# Patient Record
Sex: FEMALE | Race: WHITE | NOT HISPANIC OR LATINO | Employment: OTHER | URBAN - METROPOLITAN AREA
[De-identification: names, ages, dates, MRNs, and addresses within clinical notes are randomized per-mention and may not be internally consistent; named-entity substitution may affect disease eponyms.]

---

## 2018-07-09 ENCOUNTER — TELEPHONE (OUTPATIENT)
Dept: NEUROSURGERY | Facility: CLINIC | Age: 73
End: 2018-07-09

## 2018-07-09 NOTE — TELEPHONE ENCOUNTER
Message left earlier in the day reporting she had previous surgery with Dr Chin Carl and would like a call back after 1 pm   Call returned with no answer and request to return call at her convenience

## 2018-11-27 ENCOUNTER — HOSPITAL ENCOUNTER (OUTPATIENT)
Dept: RADIOLOGY | Facility: HOSPITAL | Age: 73
Discharge: HOME | End: 2018-11-27
Payer: MEDICARE

## 2018-11-27 ENCOUNTER — OFFICE VISIT (OUTPATIENT)
Dept: OBGYN CLINIC | Facility: HOSPITAL | Age: 73
End: 2018-11-27
Payer: MEDICARE

## 2018-11-27 VITALS
HEART RATE: 73 BPM | DIASTOLIC BLOOD PRESSURE: 80 MMHG | WEIGHT: 193 LBS | HEIGHT: 63 IN | BODY MASS INDEX: 34.2 KG/M2 | SYSTOLIC BLOOD PRESSURE: 138 MMHG

## 2018-11-27 DIAGNOSIS — M79.642 LEFT HAND PAIN: ICD-10-CM

## 2018-11-27 DIAGNOSIS — G56.21 GUYON SYNDROME, RIGHT: ICD-10-CM

## 2018-11-27 DIAGNOSIS — M79.642 LEFT HAND PAIN: Primary | ICD-10-CM

## 2018-11-27 DIAGNOSIS — M79.641 RIGHT HAND PAIN: ICD-10-CM

## 2018-11-27 DIAGNOSIS — G56.02 CARPAL TUNNEL SYNDROME ON LEFT: ICD-10-CM

## 2018-11-27 PROCEDURE — 99213 OFFICE O/P EST LOW 20 MIN: CPT | Performed by: PHYSICIAN ASSISTANT

## 2018-11-27 PROCEDURE — 73130 X-RAY EXAM OF HAND: CPT

## 2018-11-27 RX ORDER — LISINOPRIL 40 MG/1
40 TABLET ORAL DAILY
COMMUNITY
End: 2018-11-27 | Stop reason: CLARIF

## 2018-11-27 RX ORDER — LEVOTHYROXINE SODIUM 0.15 MG/1
150 TABLET ORAL
COMMUNITY
Start: 2018-10-12 | End: 2019-03-18 | Stop reason: SDUPTHER

## 2018-11-27 RX ORDER — LEVOTHYROXINE SODIUM 0.1 MG/1
100 TABLET ORAL DAILY
COMMUNITY
End: 2018-11-27 | Stop reason: CLARIF

## 2018-11-27 RX ORDER — BUPROPION HYDROCHLORIDE 150 MG/1
TABLET, EXTENDED RELEASE ORAL
COMMUNITY
Start: 2018-09-03 | End: 2019-03-18

## 2018-11-27 RX ORDER — LISINOPRIL 40 MG/1
1 TABLET ORAL DAILY
COMMUNITY
Start: 2013-11-05 | End: 2019-03-18

## 2018-11-27 RX ORDER — SERTRALINE HYDROCHLORIDE 100 MG/1
150 TABLET, FILM COATED ORAL DAILY
Refills: 1 | COMMUNITY
Start: 2018-10-14 | End: 2019-03-18

## 2018-11-27 RX ORDER — ALBUTEROL SULFATE 90 UG/1
AEROSOL, METERED RESPIRATORY (INHALATION) 3 TIMES DAILY PRN
COMMUNITY
Start: 2014-04-28 | End: 2019-03-18

## 2018-11-27 RX ORDER — CYCLOSPORINE 0.5 MG/ML
1 EMULSION OPHTHALMIC EVERY 12 HOURS
COMMUNITY
Start: 2018-11-07 | End: 2022-02-02 | Stop reason: ALTCHOICE

## 2018-11-27 RX ORDER — TRAZODONE HYDROCHLORIDE 100 MG/1
100 TABLET ORAL
COMMUNITY
Start: 2018-08-14 | End: 2019-03-18

## 2018-11-27 RX ORDER — PANTOPRAZOLE SODIUM 40 MG/1
TABLET, DELAYED RELEASE ORAL
COMMUNITY
Start: 2018-11-20 | End: 2019-03-18

## 2018-11-27 RX ORDER — ATORVASTATIN CALCIUM 20 MG/1
20 TABLET, FILM COATED ORAL
COMMUNITY
Start: 2017-08-28 | End: 2019-03-18

## 2018-11-27 NOTE — PROGRESS NOTES
Assessment/Plan   Diagnoses and all orders for this visit:      Carpal tunnel syndrome on left  -     EMG 2 Limb Upper Extremity; Future    Guyon syndrome, right  -     EMG 2 Limb Upper Extremity; Future    Other orders  -     Carpal tunnel night splints  Follow up with Dr Esa Bosch after the EMG          Subjective   Patient ID: Olman Blackman is a 74 y o  female  Vitals:    11/27/18 1044   BP: 138/80   Pulse: 68     72yo female comes in for an evaluation of her b/l hands  She has been doing a lot quilting lately and has been getting pain and numbness in her hands  Right hand: She has a history of a carpal tunnel release and cubital tunnel release in 2016 by Dr Esa Bosch  At that time, she was having numbness and pain in the long, ring, and small fingers  Currently, she has had a return of pain and numbness in the same three fingers  She has not noticed weakness  The pain does not radiate  Left hand:  No surgical history  She c/o pain and numbness in the index, long, and ring fingers  This increases at night and it increases with quilting  The left hand is worse than the right  The pain radiates to the elbow  No weakness  + history of cspine fusion, but she denies any neck pain now  The following portions of the patient's history were reviewed and updated as appropriate: allergies, current medications, past family history, past medical history, past social history, past surgical history and problem list     Review of Systems  Ortho Exam  Past Medical History:   Diagnosis Date    Acid reflux     Depressed     Hyperlipemia     Hypertension     Hypothyroid     Tendinitis      Past Surgical History:   Procedure Laterality Date    APPENDECTOMY      GALLBLADDER SURGERY      KNEE SURGERY Right     NECK SURGERY      WRIST SURGERY Right      History reviewed  No pertinent family history  Social History     Occupational History    Not on file       Social History Main Topics    Smoking status: Former Smoker    Smokeless tobacco: Never Used    Alcohol use No    Drug use: No    Sexual activity: Not on file       Review of Systems   Constitutional: Negative  HENT: Negative  Eyes: Negative  Respiratory: Negative  Cardiovascular: Negative  Gastrointestinal: Negative  Endocrine: Negative  Genitourinary: Negative  Musculoskeletal: As below      Allergic/Immunologic: Negative  Neurological: Negative  Hematological: Negative  Psychiatric/Behavioral: Negative  Objective   Physical Exam    · Constitutional: Awake, Alert, Oriented  · Eyes: EOMI  · Psych: Mood and affect appropriate  · Heart: regular rate and rhythm  · Lungs: No audible wheezing  · Abdomen: soft  · Lymph: no lymphedema   bilateral hand:  - Appearance   No swelling, discoloration, deformity, or ecchymosis and no thenar wasting  - Palpation  o No tenderness to palpation of distal radius, distal ulna, scaphoid, lunate, hamate, ulnar wrist, dorsal wrist, palmar wrist, thenar eminence, hypothenar eminence, or hand   - ROM  o full active flexion and extension  - Motor  o  5/5, wrist extension 5/5, and wrist flexion 5/5, interossi 5/5  - Special Tests  o + Phalen's maneuver and + carpal tunnel compression test bilaterally  These both cause symptoms in the index/long/ring on the right and the long/ring/small on the left  Negative tinels b/l at the carpal tunnel and cubital tunnel   + tinels at guy[place] canal on the right only  - NVI distally except as above  I have personally reviewed pertinent films in PACS and my interpretation is Osteoarthritis noted in the DIP and PIP joints  No acute fracture  Stella Jones

## 2019-01-07 ENCOUNTER — EVALUATION (OUTPATIENT)
Dept: PHYSICAL THERAPY | Facility: MEDICAL CENTER | Age: 74
End: 2019-01-07
Payer: MEDICARE

## 2019-01-07 DIAGNOSIS — M76.61 RIGHT ACHILLES TENDINITIS: Primary | ICD-10-CM

## 2019-01-07 PROCEDURE — G8979 MOBILITY GOAL STATUS: HCPCS | Performed by: PHYSICAL THERAPIST

## 2019-01-07 PROCEDURE — 97161 PT EVAL LOW COMPLEX 20 MIN: CPT | Performed by: PHYSICAL THERAPIST

## 2019-01-07 PROCEDURE — G8978 MOBILITY CURRENT STATUS: HCPCS | Performed by: PHYSICAL THERAPIST

## 2019-01-07 NOTE — LETTER
2019    Esther Shafer DPM  [address] [place] [address] [address]    Patient: Maria Fernanda Hyde   YOB: 1945   Date of Visit: 2019     Encounter Diagnosis     ICD-10-CM    1  Right Achilles tendinitis M76 61        Dear Dr Johan Hall:    Please review the attached Plan of Care from [facility] recent visit  Please verify that you agree therapy should continue by signing the attached document and sending it back to our office  If you have any questions or concerns, please don't hesitate to call  Sincerely,    Mary eWbb, PT      Referring Provider:      I certify that I have read the below Plan of Care and certify the need for these services furnished under this plan of treatment while under my care  Esther Shafer DPM  [address] [place] [address] [address]  VIA [place]          PT Evaluation     Today's date: 2019  Patient name: Maria Fernanda Hyde  : 1945  MRN: [id number]  Referring provider: LUISANA Lewis  Dx:   Encounter Diagnosis     ICD-10-CM    1  Right Achilles tendinitis M76 61                   Assessment  Assessment details: Maria Fernanda Hyde is a pleasant 74 y o  female presents with right achilles pain  No further referral appears necessary at this time  Primary movement diagnosis of gastroc tightness and TCJ hypomobility resulting in symptoms of pain and limiting her participation/performance in walk long distances, wear shoes with backs, go down stairs   Primary impairments include:  1)  Gastroc tightness and muscle spasm - addressing with STR and stretching   2) Poor TCJ mobility -addressing with mobilizations   3) Achilles insertion irritation - addressing with modalities     Skilled PT services appropriate to facilitate return to prior level of function with transition to home exercise program for independent management when appropriate      Impairments: abnormal muscle firing, abnormal muscle tone, abnormal or restricted ROM, impaired physical strength, lacks appropriate home exercise program and pain with function  Understanding of Dx/Px/POC: good   Prognosis: good    Goals  Patient will successfully transition to home exercise program   Patient will be able to manage symptoms independently  Patient will be able to walk one mile without pain in 8 weeks  Patient will be able to go down stairs without limitation in 8 weeks  Patient will be able to wear backed shoes without discomfort in 8 weeks     Plan  Patient would benefit from: skilled PT  Referral necessary: No  Planned modality interventions: thermotherapy: hydrocollator packs  Planned therapy interventions: home exercise program, manual therapy, neuromuscular re-education, patient education, functional ROM exercises, strengthening, stretching, joint mobilization, graded activity, graded exercise, therapeutic exercise, body mechanics training, motor coordination training and activity modification  Frequency: 2x week  Duration in weeks: 8  Treatment plan discussed with: patient        Subjective Evaluation    History of Present Illness  Mechanism of injury: Destinee Shukla is a 74 y o  female presenting to therapy with complaints of right achilles pain  She reports that pain began a year ago and has gotten worse  She has had injection which helped her pain for a few weeks but pain returned  She is unable to wear shoes with backs as the pressure makes pain intolerable  She limits her walking or standing due to pain and hopes to get back to walking for exercise      Pain  Current pain ratin  At best pain ratin  At worst pain ratin  Quality: dull ache and sharp    Patient Goals  Patient goals for therapy: decreased pain, independence with ADLs/IADLs and increased motion          Objective     Observations     Additional Observation Details  Visible swelling/tendon hypertrophy to posterior heel with TTP    Palpation     Right Tenderness of the lateral gastrocnemius and medial gastrocnemius  Trigger point to lateral gastrocnemius and medial gastrocnemius  Tenderness     Right Ankle/Foot   Tenderness in the Achilles insertion  Neurological Testing     Sensation     Ankle/Foot   Left Ankle/Foot   Intact: light touch    Right Ankle/Foot   Intact: light touch     Active Range of Motion   Left Ankle/Foot   Dorsiflexion (ke): 5 degrees   Dorsiflexion (kf): 20 degrees   Plantar flexion: WFL  Inversion: WFL  Eversion: WFL    Right Ankle/Foot   Dorsiflexion (ke): 5 degrees   Dorsiflexion (kf): 15 degrees with pain  Plantar flexion: WFL  Inversion: WFL  Eversion: WFL    Passive Range of Motion   Left Ankle/Foot    Dorsiflexion (ke): 5 degrees   Dorsiflexion (kf): 25 degrees     Right Ankle/Foot    Dorsiflexion (ke): 5 degrees   Dorsiflexion (kf): 15 degrees      Joint Play     Right Ankle/Foot  Hypomobile in the talocrural joint, subtalar joint and midfoot         Flowsheet Rows      Most Recent Value   PT/OT G-Codes   Current Score  49   Projected Score  56          Precautions: None    Daily Treatment Diary     Manual  1/7            Posterior TCJ mobilizations  AF            Gastroc STR AF                                                       Exercise Diary              Gastroc stretching              Self gastroc soft tissue release                                                                                                                                                                                                                                                            Modalities

## 2019-01-08 ENCOUNTER — TRANSCRIBE ORDERS (OUTPATIENT)
Dept: PHYSICAL THERAPY | Facility: MEDICAL CENTER | Age: 74
End: 2019-01-08

## 2019-01-08 DIAGNOSIS — M76.61 RIGHT ACHILLES TENDINITIS: Primary | ICD-10-CM

## 2019-01-11 ENCOUNTER — OFFICE VISIT (OUTPATIENT)
Dept: PHYSICAL THERAPY | Facility: MEDICAL CENTER | Age: 74
End: 2019-01-11
Payer: MEDICARE

## 2019-01-11 DIAGNOSIS — M76.61 RIGHT ACHILLES TENDINITIS: Primary | ICD-10-CM

## 2019-01-11 PROCEDURE — 97140 MANUAL THERAPY 1/> REGIONS: CPT

## 2019-01-11 PROCEDURE — 97110 THERAPEUTIC EXERCISES: CPT

## 2019-01-11 NOTE — PROGRESS NOTES
Daily Note     Today's date: 2019  Patient name: Omlan Blackman  : 1945  MRN: [id number]  Referring provider: LUISANA Barlow  Dx:   Encounter Diagnosis     ICD-10-CM    1  Right Achilles tendinitis M76 61        Start Time: 1000  Stop Time: 1035  Total time in clinic (min): 35 minutes    Subjective: Patient reports "3/10" pain prior to PT today  Patient notes decrease stiffness post therapy  Objective: See treatment diary below    Assessment: Tolerated treatment fair  Patient demonstrated fatigue post treatment, exhibited good technique with therapeutic exercises and would benefit from continued PT  Mod myofacial gastroc restriction noted during manual therapy  Patient felt improved post therapy today noting "decreased stiffness"  However, patient noted achilles soreness/irritation  Plan: Continue per plan of care       Precautions: None    Daily Treatment Diary     Manual             Posterior TCJ mobilizations  AF            Gastroc STR AF JM           Gastroc/achilles STM  JM           Foam Roller  JM                          Exercise Diary              Gastroc stretching   20"x5           Self gastroc soft tissue release              PF TB  Pink  2x10           Heel raises  2x10           Bike   5'                                                                                                                                                                                                                Modalities

## 2019-01-14 ENCOUNTER — APPOINTMENT (OUTPATIENT)
Dept: PHYSICAL THERAPY | Facility: MEDICAL CENTER | Age: 74
End: 2019-01-14
Payer: MEDICARE

## 2019-01-18 ENCOUNTER — APPOINTMENT (OUTPATIENT)
Dept: PHYSICAL THERAPY | Facility: MEDICAL CENTER | Age: 74
End: 2019-01-18
Payer: MEDICARE

## 2019-01-22 ENCOUNTER — OFFICE VISIT (OUTPATIENT)
Dept: PHYSICAL THERAPY | Facility: MEDICAL CENTER | Age: 74
End: 2019-01-22
Payer: MEDICARE

## 2019-01-22 DIAGNOSIS — M76.61 RIGHT ACHILLES TENDINITIS: Primary | ICD-10-CM

## 2019-01-22 PROCEDURE — 97110 THERAPEUTIC EXERCISES: CPT | Performed by: PHYSICAL THERAPIST

## 2019-01-22 PROCEDURE — 97140 MANUAL THERAPY 1/> REGIONS: CPT | Performed by: PHYSICAL THERAPIST

## 2019-01-23 NOTE — PROGRESS NOTES
Daily Note     Today's date: 2019  Patient name: Kimberly Holloway  : 1945  MRN: [id number]  Referring provider: LUISANA Katz  Dx:   Encounter Diagnosis     ICD-10-CM    1  Right Achilles tendinitis M76 61                   Subjective: Alvaro Gomez reports that she is noticing some improvements but still has pain and cannot wear backed shoes      Objective: See treatment diary below  Precautions: None    Daily Treatment Diary     Manual            Posterior TCJ mobilizations  AF  AF          Gastroc STR AF JM           Gastroc/achilles STM  JM AF          Foam Roller  JM                          Exercise Diary              Gastroc stretching   20"x5 With strap  30 sec  X3          Self gastroc soft tissue release              PF TB  Pink  2x10           Heel raises  2x10           Bike   5' 10'          BAPS forward and back   5 sec  x20          Heel slides in sitting   5 sec  x20                                                                                                                                                                                     Modalities                                                         Assessment: Tolerated treatment well  Patient with best stretch using strap and avoiding weight bearing due to heel pain  Also suggested she contact referring provider and inquire about use of iontophoresis to control some pain       Plan: Continue per plan of care

## 2019-01-25 ENCOUNTER — OFFICE VISIT (OUTPATIENT)
Dept: PHYSICAL THERAPY | Facility: MEDICAL CENTER | Age: 74
End: 2019-01-25
Payer: MEDICARE

## 2019-01-25 DIAGNOSIS — M76.61 RIGHT ACHILLES TENDINITIS: Primary | ICD-10-CM

## 2019-01-25 PROCEDURE — 97140 MANUAL THERAPY 1/> REGIONS: CPT

## 2019-01-25 PROCEDURE — 97110 THERAPEUTIC EXERCISES: CPT

## 2019-01-25 NOTE — PROGRESS NOTES
Daily Note     Today's date: 2019  Patient name: Iman Venegas  : 1945  MRN: [id number]  Referring provider: LUISANA Monsalve  Dx:   Encounter Diagnosis     ICD-10-CM    1  Right Achilles tendinitis M76 61        Start Time: 1500  Stop Time: 1540  Total time in clinic (min): 40 minutes    Subjective: Pt reports R achilles is feeling better than usual today  "It just pulls a little bit "      Objective: See treatment diary below    Precautions: None    Daily Treatment Diary     Manual           Posterior TCJ mobilizations  AF  AF          Gastroc STR AF JM           Gastroc/achilles STM  JM AF AFB         Foam Roller  JM  AFB                        Exercise Diary              Gastroc stretching   20"x5 With strap  30 sec  X3 With strap  30 sec x3         Self gastroc soft tissue release              PF TB  Pink  2x10  Pink  2x10         Heel raises  2x10  2x10         Bike   5' 10' 10'         BAPS forward and back   5 sec  x20 5 sec  x20         Heel slides in sitting   5 sec  x20 5 sec  x20                                                                                                                                                                                    Modalities                                                         Assessment: Tolerated treatment well  Heel raises and PF TB added back to program this session  Was able to perform all exercises today with no significant increase of pain  Pt stated she felt the heel slides have been helping her the most   Moderate tension in achilles and distal gastroc noted during STM  Patient would benefit from continued PT to further decrease pain and return to PLOF  Plan: Continue per plan of care  Progress treatment as tolerated

## 2019-01-28 ENCOUNTER — OFFICE VISIT (OUTPATIENT)
Dept: PHYSICAL THERAPY | Facility: MEDICAL CENTER | Age: 74
End: 2019-01-28
Payer: MEDICARE

## 2019-01-28 DIAGNOSIS — M76.61 RIGHT ACHILLES TENDINITIS: Primary | ICD-10-CM

## 2019-01-28 PROCEDURE — 97110 THERAPEUTIC EXERCISES: CPT

## 2019-01-28 PROCEDURE — 97140 MANUAL THERAPY 1/> REGIONS: CPT

## 2019-01-28 NOTE — PROGRESS NOTES
Daily Note     Today's date: 2019  Patient name: Shital Garza  : 1945  MRN: [id number]  Referring provider: LUISANA Schilling  Dx:   Encounter Diagnosis     ICD-10-CM    1  Right Achilles tendinitis M76 61        Start Time: 1430  Stop Time: 1510  Total time in clinic (min): 40 minutes    Subjective: Pt reports her R ankle "doesn't feel as good as it did on Friday, but still better "      Objective: See treatment diary below  Precautions: None    Daily Treatment Diary     Manual          Posterior TCJ mobilizations  AF  AF          Gastroc STR AF JM           Gastroc/achilles STM  JM AF [employer] AF[employer]        Foam Roller  JM  AFB AFB                       Exercise Diary              Gastroc stretching   20"x5 With strap  30 sec  X3 With strap  30 sec x3 With strap  30 sec x3        Self gastroc soft tissue release              PF TB  Pink  2x10  Pink  2x10 Pink  3x10        Heel raises  2x10  2x10 3x10        Bike   5' 10' 10' 10'        BAPS forward and back   5 sec  x20 5 sec  x20 5 sec  x30        Heel slides in sitting   5 sec  x20 5 sec  x20 5 sec  x30                                                                                                                                                                                   Modalities                                                         Assessment: Tolerated treatment well  Reps increased as tolerated by patient  Minimal verbal cueing required for proper form with exercises  Patient reported feeling slightly better at the end of session  "It feels more stretched "  Patient would benefit from continued PT  Plan: Continue per plan of care  Progress treatment as tolerated

## 2019-02-01 ENCOUNTER — OFFICE VISIT (OUTPATIENT)
Dept: PHYSICAL THERAPY | Facility: MEDICAL CENTER | Age: 74
End: 2019-02-01
Payer: MEDICARE

## 2019-02-01 DIAGNOSIS — M76.61 RIGHT ACHILLES TENDINITIS: Primary | ICD-10-CM

## 2019-02-01 PROCEDURE — 97140 MANUAL THERAPY 1/> REGIONS: CPT

## 2019-02-01 PROCEDURE — 97110 THERAPEUTIC EXERCISES: CPT

## 2019-02-01 NOTE — PROGRESS NOTES
Daily Note     Today's date: 2019  Patient name: No Yeh  : 1945  MRN: [id number]  Referring provider: LUISANA Gutierrez  Dx:   Encounter Diagnosis     ICD-10-CM    1  Right Achilles tendinitis M76 61        Start Time:   Stop Time:   Total time in clinic (min): 45 minutes    Subjective: Pt states pain today is about the same as after her last visit  "It's much better than it ever was, but I can tell I still do to much "      Objective: See treatment diary below  Precautions: None    Daily Treatment Diary     Manual         Posterior TCJ mobilizations  AF  AF          Gastroc STR AF JM           Gastroc/achilles STM  JM AF [employer] AF[employer] AFB       Foam Roller  JM  AFB AFB AFB                      Exercise Diary              Gastroc stretching   20"x5 With strap  30 sec  X3 With strap  30 sec x3 With strap  30 sec x3 With strap  30 sec x4       Self gastroc soft tissue release       AFB       PF TB  Pink  2x10  Pink  2x10 Pink  3x10 Pink  3x10       Heel raises  2x10  2x10 3x10 3x10       Bike   5' 10' 10' 10' 10'       BAPS forward and back   5 sec  x20 5 sec  x20 5 sec  x30 5 sec  x30       Heel slides in sitting   5 sec  x20 5 sec  x20 5 sec  x30 5 sec  x30                                                                                                                                                                                  Modalities                                                         Assessment: Tolerated treatment well  Moderate tension and trigger points noted in muscle belly of R gastroc, which improved with STM and foam roaller  Would likely tolerate increased resistance w/ PF TB next session  Patient was able to perform all exercise with no significant increase of pain  Patient would benefit from continued PT  Plan: Continue per plan of care  Progress treatment as tolerated

## 2019-02-04 ENCOUNTER — OFFICE VISIT (OUTPATIENT)
Dept: PHYSICAL THERAPY | Facility: MEDICAL CENTER | Age: 74
End: 2019-02-04
Payer: MEDICARE

## 2019-02-04 DIAGNOSIS — M76.61 RIGHT ACHILLES TENDINITIS: Primary | ICD-10-CM

## 2019-02-04 PROCEDURE — 97110 THERAPEUTIC EXERCISES: CPT

## 2019-02-04 PROCEDURE — 97140 MANUAL THERAPY 1/> REGIONS: CPT

## 2019-02-04 NOTE — PROGRESS NOTES
Daily Note     Today's date: 2019  Patient name: Rina Baumgarten  : 1945  MRN: [id number]  Referring provider: LUISANA Scott  Dx:   Encounter Diagnosis     ICD-10-CM    1  Right Achilles tendinitis M76 61        Start Time: 1430  Stop Time: 1515  Total time in clinic (min): 45 minutes    Subjective: Pt stated she had no pain today prior to start of session  Objective: See treatment diary below  Precautions: None    Daily Treatment Diary     Manual        Posterior TCJ mobilizations  AF  AF          Gastroc STR AF JM           Gastroc/achilles STM  JM AF AFB AFB AFB AFB      Foam Roller  JM  AFB AFB AFB AFB                     Exercise Diary              Gastroc stretching   20"x5 With strap  30 sec  X3 With strap  30 sec x3 With strap  30 sec x3 With strap  30 sec x4 With strap  30 sec x4      Self gastroc soft tissue release       AFB       PF TB  Pink  2x10  Pink  2x10 Pink  3x10 Pink  3x10 orange  3x10      Heel raises  2x10  2x10 3x10 3x10 x25 (P!)      Bike   5' 10' 10' 10' 10' 10'      BAPS forward and back   5 sec  x20 5 sec  x20 5 sec  x30 5 sec  x30 5 sec  x30      Heel slides in sitting   5 sec  x20 5 sec  x20 5 sec  x30 5 sec  x30 5 sec  x30                                                                                                                                                                                 Modalities                                                         Assessment: Tolerated treatment well  Increased pain experienced while performing heel raises  Remaining reps held 2* to this report of increased pain  Tension and trigger points continue to present in R gastroc/achilles, but has improved since last session  Patient would benefit from continued PT  Plan: Continue per plan of care  Progress treatment as tolerated

## 2019-02-08 ENCOUNTER — OFFICE VISIT (OUTPATIENT)
Dept: PHYSICAL THERAPY | Facility: MEDICAL CENTER | Age: 74
End: 2019-02-08
Payer: MEDICARE

## 2019-02-08 ENCOUNTER — APPOINTMENT (OUTPATIENT)
Dept: RADIOLOGY | Facility: MEDICAL CENTER | Age: 74
End: 2019-02-08
Payer: MEDICARE

## 2019-02-08 ENCOUNTER — TRANSCRIBE ORDERS (OUTPATIENT)
Dept: ADMINISTRATIVE | Facility: HOSPITAL | Age: 74
End: 2019-02-08

## 2019-02-08 DIAGNOSIS — M76.61 TENDONITIS, ACHILLES, RIGHT: ICD-10-CM

## 2019-02-08 DIAGNOSIS — M76.61 TENDONITIS, ACHILLES, RIGHT: Primary | ICD-10-CM

## 2019-02-08 DIAGNOSIS — M76.61 RIGHT ACHILLES TENDINITIS: Primary | ICD-10-CM

## 2019-02-08 PROCEDURE — 97110 THERAPEUTIC EXERCISES: CPT | Performed by: PHYSICAL THERAPIST

## 2019-02-08 PROCEDURE — 97140 MANUAL THERAPY 1/> REGIONS: CPT | Performed by: PHYSICAL THERAPIST

## 2019-02-08 PROCEDURE — 73650 X-RAY EXAM OF HEEL: CPT

## 2019-02-08 NOTE — PROGRESS NOTES
Daily Note     Today's date: 2019  Patient name: Hugo Oglesby  : 1945  MRN: [id number]  Referring provider: LUISANA Orourke  Dx:   Encounter Diagnosis     ICD-10-CM    1  Right Achilles tendinitis M76 61                   Subjective: Reports no pain currently  Will be seeing podiatrist next Monday  Objective: See treatment diary below  Precautions: None    Daily Treatment Diary     Manual       Posterior TCJ mobilizations  AF  AF          Gastroc STR AF JM           Gastroc/achilles STM  JM AF AFB AFB AFB AFB DD used graston     Foam Roller  JM  AFB AFB AFB AFB DD                    Exercise Diary              Gastroc stretching   20"x5 With strap  30 sec  X3 With strap  30 sec x3 With strap  30 sec x3 With strap  30 sec x4 With strap  30 sec x4 With strap  30 sec x4     Self gastroc soft tissue release       AFB       PF TB  Pink  2x10  Pink  2x10 Pink  3x10 Pink  3x10 orange  3x10 Blue TB, 3x10     Heel raises  2x10  2x10 3x10 3x10 x25 (P!) R foot on floor, L foot on stool  Focus on eccentric  10x2     Bike   5' 10' 10' 10' 10' 10' 10' L2     BAPS forward and back   5 sec  x20 5 sec  x20 5 sec  x30 5 sec  x30 5 sec  x30 30x      Heel slides in sitting   5 sec  x20 5 sec  x20 5 sec  x30 5 sec  x30 5 sec  x30 5 sec  x30                                                                                                                                                                                Modalities                                                         Assessment: Tolerated treatment well  Responsded well to [facility] with instrument reporting reduced pain and tightness in tendon  Able to tlerated progression on thera band difficulty today  Modified heel raise exercise in response to pain last visit, pt assited with concentric and focused on eccentric which produced less pain compared to last session  Would benefit from continue PT       Plan: Continue per plan of care  Progress treatment as tolerated

## 2019-02-11 ENCOUNTER — APPOINTMENT (OUTPATIENT)
Dept: PHYSICAL THERAPY | Facility: MEDICAL CENTER | Age: 74
End: 2019-02-11
Payer: MEDICARE

## 2019-02-22 ENCOUNTER — OFFICE VISIT (OUTPATIENT)
Dept: OBGYN CLINIC | Facility: HOSPITAL | Age: 74
End: 2019-02-22
Payer: MEDICARE

## 2019-02-22 VITALS
BODY MASS INDEX: 34.73 KG/M2 | SYSTOLIC BLOOD PRESSURE: 131 MMHG | WEIGHT: 196 LBS | HEART RATE: 77 BPM | HEIGHT: 63 IN | DIASTOLIC BLOOD PRESSURE: 75 MMHG

## 2019-02-22 DIAGNOSIS — G56.01 CARPAL TUNNEL SYNDROME OF RIGHT WRIST: Primary | ICD-10-CM

## 2019-02-22 DIAGNOSIS — G56.02 CARPAL TUNNEL SYNDROME OF LEFT WRIST: ICD-10-CM

## 2019-02-22 PROCEDURE — 99214 OFFICE O/P EST MOD 30 MIN: CPT | Performed by: ORTHOPAEDIC SURGERY

## 2019-02-22 NOTE — PROGRESS NOTES
ASSESSMENT/PLAN:    Assessment:   Bilateral CTS    Plan:   Patient will start wearing the braces again as her symptoms resolved while wearing them  Follow Up:  8  week(s)    To Do Next Visit:   recheck CTS    General Discussions:     Carpal Tunnel Syndrome: The anatomy and physiology of carpal tunnel syndrome was discussed with the patient today  Increase pressure localized under the transverse carpal ligament can cause pain, numbness, tingling, or dysesthesias within the median nerve distribution as well as feelings of fatigue, clumsiness, or awkwardness  These symptoms typically occur at night and worse in the morning upon waking  Eventually, untreated carpal tunnel syndrome can result in weakness and permanent loss of muscle within the thenar compartment of the hand  Treatment options were discussed with the patient  Conservative treatment includes nocturnal resting splints to keep the nerve in a neutral position, ergonomic changes within the work or home environment, activity modification, and tendon gliding exercises  Steroid injections within the carpal canal can help a majority of patients, however this is often self-limited in a majority of patients  Surgical intervention to divide the transverse carpal ligament typically results in a long-lasting relief of the patient's complaints, with the recurrence rate of less than 1%        _____________________________________________________  CHIEF COMPLAINT:  Chief Complaint   Patient presents with    Right Wrist - Pain, Numbness    Left Wrist - Pain, Numbness         SUBJECTIVE:  Olman Blackman is a 74y o  year old female who presents with Numbness to the bilateral index finger, long finger and thumb  This increases at night and it increases with quilting  Patient was seen by [facility] who provided the patient with braces, ordered an EMG and referred here today  Patient states while she was using the night time splints her symptoms resolved  Since she stopped wearing them the N/T has returned  Patient had a previous right carpal tunnel release and cubital tunnel release in 2016  PAST MEDICAL HISTORY:  Past Medical History:   Diagnosis Date    Acid reflux     Depressed     Hyperlipemia     Hypertension     Hypothyroid     Tendinitis        PAST SURGICAL HISTORY:  Past Surgical History:   Procedure Laterality Date    APPENDECTOMY      GALLBLADDER SURGERY      KNEE SURGERY Right     NECK SURGERY      WRIST SURGERY Right        FAMILY HISTORY:  Family History   Problem Relation Age of Onset    No Known Problems Mother     No Known Problems Father        SOCIAL HISTORY:  Social History     Tobacco Use    Smoking status: Former Smoker    Smokeless tobacco: Never Used   Substance Use Topics    Alcohol use: No    Drug use: No       MEDICATIONS:    Current Outpatient Medications:     albuterol (PROAIR HFA) 90 mcg/act inhaler, Inhale 3 (three) times a day as needed, Disp: , Rfl:     atorvastatin (LIPITOR) 20 mg tablet, Take 20 mg by mouth, Disp: , Rfl:     buPROPion (WELLBUTRIN SR) 150 mg 12 hr tablet, TAKE 1 TABLET BY MOUTH TWICE A DAY, Disp: , Rfl:     levothyroxine 150 mcg tablet, Take 150 mcg by mouth, Disp: , Rfl:     lisinopril (ZESTRIL) 40 mg tablet, Take 1 tablet by mouth daily, Disp: , Rfl:     pantoprazole (PROTONIX) 40 mg tablet, , Disp: , Rfl:     RESTASIS 0 05 % ophthalmic emulsion, , Disp: , Rfl:     sertraline (ZOLOFT) 100 mg tablet, Take 150 mg by mouth daily, Disp: , Rfl: 1    traZODone (DESYREL) 100 mg tablet, Take 100 mg by mouth, Disp: , Rfl:     ALLERGIES:  Allergies   Allergen Reactions    Ceftin [Cefuroxime] Hives       REVIEW OF SYSTEMS:  Pertinent items are noted in HPI  A comprehensive review of systems was negative      LABS:  HgA1c: No results found for: HGBA1C  BMP:   Lab Results   Component Value Date    GLUCOSE 121 02/27/2015    CALCIUM 8 3 02/27/2015     02/27/2015    K 4 3 02/27/2015    CO2 29 02/27/2015     02/27/2015    BUN 18 08/29/2016    CREATININE 0 96 08/29/2016         _____________________________________________________  PHYSICAL EXAMINATION:  General: well developed and well nourished, alert, oriented times 3 and appears comfortable  Psychiatric: Normal  HEENT: Trachea Midline, No torticollis  Cardiovascular: No discernable arrhythmia  Pulmonary: No wheezing or stridor  Skin: No masses, erthema, lacerations, fluctation, ulcerations  Neurovascular: Pulses Intact    MUSCULOSKELETAL EXAMINATION:  LEFT SIDE:  Carpal tunnel:  Postive Tinel's, derkin's compression + for pain, 5/5 AIN, 5/5 intrinsic, 4-/5 opposition   RIGHT SIDE:  Carpal tunnel:  Negative tinel's test and derkin's compression causes pain, AIN 5/5, intrinsic 5/5, opposition 5/5    _____________________________________________________  STUDIES REVIEWED:  EMG: Mild to Moderate CTS of the right wrist, borderline to mild CTS of the left wrist       PROCEDURES PERFORMED:  Procedures  No Procedures performed today     Scribe Attestation    I,:   Lucas Mercedes am acting as a scribe while in the presence of the attending physician :        I,:   Teresa Wilkins MD personally performed the services described in this documentation    as scribed in my presence :

## 2019-03-01 ENCOUNTER — APPOINTMENT (OUTPATIENT)
Dept: PHYSICAL THERAPY | Facility: MEDICAL CENTER | Age: 74
End: 2019-03-01
Payer: MEDICARE

## 2019-03-04 ENCOUNTER — APPOINTMENT (OUTPATIENT)
Dept: PHYSICAL THERAPY | Facility: MEDICAL CENTER | Age: 74
End: 2019-03-04
Payer: MEDICARE

## 2019-03-05 ENCOUNTER — OFFICE VISIT (OUTPATIENT)
Dept: PHYSICAL THERAPY | Facility: MEDICAL CENTER | Age: 74
End: 2019-03-05
Payer: MEDICARE

## 2019-03-05 DIAGNOSIS — M76.61 RIGHT ACHILLES TENDINITIS: Primary | ICD-10-CM

## 2019-03-05 PROCEDURE — 97140 MANUAL THERAPY 1/> REGIONS: CPT

## 2019-03-05 PROCEDURE — 97110 THERAPEUTIC EXERCISES: CPT
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
## 2019-03-08 ENCOUNTER — APPOINTMENT (OUTPATIENT)
Dept: PHYSICAL THERAPY | Facility: MEDICAL CENTER | Age: 74
End: 2019-03-08
Payer: MEDICARE

## 2019-03-08 ENCOUNTER — OFFICE VISIT (OUTPATIENT)
Dept: PHYSICAL THERAPY | Facility: MEDICAL CENTER | Age: 74
End: 2019-03-08
Payer: MEDICARE

## 2019-03-08 DIAGNOSIS — M76.61 RIGHT ACHILLES TENDINITIS: Primary | ICD-10-CM

## 2019-03-08 PROCEDURE — 97140 MANUAL THERAPY 1/> REGIONS: CPT | Performed by: PHYSICAL THERAPIST

## 2019-03-08 PROCEDURE — 97110 THERAPEUTIC EXERCISES: CPT | Performed by: PHYSICAL THERAPIST

## 2019-03-08 NOTE — PROGRESS NOTES
Daily Note     Today's date: 3/8/2019  Patient name: Jovan Denis  : 1945  MRN: [id number]  Referring provider: LUISANA Lai  Dx:   Encounter Diagnosis     ICD-10-CM    1  Right Achilles tendinitis M[age]        Start Time: 1055  Stop Time: 1140  Total time in clinic (min): 45 minutes    Subjective: Patient rates pain pre-treatment as 2/10  She mentions that she noticed relief of pain for about 2 days with the ionto patch  Objective: See treatment diary below    Precautions: None    Daily Treatment Diary     Manual  1/7 1/11 1/22 1/25 1/28 2/1 2/4 2/8 3/5 3/8   Posterior TCJ mobilizations  AF  AF          Gastroc STR AF JM       KO SK   Gastroc/achilles STM  JM AF AFB AFB AFB AFB DD used graston STM  -KO SK   Foam Roller  JM  AFB AFB AFB AFB DD KO SK   Ionto patch to R achilles         KO SK     Exercise Diary         2/8 3/5 3/8   Gastroc stretching   20"x5 With strap  30 sec  X3 With strap  30 sec x3 With strap  30 sec x3 With strap  30 sec x4 With strap  30 sec x4 With strap  30 sec x4 w/strap  30 sec  x3   w/strap  30 sec  x3     Self gastroc soft tissue release       AFB       PF TB  Pink  2x10  Pink  2x10 Pink  3x10 Pink  3x10 orange  3x10 Blue TB, 3x10 Blue  TB  30x Purple  TB  30x   Heel raises  2x10  2x10 3x10 3x10 x25 (P!) R foot on floor, L foot on stool   Focus on eccentric  10x2 W/  eccentric lowering  2x10 eccentric lowering  2x10   Bike   5' 10' 10' 10' 10' 10' 10' L2 10' 10'   BAPS forward and back   5 sec  x20 5 sec  x20 5 sec  x30 5 sec  x30 5 sec  x30 30x  Rocker  board  30x Rocker  board  30x   Heel slides in sitting   5 sec  x20 5 sec  x20 5 sec  x30 5 sec  x30 5 sec  x30 5 sec  x30 hep    HS stretch-long  sit         30"x3 30"x3   HR's         3x10 3x10                                                                                                                                                    Modalities Assessment: Tolerated treatment well  Patient reports pain feels better after manual therapy  Tolerated increased resistance for TB PF  Patient demonstrated fatigue post treatment, exhibited good technique with therapeutic exercises and would benefit from continued PT      Plan: Continue per plan of care  Progress as per patient tolerance

## 2019-03-13 ENCOUNTER — OFFICE VISIT (OUTPATIENT)
Dept: PHYSICAL THERAPY | Facility: MEDICAL CENTER | Age: 74
End: 2019-03-13
Payer: MEDICARE

## 2019-03-13 DIAGNOSIS — M76.61 RIGHT ACHILLES TENDINITIS: Primary | ICD-10-CM

## 2019-03-13 PROCEDURE — 97140 MANUAL THERAPY 1/> REGIONS: CPT

## 2019-03-13 PROCEDURE — 97110 THERAPEUTIC EXERCISES: CPT

## 2019-03-13 NOTE — PROGRESS NOTES
Daily Note     Today's date: 3/13/2019  Patient name: Iman Venegas  : 1945  MRN: [id number]  Referring provider: LUISANA Monsalve  Dx:   Encounter Diagnosis     ICD-10-CM    1  Right Achilles tendinitis M[age]        Start Time: 1430  Stop Time: 1520  Total time in clinic (min): 50 minutes    Subjective: Patient reports "2/10" pain today  Decreased pain post manual therapy  Ionto patch has helped, however, it fell off over the weekend secondary to "the sock" she wore  Objective: See treatment diary below    Precautions: None    Daily Treatment Diary     Manual  3/13   1/25 1/28 2/1 2/4 2/8 3/5 3/8   Posterior TCJ mobilizations              Gastroc STR JM        KO SK   Gastroc/achilles STM JM   AFB AFB AFB AFB DD used graston STM  -KO SK   Foam Roller    AFB AFB AFB AFB DD KO SK   Ionto patch to R achilles JM        KO SK     Exercise Diary  3/13       2/8 3/5 3/8   Gastroc stretching  30"x3   With strap  30 sec x3 With strap  30 sec x3 With strap  30 sec x4 With strap  30 sec x4 With strap  30 sec x4 w/strap  30 sec  x3   w/strap  30 sec  x3     Self gastroc soft tissue release       AFB       PF TB Purple TB  30x   Pink  2x10 Pink  3x10 Pink  3x10 orange  3x10 Blue TB, 3x10 Blue  TB  30x Purple  TB  30x   Heel raises eccentric lowering 2x10   2x10 3x10 3x10 x25 (P!) R foot on floor, L foot on stool   Focus on eccentric  10x2 W/  eccentric lowering  2x10 eccentric lowering  2x10   Bike  10'   10' 10' 10' 10' 10' L2 10' 10'   BAPS forward and back rockerboard 30x   5 sec  x20 5 sec  x30 5 sec  x30 5 sec  x30 30x  Rocker  board  30x Rocker  board  30x   Heel slides in sitting    5 sec  x20 5 sec  x30 5 sec  x30 5 sec  x30 5 sec  x30 hep    HS stretch-long  sit 30"x3        30"x3 30"x3   HR's 3x10        3x10 3x10                                                                                                                                                    Modalities Assessment: Tolerated treatment well  Patient demonstrated fatigue post treatment, exhibited good technique with therapeutic exercises and would benefit from continued PT  Plan: Continue per plan of care

## 2019-03-15 ENCOUNTER — OFFICE VISIT (OUTPATIENT)
Dept: PHYSICAL THERAPY | Facility: MEDICAL CENTER | Age: 74
End: 2019-03-15
Payer: MEDICARE

## 2019-03-15 DIAGNOSIS — M76.61 RIGHT ACHILLES TENDINITIS: Primary | ICD-10-CM

## 2019-03-15 PROCEDURE — 97140 MANUAL THERAPY 1/> REGIONS: CPT

## 2019-03-15 PROCEDURE — 97110 THERAPEUTIC EXERCISES: CPT

## 2019-03-15 NOTE — PROGRESS NOTES
Daily Note     Today's date: 3/15/2019  Patient name: Cleveland De La Paz  : 1945  MRN: [id number]  Referring provider: LUISANA Malin  Dx:   Encounter Diagnosis     ICD-10-CM    1  Right Achilles tendinitis M[age]                   Subjective: Patient reports that the pain on the medial side of her achilles is better, but she has had some increased pain on the lateral border, down towards her heel  She states that the pain is intermittent  Objective: See treatment diary below    Precautions: None    Daily Treatment Diary     Manual  3/13   1/25 1/28 2/1 2/4 2/8 3/5 3/8 3/15   Posterior TCJ mobilizations               Gastroc STR JM        KO SK TH   Gastroc/achilles STM JM   AFB AFB AFB AFB DD used graston STM  -KO SK TH   Foam Roller    AFB AFB AFB AFB DD KO SK NP   Ionto patch to R achilles JM        KO SK [facility] AT [place]     Exercise Diary  3/13       2/8 3/5 3/8 3/15   Gastroc stretching  30"x3   With strap  30 sec x3 With strap  30 sec x3 With strap  30 sec x4 With strap  30 sec x4 With strap  30 sec x4 w/strap  30 sec  x3   w/strap  30 sec  x3   w/strap  30 sec x3   Self gastroc soft tissue release       AFB        PF TB Purple TB  30x   Pink  2x10 Pink  3x10 Pink  3x10 orange  3x10 Blue TB, 3x10 Blue  TB  30x Purple  TB  30x Purple  30x   Heel raises eccentric lowering 2x10   2x10 3x10 3x10 x25 (P!) R foot on floor, L foot on stool  Focus on eccentric  10x2 W/  eccentric lowering  2x10 eccentric lowering  2x10 Ecc   Lower  2x10   Bike  10'   10' 10' 10' 10' 10' L2 10' 10' 10'   BAPS forward and back rockerboard 30x   5 sec  x20 5 sec  x30 5 sec  x30 5 sec  x30 30x  Rocker  board  30x Rocker  board  30x Rockerbrd  30x   Heel slides in sitting    5 sec  x20 5 sec  x30 5 sec  x30 5 sec  x30 5 sec  x30 hep     HS stretch-long  sit 30"x3        30"x3 30"x3 30"x3   HR's 3x10        3x10 3x10 3x10 Modalities                                                        Assessment: Tolerated treatment well  Patient demonstrated fatigue post treatment, exhibited good technique with therapeutic exercises and would benefit from continued PT  Tenderness noted along lateral portion of achilles, with decreased tenderness noted after STM  Pt reports decreased pain post treatment  Plan: Continue per plan of care

## 2019-03-18 ENCOUNTER — OFFICE VISIT (OUTPATIENT)
Dept: PHYSICAL THERAPY | Facility: MEDICAL CENTER | Age: 74
End: 2019-03-18
Payer: MEDICARE

## 2019-03-18 ENCOUNTER — OFFICE VISIT (OUTPATIENT)
Dept: FAMILY MEDICINE CLINIC | Facility: CLINIC | Age: 74
End: 2019-03-18
Payer: MEDICARE

## 2019-03-18 VITALS
WEIGHT: 192 LBS | SYSTOLIC BLOOD PRESSURE: 132 MMHG | DIASTOLIC BLOOD PRESSURE: 78 MMHG | RESPIRATION RATE: 12 BRPM | OXYGEN SATURATION: 96 % | HEIGHT: 60 IN | BODY MASS INDEX: 37.69 KG/M2 | HEART RATE: 76 BPM

## 2019-03-18 DIAGNOSIS — E78.5 HYPERLIPIDEMIA, UNSPECIFIED HYPERLIPIDEMIA TYPE: ICD-10-CM

## 2019-03-18 DIAGNOSIS — E03.9 HYPOTHYROIDISM, UNSPECIFIED TYPE: ICD-10-CM

## 2019-03-18 DIAGNOSIS — Z12.11 SCREEN FOR COLON CANCER: ICD-10-CM

## 2019-03-18 DIAGNOSIS — F32.A DEPRESSION, UNSPECIFIED DEPRESSION TYPE: ICD-10-CM

## 2019-03-18 DIAGNOSIS — R06.02 SHORTNESS OF BREATH: ICD-10-CM

## 2019-03-18 DIAGNOSIS — M76.61 RIGHT ACHILLES TENDINITIS: Primary | ICD-10-CM

## 2019-03-18 DIAGNOSIS — J45.20 MILD INTERMITTENT ASTHMA WITHOUT COMPLICATION: ICD-10-CM

## 2019-03-18 DIAGNOSIS — Z12.39 SCREENING FOR MALIGNANT NEOPLASM OF BREAST: ICD-10-CM

## 2019-03-18 DIAGNOSIS — Z00.00 MEDICARE ANNUAL WELLNESS VISIT, SUBSEQUENT: ICD-10-CM

## 2019-03-18 DIAGNOSIS — K21.9 GASTROESOPHAGEAL REFLUX DISEASE WITHOUT ESOPHAGITIS: ICD-10-CM

## 2019-03-18 DIAGNOSIS — I10 ESSENTIAL HYPERTENSION: ICD-10-CM

## 2019-03-18 PROBLEM — E66.812 CLASS 2 SEVERE OBESITY DUE TO EXCESS CALORIES WITH SERIOUS COMORBIDITY AND BODY MASS INDEX (BMI) OF 36.0 TO 36.9 IN ADULT (HCC): Status: ACTIVE | Noted: 2017-02-01

## 2019-03-18 PROBLEM — R42 VERTIGO: Status: ACTIVE | Noted: 2018-06-12

## 2019-03-18 PROBLEM — F41.8 MIXED ANXIETY AND DEPRESSIVE DISORDER: Status: ACTIVE | Noted: 2017-01-05

## 2019-03-18 PROBLEM — S46.009A INJURY OF TENDON OF ROTATOR CUFF: Status: ACTIVE | Noted: 2017-11-14

## 2019-03-18 PROBLEM — M17.9 OSTEOARTHRITIS OF KNEE: Status: ACTIVE | Noted: 2019-03-18

## 2019-03-18 PROBLEM — M17.10 OSTEOARTHRITIS OF KNEE: Status: ACTIVE | Noted: 2019-03-18

## 2019-03-18 PROBLEM — M54.12 CERVICAL RADICULITIS: Status: ACTIVE | Noted: 2019-03-18

## 2019-03-18 PROBLEM — S14.121A: Status: ACTIVE | Noted: 2019-03-18

## 2019-03-18 PROBLEM — E66.01 CLASS 2 SEVERE OBESITY DUE TO EXCESS CALORIES WITH SERIOUS COMORBIDITY AND BODY MASS INDEX (BMI) OF 36.0 TO 36.9 IN ADULT (HCC): Status: ACTIVE | Noted: 2017-02-01

## 2019-03-18 PROCEDURE — 97140 MANUAL THERAPY 1/> REGIONS: CPT | Performed by: PHYSICAL THERAPIST

## 2019-03-18 PROCEDURE — 93000 ELECTROCARDIOGRAM COMPLETE: CPT | Performed by: FAMILY MEDICINE

## 2019-03-18 PROCEDURE — 97110 THERAPEUTIC EXERCISES: CPT | Performed by: PHYSICAL THERAPIST

## 2019-03-18 PROCEDURE — 99214 OFFICE O/P EST MOD 30 MIN: CPT | Performed by: FAMILY MEDICINE

## 2019-03-18 PROCEDURE — G0439 PPPS, SUBSEQ VISIT: HCPCS | Performed by: FAMILY MEDICINE

## 2019-03-18 RX ORDER — LEVOTHYROXINE SODIUM 0.15 MG/1
150 TABLET ORAL DAILY
Qty: 90 TABLET | Refills: 3 | Status: SHIPPED | OUTPATIENT
Start: 2019-03-18 | End: 2019-07-17

## 2019-03-18 RX ORDER — ALBUTEROL SULFATE 90 UG/1
2 AEROSOL, METERED RESPIRATORY (INHALATION) EVERY 6 HOURS PRN
Qty: 1 INHALER | Refills: 3 | Status: SHIPPED | OUTPATIENT
Start: 2019-03-18 | End: 2020-03-31 | Stop reason: SDUPTHER

## 2019-03-18 RX ORDER — AMLODIPINE BESYLATE 10 MG/1
TABLET ORAL
COMMUNITY
Start: 2013-11-05 | End: 2019-03-18 | Stop reason: SDUPTHER

## 2019-03-18 RX ORDER — BUPROPION HYDROCHLORIDE 150 MG/1
150 TABLET, EXTENDED RELEASE ORAL 2 TIMES DAILY
Qty: 180 TABLET | Refills: 3 | Status: SHIPPED | OUTPATIENT
Start: 2019-03-18 | End: 2020-05-22

## 2019-03-18 RX ORDER — ALBUTEROL SULFATE 90 UG/1
1 AEROSOL, METERED RESPIRATORY (INHALATION) EVERY 6 HOURS
COMMUNITY
Start: 2015-10-14 | End: 2019-03-18

## 2019-03-18 RX ORDER — SERTRALINE HYDROCHLORIDE 100 MG/1
50 TABLET, FILM COATED ORAL DAILY
Qty: 90 TABLET | Refills: 3 | Status: SHIPPED | OUTPATIENT
Start: 2019-03-18 | End: 2020-01-23 | Stop reason: SDUPTHER

## 2019-03-18 RX ORDER — LISINOPRIL 10 MG/1
TABLET ORAL
COMMUNITY
End: 2019-03-18

## 2019-03-18 RX ORDER — FLUTICASONE PROPIONATE 110 UG/1
2 AEROSOL, METERED RESPIRATORY (INHALATION) 2 TIMES DAILY
Qty: 1 INHALER | Refills: 3 | Status: SHIPPED | OUTPATIENT
Start: 2019-03-18 | End: 2020-03-31 | Stop reason: SDUPTHER

## 2019-03-18 RX ORDER — PANTOPRAZOLE SODIUM 40 MG/1
40 TABLET, DELAYED RELEASE ORAL DAILY
Qty: 90 TABLET | Refills: 3 | Status: SHIPPED | OUTPATIENT
Start: 2019-03-18 | End: 2019-04-08 | Stop reason: SDUPTHER

## 2019-03-18 RX ORDER — BUPROPION HYDROCHLORIDE 100 MG/1
TABLET, EXTENDED RELEASE ORAL
Refills: 0 | COMMUNITY
Start: 2019-01-02 | End: 2019-03-18

## 2019-03-18 RX ORDER — LISINOPRIL 40 MG/1
40 TABLET ORAL DAILY
Qty: 90 TABLET | Refills: 3 | Status: SHIPPED | OUTPATIENT
Start: 2019-03-18 | End: 2020-03-31 | Stop reason: SDUPTHER

## 2019-03-18 RX ORDER — TRAZODONE HYDROCHLORIDE 100 MG/1
100 TABLET ORAL
Qty: 90 TABLET | Refills: 3 | Status: SHIPPED | OUTPATIENT
Start: 2019-03-18 | End: 2020-03-31 | Stop reason: SDUPTHER

## 2019-03-18 RX ORDER — BUPROPION HYDROCHLORIDE 100 MG/1
TABLET ORAL
COMMUNITY
Start: 2019-01-02 | End: 2019-03-18

## 2019-03-18 RX ORDER — AMLODIPINE BESYLATE 10 MG/1
10 TABLET ORAL DAILY
Qty: 90 TABLET | Refills: 3 | Status: SHIPPED | OUTPATIENT
Start: 2019-03-18 | End: 2020-03-31 | Stop reason: SDUPTHER

## 2019-03-18 RX ORDER — ATORVASTATIN CALCIUM 20 MG/1
20 TABLET, FILM COATED ORAL DAILY
Qty: 90 TABLET | Refills: 3 | Status: SHIPPED | OUTPATIENT
Start: 2019-03-18 | End: 2020-07-11 | Stop reason: SDUPTHER

## 2019-03-18 RX ORDER — LEVOTHYROXINE SODIUM 0.15 MG/1
TABLET ORAL
COMMUNITY
Start: 2019-02-05 | End: 2019-03-18

## 2019-03-18 RX ORDER — PANTOPRAZOLE SODIUM 40 MG/1
TABLET, DELAYED RELEASE ORAL
COMMUNITY
Start: 2014-07-09 | End: 2019-03-18

## 2019-03-18 NOTE — PROGRESS NOTES
Daily Note     Today's date: 3/18/2019  Patient name: Hugo Oglesby  : 1945  MRN: [id number]  Referring provider: LUISANA Orourke  Dx:   Encounter Diagnosis     ICD-10-CM    1  Right Achilles tendinitis M[age]                   Subjective: Clovis Diaz reports she is sore today but overall feeling much better       Objective: See treatment diary below  Precautions: None    Daily Treatment Diary     Manual  3/18            Gastroc MFR  AF                                                                    Exercise Diary              Bike 10 min            Gastroc stretch 30 sec  X3            Soleus stretch 30 sec  X3            Standing PF 3X10            TB ankle 4 way Blue  3x10                                                                                                                                                                                                                   Modalities                                                               Assessment: Tolerated treatment well  Patient with improved toleration to walking and standing  Progress as able      Plan: Continue per plan of care

## 2019-03-18 NOTE — PATIENT INSTRUCTIONS
Start flovent 2 puffs twice daily  Obtain stress test  F/u 1 month  Obesity   AMBULATORY CARE:   Obesity  is when your body mass index (BMI) is greater than 30  Your healthcare provider will use your height and weight to measure your BMI  The risks of obesity include  many health problems, such as injuries or physical disability  You may need tests to check for the following:  · Diabetes     · High blood pressure or high cholesterol     · Heart disease     · Gallbladder or liver disease     · Cancer of the colon, breast, prostate, liver, or kidney     · Sleep apnea     · Arthritis or gout  Seek care immediately if:   · You have a severe headache, confusion, or difficulty speaking  · You have weakness on one side of your body  · You have chest pain, sweating, or shortness of breath  Contact your healthcare provider if:   · You have symptoms of gallbladder or liver disease, such as pain in your upper abdomen  · You have knee or hip pain and discomfort while walking  · You have symptoms of diabetes, such as intense hunger and thirst, and frequent urination  · You have symptoms of sleep apnea, such as snoring or daytime sleepiness  · You have questions or concerns about your condition or care  Treatment for obesity  focuses on helping you lose weight to improve your health  Even a small decrease in BMI can reduce the risk for many health problems  Your healthcare provider will help you set a weight-loss goal   · Lifestyle changes  are the first step in treating obesity  These include making healthy food choices and getting regular physical activity  Your healthcare provider may suggest a weight-loss program that involves coaching, education, and therapy  · Medicine  may help you lose weight when it is used with a healthy diet and physical activity  · Surgery  can help you lose weight if you are very obese and have other health problems  There are several types of weight-loss surgery  Ask your healthcare provider for more information  Be successful losing weight:   · Set small, realistic goals  An example of a small goal is to walk for 20 minutes 5 days a week  Anther goal is to lose 5% of your body weight  · Tell friends, family members, and coworkers about your goals  and ask for their support  Ask a friend to lose weight with you, or join a weight-loss support group  · Identify foods or triggers that may cause you to overeat , and find ways to avoid them  Remove tempting high-calorie foods from your home and workplace  Place a bowl of fresh fruit on your kitchen counter  If stress causes you to eat, then find other ways to cope with stress  · Keep a diary to track what you eat and drink  Also write down how many minutes of physical activity you do each day  Weigh yourself once a week and record it in your diary  Eating changes: You will need to eat 500 to 1,000 fewer calories each day than you currently eat to lose 1 to 2 pounds a week  The following changes will help you cut calories:  · Eat smaller portions  Use small plates, no larger than 9 inches in diameter  Fill your plate half full of fruits and vegetables  Measure your food using measuring cups until you know what a serving size looks like  · Eat 3 meals and 1 or 2 snacks each day  Plan your meals in advance  [place] and eat at home most of the time  Eat slowly  · Eat fruits and vegetables at every meal   They are low in calories and high in fiber, which makes you feel full  Do not add butter, margarine, or cream sauce to vegetables  Use herbs to season steamed vegetables  · Eat less fat and fewer fried foods  Eat more baked or grilled chicken and fish  These protein sources are lower in calories and fat than red meat  Limit fast food  Dress your salads with olive oil and vinegar instead of bottled dressing  · Limit the amount of sugar you eat  Do not drink sugary beverages  Limit alcohol    Activity changes:  Physical activity is good for your body in many ways  It helps you burn calories and build strong muscles  It decreases stress and depression, and improves your mood  It can also help you sleep better  Talk to your healthcare provider before you begin an exercise program   · Exercise for at least 30 minutes 5 days a week  Start slowly  Set aside time each day for physical activity that you enjoy and that is convenient for you  It is best to do both weight training and an activity that increases your heart rate, such as walking, bicycling, or swimming  · Find ways to be more active  Do yard work and housecleaning  Walk up the stairs instead of using elevators  Spend your leisure time going to events that require walking, such as outdoor festivals or fairs  This extra physical activity can help you lose weight and keep it off  Follow up with your healthcare provider as directed: You may need to meet with a dietitian  Write down your questions so you remember to ask them during your visits  © 2017 [address] Information is for End User's use only and may not be sold, redistributed or otherwise used for commercial purposes  All illustrations and images included in CareNotes® are the copyrighted property of A D A M , Inc  or Rowdy Tanner  The above information is an  only  It is not intended as medical advice for individual conditions or treatments  Talk to your doctor, nurse or pharmacist before following any medical regimen to see if it is safe and effective for you  Urinary Incontinence   WHAT YOU NEED TO KNOW:   What is urinary incontinence? Urinary incontinence (UI) is when you lose control of your bladder  What causes UI? UI occurs because your bladder cannot store or empty urine properly  The following are the most common types of UI:  · Stress incontinence  is when you leak urine due to increased bladder pressure   This may happen when you cough, sneeze, or exercise  · Urge incontinence  is when you feel the need to urinate right away and leak urine accidentally  · Mixed incontinence  is when you have both stress and urge UI  What are the signs and symptoms of UI?   · You feel like your bladder does not empty completely when you urinate  · You urinate often and need to urinate immediately  · You leak urine when you sleep, or you wake up with the urge to urinate  · You leak urine when you cough, sneeze, exercise, or laugh  How is UI diagnosed? Your healthcare provider will ask how often you leak urine and whether you have stress or urge symptoms  Tell him which medicines you take, how often you urinate, and how much liquid you drink each day  You may need any of the following tests:  · Urine tests  may show infection or kidney function  · A pelvic exam  may be done to check for blockages  A pelvic exam will also show if your bladder, uterus, or other organs have moved out of place  · An x-ray, ultrasound, or CT  may show problems with parts of your urinary system  You may be given contrast liquid to help your organs show up better in the pictures  Tell the healthcare provider if you have ever had an allergic reaction to contrast liquid  Do not enter the MRI room with anything metal  Metal can cause serious injury  Tell the healthcare provider if you have any metal in or on your body  · A bladder scan  will show how much urine is left in your bladder after you urinate  You will be asked to urinate and then healthcare providers will use a small ultrasound machine to check the urine left in your bladder  · Cystometry  is used to check the function of your urinary system  Your healthcare provider checks the pressure in your bladder while filling it with fluid  Your bladder pressure may also be tested when your bladder is full and while you urinate  How is UI treated?    · Medicines  can help strengthen your bladder control  · Electrical stimulation  is used to send a small amount of electrical energy to your pelvic floor muscles  This helps control your bladder function  Electrodes may be placed outside your body or in your rectum  For women, the electrodes may be placed in the vagina  · A bulking agent  may be injected into the wall of your urethra to make it thicker  This helps keep your urethra closed and decreases urine leakage  · Devices  such as a clamp, pessary, or tampon may help stop urine leaks  Ask your healthcare provider for more information about these and other devices  · Surgery  may be needed if other treatments do not work  Several types of surgery can help improve your bladder control  Ask your healthcare provider for more information about the surgery you may need  How can I manage my symptoms? · Do pelvic muscle exercises often  Your pelvic muscles help you stop urinating  Squeeze these muscles tight for 5 seconds, then relax for 5 seconds  Gradually work up to squeezing for 10 seconds  Do 3 sets of 15 repetitions a day, or as directed  This will help strengthen your pelvic muscles and improve bladder control  · A catheter  may be used to help empty your bladder  A catheter is a tiny, plastic tube that is put into your bladder to drain your urine  Your healthcare provider may tell you to use a catheter to prevent your bladder from getting too full and leaking urine  · Keep a UI record  Write down how often you leak urine and how much you leak  Make a note of what you were doing when you leaked urine  · Train your bladder  Go to the bathroom at set times, such as every 2 hours, even if you do not feel the urge to go  You can also try to hold your urine when you feel the urge to go  For example, hold your urine for 5 minutes when you feel the urge to go  As that becomes easier, hold your urine for 10 minutes  · Drink liquids as directed    Ask your healthcare provider how much liquid to drink each day and which liquids are best for you  You may need to limit the amount of liquid you drink to help control your urine leakage  Limit or do not have drinks that contain caffeine or alcohol  Do not drink any liquid right before you go to bed  · Prevent constipation  Eat a variety of high-fiber foods  Good examples are high-fiber cereals, beans, vegetables, and whole-grain breads  Prune juice may help make your bowel movement softer  Walking is the best way to trigger your intestines to have a bowel movement  · Exercise regularly and maintain a healthy weight  Ask your healthcare provider how much you should weigh and about the best exercise plan for you  Weight loss and exercise will decrease pressure on your bladder and help you control your leakage  Ask him to help you create a weight loss plan if you are overweight  When should I seek immediate care? · You have severe pain  · You are confused or cannot think clearly  When should I contact my healthcare provider? · You have a fever  · You see blood in your urine  · You have pain when you urinate  · You have new or worse pain, even after treatment  · Your mouth feels dry or you have vision changes  · Your urine is cloudy or smells bad  · You have questions or concerns about your condition or care  CARE AGREEMENT:   You have the right to help plan your care  Learn about your health condition and how it may be treated  Discuss treatment options with your caregivers to decide what care you want to receive  You always have the right to refuse treatment  The above information is an  only  It is not intended as medical advice for individual conditions or treatments  Talk to your doctor, nurse or pharmacist before following any medical regimen to see if it is safe and effective for you    © 2017 [address] Information is for End User's use only and may not be sold, redistributed or otherwise used for commercial purposes  All illustrations and images included in CareNotes® are the copyrighted property of A D A M , Inc  or Rowdy Tanner  Cigarette Smoking and Your Health   AMBULATORY CARE:   Risks to your health if you smoke:  Nicotine and other chemicals found in tobacco damage every cell in your body  Even if you are a light smoker, you have an increased risk for cancer, heart disease, and lung disease  If you are pregnant or have diabetes, smoking increases your risk for complications  Benefits to your health if you stop smoking:   · You decrease respiratory symptoms such as coughing, wheezing, and shortness of breath  · You reduce your risk for cancers of the lung, mouth, throat, kidney, bladder, pancreas, stomach, and cervix  If you already have cancer, you increase the benefits of chemotherapy  You also reduce your risk for cancer returning or a second cancer from developing  · You reduce your risk for heart disease, blood clots, heart attack, and stroke  · You reduce your risk for lung infections, and diseases such as pneumonia, asthma, chronic bronchitis, and emphysema  · Your circulation improves  More oxygen can be delivered to your body  If you have diabetes, you lower your risk for complications, such as kidney, artery, and eye diseases  You also lower your risk for nerve damage  Nerve damage can lead to amputations, poor vision, and blindness  · You improve your body's ability to heal and to fight infections  Benefits to the health of others if you stop smoking:  Tobacco is harmful to nonsmokers who breathe in your secondhand smoke  The following are ways the health of others around you may improve when you stop smoking:  · You lower the risks for lung cancer and heart disease in nonsmoking adults  · If you are pregnant, you lower the risk for miscarriage, early delivery, low birth weight, and stillbirth   You also lower your baby's risk for SIDS, obesity, developmental delay, and neurobehavioral problems, such as ADHD  · If you have children, you lower their risk for ear infections, colds, pneumonia, bronchitis, and asthma  For more information and support to stop smoking:   · Smokefree  gov  Phone: 9- 213 - 530-0234  Web Address: [place] smokefree  gov  Follow up with your healthcare provider as directed:  Write down your questions so you remember to ask them during your visits  © 2017 [facility] Information is for End User's use only and may not be sold, redistributed or otherwise used for commercial purposes  All illustrations and images included in CareNotes® are the copyrighted property of A D A M , Inc  or Rowdy Tanner  The above information is an  only  It is not intended as medical advice for individual conditions or treatments  Talk to your doctor, nurse or pharmacist before following any medical regimen to see if it is safe and effective for you  Fall Prevention   AMBULATORY CARE:   Fall prevention  includes ways to make your home and other areas safer  It also includes ways you can move more carefully to prevent a fall  Health conditions that cause changes in your blood pressure, vision, or muscle strength and coordination may increase your risk for falls  Medicines may also increase your risk for falls if they make you dizzy, weak, or sleepy  Call 911 or have someone else call if:   · You have fallen and are unconscious  · You have fallen and cannot move part of your body  Contact your healthcare provider if:   · You have fallen and have pain or a headache  · You have questions or concerns about your condition or care  Fall prevention tips:   · Stand or sit up slowly  This may help you keep your balance and prevent falls  · Use assistive devices as directed  Your healthcare provider may suggest that you use a cane or walker to help you keep your balance   You may need to have grab bars put in your bathroom near the toilet or in the shower  · Wear shoes that fit well and have soles that   Wear shoes both inside and outside  Use slippers with good   Do not wear shoes with high heels  · Wear a personal alarm  This is a device that allows you to call 911 if you fall and need help  Ask your healthcare provider for more information  · Stay active  Exercise can help strengthen your muscles and improve your balance  Your healthcare provider may recommend water aerobics or walking  He or she may also recommend physical therapy to improve your coordination  Never start an exercise program without talking to your healthcare provider first      · Manage your medical conditions  Keep all appointments with your healthcare providers  Visit your eye doctor as directed  Home safety tips:   · Add items to prevent falls in the bathroom  Put nonslip strips on your bath or shower floor to prevent you from slipping  Use a bath mat if you do not have carpet in the bathroom  This will prevent you from falling when you step out of the bath or shower  Use a shower seat so you do not need to stand while you shower  Sit on the toilet or a chair in your bathroom to dry yourself and put on clothing  This will prevent you from losing your balance from drying or dressing yourself while you are standing  · Keep paths clear  Remove books, shoes, and other objects from walkways and stairs  Place cords for telephones and lamps out of the way so that you do not need to walk over them  Tape them down if you cannot move them  Remove small rugs  If you cannot remove a rug, secure it with double-sided tape  This will prevent you from tripping  · Install bright lights in your home  Use night lights to help light paths to the bathroom or kitchen  Always turn on the light before you start walking  · Keep items you use often on shelves within reach    Do not use a step stool to help you reach an item  · Paint or place reflective tape on the edges of your stairs  This will help you see the stairs better  Follow up with your healthcare provider as directed:  Write down your questions so you remember to ask them during your visits  © 2017 [address] Information is for End User's use only and may not be sold, redistributed or otherwise used for commercial purposes  All illustrations and images included in CareNotes® are the copyrighted property of A D A M , Inc  or Rowdy Tanner  The above information is an  only  It is not intended as medical advice for individual conditions or treatments  Talk to your doctor, nurse or pharmacist before following any medical regimen to see if it is safe and effective for you  Advance Directives   WHAT YOU NEED TO KNOW:   What are advance directives? Advance directives are legal documents that state your wishes and plans for medical care  These plans are made ahead of time in case you lose your ability to make decisions for yourself  Advance directives can apply to any medical decision, such as the treatments you want, and if you want to donate organs  What are the types of advance directives? There are many types of advance directives, and each state has rules about how to use them  You may choose a combination of any of the following:  · Living will: This is a written record of the treatment you want  You can also choose which treatments you do not want, which to limit, and which to stop at a certain time  This includes surgery, medicine, IV fluid, and tube feedings  · Durable power of  for healthcare [place] [facility]): This is a written record that states who you want to make healthcare choices for you when you are unable to make them for yourself  This person, called a proxy, is usually a family member or a friend  You may choose more than 1 proxy      · Do not resuscitate (DNR) order:  A DNR order is used in case your heart stops beating or you stop breathing  It is a request not to have certain forms of treatment, such as CPR  A DNR order may be included in other types of advance directives  · Medical directive: This covers the care that you want if you are in a coma, near death, or unable to make decisions for yourself  You can list the treatments you want for each condition  Treatment may include pain medicine, surgery, blood transfusions, dialysis, IV or tube feedings, and a ventilator (breathing machine)  · Values history: This document has questions about your views, beliefs, and how you feel and think about life  This information can help others choose the care that you would choose  Why are advance directives important? An advance directive helps you control your care  Although spoken wishes may be used, it is better to have your wishes written down  Spoken wishes can be misunderstood, or not followed  Treatments may be given even if you do not want them  An advance directive may make it easier for your family to make difficult choices about your care  How do I decide what to put in my advance directives? · Make informed decisions:  Make sure you fully understand treatments or care you may receive  Think about the benefits and problems your decisions could cause for you or your family  Talk to healthcare providers if you have concerns or questions before you write down your wishes  You may also want to talk with your Anglican or , or a   Check your state laws to make sure that what you put in your advance directive is legal      · Sign all forms:  Sign and date your advance directive when you have finished  You may also need 2 witnesses to sign the forms  Witnesses cannot be your doctor or his staff, your spouse, heirs or beneficiaries, people you owe money to, or your chosen proxy  Talk to your family, proxy, and healthcare providers about your advance directive   Give each person a copy, and keep one for yourself in a place you can get to easily  Do not keep it hidden or locked away  · Review and revise your plans: You can revise your advance directive at any time, as long as you are able to make decisions  Review your plan every year, and when there are changes in your life, or your health  When you make changes, let your family, proxy, and healthcare providers know  Give each a new copy  Where can I find more information? · [employer]  [address] , [address]  Phone: 2- 070 - [phone]  Phone: 3- 398 - [phone]  Web Address: http://www  aafp org  · [address] [facility])  [address] [facility], [address] [facility] , [address] [place] [address]  Phone: [phone]  Phone: [phone]  Web Address: Antonio archer  CARE AGREEMENT:   You have the right to help plan your care  To help with this plan, you must learn about your health condition and treatment options  You must also learn about advance directives and how they are used  Work with your healthcare providers to decide what care will be used to treat you  You always have the right to refuse treatment  The above information is an  only  It is not intended as medical advice for individual conditions or treatments  Talk to your doctor, nurse or pharmacist before following any medical regimen to see if it is safe and effective for you  © 2017 [address] Information is for End User's use only and may not be sold, redistributed or otherwise used for commercial purposes  All illustrations and images included in CareNotes® are the copyrighted property of [employer]  or Rowdy Tanner  Thank you for enrolling in [address] [place] [address]  Please follow the instructions below to securely access your online medical record   [employer] allows you to send messages to your doctor, view your test results, renew your prescriptions, schedule appointments, and more  [address] uses Single Sign on (SSO) Technology for you to log in and access our [facility], including [employer]  No more remembering multiple user names and passwords! We are going to guide you through, step by step, to help you set up your [place] account which will provide access to your MyChart account  How Do I Sign Up? 1  In your Internet browser, go to [email or web address]      2  Click on the [facility] patient account and then click Dont have an                 Account? Create one now      3  Enter your demographic information and chose a user name (email address) and password  Think of one that is secure and easy to remember  Enter a Referral code if you have one (this is not your MyChart code ) Accept the Terms and Conditions and the Privacy Policy  4  Select your security questions that you will use to reset your password should you forget it  Click Submit  5  Enter your [employer] Activation Code exactly as it appears below  You will not need to use this code after you have completed the sign-up process  If you do not sign up before the expiration date, you must request a new code  [facility] Activation Code: [email or web address]  Expires: 4/1/2019 12:25 PM    6  Confirm your email address  An email confirmation was sent to you  Please open that email and click Confirm your Email   You should then be redirected to our [place] Single sign on page, where you will log on with the user name and password you created! Proceed to the [facility] Icon to view your personal health information  Additional Information  If you have questions, you can e-mail [email or web address]  [email or web address] or call [phone] to talk to our customer support staff  Remember, [employer] is NOT to be used for urgent needs  For medical emergencies, dial 911

## 2019-03-18 NOTE — PROGRESS NOTES
Assessment/Plan:    No problem-specific Assessment & Plan notes found for this encounter  Diagnoses and all orders for this visit:    Hypothyroidism, unspecified type   Continue current meds  -     levothyroxine 150 mcg tablet; Take 1 tablet (150 mcg total) by mouth daily    Mild intermittent asthma without complication   ? Current worsening sob due to this issue vs cardiac   Start flovent 2 puffs twice daily   F/u 1 mo    -     fluticasone (FLOVENT HFA) 110 MCG/ACT inhaler; Inhale 2 puffs 2 (two) times a day Rinse mouth after use  -     albuterol (VENTOLIN HFA) 90 mcg/act inhaler; Inhale 2 puffs every 6 (six) hours as needed for wheezing  -     Spacer Device for Inhaler    Shortness of breath   Unclear cause   EKG today shows NSR   nml axis  Previous inferior infarct  No change since 2015   Check stress and increase fluticasone as above  Will need chemical stress due to her achilles tendonitis    -     POCT ECG  -     NM myocardial perfusion spect (rx stress and/or rest); Future    Essential hypertension   Controlled    Continue current meds    -     amLODIPine (NORVASC) 10 mg tablet; Take 1 tablet (10 mg total) by mouth daily  -     lisinopril (ZESTRIL) 40 mg tablet; Take 1 tablet (40 mg total) by mouth daily    Hyperlipidemia, unspecified hyperlipidemia type   Continue lipitor at current dosing  -     atorvastatin (LIPITOR) 20 mg tablet; Take 1 tablet (20 mg total) by mouth daily    Depression, unspecified depression type  Well-controlled  Continue current meds    -     buPROPion (WELLBUTRIN SR) 150 mg 12 hr tablet; Take 1 tablet (150 mg total) by mouth 2 (two) times a day  -     levothyroxine 150 mcg tablet; Take 1 tablet (150 mcg total) by mouth daily  -     sertraline (ZOLOFT) 100 mg tablet; Take 0 5 tablets (50 mg total) by mouth daily  -     traZODone (DESYREL) 100 mg tablet;  Take 1 tablet (100 mg total) by mouth daily at bedtime    Gastroesophageal reflux disease without esophagitis   Continue protonix give hx of gastric ulcer  -     pantoprazole (PROTONIX) 40 mg tablet; Take 1 tablet (40 mg total) by mouth daily    Other orders  -     Discontinue: buPROPion (WELLBUTRIN) 100 mg tablet  -     Discontinue: BUPROPION & DIET MANAGE PROD PO  -     Discontinue: buPROPion (WELLBUTRIN SR) 100 mg 12 hr tablet; TAKE 1 TABLET BY MOUTH TWICE A DAY  -     Ergocalciferol 2000 units CAPS; ergocalciferol (vitamin D2) 50,000 unit capsule   TK 1 C PO Q WK  -     Discontinue: albuterol (PROAIR HFA) 90 mcg/act inhaler; 1 puff every 6 (six) hours  -     Discontinue: amLODIPine (NORVASC) 10 mg tablet; amlodipine 10 mg tablet  -     Discontinue: levothyroxine 150 mcg tablet; TAKE 1 TABLET BY MOUTH EVERY DAY  -     Discontinue: lisinopril (ZESTRIL) 10 mg tablet; lisinopril 10 mg tablet  -     Discontinue: pantoprazole (PROTONIX) 40 mg tablet; pantoprazole 40 mg tablet,delayed release        Subjective:      Patient ID: Lauren Koenig is a 74 y o  female  HPI   pt presents for routine f/u   bp well-controlled  Last tsh nml in December  Mood appropriate on current meds  Would like to continue same  Is sleeping well at night  She notes some increased sob with exertion since January  She does have a hx of asthma, and inhaler helps somewhat but not entirely  No cp  Has some increased trace edema in that time, but she notes she has gained weight  No n/v  No diaphoresis  Pt has hx of gastric ulcer  On protonix daily  The following portions of the patient's history were reviewed and updated as appropriate: allergies, current medications, past family history, past medical history, past social history, past surgical history and problem list     Review of Systems   Constitutional: Negative for chills, fatigue, fever and unexpected weight change  HENT: Negative for congestion, ear pain, hearing loss, postnasal drip, rhinorrhea, sinus pressure, sinus pain, sore throat, trouble swallowing and voice change  Eyes: Negative for pain, redness and visual disturbance  Respiratory: Positive for shortness of breath  Negative for cough  Cardiovascular: Negative for chest pain, palpitations and leg swelling  Gastrointestinal: Negative for abdominal pain, constipation, diarrhea and nausea  Endocrine: Negative for cold intolerance, heat intolerance, polydipsia and polyuria  Genitourinary: Negative for dysuria, frequency and urgency  Musculoskeletal: Negative for arthralgias, joint swelling and myalgias  Skin: Negative for rash  No suspicious lesions   Neurological: Negative for weakness, numbness and headaches  Hematological: Negative for adenopathy  Objective:      /78 (BP Location: Right arm, Patient Position: Sitting, Cuff Size: Standard)   Pulse 76   Resp 12   Ht 5' (1 524 m)   Wt 87 1 kg (192 lb)   SpO2 96%   BMI 37 50 kg/m²          Physical Exam   Constitutional: She is oriented to person, place, and time  She appears well-developed and well-nourished  No distress  HENT:   Head: Normocephalic and atraumatic  Right Ear: Tympanic membrane, external ear and ear canal normal    Left Ear: Tympanic membrane, external ear and ear canal normal    Nose: Nose normal    Mouth/Throat: Oropharynx is clear and moist and mucous membranes are normal  No oropharyngeal exudate  Eyes: Pupils are equal, round, and reactive to light  Conjunctivae and EOM are normal    Neck: No JVD present  Carotid bruit is not present  No thyromegaly present  Cardiovascular: Regular rhythm, S1 normal and S2 normal  Exam reveals no gallop, no S3, no S4 and no friction rub  No murmur heard  Pulmonary/Chest: Effort normal and breath sounds normal  She has no wheezes  She has no rhonchi  She has no rales  Abdominal: Soft  Bowel sounds are normal  She exhibits no distension  There is no tenderness  Lymphadenopathy:     She has no cervical adenopathy     Neurological: She is alert and oriented to person, place, and time  She has normal strength and normal reflexes  No cranial nerve deficit or sensory deficit  Psychiatric: She has a normal mood and affect   Her behavior is normal  Judgment and thought content normal

## 2019-03-18 NOTE — PROGRESS NOTES
Assessment and Plan:    Problem List Items Addressed This Visit                                                                                                                      Other Visit Diagnoses     Medicare annual wellness visit, subsequent    Discussed need for weight loss--recommended weight watchers  Pt due for colon CA screening--refer to GI  Pt due for mammogram--ordered same  Discussed healthy diet and exercise  Discussed need for living will        Screen for colon cancer        Relevant Orders    Ambulatory referral to Gastroenterology    Screening for malignant neoplasm of breast        Relevant Orders    Mammo screening bilateral w 3d & cad                                                Health Maintenance Due   Topic Date Due    Medicare Annual Wellness Visit (AWV)  1945    CRC Screening: Colonoscopy  1945    BMI: Followup Plan  05/12/1961    Urinary Incontinence Screening  05/12/2008    PT PLAN OF CARE  02/11/2019         HPI:  Kimberly Holloway is a 74 y o  female here for her Subsequent Wellness Visit      Patient Active Problem List   Diagnosis    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 ([address] )    Carpal tunnel syndrome of right wrist    Cataract    Cervical radiculitis    Vertigo    Essential hypertension    Gastroesophageal reflux disease    Hyperlipidemia    History of colonic polyps    Injury of tendon of rotator cuff    Mild intermittent asthma without complication    Mixed anxiety and depressive disorder    Occipital neuralgia of left side    Osteoarthritis of knee    Postconcussion syndrome    Class 2 severe obesity due to excess calories with serious comorbidity and body mass index (BMI) of 36 0 to 36 9 in adult [facility])    Hypothyroidism    Cervical spinal stenosis    Tinnitus, bilateral    Ulnar nerve compression, right     Past Medical History:   Diagnosis Date    Acid reflux     Depressed     Hyperlipemia     Hypertension     Hypothyroid     Tendinitis      Past Surgical History:   Procedure Laterality Date    APPENDECTOMY      GALLBLADDER SURGERY      KNEE SURGERY Right     NECK SURGERY      WRIST SURGERY Right      Family History   Problem Relation Age of Onset    No Known Problems Mother     No Known Problems Father      Social History     Tobacco Use   Smoking Status Former Smoker   Smokeless Tobacco Never Used     Social History     Substance and Sexual Activity   Alcohol Use No      Social History     Substance and Sexual Activity   Drug Use No       Current Outpatient Medications   Medication Sig Dispense Refill    amLODIPine (NORVASC) 10 mg tablet Take 1 tablet (10 mg total) by mouth daily 90 tablet 3    albuterol (VENTOLIN HFA) 90 mcg/act inhaler Inhale 2 puffs every 6 (six) hours as needed for wheezing 1 Inhaler 3    atorvastatin (LIPITOR) 20 mg tablet Take 1 tablet (20 mg total) by mouth daily 90 tablet 3    buPROPion (WELLBUTRIN SR) 150 mg 12 hr tablet Take 1 tablet (150 mg total) by mouth 2 (two) times a day 180 tablet 3    Ergocalciferol 2000 units CAPS ergocalciferol (vitamin D2) 50,000 unit capsule   TK 1 C PO Q WK      fluticasone (FLOVENT HFA) 110 MCG/ACT inhaler Inhale 2 puffs 2 (two) times a day Rinse mouth after use  1 Inhaler 3    levothyroxine 150 mcg tablet Take 1 tablet (150 mcg total) by mouth daily 90 tablet 3    lisinopril (ZESTRIL) 40 mg tablet Take 1 tablet (40 mg total) by mouth daily 90 tablet 3    pantoprazole (PROTONIX) 40 mg tablet Take 1 tablet (40 mg total) by mouth daily 90 tablet 3    RESTASIS 0 05 % ophthalmic emulsion       sertraline (ZOLOFT) 100 mg tablet Take 0 5 tablets (50 mg total) by mouth daily 90 tablet 3    traZODone (DESYREL) 100 mg tablet Take 1 tablet (100 mg total) by mouth daily at bedtime 90 tablet 3     No current facility-administered medications for this visit        Allergies   Allergen Reactions    Ceftin [Cefuroxime] Hives Immunization History   Administered Date(s) Administered    INFLUENZA 10/16/2008, 11/04/2009, 10/09/2012, 11/05/2013, 11/19/2014, 10/14/2015, 10/18/2016, 01/05/2018, 10/02/2018    Pneumococcal Conjugate 13-Valent 10/14/2015    Pneumococcal Polysaccharide PPV23 10/18/2016    Td (adult), adsorbed 08/25/2010    Tdap 07/26/2012, 06/19/2015    Zoster 05/23/2013, 01/23/2014       Patient Care Team:  Shivam Meier DO as PCP - General (Family Medicine)  Lucia Layne MD Berna Rover, MD Earlyne Pass, PA-C    Medicare Screening Tests and Risk Assessments:  Parag Holland is here for her Subsequent Wellness visit  Health Risk Assessment:  Patient rates overall health as fair  Patient feels that their physical health rating is Slightly worse  Eyesight was rated as Same  Hearing was rated as Same  Patient feels that their emotional and mental health rating is Same  Pain experienced by patient in the last 7 days has been Some  Patient's pain rating has been 2/10  Patient states that she has experienced weight loss or gain in last 6 months  Emotional/Mental Health:  Patient has been feeling nervous/anxious  PHQ-9 Depression Screening:    Frequency of the following problems over the past two weeks:      1  Little interest or pleasure in doing things: 0 - not at all      2  Feeling down, depressed, or hopeless: 0 - not at all  PHQ-2 Score: 0          Broken Bones/Falls: Fall Risk Assessment:    In the past year, patient has experienced: No history of falling in past year          Bladder/Bowel:  Patient has leaked urine accidently in the last six months  Patient reports no loss of bowel control  Immunizations:  Patient has had a flu vaccination within the last year  Patient has not received a pneumonia shot  Patient has received a shingles shot  Patient has received tetanus/diphtheria shot       Home Safety:  Patient does not have trouble with stairs inside or outside of their home    Patient currently reports that there are no safety hazards present in home, working smoke alarms, no working carbon monoxide detectors  Preventative Screenings:   No breast cancer screening performed, no colon cancer screen completed, cholesterol screen completed, glaucoma eye exam completed,     Nutrition:  Current diet: Regular with servings of the following:    Medications:  Patient is currently taking over-the-counter supplements  List of OTC medications includes: Vitamin D , Benefiber  Patient is able to manage medications  Lifestyle Choices:  Patient reports no tobacco use  Patient has not smoked or used tobacco in the past   Patient reports no alcohol use  Patient drives a vehicle  Patient wears seat belt  Current level of exercise of physical activity described by patient as: stretches for calf  Activities of Daily Living:  Can get out of bed by his or her self, able to dress self, able to make own meals, able to do own shopping, able to bathe self, can do own laundry/housekeeping, can manage own money, pay bills and track expenses    Previous Hospitalizations:  No hospitalization or ED visit in past 12 months        Advanced Directives:  Patient has decided on a power of   Patient has spoken to designated power of   Patient has completed advanced directive

## 2019-03-20 ENCOUNTER — OFFICE VISIT (OUTPATIENT)
Dept: PHYSICAL THERAPY | Facility: MEDICAL CENTER | Age: 74
End: 2019-03-20
Payer: MEDICARE

## 2019-03-20 DIAGNOSIS — M76.61 RIGHT ACHILLES TENDINITIS: Primary | ICD-10-CM

## 2019-03-20 PROCEDURE — 97110 THERAPEUTIC EXERCISES: CPT | Performed by: PHYSICAL THERAPIST

## 2019-03-20 NOTE — PROGRESS NOTES
Daily Note     Today's date: 3/20/2019  Patient name: Sony Gerber  : 1945  MRN: [id number]  Referring provider: LUISANA Veliz  Dx:   Encounter Diagnosis     ICD-10-CM    1  Right Achilles tendinitis M76 61                   Subjective: Aliyah Matta reports that she is feeling good today  Not as sore overall       Objective: See treatment diary below  Precautions: None    Daily Treatment Diary     Manual  3/18 3/20           Gastroc MFR  AF AF                                                                   Exercise Diary              Bike 10 min 10 min            Gastroc stretch 30 sec  X3 30 sec  X3           Soleus stretch 30 sec  X3 30 sec  X3           Standing PF 3X10 3x10           TB ankle 4 way Blue  3x10 Blue  3X10                                                                                                                                                                                                                  Modalities                                                         Assessment: Tolerated treatment well  Patient with improved tolerance to strengthing today and progressive strengthening  progress as able       Plan: Continue per plan of care

## 2019-03-27 ENCOUNTER — OFFICE VISIT (OUTPATIENT)
Dept: PHYSICAL THERAPY | Facility: MEDICAL CENTER | Age: 74
End: 2019-03-27
Payer: MEDICARE

## 2019-03-27 DIAGNOSIS — M76.61 RIGHT ACHILLES TENDINITIS: Primary | ICD-10-CM

## 2019-03-27 PROCEDURE — 97140 MANUAL THERAPY 1/> REGIONS: CPT | Performed by: PHYSICAL THERAPIST

## 2019-03-27 PROCEDURE — 97110 THERAPEUTIC EXERCISES: CPT | Performed by: PHYSICAL THERAPIST

## 2019-03-27 NOTE — PROGRESS NOTES
Daily Note     Today's date: 3/27/2019  Patient name: Cj Acuña  : 1945  MRN: [id number]  Referring provider: LUISANA Moses  Dx:   Encounter Diagnosis     ICD-10-CM    1  Right Achilles tendinitis M[age]                   Subjective: Kalina Winkler reports that she is sore and stiff today but overall feeling better when she is walking  Objective: See treatment diary below  Precautions: None    Daily Treatment Diary     Manual  3/18 3/20 3/27          Gastroc MFR  AF AF AF                                                                  Exercise Diary              Bike 10 min 10 min  10 min           Gastroc stretch 30 sec  X3 30 sec  X3 30 sec  x3          Soleus stretch 30 sec  X3 30 sec  X3 30 sec  X3          Standing PF 3X10 3x10 3X10          TB ankle 4 way Blue  3x10 Blue  3X10 Blue  3X10                                                                                                                                                                                                                 Modalities              ionto    AF                                        Assessment: Tolerated treatment well  Patient reported much improved mobility in DF post mobilizations, continues to benefit from use of ionto for pain and inflammation reduction      Plan: Continue per plan of care

## 2019-03-29 ENCOUNTER — OFFICE VISIT (OUTPATIENT)
Dept: PHYSICAL THERAPY | Facility: MEDICAL CENTER | Age: 74
End: 2019-03-29
Payer: MEDICARE

## 2019-03-29 DIAGNOSIS — M76.61 RIGHT ACHILLES TENDINITIS: Primary | ICD-10-CM

## 2019-03-29 PROCEDURE — 97140 MANUAL THERAPY 1/> REGIONS: CPT | Performed by: PHYSICAL MEDICINE & REHABILITATION

## 2019-03-29 PROCEDURE — 97110 THERAPEUTIC EXERCISES: CPT | Performed by: PHYSICAL MEDICINE & REHABILITATION

## 2019-03-29 NOTE — PROGRESS NOTES
Daily Note     Today's date: 3/29/2019  Patient name: Pascale Rowland  : 1945  MRN: [id number]  Referring provider: LUISANA Cortes  Dx:   Encounter Diagnosis     ICD-10-CM    1  Right Achilles tendinitis M76 59                   Subjective: Anusha Gaona reported "I am doing okay, I still very tight "    Objective: See treatment diary below  Precautions: None    Daily Treatment Diary     Manual  3/18 3/20 3/27 3/29/2019         Gastroc MFR  AF AF AF JR                                                                 Exercise Diary              Bike 10 min 10 min  10 min  10'         Gastroc stretch 30 sec  X3 30 sec  X3 30 sec  x3 30 sec x3         Soleus stretch 30 sec  X3 30 sec  X3 30 sec  X3 30 sec x3         Standing PF 3X10 3x10 3X10          TB ankle 4 way Blue  3x10 Blue  3X10 Blue  3X10 Blue 3x10                                                                                                                                                                                                                Modalities              ionto    AF                                        Assessment: Tolerated treatment well  Patient would benefit from continued PT  Yokasta needed verbal and visual cueing for proper exercise form during today's session  She reported some pain with eversion exercises  Plan: Continue per plan of care

## 2019-04-03 ENCOUNTER — OFFICE VISIT (OUTPATIENT)
Dept: PHYSICAL THERAPY | Facility: MEDICAL CENTER | Age: 74
End: 2019-04-03
Payer: MEDICARE

## 2019-04-03 DIAGNOSIS — M76.61 RIGHT ACHILLES TENDINITIS: Primary | ICD-10-CM

## 2019-04-03 PROCEDURE — 97140 MANUAL THERAPY 1/> REGIONS: CPT | Performed by: PHYSICAL MEDICINE & REHABILITATION

## 2019-04-03 PROCEDURE — 97110 THERAPEUTIC EXERCISES: CPT | Performed by: PHYSICAL MEDICINE & REHABILITATION

## 2019-04-03 PROCEDURE — 97112 NEUROMUSCULAR REEDUCATION: CPT | Performed by: PHYSICAL MEDICINE & REHABILITATION

## 2019-04-05 ENCOUNTER — OFFICE VISIT (OUTPATIENT)
Dept: PHYSICAL THERAPY | Facility: MEDICAL CENTER | Age: 74
End: 2019-04-05
Payer: MEDICARE

## 2019-04-05 DIAGNOSIS — M76.61 RIGHT ACHILLES TENDINITIS: Primary | ICD-10-CM

## 2019-04-05 PROCEDURE — 97140 MANUAL THERAPY 1/> REGIONS: CPT | Performed by: PHYSICAL MEDICINE & REHABILITATION

## 2019-04-05 PROCEDURE — 97110 THERAPEUTIC EXERCISES: CPT | Performed by: PHYSICAL MEDICINE & REHABILITATION

## 2019-04-08 ENCOUNTER — OFFICE VISIT (OUTPATIENT)
Dept: PHYSICAL THERAPY | Facility: MEDICAL CENTER | Age: 74
End: 2019-04-08
Payer: MEDICARE

## 2019-04-08 DIAGNOSIS — M76.61 RIGHT ACHILLES TENDINITIS: Primary | ICD-10-CM

## 2019-04-08 DIAGNOSIS — K21.9 GASTROESOPHAGEAL REFLUX DISEASE WITHOUT ESOPHAGITIS: ICD-10-CM

## 2019-04-08 PROCEDURE — 97140 MANUAL THERAPY 1/> REGIONS: CPT | Performed by: PHYSICAL MEDICINE & REHABILITATION

## 2019-04-08 PROCEDURE — 97110 THERAPEUTIC EXERCISES: CPT | Performed by: PHYSICAL MEDICINE & REHABILITATION

## 2019-04-08 RX ORDER — PANTOPRAZOLE SODIUM 40 MG/1
40 TABLET, DELAYED RELEASE ORAL 2 TIMES DAILY
Qty: 90 TABLET | Refills: 0 | Status: SHIPPED | OUTPATIENT
Start: 2019-04-08 | End: 2020-01-20 | Stop reason: SDUPTHER

## 2019-04-12 ENCOUNTER — APPOINTMENT (OUTPATIENT)
Dept: PHYSICAL THERAPY | Facility: MEDICAL CENTER | Age: 74
End: 2019-04-12
Payer: MEDICARE

## 2019-04-15 ENCOUNTER — APPOINTMENT (OUTPATIENT)
Dept: PHYSICAL THERAPY | Facility: MEDICAL CENTER | Age: 74
End: 2019-04-15
Payer: MEDICARE

## 2019-04-17 ENCOUNTER — APPOINTMENT (OUTPATIENT)
Dept: PHYSICAL THERAPY | Facility: MEDICAL CENTER | Age: 74
End: 2019-04-17
Payer: MEDICARE

## 2019-05-01 ENCOUNTER — OFFICE VISIT (OUTPATIENT)
Dept: PHYSICAL THERAPY | Facility: MEDICAL CENTER | Age: 74
End: 2019-05-01
Payer: MEDICARE

## 2019-05-01 DIAGNOSIS — M76.61 RIGHT ACHILLES TENDINITIS: Primary | ICD-10-CM

## 2019-05-01 PROCEDURE — 97140 MANUAL THERAPY 1/> REGIONS: CPT | Performed by: PHYSICAL MEDICINE & REHABILITATION

## 2019-05-03 ENCOUNTER — HOSPITAL ENCOUNTER (OUTPATIENT)
Dept: NON INVASIVE DIAGNOSTICS | Facility: HOSPITAL | Age: 74
Discharge: HOME | End: 2019-05-03
Payer: MEDICARE

## 2019-05-03 ENCOUNTER — HOSPITAL ENCOUNTER (OUTPATIENT)
Dept: RADIOLOGY | Facility: HOSPITAL | Age: 74
Discharge: HOME | End: 2019-05-03
Payer: MEDICARE

## 2019-05-03 ENCOUNTER — OFFICE VISIT (OUTPATIENT)
Dept: FAMILY MEDICINE CLINIC | Facility: CLINIC | Age: 74
End: 2019-05-03
Payer: MEDICARE

## 2019-05-03 VITALS
RESPIRATION RATE: 16 BRPM | TEMPERATURE: 98.7 F | DIASTOLIC BLOOD PRESSURE: 70 MMHG | HEART RATE: 76 BPM | SYSTOLIC BLOOD PRESSURE: 128 MMHG | OXYGEN SATURATION: 95 % | BODY MASS INDEX: 37.22 KG/M2 | HEIGHT: 60 IN | WEIGHT: 189.6 LBS

## 2019-05-03 DIAGNOSIS — J45.30 MILD PERSISTENT ASTHMA WITHOUT COMPLICATION: ICD-10-CM

## 2019-05-03 DIAGNOSIS — E66.01 CLASS 2 SEVERE OBESITY DUE TO EXCESS CALORIES WITH SERIOUS COMORBIDITY AND BODY MASS INDEX (BMI) OF 36.0 TO 36.9 IN ADULT (HCC): ICD-10-CM

## 2019-05-03 DIAGNOSIS — R06.02 SHORTNESS OF BREATH: ICD-10-CM

## 2019-05-03 DIAGNOSIS — E78.2 MIXED HYPERLIPIDEMIA: Primary | ICD-10-CM

## 2019-05-03 DIAGNOSIS — R73.01 IFG (IMPAIRED FASTING GLUCOSE): ICD-10-CM

## 2019-05-03 PROBLEM — S02.31XA FRACTURE OF ORBITAL FLOOR, RIGHT SIDE, INITIAL ENCOUNTER FOR CLOSED FRACTURE (HCC): Status: ACTIVE | Noted: 2019-05-03

## 2019-05-03 LAB
CHEST PAIN STATEMENT: NORMAL
MAX DIASTOLIC BP: 60 MMHG
MAX HEART RATE: 109 BPM
MAX PREDICTED HEART RATE: 145 BPM
MAX. SYSTOLIC BP: 174 MMHG
PROTOCOL NAME: NORMAL
REASON FOR TERMINATION: NORMAL
TARGET HR FORMULA: NORMAL
TEST INDICATION: NORMAL
TIME IN EXERCISE PHASE: NORMAL

## 2019-05-03 PROCEDURE — 78452 HT MUSCLE IMAGE SPECT MULT: CPT

## 2019-05-03 PROCEDURE — 93018 CV STRESS TEST I&R ONLY: CPT | Performed by: INTERNAL MEDICINE

## 2019-05-03 PROCEDURE — 78452 HT MUSCLE IMAGE SPECT MULT: CPT | Performed by: INTERNAL MEDICINE

## 2019-05-03 PROCEDURE — 99214 OFFICE O/P EST MOD 30 MIN: CPT | Performed by: FAMILY MEDICINE

## 2019-05-03 PROCEDURE — 93017 CV STRESS TEST TRACING ONLY: CPT

## 2019-05-03 PROCEDURE — A9502 TC99M TETROFOSMIN: HCPCS

## 2019-05-03 PROCEDURE — 93016 CV STRESS TEST SUPVJ ONLY: CPT | Performed by: INTERNAL MEDICINE

## 2019-05-03 RX ADMIN — REGADENOSON 0.4 MG: 0.08 INJECTION, SOLUTION INTRAVENOUS at 09:04

## 2019-05-08 ENCOUNTER — OFFICE VISIT (OUTPATIENT)
Dept: PHYSICAL THERAPY | Facility: MEDICAL CENTER | Age: 74
End: 2019-05-08
Payer: MEDICARE

## 2019-05-08 DIAGNOSIS — M76.61 RIGHT ACHILLES TENDINITIS: Primary | ICD-10-CM

## 2019-05-08 PROCEDURE — 97140 MANUAL THERAPY 1/> REGIONS: CPT | Performed by: PHYSICAL MEDICINE & REHABILITATION

## 2019-05-08 PROCEDURE — 97110 THERAPEUTIC EXERCISES: CPT | Performed by: PHYSICAL MEDICINE & REHABILITATION

## 2019-05-15 ENCOUNTER — APPOINTMENT (OUTPATIENT)
Dept: PHYSICAL THERAPY | Facility: MEDICAL CENTER | Age: 74
End: 2019-05-15
Payer: MEDICARE

## 2019-05-22 ENCOUNTER — APPOINTMENT (OUTPATIENT)
Dept: PHYSICAL THERAPY | Facility: MEDICAL CENTER | Age: 74
End: 2019-05-22
Payer: MEDICARE

## 2019-05-28 ENCOUNTER — OFFICE VISIT (OUTPATIENT)
Dept: FAMILY MEDICINE CLINIC | Facility: CLINIC | Age: 74
End: 2019-05-28
Payer: MEDICARE

## 2019-05-28 ENCOUNTER — HOSPITAL ENCOUNTER (OUTPATIENT)
Dept: RADIOLOGY | Facility: HOSPITAL | Age: 74
Discharge: HOME | End: 2019-05-28
Attending: FAMILY MEDICINE
Payer: MEDICARE

## 2019-05-28 VITALS
BODY MASS INDEX: 37.07 KG/M2 | RESPIRATION RATE: 22 BRPM | OXYGEN SATURATION: 96 % | WEIGHT: 188.8 LBS | SYSTOLIC BLOOD PRESSURE: 158 MMHG | HEART RATE: 85 BPM | TEMPERATURE: 98.1 F | HEIGHT: 60 IN | DIASTOLIC BLOOD PRESSURE: 76 MMHG

## 2019-05-28 DIAGNOSIS — R42 VERTIGO: ICD-10-CM

## 2019-05-28 DIAGNOSIS — R05.9 COUGH: Primary | ICD-10-CM

## 2019-05-28 DIAGNOSIS — R05.9 COUGH: ICD-10-CM

## 2019-05-28 PROCEDURE — 99213 OFFICE O/P EST LOW 20 MIN: CPT | Performed by: FAMILY MEDICINE

## 2019-05-28 PROCEDURE — 71046 X-RAY EXAM CHEST 2 VIEWS: CPT

## 2019-05-28 RX ORDER — METHYLPREDNISOLONE 4 MG/1
4 TABLET ORAL SEE ADMIN INSTRUCTIONS
Refills: 0 | COMMUNITY
Start: 2019-05-21 | End: 2019-07-17 | Stop reason: ALTCHOICE

## 2019-05-28 RX ORDER — MECLIZINE HYDROCHLORIDE 25 MG/1
25 TABLET ORAL EVERY 8 HOURS SCHEDULED
Qty: 30 TABLET | Refills: 0 | Status: SHIPPED | OUTPATIENT
Start: 2019-05-28 | End: 2020-02-04

## 2019-05-28 RX ORDER — BENZONATATE 200 MG/1
200 CAPSULE ORAL 3 TIMES DAILY PRN
Qty: 20 CAPSULE | Refills: 0 | Status: SHIPPED | OUTPATIENT
Start: 2019-05-28 | End: 2019-07-17 | Stop reason: ALTCHOICE

## 2019-05-28 RX ORDER — DOXYCYCLINE HYCLATE 100 MG/1
100 CAPSULE ORAL EVERY 12 HOURS
Refills: 0 | COMMUNITY
Start: 2019-05-21 | End: 2019-06-11 | Stop reason: ALTCHOICE

## 2019-05-29 ENCOUNTER — APPOINTMENT (OUTPATIENT)
Dept: PHYSICAL THERAPY | Facility: MEDICAL CENTER | Age: 74
End: 2019-05-29
Payer: MEDICARE

## 2019-06-11 ENCOUNTER — OFFICE VISIT (OUTPATIENT)
Dept: FAMILY MEDICINE CLINIC | Facility: CLINIC | Age: 74
End: 2019-06-11
Payer: MEDICARE

## 2019-06-11 ENCOUNTER — APPOINTMENT (OUTPATIENT)
Dept: LAB | Facility: CLINIC | Age: 74
End: 2019-06-11
Payer: MEDICARE

## 2019-06-11 VITALS
HEIGHT: 60 IN | WEIGHT: 198.4 LBS | OXYGEN SATURATION: 97 % | SYSTOLIC BLOOD PRESSURE: 124 MMHG | HEART RATE: 80 BPM | RESPIRATION RATE: 12 BRPM | DIASTOLIC BLOOD PRESSURE: 60 MMHG | BODY MASS INDEX: 38.95 KG/M2 | TEMPERATURE: 98.4 F

## 2019-06-11 DIAGNOSIS — Z01.818 PREOPERATIVE CLEARANCE: Primary | ICD-10-CM

## 2019-06-11 DIAGNOSIS — R53.83 FATIGUE, UNSPECIFIED TYPE: ICD-10-CM

## 2019-06-11 DIAGNOSIS — E03.9 HYPOTHYROIDISM, UNSPECIFIED TYPE: Primary | ICD-10-CM

## 2019-06-11 DIAGNOSIS — E03.9 HYPOTHYROIDISM, UNSPECIFIED TYPE: ICD-10-CM

## 2019-06-11 DIAGNOSIS — H25.9 AGE-RELATED CATARACT OF BOTH EYES, UNSPECIFIED AGE-RELATED CATARACT TYPE: ICD-10-CM

## 2019-06-11 LAB
BASOPHILS # BLD AUTO: 0.07 THOUSANDS/ΜL (ref 0–0.1)
BASOPHILS NFR BLD AUTO: 1 % (ref 0–1)
EOSINOPHIL # BLD AUTO: 0.38 THOUSAND/ΜL (ref 0–0.61)
EOSINOPHIL NFR BLD AUTO: 5 % (ref 0–6)
ERYTHROCYTE [DISTWIDTH] IN BLOOD BY AUTOMATED COUNT: 16.4 % (ref 11.6–15.1)
HCT VFR BLD AUTO: 33.7 % (ref 34.8–46.1)
HGB BLD-MCNC: 10.2 G/DL (ref 11.5–15.4)
IMM GRANULOCYTES # BLD AUTO: 0.02 THOUSAND/UL (ref 0–0.2)
IMM GRANULOCYTES NFR BLD AUTO: 0 % (ref 0–2)
LYMPHOCYTES # BLD AUTO: 1.69 THOUSANDS/ΜL (ref 0.6–4.47)
LYMPHOCYTES NFR BLD AUTO: 24 % (ref 14–44)
MCH RBC QN AUTO: 26.4 PG (ref 26.8–34.3)
MCHC RBC AUTO-ENTMCNC: 30.3 G/DL (ref 31.4–37.4)
MCV RBC AUTO: 87 FL (ref 82–98)
MONOCYTES # BLD AUTO: 0.48 THOUSAND/ΜL (ref 0.17–1.22)
MONOCYTES NFR BLD AUTO: 7 % (ref 4–12)
NEUTROPHILS # BLD AUTO: 4.54 THOUSANDS/ΜL (ref 1.85–7.62)
NEUTS SEG NFR BLD AUTO: 63 % (ref 43–75)
NRBC BLD AUTO-RTO: 0 /100 WBCS
PLATELET # BLD AUTO: 303 THOUSANDS/UL (ref 149–390)
PMV BLD AUTO: 10.3 FL (ref 8.9–12.7)
RBC # BLD AUTO: 3.86 MILLION/UL (ref 3.81–5.12)
TSH SERPL DL<=0.05 MIU/L-ACNC: 139.2 UIU/ML (ref 0.36–3.74)
WBC # BLD AUTO: 7.18 THOUSAND/UL (ref 4.31–10.16)

## 2019-06-11 PROCEDURE — 84443 ASSAY THYROID STIM HORMONE: CPT

## 2019-06-11 PROCEDURE — 85025 COMPLETE CBC W/AUTO DIFF WBC: CPT

## 2019-06-11 PROCEDURE — 36415 COLL VENOUS BLD VENIPUNCTURE: CPT

## 2019-06-11 PROCEDURE — 99214 OFFICE O/P EST MOD 30 MIN: CPT | Performed by: FAMILY MEDICINE

## 2019-06-28 DIAGNOSIS — F32.A DEPRESSION, UNSPECIFIED DEPRESSION TYPE: ICD-10-CM

## 2019-06-28 RX ORDER — BUPROPION HYDROCHLORIDE 150 MG/1
150 TABLET, EXTENDED RELEASE ORAL 2 TIMES DAILY
Qty: 180 TABLET | Refills: 0 | Status: CANCELLED | OUTPATIENT
Start: 2019-06-28

## 2019-07-16 ENCOUNTER — APPOINTMENT (OUTPATIENT)
Dept: LAB | Facility: MEDICAL CENTER | Age: 74
End: 2019-07-16
Payer: MEDICARE

## 2019-07-16 DIAGNOSIS — E03.9 HYPOTHYROIDISM, UNSPECIFIED TYPE: ICD-10-CM

## 2019-07-16 LAB
BASOPHILS # BLD AUTO: 0.07 THOUSANDS/ΜL (ref 0–0.1)
BASOPHILS NFR BLD AUTO: 1 % (ref 0–1)
EOSINOPHIL # BLD AUTO: 0.5 THOUSAND/ΜL (ref 0–0.61)
EOSINOPHIL NFR BLD AUTO: 7 % (ref 0–6)
ERYTHROCYTE [DISTWIDTH] IN BLOOD BY AUTOMATED COUNT: 16.4 % (ref 11.6–15.1)
HCT VFR BLD AUTO: 33.1 % (ref 34.8–46.1)
HGB BLD-MCNC: 10.1 G/DL (ref 11.5–15.4)
IMM GRANULOCYTES # BLD AUTO: 0.02 THOUSAND/UL (ref 0–0.2)
IMM GRANULOCYTES NFR BLD AUTO: 0 % (ref 0–2)
LYMPHOCYTES # BLD AUTO: 1.49 THOUSANDS/ΜL (ref 0.6–4.47)
LYMPHOCYTES NFR BLD AUTO: 20 % (ref 14–44)
MCH RBC QN AUTO: 26.9 PG (ref 26.8–34.3)
MCHC RBC AUTO-ENTMCNC: 30.5 G/DL (ref 31.4–37.4)
MCV RBC AUTO: 88 FL (ref 82–98)
MONOCYTES # BLD AUTO: 0.59 THOUSAND/ΜL (ref 0.17–1.22)
MONOCYTES NFR BLD AUTO: 8 % (ref 4–12)
NEUTROPHILS # BLD AUTO: 4.88 THOUSANDS/ΜL (ref 1.85–7.62)
NEUTS SEG NFR BLD AUTO: 64 % (ref 43–75)
NRBC BLD AUTO-RTO: 0 /100 WBCS
PLATELET # BLD AUTO: 310 THOUSANDS/UL (ref 149–390)
PMV BLD AUTO: 10.6 FL (ref 8.9–12.7)
RBC # BLD AUTO: 3.75 MILLION/UL (ref 3.81–5.12)
TSH SERPL DL<=0.05 MIU/L-ACNC: 49.3 UIU/ML (ref 0.36–3.74)
WBC # BLD AUTO: 7.55 THOUSAND/UL (ref 4.31–10.16)

## 2019-07-16 PROCEDURE — 36415 COLL VENOUS BLD VENIPUNCTURE: CPT

## 2019-07-16 PROCEDURE — 85025 COMPLETE CBC W/AUTO DIFF WBC: CPT

## 2019-07-16 PROCEDURE — 84443 ASSAY THYROID STIM HORMONE: CPT

## 2019-07-17 ENCOUNTER — OFFICE VISIT (OUTPATIENT)
Dept: FAMILY MEDICINE CLINIC | Facility: CLINIC | Age: 74
End: 2019-07-17
Payer: MEDICARE

## 2019-07-17 VITALS
TEMPERATURE: 98.7 F | SYSTOLIC BLOOD PRESSURE: 152 MMHG | WEIGHT: 190 LBS | OXYGEN SATURATION: 98 % | BODY MASS INDEX: 37.3 KG/M2 | DIASTOLIC BLOOD PRESSURE: 68 MMHG | HEART RATE: 83 BPM | HEIGHT: 60 IN | RESPIRATION RATE: 12 BRPM

## 2019-07-17 DIAGNOSIS — H25.9 SENILE CATARACT OF LEFT EYE, UNSPECIFIED AGE-RELATED CATARACT TYPE: ICD-10-CM

## 2019-07-17 DIAGNOSIS — J32.9 SINUSITIS, UNSPECIFIED CHRONICITY, UNSPECIFIED LOCATION: ICD-10-CM

## 2019-07-17 DIAGNOSIS — E03.9 HYPOTHYROIDISM, UNSPECIFIED TYPE: ICD-10-CM

## 2019-07-17 DIAGNOSIS — Z01.818 PREOPERATIVE CLEARANCE: Primary | ICD-10-CM

## 2019-07-17 PROCEDURE — 99214 OFFICE O/P EST MOD 30 MIN: CPT | Performed by: FAMILY MEDICINE

## 2019-07-17 RX ORDER — LEVOTHYROXINE SODIUM 175 UG/1
175 TABLET ORAL
Qty: 30 TABLET | Refills: 5 | Status: SHIPPED | OUTPATIENT
Start: 2019-07-17 | End: 2019-09-18

## 2019-07-17 RX ORDER — AZITHROMYCIN 250 MG/1
TABLET, FILM COATED ORAL
Qty: 6 TABLET | Refills: 0 | Status: SHIPPED | OUTPATIENT
Start: 2019-07-17 | End: 2019-07-22

## 2019-07-17 NOTE — PATIENT INSTRUCTIONS
zithromax x 5 days  Rest, fluids  Increase synthroid to 175mcg daily and recheck labs in 6 weeks  You're good for cataract surgery

## 2019-07-17 NOTE — PROGRESS NOTES
Assessment/Plan:    No problem-specific Assessment & Plan notes found for this encounter  Diagnoses and all orders for this visit:    Preoperative clearance  Comments:  low risk for low-risk surgery  form completed    Senile cataract of left eye, unspecified age-related cataract type  Comments:  mgmt per Dr Shabnam Haskins    Hypothyroidism, unspecified type  Comments:  TSH of 49 is much improved but still out of range  increase synthroid to 175mcg daily and recheck tsh in 6 weeks  Orders:  -     levothyroxine 175 mcg tablet; Take 1 tablet (175 mcg total) by mouth daily in the early morning  -     CBC and differential; Future  -     TSH, 3rd generation; Future    Sinusitis, unspecified chronicity, unspecified location  Comments:  worsening after 7 days, will treat with abx given upcoming surgery    Orders:  -     azithromycin (ZITHROMAX) 250 mg tablet; 2 tabs today; 1 tab every day after until finished        Subjective:      Patient ID: Thaddeus Gay is a 66 y o  female  HPI  Pt presents in routine preoperative eval for upcoming OS cataract extraction/lens implantation under MAC/local scheduled on 7/30 with Dr Shabnam Haskins  She recently had her R eye completed  She is now noticing floaters which are new in the surgical eye and will call ophtho  In the non-surgical eye, she noticed decreased acuity, worse at night, unimproved by correction  +halos  No floaters or flashes of light in L eye  Pt has had 7-8 days of congestion and cough which are worsening  No fever   +sinus headache  No shortness of breath  Has tried nothing for symptoms    In general, other than cold symptoms, pt feels well  She denies chest pain, shortness of breath  Can walk up 2 flights without chest pain/sob  Can walk on a flat surface without chest pain, shortness of breath, calf pain  Pt restarted her thyroid meds (she had stopped these on her own) 6 weeks ago    Repeat tsh still high at   Lab Results   Component Value Date [id number] 49 300 (H) 07/16/2019   +fatigue  The following portions of the patient's history were reviewed and updated as appropriate: allergies, current medications, past family history, past medical history, past social history, past surgical history and problem list     Review of Systems   Constitutional: Positive for fatigue  Negative for chills, fever and unexpected weight change  HENT: Positive for congestion, postnasal drip, sinus pressure, sinus pain and sore throat  Negative for ear pain, hearing loss, rhinorrhea, trouble swallowing and voice change  Eyes: Positive for visual disturbance  Negative for pain, discharge, redness and itching  Respiratory: Positive for cough  Negative for shortness of breath  Cardiovascular: Negative for chest pain, palpitations and leg swelling  Gastrointestinal: Negative for abdominal pain, constipation, diarrhea and nausea  Endocrine: Negative for cold intolerance, heat intolerance, polydipsia and polyuria  Genitourinary: Negative for dysuria, frequency and urgency  Musculoskeletal: Negative for arthralgias, joint swelling and myalgias  Skin: Negative for rash  No suspicious lesions   Neurological: Negative for weakness, numbness and headaches  Hematological: Negative for adenopathy  Objective:      /68 (BP Location: Right arm)   Pulse 83   Temp 98 7 °F (37 1 °C)   Resp 12   Ht 5' 0 24" (1 53 m)   Wt 86 2 kg (190 lb)   SpO2 98%   BMI 36 81 kg/m²          Physical Exam   Constitutional: She is oriented to person, place, and time  She appears well-developed and well-nourished  She appears lethargic  No distress  HENT:   Head: Normocephalic and atraumatic  Right Ear: Tympanic membrane, external ear and ear canal normal    Left Ear: Tympanic membrane, external ear and ear canal normal    Nose: Mucosal edema present  Mouth/Throat: Mucous membranes are normal  Posterior oropharyngeal erythema present   No oropharyngeal exudate  Eyes: Pupils are equal, round, and reactive to light  Conjunctivae and EOM are normal    L cataract   Cardiovascular: Regular rhythm, S1 normal and S2 normal  Exam reveals no gallop, no S3, no S4 and no friction rub  No murmur heard  Pulmonary/Chest: Effort normal and breath sounds normal  She has no wheezes  She has no rhonchi  Lymphadenopathy:     She has no cervical adenopathy  Neurological: She is oriented to person, place, and time  She has normal strength  She appears lethargic  No cranial nerve deficit or sensory deficit

## 2019-08-12 ENCOUNTER — OFFICE VISIT (OUTPATIENT)
Dept: FAMILY MEDICINE CLINIC | Facility: CLINIC | Age: 74
End: 2019-08-12
Payer: MEDICARE

## 2019-08-12 VITALS
SYSTOLIC BLOOD PRESSURE: 110 MMHG | DIASTOLIC BLOOD PRESSURE: 58 MMHG | HEART RATE: 82 BPM | BODY MASS INDEX: 38.24 KG/M2 | OXYGEN SATURATION: 95 % | RESPIRATION RATE: 16 BRPM | WEIGHT: 194.8 LBS | HEIGHT: 60 IN | TEMPERATURE: 98 F

## 2019-08-12 DIAGNOSIS — E66.01 CLASS 2 SEVERE OBESITY DUE TO EXCESS CALORIES WITH SERIOUS COMORBIDITY AND BODY MASS INDEX (BMI) OF 37.0 TO 37.9 IN ADULT (HCC): ICD-10-CM

## 2019-08-12 DIAGNOSIS — I10 ESSENTIAL HYPERTENSION: ICD-10-CM

## 2019-08-12 DIAGNOSIS — L71.9 ROSACEA: Primary | ICD-10-CM

## 2019-08-12 PROCEDURE — 99214 OFFICE O/P EST MOD 30 MIN: CPT | Performed by: FAMILY MEDICINE

## 2019-08-12 RX ORDER — METRONIDAZOLE 10 MG/G
GEL TOPICAL DAILY
Qty: 45 G | Refills: 0 | Status: SHIPPED | OUTPATIENT
Start: 2019-08-12

## 2019-08-12 NOTE — ASSESSMENT & PLAN NOTE
Recurrent and worsening  Start Metrogel daily  Handout given  Avoid triggers, use fragrance-free topicals, OTC lubricating eye drops

## 2019-08-12 NOTE — PROGRESS NOTES
Assessment/Plan:  Problem List Items Addressed This Visit        Cardiovascular and Mediastinum    Essential hypertension     Stable  Recommend lifestyle modifications  Check blood pressure outside of office  Musculoskeletal and Integument    Rosacea - Primary     Recurrent and worsening  Start Metrogel daily  Handout given  Avoid triggers, use fragrance-free topicals, OTC lubricating eye drops  Relevant Medications    metroNIDAZOLE (METROGEL) 1 % gel       Other    Class 2 severe obesity due to excess calories with serious comorbidity and body mass index (BMI) of 37 0 to 37 9 in adult [facility])     Worsening  Recommend lifestyle modifications  Return if symptoms worsen or fail to improve  No future appointments  Subjective:     Tatianna Garza is a 66 y o  female who presents today for a follow-up on her acute medical conditions  HPI:  Chief Complaint   Patient presents with    Rosacea     started friday evening around eyes  is itchy  unsure of name of prescription that helped her  it was gel      -- Above per clinical staff and reviewed  --    HPI      Today:    Rosacea - Symptoms x 10 years, worse in the past 3 days  Had been using an OTC Rosacea Gel  Previouly on Rx 2014 - unsure of name  She has felt itching all over her face and notices redness at her laugh lines  OTC Licorice root unhelpful, green and brown creams unhelpful  Feels as though things are crawling on her face  No sick contacts  The following portions of the patient's history were reviewed and updated as appropriate: allergies, current medications, past family history, past medical history, past social history, past surgical history and problem list       Review of Systems   Constitutional: Positive for appetite change (Decreased) and fatigue  Negative for chills, diaphoresis and fever  Respiratory: Negative for chest tightness and shortness of breath      Cardiovascular: Negative for chest pain  Gastrointestinal: Negative for abdominal pain, diarrhea, nausea and vomiting  Genitourinary: Negative for dysuria  Skin: Positive for rash  Current Outpatient Medications   Medication Sig Dispense Refill    albuterol (VENTOLIN HFA) 90 mcg/act inhaler Inhale 2 puffs every 6 (six) hours as needed for wheezing 1 Inhaler 3    amLODIPine (NORVASC) 10 mg tablet Take 1 tablet (10 mg total) by mouth daily 90 tablet 3    atorvastatin (LIPITOR) 20 mg tablet Take 1 tablet (20 mg total) by mouth daily 90 tablet 3    buPROPion (WELLBUTRIN SR) 150 mg 12 hr tablet Take 1 tablet (150 mg total) by mouth 2 (two) times a day 180 tablet 3    Ergocalciferol 2000 units CAPS ergocalciferol (vitamin D2) 50,000 unit capsule   TK 1 C PO Q WK      fluticasone (FLOVENT HFA) 110 MCG/ACT inhaler Inhale 2 puffs 2 (two) times a day Rinse mouth after use  1 Inhaler 3    levothyroxine 175 mcg tablet Take 1 tablet (175 mcg total) by mouth daily in the early morning 30 tablet 5    lisinopril (ZESTRIL) 40 mg tablet Take 1 tablet (40 mg total) by mouth daily 90 tablet 3    meclizine (ANTIVERT) 25 mg tablet Take 1 tablet (25 mg total) by mouth every 8 (eight) hours 30 tablet 0    pantoprazole (PROTONIX) 40 mg tablet Take 1 tablet (40 mg total) by mouth 2 (two) times a day 90 tablet 0    Polyethylene Glycol 3350 (MIRALAX PO) Take 17 g by mouth      RESTASIS 0 05 % ophthalmic emulsion       sertraline (ZOLOFT) 100 mg tablet Take 0 5 tablets (50 mg total) by mouth daily 90 tablet 3    traZODone (DESYREL) 100 mg tablet Take 1 tablet (100 mg total) by mouth daily at bedtime 90 tablet 3    metroNIDAZOLE (METROGEL) 1 % gel Apply topically daily 45 g 0     No current facility-administered medications for this visit          Objective:  /58   Pulse 82   Temp 98 °F (36 7 °C) (Tympanic)   Resp 16   Ht 5' 0 24" (1 53 m)   Wt 88 4 kg (194 lb 12 8 oz)   SpO2 95%   BMI 37 75 kg/m²    Wt Readings from Last 3 Encounters:   08/12/19 88 4 kg (194 lb 12 8 oz)   07/17/19 86 2 kg (190 lb)   06/11/19 90 kg (198 lb 6 4 oz)      BP Readings from Last 3 Encounters:   08/12/19 110/58   07/17/19 152/68   06/11/19 124/60          Physical Exam   Constitutional: She appears well-developed and well-nourished  HENT:   Head: Normocephalic and atraumatic  Eyes: Conjunctivae are normal    Neck: Neck supple  Cardiovascular: Normal rate, regular rhythm, normal heart sounds and intact distal pulses  Pulmonary/Chest: Effort normal and breath sounds normal    Musculoskeletal: She exhibits edema (+1 B/L proximal tiba B/L)  She exhibits no tenderness or deformity  Skin: Rash (Slight erthema B/L Laugh Lines) noted  Psychiatric: She has a normal mood and affect  Nursing note and vitals reviewed  Lab Results:      Lab Results   Component Value Date    WBC 7 55 07/16/2019    HGB 10 1 (L) 07/16/2019    HCT 33 1 (L) 07/16/2019     07/16/2019    ALT 33 02/26/2015    AST 24 02/26/2015     02/27/2015    K 4 3 02/27/2015     02/27/2015    CREATININE 0 96 08/29/2016    BUN 18 08/29/2016    CO2 29 02/27/2015    INR 1 12 02/26/2015     No results found for: URICACID  Invalid input(s): BASENAME Vitamin D    No results found       POCT Labs

## 2019-08-12 NOTE — PATIENT INSTRUCTIONS
Rosacea   WHAT YOU NEED TO KNOW:   What is rosacea? Rosacea is a long-term skin condition that causes inflammation of your face  Rosacea usually affects the cheeks and chin  It may also affect the nose, forehead, and eyes  There is no known cause of rosacea  Healthcare providers believe the facial redness occurs when blood vessels in your face widen  Rosacea is more common in women, people with fair skin, and those aged 27 or older  What are the signs and symptoms of rosacea? Your face will be red with red bumps  The bumps often look like acne pimples  Symptoms range from mild to severe  You may have periods of time with no symptoms  When you have symptoms, this is called a flare  Some signs and symptoms will depend on the type of rosacea you have  The 4 types of rosacea are:  · Erythematotelangiectatic:  This is also called ETR  Your face may be red and swollen for long periods  Your skin may burn, itch, or sting more easily when you use creams or lotions on your face  Your skin may also be drier than with other types of rosacea  · Papulopustular: Your skin may swell, burn, or sting  The bumps on your skin may be filled with pus  · Phymatous: This type causes your skin on your nose, chin, forehead, cheeks, eyelids, or ears to thicken  Your nose may also look bumpy  · Ocular: This type of rosacea affects your eyes  You may have dry or watery, red eyes  Your eyes may burn, sting, or itch  You may have eyelid swelling or feel like you have something in your eye  You may also have blurry vision  Your eyes may hurt when you are in bright light  What increases my risk for rosacea flares? · Heavy exercise    · Hot drinks or drinks that contain alcohol    · Spicy foods    · Stress, sudden laughter, or embarrassment    · Sun exposure    · Very hot, cold, or windy weather  How is rosacea diagnosed? Your healthcare provider will ask about your signs and symptoms   He may know you have rosacea by looking at your skin  Your healthcare provider may also do blood tests to learn if another medical condition is the cause of your symptoms  How is rosacea treated? Rosacea has no cure  With treatment, your symptoms can be controlled  Ask your healthcare provider for more information about the following treatments:  · Antibiotic medicines: This medicine is given to treat or prevent infection  Antibiotics may also help decrease swelling, redness, and acne-like bumps  This medicine may be given as a pill or a cream to apply on your face  · Artificial tears: These help keep your eyes moist if you have dryness from ocular rosacea  · Laser ablation:  A laser removes tissue buildup and reduces redness and swelling  · Surgery: You may need surgery to remove thickened skin on your face  How can I prevent a rosacea flare? · Avoid hot drinks or drinks that contain alcohol  · Avoid being in the sun for long periods of time  Use sunscreen that is SPF 15 or higher every time you go outside  Wear a wide-brimmed hat while you are outdoors  · Avoid skin care products that have alcohol, menthol, or salt in them  Use fragrance-free products to wash your face  Be gentle when you wash your face to avoid irritation  Ask your healthcare provider which products are best to treat dry skin  · Clean your eyelids as directed  Your healthcare provider may suggest you put warm compresses on your eyes 2 times each day  When should I contact my healthcare provider? · You have new or worse eye redness or itching  · You feel depressed about the look of your skin  · You have questions about your condition or care  When should I seek immediate care or call 911? · You have new or increased blurry vision, or you have vision loss  CARE AGREEMENT:   You have the right to help plan your care  Learn about your health condition and how it may be treated   Discuss treatment options with your caregivers to decide what care you want to receive  You always have the right to refuse treatment  The above information is an  only  It is not intended as medical advice for individual conditions or treatments  Talk to your doctor, nurse or pharmacist before following any medical regimen to see if it is safe and effective for you  © 2017 [employer] Information is for End User's use only and may not be sold, redistributed or otherwise used for commercial purposes  All illustrations and images included in CareNotes® are the copyrighted property of A D A M [employer]  or [employer]  Use fragrance-free topicals

## 2019-09-18 ENCOUNTER — APPOINTMENT (OUTPATIENT)
Dept: LAB | Facility: MEDICAL CENTER | Age: 74
End: 2019-09-18
Payer: MEDICARE

## 2019-09-18 DIAGNOSIS — D53.9 NUTRITIONAL ANEMIA: ICD-10-CM

## 2019-09-18 DIAGNOSIS — E03.9 HYPOTHYROIDISM, UNSPECIFIED TYPE: ICD-10-CM

## 2019-09-18 DIAGNOSIS — D64.9 ANEMIA, UNSPECIFIED TYPE: ICD-10-CM

## 2019-09-18 DIAGNOSIS — E03.9 HYPOTHYROIDISM, UNSPECIFIED TYPE: Primary | ICD-10-CM

## 2019-09-18 LAB
BASOPHILS # BLD AUTO: 0.07 THOUSANDS/ΜL (ref 0–0.1)
BASOPHILS NFR BLD AUTO: 1 % (ref 0–1)
EOSINOPHIL # BLD AUTO: 0.44 THOUSAND/ΜL (ref 0–0.61)
EOSINOPHIL NFR BLD AUTO: 6 % (ref 0–6)
ERYTHROCYTE [DISTWIDTH] IN BLOOD BY AUTOMATED COUNT: 15.1 % (ref 11.6–15.1)
HCT VFR BLD AUTO: 32.6 % (ref 34.8–46.1)
HGB BLD-MCNC: 9.6 G/DL (ref 11.5–15.4)
IMM GRANULOCYTES # BLD AUTO: 0.03 THOUSAND/UL (ref 0–0.2)
IMM GRANULOCYTES NFR BLD AUTO: 0 % (ref 0–2)
LYMPHOCYTES # BLD AUTO: 1.93 THOUSANDS/ΜL (ref 0.6–4.47)
LYMPHOCYTES NFR BLD AUTO: 24 % (ref 14–44)
MCH RBC QN AUTO: 25.6 PG (ref 26.8–34.3)
MCHC RBC AUTO-ENTMCNC: 29.4 G/DL (ref 31.4–37.4)
MCV RBC AUTO: 87 FL (ref 82–98)
MONOCYTES # BLD AUTO: 0.66 THOUSAND/ΜL (ref 0.17–1.22)
MONOCYTES NFR BLD AUTO: 8 % (ref 4–12)
NEUTROPHILS # BLD AUTO: 4.83 THOUSANDS/ΜL (ref 1.85–7.62)
NEUTS SEG NFR BLD AUTO: 61 % (ref 43–75)
NRBC BLD AUTO-RTO: 0 /100 WBCS
PLATELET # BLD AUTO: 314 THOUSANDS/UL (ref 149–390)
PMV BLD AUTO: 10.1 FL (ref 8.9–12.7)
RBC # BLD AUTO: 3.75 MILLION/UL (ref 3.81–5.12)
TSH SERPL DL<=0.05 MIU/L-ACNC: 16.8 UIU/ML (ref 0.36–3.74)
WBC # BLD AUTO: 7.96 THOUSAND/UL (ref 4.31–10.16)

## 2019-09-18 PROCEDURE — 82746 ASSAY OF FOLIC ACID SERUM: CPT

## 2019-09-18 PROCEDURE — 36415 COLL VENOUS BLD VENIPUNCTURE: CPT

## 2019-09-18 PROCEDURE — 83550 IRON BINDING TEST: CPT

## 2019-09-18 PROCEDURE — 84443 ASSAY THYROID STIM HORMONE: CPT

## 2019-09-18 PROCEDURE — 82728 ASSAY OF FERRITIN: CPT

## 2019-09-18 PROCEDURE — 85025 COMPLETE CBC W/AUTO DIFF WBC: CPT

## 2019-09-18 RX ORDER — LEVOTHYROXINE SODIUM 0.2 MG/1
200 TABLET ORAL
Qty: 30 TABLET | Refills: 5 | Status: SHIPPED | OUTPATIENT
Start: 2019-09-18 | End: 2020-03-31 | Stop reason: SDUPTHER

## 2019-09-20 DIAGNOSIS — D53.9 NUTRITIONAL ANEMIA: ICD-10-CM

## 2019-09-20 DIAGNOSIS — D64.9 ANEMIA, UNSPECIFIED TYPE: Primary | ICD-10-CM

## 2019-09-20 LAB
FERRITIN SERPL-MCNC: 9 NG/ML (ref 8–388)
FOLATE SERPL-MCNC: 12.5 NG/ML (ref 3.1–17.5)
TIBC SERPL-MCNC: 421 UG/DL (ref 250–450)

## 2019-09-30 ENCOUNTER — PATIENT MESSAGE (OUTPATIENT)
Dept: FAMILY MEDICINE CLINIC | Facility: CLINIC | Age: 74
End: 2019-09-30

## 2019-10-01 NOTE — TELEPHONE ENCOUNTER
Please send the tsh, b12 scrip to pt again, and make sure she has f/u with GI within the next month--+hx of ulcer and continued anemia

## 2019-10-01 NOTE — TELEPHONE ENCOUNTER
From: George Whipple  To: Amalia Padilla DO  Sent: 9/30/2019 6:53 AM EDT  Subject: Non-Urgent Medical Question    After my last thyroid test I was told it should be repeated in six weeks and the office would send a script  I was informed due to the anemia results I such have a GI test done and the office would schedule an appointment  I have not received or heard anything further please let me know how to proceed   Thank You

## 2019-10-02 ENCOUNTER — TELEPHONE (OUTPATIENT)
Dept: GASTROENTEROLOGY | Facility: CLINIC | Age: 74
End: 2019-10-02

## 2019-10-02 NOTE — TELEPHONE ENCOUNTER
New pt - please assist in scheduling the pt for an appt for anemia with Dr Parker Rockwell [phone] No

## 2019-10-10 ENCOUNTER — APPOINTMENT (OUTPATIENT)
Dept: LAB | Facility: MEDICAL CENTER | Age: 74
End: 2019-10-10
Payer: MEDICARE

## 2019-10-10 DIAGNOSIS — D53.9 NUTRITIONAL ANEMIA: ICD-10-CM

## 2019-10-10 DIAGNOSIS — D64.9 ANEMIA, UNSPECIFIED TYPE: ICD-10-CM

## 2019-10-10 LAB — VIT B12 SERPL-MCNC: 410 PG/ML (ref 100–900)

## 2019-10-10 PROCEDURE — 36415 COLL VENOUS BLD VENIPUNCTURE: CPT

## 2019-10-10 PROCEDURE — 82607 VITAMIN B-12: CPT

## 2019-10-11 ENCOUNTER — TELEPHONE (OUTPATIENT)
Dept: FAMILY MEDICINE CLINIC | Facility: CLINIC | Age: 74
End: 2019-10-11

## 2019-10-11 NOTE — TELEPHONE ENCOUNTER
Concern: medication reaction  Symptoms: dreaming that her furniture was moved around, she could not get to the light switch, she could not reach or touch anything  Duration: last night was the first time this happened  Remedies tried at home for relief:  Patient has been taking Trazodone for over one year and is not sure if this is a reaction from this or if something else is going on but she is very concerned about this episode

## 2019-10-14 NOTE — TELEPHONE ENCOUNTER
I don't think that her hallucinations are a side effect of her Trazodone as she has been taking Rx for some time  She can restart Trazodone if she chooses to do so  Office will reach out to patient if Dr Erin Carrillo has any additional advice

## 2019-10-19 ENCOUNTER — TRANSCRIBE ORDERS (OUTPATIENT)
Dept: ADMINISTRATIVE | Facility: HOSPITAL | Age: 74
End: 2019-10-19

## 2019-10-19 ENCOUNTER — APPOINTMENT (OUTPATIENT)
Dept: LAB | Facility: MEDICAL CENTER | Age: 74
End: 2019-10-19
Payer: MEDICARE

## 2019-10-19 DIAGNOSIS — D64.9 ANEMIA, UNSPECIFIED TYPE: ICD-10-CM

## 2019-10-19 DIAGNOSIS — D64.9 ANEMIA, UNSPECIFIED TYPE: Primary | ICD-10-CM

## 2019-10-19 PROCEDURE — G0328 FECAL BLOOD SCRN IMMUNOASSAY: HCPCS

## 2019-11-01 ENCOUNTER — OFFICE VISIT (OUTPATIENT)
Dept: GASTROENTEROLOGY | Facility: AMBULARY SURGERY CENTER | Age: 74
End: 2019-11-01
Payer: MEDICARE

## 2019-11-01 VITALS
SYSTOLIC BLOOD PRESSURE: 138 MMHG | HEIGHT: 60 IN | DIASTOLIC BLOOD PRESSURE: 62 MMHG | BODY MASS INDEX: 36.44 KG/M2 | WEIGHT: 185.6 LBS | RESPIRATION RATE: 16 BRPM | TEMPERATURE: 98.8 F | HEART RATE: 84 BPM

## 2019-11-01 DIAGNOSIS — R10.13 EPIGASTRIC PAIN: ICD-10-CM

## 2019-11-01 DIAGNOSIS — D64.9 ANEMIA, UNSPECIFIED TYPE: Primary | ICD-10-CM

## 2019-11-01 DIAGNOSIS — Z86.010 HISTORY OF COLON POLYPS: ICD-10-CM

## 2019-11-01 PROCEDURE — 99204 OFFICE O/P NEW MOD 45 MIN: CPT | Performed by: INTERNAL MEDICINE

## 2019-11-01 NOTE — LETTER
November 1, 2019     DO [address]  [address]  [facility]    Patient: Wil Garcia   YOB: 1945   Date of Visit: 11/1/2019       Dear Dr Manuel Haas: Thank you for referring Wil Garcia to me for evaluation  Below are my notes for this consultation  If you have questions, please do not hesitate to call me  I look forward to following your patient along with you  Sincerely,        [place] MD        CC: No Recipients  [place] Ag[place]in MD  11/1/2019  5:07 PM  Sign at close encounter  Consultation - [address] [facility]  Wil Garcia 66 y o  female MRN: [id number]  Unit/Bed#:  Encounter: [id number]        Consults    ASSESSMENT/PLAN:       1  Intermittent Epigastric/right upper quadrant pain-has history of cholecystectomy, differential includes peptic ulcer disease versus gastritis  She has had improvement since being on pantoprazole 40 mg b i d     Will continue this  -stop using NSAIDs  Use acetaminophen instead   -will plan for EGD to assess for peptic ulcer disease  Patient was explained about the lifestyle and dietary modifications  Advised to avoid fatty foods, chocolates, caffeine, alcohol and any other triggering foods  Avoid eating for at least 3 hours before going to bed  2  Normocytic anemia- ? History of ulcers, no overt bleeding, no melena or hematochezia  Vitamin B12 levels are normal   Ferritin is low   -will schedule for EGD and colonoscopy for further evaluation  Differential includes peptic ulcer disease versus AVM versus polyps versus malignancy    -patient reports that she has repeat labs due in November, would recommend checking CBC at the time   -if EGD and colonoscopy are unremarkable, would recommend PillCam   -patient is instructed to go hospital if she has any evidence of melena or hematochezia         ______________________________________________________________________    Reason for Consult / Principal Problem: [unfilled]    HPI: Shreya Bergman is a 66y o  year old female with history of hypothyroidism, GERD, hypertension, mild asthma, presents for evaluation of anemia  She states that she has been getting intermittent episodes of epigastric/right upper quadrant pain associated with nausea but this has improved since taking PPI b i d  She reports that she has been on PPI from several years  She denies dysphagia, hematemesis, coffee-ground emesis or melena  Denies any change in bowel habits  She states that she is constipated at baseline  She reports having had colonoscopy 5 years ago which was notable for polyps  I do not have these results  Denies any family history of colon cancer  Denies unintentional weight loss, or loss of appetite  She does endorse a occasional use of Advil  Patient is unsure whether she has had history of peptic ulcer disease in the past   Denies having had EGD in the past     Labs are reviewed are notable for anemia with hemoglobin of 9 6, MCV 87, platelets 230  Vitamin B12 is 410, ferritin is low at 9  TSH was 16  Fecal occult blood test was negative  Review of Systems: The remainder of the review of systems was negative except for the pertinent positives noted in HPI       Historical Information   Past Medical History:   Diagnosis Date    Acid reflux     Anxiety     C1-C4 level with central cord syndrome ([facility] [address] )     Resolved 2/1/2017     Carpal tunnel syndrome Resolved 4/21/2015    Colitis     Concussion     Depressed     Dysthymic disorder     Resolved 1/5/2018    Gastric ulcer 2013    small - on EGD - [facility]    Hemorrhoids     Hx of blood clots     Hx of colonic polyps     [place]    Hyperlipemia     Hypertension     Hypothyroid     Lung nodule     stable x 2 yrs on CT    Migraines     Obesity     Postural dizziness with presyncope     Resolved 8/28/2018     Tendinitis     Ulnar neuropathy     Resolved 5/21/2015      Past Surgical History:   Procedure Laterality Date    APPENDECTOMY      BREAST SURGERY Left 1966    BREAST LUMPECTOMY    CARPAL TUNNEL RELEASE      DILATION AND CURETTAGE OF UTERUS      ELBOW SURGERY      EYE SURGERY Bilateral 2019    Cataract     GALLBLADDER SURGERY      JOINT REPLACEMENT Right     REPLACEMENT TOTAL KNEE    KNEE ARTHROSCOPY      KNEE SURGERY Right     NECK SURGERY      POSTERIOR LAMINECTOMY / DECOMPRESSION CERVICAL SPINE  2015    VAGINAL DELIVERY      x3    WRIST SURGERY Right      Social History   Social History     Substance and Sexual Activity   Alcohol Use No     Social History     Substance and Sexual Activity   Drug Use No     Social History     Tobacco Use   Smoking Status Former Smoker    Last attempt to quit: 10/14/2013    Years since quittin 0   Smokeless Tobacco Never Used     Family History   Problem Relation Age of Onset    Breast cancer Mother     Alzheimer's disease Mother     Coronary artery disease Mother     Hypertension Mother    Julieann Frankel Migraines Mother     Peripheral vascular disease Mother    Julieann Frankel Parkinsonism Father     Other Father         Cardiovascular disease     Coronary artery disease Father     Hypertension Father     Bipolar disorder Sister     Asthma Daughter        Meds/Allergies       (Not in a hospital admission)  No current facility-administered medications for this visit  Allergies   Allergen Reactions    Ceftin [Cefuroxime] Hives       Objective     Blood pressure 138/62, pulse 84, temperature 98 8 °F (37 1 °C), temperature source Tympanic, resp  rate 16, height 5' (1 524 m), weight 84 2 kg (185 lb 9 6 oz)      [unfilled]    PHYSICAL EXAM     GEN: well nourished, well developed, no acute distress  HEENT: anicteric, MMM, no cervical or supraclavicular lymphadenopathy  CV: RRR, no m/r/g  CHEST: CTA b/l, no WRR  ABD: +BS, soft, NT/ND, no hepatosplenomegaly  EXT: no c/c/e  SKIN: no rashes,  NEURO: aaox3    Lab Results:   No visits with results within 1 Day(s) from this visit     Latest known visit with results is:   Appointment on 10/13/2019   Component Date Value    OCCULT BLD, FECAL IMMUNO* 10/19/2019 Negative      Imaging Studies: I have personally reviewed pertinent films in PACS

## 2019-11-21 ENCOUNTER — TRANSCRIBE ORDERS (OUTPATIENT)
Dept: ADMINISTRATIVE | Facility: HOSPITAL | Age: 74
End: 2019-11-21

## 2019-11-21 ENCOUNTER — APPOINTMENT (OUTPATIENT)
Dept: LAB | Facility: MEDICAL CENTER | Age: 74
End: 2019-11-21
Payer: MEDICARE

## 2019-11-21 DIAGNOSIS — K21.9 GASTROESOPHAGEAL REFLUX DISEASE, ESOPHAGITIS PRESENCE NOT SPECIFIED: ICD-10-CM

## 2019-11-21 DIAGNOSIS — I10 ESSENTIAL HYPERTENSION, MALIGNANT: ICD-10-CM

## 2019-11-21 DIAGNOSIS — E03.9 HYPOTHYROIDISM, UNSPECIFIED TYPE: ICD-10-CM

## 2019-11-21 DIAGNOSIS — I10 ESSENTIAL HYPERTENSION, MALIGNANT: Primary | ICD-10-CM

## 2019-11-21 LAB
ALBUMIN SERPL BCP-MCNC: 4.3 G/DL (ref 3.5–5)
ALP SERPL-CCNC: 100 U/L (ref 46–116)
ALT SERPL W P-5'-P-CCNC: 27 U/L (ref 12–78)
ANION GAP SERPL CALCULATED.3IONS-SCNC: 8 MMOL/L (ref 4–13)
AST SERPL W P-5'-P-CCNC: 18 U/L (ref 5–45)
BILIRUB SERPL-MCNC: 0.27 MG/DL (ref 0.2–1)
BUN SERPL-MCNC: 27 MG/DL (ref 5–25)
CALCIUM SERPL-MCNC: 9.7 MG/DL (ref 8.3–10.1)
CHLORIDE SERPL-SCNC: 110 MMOL/L (ref 100–108)
CO2 SERPL-SCNC: 25 MMOL/L (ref 21–32)
CREAT SERPL-MCNC: 1.01 MG/DL (ref 0.6–1.3)
GFR SERPL CREATININE-BSD FRML MDRD: 54 ML/MIN/1.73SQ M
GLUCOSE P FAST SERPL-MCNC: 106 MG/DL (ref 65–99)
POTASSIUM SERPL-SCNC: 4.6 MMOL/L (ref 3.5–5.3)
PROT SERPL-MCNC: 7.5 G/DL (ref 6.4–8.2)
SODIUM SERPL-SCNC: 143 MMOL/L (ref 136–145)
TSH SERPL DL<=0.05 MIU/L-ACNC: 0.54 UIU/ML (ref 0.36–3.74)

## 2019-11-21 PROCEDURE — 84443 ASSAY THYROID STIM HORMONE: CPT

## 2019-11-21 PROCEDURE — 80053 COMPREHEN METABOLIC PANEL: CPT

## 2019-11-21 PROCEDURE — 36415 COLL VENOUS BLD VENIPUNCTURE: CPT

## 2019-12-09 ENCOUNTER — OFFICE VISIT (OUTPATIENT)
Dept: URGENT CARE | Facility: MEDICAL CENTER | Age: 74
End: 2019-12-09
Payer: MEDICARE

## 2019-12-09 VITALS
HEIGHT: 60 IN | DIASTOLIC BLOOD PRESSURE: 72 MMHG | OXYGEN SATURATION: 95 % | RESPIRATION RATE: 16 BRPM | HEART RATE: 70 BPM | BODY MASS INDEX: 35.34 KG/M2 | TEMPERATURE: 97 F | WEIGHT: 180 LBS | SYSTOLIC BLOOD PRESSURE: 130 MMHG

## 2019-12-09 DIAGNOSIS — H61.20 CERUMEN IN AUDITORY CANAL ON EXAMINATION: ICD-10-CM

## 2019-12-09 DIAGNOSIS — J01.00 ACUTE NON-RECURRENT MAXILLARY SINUSITIS: Primary | ICD-10-CM

## 2019-12-09 PROCEDURE — 99213 OFFICE O/P EST LOW 20 MIN: CPT | Performed by: PHYSICIAN ASSISTANT

## 2019-12-09 PROCEDURE — G0463 HOSPITAL OUTPT CLINIC VISIT: HCPCS | Performed by: PHYSICIAN ASSISTANT

## 2019-12-09 RX ORDER — DOXYCYCLINE 100 MG/1
100 TABLET ORAL 2 TIMES DAILY
Qty: 14 TABLET | Refills: 0 | Status: SHIPPED | OUTPATIENT
Start: 2019-12-09 | End: 2019-12-16

## 2019-12-09 NOTE — PATIENT INSTRUCTIONS
Follow up with PCP in 3-5 days  Proceed to  ER if symptoms worsen  Patient will begin Doxycycline as directed  Hearing loss more than likely associated with cerumen debris in auditory canal  Monitor symptoms closely  Mucinex as directed   Continue with symptomatic treatment  Patient will continue flonase / nasal saline as needed for nasal congestion  Tylenol as needed for fever or chills   Warm salt gargles as needed for sore throat   Discussed possibility of alternative therapy in the future symptoms continue  Sinusitis   WHAT YOU NEED TO KNOW:   Sinusitis is inflammation or infection of your sinuses  It is most often caused by a virus  Acute sinusitis may last up to 12 weeks  Chronic sinusitis lasts longer than 12 weeks  Recurrent sinusitis means you have 4 or more times in 1 year  DISCHARGE INSTRUCTIONS:   Return to the emergency department if:   · Your eye and eyelid are red, swollen, and painful  · You cannot open your eye  · You have vision changes, such as double vision  · Your eyeball bulges out or you cannot move your eye  · You are more sleepy than normal, or you notice changes in your ability to think, move, or talk  · You have a stiff neck, a fever, or a bad headache  · You have swelling of your forehead or scalp  Contact your healthcare provider if:   · Your symptoms do not improve after 3 days  · Your symptoms do not go away after 10 days  · You have nausea and are vomiting  · Your nose is bleeding  · You have questions or concerns about your condition or care  Medicines: Your symptoms may go away on their own  Your healthcare provider may recommend watchful waiting for up to 10 days before starting antibiotics  You may  need any of the following:  · Acetaminophen  decreases pain and fever  It is available without a doctor's order  Ask how much to take and how often to take it  Follow directions   Read the labels of all other medicines you are using to see if they also contain acetaminophen, or ask your doctor or pharmacist  Acetaminophen can cause liver damage if not taken correctly  Do not use more than 4 grams (4,000 milligrams) total of acetaminophen in one day  · NSAIDs , such as ibuprofen, help decrease swelling, pain, and fever  This medicine is available with or without a doctor's order  NSAIDs can cause stomach bleeding or kidney problems in certain people  If you take blood thinner medicine, always ask your healthcare provider if NSAIDs are safe for you  Always read the medicine label and follow directions  · Nasal steroid sprays  may help decrease inflammation in your nose and sinuses  · Decongestants  help reduce swelling and drain mucus in the nose and sinuses  They may help you breathe easier  · Antihistamines  help dry mucus in the nose and relieve sneezing  · Antibiotics  help treat or prevent a bacterial infection  · Take your medicine as directed  Contact your healthcare provider if you think your medicine is not helping or if you have side effects  Tell him or her if you are allergic to any medicine  Keep a list of the medicines, vitamins, and herbs you take  Include the amounts, and when and why you take them  Bring the list or the pill bottles to follow-up visits  Carry your medicine list with you in case of an emergency  Self-care:   · Rinse your sinuses  Use a sinus rinse device to rinse your nasal passages with a saline (salt water) solution or distilled water  Do not use tap water  This will help thin the mucus in your nose and rinse away pollen and dirt  It will also help reduce swelling so you can breathe normally  Ask your healthcare provider how often to do this  · Breathe in steam   Heat a bowl of water until you see steam  Lean over the bowl and make a tent over your head with a large towel  Breathe deeply for about 20 minutes  Be careful not to get too close to the steam or burn yourself   Do this 3 times a day  You can also breathe deeply when you take a hot shower  · Sleep with your head elevated  Place an extra pillow under your head before you go to sleep to help your sinuses drain  · Drink liquids as directed  Ask your healthcare provider how much liquid to drink each day and which liquids are best for you  Liquids will thin the mucus in your nose and help it drain  Avoid drinks that contain alcohol or caffeine  · Do not smoke, and avoid secondhand smoke  Nicotine and other chemicals in cigarettes and cigars can make your symptoms worse  Ask your healthcare provider for information if you currently smoke and need help to quit  E-cigarettes or smokeless tobacco still contain nicotine  Talk to your healthcare provider before you use these products  Prevent the spread of germs that cause sinusitis:  Wash your hands often with soap and water  Wash your hands after you use the bathroom, change a child's diaper, or sneeze  Wash your hands before you prepare or eat food  Follow up with your healthcare provider as directed: You may be referred to an ear, nose, and throat specialist  Write down your questions so you remember to ask them during your visits  © 2017 [facility] [facility] Information is for End User's use only and may not be sold, redistributed or otherwise used for commercial purposes  All illustrations and images included in CareNotes® are the copyrighted property of A D A M , Inc  or [employer]  The above information is an  only  It is not intended as medical advice for individual conditions or treatments  Talk to your doctor, nurse or pharmacist before following any medical regimen to see if it is safe and effective for you

## 2019-12-09 NOTE — PROGRESS NOTES
[address]        NAME: Miryam Angulo is a 66 y o  female  : 1945    MRN: [id number]  DATE: 2019  TIME: 12:16 PM    Assessment and Plan   Acute non-recurrent maxillary sinusitis [J01 00]  1  Acute non-recurrent maxillary sinusitis  doxycycline (ADOXA) 100 MG tablet   2  Cerumen in auditory canal on examination           Patient Instructions     Follow up with PCP in 3-5 days  Proceed to  ER if symptoms worsen  Patient will begin Doxycycline as directed  Hearing loss more than likely associated with cerumen debris in auditory canal  Monitor symptoms closely  Mucinex as directed  Continue with symptomatic treatment  Patient will continue flonase / nasal saline as needed for nasal congestion  Tylenol as needed for fever or chills   Warm salt gargles as needed for sore throat   Discussed possibility of alternative therapy in the future symptoms continue  Chief Complaint     Chief Complaint   Patient presents with    Earache     Pt c/o left ear pain x 3 weeks  Using flonase  History of Present Illness       Patient  Is a 25-year-old female presenting the office for left ear pain  Patient states that symptoms been ongoing for the past 2 weeks in duration  Patient states that she describes the ear as "full"  Patient denies any discharge from the ear but does admit to having some minor hearing loss  Patient states that she does use hearing aids regularly  Patient states that she has also been experiencing sinus infection type symptoms for similar amount of time  Patient states she has been using Flonase nasal saline with minimal relief of symptoms  Patient states she does get headaches but denies any fevers any chills  Patient states that she also has postnasal type symptoms with a sore throat  Patient denies any chest tightness shortness of breath chest pain  Patient denies any nausea vomiting diarrhea    Patient offers no other complaints at this time     Earache    There is pain in the left ear  This is a new problem  The current episode started 1 to 4 weeks ago  The problem occurs constantly  The problem has been unchanged  There has been no fever  Associated symptoms include headaches, hearing loss and a sore throat  Pertinent negatives include no abdominal pain, coughing, diarrhea, ear discharge, neck pain, rash, rhinorrhea or vomiting  She has tried heat packs for the symptoms  The treatment provided mild relief  There is no history of a chronic ear infection, hearing loss or a tympanostomy tube  Review of Systems   Review of Systems   Constitutional: Negative  HENT: Positive for congestion, ear pain, hearing loss, postnasal drip, sinus pressure, sinus pain and sore throat  Negative for dental problem, drooling, ear discharge, facial swelling, mouth sores, nosebleeds, rhinorrhea, sneezing, tinnitus, trouble swallowing and voice change  Eyes: Negative  Respiratory: Negative  Negative for cough  Cardiovascular: Negative  Gastrointestinal: Negative  Negative for abdominal pain, diarrhea and vomiting  Endocrine: Negative  Genitourinary: Negative  Musculoskeletal: Negative  Negative for neck pain  Skin: Negative  Negative for rash  Allergic/Immunologic: Negative  Neurological: Positive for headaches  Negative for dizziness, tremors, seizures, syncope, facial asymmetry, speech difficulty, weakness, light-headedness and numbness  Hematological: Negative  Psychiatric/Behavioral: Negative            Current Medications       Current Outpatient Medications:     albuterol (VENTOLIN HFA) 90 mcg/act inhaler, Inhale 2 puffs every 6 (six) hours as needed for wheezing, Disp: 1 Inhaler, Rfl: 3    amLODIPine (NORVASC) 10 mg tablet, Take 1 tablet (10 mg total) by mouth daily, Disp: 90 tablet, Rfl: 3    atorvastatin (LIPITOR) 20 mg tablet, Take 1 tablet (20 mg total) by mouth daily, Disp: 90 tablet, Rfl: 3    buPROPion [facility]) 150 mg 12 hr tablet, Take 1 tablet (150 mg total) by mouth 2 (two) times a day, Disp: 180 tablet, Rfl: 3    doxycycline (ADOXA) 100 MG tablet, Take 1 tablet (100 mg total) by mouth 2 (two) times a day for 7 days, Disp: 14 tablet, Rfl: 0    Ergocalciferol 2000 units CAPS, ergocalciferol (vitamin D2) 50,000 unit capsule  TK 1 C PO Q WK, Disp: , Rfl:     fluticasone (FLOVENT HFA) 110 MCG/ACT inhaler, Inhale 2 puffs 2 (two) times a day Rinse mouth after use , Disp: 1 Inhaler, Rfl: 3    levothyroxine 200 mcg tablet, Take 1 tablet (200 mcg total) by mouth daily in the early morning, Disp: 30 tablet, Rfl: 5    lisinopril (ZESTRIL) 40 mg tablet, Take 1 tablet (40 mg total) by mouth daily, Disp: 90 tablet, Rfl: 3    meclizine (ANTIVERT) 25 mg tablet, Take 1 tablet (25 mg total) by mouth every 8 (eight) hours, Disp: 30 tablet, Rfl: 0    metroNIDAZOLE (METROGEL) 1 % gel, Apply topically daily, Disp: 45 g, Rfl: 0    Na Sulfate-K Sulfate-Mg Sulf 17 5-3 13-1 6 GM/177ML SOLN, Take 1 applicator by mouth once for 1 dose, Disp: 1 Bottle, Rfl: 0    pantoprazole (PROTONIX) 40 mg tablet, Take 1 tablet (40 mg total) by mouth 2 (two) times a day, Disp: 90 tablet, Rfl: 0    Polyethylene Glycol 3350 (MIRALAX PO), Take 17 g by mouth, Disp: , Rfl:     RESTASIS 0 05 % ophthalmic emulsion, , Disp: , Rfl:     sertraline (ZOLOFT) 100 mg tablet, Take 0 5 tablets (50 mg total) by mouth daily, Disp: 90 tablet, Rfl: 3    traZODone (DESYREL) 100 mg tablet, Take 1 tablet (100 mg total) by mouth daily at bedtime (Patient taking differently: Take 50 mg by mouth daily at bedtime ), Disp: 90 tablet, Rfl: 3    Current Allergies     Allergies as of 12/09/2019 - Reviewed 12/09/2019   Allergen Reaction Noted    Ceftin [cefuroxime] Hives 11/27/2018            The following portions of the patient's history were reviewed and updated as appropriate: allergies, current medications, past family history, past medical history, past social history, past surgical history and problem list      Past Medical History:   Diagnosis Date    Acid reflux     Anxiety     C1-C4 level with central cord syndrome ([address] )     Resolved 2/1/2017     Carpal tunnel syndrome Resolved 4/21/2015    Colitis     Concussion     Depressed     Dysthymic disorder     Resolved 1/5/2018    Gastric ulcer 2013    small - on EGD - [facility]    Hemorrhoids     Hx of blood clots     Hx of colonic polyps     [place]    Hyperlipemia     Hypertension     Hypothyroid     Lung nodule     stable x 2 yrs on CT    Migraines     Obesity     Postural dizziness with presyncope     Resolved 8/28/2018     Tendinitis     Ulnar neuropathy     Resolved 5/21/2015        Past Surgical History:   Procedure Laterality Date    APPENDECTOMY      BREAST SURGERY Left 01/1966    BREAST LUMPECTOMY    CARPAL TUNNEL RELEASE      DILATION AND CURETTAGE OF UTERUS      ELBOW SURGERY      EYE SURGERY Bilateral 2019    Cataract     GALLBLADDER SURGERY      JOINT REPLACEMENT Right     REPLACEMENT TOTAL KNEE    KNEE ARTHROSCOPY      KNEE SURGERY Right     NECK SURGERY      POSTERIOR LAMINECTOMY / DECOMPRESSION CERVICAL SPINE  02/2015    VAGINAL DELIVERY      x3    WRIST SURGERY Right        Family History   Problem Relation Age of Onset    Breast cancer Mother     Alzheimer's disease Mother     Coronary artery disease Mother     Hypertension Mother    [facility] Migraines Mother     Peripheral vascular disease Mother    [facility] Parkinsonism Father     Other Father         Cardiovascular disease     Coronary artery disease Father     Hypertension Father     Bipolar disorder Sister     Asthma Daughter          Medications have been verified  Objective   /72   Pulse 70   Temp (!) 97 °F (36 1 °C)   Resp 16   Ht 5' (1 524 m)   Wt 81 6 kg (180 lb)   SpO2 95%   BMI 35 15 kg/m²        Physical Exam     Physical Exam   Constitutional: She is oriented to person, place, and time  She appears well-developed and well-nourished  HENT:   Head: Normocephalic  Right Ear: Hearing, tympanic membrane, external ear and ear canal normal  Tympanic membrane is not perforated, not erythematous, not retracted and not bulging  Tympanic membrane mobility is normal    Left Ear: Tympanic membrane and external ear normal  No lacerations  A foreign body (Small amount of cerumen noted posterior wall of auditory canal ) is present  No mastoid tenderness  Tympanic membrane is not injected, not scarred, not perforated, not erythematous, not retracted and not bulging  Tympanic membrane mobility is normal  No hemotympanum  Eyes: Pupils are equal, round, and reactive to light  Conjunctivae and EOM are normal    Neck: Normal range of motion  Cardiovascular: Normal rate, regular rhythm, normal heart sounds and intact distal pulses  Pulmonary/Chest: Effort normal and breath sounds normal    Abdominal: Soft  Bowel sounds are normal    Neurological: She is alert and oriented to person, place, and time  Skin: Skin is warm  Capillary refill takes less than 2 seconds  Psychiatric: She has a normal mood and affect  Her behavior is normal  Judgment and thought content normal    Nursing note and vitals reviewed

## 2019-12-18 ENCOUNTER — OFFICE VISIT (OUTPATIENT)
Dept: FAMILY MEDICINE CLINIC | Facility: CLINIC | Age: 74
End: 2019-12-18
Payer: MEDICARE

## 2019-12-18 VITALS
DIASTOLIC BLOOD PRESSURE: 70 MMHG | OXYGEN SATURATION: 98 % | BODY MASS INDEX: 35.81 KG/M2 | HEART RATE: 73 BPM | SYSTOLIC BLOOD PRESSURE: 128 MMHG | WEIGHT: 182.4 LBS | HEIGHT: 60 IN | RESPIRATION RATE: 12 BRPM | TEMPERATURE: 97.9 F

## 2019-12-18 DIAGNOSIS — H91.92 HEARING LOSS OF LEFT EAR, UNSPECIFIED HEARING LOSS TYPE: Primary | ICD-10-CM

## 2019-12-18 PROCEDURE — 99213 OFFICE O/P EST LOW 20 MIN: CPT | Performed by: FAMILY MEDICINE

## 2019-12-18 RX ORDER — PREDNISONE 50 MG/1
50 TABLET ORAL DAILY
Qty: 5 TABLET | Refills: 0 | Status: SHIPPED | OUTPATIENT
Start: 2019-12-18 | End: 2020-01-24 | Stop reason: ALTCHOICE

## 2019-12-18 NOTE — PROGRESS NOTES
Assessment/Plan:    No problem-specific Assessment & Plan notes found for this encounter  Diagnoses and all orders for this visit:    Hearing loss of left ear, unspecified hearing loss type     Mild fluid behind drum, but I wouldn't expect as much eharing loss as she has   Prednisone x 5 days and refer to ENT    -     predniSONE 50 mg tablet; Take 1 tablet (50 mg total) by mouth daily  -     Ambulatory Referral to Otolaryngology; Future    BMI Counseling: Body mass index is 35 62 kg/m²  The BMI is above normal  Nutrition recommendations include decreasing portion sizes and moderation in carbohydrate intake  Exercise recommendations include moderate physical activity 150 minutes/week  Subjective:      Patient ID: Raegan Rocha is a 66 y o  female  HPI  Pt presents c/o R ear fullness/hearing loss  Had cerumen impaction that was cleared at urgent care and was dx'd with sinus infection  Finished doxy, and denies congestion or feeling ill  Doesn't have pain in the ear anymore, but still has fullness and hearing loss  No fevers  The following portions of the patient's history were reviewed and updated as appropriate: allergies, current medications, past family history, past medical history, past social history, past surgical history and problem list     Review of Systems    See hpi    Objective:      /70   Pulse 73   Temp 97 9 °F (36 6 °C)   Resp 12   Ht 5' (1 524 m)   Wt 82 7 kg (182 lb 6 4 oz)   SpO2 98%   BMI 35 62 kg/m²          Physical Exam   Constitutional: She appears well-developed and well-nourished  HENT:   Head: Normocephalic and atraumatic  Right Ear: External ear normal    Left Ear: External ear normal    Nose: Nose normal    Mouth/Throat: Oropharynx is clear and moist    L TM with fluid behind drum  No erythema    No bulging

## 2020-01-10 ENCOUNTER — ANESTHESIA EVENT (OUTPATIENT)
Dept: GASTROENTEROLOGY | Facility: AMBULARY SURGERY CENTER | Age: 75
End: 2020-01-10

## 2020-01-20 DIAGNOSIS — K21.9 GASTROESOPHAGEAL REFLUX DISEASE WITHOUT ESOPHAGITIS: ICD-10-CM

## 2020-01-20 RX ORDER — PANTOPRAZOLE SODIUM 40 MG/1
TABLET, DELAYED RELEASE ORAL
Qty: 30 TABLET | Refills: 0 | Status: SHIPPED | OUTPATIENT
Start: 2020-01-20 | End: 2020-03-31 | Stop reason: SDUPTHER

## 2020-01-23 DIAGNOSIS — F32.A DEPRESSION, UNSPECIFIED DEPRESSION TYPE: ICD-10-CM

## 2020-01-23 NOTE — TELEPHONE ENCOUNTER
Medication: Zoloft  Last office visit: 12/18/19  Next office visit: n/a  Labs: 11/21/19  Last refilled: 3/18/19#90x3  Pharmacy: on file

## 2020-01-24 ENCOUNTER — ANESTHESIA (OUTPATIENT)
Dept: GASTROENTEROLOGY | Facility: AMBULARY SURGERY CENTER | Age: 75
End: 2020-01-24

## 2020-01-24 ENCOUNTER — HOSPITAL ENCOUNTER (OUTPATIENT)
Dept: GASTROENTEROLOGY | Facility: AMBULARY SURGERY CENTER | Age: 75
Setting detail: OUTPATIENT SURGERY
Discharge: HOME | End: 2020-01-24
Attending: INTERNAL MEDICINE | Admitting: INTERNAL MEDICINE
Payer: MEDICARE

## 2020-01-24 VITALS
TEMPERATURE: 97 F | OXYGEN SATURATION: 95 % | HEART RATE: 66 BPM | RESPIRATION RATE: 14 BRPM | BODY MASS INDEX: 33.38 KG/M2 | SYSTOLIC BLOOD PRESSURE: 116 MMHG | HEIGHT: 60 IN | DIASTOLIC BLOOD PRESSURE: 75 MMHG | WEIGHT: 170 LBS

## 2020-01-24 DIAGNOSIS — D64.9 ANEMIA, UNSPECIFIED TYPE: ICD-10-CM

## 2020-01-24 DIAGNOSIS — Z86.010 HISTORY OF COLON POLYPS: ICD-10-CM

## 2020-01-24 DIAGNOSIS — R10.13 EPIGASTRIC PAIN: ICD-10-CM

## 2020-01-24 PROCEDURE — 43239 EGD BIOPSY SINGLE/MULTIPLE: CPT | Performed by: INTERNAL MEDICINE

## 2020-01-24 PROCEDURE — 88305 TISSUE EXAM BY PATHOLOGIST: CPT | Performed by: PATHOLOGY

## 2020-01-24 PROCEDURE — 88342 IMHCHEM/IMCYTCHM 1ST ANTB: CPT | Performed by: PATHOLOGY

## 2020-01-24 PROCEDURE — NC001 PR NO CHARGE: Performed by: INTERNAL MEDICINE

## 2020-01-24 PROCEDURE — 1123F ACP DISCUSS/DSCN MKR DOCD: CPT | Performed by: INTERNAL MEDICINE

## 2020-01-24 PROCEDURE — 45385 COLONOSCOPY W/LESION REMOVAL: CPT | Performed by: INTERNAL MEDICINE

## 2020-01-24 RX ORDER — SODIUM CHLORIDE, SODIUM LACTATE, POTASSIUM CHLORIDE, CALCIUM CHLORIDE 600; 310; 30; 20 MG/100ML; MG/100ML; MG/100ML; MG/100ML
INJECTION, SOLUTION INTRAVENOUS CONTINUOUS PRN
Status: DISCONTINUED | OUTPATIENT
Start: 2020-01-24 | End: 2020-01-24 | Stop reason: SURG

## 2020-01-24 RX ORDER — PROPOFOL 10 MG/ML
INJECTION, EMULSION INTRAVENOUS AS NEEDED
Status: DISCONTINUED | OUTPATIENT
Start: 2020-01-24 | End: 2020-01-24 | Stop reason: SURG

## 2020-01-24 RX ORDER — SERTRALINE HYDROCHLORIDE 100 MG/1
50 TABLET, FILM COATED ORAL DAILY
Qty: 90 TABLET | Refills: 1 | Status: SHIPPED | OUTPATIENT
Start: 2020-01-24 | End: 2020-03-31 | Stop reason: SDUPTHER

## 2020-01-24 RX ADMIN — PROPOFOL 50 MG: 10 INJECTION, EMULSION INTRAVENOUS at 11:26

## 2020-01-24 RX ADMIN — PROPOFOL 50 MG: 10 INJECTION, EMULSION INTRAVENOUS at 11:21

## 2020-01-24 RX ADMIN — PROPOFOL 100 MG: 10 INJECTION, EMULSION INTRAVENOUS at 11:19

## 2020-01-24 RX ADMIN — SODIUM CHLORIDE, SODIUM LACTATE, POTASSIUM CHLORIDE, AND CALCIUM CHLORIDE: .6; .31; .03; .02 INJECTION, SOLUTION INTRAVENOUS at 11:14

## 2020-01-24 RX ADMIN — PROPOFOL 50 MG: 10 INJECTION, EMULSION INTRAVENOUS at 11:36

## 2020-01-24 RX ADMIN — PROPOFOL 50 MG: 10 INJECTION, EMULSION INTRAVENOUS at 11:45

## 2020-01-24 NOTE — ANESTHESIA POSTPROCEDURE EVALUATION
Post-Op Assessment Note    CV Status:  Stable        Staff: Anesthesiologist   Comments: uneventful post op course, stable          BP      Temp     Pulse     Resp      SpO2

## 2020-01-24 NOTE — ANESTHESIA PREPROCEDURE EVALUATION
Review of Systems/Medical History      No history of anesthetic complications     Cardiovascular  Hyperlipidemia, Hypertension ,    Pulmonary  Asthma , well controlled/ stable ,        GI/Hepatic    PUD, GERD ,             Endo/Other  History of thyroid disease , hypothyroidism,      GYN       Hematology  Anemia ,     Musculoskeletal    Comment: C-spine surgery Arthritis     Neurology    Headaches,    Psychology   Anxiety, Depression ,              Physical Exam    Airway        Neck ROM: limited     Dental       Cardiovascular      Pulmonary      Other Findings        Anesthesia Plan  ASA Score- 3     Anesthesia Type- IV sedation with anesthesia with ASA Monitors  Additional Monitors:   Airway Plan:     Comment: IV sedation,  standard ASA monitors  Risks and benefits discussed with patient; patient consented and agrees to proceed  I saw and evaluated the patient  If seen with CRNA, we have discussed the anesthetic plan and I am in agreement that the plan is appropriate for the patient        Plan Factors-    Induction- intravenous  Postoperative Plan-     Informed Consent- Anesthetic plan and risks discussed with patient  I personally reviewed this patient with the CRNA  Discussed and agreed on the Anesthesia Plan with the CRNA  No Fox

## 2020-01-24 NOTE — H&P
History and Physical -  [facility]  Carlene Pendleton 66 y o  female MRN: [id number]    HPI: Carlene Pendleton is a 66y o  year old female who presents for evaluation of GERD symptoms and abdominal pain        Review of Systems    Historical Information   Past Medical History:   Diagnosis Date    Acid reflux     Anxiety     C1-C4 level with central cord syndrome ([address] )     Resolved 2017     Carpal tunnel syndrome Resolved 2015    Colitis     Concussion     Depressed     Dysthymic disorder     Resolved 2018    Gastric ulcer 2013    small - on EGD - [facility]    Hemorrhoids     Hx of blood clots     Hx of colonic polyps     [place]    Hyperlipemia     Hypertension     Hypothyroid     Lung nodule     stable x 2 yrs on CT    Migraines     Obesity     Postural dizziness with presyncope     Resolved 2018     Tendinitis     Ulnar neuropathy     Resolved 2015      Past Surgical History:   Procedure Laterality Date    APPENDECTOMY      BREAST SURGERY Left 1966    BREAST LUMPECTOMY    CARPAL TUNNEL RELEASE      DILATION AND CURETTAGE OF UTERUS      ELBOW SURGERY      EYE SURGERY Bilateral     Cataract     GALLBLADDER SURGERY      JOINT REPLACEMENT Right     REPLACEMENT TOTAL KNEE    KNEE ARTHROSCOPY      KNEE SURGERY Right     NECK SURGERY      POSTERIOR LAMINECTOMY / DECOMPRESSION CERVICAL SPINE  2015    VAGINAL DELIVERY      x3    WRIST SURGERY Right      Social History   Social History     Substance and Sexual Activity   Alcohol Use No     Social History     Substance and Sexual Activity   Drug Use No     Social History     Tobacco Use   Smoking Status Former Smoker    Last attempt to quit: 10/14/2013    Years since quittin 2   Smokeless Tobacco Never Used     Family History   Problem Relation Age of Onset    Breast cancer Mother     Alzheimer's disease Mother     Coronary artery disease Mother     Hypertension Mother     Migraines Mother     Peripheral vascular disease Mother    [facility] Parkinsonism Father     Other Father         Cardiovascular disease     Coronary artery disease Father     Hypertension Father     Bipolar disorder Sister     Asthma Daughter        Meds/Allergies       (Not in a hospital admission)    Allergies   Allergen Reactions    Ceftin [Cefuroxime] Hives       Objective     There were no vitals taken for this visit  PHYSICAL EXAM    Gen: NAD  CV: RRR  CHEST: Clear  ABD: soft, NT/ND  EXT: no edema  Neuro: AAO      ASSESSMENT/PLAN:  This is a 66y o  year old female here for evaluation of GERD symptoms some abdominal pain and anemia  PLAN:   Procedure:  EGD and colonoscopy

## 2020-02-04 ENCOUNTER — OFFICE VISIT (OUTPATIENT)
Dept: URGENT CARE | Facility: MEDICAL CENTER | Age: 75
End: 2020-02-04
Payer: MEDICARE

## 2020-02-04 VITALS
HEIGHT: 60 IN | RESPIRATION RATE: 16 BRPM | SYSTOLIC BLOOD PRESSURE: 134 MMHG | BODY MASS INDEX: 33.77 KG/M2 | WEIGHT: 172 LBS | TEMPERATURE: 96.7 F | DIASTOLIC BLOOD PRESSURE: 69 MMHG | HEART RATE: 80 BPM | OXYGEN SATURATION: 96 %

## 2020-02-04 DIAGNOSIS — H81.10 BENIGN PAROXYSMAL POSITIONAL VERTIGO, UNSPECIFIED LATERALITY: Primary | ICD-10-CM

## 2020-02-04 PROCEDURE — 99213 OFFICE O/P EST LOW 20 MIN: CPT | Performed by: FAMILY MEDICINE

## 2020-02-04 PROCEDURE — G0463 HOSPITAL OUTPT CLINIC VISIT: HCPCS | Performed by: FAMILY MEDICINE

## 2020-02-04 PROCEDURE — 93005 ELECTROCARDIOGRAM TRACING: CPT | Performed by: FAMILY MEDICINE

## 2020-02-04 RX ORDER — ONDANSETRON 4 MG/1
4 TABLET, ORALLY DISINTEGRATING ORAL EVERY 6 HOURS PRN
Qty: 20 TABLET | Refills: 0 | Status: SHIPPED | OUTPATIENT
Start: 2020-02-04 | End: 2020-09-01

## 2020-02-04 RX ORDER — MECLIZINE HYDROCHLORIDE 25 MG/1
25 TABLET ORAL 3 TIMES DAILY PRN
Qty: 30 TABLET | Refills: 0 | Status: SHIPPED | OUTPATIENT
Start: 2020-02-04 | End: 2021-09-09 | Stop reason: SDUPTHER

## 2020-02-04 NOTE — PATIENT INSTRUCTIONS
Patient medically stable  EKG reveals normal sinus rhythm, 71 beats per minute, good R-wave progression  No ischemic changes observed  History consistent with BPPV  I prescribed meclizine 25 mg q 8 hours, Zofran 4 mg ODT q 6 hours  Patient also given outpatient ENT referral     Benign Paroxysmal Positional Vertigo   WHAT YOU NEED TO KNOW:   BPPV is an inner ear condition that causes you to suddenly feel dizzy  Benign means it is not serious or life-threatening  BPPV is caused by a problem with the nerves and structure of your inner ear  BPPV happens when small pieces of calcium break loose and lump together in one of your inner ear canals  DISCHARGE INSTRUCTIONS:   Return to the emergency department if:   · You fall during a BPPV episode and are injured  · You have a severe headache that does not go away  · You have new changes in your vision or feel weak or confused  · You have problems hearing, or you have ringing or buzzing in your ears  Contact your healthcare provider if:   · Your BPPV symptoms do not go away or they return  · You have problems with your balance, or you are falling often  · You have new or increased nausea or vomiting with vertigo  · You feel anxious or depressed and do not want to leave your home  · You have questions or concerns about your condition or care  Medicines:   · Medicines  may be recommended or prescribed to treat dizziness or nausea  · Take your medicine as directed  Contact your healthcare provider if you think your medicine is not helping or if you have side effects  Tell him of her if you are allergic to any medicine  Keep a list of the medicines, vitamins, and herbs you take  Include the amounts, and when and why you take them  Bring the list or the pill bottles to follow-up visits  Carry your medicine list with you in case of an emergency  Prevent your symptoms:   · Try to avoid sudden head movements  Stand up and lie down slowly  · Raise and support your head when you lie down  Place pillows under your upper back and head or rest in a recliner  · Change your position often when you are lying down  Try not to lie with your head on the same side for long periods of time  Roll over slowly  · Wear protective gear  when you ride a bike or play sports  A helmet helps protect your head from injury  Follow up with your healthcare provider as directed: You may need to return in 1 month to check the progress of your treatment  Write down your questions so you remember to ask them during your visits  © 2017 [address] Information is for End User's use only and may not be sold, redistributed or otherwise used for commercial purposes  All illustrations and images included in CareNotes® are the copyrighted property of [employer]  or [employer]  The above information is an  only  It is not intended as medical advice for individual conditions or treatments  Talk to your doctor, nurse or pharmacist before following any medical regimen to see if it is safe and effective for you

## 2020-02-04 NOTE — PROGRESS NOTES
[facility]        NAME: Main Lobo is a 66 y o  female  : 1945    MRN: [id number]  DATE: 2020  TIME: 4:02 PM    Assessment and Plan   Benign paroxysmal positional vertigo, unspecified laterality [H81 10]  1  Benign paroxysmal positional vertigo, unspecified laterality  meclizine (ANTIVERT) 25 mg tablet    ondansetron (ZOFRAN-ODT) 4 mg disintegrating tablet    Ambulatory Referral to Otolaryngology         Patient Instructions       Follow up with PCP in 3-5 days  Proceed to  ER if symptoms worsen  Chief Complaint     Chief Complaint   Patient presents with    Dizziness     Patient relates started with dizziness since   C/O nausea and vomited x1 yesterday  Denies chest pain and shortness of breath  C/O epigastric pain  Denies cough and fever  Taking Meclizine with no improvement  History of Present Illness       20-year-old female here today with dizziness for the last 2 days  She describes the dizziness is brought on by son head motion  At times he feels nauseous  She has also had midepigastric pain  Describes history of acid reflux  Upon further questioning, patient has had a history of vertigo  She has been taking meclizine which she has at home with no noticeable improvement  Denies any recent cold symptoms  Denies hearing loss or tinnitus  Review of Systems   Review of Systems   Constitutional: Negative  HENT: Positive for congestion  Gastrointestinal: Positive for nausea  Neurological: Positive for dizziness           Current Medications       Current Outpatient Medications:     albuterol (VENTOLIN HFA) 90 mcg/act inhaler, Inhale 2 puffs every 6 (six) hours as needed for wheezing, Disp: 1 Inhaler, Rfl: 3    amLODIPine (NORVASC) 10 mg tablet, Take 1 tablet (10 mg total) by mouth daily, Disp: 90 tablet, Rfl: 3    atorvastatin (LIPITOR) 20 mg tablet, Take 1 tablet (20 mg total) by mouth daily, Disp: 90 tablet, Rfl: 3    buPROPion [facility] SR) 150 mg 12 hr tablet, Take 1 tablet (150 mg total) by mouth 2 (two) times a day, Disp: 180 tablet, Rfl: 3    Ergocalciferol 2000 units CAPS, ergocalciferol (vitamin D2) 50,000 unit capsule  TK 1 C PO Q WK, Disp: , Rfl:     fluticasone (FLOVENT HFA) 110 MCG/ACT inhaler, Inhale 2 puffs 2 (two) times a day Rinse mouth after use , Disp: 1 Inhaler, Rfl: 3    levothyroxine 200 mcg tablet, Take 1 tablet (200 mcg total) by mouth daily in the early morning, Disp: 30 tablet, Rfl: 5    lisinopril (ZESTRIL) 40 mg tablet, Take 1 tablet (40 mg total) by mouth daily, Disp: 90 tablet, Rfl: 3    metroNIDAZOLE (METROGEL) 1 % gel, Apply topically daily, Disp: 45 g, Rfl: 0    pantoprazole (PROTONIX) 40 mg tablet, TAKE 1 TABLET BY MOUTH DAILY, Disp: 30 tablet, Rfl: 0    Polyethylene Glycol 3350 (MIRALAX PO), Take 17 g by mouth, Disp: , Rfl:     RESTASIS 0 05 % ophthalmic emulsion, , Disp: , Rfl:     sertraline (ZOLOFT) 100 mg tablet, Take 0 5 tablets (50 mg total) by mouth daily, Disp: 90 tablet, Rfl: 1    traZODone (DESYREL) 100 mg tablet, Take 1 tablet (100 mg total) by mouth daily at bedtime (Patient taking differently: Take 50 mg by mouth daily at bedtime ), Disp: 90 tablet, Rfl: 3    meclizine (ANTIVERT) 25 mg tablet, Take 1 tablet (25 mg total) by mouth 3 (three) times a day as needed for dizziness for up to 10 days, Disp: 30 tablet, Rfl: 0    ondansetron (ZOFRAN-ODT) 4 mg disintegrating tablet, Take 1 tablet (4 mg total) by mouth every 6 (six) hours as needed for nausea or vomiting, Disp: 20 tablet, Rfl: 0    Current Allergies     Allergies as of 02/04/2020 - Reviewed 02/04/2020   Allergen Reaction Noted    Ceftin [cefuroxime] Hives 11/27/2018            The following portions of the patient's history were reviewed and updated as appropriate: allergies, current medications, past family history, past medical history, past social history, past surgical history and problem list      Past Medical History: Diagnosis Date    Acid reflux     Anxiety     C1-C4 level with central cord syndrome ([address] )     Resolved 2/1/2017     Carpal tunnel syndrome Resolved 4/21/2015    Colitis     Colon polyp     Concussion     Depressed     Dysthymic disorder     Resolved 1/5/2018    Gastric ulcer 2013    small - on EGD - [facility]    Hemorrhoids     Hx of blood clots     Hx of colonic polyps     D'Merrick    Hyperlipemia     Hypertension     Hypothyroid     Lung nodule     stable x 2 yrs on CT    Migraines     Obesity     Postural dizziness with presyncope     Resolved 8/28/2018     Tendinitis     Ulnar neuropathy     Resolved 5/21/2015        Past Surgical History:   Procedure Laterality Date    APPENDECTOMY      BREAST SURGERY Left 01/1966    BREAST LUMPECTOMY    CARPAL TUNNEL RELEASE      CHOLECYSTECTOMY      DILATION AND CURETTAGE OF UTERUS      ELBOW SURGERY      EYE SURGERY Bilateral 2019    Cataract     GALLBLADDER SURGERY      JOINT REPLACEMENT Right     REPLACEMENT TOTAL KNEE    KNEE ARTHROSCOPY      KNEE SURGERY Right     NECK SURGERY      POSTERIOR LAMINECTOMY / DECOMPRESSION CERVICAL SPINE  02/2015    VAGINAL DELIVERY      x3    WRIST SURGERY Right        Family History   Problem Relation Age of Onset    Breast cancer Mother     Alzheimer's disease Mother     Coronary artery disease Mother     Hypertension Mother    Allison Splinter Migraines Mother     Peripheral vascular disease Mother    Allison Splinter Parkinsonism Father     Other Father         Cardiovascular disease     Coronary artery disease Father     Hypertension Father     Bipolar disorder Sister     Asthma Daughter          Medications have been verified  Objective   /65   Pulse 73   Temp (!) 96 7 °F (35 9 °C) (Tympanic)   Resp 16   Ht 5' (1 524 m)   Wt 78 kg (172 lb)   SpO2 96%   BMI 33 59 kg/m²        Physical Exam     Physical Exam   Constitutional: She is oriented to person, place, and time     Eyes: Pupils are equal, round, and reactive to light  EOM are normal    Neck: Normal range of motion  Cardiovascular: Normal rate, regular rhythm and normal heart sounds  Pulmonary/Chest: Effort normal and breath sounds normal    Neurological: She is alert and oriented to person, place, and time  Romberg test-negative    Tyler Abts Renetta maneuver-negative for nystagmus

## 2020-02-07 LAB
ATRIAL RATE: 71 BPM
P AXIS: 26 DEGREES
PR INTERVAL: 166 MS
QRS AXIS: -26 DEGREES
QRSD INTERVAL: 80 MS
QT INTERVAL: 404 MS
QTC INTERVAL: 439 MS
T WAVE AXIS: 27 DEGREES
VENTRICULAR RATE: 71 BPM

## 2020-02-07 PROCEDURE — 93010 ELECTROCARDIOGRAM REPORT: CPT | Performed by: INTERNAL MEDICINE

## 2020-02-17 ENCOUNTER — HOSPITAL ENCOUNTER (OUTPATIENT)
Dept: GASTROENTEROLOGY | Facility: HOSPITAL | Age: 75
Discharge: HOME | End: 2020-02-17
Payer: MEDICARE

## 2020-02-17 DIAGNOSIS — D50.9 IRON DEFICIENCY ANEMIA, UNSPECIFIED IRON DEFICIENCY ANEMIA TYPE: ICD-10-CM

## 2020-02-17 PROCEDURE — 91110 GI TRC IMG INTRAL ESOPH-ILE: CPT

## 2020-02-25 ENCOUNTER — OFFICE VISIT (OUTPATIENT)
Dept: URGENT CARE | Facility: MEDICAL CENTER | Age: 75
End: 2020-02-25
Payer: MEDICARE

## 2020-02-25 VITALS
RESPIRATION RATE: 16 BRPM | DIASTOLIC BLOOD PRESSURE: 56 MMHG | OXYGEN SATURATION: 95 % | HEIGHT: 60 IN | SYSTOLIC BLOOD PRESSURE: 120 MMHG | TEMPERATURE: 98.6 F | BODY MASS INDEX: 34.16 KG/M2 | WEIGHT: 174 LBS | HEART RATE: 79 BPM

## 2020-02-25 DIAGNOSIS — J01.00 ACUTE NON-RECURRENT MAXILLARY SINUSITIS: Primary | ICD-10-CM

## 2020-02-25 PROCEDURE — G0463 HOSPITAL OUTPT CLINIC VISIT: HCPCS | Performed by: PHYSICIAN ASSISTANT

## 2020-02-25 PROCEDURE — 99213 OFFICE O/P EST LOW 20 MIN: CPT | Performed by: PHYSICIAN ASSISTANT

## 2020-02-25 RX ORDER — AMOXICILLIN AND CLAVULANATE POTASSIUM 875; 125 MG/1; MG/1
1 TABLET, FILM COATED ORAL EVERY 12 HOURS SCHEDULED
Qty: 14 TABLET | Refills: 0 | Status: SHIPPED | OUTPATIENT
Start: 2020-02-25 | End: 2020-03-03

## 2020-02-25 NOTE — PROGRESS NOTES
[facility]        NAME: Hemanth Haney is a 66 y o  female  : 1945    MRN: [id number]  DATE: 2020  TIME: 12:38 PM    Assessment and Plan   Acute non-recurrent maxillary sinusitis [J01 00]  1  Acute non-recurrent maxillary sinusitis  amoxicillin-clavulanate (AUGMENTIN) 875-125 mg per tablet         Patient Instructions     Take augmentin as prescribed  One tablet, twice a day (every 12 hours), for the next 7 days  Take with food to prevent stomach upset  May also take probiotics to restore good gut bacteria  Continue using flonase and nasal saline rinses  Follow up with PCP in 3-5 days if symptoms worsen  Go to the ER if symptoms become severe  Chief Complaint     Chief Complaint   Patient presents with   Jeannett Situ Like Symptoms     Patient relates started with sinus symptoms pain, pressure, facial pain, and teeth hurt since Wednesday  Denies fever  Denies cough  History of Present Illness       Sinusitis   This is a new problem  The current episode started 1 to 4 weeks ago  The problem is unchanged  There has been no fever  Associated symptoms include congestion, ear pain (R>L), headaches, neck pain, sinus pressure and a sore throat  Pertinent negatives include no chills, coughing, diaphoresis or shortness of breath  (Teeth pain, pressure behind eyes    Pt denies CP  ) Treatments tried: naproxen, flonase, nasal saline  The treatment provided mild relief  Review of Systems   Review of Systems   Constitutional: Negative for activity change, appetite change, chills, diaphoresis, fatigue and fever  HENT: Positive for congestion, ear pain (R>L), sinus pressure and sore throat  Negative for ear discharge, hearing loss, postnasal drip, rhinorrhea, sinus pain and trouble swallowing  Eyes: Negative for pain, discharge, redness and itching  Respiratory: Negative for cough, chest tightness, shortness of breath, wheezing and stridor      Cardiovascular: Negative for chest pain, palpitations and leg swelling  Gastrointestinal: Negative for abdominal distention, abdominal pain, diarrhea, nausea and vomiting  Musculoskeletal: Positive for neck pain  Negative for arthralgias, myalgias and neck stiffness  Skin: Negative for color change, pallor and rash  Neurological: Positive for headaches  Negative for dizziness, syncope, weakness, light-headedness and numbness           Current Medications       Current Outpatient Medications:     albuterol (VENTOLIN HFA) 90 mcg/act inhaler, Inhale 2 puffs every 6 (six) hours as needed for wheezing, Disp: 1 Inhaler, Rfl: 3    amLODIPine (NORVASC) 10 mg tablet, Take 1 tablet (10 mg total) by mouth daily, Disp: 90 tablet, Rfl: 3    atorvastatin (LIPITOR) 20 mg tablet, Take 1 tablet (20 mg total) by mouth daily, Disp: 90 tablet, Rfl: 3    buPROPion (WELLBUTRIN SR) 150 mg 12 hr tablet, Take 1 tablet (150 mg total) by mouth 2 (two) times a day, Disp: 180 tablet, Rfl: 3    Ergocalciferol 2000 units CAPS, ergocalciferol (vitamin D2) 50,000 unit capsule  TK 1 C PO Q WK, Disp: , Rfl:     fluticasone (FLOVENT HFA) 110 MCG/ACT inhaler, Inhale 2 puffs 2 (two) times a day Rinse mouth after use , Disp: 1 Inhaler, Rfl: 3    levothyroxine 200 mcg tablet, Take 1 tablet (200 mcg total) by mouth daily in the early morning, Disp: 30 tablet, Rfl: 5    lisinopril (ZESTRIL) 40 mg tablet, Take 1 tablet (40 mg total) by mouth daily, Disp: 90 tablet, Rfl: 3    metroNIDAZOLE (METROGEL) 1 % gel, Apply topically daily, Disp: 45 g, Rfl: 0    pantoprazole (PROTONIX) 40 mg tablet, TAKE 1 TABLET BY MOUTH DAILY, Disp: 30 tablet, Rfl: 0    Polyethylene Glycol 3350 (MIRALAX PO), Take 17 g by mouth, Disp: , Rfl:     RESTASIS 0 05 % ophthalmic emulsion, , Disp: , Rfl:     sertraline (ZOLOFT) 100 mg tablet, Take 0 5 tablets (50 mg total) by mouth daily, Disp: 90 tablet, Rfl: 1    traZODone (DESYREL) 100 mg tablet, Take 1 tablet (100 mg total) by mouth daily at bedtime (Patient taking differently: Take 50 mg by mouth daily at bedtime ), Disp: 90 tablet, Rfl: 3    amoxicillin-clavulanate (AUGMENTIN) 875-125 mg per tablet, Take 1 tablet by mouth every 12 (twelve) hours for 7 days, Disp: 14 tablet, Rfl: 0    meclizine (ANTIVERT) 25 mg tablet, Take 1 tablet (25 mg total) by mouth 3 (three) times a day as needed for dizziness for up to 10 days, Disp: 30 tablet, Rfl: 0    ondansetron (ZOFRAN-ODT) 4 mg disintegrating tablet, Take 1 tablet (4 mg total) by mouth every 6 (six) hours as needed for nausea or vomiting (Patient not taking: Reported on 2/25/2020), Disp: 20 tablet, Rfl: 0    Current Allergies     Allergies as of 02/25/2020 - Reviewed 02/25/2020   Allergen Reaction Noted    Ceftin [cefuroxime] Hives 11/27/2018            The following portions of the patient's history were reviewed and updated as appropriate: allergies, current medications, past family history, past medical history, past social history, past surgical history and problem list      Past Medical History:   Diagnosis Date    Acid reflux     Anxiety     Asthma     C1-C4 level with central cord syndrome ([address] )     Resolved 2/1/2017     Carpal tunnel syndrome Resolved 4/21/2015    Colitis     Colon polyp     Concussion     Depressed     Dysthymic disorder     Resolved 1/5/2018    Gastric ulcer 2013    small - on EGD - [facility]    Hemorrhoids     Hx of blood clots     Hx of colonic polyps     [place]    Hyperlipemia     Hypertension     Hypothyroid     Lung nodule     stable x 2 yrs on CT    Migraines     Obesity     Postural dizziness with presyncope     Resolved 8/28/2018     Tendinitis     Ulnar neuropathy     Resolved 5/21/2015        Past Surgical History:   Procedure Laterality Date    APPENDECTOMY      BREAST SURGERY Left 01/1966    BREAST LUMPECTOMY    CARPAL TUNNEL RELEASE      CHOLECYSTECTOMY      DILATION AND CURETTAGE OF UTERUS      ELBOW SURGERY      EYE SURGERY Bilateral 2019 Cataract     GALLBLADDER SURGERY      JOINT REPLACEMENT Right     REPLACEMENT TOTAL KNEE    KNEE ARTHROSCOPY      KNEE SURGERY Right     NECK SURGERY      POSTERIOR LAMINECTOMY / DECOMPRESSION CERVICAL SPINE  02/2015    TONSILLECTOMY      VAGINAL DELIVERY      x3    WRIST SURGERY Right        Family History   Problem Relation Age of Onset    Breast cancer Mother     Alzheimer's disease Mother     Coronary artery disease Mother     Hypertension Mother    Lena Flores Migraines Mother     Peripheral vascular disease Mother    Lena Flores Parkinsonism Father     Other Father         Cardiovascular disease     Coronary artery disease Father     Hypertension Father     Bipolar disorder Sister     Asthma Daughter          Medications have been verified  Objective   /56   Pulse 79   Temp 98 6 °F (37 °C) (Tympanic)   Resp 16   Ht 5' (1 524 m)   Wt 78 9 kg (174 lb)   SpO2 95%   BMI 33 98 kg/m²        Physical Exam     Physical Exam   Constitutional: She is oriented to person, place, and time  She appears well-developed and well-nourished  No distress  HENT:   Head: Normocephalic and atraumatic  Right Ear: Hearing, tympanic membrane, external ear and ear canal normal    Left Ear: Hearing, tympanic membrane, external ear and ear canal normal    Nose: No rhinorrhea  Right sinus exhibits maxillary sinus tenderness  Left sinus exhibits maxillary sinus tenderness  Mouth/Throat: Oropharynx is clear and moist  No oropharyngeal exudate, posterior oropharyngeal edema or posterior oropharyngeal erythema  Neck: Normal range of motion  Neck supple  Cardiovascular: Normal rate, regular rhythm, normal heart sounds and intact distal pulses  No murmur heard  Pulmonary/Chest: Effort normal and breath sounds normal  No stridor  No respiratory distress  She has no wheezes  She exhibits no tenderness  Lymphadenopathy:     She has no cervical adenopathy     Neurological: She is alert and oriented to person, place, and time  Skin: Skin is warm  She is not diaphoretic  Psychiatric: She has a normal mood and affect  Her behavior is normal    Nursing note and vitals reviewed

## 2020-02-25 NOTE — PATIENT INSTRUCTIONS
Take augmentin as prescribed  One tablet, twice a day (every 12 hours), for the next 7 days  Take with food to prevent stomach upset  May also take probiotics to restore good gut bacteria  Continue using flonase and nasal saline rinses  Follow up with PCP in 3-5 days if symptoms worsen  Go to the ER if symptoms become severe  Sinusitis   WHAT YOU NEED TO KNOW:   Sinusitis is inflammation or infection of your sinuses  It is most often caused by a virus  Acute sinusitis may last up to 12 weeks  Chronic sinusitis lasts longer than 12 weeks  Recurrent sinusitis means you have 4 or more times in 1 year  DISCHARGE INSTRUCTIONS:   Return to the emergency department if:   · Your eye and eyelid are red, swollen, and painful  · You cannot open your eye  · You have vision changes, such as double vision  · Your eyeball bulges out or you cannot move your eye  · You are more sleepy than normal, or you notice changes in your ability to think, move, or talk  · You have a stiff neck, a fever, or a bad headache  · You have swelling of your forehead or scalp  Contact your healthcare provider if:   · Your symptoms do not improve after 3 days  · Your symptoms do not go away after 10 days  · You have nausea and are vomiting  · Your nose is bleeding  · You have questions or concerns about your condition or care  Medicines: Your symptoms may go away on their own  Your healthcare provider may recommend watchful waiting for up to 10 days before starting antibiotics  You may  need any of the following:  · Acetaminophen  decreases pain and fever  It is available without a doctor's order  Ask how much to take and how often to take it  Follow directions  Read the labels of all other medicines you are using to see if they also contain acetaminophen, or ask your doctor or pharmacist  Acetaminophen can cause liver damage if not taken correctly   Do not use more than 4 grams (4,000 milligrams) total of acetaminophen in one day  · NSAIDs , such as ibuprofen, help decrease swelling, pain, and fever  This medicine is available with or without a doctor's order  NSAIDs can cause stomach bleeding or kidney problems in certain people  If you take blood thinner medicine, always ask your healthcare provider if NSAIDs are safe for you  Always read the medicine label and follow directions  · Nasal steroid sprays  may help decrease inflammation in your nose and sinuses  · Decongestants  help reduce swelling and drain mucus in the nose and sinuses  They may help you breathe easier  · Antihistamines  help dry mucus in the nose and relieve sneezing  · Antibiotics  help treat or prevent a bacterial infection  · Take your medicine as directed  Contact your healthcare provider if you think your medicine is not helping or if you have side effects  Tell him or her if you are allergic to any medicine  Keep a list of the medicines, vitamins, and herbs you take  Include the amounts, and when and why you take them  Bring the list or the pill bottles to follow-up visits  Carry your medicine list with you in case of an emergency  Self-care:   · Rinse your sinuses  Use a sinus rinse device to rinse your nasal passages with a saline (salt water) solution or distilled water  Do not use tap water  This will help thin the mucus in your nose and rinse away pollen and dirt  It will also help reduce swelling so you can breathe normally  Ask your healthcare provider how often to do this  · Breathe in steam   Heat a bowl of water until you see steam  Lean over the bowl and make a tent over your head with a large towel  Breathe deeply for about 20 minutes  Be careful not to get too close to the steam or burn yourself  Do this 3 times a day  You can also breathe deeply when you take a hot shower  · Sleep with your head elevated  Place an extra pillow under your head before you go to sleep to help your sinuses drain  · Drink liquids as directed  Ask your healthcare provider how much liquid to drink each day and which liquids are best for you  Liquids will thin the mucus in your nose and help it drain  Avoid drinks that contain alcohol or caffeine  · Do not smoke, and avoid secondhand smoke  Nicotine and other chemicals in cigarettes and cigars can make your symptoms worse  Ask your healthcare provider for information if you currently smoke and need help to quit  E-cigarettes or smokeless tobacco still contain nicotine  Talk to your healthcare provider before you use these products  Prevent the spread of germs that cause sinusitis:  Wash your hands often with soap and water  Wash your hands after you use the bathroom, change a child's diaper, or sneeze  Wash your hands before you prepare or eat food  Follow up with your healthcare provider as directed: You may be referred to an ear, nose, and throat specialist  Write down your questions so you remember to ask them during your visits  © 2017 [employer] Information is for End User's use only and may not be sold, redistributed or otherwise used for commercial purposes  All illustrations and images included in CareNotes® are the copyrighted property of [employer]  or [employer]  The above information is an  only  It is not intended as medical advice for individual conditions or treatments  Talk to your doctor, nurse or pharmacist before following any medical regimen to see if it is safe and effective for you

## 2020-03-05 ENCOUNTER — OFFICE VISIT (OUTPATIENT)
Dept: URGENT CARE | Facility: MEDICAL CENTER | Age: 75
End: 2020-03-05
Payer: MEDICARE

## 2020-03-05 VITALS
DIASTOLIC BLOOD PRESSURE: 72 MMHG | BODY MASS INDEX: 34.16 KG/M2 | HEART RATE: 83 BPM | HEIGHT: 60 IN | TEMPERATURE: 97.6 F | SYSTOLIC BLOOD PRESSURE: 160 MMHG | WEIGHT: 174 LBS | OXYGEN SATURATION: 98 % | RESPIRATION RATE: 16 BRPM

## 2020-03-05 DIAGNOSIS — S29.019A STRAIN OF THORACIC SPINE, INITIAL ENCOUNTER: ICD-10-CM

## 2020-03-05 DIAGNOSIS — S16.1XXA STRAIN OF NECK MUSCLE, INITIAL ENCOUNTER: Primary | ICD-10-CM

## 2020-03-05 PROCEDURE — G0463 HOSPITAL OUTPT CLINIC VISIT: HCPCS | Performed by: FAMILY MEDICINE

## 2020-03-05 PROCEDURE — 99214 OFFICE O/P EST MOD 30 MIN: CPT | Performed by: FAMILY MEDICINE

## 2020-03-05 RX ORDER — METHOCARBAMOL 500 MG/1
500 TABLET, FILM COATED ORAL
Qty: 10 TABLET | Refills: 0 | Status: SHIPPED | OUTPATIENT
Start: 2020-03-05 | End: 2020-09-01

## 2020-03-05 RX ORDER — PREDNISONE 20 MG/1
TABLET ORAL
Qty: 18 TABLET | Refills: 0 | Status: SHIPPED | OUTPATIENT
Start: 2020-03-05 | End: 2020-09-01

## 2020-03-05 NOTE — PATIENT INSTRUCTIONS
I prescribed Robaxin 500 mg at bedtime p r n , prednisone tapering from 60 down to 20 mg over 9 days  Advised patient apply heating pad as needed to affected area  She is to perform neck and upper back stretching exercises  Follow up with PCP symptoms persist or worsen  Cervical Strain   WHAT YOU NEED TO KNOW:   A cervical strain is a stretched or torn muscle or tendon in your neck  Tendons are strong tissues that connect muscles to bones  Common causes of cervical strains include a car accident, a fall, or a sports injury  DISCHARGE INSTRUCTIONS:   Return to the emergency department if:   · You have pain or numbness from your shoulder down to your hand  · You have problems with your vision, hearing, or balance  · You feel confused or cannot concentrate  · You have problems with movement and strength  Contact your healthcare provider if:   · You have increased swelling or pain in your neck  · You have questions or concerns about your condition or care  Medicines: You may need any of the following:  · Acetaminophen  decreases pain and fever  It is available without a doctor's order  Ask how much to take and how often to take it  Follow directions  Read the labels of all other medicines you are using to see if they also contain acetaminophen, or ask your doctor or pharmacist  Acetaminophen can cause liver damage if not taken correctly  Do not use more than 4 grams (4,000 milligrams) total of acetaminophen in one day  · NSAIDs , such as ibuprofen, help decrease swelling, pain, and fever  This medicine is available with or without a doctor's order  NSAIDs can cause stomach bleeding or kidney problems in certain people  If you take blood thinner medicine, always ask your healthcare provider if NSAIDs are safe for you  Always read the medicine label and follow directions  · Muscle relaxers  help decrease pain and muscle spasms  · Prescription pain medicine  may be given   Ask your healthcare provider how to take this medicine safely  Some prescription pain medicines contain acetaminophen  Do not take other medicines that contain acetaminophen without talking to your healthcare provider  Too much acetaminophen may cause liver damage  Prescription pain medicine may cause constipation  Ask your healthcare provider how to prevent or treat constipation  · Take your medicine as directed  Contact your healthcare provider if you think your medicine is not helping or if you have side effects  Tell him or her if you are allergic to any medicine  Keep a list of the medicines, vitamins, and herbs you take  Include the amounts, and when and why you take them  Bring the list or the pill bottles to follow-up visits  Carry your medicine list with you in case of an emergency  Manage your symptoms:   · Apply heat  on your neck for 15 to 20 minutes, 4 to 6 times a day or as directed  Heat helps decrease pain, stiffness, and muscle spasms  · Begin gentle neck exercises  as soon as you can move your neck without pain  Exercises will help decrease stiffness and improve the strength and movement of your neck  Ask your healthcare provider what kind of exercises you should do  · Gradually return to your usual activities as directed  Stop if you have pain  Avoid activities that can cause more damage to your neck, such as heavy lifting or strenuous exercise  · Sleep without a pillow  to help decrease pain  Instead, roll a small towel tightly and place it under your neck  · Go to physical therapy as directed  A physical therapist teaches you exercises to help improve movement and strength, and to decrease pain  Prevent neck injury:   · Drive safely  Make sure everyone in your car wears a seatbelt  A seatbelt can save your life if you are in an accident  Do not use your cell phone when you are driving  This could distract you and cause an accident   Pull over if you need to make a call or send a text message  · Wear helmets, lifejackets, and protective gear  Always wear a helmet when you ride a bike or motorcycle, go skiing, or play sports that could cause a head injury  Wear protective equipment when you play sports  Wear a lifejacket when you are on a boat or doing water sports  Follow up with your healthcare provider as directed: You may be referred to an orthopedist or physical therapies  Write down your questions so you remember to ask them during your visits  © 2017 [employer] Information is for End User's use only and may not be sold, redistributed or otherwise used for commercial purposes  All illustrations and images included in CareNotes® are the copyrighted property of [employer]  or [employer]  The above information is an  only  It is not intended as medical advice for individual conditions or treatments  Talk to your doctor, nurse or pharmacist before following any medical regimen to see if it is safe and effective for you

## 2020-03-05 NOTE — PROGRESS NOTES
[facility]        NAME: Teresita Arguello is a 66 y o  female  : 1945    MRN: [id number]  DATE: 2020  TIME: 10:52 AM    Assessment and Plan   Strain of neck muscle, initial encounter [S16  1XXA]  1  Strain of neck muscle, initial encounter  methocarbamol (ROBAXIN) 500 mg tablet    predniSONE (Deltasone) 20 mg tablet   2  Strain of thoracic spine, initial encounter  methocarbamol (ROBAXIN) 500 mg tablet    predniSONE (Deltasone) 20 mg tablet         Patient Instructions       Follow up with PCP in 3-5 days  Proceed to  ER if symptoms worsen  Chief Complaint     Chief Complaint   Patient presents with    Shoulder Pain     Patient relates started with left shoulder and left scapula pain for 3 days  Denies chest pain and shortness of breath  Denies injury  Denies fever  Pain increases with movement  States "my left hand felt tingling and numbness now it moved up my entire left arm  I thought it was carpal tunnel "         History of Present Illness       43-year-old female here today with upper back left shoulder pain for the last 3 days  Denies any recent fall or trauma  However, upon further questioning, patient believes she was lifting some heavy objects prior to pain about 3-5 days ago  Pain seems to be constant radiating from the left upper neck to the left shoulder sometimes down her left arm  She is right handed  She has been taking Tylenol and applying Biofreeze with no significant improvement  Symptoms are worse when she lifts of left arm  No pain on rest   Upon further questioning, she had some cervical disc fusion back in 2015  Review of Systems   Review of Systems   Constitutional: Negative  Musculoskeletal: Positive for arthralgias, back pain and neck pain           Current Medications       Current Outpatient Medications:     albuterol (VENTOLIN HFA) 90 mcg/act inhaler, Inhale 2 puffs every 6 (six) hours as needed for wheezing, Disp: 1 Inhaler, Rfl: 3   amLODIPine (NORVASC) 10 mg tablet, Take 1 tablet (10 mg total) by mouth daily, Disp: 90 tablet, Rfl: 3    atorvastatin (LIPITOR) 20 mg tablet, Take 1 tablet (20 mg total) by mouth daily, Disp: 90 tablet, Rfl: 3    buPROPion (WELLBUTRIN SR) 150 mg 12 hr tablet, Take 1 tablet (150 mg total) by mouth 2 (two) times a day, Disp: 180 tablet, Rfl: 3    Ergocalciferol 2000 units CAPS, ergocalciferol (vitamin D2) 50,000 unit capsule  TK 1 C PO Q WK, Disp: , Rfl:     fluticasone (FLOVENT HFA) 110 MCG/ACT inhaler, Inhale 2 puffs 2 (two) times a day Rinse mouth after use , Disp: 1 Inhaler, Rfl: 3    levothyroxine 200 mcg tablet, Take 1 tablet (200 mcg total) by mouth daily in the early morning, Disp: 30 tablet, Rfl: 5    lisinopril (ZESTRIL) 40 mg tablet, Take 1 tablet (40 mg total) by mouth daily, Disp: 90 tablet, Rfl: 3    metroNIDAZOLE (METROGEL) 1 % gel, Apply topically daily, Disp: 45 g, Rfl: 0    pantoprazole (PROTONIX) 40 mg tablet, TAKE 1 TABLET BY MOUTH DAILY, Disp: 30 tablet, Rfl: 0    Polyethylene Glycol 3350 (MIRALAX PO), Take 17 g by mouth, Disp: , Rfl:     RESTASIS 0 05 % ophthalmic emulsion, , Disp: , Rfl:     sertraline (ZOLOFT) 100 mg tablet, Take 0 5 tablets (50 mg total) by mouth daily, Disp: 90 tablet, Rfl: 1    traZODone (DESYREL) 100 mg tablet, Take 1 tablet (100 mg total) by mouth daily at bedtime (Patient taking differently: Take 50 mg by mouth daily at bedtime ), Disp: 90 tablet, Rfl: 3    meclizine (ANTIVERT) 25 mg tablet, Take 1 tablet (25 mg total) by mouth 3 (three) times a day as needed for dizziness for up to 10 days, Disp: 30 tablet, Rfl: 0    methocarbamol (ROBAXIN) 500 mg tablet, Take 1 tablet (500 mg total) by mouth daily at bedtime as needed for muscle spasms, Disp: 10 tablet, Rfl: 0    ondansetron (ZOFRAN-ODT) 4 mg disintegrating tablet, Take 1 tablet (4 mg total) by mouth every 6 (six) hours as needed for nausea or vomiting (Patient not taking: Reported on 2/25/2020), Disp: 20 tablet, Rfl: 0    predniSONE (Deltasone) 20 mg tablet, Take 3 tablets for 3 days then 2 tablets for 3 days then 1 tablet for 3 days then stop, Disp: 18 tablet, Rfl: 0    Current Allergies     Allergies as of 03/05/2020 - Reviewed 03/05/2020   Allergen Reaction Noted    Ceftin [cefuroxime] Hives 11/27/2018            The following portions of the patient's history were reviewed and updated as appropriate: allergies, current medications, past family history, past medical history, past social history, past surgical history and problem list      Past Medical History:   Diagnosis Date    Acid reflux     Anxiety     Asthma     C1-C4 level with central cord syndrome ([facility] [address] )     Resolved 2/1/2017     Carpal tunnel syndrome Resolved 4/21/2015    Colitis     Colon polyp     Concussion     Depressed     Dysthymic disorder     Resolved 1/5/2018    Gastric ulcer 2013    small - on EGD - [facility]    Hemorrhoids     Hx of blood clots     Hx of colonic polyps     [place]    Hyperlipemia     Hypertension     Hypothyroid     Lung nodule     stable x 2 yrs on CT    Migraines     Obesity     Postural dizziness with presyncope     Resolved 8/28/2018     Tendinitis     Ulnar neuropathy     Resolved 5/21/2015        Past Surgical History:   Procedure Laterality Date    APPENDECTOMY      BREAST SURGERY Left 01/1966    BREAST LUMPECTOMY    CARPAL TUNNEL RELEASE      CHOLECYSTECTOMY      DILATION AND CURETTAGE OF UTERUS      ELBOW SURGERY      EYE SURGERY Bilateral 2019    Cataract     GALLBLADDER SURGERY      JOINT REPLACEMENT Right     REPLACEMENT TOTAL KNEE    KNEE ARTHROSCOPY      KNEE SURGERY Right     NECK SURGERY      POSTERIOR LAMINECTOMY / DECOMPRESSION CERVICAL SPINE  02/2015    TONSILLECTOMY      VAGINAL DELIVERY      x3    WRIST SURGERY Right        Family History   Problem Relation Age of Onset    Breast cancer Mother     Alzheimer's disease Mother     Coronary artery disease Mother    Travon Bones Hypertension Mother     Migraines Mother     Peripheral vascular disease Mother    Sridevi Cantu Parkinsonism Father     Other Father         Cardiovascular disease     Coronary artery disease Father     Hypertension Father     Bipolar disorder Sister     Asthma Daughter          Medications have been verified  Objective   /72   Pulse 83   Temp 97 6 °F (36 4 °C) (Tympanic)   Resp 16   Ht 5' (1 524 m)   Wt 78 9 kg (174 lb)   SpO2 98%   BMI 33 98 kg/m²        Physical Exam     Physical Exam   Constitutional: She appears well-developed  Musculoskeletal: Normal range of motion  C-spine:  Limited range of motion all direction  Well-healed scar posterior neck area from cervical disc fusion  There is palpable tenderness upon palpation of the left trapezius muscle  Extremities:  Upper-full range of motion  Point tenderness of the left posterior shoulder  Otherwise exhibits good strength in , 5/5  Vitals reviewed

## 2020-03-23 PROCEDURE — 91110 GI TRC IMG INTRAL ESOPH-ILE: CPT | Performed by: INTERNAL MEDICINE

## 2020-03-30 ENCOUNTER — TELEPHONE (OUTPATIENT)
Dept: GASTROENTEROLOGY | Facility: CLINIC | Age: 75
End: 2020-03-30

## 2020-03-30 NOTE — TELEPHONE ENCOUNTER
----- Message from Ivonne Stern MD sent at 3/30/2020  2:15 PM EDT -----  Regarding: FW: capsule  Rosibel Mas can you call this patient again  I called the patient but there was no answer, I left her a brief message stating that the capsule endoscopy showed a polyp within the cecum  I did not see any polyps in the cecum on her colonoscopy  I had removed several other polyps from her colon and had recommended that she have another colonoscopy in 5 years  Based on the capsule endoscopy results however I would recommend a sooner colonoscopy to look at the right side of the colon  Please schedule colonoscopy in about 6 months    I would also like to repeat another CBC at this time for history of anemia     ----- Message -----  From: Alondra Hidalgo MD  Sent: 3/23/2020   1:34 PM EDT  To: Ivonne Stern MD, Radha Pereyra MD  Subject: FW: capsule                                      Probably needs another colonoscopy for the cecal polyp    ----- Message -----  From: Alondra Hidalgo MD  Sent: 3/6/2020   2:50 PM EDT  To: Radha Pereyra MD  Subject: re:  Preliminary capsule rate                     preliminary read by GI fellow Michael Hinson MD    Small-bowel intestinal transit time: difficult to determine due to extensive ball valving of capsule at ileocecal valve  from 3 hours 45 minutes till 6 hours 21 minutes  Adequate prep: Y  Passage of capsule into cecum: Y  No active bleeding or etiology to explain anemia   Gastric erosion  Proximal small bowel ecchymotic lesion  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

## 2020-03-31 DIAGNOSIS — F32.A DEPRESSION, UNSPECIFIED DEPRESSION TYPE: ICD-10-CM

## 2020-03-31 DIAGNOSIS — I10 ESSENTIAL HYPERTENSION: ICD-10-CM

## 2020-03-31 DIAGNOSIS — K21.9 GASTROESOPHAGEAL REFLUX DISEASE WITHOUT ESOPHAGITIS: ICD-10-CM

## 2020-03-31 DIAGNOSIS — J45.20 MILD INTERMITTENT ASTHMA WITHOUT COMPLICATION: ICD-10-CM

## 2020-03-31 DIAGNOSIS — E03.9 HYPOTHYROIDISM, UNSPECIFIED TYPE: ICD-10-CM

## 2020-03-31 RX ORDER — PANTOPRAZOLE SODIUM 40 MG/1
40 TABLET, DELAYED RELEASE ORAL DAILY
Qty: 30 TABLET | Refills: 3 | Status: SHIPPED | OUTPATIENT
Start: 2020-03-31 | End: 2020-07-11 | Stop reason: SDUPTHER

## 2020-03-31 RX ORDER — ALBUTEROL SULFATE 90 UG/1
2 AEROSOL, METERED RESPIRATORY (INHALATION) EVERY 6 HOURS PRN
Qty: 1 INHALER | Refills: 2 | Status: SHIPPED | OUTPATIENT
Start: 2020-03-31 | End: 2020-03-31 | Stop reason: SDUPTHER

## 2020-03-31 RX ORDER — TRAZODONE HYDROCHLORIDE 100 MG/1
100 TABLET ORAL
Qty: 90 TABLET | Refills: 1 | Status: SHIPPED | OUTPATIENT
Start: 2020-03-31 | End: 2020-11-03

## 2020-03-31 RX ORDER — AMLODIPINE BESYLATE 10 MG/1
10 TABLET ORAL DAILY
Qty: 90 TABLET | Refills: 0 | Status: SHIPPED | OUTPATIENT
Start: 2020-03-31 | End: 2020-03-31 | Stop reason: SDUPTHER

## 2020-03-31 RX ORDER — FLUTICASONE PROPIONATE 110 UG/1
2 AEROSOL, METERED RESPIRATORY (INHALATION) 2 TIMES DAILY
Qty: 3 INHALER | Refills: 1 | Status: SHIPPED | OUTPATIENT
Start: 2020-03-31 | End: 2021-10-27 | Stop reason: SDUPTHER

## 2020-03-31 RX ORDER — AMLODIPINE BESYLATE 10 MG/1
10 TABLET ORAL DAILY
Qty: 90 TABLET | Refills: 1 | Status: SHIPPED | OUTPATIENT
Start: 2020-03-31 | End: 2020-10-07

## 2020-03-31 RX ORDER — SERTRALINE HYDROCHLORIDE 100 MG/1
50 TABLET, FILM COATED ORAL DAILY
Qty: 90 TABLET | Refills: 0 | Status: SHIPPED | OUTPATIENT
Start: 2020-03-31 | End: 2020-07-11 | Stop reason: SDUPTHER

## 2020-03-31 RX ORDER — LEVOTHYROXINE SODIUM 0.2 MG/1
200 TABLET ORAL
Qty: 30 TABLET | Refills: 3 | Status: SHIPPED | OUTPATIENT
Start: 2020-03-31 | End: 2020-08-21 | Stop reason: SDUPTHER

## 2020-03-31 RX ORDER — LISINOPRIL 40 MG/1
40 TABLET ORAL DAILY
Qty: 90 TABLET | Refills: 1 | Status: SHIPPED | OUTPATIENT
Start: 2020-03-31 | End: 2020-11-03

## 2020-03-31 RX ORDER — FLUTICASONE PROPIONATE 110 UG/1
2 AEROSOL, METERED RESPIRATORY (INHALATION) 2 TIMES DAILY
Qty: 1 INHALER | Refills: 2 | Status: SHIPPED | OUTPATIENT
Start: 2020-03-31 | End: 2020-03-31 | Stop reason: SDUPTHER

## 2020-03-31 RX ORDER — LISINOPRIL 40 MG/1
40 TABLET ORAL DAILY
Qty: 90 TABLET | Refills: 0 | Status: SHIPPED | OUTPATIENT
Start: 2020-03-31 | End: 2020-03-31 | Stop reason: SDUPTHER

## 2020-03-31 RX ORDER — TRAZODONE HYDROCHLORIDE 100 MG/1
100 TABLET ORAL
Qty: 90 TABLET | Refills: 0 | Status: SHIPPED | OUTPATIENT
Start: 2020-03-31 | End: 2020-03-31 | Stop reason: SDUPTHER

## 2020-03-31 RX ORDER — ALBUTEROL SULFATE 90 UG/1
2 AEROSOL, METERED RESPIRATORY (INHALATION) EVERY 6 HOURS PRN
Qty: 1 INHALER | Refills: 2 | Status: SHIPPED | OUTPATIENT
Start: 2020-03-31

## 2020-03-31 NOTE — TELEPHONE ENCOUNTER
Medication: zoloft 100 mg   Last refilled:1/24/20  Last Office Visit: 12/18/19  Next Office Visit: N/A  Pharmacy:     Desyrel 100 mg 3/19/18  Lisinopril 40 mg 3/18/19  Norvasc 10 mg 3/18/19  Protonix 40 mg 3/18/19  Levothyroxine 200 mcg 3/18/19  Flovent 110 mcg  3/18/19  Ventolin 90 mcg  3/18/19

## 2020-04-02 ENCOUNTER — TELEPHONE (OUTPATIENT)
Dept: GASTROENTEROLOGY | Facility: CLINIC | Age: 75
End: 2020-04-02

## 2020-05-20 ENCOUNTER — PATIENT MESSAGE (OUTPATIENT)
Dept: FAMILY MEDICINE CLINIC | Facility: CLINIC | Age: 75
End: 2020-05-20

## 2020-05-22 DIAGNOSIS — F32.A DEPRESSION, UNSPECIFIED DEPRESSION TYPE: ICD-10-CM

## 2020-05-22 RX ORDER — BUPROPION HYDROCHLORIDE 150 MG/1
TABLET, EXTENDED RELEASE ORAL
Qty: 180 TABLET | Refills: 3 | Status: SHIPPED | OUTPATIENT
Start: 2020-05-22 | End: 2021-04-17 | Stop reason: SDUPTHER

## 2020-07-11 DIAGNOSIS — K21.9 GASTROESOPHAGEAL REFLUX DISEASE WITHOUT ESOPHAGITIS: ICD-10-CM

## 2020-07-11 DIAGNOSIS — I10 ESSENTIAL HYPERTENSION: ICD-10-CM

## 2020-07-11 DIAGNOSIS — E78.5 HYPERLIPIDEMIA, UNSPECIFIED HYPERLIPIDEMIA TYPE: ICD-10-CM

## 2020-07-11 DIAGNOSIS — F32.A DEPRESSION, UNSPECIFIED DEPRESSION TYPE: ICD-10-CM

## 2020-07-13 RX ORDER — AMLODIPINE BESYLATE 10 MG/1
10 TABLET ORAL DAILY
Qty: 90 TABLET | Refills: 1 | Status: CANCELLED | OUTPATIENT
Start: 2020-07-13

## 2020-07-13 RX ORDER — PANTOPRAZOLE SODIUM 40 MG/1
40 TABLET, DELAYED RELEASE ORAL DAILY
Qty: 90 TABLET | Refills: 1 | Status: SHIPPED | OUTPATIENT
Start: 2020-07-13 | End: 2021-01-11

## 2020-07-13 RX ORDER — SERTRALINE HYDROCHLORIDE 100 MG/1
50 TABLET, FILM COATED ORAL DAILY
Qty: 45 TABLET | Refills: 1 | Status: SHIPPED | OUTPATIENT
Start: 2020-07-13 | End: 2020-11-12 | Stop reason: SDUPTHER

## 2020-07-13 RX ORDER — ATORVASTATIN CALCIUM 20 MG/1
20 TABLET, FILM COATED ORAL DAILY
Qty: 90 TABLET | Refills: 1 | Status: SHIPPED | OUTPATIENT
Start: 2020-07-13 | End: 2021-01-08

## 2020-07-13 NOTE — TELEPHONE ENCOUNTER
Medication refill request: amlodipine, Lipitor, Protonix, zoloft  Last office visit: 12/18/2019  Next office visit: none   Last refilled:   Amlodipine - 3/31/20 #90x1  Protonix - 3/31/20 #30x3  Lipitor - 3/18/19 #90x3  Zoloft- #90x0  Pharmacy:   [address] [place] [address] [address] [place] [address]  Phone: [phone] Fax: [phone]    Amlodipine denied - placed call to pharmacy, refill is on file and will fill  Protonix, Lipitor, and Zoloft pended #90x1

## 2020-07-22 ENCOUNTER — TELEPHONE (OUTPATIENT)
Dept: GASTROENTEROLOGY | Facility: AMBULARY SURGERY CENTER | Age: 75
End: 2020-07-22

## 2020-07-22 NOTE — TELEPHONE ENCOUNTER
Patients GI provider:  Dr Gaye Penn    Number to return call: ( [phone]    Reason for call: Pt calling to schedule repeat colon    Scheduled procedure/appointment date if applicable: Apt/procedure   na

## 2020-07-23 ENCOUNTER — PREP FOR PROCEDURE (OUTPATIENT)
Dept: GASTROENTEROLOGY | Facility: CLINIC | Age: 75
End: 2020-07-23

## 2020-07-23 DIAGNOSIS — Z20.822 ENCOUNTER FOR LABORATORY TESTING FOR COVID-19 VIRUS: ICD-10-CM

## 2020-07-23 DIAGNOSIS — Z86.010 HISTORY OF COLON POLYPS: Primary | ICD-10-CM

## 2020-07-23 NOTE — TELEPHONE ENCOUNTER
Returned pt call, scheduled colonoscopy with Dr Linda Chase on 10/1 at [address] [place] [address]  Pt aware to get covid testing on 9/22  Reviewed prep instructions, reminded needs a  and will get a call the day before with the arrival time  Mailed prep and covid testing info to pt  Pt is scheduled and covid testing ordered

## 2020-08-20 DIAGNOSIS — E03.9 HYPOTHYROIDISM, UNSPECIFIED TYPE: ICD-10-CM

## 2020-08-21 DIAGNOSIS — D64.9 ANEMIA, UNSPECIFIED TYPE: ICD-10-CM

## 2020-08-21 DIAGNOSIS — E03.9 HYPOTHYROIDISM, UNSPECIFIED TYPE: ICD-10-CM

## 2020-08-21 DIAGNOSIS — E78.5 HYPERLIPIDEMIA, UNSPECIFIED HYPERLIPIDEMIA TYPE: Primary | ICD-10-CM

## 2020-08-21 DIAGNOSIS — R73.01 IFG (IMPAIRED FASTING GLUCOSE): ICD-10-CM

## 2020-08-21 RX ORDER — LEVOTHYROXINE SODIUM 0.2 MG/1
200 TABLET ORAL
Qty: 30 TABLET | Refills: 5 | OUTPATIENT
Start: 2020-08-21

## 2020-08-21 RX ORDER — LEVOTHYROXINE SODIUM 0.2 MG/1
200 TABLET ORAL
Qty: 30 TABLET | Refills: 0 | Status: SHIPPED | OUTPATIENT
Start: 2020-08-21 | End: 2020-09-15

## 2020-08-21 NOTE — TELEPHONE ENCOUNTER
Medication: Levothyroxine 200 mcg   Last refilled: 3/31/20  Last Office Visit: 12/18/19  Next Office Visit: N/A   Pharmacy:

## 2020-08-26 ENCOUNTER — PATIENT MESSAGE (OUTPATIENT)
Dept: FAMILY MEDICINE CLINIC | Facility: CLINIC | Age: 75
End: 2020-08-26

## 2020-08-28 NOTE — TELEPHONE ENCOUNTER
Upon chart review for pre-charting, patient is due for lab for upcoming appointment  Placed call to patient, left a detail message (okay per communication form) advising lab work is due prior to their upcoming appointment and if fasting was appropriate  Requested a return call if patient is going to an outside facility so we can fax the orders to the preferred lab

## 2020-09-01 ENCOUNTER — OFFICE VISIT (OUTPATIENT)
Dept: FAMILY MEDICINE CLINIC | Facility: CLINIC | Age: 75
End: 2020-09-01
Payer: MEDICARE

## 2020-09-01 VITALS
TEMPERATURE: 98.9 F | DIASTOLIC BLOOD PRESSURE: 82 MMHG | HEIGHT: 60 IN | WEIGHT: 175 LBS | BODY MASS INDEX: 34.36 KG/M2 | OXYGEN SATURATION: 97 % | HEART RATE: 87 BPM | SYSTOLIC BLOOD PRESSURE: 124 MMHG | RESPIRATION RATE: 16 BRPM

## 2020-09-01 DIAGNOSIS — E78.2 MIXED HYPERLIPIDEMIA: ICD-10-CM

## 2020-09-01 DIAGNOSIS — Z12.31 OTHER SCREENING MAMMOGRAM: ICD-10-CM

## 2020-09-01 DIAGNOSIS — E03.9 HYPOTHYROIDISM, UNSPECIFIED TYPE: ICD-10-CM

## 2020-09-01 DIAGNOSIS — Z86.010 HISTORY OF COLONIC POLYPS: ICD-10-CM

## 2020-09-01 DIAGNOSIS — F41.8 MIXED ANXIETY AND DEPRESSIVE DISORDER: ICD-10-CM

## 2020-09-01 DIAGNOSIS — I10 ESSENTIAL HYPERTENSION: ICD-10-CM

## 2020-09-01 DIAGNOSIS — Z00.00 MEDICARE ANNUAL WELLNESS VISIT, SUBSEQUENT: ICD-10-CM

## 2020-09-01 DIAGNOSIS — Z00.00 MEDICARE ANNUAL WELLNESS VISIT, SUBSEQUENT: Primary | ICD-10-CM

## 2020-09-01 DIAGNOSIS — J01.00 ACUTE NON-RECURRENT MAXILLARY SINUSITIS: ICD-10-CM

## 2020-09-01 DIAGNOSIS — J45.30 MILD PERSISTENT ASTHMA WITHOUT COMPLICATION: ICD-10-CM

## 2020-09-01 DIAGNOSIS — R73.01 IFG (IMPAIRED FASTING GLUCOSE): ICD-10-CM

## 2020-09-01 DIAGNOSIS — L98.9 SKIN LESION: ICD-10-CM

## 2020-09-01 PROBLEM — S14.121A: Status: RESOLVED | Noted: 2019-03-18 | Resolved: 2020-09-01

## 2020-09-01 PROCEDURE — 99214 OFFICE O/P EST MOD 30 MIN: CPT | Performed by: FAMILY MEDICINE

## 2020-09-01 PROCEDURE — G0438 PPPS, INITIAL VISIT: HCPCS | Performed by: FAMILY MEDICINE

## 2020-09-01 RX ORDER — AMOXICILLIN AND CLAVULANATE POTASSIUM 875; 125 MG/1; MG/1
1 TABLET, FILM COATED ORAL EVERY 12 HOURS SCHEDULED
Qty: 10 TABLET | Refills: 0 | Status: SHIPPED | OUTPATIENT
Start: 2020-09-01 | End: 2020-09-06

## 2020-09-01 NOTE — PATIENT INSTRUCTIONS
augmentin twice daily x 5 days  flonase 2 sprays in each nostril daily  Flu shot and shingles shot at the pharmacy (October is good)  Obtain labs  Refer to derm  Medicare Preventive Visit Patient Instructions  Thank you for completing your Welcome to Medicare Visit or Medicare Annual Wellness Visit today  Your next wellness visit will be due in one year (9/1/2021)  The screening/preventive services that you may require over the next 5-10 years are detailed below  Some tests may not apply to you based off risk factors and/or age  Screening tests ordered at today's visit but not completed yet may show as past due  Also, please note that scanned in results may not display below  Preventive Screenings:  Service Recommendations Previous Testing/Comments   Colorectal Cancer Screening  * Colonoscopy    * Fecal Occult Blood Test (FOBT)/Fecal Immunochemical Test (FIT)  * Fecal DNA/Cologuard Test  * Flexible Sigmoidoscopy Age: 54-65 years old   Colonoscopy: every 10 years (may be performed more frequently if at higher risk)  OR  FOBT/FIT: every 1 year  OR  Cologuard: every 3 years  OR  Sigmoidoscopy: every 5 years  Screening may be recommended earlier than age 48 if at higher risk for colorectal cancer  Also, an individualized decision between you and your healthcare provider will decide whether screening between the ages of 74-80 would be appropriate  Colonoscopy: 01/24/2020  FOBT/FIT: 10/19/2019  Cologuard: Not on file  Sigmoidoscopy: Not on file         Breast Cancer Screening Age: 36 years old  Frequency: every 1-2 years  Not required if history of left and right mastectomy Mammogram: Not on file       Cervical Cancer Screening Between the ages of 21-29, pap smear recommended once every 3 years  Between the ages of 33-67, can perform pap smear with HPV co-testing every 5 years     Recommendations may differ for women with a history of total hysterectomy, cervical cancer, or abnormal pap smears in past  Pap Smear: Not on file    Screening Not Indicated   Hepatitis C Screening Once for adults born between 1945 and 1965  More frequently in patients at high risk for Hepatitis C Hep C Antibody: Not on file       Diabetes Screening 1-2 times per year if you're at risk for diabetes or have pre-diabetes Fasting glucose: 106 mg/dL   A1C: 6 1 %    Screening Current   Cholesterol Screening Once every 5 years if you don't have a lipid disorder  May order more often based on risk factors  Lipid panel: 05/01/2019    Screening Not Indicated  History Lipid Disorder     Other Preventive Screenings Covered by Medicare:  1  Abdominal Aortic Aneurysm (AAA) Screening: covered once if your at risk  You're considered to be at risk if you have a family history of AAA  2  Lung Cancer Screening: covers low dose CT scan once per year if you meet all of the following conditions: (1) Age 50-69; (2) No signs or symptoms of lung cancer; (3) Current smoker or have quit smoking within the last 15 years; (4) You have a tobacco smoking history of at least 30 pack years (packs per day multiplied by number of years you smoked); (5) You get a written order from a healthcare provider  3  Glaucoma Screening: covered annually if you're considered high risk: (1) You have diabetes OR (2) Family history of glaucoma OR (3)  aged 48 and older OR (3)  American aged 72 and older  3  Osteoporosis Screening: covered every 2 years if you meet one of the following conditions: (1) You're estrogen deficient and at risk for osteoporosis based off medical history and other findings; (2) Have a vertebral abnormality; (3) On glucocorticoid therapy for more than 3 months; (4) Have primary hyperparathyroidism; (5) On osteoporosis medications and need to assess response to drug therapy  · Last bone density test (DXA Scan): Not on file  5  HIV Screening: covered annually if you're between the age of 12-76   Also covered annually if you are younger than 13 and older than 72 with risk factors for HIV infection  For pregnant patients, it is covered up to 3 times per pregnancy  Immunizations:  Immunization Recommendations   Influenza Vaccine Annual influenza vaccination during flu season is recommended for all persons aged >= 6 months who do not have contraindications   Pneumococcal Vaccine (Prevnar and Pneumovax)  * Prevnar = PCV13  * Pneumovax = PPSV23   Adults 25-60 years old: 1-3 doses may be recommended based on certain risk factors  Adults 72 years old: Prevnar (PCV13) vaccine recommended followed by Pneumovax (PPSV23) vaccine  If already received PPSV23 since turning 65, then PCV13 recommended at least one year after PPSV23 dose  Hepatitis B Vaccine 3 dose series if at intermediate or high risk (ex: diabetes, end stage renal disease, liver disease)   Tetanus (Td) Vaccine - COST NOT COVERED BY MEDICARE PART B Following completion of primary series, a booster dose should be given every 10 years to maintain immunity against tetanus  Td may also be given as tetanus wound prophylaxis  Tdap Vaccine - COST NOT COVERED BY MEDICARE PART B Recommended at least once for all adults  For pregnant patients, recommended with each pregnancy  Shingles Vaccine (Shingrix) - COST NOT COVERED BY MEDICARE PART B  2 shot series recommended in those aged 48 and above     Health Maintenance Due:  There are no preventive care reminders to display for this patient  Immunizations Due:      Topic Date Due    Influenza Vaccine  07/01/2020     Advance Directives   What are advance directives? Advance directives are legal documents that state your wishes and plans for medical care  These plans are made ahead of time in case you lose your ability to make decisions for yourself  Advance directives can apply to any medical decision, such as the treatments you want, and if you want to donate organs  What are the types of advance directives?   There are many types of advance directives, and each state has rules about how to use them  You may choose a combination of any of the following:  · Living will: This is a written record of the treatment you want  You can also choose which treatments you do not want, which to limit, and which to stop at a certain time  This includes surgery, medicine, IV fluid, and tube feedings  · Durable power of  for healthcare [facility]): This is a written record that states who you want to make healthcare choices for you when you are unable to make them for yourself  This person, called a proxy, is usually a family member or a friend  You may choose more than 1 proxy  · Do not resuscitate (DNR) order:  A DNR order is used in case your heart stops beating or you stop breathing  It is a request not to have certain forms of treatment, such as CPR  A DNR order may be included in other types of advance directives  · Medical directive: This covers the care that you want if you are in a coma, near death, or unable to make decisions for yourself  You can list the treatments you want for each condition  Treatment may include pain medicine, surgery, blood transfusions, dialysis, IV or tube feedings, and a ventilator (breathing machine)  · Values history: This document has questions about your views, beliefs, and how you feel and think about life  This information can help others choose the care that you would choose  Why are advance directives important? An advance directive helps you control your care  Although spoken wishes may be used, it is better to have your wishes written down  Spoken wishes can be misunderstood, or not followed  Treatments may be given even if you do not want them  An advance directive may make it easier for your family to make difficult choices about your care     Weight Management   Why it is important to manage your weight:  Being overweight increases your risk of health conditions such as heart disease, high blood pressure, type 2 diabetes, and certain types of cancer  It can also increase your risk for osteoarthritis, sleep apnea, and other respiratory problems  Aim for a slow, steady weight loss  Even a small amount of weight loss can lower your risk of health problems  How to lose weight safely:  A safe and healthy way to lose weight is to eat fewer calories and get regular exercise  You can lose up about 1 pound a week by decreasing the number of calories you eat by 500 calories each day  Healthy meal plan for weight management:  A healthy meal plan includes a variety of foods, contains fewer calories, and helps you stay healthy  A healthy meal plan includes the following:  · Eat whole-grain foods more often  A healthy meal plan should contain fiber  Fiber is the part of grains, fruits, and vegetables that is not broken down by your body  Whole-grain foods are healthy and provide extra fiber in your diet  Some examples of whole-grain foods are whole-wheat breads and pastas, oatmeal, brown rice, and bulgur  · Eat a variety of vegetables every day  Include dark, leafy greens such as spinach, kale, marci greens, and mustard greens  Eat yellow and orange vegetables such as carrots, sweet potatoes, and winter squash  · Eat a variety of fruits every day  Choose fresh or canned fruit (canned in its own juice or light syrup) instead of juice  Fruit juice has very little or no fiber  · Eat low-fat dairy foods  Drink fat-free (skim) milk or 1% milk  Eat fat-free yogurt and low-fat cottage cheese  Try low-fat cheeses such as mozzarella and other reduced-fat cheeses  · Choose meat and other protein foods that are low in fat  Choose beans or other legumes such as split peas or lentils  Choose fish, skinless poultry (chicken or turkey), or lean cuts of red meat (beef or pork)  Before you cook meat or poultry, cut off any visible fat  · Use less fat and oil  Try baking foods instead of frying them   Add less fat, such as margarine, sour cream, regular salad dressing and mayonnaise to foods  Eat fewer high-fat foods  Some examples of high-fat foods include french fries, doughnuts, ice cream, and cakes  · Eat fewer sweets  Limit foods and drinks that are high in sugar  This includes candy, cookies, regular soda, and sweetened drinks  Exercise:  Exercise at least 30 minutes per day on most days of the week  Some examples of exercise include walking, biking, dancing, and swimming  You can also fit in more physical activity by taking the stairs instead of the elevator or parking farther away from stores  Ask your healthcare provider about the best exercise plan for you  © Copyright [employer] 2018 Information is for End User's use only and may not be sold, redistributed or otherwise used for commercial purposes  All illustrations and images included in CareNotes® are the copyrighted property of [employer]  or [facility] Preventive Visit Patient Instructions  Thank you for completing your Welcome to Medicare Visit or Medicare Annual Wellness Visit today  Your next wellness visit will be due in one year (9/1/2021)  The screening/preventive services that you may require over the next 5-10 years are detailed below  Some tests may not apply to you based off risk factors and/or age  Screening tests ordered at today's visit but not completed yet may show as past due  Also, please note that scanned in results may not display below    Preventive Screenings:  Service Recommendations Previous Testing/Comments   Colorectal Cancer Screening  * Colonoscopy    * Fecal Occult Blood Test (FOBT)/Fecal Immunochemical Test (FIT)  * Fecal DNA/Cologuard Test  * Flexible Sigmoidoscopy Age: 54-65 years old   Colonoscopy: every 10 years (may be performed more frequently if at higher risk)  OR  FOBT/FIT: every 1 year  OR  Cologuard: every 3 years  OR  Sigmoidoscopy: every 5 years  Screening may be recommended earlier than age 48 if at higher risk for colorectal cancer  Also, an individualized decision between you and your healthcare provider will decide whether screening between the ages of 74-80 would be appropriate  Colonoscopy: 01/24/2020  FOBT/FIT: 10/19/2019  Cologuard: Not on file  Sigmoidoscopy: Not on file    Screening Current     Breast Cancer Screening Age: 36 years old  Frequency: every 1-2 years  Not required if history of left and right mastectomy Mammogram: Not on file    Risks and Benefits Discussed   Cervical Cancer Screening Between the ages of 21-29, pap smear recommended once every 3 years  Between the ages of 33-67, can perform pap smear with HPV co-testing every 5 years  Recommendations may differ for women with a history of total hysterectomy, cervical cancer, or abnormal pap smears in past  Pap Smear: Not on file    Screening Not Indicated   Hepatitis C Screening Once for adults born between 1945 and 1965  More frequently in patients at high risk for Hepatitis C Hep C Antibody: Not on file    Screening Current   Diabetes Screening 1-2 times per year if you're at risk for diabetes or have pre-diabetes Fasting glucose: 106 mg/dL   A1C: 6 1 %    Screening Current   Cholesterol Screening Once every 5 years if you don't have a lipid disorder  May order more often based on risk factors  Lipid panel: 05/01/2019    Screening Not Indicated  History Lipid Disorder     Other Preventive Screenings Covered by Medicare:  6  Abdominal Aortic Aneurysm (AAA) Screening: covered once if your at risk  You're considered to be at risk if you have a family history of AAA    7  Lung Cancer Screening: covers low dose CT scan once per year if you meet all of the following conditions: (1) Age 50-69; (2) No signs or symptoms of lung cancer; (3) Current smoker or have quit smoking within the last 15 years; (4) You have a tobacco smoking history of at least 30 pack years (packs per day multiplied by number of years you smoked); (5) You get a written order from a healthcare provider  8  Glaucoma Screening: covered annually if you're considered high risk: (1) You have diabetes OR (2) Family history of glaucoma OR (3)  aged 48 and older OR (3)  American aged 72 and older  5  Osteoporosis Screening: covered every 2 years if you meet one of the following conditions: (1) You're estrogen deficient and at risk for osteoporosis based off medical history and other findings; (2) Have a vertebral abnormality; (3) On glucocorticoid therapy for more than 3 months; (4) Have primary hyperparathyroidism; (5) On osteoporosis medications and need to assess response to drug therapy  · Last bone density test (DXA Scan): Not on file  10  HIV Screening: covered annually if you're between the age of 12-76  Also covered annually if you are younger than 13 and older than 72 with risk factors for HIV infection  For pregnant patients, it is covered up to 3 times per pregnancy  Immunizations:  Immunization Recommendations   Influenza Vaccine Annual influenza vaccination during flu season is recommended for all persons aged >= 6 months who do not have contraindications   Pneumococcal Vaccine (Prevnar and Pneumovax)  * Prevnar = PCV13  * Pneumovax = PPSV23   Adults 25-60 years old: 1-3 doses may be recommended based on certain risk factors  Adults 72 years old: Prevnar (PCV13) vaccine recommended followed by Pneumovax (PPSV23) vaccine  If already received PPSV23 since turning 65, then PCV13 recommended at least one year after PPSV23 dose  Hepatitis B Vaccine 3 dose series if at intermediate or high risk (ex: diabetes, end stage renal disease, liver disease)   Tetanus (Td) Vaccine - COST NOT COVERED BY MEDICARE PART B Following completion of primary series, a booster dose should be given every 10 years to maintain immunity against tetanus  Td may also be given as tetanus wound prophylaxis     Tdap Vaccine - COST NOT COVERED BY MEDICARE PART B Recommended at least once for all adults  For pregnant patients, recommended with each pregnancy  Shingles Vaccine (Shingrix) - COST NOT COVERED BY MEDICARE PART B  2 shot series recommended in those aged 48 and above     Health Maintenance Due:  There are no preventive care reminders to display for this patient  Immunizations Due:      Topic Date Due    Influenza Vaccine  07/01/2020     Advance Directives   What are advance directives? Advance directives are legal documents that state your wishes and plans for medical care  These plans are made ahead of time in case you lose your ability to make decisions for yourself  Advance directives can apply to any medical decision, such as the treatments you want, and if you want to donate organs  What are the types of advance directives? There are many types of advance directives, and each state has rules about how to use them  You may choose a combination of any of the following:  · Living will: This is a written record of the treatment you want  You can also choose which treatments you do not want, which to limit, and which to stop at a certain time  This includes surgery, medicine, IV fluid, and tube feedings  · Durable power of  for healthcare [facility]): This is a written record that states who you want to make healthcare choices for you when you are unable to make them for yourself  This person, called a proxy, is usually a family member or a friend  You may choose more than 1 proxy  · Do not resuscitate (DNR) order:  A DNR order is used in case your heart stops beating or you stop breathing  It is a request not to have certain forms of treatment, such as CPR  A DNR order may be included in other types of advance directives  · Medical directive: This covers the care that you want if you are in a coma, near death, or unable to make decisions for yourself  You can list the treatments you want for each condition   Treatment may include pain medicine, surgery, blood transfusions, dialysis, IV or tube feedings, and a ventilator (breathing machine)  · Values history: This document has questions about your views, beliefs, and how you feel and think about life  This information can help others choose the care that you would choose  Why are advance directives important? An advance directive helps you control your care  Although spoken wishes may be used, it is better to have your wishes written down  Spoken wishes can be misunderstood, or not followed  Treatments may be given even if you do not want them  An advance directive may make it easier for your family to make difficult choices about your care  Weight Management   Why it is important to manage your weight:  Being overweight increases your risk of health conditions such as heart disease, high blood pressure, type 2 diabetes, and certain types of cancer  It can also increase your risk for osteoarthritis, sleep apnea, and other respiratory problems  Aim for a slow, steady weight loss  Even a small amount of weight loss can lower your risk of health problems  How to lose weight safely:  A safe and healthy way to lose weight is to eat fewer calories and get regular exercise  You can lose up about 1 pound a week by decreasing the number of calories you eat by 500 calories each day  Healthy meal plan for weight management:  A healthy meal plan includes a variety of foods, contains fewer calories, and helps you stay healthy  A healthy meal plan includes the following:  · Eat whole-grain foods more often  A healthy meal plan should contain fiber  Fiber is the part of grains, fruits, and vegetables that is not broken down by your body  Whole-grain foods are healthy and provide extra fiber in your diet  Some examples of whole-grain foods are whole-wheat breads and pastas, oatmeal, brown rice, and bulgur  · Eat a variety of vegetables every day  Include dark, leafy greens such as spinach, kale, marci greens, and mustard greens   Eat yellow and orange vegetables such as carrots, sweet potatoes, and winter squash  · Eat a variety of fruits every day  Choose fresh or canned fruit (canned in its own juice or light syrup) instead of juice  Fruit juice has very little or no fiber  · Eat low-fat dairy foods  Drink fat-free (skim) milk or 1% milk  Eat fat-free yogurt and low-fat cottage cheese  Try low-fat cheeses such as mozzarella and other reduced-fat cheeses  · Choose meat and other protein foods that are low in fat  Choose beans or other legumes such as split peas or lentils  Choose fish, skinless poultry (chicken or turkey), or lean cuts of red meat (beef or pork)  Before you cook meat or poultry, cut off any visible fat  · Use less fat and oil  Try baking foods instead of frying them  Add less fat, such as margarine, sour cream, regular salad dressing and mayonnaise to foods  Eat fewer high-fat foods  Some examples of high-fat foods include french fries, doughnuts, ice cream, and cakes  · Eat fewer sweets  Limit foods and drinks that are high in sugar  This includes candy, cookies, regular soda, and sweetened drinks  Exercise:  Exercise at least 30 minutes per day on most days of the week  Some examples of exercise include walking, biking, dancing, and swimming  You can also fit in more physical activity by taking the stairs instead of the elevator or parking farther away from stores  Ask your healthcare provider about the best exercise plan for you  © Copyright [employer] 2018 Information is for End User's use only and may not be sold, redistributed or otherwise used for commercial purposes   All illustrations and images included in CareNotes® are the copyrighted property of [employer]  or [address] [place] [address]

## 2020-09-01 NOTE — PROGRESS NOTES
Assessment and Plan:     Problem List Items Addressed This Visit      Medicare annual wellness visit, subsequent   Work on diet/exercise/weight loss   Mammogram ordered   Flu shot in October   shingrix at pharmacy    BMI Counseling: Body mass index is 34 18 kg/m²  The BMI is above normal  Nutrition recommendations include decreasing portion sizes and encouraging healthy choices of fruits and vegetables  Exercise recommendations include exercising 3-5 times per week  Preventive health issues were discussed with patient, and age appropriate screening tests were ordered as noted in patient's After Visit Summary  Personalized health advice and appropriate referrals for health education or preventive services given if needed, as noted in patient's After Visit Summary       History of Present Illness:     Patient presents for Medicare Annual Wellness visit    Patient Care Team:  Brinda Harmon DO as PCP - General (Family Medicine)  Debby Chandra MD Rosann Demark, MD Raynald Panning, PA-C Tamara Median, MD (Gastroenterology)     Problem List:     Patient Active Problem List   Diagnosis    Carpal tunnel syndrome of right wrist    Cataract    Cervical radiculitis    Vertigo    Essential hypertension    Gastroesophageal reflux disease    Hyperlipidemia    History of colonic polyps    Injury of tendon of rotator cuff    Mild persistent asthma without complication    Mixed anxiety and depressive disorder    Occipital neuralgia of left side    Osteoarthritis of knee    Postconcussion syndrome    Class 2 severe obesity due to excess calories with serious comorbidity and body mass index (BMI) of 37 0 to 37 9 in adult [facility])    Hypothyroidism    Cervical spinal stenosis    Tinnitus, bilateral    Ulnar nerve compression, right    Fracture of orbital floor, right side, initial encounter for closed fracture ([facility] [address] )    IFG (impaired fasting glucose)    Rosacea      Past Medical and Surgical History:     Past Medical History:   Diagnosis Date    Acid reflux     Anxiety     Asthma     C1-C4 level with central cord syndrome ([address] )     Resolved 2/1/2017     Carpal tunnel syndrome Resolved 4/21/2015    Colitis     Colon polyp     Concussion     Depressed     Dysthymic disorder     Resolved 1/5/2018    Gastric ulcer 2013    small - on EGD - [facility]    Hemorrhoids     Hx of blood clots     Hx of colonic polyps     [place]    Hyperlipemia     Hypertension     Hypothyroid     Lung nodule     stable x 2 yrs on CT    Migraines     Obesity     Postural dizziness with presyncope     Resolved 8/28/2018     Tendinitis     Ulnar neuropathy     Resolved 5/21/2015      Past Surgical History:   Procedure Laterality Date    APPENDECTOMY      BREAST SURGERY Left 01/1966    BREAST LUMPECTOMY    CARPAL TUNNEL RELEASE      CHOLECYSTECTOMY      DILATION AND CURETTAGE OF UTERUS      ELBOW SURGERY      EYE SURGERY Bilateral 2019    Cataract     GALLBLADDER SURGERY      JOINT REPLACEMENT Right     REPLACEMENT TOTAL KNEE    KNEE ARTHROSCOPY      KNEE SURGERY Right     NECK SURGERY      POSTERIOR LAMINECTOMY / DECOMPRESSION CERVICAL SPINE  02/2015    TONSILLECTOMY      VAGINAL DELIVERY      x3    WRIST SURGERY Right       Family History:     Family History   Problem Relation Age of Onset    Breast cancer Mother     Alzheimer's disease Mother     Coronary artery disease Mother     Hypertension Mother    [facility] Migraines Mother     Peripheral vascular disease Mother    [facility] Parkinsonism Father     Other Father         Cardiovascular disease     Coronary artery disease Father     Hypertension Father     Bipolar disorder Sister     Asthma Daughter       Social History:     E-Cigarette/Vaping    E-Cigarette Use Never User      E-Cigarette/Vaping Substances    Nicotine No     THC No     CBD No     Flavoring No     Other No     Unknown No      Social History     Socioeconomic History    Marital status:      Spouse name: None    Number of children: None    Years of education: None    Highest education level: None   Occupational History    Occupation: Retired    Social Needs    Financial resource strain: None    Food insecurity     Worry: None     Inability: None    Transportation needs     Medical: None     Non-medical: None   Tobacco Use    Smoking status: Former Smoker     Last attempt to quit: 10/14/2013     Years since quittin 8    Smokeless tobacco: Never Used   Substance and Sexual Activity    Alcohol use: No    Drug use: No    Sexual activity: None   Lifestyle    Physical activity     Days per week: None     Minutes per session: None    Stress: None   Relationships    Social connections     Talks on phone: None     Gets together: None     Attends [employer] service: None     Active member of club or organization: None     Attends meetings of clubs or organizations: None     Relationship status: None    Intimate partner violence     Fear of current or ex partner: None     Emotionally abused: None     Physically abused: None     Forced sexual activity: None   Other Topics Concern    None   Social History Narrative    Consumes 2-4 cups per day      Medications and Allergies:     Current Outpatient Medications   Medication Sig Dispense Refill    albuterol (Ventolin HFA) 90 mcg/act inhaler Inhale 2 puffs every 6 (six) hours as needed for wheezing 1 Inhaler 2    amLODIPine (NORVASC) 10 mg tablet Take 1 tablet (10 mg total) by mouth daily 90 tablet 1    atorvastatin (LIPITOR) 20 mg tablet Take 1 tablet (20 mg total) by mouth daily 90 tablet 1    buPROPion (WELLBUTRIN SR) 150 mg 12 hr tablet TAKE 1 TABLET TWICE A DAY BY MOUTH 180 tablet 3    Ergocalciferol 2000 units CAPS ergocalciferol (vitamin D2) 50,000 unit capsule   TK 1 C PO Q WK      fluticasone (FLOVENT HFA) 110 MCG/ACT inhaler Inhale 2 puffs 2 (two) times a day Rinse mouth after use   3 Inhaler 1  levothyroxine 200 mcg tablet Take 1 tablet (200 mcg total) by mouth daily in the early morning 30 tablet 0    lisinopril (ZESTRIL) 40 mg tablet Take 1 tablet (40 mg total) by mouth daily 90 tablet 1    metroNIDAZOLE (METROGEL) 1 % gel Apply topically daily 45 g 0    pantoprazole (PROTONIX) 40 mg tablet Take 1 tablet (40 mg total) by mouth daily 90 tablet 1    Polyethylene Glycol 3350 (MIRALAX PO) Take 17 g by mouth      RESTASIS 0 05 % ophthalmic emulsion       sertraline (ZOLOFT) 100 mg tablet Take 0 5 tablets (50 mg total) by mouth daily 45 tablet 1    traZODone (DESYREL) 100 mg tablet Take 1 tablet (100 mg total) by mouth daily at bedtime 90 tablet 1    amoxicillin-clavulanate (Augmentin) 875-125 mg per tablet Take 1 tablet by mouth every 12 (twelve) hours for 5 days 10 tablet 0    meclizine (ANTIVERT) 25 mg tablet Take 1 tablet (25 mg total) by mouth 3 (three) times a day as needed for dizziness for up to 10 days (Patient not taking: Reported on 9/1/2020) 30 tablet 0     No current facility-administered medications for this visit  Allergies   Allergen Reactions    Ceftin [Cefuroxime] Hives      Immunizations:     Immunization History   Administered Date(s) Administered    INFLUENZA 10/16/2008, 11/04/2009, 10/09/2012, 11/05/2013, 11/19/2014, 10/14/2015, 10/18/2016, 01/05/2018, 10/02/2018    Influenza Split High Dose Preservative Free IM 10/14/2015, 10/18/2016, 01/05/2018, 10/02/2018, 11/05/2019    Pneumococcal Conjugate 13-Valent 10/14/2015    Pneumococcal Polysaccharide PPV23 10/18/2016    Td (adult), adsorbed 08/25/2010    Tdap 07/26/2012, 06/19/2015    Zoster 05/23/2013, 01/23/2014      Health Maintenance: There are no preventive care reminders to display for this patient        Topic Date Due    Influenza Vaccine  07/01/2020      Medicare Health Risk Assessment:     /82   Pulse 87   Temp 98 9 °F (37 2 °C) (Tympanic)   Resp 16   Ht 5' (1 524 m)   Wt 79 4 kg (175 lb) SpO2 97%   BMI 34 18 kg/m²      Parag Holland is here for her Subsequent Wellness visit  Health Risk Assessment:   Patient rates overall health as very good  Patient feels that their physical health rating is same  Eyesight was rated as same  Hearing was rated as same  Patient feels that their emotional and mental health rating is same  Pain experienced in the last 7 days has been none  Patient states that she has experienced no weight loss or gain in last 6 months  Depression Screening:   PHQ-2 Score: 1  PHQ-9 Score: 4      Fall Risk Screening: In the past year, patient has experienced: no history of falling in past year      Urinary Incontinence Screening:   Patient has not leaked urine accidently in the last six months  Home Safety:  Patient does not have trouble with stairs inside or outside of their home  Patient has working smoke alarms and has working carbon monoxide detector  Home safety hazards include: none  Nutrition:   Current diet is Regular  Medications:   Patient is currently taking over-the-counter supplements  OTC medications include: see medication list  Patient is able to manage medications  Activities of Daily Living (ADLs)/Instrumental Activities of Daily Living (IADLs):   Walk and transfer into and out of bed and chair?: Yes  Dress and groom yourself?: Yes    Bathe or shower yourself?: Yes    Feed yourself?  Yes  Do your laundry/housekeeping?: Yes  Manage your money, pay your bills and track your expenses?: Yes  Make your own meals?: Yes    Do your own shopping?: Yes    Previous Hospitalizations:   Any hospitalizations or ED visits within the last 12 months?: No      Advance Care Planning:   Living will: Yes    Advanced directive: Yes      Cognitive Screening:   Provider or family/friend/caregiver concerned regarding cognition?: No    PREVENTIVE SCREENINGS      Cardiovascular Screening:    General: Screening Not Indicated and History Lipid Disorder      Diabetes Screening: General: Screening Current      Colorectal Cancer Screening:     General: Screening Current      Breast Cancer Screening:     General: Risks and Benefits Discussed    Due for: Mammogram        Cervical Cancer Screening:    General: Screening Not Indicated      Abdominal Aortic Aneurysm (AAA) Screening:        General: Screening Not Indicated      Lung Cancer Screening:     General: Screening Not Indicated      Hepatitis C Screening:    General: Screening Current      Ky Human, DO

## 2020-09-01 NOTE — PROGRESS NOTES
Assessment/Plan:    No problem-specific Assessment & Plan notes found for this encounter  Diagnoses and all orders for this visit:    Acute non-recurrent maxillary sinusitis  Comments:  augmentin BID X 5 days  restart flonase  call if symptoms don't resolve including cough  Orders:  -     amoxicillin-clavulanate (Augmentin) 875-125 mg per tablet; Take 1 tablet by mouth every 12 (twelve) hours for 5 days    Hypothyroidism, unspecified type  Comments:  obtain labs    IFG (impaired fasting glucose)  Comments:  obtain labs    Mild persistent asthma without complication  Comments:  controlled on current meds  continue same    Essential hypertension  Comments:  controlled  continue current meds    Mixed hyperlipidemia  Comments:  await labs    History of colonic polyps  Comments:  scheduled for colonoscopy this month    Mixed anxiety and depressive disorder  Comments:  stable  continue counseling  continue current meds  to call if sx worsen    Skin lesion  Comments:  ?AK vs SCC  refer to derm for removal  Orders:  -     Ambulatory referral to Dermatology; Future        Subjective:      Patient ID: Agnes Simpson is a 66 y o  female  HPI  Pt presents in chronic f/u    Mood is okay  Tolerating zoloft and wellbutrin  There are some tensions and conflicts with her sister, but she feels things are going okay  Sees counselor  Wants to continue same dosing  Pt has had sinus pain/pressure and has been feeling under the weather for a few weeks  Doesn't always take her flonase  Has congestion which runs down her throat and causes her to cough it up if she doesn't blow her nose regularly  No cough otherwise  No fevers  No shortness of breath  No cough with activity or night cough  No need for albuterol  Due for labs  Hasn't obtained them yet  Blood pressure appropriate    Tolerating norvasc and lisinopril '    Pt has lesion on RLE which she feels may need to be removed         The following portions of the patient's history were reviewed and updated as appropriate: allergies, current medications, past family history, past medical history, past social history, past surgical history and problem list     Review of Systems   Constitutional: Negative for chills, fatigue, fever and unexpected weight change  HENT: Positive for postnasal drip, sinus pressure and sinus pain  Negative for congestion, ear pain, hearing loss, rhinorrhea, sore throat, trouble swallowing and voice change  Eyes: Negative for pain, redness and visual disturbance  Respiratory: Positive for cough  Negative for shortness of breath  Cardiovascular: Negative for chest pain, palpitations and leg swelling  Gastrointestinal: Negative for abdominal pain, constipation, diarrhea and nausea  Endocrine: Negative for cold intolerance, heat intolerance, polydipsia and polyuria  Genitourinary: Negative for dysuria, frequency and urgency  Musculoskeletal: Negative for arthralgias, joint swelling and myalgias  Skin: Negative for rash  RLE skin lesion   Neurological: Negative for weakness, numbness and headaches  Hematological: Negative for adenopathy  Objective:      /82   Pulse 87   Temp 98 9 °F (37 2 °C) (Tympanic)   Resp 16   Ht 5' (1 524 m)   Wt 79 4 kg (175 lb)   SpO2 97%   BMI 34 18 kg/m²          Physical Exam  Constitutional:       General: She is not in acute distress  Appearance: She is well-developed  HENT:      Head: Normocephalic and atraumatic  Right Ear: Tympanic membrane, ear canal and external ear normal       Left Ear: Tympanic membrane, ear canal and external ear normal       Nose:      Right Turbinates: Swollen  Left Turbinates: Swollen  Right Sinus: Maxillary sinus tenderness present  Mouth/Throat:      Pharynx: No oropharyngeal exudate  Eyes:      Conjunctiva/sclera: Conjunctivae normal       Pupils: Pupils are equal, round, and reactive to light     Neck: Thyroid: No thyromegaly  Vascular: No carotid bruit or JVD  Cardiovascular:      Rate and Rhythm: Regular rhythm  Heart sounds: S1 normal and S2 normal  No murmur  No friction rub  No gallop  No S3 or S4 sounds  Pulmonary:      Effort: Pulmonary effort is normal       Breath sounds: Normal breath sounds  No wheezing, rhonchi or rales  Abdominal:      General: Bowel sounds are normal  There is no distension  Palpations: Abdomen is soft  Tenderness: There is no abdominal tenderness  Lymphadenopathy:      Cervical: No cervical adenopathy  Skin:     Comments: RLE approx 5x7mm, scaly flat lesion   Neurological:      Mental Status: She is alert and oriented to person, place, and time  Cranial Nerves: No cranial nerve deficit  Sensory: No sensory deficit  Deep Tendon Reflexes: Reflexes are normal and symmetric

## 2020-09-11 ENCOUNTER — TELEPHONE (OUTPATIENT)
Dept: DERMATOLOGY | Facility: CLINIC | Age: 75
End: 2020-09-11

## 2020-09-11 NOTE — TELEPHONE ENCOUNTER
Return call made to patient, LVM asking her to call us back if she would like to be added to the wait list

## 2020-09-15 DIAGNOSIS — E03.9 HYPOTHYROIDISM, UNSPECIFIED TYPE: ICD-10-CM

## 2020-09-15 RX ORDER — LEVOTHYROXINE SODIUM 0.2 MG/1
200 TABLET ORAL
Qty: 30 TABLET | Refills: 0 | Status: SHIPPED | OUTPATIENT
Start: 2020-09-15 | End: 2020-09-23

## 2020-09-21 ENCOUNTER — APPOINTMENT (OUTPATIENT)
Dept: LAB | Facility: MEDICAL CENTER | Age: 75
End: 2020-09-21
Payer: MEDICARE

## 2020-09-21 DIAGNOSIS — E03.9 HYPOTHYROIDISM, UNSPECIFIED TYPE: ICD-10-CM

## 2020-09-21 DIAGNOSIS — D64.9 ANEMIA, UNSPECIFIED TYPE: ICD-10-CM

## 2020-09-21 DIAGNOSIS — R73.01 IFG (IMPAIRED FASTING GLUCOSE): ICD-10-CM

## 2020-09-21 DIAGNOSIS — E78.5 HYPERLIPIDEMIA, UNSPECIFIED HYPERLIPIDEMIA TYPE: ICD-10-CM

## 2020-09-21 LAB
ALBUMIN SERPL BCP-MCNC: 3.6 G/DL (ref 3.5–5)
ALP SERPL-CCNC: 97 U/L (ref 46–116)
ALT SERPL W P-5'-P-CCNC: 30 U/L (ref 12–78)
ANION GAP SERPL CALCULATED.3IONS-SCNC: 7 MMOL/L (ref 4–13)
AST SERPL W P-5'-P-CCNC: 18 U/L (ref 5–45)
BASOPHILS # BLD AUTO: 0.06 THOUSANDS/ΜL (ref 0–0.1)
BASOPHILS NFR BLD AUTO: 1 % (ref 0–1)
BILIRUB SERPL-MCNC: 0.49 MG/DL (ref 0.2–1)
BUN SERPL-MCNC: 21 MG/DL (ref 5–25)
CALCIUM SERPL-MCNC: 9.2 MG/DL (ref 8.3–10.1)
CHLORIDE SERPL-SCNC: 111 MMOL/L (ref 100–108)
CHOLEST SERPL-MCNC: 134 MG/DL (ref 50–200)
CO2 SERPL-SCNC: 25 MMOL/L (ref 21–32)
CREAT SERPL-MCNC: 0.94 MG/DL (ref 0.6–1.3)
EOSINOPHIL # BLD AUTO: 0.3 THOUSAND/ΜL (ref 0–0.61)
EOSINOPHIL NFR BLD AUTO: 5 % (ref 0–6)
ERYTHROCYTE [DISTWIDTH] IN BLOOD BY AUTOMATED COUNT: 14.5 % (ref 11.6–15.1)
EST. AVERAGE GLUCOSE BLD GHB EST-MCNC: 123 MG/DL
GFR SERPL CREATININE-BSD FRML MDRD: 59 ML/MIN/1.73SQ M
GLUCOSE P FAST SERPL-MCNC: 98 MG/DL (ref 65–99)
HBA1C MFR BLD: 5.9 %
HCT VFR BLD AUTO: 32.9 % (ref 34.8–46.1)
HDLC SERPL-MCNC: 70 MG/DL
HGB BLD-MCNC: 10.5 G/DL (ref 11.5–15.4)
IMM GRANULOCYTES # BLD AUTO: 0.01 THOUSAND/UL (ref 0–0.2)
IMM GRANULOCYTES NFR BLD AUTO: 0 % (ref 0–2)
LDLC SERPL CALC-MCNC: 52 MG/DL (ref 0–100)
LYMPHOCYTES # BLD AUTO: 1.54 THOUSANDS/ΜL (ref 0.6–4.47)
LYMPHOCYTES NFR BLD AUTO: 23 % (ref 14–44)
MCH RBC QN AUTO: 28.1 PG (ref 26.8–34.3)
MCHC RBC AUTO-ENTMCNC: 31.9 G/DL (ref 31.4–37.4)
MCV RBC AUTO: 88 FL (ref 82–98)
MONOCYTES # BLD AUTO: 0.6 THOUSAND/ΜL (ref 0.17–1.22)
MONOCYTES NFR BLD AUTO: 9 % (ref 4–12)
NEUTROPHILS # BLD AUTO: 4.19 THOUSANDS/ΜL (ref 1.85–7.62)
NEUTS SEG NFR BLD AUTO: 62 % (ref 43–75)
NONHDLC SERPL-MCNC: 64 MG/DL
NRBC BLD AUTO-RTO: 0 /100 WBCS
PLATELET # BLD AUTO: 282 THOUSANDS/UL (ref 149–390)
PMV BLD AUTO: 9.8 FL (ref 8.9–12.7)
POTASSIUM SERPL-SCNC: 4.2 MMOL/L (ref 3.5–5.3)
PROT SERPL-MCNC: 7 G/DL (ref 6.4–8.2)
RBC # BLD AUTO: 3.74 MILLION/UL (ref 3.81–5.12)
SODIUM SERPL-SCNC: 143 MMOL/L (ref 136–145)
TRIGL SERPL-MCNC: 58 MG/DL
TSH SERPL DL<=0.05 MIU/L-ACNC: 0.07 UIU/ML (ref 0.36–3.74)
WBC # BLD AUTO: 6.7 THOUSAND/UL (ref 4.31–10.16)

## 2020-09-21 PROCEDURE — 83036 HEMOGLOBIN GLYCOSYLATED A1C: CPT

## 2020-09-21 PROCEDURE — 84443 ASSAY THYROID STIM HORMONE: CPT

## 2020-09-21 PROCEDURE — 36415 COLL VENOUS BLD VENIPUNCTURE: CPT

## 2020-09-21 PROCEDURE — 80061 LIPID PANEL: CPT

## 2020-09-21 PROCEDURE — 80053 COMPREHEN METABOLIC PANEL: CPT

## 2020-09-21 PROCEDURE — 85025 COMPLETE CBC W/AUTO DIFF WBC: CPT

## 2020-09-23 DIAGNOSIS — E03.9 HYPOTHYROIDISM, UNSPECIFIED TYPE: Primary | ICD-10-CM

## 2020-09-23 DIAGNOSIS — Z20.822 ENCOUNTER FOR LABORATORY TESTING FOR COVID-19 VIRUS: ICD-10-CM

## 2020-09-23 PROCEDURE — U0003 INFECTIOUS AGENT DETECTION BY NUCLEIC ACID (DNA OR RNA); SEVERE ACUTE RESPIRATORY SYNDROME CORONAVIRUS 2 (SARS-COV-2) (CORONAVIRUS DISEASE [COVID-19]), AMPLIFIED PROBE TECHNIQUE, MAKING USE OF HIGH THROUGHPUT TECHNOLOGIES AS DESCRIBED BY CMS-2020-01-R: HCPCS

## 2020-09-23 RX ORDER — LEVOTHYROXINE SODIUM 175 UG/1
175 TABLET ORAL
Qty: 30 TABLET | Refills: 3 | Status: SHIPPED | OUTPATIENT
Start: 2020-09-23 | End: 2020-10-30

## 2020-09-24 LAB — SARS-COV-2 RNA SPEC QL NAA+PROBE: NOT DETECTED

## 2020-10-01 ENCOUNTER — ANESTHESIA EVENT (OUTPATIENT)
Dept: GASTROENTEROLOGY | Facility: HOSPITAL | Age: 75
End: 2020-10-01

## 2020-10-01 ENCOUNTER — HOSPITAL ENCOUNTER (OUTPATIENT)
Dept: GASTROENTEROLOGY | Facility: HOSPITAL | Age: 75
Setting detail: OUTPATIENT SURGERY
Discharge: HOME | End: 2020-10-01
Attending: INTERNAL MEDICINE
Payer: MEDICARE

## 2020-10-01 ENCOUNTER — ANESTHESIA (OUTPATIENT)
Dept: GASTROENTEROLOGY | Facility: HOSPITAL | Age: 75
End: 2020-10-01

## 2020-10-01 VITALS
RESPIRATION RATE: 16 BRPM | DIASTOLIC BLOOD PRESSURE: 63 MMHG | SYSTOLIC BLOOD PRESSURE: 145 MMHG | HEART RATE: 68 BPM | OXYGEN SATURATION: 96 % | TEMPERATURE: 97.3 F

## 2020-10-01 VITALS — HEART RATE: 65 BPM

## 2020-10-01 DIAGNOSIS — Z86.010 HISTORY OF COLON POLYPS: ICD-10-CM

## 2020-10-01 PROCEDURE — 88305 TISSUE EXAM BY PATHOLOGIST: CPT | Performed by: PATHOLOGY

## 2020-10-01 PROCEDURE — 45380 COLONOSCOPY AND BIOPSY: CPT | Performed by: INTERNAL MEDICINE

## 2020-10-01 RX ORDER — SODIUM CHLORIDE, SODIUM LACTATE, POTASSIUM CHLORIDE, CALCIUM CHLORIDE 600; 310; 30; 20 MG/100ML; MG/100ML; MG/100ML; MG/100ML
INJECTION, SOLUTION INTRAVENOUS CONTINUOUS PRN
Status: DISCONTINUED | OUTPATIENT
Start: 2020-10-01 | End: 2020-10-01

## 2020-10-01 RX ORDER — CHOLECALCIFEROL (VITAMIN D3) 1250 MCG
5000 CAPSULE ORAL DAILY
COMMUNITY

## 2020-10-01 RX ORDER — PROPOFOL 10 MG/ML
INJECTION, EMULSION INTRAVENOUS AS NEEDED
Status: DISCONTINUED | OUTPATIENT
Start: 2020-10-01 | End: 2020-10-01

## 2020-10-01 RX ORDER — PROPOFOL 10 MG/ML
INJECTION, EMULSION INTRAVENOUS CONTINUOUS PRN
Status: DISCONTINUED | OUTPATIENT
Start: 2020-10-01 | End: 2020-10-01

## 2020-10-01 RX ADMIN — PROPOFOL 100 MCG/KG/MIN: 10 INJECTION, EMULSION INTRAVENOUS at 09:03

## 2020-10-01 RX ADMIN — SODIUM CHLORIDE, SODIUM LACTATE, POTASSIUM CHLORIDE, AND CALCIUM CHLORIDE: .6; .31; .03; .02 INJECTION, SOLUTION INTRAVENOUS at 08:49

## 2020-10-01 RX ADMIN — PROPOFOL 100 MG: 10 INJECTION, EMULSION INTRAVENOUS at 09:01

## 2020-10-01 RX ADMIN — Medication 40 MG: at 09:11

## 2020-10-07 DIAGNOSIS — I10 ESSENTIAL HYPERTENSION: ICD-10-CM

## 2020-10-07 RX ORDER — AMLODIPINE BESYLATE 10 MG/1
TABLET ORAL
Qty: 90 TABLET | Refills: 1 | Status: SHIPPED | OUTPATIENT
Start: 2020-10-07 | End: 2021-02-15 | Stop reason: SDUPTHER

## 2020-10-15 ENCOUNTER — TELEPHONE (OUTPATIENT)
Dept: GASTROENTEROLOGY | Facility: CLINIC | Age: 75
End: 2020-10-15

## 2020-10-20 ENCOUNTER — OFFICE VISIT (OUTPATIENT)
Dept: DERMATOLOGY | Facility: CLINIC | Age: 75
End: 2020-10-20
Payer: MEDICARE

## 2020-10-20 VITALS — HEIGHT: 60 IN | WEIGHT: 173.2 LBS | BODY MASS INDEX: 34 KG/M2 | TEMPERATURE: 98.1 F

## 2020-10-20 DIAGNOSIS — L28.0 LICHEN SIMPLEX CHRONICUS: ICD-10-CM

## 2020-10-20 DIAGNOSIS — L82.1 SEBORRHEIC KERATOSIS: Primary | ICD-10-CM

## 2020-10-20 PROCEDURE — 99202 OFFICE O/P NEW SF 15 MIN: CPT | Performed by: STUDENT IN AN ORGANIZED HEALTH CARE EDUCATION/TRAINING PROGRAM

## 2020-10-28 ENCOUNTER — PATIENT MESSAGE (OUTPATIENT)
Dept: FAMILY MEDICINE CLINIC | Facility: CLINIC | Age: 75
End: 2020-10-28

## 2020-10-28 DIAGNOSIS — M25.541 ARTHRALGIA OF BOTH HANDS: Primary | ICD-10-CM

## 2020-10-28 DIAGNOSIS — M25.542 ARTHRALGIA OF BOTH HANDS: Primary | ICD-10-CM

## 2020-10-29 ENCOUNTER — APPOINTMENT (OUTPATIENT)
Dept: LAB | Facility: MEDICAL CENTER | Age: 75
End: 2020-10-29
Payer: MEDICARE

## 2020-10-30 DIAGNOSIS — E03.9 HYPOTHYROIDISM, UNSPECIFIED TYPE: Primary | ICD-10-CM

## 2020-10-30 RX ORDER — LEVOTHYROXINE SODIUM 0.15 MG/1
150 TABLET ORAL
Qty: 30 TABLET | Refills: 5 | Status: SHIPPED | OUTPATIENT
Start: 2020-10-30 | End: 2021-02-15 | Stop reason: SDUPTHER

## 2020-10-31 DIAGNOSIS — I10 ESSENTIAL HYPERTENSION: ICD-10-CM

## 2020-10-31 DIAGNOSIS — F32.A DEPRESSION, UNSPECIFIED DEPRESSION TYPE: ICD-10-CM

## 2020-11-03 RX ORDER — TRAZODONE HYDROCHLORIDE 100 MG/1
TABLET ORAL
Qty: 90 TABLET | Refills: 1 | Status: SHIPPED | OUTPATIENT
Start: 2020-11-03 | End: 2021-01-15

## 2020-11-03 RX ORDER — LISINOPRIL 40 MG/1
TABLET ORAL
Qty: 90 TABLET | Refills: 1 | Status: SHIPPED | OUTPATIENT
Start: 2020-11-03 | End: 2021-04-21

## 2020-11-06 ENCOUNTER — OFFICE VISIT (OUTPATIENT)
Dept: URGENT CARE | Facility: MEDICAL CENTER | Age: 75
End: 2020-11-06
Payer: MEDICARE

## 2020-11-06 VITALS
TEMPERATURE: 97.6 F | BODY MASS INDEX: 33.38 KG/M2 | RESPIRATION RATE: 18 BRPM | SYSTOLIC BLOOD PRESSURE: 188 MMHG | WEIGHT: 170 LBS | HEIGHT: 60 IN | DIASTOLIC BLOOD PRESSURE: 76 MMHG | OXYGEN SATURATION: 97 % | HEART RATE: 74 BPM

## 2020-11-06 DIAGNOSIS — S00.83XA CONTUSION OF OTHER PART OF HEAD, INITIAL ENCOUNTER: Primary | ICD-10-CM

## 2020-11-06 PROCEDURE — 99213 OFFICE O/P EST LOW 20 MIN: CPT | Performed by: PHYSICIAN ASSISTANT

## 2020-11-06 PROCEDURE — G0463 HOSPITAL OUTPT CLINIC VISIT: HCPCS | Performed by: PHYSICIAN ASSISTANT

## 2020-11-12 DIAGNOSIS — F32.A DEPRESSION, UNSPECIFIED DEPRESSION TYPE: ICD-10-CM

## 2020-11-13 RX ORDER — SERTRALINE HYDROCHLORIDE 100 MG/1
50 TABLET, FILM COATED ORAL DAILY
Qty: 45 TABLET | Refills: 1 | Status: SHIPPED | OUTPATIENT
Start: 2020-11-13 | End: 2021-01-15

## 2020-11-17 ENCOUNTER — TELEMEDICINE (OUTPATIENT)
Dept: FAMILY MEDICINE CLINIC | Facility: CLINIC | Age: 75
End: 2020-11-17
Payer: MEDICARE

## 2020-11-17 VITALS — WEIGHT: 170 LBS | BODY MASS INDEX: 33.38 KG/M2 | HEIGHT: 60 IN

## 2020-11-17 DIAGNOSIS — B34.9 VIRAL INFECTION, UNSPECIFIED: Primary | ICD-10-CM

## 2020-11-17 DIAGNOSIS — B34.9 VIRAL INFECTION, UNSPECIFIED: ICD-10-CM

## 2020-11-17 PROCEDURE — U0003 INFECTIOUS AGENT DETECTION BY NUCLEIC ACID (DNA OR RNA); SEVERE ACUTE RESPIRATORY SYNDROME CORONAVIRUS 2 (SARS-COV-2) (CORONAVIRUS DISEASE [COVID-19]), AMPLIFIED PROBE TECHNIQUE, MAKING USE OF HIGH THROUGHPUT TECHNOLOGIES AS DESCRIBED BY CMS-2020-01-R: HCPCS | Performed by: FAMILY MEDICINE

## 2020-11-17 PROCEDURE — 99213 OFFICE O/P EST LOW 20 MIN: CPT | Performed by: FAMILY MEDICINE

## 2020-11-19 LAB — SARS-COV-2 RNA SPEC QL NAA+PROBE: NOT DETECTED

## 2020-12-01 ENCOUNTER — PATIENT MESSAGE (OUTPATIENT)
Dept: FAMILY MEDICINE CLINIC | Facility: CLINIC | Age: 75
End: 2020-12-01

## 2020-12-04 ENCOUNTER — APPOINTMENT (OUTPATIENT)
Dept: LAB | Facility: MEDICAL CENTER | Age: 75
End: 2020-12-04
Payer: MEDICARE

## 2020-12-04 ENCOUNTER — OFFICE VISIT (OUTPATIENT)
Dept: FAMILY MEDICINE CLINIC | Facility: CLINIC | Age: 75
End: 2020-12-04
Payer: MEDICARE

## 2020-12-04 VITALS
HEART RATE: 71 BPM | RESPIRATION RATE: 16 BRPM | DIASTOLIC BLOOD PRESSURE: 64 MMHG | SYSTOLIC BLOOD PRESSURE: 154 MMHG | WEIGHT: 175.2 LBS | BODY MASS INDEX: 34.4 KG/M2 | HEIGHT: 60 IN | OXYGEN SATURATION: 96 % | TEMPERATURE: 98 F

## 2020-12-04 DIAGNOSIS — M25.50 PAIN IN JOINT, MULTIPLE SITES: ICD-10-CM

## 2020-12-04 DIAGNOSIS — M25.50 PAIN IN JOINT, MULTIPLE SITES: Primary | ICD-10-CM

## 2020-12-04 PROBLEM — E66.812 CLASS 2 SEVERE OBESITY DUE TO EXCESS CALORIES WITH SERIOUS COMORBIDITY AND BODY MASS INDEX (BMI) OF 37.0 TO 37.9 IN ADULT (HCC): Status: RESOLVED | Noted: 2017-02-01 | Resolved: 2020-12-04

## 2020-12-04 PROBLEM — Z87.11 HISTORY OF GASTRIC ULCER: Status: ACTIVE | Noted: 2020-12-04

## 2020-12-04 PROBLEM — E66.01 CLASS 2 SEVERE OBESITY DUE TO EXCESS CALORIES WITH SERIOUS COMORBIDITY AND BODY MASS INDEX (BMI) OF 37.0 TO 37.9 IN ADULT (HCC): Status: RESOLVED | Noted: 2017-02-01 | Resolved: 2020-12-04

## 2020-12-04 LAB
ALBUMIN SERPL BCP-MCNC: 4 G/DL (ref 3.5–5)
ALP SERPL-CCNC: 116 U/L (ref 46–116)
ALT SERPL W P-5'-P-CCNC: 21 U/L (ref 12–78)
ANION GAP SERPL CALCULATED.3IONS-SCNC: 5 MMOL/L (ref 4–13)
AST SERPL W P-5'-P-CCNC: 12 U/L (ref 5–45)
BASOPHILS # BLD AUTO: 0.07 THOUSANDS/ΜL (ref 0–0.1)
BASOPHILS NFR BLD AUTO: 1 % (ref 0–1)
BILIRUB SERPL-MCNC: 0.39 MG/DL (ref 0.2–1)
BUN SERPL-MCNC: 21 MG/DL (ref 5–25)
CALCIUM SERPL-MCNC: 9.5 MG/DL (ref 8.3–10.1)
CHLORIDE SERPL-SCNC: 108 MMOL/L (ref 100–108)
CK SERPL-CCNC: 80 U/L (ref 26–192)
CO2 SERPL-SCNC: 28 MMOL/L (ref 21–32)
CREAT SERPL-MCNC: 1.03 MG/DL (ref 0.6–1.3)
CRP SERPL QL: 3.4 MG/L
EOSINOPHIL # BLD AUTO: 0.35 THOUSAND/ΜL (ref 0–0.61)
EOSINOPHIL NFR BLD AUTO: 6 % (ref 0–6)
ERYTHROCYTE [DISTWIDTH] IN BLOOD BY AUTOMATED COUNT: 13.2 % (ref 11.6–15.1)
ERYTHROCYTE [SEDIMENTATION RATE] IN BLOOD: 26 MM/HOUR (ref 0–29)
GFR SERPL CREATININE-BSD FRML MDRD: 53 ML/MIN/1.73SQ M
GLUCOSE P FAST SERPL-MCNC: 84 MG/DL (ref 65–99)
HCT VFR BLD AUTO: 36.2 % (ref 34.8–46.1)
HGB BLD-MCNC: 11.2 G/DL (ref 11.5–15.4)
IMM GRANULOCYTES # BLD AUTO: 0.02 THOUSAND/UL (ref 0–0.2)
IMM GRANULOCYTES NFR BLD AUTO: 0 % (ref 0–2)
LYMPHOCYTES # BLD AUTO: 1.97 THOUSANDS/ΜL (ref 0.6–4.47)
LYMPHOCYTES NFR BLD AUTO: 31 % (ref 14–44)
MCH RBC QN AUTO: 27.5 PG (ref 26.8–34.3)
MCHC RBC AUTO-ENTMCNC: 30.9 G/DL (ref 31.4–37.4)
MCV RBC AUTO: 89 FL (ref 82–98)
MONOCYTES # BLD AUTO: 0.47 THOUSAND/ΜL (ref 0.17–1.22)
MONOCYTES NFR BLD AUTO: 7 % (ref 4–12)
NEUTROPHILS # BLD AUTO: 3.46 THOUSANDS/ΜL (ref 1.85–7.62)
NEUTS SEG NFR BLD AUTO: 55 % (ref 43–75)
NRBC BLD AUTO-RTO: 0 /100 WBCS
PLATELET # BLD AUTO: 299 THOUSANDS/UL (ref 149–390)
PMV BLD AUTO: 9.7 FL (ref 8.9–12.7)
POTASSIUM SERPL-SCNC: 4.3 MMOL/L (ref 3.5–5.3)
PROT SERPL-MCNC: 7.4 G/DL (ref 6.4–8.2)
RBC # BLD AUTO: 4.07 MILLION/UL (ref 3.81–5.12)
SODIUM SERPL-SCNC: 141 MMOL/L (ref 136–145)
WBC # BLD AUTO: 6.34 THOUSAND/UL (ref 4.31–10.16)

## 2020-12-04 PROCEDURE — 86140 C-REACTIVE PROTEIN: CPT

## 2020-12-04 PROCEDURE — 36415 COLL VENOUS BLD VENIPUNCTURE: CPT

## 2020-12-04 PROCEDURE — 80053 COMPREHEN METABOLIC PANEL: CPT

## 2020-12-04 PROCEDURE — 82550 ASSAY OF CK (CPK): CPT

## 2020-12-04 PROCEDURE — 85025 COMPLETE CBC W/AUTO DIFF WBC: CPT

## 2020-12-04 PROCEDURE — 99213 OFFICE O/P EST LOW 20 MIN: CPT | Performed by: FAMILY MEDICINE

## 2020-12-04 PROCEDURE — 86618 LYME DISEASE ANTIBODY: CPT

## 2020-12-04 PROCEDURE — 85652 RBC SED RATE AUTOMATED: CPT

## 2020-12-04 PROCEDURE — 86430 RHEUMATOID FACTOR TEST QUAL: CPT

## 2020-12-05 LAB — B BURGDOR IGG+IGM SER-ACNC: 59

## 2020-12-07 LAB — RHEUMATOID FACT SER QL LA: NEGATIVE

## 2021-01-08 DIAGNOSIS — E78.5 HYPERLIPIDEMIA, UNSPECIFIED HYPERLIPIDEMIA TYPE: ICD-10-CM

## 2021-01-08 RX ORDER — ATORVASTATIN CALCIUM 20 MG/1
TABLET, FILM COATED ORAL
Qty: 90 TABLET | Refills: 1 | Status: SHIPPED | OUTPATIENT
Start: 2021-01-08 | End: 2021-02-15 | Stop reason: SDUPTHER

## 2021-01-09 DIAGNOSIS — K21.9 GASTROESOPHAGEAL REFLUX DISEASE WITHOUT ESOPHAGITIS: ICD-10-CM

## 2021-01-11 RX ORDER — PANTOPRAZOLE SODIUM 40 MG/1
TABLET, DELAYED RELEASE ORAL
Qty: 90 TABLET | Refills: 1 | Status: SHIPPED | OUTPATIENT
Start: 2021-01-11 | End: 2021-02-15 | Stop reason: SDUPTHER

## 2021-01-15 ENCOUNTER — PATIENT MESSAGE (OUTPATIENT)
Dept: FAMILY MEDICINE CLINIC | Facility: CLINIC | Age: 76
End: 2021-01-15

## 2021-01-15 ENCOUNTER — APPOINTMENT (OUTPATIENT)
Dept: LAB | Facility: CLINIC | Age: 76
End: 2021-01-15
Payer: MEDICARE

## 2021-01-15 ENCOUNTER — TELEMEDICINE (OUTPATIENT)
Dept: FAMILY MEDICINE CLINIC | Facility: CLINIC | Age: 76
End: 2021-01-15
Payer: MEDICARE

## 2021-01-15 ENCOUNTER — TELEPHONE (OUTPATIENT)
Dept: FAMILY MEDICINE CLINIC | Facility: CLINIC | Age: 76
End: 2021-01-15

## 2021-01-15 VITALS
DIASTOLIC BLOOD PRESSURE: 68 MMHG | SYSTOLIC BLOOD PRESSURE: 140 MMHG | WEIGHT: 173 LBS | BODY MASS INDEX: 33.96 KG/M2 | HEIGHT: 60 IN | TEMPERATURE: 98.3 F | HEART RATE: 73 BPM | RESPIRATION RATE: 16 BRPM | OXYGEN SATURATION: 97 %

## 2021-01-15 DIAGNOSIS — F32.A DEPRESSION, UNSPECIFIED DEPRESSION TYPE: ICD-10-CM

## 2021-01-15 DIAGNOSIS — R10.13 EPIGASTRIC PAIN: ICD-10-CM

## 2021-01-15 DIAGNOSIS — R10.13 EPIGASTRIC PAIN: Primary | ICD-10-CM

## 2021-01-15 LAB
ALBUMIN SERPL BCP-MCNC: 4.2 G/DL (ref 3.5–5)
ALP SERPL-CCNC: 106 U/L (ref 46–116)
ALT SERPL W P-5'-P-CCNC: 19 U/L (ref 12–78)
ANION GAP SERPL CALCULATED.3IONS-SCNC: 9 MMOL/L (ref 4–13)
AST SERPL W P-5'-P-CCNC: 17 U/L (ref 5–45)
BASOPHILS # BLD AUTO: 0.06 THOUSANDS/ΜL (ref 0–0.1)
BASOPHILS NFR BLD AUTO: 1 % (ref 0–1)
BILIRUB SERPL-MCNC: 0.41 MG/DL (ref 0.2–1)
BUN SERPL-MCNC: 30 MG/DL (ref 5–25)
CALCIUM SERPL-MCNC: 9.2 MG/DL (ref 8.3–10.1)
CHLORIDE SERPL-SCNC: 103 MMOL/L (ref 100–108)
CO2 SERPL-SCNC: 27 MMOL/L (ref 21–32)
CREAT SERPL-MCNC: 1.22 MG/DL (ref 0.6–1.3)
EOSINOPHIL # BLD AUTO: 0.24 THOUSAND/ΜL (ref 0–0.61)
EOSINOPHIL NFR BLD AUTO: 4 % (ref 0–6)
ERYTHROCYTE [DISTWIDTH] IN BLOOD BY AUTOMATED COUNT: 14.4 % (ref 11.6–15.1)
GFR SERPL CREATININE-BSD FRML MDRD: 43 ML/MIN/1.73SQ M
GLUCOSE SERPL-MCNC: 92 MG/DL (ref 65–140)
HCT VFR BLD AUTO: 37.9 % (ref 34.8–46.1)
HGB BLD-MCNC: 11.7 G/DL (ref 11.5–15.4)
IMM GRANULOCYTES # BLD AUTO: 0.01 THOUSAND/UL (ref 0–0.2)
IMM GRANULOCYTES NFR BLD AUTO: 0 % (ref 0–2)
LIPASE SERPL-CCNC: 119 U/L (ref 73–393)
LYMPHOCYTES # BLD AUTO: 2.18 THOUSANDS/ΜL (ref 0.6–4.47)
LYMPHOCYTES NFR BLD AUTO: 32 % (ref 14–44)
MCH RBC QN AUTO: 27.7 PG (ref 26.8–34.3)
MCHC RBC AUTO-ENTMCNC: 30.9 G/DL (ref 31.4–37.4)
MCV RBC AUTO: 90 FL (ref 82–98)
MONOCYTES # BLD AUTO: 0.61 THOUSAND/ΜL (ref 0.17–1.22)
MONOCYTES NFR BLD AUTO: 9 % (ref 4–12)
NEUTROPHILS # BLD AUTO: 3.72 THOUSANDS/ΜL (ref 1.85–7.62)
NEUTS SEG NFR BLD AUTO: 54 % (ref 43–75)
NRBC BLD AUTO-RTO: 0 /100 WBCS
PLATELET # BLD AUTO: 283 THOUSANDS/UL (ref 149–390)
PMV BLD AUTO: 9.4 FL (ref 8.9–12.7)
POTASSIUM SERPL-SCNC: 4.1 MMOL/L (ref 3.5–5.3)
PROT SERPL-MCNC: 7.8 G/DL (ref 6.4–8.2)
RBC # BLD AUTO: 4.23 MILLION/UL (ref 3.81–5.12)
SODIUM SERPL-SCNC: 139 MMOL/L (ref 136–145)
WBC # BLD AUTO: 6.82 THOUSAND/UL (ref 4.31–10.16)

## 2021-01-15 PROCEDURE — 36415 COLL VENOUS BLD VENIPUNCTURE: CPT

## 2021-01-15 PROCEDURE — 83690 ASSAY OF LIPASE: CPT

## 2021-01-15 PROCEDURE — 99214 OFFICE O/P EST MOD 30 MIN: CPT | Performed by: FAMILY MEDICINE

## 2021-01-15 PROCEDURE — 85025 COMPLETE CBC W/AUTO DIFF WBC: CPT

## 2021-01-15 PROCEDURE — 80053 COMPREHEN METABOLIC PANEL: CPT

## 2021-01-15 RX ORDER — SUCRALFATE ORAL 1 G/10ML
1 SUSPENSION ORAL 4 TIMES DAILY
Qty: 420 ML | Refills: 0 | Status: SHIPPED | OUTPATIENT
Start: 2021-01-15 | End: 2021-01-21

## 2021-01-15 RX ORDER — SERTRALINE HYDROCHLORIDE 100 MG/1
200 TABLET, FILM COATED ORAL DAILY
Qty: 60 TABLET | Refills: 5 | Status: SHIPPED | OUTPATIENT
Start: 2021-01-15 | End: 2021-02-15 | Stop reason: SDUPTHER

## 2021-01-15 RX ORDER — PANTOPRAZOLE SODIUM 40 MG/1
40 TABLET, DELAYED RELEASE ORAL 2 TIMES DAILY
Qty: 60 TABLET | Refills: 1 | Status: SHIPPED | OUTPATIENT
Start: 2021-01-15 | End: 2021-10-26 | Stop reason: SDUPTHER

## 2021-01-15 RX ORDER — SUCRALFATE ORAL 1 G/10ML
SUSPENSION ORAL
Qty: 420 ML | Refills: 0 | OUTPATIENT
Start: 2021-01-15

## 2021-01-15 NOTE — TELEPHONE ENCOUNTER
From: Zoya Roach  To: Melissa Felder DO  Sent: 1/15/2021 5:22 PM EST  Subject: Non-Urgent Medical Question    Sucralfate is not covered by my insurance  Siobhan Aguilar couldn't give me a price estimate can or do you have another option?

## 2021-01-15 NOTE — TELEPHONE ENCOUNTER
Patient c/o nausea hx/o ulcers  Symptoms started last week Friday progressively getting worse with abdominal pain level 1 of 10, decreased appetite with intermittent dizziness  Patient denied any chest pain, vomiting, SOB, fever, diarrhea or changes in urine or bowel movement

## 2021-01-15 NOTE — PATIENT INSTRUCTIONS
Sertraline 200mg daily  Stop trazodone for now  Continue buproprion  Talk to your therapist about counseling with your sister  Increase pantoprazole to twice daily  Add carafate four times daily  Make f/u with Dr Liset Colunga today

## 2021-01-16 NOTE — PROGRESS NOTES
Assessment/Plan:    No problem-specific Assessment & Plan notes found for this encounter  Diagnoses and all orders for this visit:    Epigastric pain  Comments:  check stat labs  needs f/u with GI--suspect ulcer  increase protonix to BID and use carafate QID  call if any symptoms worsen  no NSAIDS  Orders:  -     pantoprazole (PROTONIX) 40 mg tablet; Take 1 tablet (40 mg total) by mouth 2 (two) times a day  -     sucralfate (CARAFATE) 1 g/10 mL suspension; Take 10 mL (1 g total) by mouth 4 (four) times a day  -     Comprehensive metabolic panel; Future  -     CBC and differential; Future  -     Lipase; Future    Depression, unspecified depression type  Comments:  worsened  agree with meeting with therapist  advised she and her sister seeing a therapist together  increase zoloft to 200mg  continue wellbutrin  BID  D/c trazodone  Orders:  -     sertraline (ZOLOFT) 100 mg tablet; Take 2 tablets (200 mg total) by mouth daily        Subjective:      Patient ID: Virgen Cintron is a 66 y o  female  HPI  Pt presents c/o nausea since last Wednesday  Unsure what started it  Has lost weight because she doesn't want to eat  +hx of PUD  was to have an egd in the summer but put it off due to covid  Has noticed some pain in epigastric area (dull, non-radiating) for the last day  No fevers  No dark stools  Having regular bowel mvmts for her (last 2 days ago)  No vomiting  No diarrhea/constipation  No NSAID use  Has had a lot of emotional upset with her sister  Has an appt with her therapist   She has had decreased tearfulness, sadness, hopelessness  +anxiety  Want to increase her zoloft      The following portions of the patient's history were reviewed and updated as appropriate: allergies, current medications, past family history, past medical history, past social history, past surgical history and problem list     Review of Systems   Constitutional: Negative for chills, fatigue, fever and unexpected weight change  HENT: Negative for congestion, ear pain, hearing loss, postnasal drip, rhinorrhea, sinus pressure, sinus pain, sore throat, trouble swallowing and voice change  Eyes: Negative for pain, redness and visual disturbance  Respiratory: Negative for cough and shortness of breath  Cardiovascular: Negative for chest pain, palpitations and leg swelling  Gastrointestinal: Positive for abdominal pain  Negative for constipation, diarrhea and nausea  Endocrine: Negative for cold intolerance, heat intolerance, polydipsia and polyuria  Genitourinary: Negative for dysuria, frequency and urgency  Musculoskeletal: Negative for arthralgias, joint swelling and myalgias  Skin: Negative for rash  No suspicious lesions   Neurological: Negative for weakness, numbness and headaches  Hematological: Negative for adenopathy  Psychiatric/Behavioral: Positive for dysphoric mood  Negative for self-injury, sleep disturbance and suicidal ideas  Objective:      /68   Pulse 73   Temp 98 3 °F (36 8 °C)   Resp 16   Ht 5' (1 524 m)   Wt 78 5 kg (173 lb)   SpO2 97% Comment: unable to obtain  BMI 33 79 kg/m²          Physical Exam  Constitutional:       General: She is not in acute distress  Appearance: She is well-developed  HENT:      Head: Normocephalic and atraumatic  Right Ear: Tympanic membrane, ear canal and external ear normal       Left Ear: Tympanic membrane, ear canal and external ear normal       Nose: Nose normal       Mouth/Throat:      Pharynx: No oropharyngeal exudate  Eyes:      Conjunctiva/sclera: Conjunctivae normal       Pupils: Pupils are equal, round, and reactive to light  Neck:      Thyroid: No thyromegaly  Vascular: No carotid bruit or JVD  Cardiovascular:      Rate and Rhythm: Regular rhythm  Heart sounds: S1 normal and S2 normal  No murmur  No friction rub  No gallop  No S3 or S4 sounds      Pulmonary:      Effort: Pulmonary effort is normal       Breath sounds: Normal breath sounds  No wheezing, rhonchi or rales  Abdominal:      General: Bowel sounds are normal  There is no distension  Palpations: Abdomen is soft  Tenderness: There is abdominal tenderness in the epigastric area  There is no guarding  Negative signs include Lawrence's sign and McBurney's sign  Lymphadenopathy:      Cervical: No cervical adenopathy  Neurological:      Mental Status: She is alert and oriented to person, place, and time  Cranial Nerves: No cranial nerve deficit  Sensory: No sensory deficit  Deep Tendon Reflexes: Reflexes are normal and symmetric

## 2021-01-19 ENCOUNTER — IMMUNIZATIONS (OUTPATIENT)
Dept: FAMILY MEDICINE CLINIC | Facility: HOSPITAL | Age: 76
End: 2021-01-19

## 2021-01-19 DIAGNOSIS — Z23 ENCOUNTER FOR IMMUNIZATION: Primary | ICD-10-CM

## 2021-01-19 PROCEDURE — 91300 SARS-COV-2 / COVID-19 MRNA VACCINE (PFIZER-BIONTECH) 30 MCG: CPT | Performed by: NURSE PRACTITIONER

## 2021-01-19 PROCEDURE — 0001A SARS-COV-2 / COVID-19 MRNA VACCINE (PFIZER-BIONTECH) 30 MCG: CPT | Performed by: NURSE PRACTITIONER

## 2021-01-20 NOTE — TELEPHONE ENCOUNTER
Placed call to [facility], insurance does not cover the medication  Patient picked up prescription with a coupon card that lowered the cost to $63   Phone number to insurance company is   [phone], ID# [id number]  Placed call to [address] [place] [address], spoke to Sreekanth Brock  Was transferred to [facility], direct number is [phone], option 2 for pharmacy   The suspension is non formulary  However the sucralfate 1g oral tablet that is on her formulary without prior authorization  Form was completed and faxed to [facility] for review

## 2021-01-21 DIAGNOSIS — R10.13 EPIGASTRIC PAIN: Primary | ICD-10-CM

## 2021-01-21 RX ORDER — SUCRALFATE 1 G/1
1 TABLET ORAL 4 TIMES DAILY
Qty: 120 TABLET | Refills: 3 | Status: ON HOLD | OUTPATIENT
Start: 2021-01-21 | End: 2021-09-02 | Stop reason: CLARIF

## 2021-01-22 ENCOUNTER — TELEPHONE (OUTPATIENT)
Dept: GASTROENTEROLOGY | Facility: AMBULARY SURGERY CENTER | Age: 76
End: 2021-01-22

## 2021-01-22 NOTE — TELEPHONE ENCOUNTER
Patients GI provider:  Dr Stella Felder    Number to return call: (  [phone]    Reason for call: Pt calling to ask if dr haynes can order an egd for her due to an ulcer    Scheduled procedure/appointment date if applicable: Apt/procedure  na

## 2021-02-11 ENCOUNTER — PATIENT MESSAGE (OUTPATIENT)
Dept: FAMILY MEDICINE CLINIC | Facility: CLINIC | Age: 76
End: 2021-02-11

## 2021-02-11 NOTE — TELEPHONE ENCOUNTER
From: Dominick Alarcon  To: Heena Mari DO  Sent: 2/11/2021 8:46 AM EST  Subject: Non-Urgent Medical Question    Reschedule vaccine shot: any day next week  Hopefully no snow

## 2021-02-14 ENCOUNTER — IMMUNIZATIONS (OUTPATIENT)
Dept: FAMILY MEDICINE CLINIC | Facility: HOSPITAL | Age: 76
End: 2021-02-14

## 2021-02-14 DIAGNOSIS — Z23 ENCOUNTER FOR IMMUNIZATION: Primary | ICD-10-CM

## 2021-02-14 PROCEDURE — 0002A SARS-COV-2 / COVID-19 MRNA VACCINE (PFIZER-BIONTECH) 30 MCG: CPT

## 2021-02-14 PROCEDURE — 91300 SARS-COV-2 / COVID-19 MRNA VACCINE (PFIZER-BIONTECH) 30 MCG: CPT

## 2021-02-15 ENCOUNTER — PATIENT MESSAGE (OUTPATIENT)
Dept: FAMILY MEDICINE CLINIC | Facility: CLINIC | Age: 76
End: 2021-02-15

## 2021-02-15 DIAGNOSIS — E03.9 HYPOTHYROIDISM, UNSPECIFIED TYPE: ICD-10-CM

## 2021-02-15 DIAGNOSIS — F32.A DEPRESSION, UNSPECIFIED DEPRESSION TYPE: ICD-10-CM

## 2021-02-15 DIAGNOSIS — K21.9 GASTROESOPHAGEAL REFLUX DISEASE WITHOUT ESOPHAGITIS: ICD-10-CM

## 2021-02-15 DIAGNOSIS — E78.5 HYPERLIPIDEMIA, UNSPECIFIED HYPERLIPIDEMIA TYPE: ICD-10-CM

## 2021-02-15 DIAGNOSIS — I10 ESSENTIAL HYPERTENSION: ICD-10-CM

## 2021-02-15 RX ORDER — ATORVASTATIN CALCIUM 20 MG/1
20 TABLET, FILM COATED ORAL DAILY
Qty: 90 TABLET | Refills: 1 | Status: SHIPPED | OUTPATIENT
Start: 2021-02-15 | End: 2021-11-04

## 2021-02-15 RX ORDER — AMLODIPINE BESYLATE 10 MG/1
10 TABLET ORAL DAILY
Qty: 90 TABLET | Refills: 1 | Status: SHIPPED | OUTPATIENT
Start: 2021-02-15 | End: 2021-12-17 | Stop reason: SDUPTHER

## 2021-02-15 RX ORDER — PANTOPRAZOLE SODIUM 40 MG/1
40 TABLET, DELAYED RELEASE ORAL 2 TIMES DAILY
Qty: 180 TABLET | Refills: 1 | Status: SHIPPED | OUTPATIENT
Start: 2021-02-15 | End: 2021-05-20 | Stop reason: SDUPTHER

## 2021-02-15 RX ORDER — SERTRALINE HYDROCHLORIDE 100 MG/1
200 TABLET, FILM COATED ORAL DAILY
Qty: 60 TABLET | Refills: 3 | Status: SHIPPED | OUTPATIENT
Start: 2021-02-15 | End: 2021-10-27 | Stop reason: SDUPTHER

## 2021-02-15 RX ORDER — LEVOTHYROXINE SODIUM 0.1 MG/1
100 TABLET ORAL
Qty: 30 TABLET | Refills: 3 | Status: SHIPPED | OUTPATIENT
Start: 2021-02-15 | End: 2021-06-18

## 2021-02-15 NOTE — TELEPHONE ENCOUNTER
PLEASE ADVISE:  " Also I am not getting a good nights sleep I know we omitted trazodone any help would be appreciated  "    Medication: ZOLOFT 100 MG, NORVASC 10 MG, PROTONIX 40 MG, LIPITOR 20 MG, LEVOTHYROXINE 150 MCG   Last refilled: 1/15/2021, 10/7/20, 1/11/21, 1/8/21, 10/30/20  Last Office Visit: 1/15/21  Next Office Visit: 3/3/21  Pharmacy: [facility]

## 2021-02-15 NOTE — TELEPHONE ENCOUNTER
From: Shreya Bergman  To: Alfredo Leos DO  Sent: 2/15/2021 7:56 AM EST  Subject: Prescription Question    My meds were changed and the pharmacy has bee mixing everything up  New  Pantoprazole 40 (2 times a day  Sertraline 100mg(2 times a day)  Levothyroxine 100 mg  Amlodopine 10  Atovastatin 20 mg    could you please send these new prescriptions to the pharmacy   Also I am not getting a good nights sleep I know we omitted trazodone any help would be appreciated

## 2021-03-23 ENCOUNTER — OFFICE VISIT (OUTPATIENT)
Dept: GASTROENTEROLOGY | Facility: AMBULARY SURGERY CENTER | Age: 76
End: 2021-03-23
Payer: MEDICARE

## 2021-03-23 VITALS — HEIGHT: 62 IN | BODY MASS INDEX: 31.65 KG/M2 | RESPIRATION RATE: 16 BRPM | WEIGHT: 172 LBS

## 2021-03-23 DIAGNOSIS — K21.9 GASTROESOPHAGEAL REFLUX DISEASE WITHOUT ESOPHAGITIS: ICD-10-CM

## 2021-03-23 DIAGNOSIS — K31.A0 INTESTINAL METAPLASIA OF GASTRIC MUCOSA: ICD-10-CM

## 2021-03-23 DIAGNOSIS — R10.11 RUQ PAIN: Primary | ICD-10-CM

## 2021-03-23 PROCEDURE — 99214 OFFICE O/P EST MOD 30 MIN: CPT | Performed by: INTERNAL MEDICINE
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
## 2021-03-31 ENCOUNTER — APPOINTMENT (OUTPATIENT)
Dept: LAB | Facility: MEDICAL CENTER | Age: 76
End: 2021-03-31
Payer: MEDICARE

## 2021-03-31 DIAGNOSIS — R10.11 RUQ PAIN: ICD-10-CM

## 2021-03-31 LAB
ALBUMIN SERPL BCP-MCNC: 4 G/DL (ref 3.5–5)
ALP SERPL-CCNC: 98 U/L (ref 46–116)
ALT SERPL W P-5'-P-CCNC: 22 U/L (ref 12–78)
ANION GAP SERPL CALCULATED.3IONS-SCNC: 1 MMOL/L (ref 4–13)
AST SERPL W P-5'-P-CCNC: 13 U/L (ref 5–45)
BILIRUB SERPL-MCNC: 0.45 MG/DL (ref 0.2–1)
BUN SERPL-MCNC: 24 MG/DL (ref 5–25)
CALCIUM SERPL-MCNC: 9.3 MG/DL (ref 8.3–10.1)
CHLORIDE SERPL-SCNC: 112 MMOL/L (ref 100–108)
CO2 SERPL-SCNC: 27 MMOL/L (ref 21–32)
CREAT SERPL-MCNC: 1.18 MG/DL (ref 0.6–1.3)
GFR SERPL CREATININE-BSD FRML MDRD: 45 ML/MIN/1.73SQ M
GLUCOSE P FAST SERPL-MCNC: 92 MG/DL (ref 65–99)
POTASSIUM SERPL-SCNC: 4.3 MMOL/L (ref 3.5–5.3)
PROT SERPL-MCNC: 7.8 G/DL (ref 6.4–8.2)
SODIUM SERPL-SCNC: 140 MMOL/L (ref 136–145)

## 2021-03-31 PROCEDURE — 36415 COLL VENOUS BLD VENIPUNCTURE: CPT

## 2021-03-31 PROCEDURE — 80053 COMPREHEN METABOLIC PANEL: CPT

## 2021-04-03 ENCOUNTER — HOSPITAL ENCOUNTER (OUTPATIENT)
Dept: CT IMAGING | Facility: HOSPITAL | Age: 76
Discharge: HOME | End: 2021-04-03
Attending: INTERNAL MEDICINE
Payer: MEDICARE

## 2021-04-03 DIAGNOSIS — R10.11 RUQ PAIN: ICD-10-CM

## 2021-04-03 PROCEDURE — 74177 CT ABD & PELVIS W/CONTRAST: CPT

## 2021-04-03 PROCEDURE — G1004 CDSM NDSC: HCPCS

## 2021-04-03 RX ADMIN — IOHEXOL 100 ML: 350 INJECTION, SOLUTION INTRAVENOUS at 11:32

## 2021-04-05 ENCOUNTER — TELEPHONE (OUTPATIENT)
Dept: GASTROENTEROLOGY | Facility: AMBULARY SURGERY CENTER | Age: 76
End: 2021-04-05

## 2021-04-05 NOTE — TELEPHONE ENCOUNTER
Patients GI provider:  Dr Romana Skye     Number to return call: [phone]     Reason for call: Herberthia from Radiology called stated there are significant findings of ct abdomen pelvis ready to be read in epic  Tiger texted also       Scheduled procedure/appointment date if applicable: Apt/procedure 5/20/21

## 2021-04-05 NOTE — TELEPHONE ENCOUNTER
I called the patient myself  She did not answer  I left her detailed message  If someone can call her later today or tomorrow that would be great thank you  Ultrasound in 1 year

## 2021-04-05 NOTE — TELEPHONE ENCOUNTER
Please advise patient that her CT scan showed an abnormal area in the lung that is suspected to be mucous but they recommend follow up imaging for this to ensure resolution or no worsening  I will send the report to her family doctor to decide on needed follow up, one option is repeat CT of the chest in a few months  She also has cysts on both ovaries which are chronic and stable back to March 2018  Recommending repeat US of the pelvis in 12 months to ensure they remain stable, will again forward this to PCP  She also has a liver cyst that has vianca stable since 2013 and doubt for this as the cause for any of her pain since there has been no change in this  Recommend repeat US of the liver in 1 year to follow up on this and ensure it remains stable  Please place recall          Otherwise, no other concerning findings on CT scan that would indicate a cause for her abdominal pain  LYNNE Garcia, this ws ordered by you

## 2021-04-06 ENCOUNTER — TELEPHONE (OUTPATIENT)
Dept: FAMILY MEDICINE CLINIC | Facility: CLINIC | Age: 76
End: 2021-04-06

## 2021-04-06 ENCOUNTER — PATIENT MESSAGE (OUTPATIENT)
Dept: FAMILY MEDICINE CLINIC | Facility: CLINIC | Age: 76
End: 2021-04-06

## 2021-04-06 NOTE — TELEPHONE ENCOUNTER
From: Debbie Jacobs  To: Feliberto Dasilva DO  Sent: 4/6/2021 11:32 AM EDT  Subject: Pre-Procedure Testing Question    Received your message and I have a question-the test was done using dye and a scan is this similar to a pt scan  I know they stated that it should be done again in 3 months  I'm at a quandary as to what I should do

## 2021-04-06 NOTE — TELEPHONE ENCOUNTER
Call pt  I think GI called her, but on the CT they ordered, there is a spot in her lung which just looks like mucus plugging, but we need to make sure that's all it is  We can either do a PET scan now or repeat CT in 3 months  I think that it may just be better to get the PET if she is willing  Let me know, and I can place the order

## 2021-04-07 NOTE — TELEPHONE ENCOUNTER
Dr Daniela Hernandez, Looks like she is communicating with her PCP regarding CT scan  She has not returned our calls

## 2021-04-08 DIAGNOSIS — R91.1 LUNG NODULE: Primary | ICD-10-CM

## 2021-04-08 NOTE — TELEPHONE ENCOUNTER
Placed call to central scheduling, scheduled patient for 4/20/21 at 3 pm [facility] facility  Shuropody message sent to patient

## 2021-04-17 DIAGNOSIS — F32.A DEPRESSION, UNSPECIFIED DEPRESSION TYPE: ICD-10-CM

## 2021-04-19 RX ORDER — BUPROPION HYDROCHLORIDE 150 MG/1
150 TABLET, EXTENDED RELEASE ORAL 2 TIMES DAILY
Qty: 180 TABLET | Refills: 1 | Status: SHIPPED | OUTPATIENT
Start: 2021-04-19 | End: 2021-10-26

## 2021-04-20 ENCOUNTER — HOSPITAL ENCOUNTER (OUTPATIENT)
Dept: NUCLEAR MEDICINE | Facility: HOSPITAL | Age: 76
Discharge: HOME | End: 2021-04-20
Payer: MEDICARE

## 2021-04-20 DIAGNOSIS — R91.1 PULMONARY NODULE: Primary | ICD-10-CM

## 2021-04-20 DIAGNOSIS — N83.209 CYST OF OVARY, UNSPECIFIED LATERALITY: ICD-10-CM

## 2021-04-20 DIAGNOSIS — R91.1 LUNG NODULE: ICD-10-CM

## 2021-04-20 LAB — GLUCOSE SERPL-MCNC: 96 MG/DL (ref 65–140)

## 2021-04-20 PROCEDURE — A9552 F18 FDG: HCPCS

## 2021-04-20 PROCEDURE — G1004 CDSM NDSC: HCPCS

## 2021-04-20 PROCEDURE — 82948 REAGENT STRIP/BLOOD GLUCOSE: CPT

## 2021-04-20 PROCEDURE — 78815 PET IMAGE W/CT SKULL-THIGH: CPT

## 2021-04-21 DIAGNOSIS — I10 ESSENTIAL HYPERTENSION: ICD-10-CM

## 2021-04-21 RX ORDER — LISINOPRIL 40 MG/1
TABLET ORAL
Qty: 90 TABLET | Refills: 1 | Status: SHIPPED | OUTPATIENT
Start: 2021-04-21 | End: 2021-11-29 | Stop reason: SDUPTHER

## 2021-05-06 ENCOUNTER — ANESTHESIA EVENT (OUTPATIENT)
Dept: GASTROENTEROLOGY | Facility: AMBULARY SURGERY CENTER | Age: 76
End: 2021-05-06

## 2021-05-19 NOTE — PROGRESS NOTES
Consultation - [facility]  Jac Christie 66 y o  female MRN: [id number]  Unit/Bed#:  Encounter: [id number]        Consults    ASSESSMENT/PLAN:       1  Intermittent Epigastric/right upper quadrant pain-has history of cholecystectomy, differential includes peptic ulcer disease versus gastritis  She has had improvement since being on pantoprazole 40 mg b i d     Will continue this  -stop using NSAIDs  Use acetaminophen instead   -will plan for EGD to assess for peptic ulcer disease  Patient was explained about the lifestyle and dietary modifications  Advised to avoid fatty foods, chocolates, caffeine, alcohol and any other triggering foods  Avoid eating for at least 3 hours before going to bed  2  Normocytic anemia- ? History of ulcers, no overt bleeding, no melena or hematochezia  Vitamin B12 levels are normal   Ferritin is low   -will schedule for EGD and colonoscopy for further evaluation  Differential includes peptic ulcer disease versus AVM versus polyps versus malignancy  -patient reports that she has repeat labs due in November, would recommend checking CBC at the time   -if EGD and colonoscopy are unremarkable, would recommend PillCam   -patient is instructed to go hospital if she has any evidence of melena or hematochezia         ______________________________________________________________________    Reason for Consult / Principal Problem: [unfilled]    HPI: Jac Christie is a 66y o  year old female with history of hypothyroidism, GERD, hypertension, mild asthma, presents for evaluation of anemia  She states that she has been getting intermittent episodes of epigastric/right upper quadrant pain associated with nausea but this has improved since taking PPI b i d  She reports that she has been on PPI from several years  She denies dysphagia, hematemesis, coffee-ground emesis or melena  Denies any change in bowel habits    She states that she is constipated at baseline  She reports having had colonoscopy 5 years ago which was notable for polyps  I do not have these results  Denies any family history of colon cancer  Denies unintentional weight loss, or loss of appetite  She does endorse a occasional use of Advil  Patient is unsure whether she has had history of peptic ulcer disease in the past   Denies having had EGD in the past     Labs are reviewed are notable for anemia with hemoglobin of 9 6, MCV 87, platelets 763  Vitamin B12 is 410, ferritin is low at 9  TSH was 16  Fecal occult blood test was negative  Review of Systems: The remainder of the review of systems was negative except for the pertinent positives noted in HPI       Historical Information   Past Medical History:   Diagnosis Date    Acid reflux     Anxiety     C1-C4 level with central cord syndrome ([facility] [address] )     Resolved 2/1/2017     Carpal tunnel syndrome Resolved 4/21/2015    Colitis     Concussion     Depressed     Dysthymic disorder     Resolved 1/5/2018    Gastric ulcer 2013    small - on EGD - [facility]    Hemorrhoids     Hx of blood clots     Hx of colonic polyps     [place]    Hyperlipemia     Hypertension     Hypothyroid     Lung nodule     stable x 2 yrs on CT    Migraines     Obesity     Postural dizziness with presyncope     Resolved 8/28/2018     Tendinitis     Ulnar neuropathy     Resolved 5/21/2015      Past Surgical History:   Procedure Laterality Date    APPENDECTOMY      BREAST SURGERY Left 01/1966    BREAST LUMPECTOMY    CARPAL TUNNEL RELEASE      DILATION AND CURETTAGE OF UTERUS      ELBOW SURGERY      EYE SURGERY Bilateral 2019    Cataract     GALLBLADDER SURGERY      JOINT REPLACEMENT Right     REPLACEMENT TOTAL KNEE    KNEE ARTHROSCOPY      KNEE SURGERY Right     NECK SURGERY      POSTERIOR LAMINECTOMY / DECOMPRESSION CERVICAL SPINE  02/2015    VAGINAL DELIVERY      x3    WRIST SURGERY Right      Social History   Social History     Substance and Sexual Activity   Alcohol Use No     Social History     Substance and Sexual Activity   Drug Use No     Social History     Tobacco Use   Smoking Status Former Smoker    Last attempt to quit: 10/14/2013    Years since quittin 0   Smokeless Tobacco Never Used     Family History   Problem Relation Age of Onset   [facility] Breast cancer Mother     Alzheimer's disease Mother     Coronary artery disease Mother     Hypertension Mother    [facility] Migraines Mother     Peripheral vascular disease Mother    [facility] Parkinsonism Father     Other Father         Cardiovascular disease     Coronary artery disease Father     Hypertension Father     Bipolar disorder Sister     Asthma Daughter        Meds/Allergies       (Not in a hospital admission)  No current facility-administered medications for this visit  Allergies   Allergen Reactions    Ceftin [Cefuroxime] Hives       Objective     Blood pressure 138/62, pulse 84, temperature 98 8 °F (37 1 °C), temperature source Tympanic, resp  rate 16, height 5' (1 524 m), weight 84 2 kg (185 lb 9 6 oz)  [unfilled]    PHYSICAL EXAM     GEN: well nourished, well developed, no acute distress  HEENT: anicteric, MMM, no cervical or supraclavicular lymphadenopathy  CV: RRR, no m/r/g  CHEST: CTA b/l, no WRR  ABD: +BS, soft, NT/ND, no hepatosplenomegaly  EXT: no c/c/e  SKIN: no rashes,  NEURO: aaox3    Lab Results:   No visits with results within 1 Day(s) from this visit     Latest known visit with results is:   Appointment on 10/13/2019   Component Date Value    OCCULT BLD, FECAL IMMUNO* 10/19/2019 Negative      Imaging Studies: I have personally reviewed pertinent films in PACS Cryotherapy Text: The wound bed was treated with cryotherapy after the biopsy was performed.

## 2021-05-20 ENCOUNTER — ANESTHESIA (OUTPATIENT)
Dept: GASTROENTEROLOGY | Facility: AMBULARY SURGERY CENTER | Age: 76
End: 2021-05-20

## 2021-05-20 ENCOUNTER — HOSPITAL ENCOUNTER (OUTPATIENT)
Dept: GASTROENTEROLOGY | Facility: AMBULARY SURGERY CENTER | Age: 76
Setting detail: OUTPATIENT SURGERY
Discharge: HOME | End: 2021-05-20
Attending: INTERNAL MEDICINE | Admitting: INTERNAL MEDICINE
Payer: MEDICARE

## 2021-05-20 VITALS
BODY MASS INDEX: 32.47 KG/M2 | HEART RATE: 68 BPM | RESPIRATION RATE: 16 BRPM | OXYGEN SATURATION: 97 % | TEMPERATURE: 97.7 F | SYSTOLIC BLOOD PRESSURE: 93 MMHG | DIASTOLIC BLOOD PRESSURE: 53 MMHG | WEIGHT: 172 LBS | HEIGHT: 61 IN

## 2021-05-20 DIAGNOSIS — K31.A0 INTESTINAL METAPLASIA OF GASTRIC MUCOSA: ICD-10-CM

## 2021-05-20 DIAGNOSIS — R10.11 RUQ PAIN: ICD-10-CM

## 2021-05-20 DIAGNOSIS — D50.9 IRON DEFICIENCY ANEMIA, UNSPECIFIED IRON DEFICIENCY ANEMIA TYPE: ICD-10-CM

## 2021-05-20 DIAGNOSIS — D64.9 ANEMIA, UNSPECIFIED TYPE: ICD-10-CM

## 2021-05-20 DIAGNOSIS — R10.13 EPIGASTRIC PAIN: ICD-10-CM

## 2021-05-20 DIAGNOSIS — K21.9 GASTROESOPHAGEAL REFLUX DISEASE WITHOUT ESOPHAGITIS: ICD-10-CM

## 2021-05-20 PROBLEM — J06.9 URI (UPPER RESPIRATORY INFECTION): Status: ACTIVE | Noted: 2021-05-20

## 2021-05-20 PROCEDURE — 88305 TISSUE EXAM BY PATHOLOGIST: CPT | Performed by: PATHOLOGY

## 2021-05-20 PROCEDURE — 88312 SPECIAL STAINS GROUP 1: CPT | Performed by: PATHOLOGY

## 2021-05-20 PROCEDURE — 43239 EGD BIOPSY SINGLE/MULTIPLE: CPT | Performed by: INTERNAL MEDICINE

## 2021-05-20 RX ORDER — FENTANYL CITRATE 50 UG/ML
INJECTION, SOLUTION INTRAMUSCULAR; INTRAVENOUS AS NEEDED
Status: DISCONTINUED | OUTPATIENT
Start: 2021-05-20 | End: 2021-05-20

## 2021-05-20 RX ORDER — SODIUM CHLORIDE, SODIUM LACTATE, POTASSIUM CHLORIDE, CALCIUM CHLORIDE 600; 310; 30; 20 MG/100ML; MG/100ML; MG/100ML; MG/100ML
125 INJECTION, SOLUTION INTRAVENOUS CONTINUOUS
Status: DISCONTINUED | OUTPATIENT
Start: 2021-05-20 | End: 2021-05-24 | Stop reason: HOSPADM

## 2021-05-20 RX ORDER — PROPOFOL 10 MG/ML
INJECTION, EMULSION INTRAVENOUS AS NEEDED
Status: DISCONTINUED | OUTPATIENT
Start: 2021-05-20 | End: 2021-05-20

## 2021-05-20 RX ORDER — LIDOCAINE HYDROCHLORIDE 20 MG/ML
INJECTION, SOLUTION INFILTRATION; PERINEURAL AS NEEDED
Status: DISCONTINUED | OUTPATIENT
Start: 2021-05-20 | End: 2021-05-20

## 2021-05-20 RX ADMIN — PROPOFOL 100 MG: 10 INJECTION, EMULSION INTRAVENOUS at 12:31

## 2021-05-20 RX ADMIN — SODIUM CHLORIDE, SODIUM LACTATE, POTASSIUM CHLORIDE, AND CALCIUM CHLORIDE 125 ML/HR: .6; .31; .03; .02 INJECTION, SOLUTION INTRAVENOUS at 11:06

## 2021-05-20 RX ADMIN — LIDOCAINE HYDROCHLORIDE 5 ML: 20 INJECTION, SOLUTION INFILTRATION; PERINEURAL at 12:30

## 2021-05-20 RX ADMIN — PROPOFOL 50 MG: 10 INJECTION, EMULSION INTRAVENOUS at 12:34

## 2021-05-20 RX ADMIN — PROPOFOL 50 MG: 10 INJECTION, EMULSION INTRAVENOUS at 12:37

## 2021-05-20 RX ADMIN — FENTANYL CITRATE 50 MCG: 50 INJECTION, SOLUTION INTRAMUSCULAR; INTRAVENOUS at 12:30

## 2021-05-20 NOTE — ANESTHESIA PREPROCEDURE EVALUATION
Procedure:  EGD    Relevant Problems   ANESTHESIA (within normal limits)      CARDIO   (+) Essential hypertension   (+) Hyperlipidemia      ENDO   (+) Hypothyroidism      GI/HEPATIC   (+) Gastroesophageal reflux disease      NEURO/PSYCH   (+) History of gastric ulcer   (+) Mixed anxiety and depressive disorder      PULMONARY   (+) Mild persistent asthma without complication (no meds, pt denies asthma, sounds like RAD with illnesses)   (+) URI (upper respiratory infection) (pt reports URI, no fevers, no cough 1 week ago, now resolved  Pt was previously vaccinated for COVID)   (-) Smoking    BMI 32    Physical Exam    Airway    Mallampati score: II  TM Distance: <3 FB  Neck ROM: limited     Dental   No notable dental hx     Cardiovascular      Pulmonary      Other Findings       Lab Results   Component Value Date    WBC 6 82 01/15/2021    HGB 11 7 01/15/2021     01/15/2021     Lab Results   Component Value Date    SODIUM 140 03/31/2021    K 4 3 03/31/2021    BUN 24 03/31/2021    CREATININE 1 18 03/31/2021    EGFR 45 03/31/2021    GLUCOSE 121 02/27/2015     Lab Results   Component Value Date    HGBA1C 5 9 (H) 09/21/2020     Anesthesia Plan  ASA Score- 2     Anesthesia Type- IV sedation with anesthesia with ASA Monitors  Additional Monitors:   Airway Plan:           Plan Factors-Exercise tolerance (METS): >4 METS  Chart reviewed  Existing labs reviewed  Patient summary reviewed  Patient is not a current smoker  Induction- intravenous  Postoperative Plan-     Informed Consent- Anesthetic plan and risks discussed with patient and sibling  I personally reviewed this patient with the CRNA  Discussed and agreed on the Anesthesia Plan with the CRNA  Ludivina Preciado

## 2021-05-20 NOTE — ANESTHESIA POSTPROCEDURE EVALUATION
Post-Op Assessment Note    CV Status:  Stable  Pain Score: 0    Pain management: adequate     Mental Status:  Awake, arousable and somnolent   PONV Controlled:  None   Airway Patency:  Patent      Post Op Vitals Reviewed: Yes      Staff: Anesthesiologist, with CRNAs         No complications documented      BP 99/57 (05/20/21 1243)    Temp 97 7 °F (36 5 °C) (05/20/21 1243)    Pulse 60 (05/20/21 1243)   Resp 18 (05/20/21 1243)    SpO2 96 % (05/20/21 1243)

## 2021-05-20 NOTE — H&P
History and Physical -  [facility]  Michael Bazzi 64 y o  female MRN: [id number]    HPI: Michael Bazzi is a 64y o  year old female who presents for evaluation of intestinal metaplasia        Review of Systems    Historical Information   Past Medical History:   Diagnosis Date    Acid reflux     Anxiety     Arthritis     Asthma     C1-C4 level with central cord syndrome ([address] )     Resolved 2017     Carpal tunnel syndrome Resolved 2015    Colitis     Colon polyp     Concussion     Depressed     Dysthymic disorder     Resolved 2018    Gastric ulcer 2013    small - on EGD - [facility]    Hemorrhoids     Hx of blood clots     Hx of colonic polyps     [place]    Hyperlipemia     Hypertension     Hypothyroid     Lung nodule     stable x 2 yrs on CT    Migraines     Obesity     Postural dizziness with presyncope     Resolved 2018     Tendinitis     Ulnar neuropathy     Resolved 2015      Past Surgical History:   Procedure Laterality Date    APPENDECTOMY      BREAST SURGERY Left 1966    BREAST LUMPECTOMY    CARPAL TUNNEL RELEASE      CHOLECYSTECTOMY      DILATION AND CURETTAGE OF UTERUS      ELBOW SURGERY      EYE SURGERY Bilateral     Cataract     GALLBLADDER SURGERY      JOINT REPLACEMENT Right     REPLACEMENT TOTAL KNEE    KNEE ARTHROSCOPY      KNEE SURGERY Right     NECK SURGERY      POSTERIOR LAMINECTOMY / DECOMPRESSION CERVICAL SPINE  2015    TONSILLECTOMY      VAGINAL DELIVERY      x3    WRIST SURGERY Right      Social History   Social History     Substance and Sexual Activity   Alcohol Use No     Social History     Substance and Sexual Activity   Drug Use No     Social History     Tobacco Use   Smoking Status Former Smoker    Packs/day: 0 00    Years: 0 00    Pack years: 0 00    Types: Cigarettes    Quit date: 10/14/2013    Years since quittin 6   Smokeless Tobacco Never Used     Family History   Problem Relation Age of Onset    Breast cancer Mother     Alzheimer's disease Mother     Coronary artery disease Mother     Hypertension Mother    [place] Migraines Mother     Peripheral vascular disease Mother     Parkinsonism Father     Other Father         Cardiovascular disease     Coronary artery disease Father     Hypertension Father     Bipolar disorder Sister     Asthma Daughter    [place] Asthma Son        Meds/Allergies     (Not in a hospital admission)      Allergies   Allergen Reactions    Ceftin [Cefuroxime] Hives       Objective     BP (!) 178/74   Pulse 67   Temp (!) 97 °F (36 1 °C) (Temporal)   Resp 18   Ht 5' 1" (1 549 m)   Wt 78 kg (172 lb)   SpO2 99%   BMI 32 50 kg/m²       PHYSICAL EXAM    Gen: NAD  CV: RRR  CHEST: Clear  ABD: soft, NT/ND  EXT: no edema  Neuro: AAO      ASSESSMENT/PLAN:  This is a 64y o  year old female here for evaluation of intestinal metaplasia  PLAN:   Procedure:  EGD

## 2021-06-07 ENCOUNTER — TELEMEDICINE (OUTPATIENT)
Dept: FAMILY MEDICINE CLINIC | Facility: CLINIC | Age: 76
End: 2021-06-07
Payer: MEDICARE

## 2021-06-07 VITALS — WEIGHT: 172 LBS | HEIGHT: 61 IN | BODY MASS INDEX: 32.47 KG/M2

## 2021-06-07 DIAGNOSIS — B34.9 VIRAL INFECTION, UNSPECIFIED: ICD-10-CM

## 2021-06-07 DIAGNOSIS — B34.9 VIRAL INFECTION, UNSPECIFIED: Primary | ICD-10-CM

## 2021-06-07 PROCEDURE — U0003 INFECTIOUS AGENT DETECTION BY NUCLEIC ACID (DNA OR RNA); SEVERE ACUTE RESPIRATORY SYNDROME CORONAVIRUS 2 (SARS-COV-2) (CORONAVIRUS DISEASE [COVID-19]), AMPLIFIED PROBE TECHNIQUE, MAKING USE OF HIGH THROUGHPUT TECHNOLOGIES AS DESCRIBED BY CMS-2020-01-R: HCPCS | Performed by: FAMILY MEDICINE

## 2021-06-07 PROCEDURE — U0005 INFEC AGEN DETEC AMPLI PROBE: HCPCS | Performed by: FAMILY MEDICINE

## 2021-06-07 PROCEDURE — 99213 OFFICE O/P EST LOW 20 MIN: CPT | Performed by: FAMILY MEDICINE

## 2021-06-07 NOTE — PROGRESS NOTES
COVID-19 Outpatient Progress Note    Assessment/Plan:    Problem List Items Addressed This Visit     None      Visit Diagnoses     Viral infection, unspecified    -  Primary    Relevant Orders    Novel Coronavirus (Covid-19),PCR [facility] - Collected at Mobile [facility] or [facility]         Disposition:     I referred patient to one of our centralized sites for a COVID-19 swab  Pending labs  Should remain in quarantine until further notice  If her COVID-19 test is negative, she can discontinue quarantine  If her COVID-19 test is positive, she should complete 10 days of isolation through Monday, 06/14/2021 it earliest ending on Tuesday, 06/15/2021  Monoclonal antibody infusion treatment information provided in portal today  Patient will need further discussion if she is interested  Advise Alveda Sandifer med sinus rinse kit, Mucinex, Claritin/Zyrtec/Allegra  Avoid decongestants if you have high blood pressure  Push fluids  I have spent 18 minutes directly with the patient  Greater than 50% of this time was spent in counseling/coordination of care regarding: prognosis, risks and benefits of treatment options, instructions for management, patient and family education, importance of treatment compliance, risk factor reductions and impressions  Encounter provider Shadi Medina DO    Provider located at [address] [place] [address] 200  [facility] [address] [place] [id number] [phone]    Recent Visits  No visits were found meeting these conditions  Showing recent visits within past 7 days and meeting all other requirements     Today's Visits  Date Type Provider Dept   06/07/21 Telemedicine Isela Bell DO Pg  [facility] today's visits and meeting all other requirements     Future Appointments  No visits were found meeting these conditions     Showing future appointments within next 150 days and meeting all other requirements      This virtual check-in was done via [employer] and patient was informed that this is a secure, HIPAA-compliant platform  She agrees to proceed  Patient agrees to participate in a virtual check in via telephone or video visit instead of presenting to the office to address urgent/immediate medical needs  Patient is aware this is a billable service  After connecting through [facility], the patient was identified by name and date of birth  Iman Venegas was informed that this was a telemedicine visit and that the exam was being conducted confidentially over secure lines  My office door was closed  No one else was in the room  Iman Venegas acknowledged consent and understanding of privacy and security of the telemedicine visit  I informed the patient that I have reviewed her record in Epic and presented the opportunity for her to ask any questions regarding the visit today  The patient agreed to participate  Subjective:   Iman Venegas is a 64 y o  female who is concerned about COVID-19  Patient's symptoms include fatigue, rhinorrhea, sore throat, cough (Dry), nausea and headache (Geneneralized)  Patient denies fever, chills, congestion, anosmia, loss of taste, shortness of breath, chest tightness, abdominal pain, vomiting, diarrhea and myalgias       Date of symptom onset: 6/4/2021    Exposure:   Contact with a person who is under investigation (PUI) for or who is positive for COVID-19 within the last 14 days?: No    Hospitalized recently for fever and/or lower respiratory symptoms?: No      Currently a healthcare worker that is involved in direct patient care?: No      Works in a special setting where the risk of COVID-19 transmission may be high? (this may include long-term care, correctional and shelter facilities; homeless shelters; assisted-living facilities and group homes ): No      Resident in a special setting where the risk of COVID-19 transmission may be high? (this may include long-term care, correctional and shelter facilities; homeless shelters; assisted-living facilities and group homes ): No      Sinus Pressure    Dizziness - Symptoms x 1 week  Constant and when moving her head  Meclizine unhelpful  B/L Ear Pain - Denies otorrhea  Ears feel blocked up, but patient denies cerumen  S/p 2nd COVID vaccine 2/14/21  Using Flonase, Albuterol HFA, Sinus PE and Allegy Pill (helpful)                Lab Results   Component Value Date    SARSCOV2 Not Detected 11/17/2020     Past Medical History:   Diagnosis Date    Acid reflux     Anxiety     Arthritis     Asthma     C1-C4 level with central cord syndrome ([address] )     Resolved 2/1/2017     Carpal tunnel syndrome Resolved 4/21/2015    Colitis     Colon polyp     Concussion     Depressed     Dysthymic disorder     Resolved 1/5/2018    Gastric ulcer 2013    small - on EGD - [facility]    Hemorrhoids     Hx of blood clots     Hx of colonic polyps     [place]    Hyperlipemia     Hypertension     Hypothyroid     Lung nodule     stable x 2 yrs on CT    Migraines     Obesity     Postural dizziness with presyncope     Resolved 8/28/2018     Tendinitis     Ulnar neuropathy     Resolved 5/21/2015      Past Surgical History:   Procedure Laterality Date    APPENDECTOMY      BREAST SURGERY Left 01/1966    BREAST LUMPECTOMY    CARPAL TUNNEL RELEASE      CHOLECYSTECTOMY      DILATION AND CURETTAGE OF UTERUS      ELBOW SURGERY      EYE SURGERY Bilateral 2019    Cataract     GALLBLADDER SURGERY      JOINT REPLACEMENT Right     REPLACEMENT TOTAL KNEE    KNEE ARTHROSCOPY      KNEE SURGERY Right     NECK SURGERY      POSTERIOR LAMINECTOMY / DECOMPRESSION CERVICAL SPINE  02/2015    TONSILLECTOMY      VAGINAL DELIVERY      x3    WRIST SURGERY Right      Current Outpatient Medications   Medication Sig Dispense Refill    albuterol (Ventolin HFA) 90 mcg/act inhaler Inhale 2 puffs every 6 (six) hours as needed for wheezing 1 Inhaler 2    amLODIPine (NORVASC) 10 mg tablet Take 1 tablet (10 mg total) by mouth daily 90 tablet 1    atorvastatin (LIPITOR) 20 mg tablet Take 1 tablet (20 mg total) by mouth daily 90 tablet 1    buPROPion (WELLBUTRIN SR) 150 mg 12 hr tablet Take 1 tablet (150 mg total) by mouth 2 (two) times a day 180 tablet 1    Cholecalciferol (Vitamin D3) 1 25 MG (68627 UT) CAPS Take 5,000 Units by mouth daily      fluticasone (FLOVENT HFA) 110 MCG/ACT inhaler Inhale 2 puffs 2 (two) times a day Rinse mouth after use  3 Inhaler 1    levothyroxine 100 mcg tablet Take 1 tablet (100 mcg total) by mouth daily in the early morning 30 tablet 3    lisinopril (ZESTRIL) 40 mg tablet TAKE 1 TABLET BY MOUTH EVERY DAY 90 tablet 1    meclizine (ANTIVERT) 25 mg tablet Take 1 tablet (25 mg total) by mouth 3 (three) times a day as needed for dizziness for up to 10 days 30 tablet 0    metroNIDAZOLE (METROGEL) 1 % gel Apply topically daily 45 g 0    pantoprazole (PROTONIX) 40 mg tablet Take 1 tablet (40 mg total) by mouth 2 (two) times a day 60 tablet 1    RESTASIS 0 05 % ophthalmic emulsion       sertraline (ZOLOFT) 100 mg tablet Take 2 tablets (200 mg total) by mouth daily 60 tablet 3    triamcinolone (KENALOG) 0 1 % ointment Apply topically twice a day to upper back for 1 month 60 g 0    sucralfate (CARAFATE) 1 g tablet Take 1 tablet (1 g total) by mouth 4 (four) times a day (Patient not taking: Reported on 6/7/2021) 120 tablet 3     No current facility-administered medications for this visit  Allergies   Allergen Reactions    Ceftin [Cefuroxime] Hives       Review of Systems   Constitutional: Positive for fatigue  Negative for chills and fever  HENT: Positive for rhinorrhea and sore throat  Negative for congestion  Respiratory: Positive for cough (Dry)  Negative for chest tightness and shortness of breath  Gastrointestinal: Positive for nausea  Negative for abdominal pain, diarrhea and vomiting  Musculoskeletal: Negative for myalgias     Neurological: Positive for headaches (Geneneralized)  Objective:    Vitals:    06/07/21 1141   Weight: 78 kg (172 lb)   Height: 5' 1" (1 549 m)       Physical Exam  Vitals signs and nursing note reviewed  Constitutional:       General: She is not in acute distress  Appearance: Normal appearance  She is well-developed  HENT:      Head: Normocephalic and atraumatic  Right Ear: External ear normal       Left Ear: External ear normal       Nose:      Right Sinus: Maxillary sinus tenderness and frontal sinus tenderness present  Left Sinus: Maxillary sinus tenderness and frontal sinus tenderness present  Mouth/Throat:      Mouth: Mucous membranes are moist       Pharynx: Oropharynx is clear  Eyes:      Extraocular Movements: Extraocular movements intact  Conjunctiva/sclera: Conjunctivae normal    Neck:      Musculoskeletal: Neck supple  Cardiovascular:      Rate and Rhythm: Normal rate and regular rhythm  Heart sounds: No murmur  Pulmonary:      Effort: Pulmonary effort is normal  No respiratory distress  Abdominal:      Palpations: Abdomen is soft  Tenderness: There is no abdominal tenderness  There is no guarding or rebound  Musculoskeletal:         General: No swelling  Right lower leg: No edema  Left lower leg: No edema  Skin:     General: Skin is warm and dry  Neurological:      General: No focal deficit present  Mental Status: She is alert and oriented to person, place, and time  Psychiatric:         Mood and Affect: Mood normal        VIRTUAL VISIT DISCLAIMER    Jyothi Delgado acknowledges that she has consented to an online visit or consultation  She understands that the online visit is based solely on information provided by her, and that, in the absence of a face-to-face physical evaluation by the physician, the diagnosis she receives is both limited and provisional in terms of accuracy and completeness   This is not intended to replace a full medical face-to-face evaluation by the physician  Olga Santiago understands and accepts these terms

## 2021-06-07 NOTE — PATIENT INSTRUCTIONS
[address]    Your healthcare provider and/or public health staff have evaluated you and have determined that you do not need to remain in the hospital at this time  At this time you can be isolated at home where you will be monitored by staff from your local or state health department  You should carefully follow the prevention and isolation steps below until a healthcare provider or local or state health department says that you can return to your normal activities  Stay home except to get medical care    People who are mildly ill with COVID-19 are able to isolate at home during their illness  You should restrict activities outside your home, except for getting medical care  Do not go to work, school, or public areas  Avoid using public transportation, ride-sharing, or taxis  Separate yourself from other people and animals in your home    People: As much as possible, you should stay in a specific room and away from other people in your home  Also, you should use a separate bathroom, if available  Animals: You should restrict contact with pets and other animals while you are sick with COVID-19, just like you would around other people  Although there have not been reports of pets or other animals becoming sick with COVID-19, it is still recommended that people sick with COVID-19 limit contact with animals until more information is known about the virus  When possible, have another member of your household care for your animals while you are sick  If you are sick with COVID-19, avoid contact with your pet, including petting, snuggling, being kissed or licked, and sharing food  If you must care for your pet or be around animals while you are sick, wash your hands before and after you interact with pets and wear a facemask  See COVID-19 and Animals for more information      Call ahead before visiting your doctor    If you have a medical appointment, call the healthcare provider and tell them that you have or may have COVID-19  This will help the healthcare providers office take steps to keep other people from getting infected or exposed  Wear a facemask    You should wear a facemask when you are around other people (e g , sharing a room or vehicle) or pets and before you enter a healthcare providers office  If you are not able to wear a facemask (for example, because it causes trouble breathing), then people who live with you should not stay in the same room with you, or they should wear a facemask if they enter your room  Cover your coughs and sneezes    Cover your mouth and nose with a tissue when you cough or sneeze  Throw used tissues in a lined trash can  Immediately wash your hands with soap and water for at least 20 seconds or, if soap and water are not available, clean your hands with an alcohol-based hand  that contains at least 60% alcohol  Clean your hands often    Wash your hands often with soap and water for at least 20 seconds, especially after blowing your nose, coughing, or sneezing; going to the bathroom; and before eating or preparing food  If soap and water are not readily available, use an alcohol-based hand  with at least 60% alcohol, covering all surfaces of your hands and rubbing them together until they feel dry  Soap and water are the best option if hands are visibly dirty  Avoid touching your eyes, nose, and mouth with unwashed hands  Avoid sharing personal household items    You should not share dishes, drinking glasses, cups, eating utensils, towels, or bedding with other people or pets in your home  After using these items, they should be washed thoroughly with soap and water  Clean all high-touch surfaces everyday    High touch surfaces include counters, tabletops, doorknobs, bathroom fixtures, toilets, phones, keyboards, tablets, and bedside tables  Also, clean any surfaces that may have blood, stool, or body fluids on them   Use a household cleaning spray or wipe, according to the label instructions  Labels contain instructions for safe and effective use of the cleaning product including precautions you should take when applying the product, such as wearing gloves and making sure you have good ventilation during use of the product  Monitor your symptoms    Seek prompt medical attention if your illness is worsening (e g , difficulty breathing)  Before seeking care, call your healthcare provider and tell them that you have, or are being evaluated for, COVID-19  Put on a facemask before you enter the facility  These steps will help the healthcare providers office to keep other people in the office or waiting room from getting infected or exposed  Ask your healthcare provider to call the local or [facility] health department  Persons who are placed under active monitoring or facilitated self-monitoring should follow instructions provided by their local health department or occupational health professionals, as appropriate  If you have a medical emergency and need to call 911, notify the dispatch personnel that you have, or are being evaluated for COVID-19  If possible, put on a facemask before emergency medical services arrive      Discontinuing home isolation    Patients with confirmed COVID-19 should remain under home isolation precautions until the following conditions are met:   - They have had no fever for at least 24 hours (that is one full day of no fever without the use medicine that reduces fevers)  AND  - other symptoms have improved (for example, when their cough or shortness of breath have improved)  AND  - If had mild or moderate illness, at least 10 days have passed since their symptoms first appeared or if severe illness (needed oxygen) or immunosuppressed, at least 20 days have passed since symptoms first appeared  Patients with confirmed COVID-19 should also notify close contacts (including their workplace) and ask that they self-quarantine  Currently, close contact is defined as being within 6 feet for 15 minutes or more from the period 24 hours starting 48 hours before symptom onset to the time at which the patient went into isolation  Close contacts of patients diagnosed with COVID-19 should be instructed by the patient to self-quarantine for 14 days from the last time of their last contact with the patient  Source: [employer]    COVID TESTING TRIAGE:    Other Screening: (Travel, Work, School, etc  with no known direct exposure to [address]) - Refer to [facility] ([employer], [employer], [employer]) Call ahead to see if these locations provide these services  In an effort to best serve our community in this time of increased COVID activity, [facility] will only be providing COVID testing for those individuals who are symptomatic or have had a direct exposure to a COVID case  Unfortunately, due to resource constraints we are unable to provide testing for asymptomatic individuals who need a swab for clearance to travel, or return to work or school  Please note that [facility] Employee's in need of testing for return to work in a Network position can continue to obtain testing through this hotline  Thank you in advance for your understanding and cooperation during these challenging times  Symptomatic Patient or Exposed Patient(Close Contact with COVID + Person):  Testing Sites:   [facility]Address: Esther [address] [place] [address]  oHours: Monday-Friday 8:00A - 5:00P  [address] ([facility])oAddress: [address] [place], [place] [address]FFYGTT: Monday-Friday 8:00A - 5:00P   KENN SL[facility])oAddress: [address] [facility], [place] [address]YAYXPN: Monday-Friday 8:00A - 5:00P  [address] CenteroAddress: [address] [place], [place] [address]h Hours: Monday-Friday 8:00A - 5:00P   [facility]Address: [address], [place], [place] [address]YNNCXL: Monday-Friday 8:00A - 5:00P & Saturday 8:00A - 1:00P   [facility] [employer] oAddress: [address] [place]  [address](located in the parking lot behind the building)oHours: Monday-Friday 8:00A - 1:00P   [address] [place] [address] oAddress: [address] [facility], [place], [place]  [id number]: Monday-Friday 10:00A - 2:00P   Care Now Sites:  [address] Now [address] [facility]) oAddress: [address], [address]  [facility], Þ[facility], [address]: Monday-Friday 7:30A - 10:30P & Sat/Sun 8:00A - 8:00P   [facility] [place] ([facility])oAddress: [address] [facility] [address]eVqvbw: Monday-Friday 7:30A - 10:30P & Sat/Sun 8A - 8P  [address] Now- [place]Address: [address] [facility] [place] [facility] [id number]: Monday-Friday 8:00A - 8:00P & Sat/Sun 8:00A - 4:00P  [address] ([address] [facility],6Th [facility] Center)oAddress: [address], [address]: Monday-Friday 8:00A - 8:00P & Sat/Sun 8:00A - 4:00P  [address] Now- [place] oAddress: [address]) [place]: Monday-Friday 8:00A - 8:00P  [facility]-[place]Address: [address] [place], [place] [address]QPVMRA: Monday-Friday 8:00A-8:00P & Sat/Sun 8:00A - 4:00P  [address] Now- [place]Address: [address] [facility] [id number]: Monday-Friday 8:00A - 8:00P & Sat/Sun 8:00A - 4:00P  [address], [place], [place] [address]ENWTFX: Monday-Friday 8:00A - 8:00P  [address] Now- [facility]Address: [place], [place] [address]oXmskq: 7 days a week 8:00A - 8:00P    [address] [facility] Now- [place]Address: [address], [place], [place] [address]oHbpmi: Monday-Friday 8:00A - 8:00P  [address] [facility]Address: [address] [place], [place] [address]zZnlhy: Monday-Friday 8:00A - 8:00P & Sat/Sun 8:00A - 4:00P   [facility])oAddress: [address], [address] [id number]: Monday-Friday 8:00A - 8:00P  [address] Now- [place]Address: [address] [place], [place] [address]JOGNYG: Monday-Friday 8:00A - 8:00P & Sat/Sun 8:00A - 4:00PSt  [facility] Now- [place]Address: [address] [facility], [address] [id number]: 7 days a week 8:00A - 8:00P    [facility]Address: [address], [place], [address] [address]NTZBGG: 7 days a week 8:00A - 8:00P   [address] Now- [place]oAddress: [address], [facility] [place], [address] [id number]: Monday-Friday 7:30A - 10:30P & Sat/Sun 8:00A - 8:00P    Bamlanivimab and etesevimab are investigational medicines used to treat mild to moderate symptoms of COVID-19 in non-hospitalized adults and adolescents (15years of age and older who weigh at least 80 pounds (40 kg)), and who are at high risk for developing severe COVID-19 symptoms or the need for hospitalization  Bamlanivimab and etesevimab are investigational because they are still being studied  There is limited information known about the safety and effectiveness of using bamlanivimab and etesevimab to treat people with COVID-19  The FDA has authorized the emergency use of bamlanivimab and etesevimab for the treatment of COVID-19 under an Emergency Use Authorization (EUA)  How will I receive bamlanivimab and etesevimab?  Bamlanivimab and etesevimab are given at the same time through a vein (intravenous or IV)   You will receive one dose of bamlanivimab and etesevimab by IV infusion  The infusion will take 21-60 minutes or longer  Possible side effects of bamlanivimab and etesevimab: Allergic reactions can happen during and after infusion with bamlanivimab and etesevimab      Possible reactions include: fever, chills, nausea, headache, shortness of breath, low or high blood pressure, rapid or slow heart rate, chest discomfort or pain, weakness, confusion, feeling tired, wheezing, swelling of your lips, face, or throat, rash including hives, itching, muscle aches, dizziness, and sweating  These reactions may be severe or life threatening  Worsening symptoms after treatment: You may experience new or worsening symptoms after infusion, including fever, difficulty breathing, rapid or slow heart rate, tiredness, weakness or confusion  If these occur, contact your healthcare provider or seek immediate medical attention as some of these events have required hospitalization  It is unknown if these events are related to treatment or are due to the progression of COVID19  The side effects of getting any medicine by vein may include brief pain, bleeding, bruising of the skin, soreness, swelling, and possible infection at the infusion site  These are not all the possible side effects of bamlanivimab and etesevimab  Not a lot of people have been given bamlanivimab and etesevimab  Serious and unexpected side effects may happen  Bamlanivimab and etesevimab are still being studied so it is possible that all of the risks are not known at this time  It is possible that bamlanivimab and etesevimab could interfere with your body's own ability to fight off a future infection of SARS-CoV-2  Similarly, bamlanivimab and etesevimab may reduce your bodys immune response to a vaccine for SARS-CoV-2  Specific studies have not been conducted to address these possible risks  Talk to your healthcare provider if you have any questions  Emergency Use Authorization:    [facility] FDA has made bamlanivimab and etesevimab available under an emergency access mechanism called an EUA   The EUA is supported by a  of Health and Human Service (HHS) declaration that circumstances exist to justify the emergency use of drugs and biological products during the COVID-19 pandemic  Bamlanivimab and etesevimab have not undergone the same type of review as an FDA-approved or cleared product  The FDA may issue an EUA when certain criteria are met, which includes that there are no adequate, approved, and available alternatives  In addition, the FDA decision is based on the totality of scientific evidence available showing that it is reasonable to believe that the product meets certain criteria for safety, performance, and labeling and may be effective in treatment of patients during the COVID-19 pandemic  All of these criteria must be met to allow for the product to be used in the treatment of patients during the COVID-19 pandemic  The EUA for bamlanivimab and etesevimab together is in effect for the duration of the COVID-19 declaration justifying emergency use of these products, unless terminated or revoked (after which the product may no longer be used)  What if I am pregnant or breastfeeding? There is limited experience treating pregnant women or breastfeeding mothers with bamlanivimab and etesevimab  For a mother and unborn baby, the benefit of receiving bamlanivimab and etesevimab may be greater than the risk from the treatment  If you are pregnant or breastfeeding, discuss your options and specific situation with your healthcare provider  Regarding COVID-19 Vaccination:    Currently there is no data or safety or efficacy of COVID-19 vaccination in persons who received monoclonal antibodies as part of COVID-19 treatment  Treatment should be deferred for at least 90 days to avoid interference of the treatment with vaccine-induced immune responses (this is based on estimated half-life of therapies and evidence suggesting reinfection is uncommon within 90 days of initial infection)      Full fact sheet document for patients can be found at: [employer]    The patient consents to proceed with bamlanivimab and etesevimab infusion  Possible side effects of bamlanivimab are: Allergic reactions   Allergic reactions can happen during and after infusion with bamlanivimab which include:    fever, chills, nausea, headache, shortness of breath, low blood pressure, wheezing, swelling of your lips, face, or  throat, rash including hives, itching, muscle aches, and dizziness  The side effects of getting any medicine by vein may include brief pain, bleeding, bruising of the skin, soreness,  swelling, and possible infection at the infusion site  These are not all the possible side effects of bamlanivimab  Not a lot of people have been given bamlanivimab  Serious and unexpected side effects may happen  Anahi Kessler is still being studied so it is possible that all of  the risks are not known at this time  Please note that this drug was approved under the Emergency Use Authorization   of the FDA and has not gone through the full, formal FDA approval process    It is possible that bamlanivimab could interfere with your body's own ability to fight off a future infection of  SARS-CoV-2  Similarly, bamlanivimab may reduce your bodys immune response to a vaccine for SARS-CoV-2  Specific studies have not been conducted to address these possible risks  Currently there is no data or safety or efficacy of COVID-19 vaccination in persons who received monoclonal antibodies   as part of COVID-19 treatment  Treatment should be deferred for at least 90 days to avoid interference of the treatment   with vaccine-induced immune responses (this is based on estimated half-life of therapies and evidence suggesting reinfection   is uncommon within 90 days of initial infection)  The patient consents to proceed with bamlanivimab infusion      Table 2: Treatment-emergent Adverse Events Reported in at Least 1% of All  Subjects in BLAZE-1*         Bamlinivimab  Symptom Placebo 700 mg (N=101) 2000 mg (N=107) 7000 mg (N=101) Total (N=309)   Nausea 4% 3% 4% 5% 4%   Diarrhea 5% 1% 2% 7% 3%   Dizziness 2% 3% 3% 3% 3%   Headache 2% 3% 2% 0% 2%   Pruritis 1% 2% 3% 0% 2%   Vomiting 3% 1% 3% 1% 2%     *http://[email or web address]/[email or web address]-eua-factsheet-hcp  pdf    Instructions for Bamlanivimab infusion at [facility]    1  Once you arrive at [facility], please park in the small parking lot at the [address] entrance  Do not park in the main parking lot or in the parking garage  2  When you arrive, please call the infusion nurse at [phone] to be escorted into the clinic  3  It will be approximately a 4-hour visit  4  No visitors can accompany you into the clinic  If you need a , they will need to wait in the car until treatment is over or we can call them 30 minutes before you will be ready for   5  Please bring something to occupy your time for 4 hours, i e  book, i-Pad, phone, headphones, etc   6  Please make sure you bring your mask  You must wear a mask for the duration of your treatment  7  The clinic will have light refreshments and snacks available

## 2021-06-08 LAB — SARS-COV-2 RNA RESP QL NAA+PROBE: NEGATIVE

## 2021-06-14 ENCOUNTER — OFFICE VISIT (OUTPATIENT)
Dept: FAMILY MEDICINE CLINIC | Facility: CLINIC | Age: 76
End: 2021-06-14
Payer: MEDICARE

## 2021-06-14 VITALS
WEIGHT: 179.4 LBS | TEMPERATURE: 97.5 F | HEART RATE: 74 BPM | OXYGEN SATURATION: 97 % | BODY MASS INDEX: 33.87 KG/M2 | RESPIRATION RATE: 14 BRPM | DIASTOLIC BLOOD PRESSURE: 72 MMHG | HEIGHT: 61 IN | SYSTOLIC BLOOD PRESSURE: 138 MMHG

## 2021-06-14 DIAGNOSIS — I10 ESSENTIAL HYPERTENSION: ICD-10-CM

## 2021-06-14 DIAGNOSIS — G47.00 INSOMNIA, UNSPECIFIED TYPE: ICD-10-CM

## 2021-06-14 DIAGNOSIS — E66.9 OBESITY (BMI 30.0-34.9): ICD-10-CM

## 2021-06-14 DIAGNOSIS — J01.90 ACUTE SINUSITIS, RECURRENCE NOT SPECIFIED, UNSPECIFIED LOCATION: Primary | ICD-10-CM

## 2021-06-14 DIAGNOSIS — Z11.59 NEED FOR HEPATITIS C SCREENING TEST: ICD-10-CM

## 2021-06-14 DIAGNOSIS — J45.30 MILD PERSISTENT ASTHMA WITHOUT COMPLICATION: ICD-10-CM

## 2021-06-14 PROCEDURE — 99214 OFFICE O/P EST MOD 30 MIN: CPT | Performed by: FAMILY MEDICINE

## 2021-06-14 PROCEDURE — 1123F ACP DISCUSS/DSCN MKR DOCD: CPT | Performed by: FAMILY MEDICINE

## 2021-06-14 RX ORDER — DOXYCYCLINE HYCLATE 100 MG/1
100 CAPSULE ORAL EVERY 12 HOURS SCHEDULED
Qty: 20 CAPSULE | Refills: 0 | Status: SHIPPED | OUTPATIENT
Start: 2021-06-14 | End: 2021-06-24

## 2021-06-14 RX ORDER — ACETAMINOPHEN 325 MG/1
325 TABLET ORAL AS NEEDED
COMMUNITY
End: 2022-05-23

## 2021-06-14 NOTE — PROGRESS NOTES
Assessment/Plan:  Problem List Items Addressed This Visit        Respiratory    Mild persistent asthma without complication     Stable  Cardiovascular and Mediastinum    Essential hypertension     Stable  Check blood pressure outside of office  Recommend lifestyle modifications  Other Visit Diagnoses     Acute sinusitis, recurrence not specified, unspecified location    -  Primary    Relevant Medications    doxycycline hyclate (VIBRAMYCIN) 100 mg capsule    Advise [facility] med sinus rinse kit, Mucinex, Claritin/Zyrtec/Allegra  Avoid decongestants if you have high blood pressure  Insomnia, unspecified type        OTC Melatonin PRN advises  F/U c PCP  Obesity (BMI 30 0-34  9)        Recommend lifestyle modifications  BMI 33 0-33 9,adult        Need for hepatitis C screening test        Relevant Orders    Hepatitis C antibody           Return in about 2 weeks (around 6/28/2021), or if symptoms worsen or fail to improve, for With PCP Dr Tonio Carballo - Insomnia  No future appointments  Subjective:     Shanelle Benson is a 64 y o  female who presents today for a follow-up on her acute medical conditions  HPI:  Chief Complaint   Patient presents with    Nasal Congestion     COVID - neg    Cough    Headache    Sore Throat     -- Above per clinical staff and reviewed  --    HPI      Today:        Seen by me 6/7/21 for similar symptoms - COVID negative  Symptoms unchanged x 10 days  Her symptoms improve, then worsen  B/L Maxillary Sinus Pressure - Took Advil QD PRN x 2 days c temporary  Using Zyrtec, Flonase,  Albuterol HFA when SOB c humidity  Cough - Non-productive  +Rhinorrhea  Sore Throat - Feels dry  +Fluids         Dizziness - Improving  Symptoms x 1 week  Sometimes occurs c moving quickly        B/L Ear Pain - Denies otorrhea    Ears feel blocked up, but patient denies cerumen        S/p 2nd COVID vaccine 2/14/21      Using Flonase, Albuterol HFA, Sinus PE and Allegy Pill (helpful)  The following portions of the patient's history were reviewed and updated as appropriate: allergies, current medications, past family history, past medical history, past social history, past surgical history and problem list       Review of Systems   Constitutional: Positive for chills  Negative for appetite change, diaphoresis, fatigue and fever  HENT: Positive for congestion, rhinorrhea, sinus pressure and sore throat  Respiratory: Positive for shortness of breath (With increased humidity)  Negative for chest tightness  Cardiovascular: Negative for chest pain  Gastrointestinal: Positive for nausea (Intermittent)  Negative for abdominal pain, blood in stool, diarrhea and vomiting  Genitourinary: Negative for dysuria  Current Outpatient Medications   Medication Sig Dispense Refill    acetaminophen (TYLENOL) 325 mg tablet Take 325 mg by mouth as needed      albuterol (Ventolin HFA) 90 mcg/act inhaler Inhale 2 puffs every 6 (six) hours as needed for wheezing 1 Inhaler 2    amLODIPine (NORVASC) 10 mg tablet Take 1 tablet (10 mg total) by mouth daily 90 tablet 1    atorvastatin (LIPITOR) 20 mg tablet Take 1 tablet (20 mg total) by mouth daily 90 tablet 1    buPROPion (WELLBUTRIN SR) 150 mg 12 hr tablet Take 1 tablet (150 mg total) by mouth 2 (two) times a day 180 tablet 1    Cholecalciferol (Vitamin D3) 1 25 MG (45131 UT) CAPS Take 5,000 Units by mouth daily      fluticasone (FLOVENT HFA) 110 MCG/ACT inhaler Inhale 2 puffs 2 (two) times a day Rinse mouth after use   3 Inhaler 1    levothyroxine 100 mcg tablet Take 1 tablet (100 mcg total) by mouth daily in the early morning 30 tablet 3    lisinopril (ZESTRIL) 40 mg tablet TAKE 1 TABLET BY MOUTH EVERY DAY 90 tablet 1    meclizine (ANTIVERT) 25 mg tablet Take 1 tablet (25 mg total) by mouth 3 (three) times a day as needed for dizziness for up to 10 days 30 tablet 0    metroNIDAZOLE (METROGEL) 1 % gel Apply topically daily 45 g 0    pantoprazole (PROTONIX) 40 mg tablet Take 1 tablet (40 mg total) by mouth 2 (two) times a day 60 tablet 1    sertraline (ZOLOFT) 100 mg tablet Take 2 tablets (200 mg total) by mouth daily 60 tablet 3    doxycycline hyclate (VIBRAMYCIN) 100 mg capsule Take 1 capsule (100 mg total) by mouth every 12 (twelve) hours for 10 days 20 capsule 0    RESTASIS 0 05 % ophthalmic emulsion       sucralfate (CARAFATE) 1 g tablet Take 1 tablet (1 g total) by mouth 4 (four) times a day (Patient not taking: Reported on 6/14/2021) 120 tablet 3    triamcinolone (KENALOG) 0 1 % ointment Apply topically twice a day to upper back for 1 month (Patient not taking: Reported on 6/14/2021) 60 g 0     No current facility-administered medications for this visit  Objective:  /72   Pulse 74   Temp 97 5 °F (36 4 °C)   Resp 14   Ht 5' 1" (1 549 m)   Wt 81 4 kg (179 lb 6 4 oz)   SpO2 97%   BMI 33 90 kg/m²    Wt Readings from Last 3 Encounters:   06/14/21 81 4 kg (179 lb 6 4 oz)   06/07/21 78 kg (172 lb)   05/20/21 78 kg (172 lb)      BP Readings from Last 3 Encounters:   06/14/21 138/72   05/20/21 93/53   01/15/21 140/68          Physical Exam  Vitals and nursing note reviewed  Constitutional:       Appearance: Normal appearance  She is well-developed  She is obese  HENT:      Head: Normocephalic and atraumatic  Right Ear: Tympanic membrane, ear canal and external ear normal  There is no impacted cerumen  Left Ear: Tympanic membrane, ear canal and external ear normal  There is no impacted cerumen  Nose: Congestion present  No rhinorrhea  Right Sinus: Maxillary sinus tenderness and frontal sinus tenderness present  Left Sinus: Maxillary sinus tenderness and frontal sinus tenderness present  Mouth/Throat:      Mouth: Mucous membranes are moist       Pharynx: Oropharynx is clear  Uvula midline  No oropharyngeal exudate or posterior oropharyngeal erythema  Tonsils: No tonsillar exudate  Eyes:      Extraocular Movements: Extraocular movements intact  Conjunctiva/sclera: Conjunctivae normal    Cardiovascular:      Rate and Rhythm: Normal rate and regular rhythm  Pulses: Normal pulses  Heart sounds: Normal heart sounds  Pulmonary:      Effort: Pulmonary effort is normal       Breath sounds: Normal breath sounds  Musculoskeletal:         General: No swelling  Cervical back: Neck supple  Tenderness (Left latreal thyroid) present  Right lower leg: No edema  Left lower leg: No edema  Lymphadenopathy:      Cervical: No cervical adenopathy  Skin:     Findings: No rash  Neurological:      General: No focal deficit present  Mental Status: She is alert and oriented to person, place, and time  Psychiatric:         Mood and Affect: Mood normal          Behavior: Behavior normal          Thought Content:  Thought content normal          Judgment: Judgment normal          Lab Results:      Lab Results   Component Value Date    WBC 6 82 01/15/2021    HGB 11 7 01/15/2021    HCT 37 9 01/15/2021     01/15/2021    TRIG 58 09/21/2020    HDL 70 09/21/2020    ALT 22 03/31/2021    AST 13 03/31/2021     02/27/2015    K 4 3 03/31/2021     (H) 03/31/2021    CREATININE 1 18 03/31/2021    BUN 24 03/31/2021    CO2 27 03/31/2021    INR 1 12 02/26/2015    GLUF 92 03/31/2021    HGBA1C 5 9 (H) 09/21/2020     No results found for: URICACID  Invalid input(s): BASENAME Vitamin D    EGD    Result Date: 5/20/2021  Narrative: [facility] [address] [place] [address] [phone] DATE OF SERVICE: 5/20/21 PHYSICIAN(S): Di Mcknight MD - Attending Physician INDICATION: RUQ pain, Intestinal metaplasia of gastric mucosa, Gastroesophageal reflux disease without esophagitis, Iron deficiency anemia, unspecified iron deficiency anemia type, Anemia, unspecified type, Epigastric pain POST-OP DIAGNOSIS: See the impression below  PREPROCEDURE: Informed consent was obtained for the procedure, including sedation  Risks of perforation, hemorrhage, adverse drug reaction and aspiration were discussed  The patient was placed in the left lateral decubitus position  Patient was explained about the risks and benefits of the procedure  Risks including but not limited to bleeding, infection, and perforation were explained in detail  Also explained about less than 100% sensitivity with the exam and other alternatives  DETAILS OF PROCEDURE: Patient was taken to the procedure room where a time out was performed to confirm correct patient and correct procedure  The patient underwent monitored anesthesia care, which was administered by an anesthesia professional  The patient's blood pressure, heart rate, level of consciousness, respirations and oxygen were monitored throughout the procedure  The scope was advanced to the second part of the duodenum  Retroflexion was performed in the fundus  The patient experienced no blood loss  The procedure was not difficult  The patient tolerated the procedure well  There were no apparent complications  ANESTHESIA INFORMATION: ASA: II Anesthesia Type: IV Sedation with Anesthesia FINDINGS: Regular Z-line 40 cm from the incisors Moderate, localized erythematous mucosa in the antrum; performed 4 cold forceps biopsies Performed 20 biopsies in the fundus of the stomach, body of the stomach, greater curve of the stomach, incisura and antrum  Multiple biopsies were obtained to assess for intestinal metaplasia  No ulcers were seen  Retroflexed view was notable for diminutive fundic polyps, these were previously sampled and were noted to be fundic gland polyps   The duodenal bulb, 1st part of the duodenum, 2nd part of the duodenum and ampullary region appeared normal  SPECIMENS: ID Type Source Tests Collected by Time Destination 1 : gastric antrum  Tissue Stomach TISSUE EXAM Maribell Limon MD 5/20/2021 12:34 PM  2 : incisure Tissue Stomach TISSUE EXAM Elizabeth Mccain MD 5/20/2021 12:35 PM  3 : gastric body Tissue Stomach TISSUE EXAM Elizabeth Mccain MD 5/20/2021 12:36 PM  4 : gastric fundus Tissue Stomach TISSUE EXAM Elizabeth Mccain MD 5/20/2021 12:37 PM  5 : gastric greater curvature Tissue Stomach TISSUE EXAM Elizabeth Mccain MD 5/20/2021 12:37 PM       Impression: 1  Moderate antral gastritis  2  Diminutive polyps, likely fundic gland  RECOMMENDATION: 1  Follow-up biopsy results in 2 weeks  2  Continue pantoprazole 40 mg on daily basis  Patient reports that she had stopped taking this as her epigastric pain had improved with PPI  3  Avoid NSAIDs  4  Advised to avoid fatty foods, chocolates, caffeine, alcohol and any other triggering foods  Avoid eating for at least 3 hours before going to bed  Elizabeth Mccain MD       POCT Labs      BMI Counseling: Body mass index is 33 9 kg/m²  The BMI is above normal  Nutrition recommendations include encouraging healthy choices of fruits and vegetables  Exercise recommendations include exercising 3-5 times per week  No pharmacotherapy was ordered  BMI Counseling: Body mass index is 33 9 kg/m²  The BMI is above normal  Nutrition recommendations include 3-5 servings of fruits/vegetables daily  Exercise recommendations include exercising 3-5 times per week

## 2021-06-14 NOTE — PATIENT INSTRUCTIONS
Obesity   AMBULATORY CARE:   Obesity  is when your body mass index (BMI) is greater than 30  Your healthcare provider will use your height and weight to measure your BMI  The risks of obesity include  many health problems, such as injuries or physical disability  You may need tests to check for the following:  · Diabetes    · High blood pressure or high cholesterol    · Heart disease    · Gallbladder or liver disease    · Cancer of the colon, breast, prostate, liver, or kidney    · Sleep apnea    · Arthritis or gout    Seek care immediately if:   · You have a severe headache, confusion, or difficulty speaking  · You have weakness on one side of your body  · You have chest pain, sweating, or shortness of breath  Contact your healthcare provider if:   · You have symptoms of gallbladder or liver disease, such as pain in your upper abdomen  · You have knee or hip pain and discomfort while walking  · You have symptoms of diabetes, such as intense hunger and thirst, and frequent urination  · You have symptoms of sleep apnea, such as snoring or daytime sleepiness  · You have questions or concerns about your condition or care  Treatment for obesity  focuses on helping you lose weight to improve your health  Even a small decrease in BMI can reduce the risk for many health problems  Your healthcare provider will help you set a weight-loss goal   · Lifestyle changes  are the first step in treating obesity  These include making healthy food choices and getting regular physical activity  Your healthcare provider may suggest a weight-loss program that involves coaching, education, and therapy  · Medicine  may help you lose weight when it is used with a healthy diet and physical activity  · Surgery  can help you lose weight if you are very obese and have other health problems  There are several types of weight-loss surgery  Ask your healthcare provider for more information      Be successful losing weight:   · Set small, realistic goals  An example of a small goal is to walk for 20 minutes 5 days a week  Anther goal is to lose 5% of your body weight  · Tell friends, family members, and coworkers about your goals  and ask for their support  Ask a friend to lose weight with you, or join a weight-loss support group  · Identify foods or triggers that may cause you to overeat , and find ways to avoid them  Remove tempting high-calorie foods from your home and workplace  Place a bowl of fresh fruit on your kitchen counter  If stress causes you to eat, then find other ways to cope with stress  · Keep a diary to track what you eat and drink  Also write down how many minutes of physical activity you do each day  Weigh yourself once a week and record it in your diary  Eating changes: You will need to eat 500 to 1,000 fewer calories each day than you currently eat to lose 1 to 2 pounds a week  The following changes will help you cut calories:  · Eat smaller portions  Use small plates, no larger than 9 inches in diameter  Fill your plate half full of fruits and vegetables  Measure your food using measuring cups until you know what a serving size looks like  · Eat 3 meals and 1 or 2 snacks each day  Plan your meals in advance  Maria Del Rosario Santizo and eat at home most of the time  Eat slowly  Do not skip meals  Skipping meals can lead to overeating later in the day  This can make it harder for you to lose weight  Talk with a dietitian to help you make a meal plan and schedule that is right for you  · Eat fruits and vegetables at every meal   They are low in calories and high in fiber, which makes you feel full  Do not add butter, margarine, or cream sauce to vegetables  Use herbs to season steamed vegetables  · Eat less fat and fewer fried foods  Eat more baked or grilled chicken and fish  These protein sources are lower in calories and fat than red meat  Limit fast food   Dress your salads with olive oil and vinegar instead of bottled dressing  · Limit the amount of sugar you eat  Do not drink sugary beverages  Limit alcohol  Activity changes:  Physical activity is good for your body in many ways  It helps you burn calories and build strong muscles  It decreases stress and depression, and improves your mood  It can also help you sleep better  Talk to your healthcare provider before you begin an exercise program   · Exercise for at least 30 minutes 5 days a week  Start slowly  Set aside time each day for physical activity that you enjoy and that is convenient for you  It is best to do both weight training and an activity that increases your heart rate, such as walking, bicycling, or swimming  · Find ways to be more active  Do yard work and housecleaning  Walk up the stairs instead of using elevators  Spend your leisure time going to events that require walking, such as outdoor festivals or fairs  This extra physical activity can help you lose weight and keep it off  Follow up with your healthcare provider as directed: You may need to meet with a dietitian  Write down your questions so you remember to ask them during your visits  © Copyright [address] [place] [address] Drive Information is for End User's use only and may not be sold, redistributed or otherwise used for commercial purposes  All illustrations and images included in CareNotes® are the copyrighted property of A D A M , Inc  or [address] [place] [address]  The above information is an  only  It is not intended as medical advice for individual conditions or treatments  Talk to your doctor, nurse or pharmacist before following any medical regimen to see if it is safe and effective for you  Consider using antibiotic for sinus infection if you develop fever 100 4 or greater, symptoms wax/wane, or symptoms do not improve within 10 days of onset  Advise Tati Hensley med sinus rinse kit, Mucinex, Claritin/Zyrtec/Allegra    Avoid decongestants if you have high blood pressure  Insomnia   AMBULATORY CARE:   Insomnia  is a condition that makes it hard to fall or stay asleep  Lack of sleep can lead to attention or memory problems during the day  You may also be duran, depressed, clumsy, or have headaches  Contact your healthcare provider if:   · Your symptoms do not get better, or they get worse  · You begin to use drugs or alcohol to fall asleep  · You have questions or concerns about your condition or care  Medicines:   · Medicines  may help you sleep more regularly or help you feel less anxious  · Take your medicine as directed  Contact your healthcare provider if you think your medicine is not helping or if you have side effects  Tell him or her if you are allergic to any medicine  Keep a list of the medicines, vitamins, and herbs you take  Include the amounts, and when and why you take them  Bring the list or the pill bottles to follow-up visits  Carry your medicine list with you in case of an emergency  What you can do to improve your sleep:   · Create a sleep schedule  Try to go to sleep and wake up at the same times every day  Keep a record of your sleep patterns, and any sleeping problems you have  Bring the record to follow-up visits with healthcare providers  · Do not take naps  Naps could make it hard for you to fall asleep at bedtime  · Keep your bedroom cool, quiet, and dark  Turn on white noise, such as a fan, to help you relax  Do not use your bed for any activity that will keep you awake  Do not read, exercise, eat, or watch TV in your bedroom  · Get up if you do not fall asleep within 20 minutes  Move to another room and do something relaxing until you become sleepy  · Limit caffeine, alcohol, and food to earlier in the day  Only drink caffeine in the morning  Do not drink alcohol within 6 hours of bedtime  Do not eat a heavy meal right before you go to bed  · Exercise regularly    Daily exercise may help you sleep better  Do not exercise within 4 hours of bedtime  Follow up with your healthcare provider as directed: Your healthcare provider may refer you for cognitive behavioral therapy  A behavioral therapist may help you find ways to relax, decrease stress, and improve sleep  Write down your questions so you remember to ask them during your visits  © Copyright [address] Information is for End User's use only and may not be sold, redistributed or otherwise used for commercial purposes  All illustrations and images included in CareNotes® are the copyrighted property of A D A M , Inc  or [address] [place], [place] [address]benny [employer]   The above information is an  only  It is not intended as medical advice for individual conditions or treatments  Talk to your doctor, nurse or pharmacist before following any medical regimen to see if it is safe and effective for you  Apps and Websites for Counseling, Anxiety/Depression, Chronic Pain, Lifestyle Change, Problem-solving, Self-Esteem, Anger Management, Coping with Uncertainty    (Prices current as of 9/5/19)    1  Mood Kit - Available in Apple Hilario Store for $4 99  Supports a person's success in specific situations, includes thought , mood tracker, and journal   Can be accessed in an unstructured way and used as an unguided self-help hilario  2   Moodnotes - Available in Apple Hilario Store for $4 99  Focuses on healthier thinking habits and identifying "traps" in thinking  Tracks mood over a period of time and identifies factors that influence mood  3   MoodMission - Available in [facility] for free  Includes five "missions" to promote confidence in handling stressors and coping in specific situations  The hilario personalizes its style and techniques based on when the user engages it most frequently  In-hilario rewards are used to motivate, increase fun and self-confidence    Helpful for patients who need improvement in mood or a decrease in anxiety and depression symptoms  4    What's Up - Available in [employer] for free  Recognizes negative thinking patterns and methods to overcome them  Uses helpful metaphors, a catastrophe scale, grounding techniques, and breathing exercises  Has the ability to sync data across multiple devices and protect this information with a unique pass code  Has the capability to be active in forums where people can discuss similar feelings and strategies that have been helpful  5   Moodpath - Available in [employer] for free  Uses daily screenings to create a better understanding of thoughts, feelings, and emotions  When needed, it provides a discussion guide to talking with a medical professional based on the responses to daily screenings  Includes over 150 psychological exercises and videos to promote and strengthen overall mental health  6   MindShift - Available in [employer]  Helpful for youth and young adults in coping with anxiety  Creates an individualized toolbox to help users deal with test anxiety, perfectionism, social anxiety, worry, panic, and conflict  Includes directions on how to construct belief experiments to trial common beliefs that feed anxiety, guided relaxation, as well as tools and tips to help establish and accomplish goals  Differentiates between helpful and unhelpful anxiety, and explains how to overcome fears by gradually facing them in manageable steps  7   CBT-I  - Available in [employer]  For insomnia  Educates about sleep, developing positive sleep routines, and improved sleep environment  Encourages user to change behaviors which will provide confidence for better sleep on a regular basis  8   Talyst  - Free website  Provides self-help and therapy resources that encourages change to combat negative and destructive thought patterns    Includes access to numerous handouts on a variety of symptoms related to anxiety, depression, low self-esteem, panic attacks, social disorders, and more  The solution section has interventions that can be printed and saved for future use  9   Woebot - Available in [employer] for free  Recommended for age 16+  Friendly self-care  that helps you think through situations with step-by-step guidance using counseling tools, learn about yourself with intelligent mood tracking, and master skills to reduce stress and live happier through 100+ evidence-based stories from clinicians  Chat as often or as little as you'd like, whenever you'd like to  10   Sherie:  AI  CBT DBT Chatbot - Available in Apple appsstore and Google Play for free, has in-hilario purchases  Recommended for 12+  Psychologist support at $30/month, 50% off to start  Lois Shay / Jus Lamar is free  Uses techniques of CBT, DBT, yoga, and meditation to support your needs regarding stress, anxiety, sleep, loss, and a full range of other mental health and wellness issues  11   Curable Pain Relief - Available in Apple Hilario store and Google Play for free, has in-hilario purchases  Teaches about the chronic pain cycle and how to reverse it, while retraining your brain and nervous system for long-term results  Smart  Funmi guides user through updates in pain science to identify, target, eliminate the factors that are keeping user "stuck" in the pain cycle  12   Talkspace Online Therapy - Available in Apple Hilario store and Google Play for a fee  Subscription service, starting at $59/week (billed monthly)  All plans include unlimited messaging, and add live video sessions if desired  Unlimited messaging therapy for teens ages 15-14 and special services for couples therapy  You can change therapists or stop subscription renewal at any time  Recommended for 13+    User is matched with a licensed therapist in your state and communicate on your device by text, audio, and video from anywhere, at any time  User will hear back at least once a day, 5 days per week  Refer to the back of insurance card for counseling options  Counseling services:    [facility]   [address], [address] , [place], [address] (they have equine therapy too)  8 [facility], [facility] 3914,  [place], 120 [facility] [address], [address],  [facility], [address]    70 [facility], [address] [place] [address] [place]   [phone]     [address]  200 [facility], [address]  [place] [address]    162 [facility]   [address], [place], [address]     1200 [facility]   2102 [facility], [address] [place] [address]     Zuleyka Salts, MSW, LSW  (patients age 11-adult)   [address], 243 [facility], 243 [facility] 209 [facility], [address]   [facility], [facility]5 [address] [place] [address] Ne  1000 [facility], 1777 [facility] 2131 [facility], [facility], [place], [address]  [address] (specializing in addiction)  [facility], [address]  1441 [facility]  [address], [place], 6100 [facility]   [place]      [address], 403 [facility] , [address], [place], [address], [place], [facility], 2121 [facility], [address], [place], [address] [place]  [address] [address], 2601 [facility], [address]   [phone]    Julian Solis, [address] [facility] [address]  [address] [place] [id number]     [phone]      Hotlines:   Please program these numbers into your phone in case you or someone you know needs them  All services are free       WarmLine: [phone] or [phone]   They provide a supportive listening ear if you need it  They also can also provide information about mental health concerns and services  Crisis Line:  [facility] [phone],  [facility] [phone], [address] [place] [address], [facility] and [place]: [phone]   24/7 crisis counseling, on-site counseling and walk-in counseling services available  National Suicide Prevention Lifeline:  6-169-566-263.116.7606  En [address] [facility] [phone]   This is free, confidential, and available 24/7  [place]: [phone]   For those facing or having survived abuse 24 hour confidential help line, emergency safe house, counseling, advocacy and education  Crisis Text Line: text 914504     You are connected to a Crisis Counselor to bring a hot moment to a cool calm through active listening and collaborative problem solving  If you or someone your know are feeling as though you are going to hurt yourself, do not wait - GET HELP RIGHT AWAY  Go to the closest Emergency Room, call 911, or call someone you trust, family member or friend to be with you until you can get some help

## 2021-06-17 DIAGNOSIS — R10.13 EPIGASTRIC PAIN: ICD-10-CM

## 2021-06-17 RX ORDER — PANTOPRAZOLE SODIUM 40 MG/1
TABLET, DELAYED RELEASE ORAL
Qty: 60 TABLET | Refills: 1 | OUTPATIENT
Start: 2021-06-17

## 2021-06-17 NOTE — TELEPHONE ENCOUNTER
Medication refill received from [facility]  Please let the patient know that we do not accept refills directly from the pharmacy

## 2021-06-18 DIAGNOSIS — E03.9 HYPOTHYROIDISM, UNSPECIFIED TYPE: ICD-10-CM

## 2021-06-18 RX ORDER — LEVOTHYROXINE SODIUM 0.1 MG/1
TABLET ORAL
Qty: 30 TABLET | Refills: 3 | Status: SHIPPED | OUTPATIENT
Start: 2021-06-18 | End: 2021-07-19

## 2021-07-16 ENCOUNTER — APPOINTMENT (OUTPATIENT)
Dept: LAB | Facility: MEDICAL CENTER | Age: 76
End: 2021-07-16
Payer: MEDICARE

## 2021-07-16 DIAGNOSIS — E03.9 HYPOTHYROIDISM, UNSPECIFIED TYPE: ICD-10-CM

## 2021-07-16 LAB — TSH SERPL DL<=0.05 MIU/L-ACNC: 68 UIU/ML (ref 0.36–3.74)

## 2021-07-16 PROCEDURE — 84443 ASSAY THYROID STIM HORMONE: CPT

## 2021-07-16 PROCEDURE — 36415 COLL VENOUS BLD VENIPUNCTURE: CPT

## 2021-07-19 DIAGNOSIS — E03.9 HYPOTHYROIDISM, UNSPECIFIED TYPE: Primary | ICD-10-CM

## 2021-07-19 RX ORDER — LEVOTHYROXINE SODIUM 0.12 MG/1
125 TABLET ORAL DAILY
Qty: 30 TABLET | Refills: 1 | Status: SHIPPED | OUTPATIENT
Start: 2021-07-19 | End: 2021-08-31

## 2021-08-09 DIAGNOSIS — F32.A DEPRESSION, UNSPECIFIED DEPRESSION TYPE: ICD-10-CM

## 2021-08-09 DIAGNOSIS — E03.9 HYPOTHYROIDISM, UNSPECIFIED TYPE: ICD-10-CM

## 2021-08-09 RX ORDER — LEVOTHYROXINE SODIUM 0.12 MG/1
125 TABLET ORAL DAILY
Qty: 30 TABLET | Refills: 0 | Status: CANCELLED | OUTPATIENT
Start: 2021-08-09

## 2021-08-09 RX ORDER — BUPROPION HYDROCHLORIDE 150 MG/1
150 TABLET, EXTENDED RELEASE ORAL 2 TIMES DAILY
Qty: 180 TABLET | Refills: 0 | Status: CANCELLED | OUTPATIENT
Start: 2021-08-09

## 2021-08-26 ENCOUNTER — OFFICE VISIT (OUTPATIENT)
Dept: DERMATOLOGY | Facility: CLINIC | Age: 76
End: 2021-08-26
Payer: MEDICARE

## 2021-08-26 VITALS — HEIGHT: 61 IN | TEMPERATURE: 97.8 F | BODY MASS INDEX: 34.66 KG/M2 | WEIGHT: 183.6 LBS

## 2021-08-26 DIAGNOSIS — R22.2 NODULE OF SKIN OF BACK: ICD-10-CM

## 2021-08-26 DIAGNOSIS — L82.0 INFLAMED SEBORRHEIC KERATOSIS: Primary | ICD-10-CM

## 2021-08-26 PROCEDURE — 17110 DESTRUCTION B9 LES UP TO 14: CPT | Performed by: DERMATOLOGY

## 2021-08-26 RX ORDER — POLYETHYLENE GLYCOL 3350 17 G/17G
17 POWDER, FOR SOLUTION ORAL DAILY
COMMUNITY
End: 2022-05-23

## 2021-08-26 RX ORDER — WHEAT DEXTRIN 3 G/3.8 G
POWDER (GRAM) ORAL DAILY
COMMUNITY
End: 2022-05-10

## 2021-08-26 NOTE — PROGRESS NOTES
[facility] Follow Up Note    Patient Name: Ray Alston  Encounter Date: 8/26/2021    Today's Chief Concerns:  Kourtney Romerovincent Concern #1:  Lump  on back   Concern #2: Skin lesion on right frontal scalp        Current Medications:    Current Outpatient Medications:     acetaminophen (TYLENOL) 325 mg tablet, Take 325 mg by mouth as needed, Disp: , Rfl:     albuterol (Ventolin HFA) 90 mcg/act inhaler, Inhale 2 puffs every 6 (six) hours as needed for wheezing, Disp: 1 Inhaler, Rfl: 2    amLODIPine (NORVASC) 10 mg tablet, Take 1 tablet (10 mg total) by mouth daily, Disp: 90 tablet, Rfl: 1    atorvastatin (LIPITOR) 20 mg tablet, Take 1 tablet (20 mg total) by mouth daily, Disp: 90 tablet, Rfl: 1    buPROPion (WELLBUTRIN SR) 150 mg 12 hr tablet, Take 1 tablet (150 mg total) by mouth 2 (two) times a day, Disp: 180 tablet, Rfl: 1    Cholecalciferol (Vitamin D3) 1 25 MG (32138 UT) CAPS, Take 5,000 Units by mouth daily, Disp: , Rfl:     fluticasone (FLOVENT HFA) 110 MCG/ACT inhaler, Inhale 2 puffs 2 (two) times a day Rinse mouth after use , Disp: 3 Inhaler, Rfl: 1    levothyroxine 125 mcg tablet, Take 1 tablet (125 mcg total) by mouth daily, Disp: 30 tablet, Rfl: 1    lisinopril (ZESTRIL) 40 mg tablet, TAKE 1 TABLET BY MOUTH EVERY DAY, Disp: 90 tablet, Rfl: 1    meclizine (ANTIVERT) 25 mg tablet, Take 1 tablet (25 mg total) by mouth 3 (three) times a day as needed for dizziness for up to 10 days, Disp: 30 tablet, Rfl: 0    metroNIDAZOLE (METROGEL) 1 % gel, Apply topically daily, Disp: 45 g, Rfl: 0    pantoprazole (PROTONIX) 40 mg tablet, Take 1 tablet (40 mg total) by mouth 2 (two) times a day, Disp: 60 tablet, Rfl: 1    RESTASIS 0 05 % ophthalmic emulsion, , Disp: , Rfl:     sertraline (ZOLOFT) 100 mg tablet, Take 2 tablets (200 mg total) by mouth daily, Disp: 60 tablet, Rfl: 3    sucralfate (CARAFATE) 1 g tablet, Take 1 tablet (1 g total) by mouth 4 (four) times a day (Patient not taking: Reported on 6/14/2021), Disp: 120 tablet, Rfl: 3    triamcinolone (KENALOG) 0 1 % ointment, Apply topically twice a day to upper back for 1 month (Patient not taking: Reported on 6/14/2021), Disp: 60 g, Rfl: 0    CONSTITUTIONAL:   There were no vitals filed for this visit  Specific Alerts:    Have you been seen by a [facility] Dermatologist in the last 3 years? YES    Are you pregnant or planning to become pregnant? No    Are you currently or planning to be nursing or breast feeding? No    Allergies   Allergen Reactions    Ceftin [Cefuroxime] Hives       May we call your Preferred Phone number to discuss your specific medical information? YES    May we leave a detailed message that includes your specific medical information? YES    Have you traveled outside of the [facility] in the past 3 months? No    Do you currently have a pacemaker or defibrillator? No    Do you have any artificial heart valves, joints, plates, screws, rods, stents, pins, etc? Yes, in 2013       Do you require any medications prior to a surgical procedure? No      Are you taking any medications that cause you to bleed more easily ("blood thinners") No    Have you ever experienced a rapid heartbeat with epinephrine? No    Have you ever been treated with "gold" (gold sodium thiomalate) therapy? No      Review of Systems:  Have you recently had or currently have any of the following?     · Fever or chills: No  · Night Sweats: No  · Headaches: No  · Weight Gain: No  · Weight Loss: No  · Blurry Vision: No  · Nausea: No  · Vomiting: No  · Diarrhea: No  · Blood in Stool: No  · Abdominal Pain: No  · Itchy Skin: No  · Painful Joints: YES  · Swollen Joints: No  · Muscle Pain: No  · Irregular Mole: No  · Sun Burn: No  · Dry Skin: YES  · Skin Color Changes: No  · Scar or Keloid: No  · Cold Sores/Fever Blisters: No  · Bacterial Infections/MRSA: No  · Anxiety: No  · Depression: No  · Suicidal or Homicidal Thoughts: No      PSYCH: Normal mood and affect  EYES: Normal conjunctiva  ENT: Normal lips and oral mucosa  CARDIOVASCULAR: No edema  RESPIRATORY: Normal respirations  HEME/LYMPH/IMMUNO:  No regional lymphadenopathy except as noted below in ASSESSMENT AND PLAN BY DIAGNOSIS    FOCUSED ORGAN SYSTEM SKIN EXAM (SKIN)   Scalp, Face Normal except as noted below in Assessment                       Back Normal except as noted below in Assessment         SEBORRHEIC KERATOSIS; INFLAMED    Physical Exam:   Anatomic Location Affected:  Right cheek, right arm,  Right frontal scalp     Morphological Description:  Flat and raised, waxy, smooth to warty textured, yellow to brownish-grey to dark brown to blackish, discrete, "stuck-on" appearing papules   Pertinent Positives:   Pertinent Negatives: Additional History of Present Condition:  Patient reports new bumps on the skin  Denies itch, burn, pain, bleeding or ulceration  Present constantly; nothing seems to make it worse or better  No prior treatment  Assessment and Plan:  Based on a thorough discussion of this condition and the management approach to it (including a comprehensive discussion of the known risks, side effects and potential benefits of treatment), the patient (family) agrees to implement the following specific plan:   Reassured benign; No treatment necessary   Cryotherapy treatment can be helpful  Verbal and written consent obtained for Liquid Nitrogen ( Cryotherapy treatment)   Keep it soft with Vaseline  Seborrheic Keratosis  A seborrheic keratosis is a harmless warty spot that appears during adult life as a common sign of skin aging  Seborrheic keratoses can arise on any area of skin, covered or uncovered, with the usual exception of the palms and soles  They do not arise from mucous membranes  Seborrheic keratoses can have highly variable appearance  Seborrheic keratoses are extremely common   It has been estimated that over 90% of adults over the age of 61 years have one or more of them  They occur in males and females of all races, typically beginning to erupt in the 35s or 45s  They are uncommon under the age of 21 years  The precise cause of seborrhoeic keratoses is not known  Seborrhoeic keratoses are considered degenerative in nature  As time goes by, seborrheic keratoses tend to become more numerous  Some people inherit a tendency to develop a very large number of them; some people may have hundreds of them  The name "seborrheic keratosis" is misleading, because these lesions are not limited to a seborrhoeic distribution (scalp, mid-face, chest, upper back), nor are they formed from sebaceous glands, nor are they associated with sebum -- which is greasy  Seborrheic keratosis may also be called "SK," "Seb K," "basal cell papilloma," "senile wart," or "barnacle "      Researchers have noted:   Eruptive seborrhoeic keratoses can follow sunburn or dermatitis   Skin friction may be the reason they appear in body folds   Viral cause (e g , human papillomavirus) seems unlikely   Stable and clonal mutations or activation of FRFR3, PIK3CA, ELY, AKT1 and EGFR genes are found in seborrhoeic keratoses   Seborrhoeic keratosis can arise from solar lentigo   FRFR3 mutations also arise in solar lentigines  These mutations are associated with increased age and location on the head and neck, suggesting a role of ultraviolet radiation in these lesions   Seborrheic keratoses do not harbour tumour suppressor gene mutations   Epidermal growth factor receptor inhibitors, which are used to treat some cancers, often result in an increase in verrucal (warty) keratoses  There is no easy way to remove multiple lesions on a single occasion  Unless a specific lesion is "inflamed" and is causing pain or stinging/burning or is bleeding, most insurance companies do not authorize treatment        PROCEDURE:  DESTRUCTION OF BENIGN LESIONS  After a thorough discussion of treatment options and risk/benefits/alternatives (including but not limited to local pain, scarring, dyspigmentation, blistering, and possible superinfection), verbal and written consent were obtained and the aforementioned lesions were treated on with cryotherapy using liquid nitrogen x 1 cycle for 5-10 seconds   TOTAL NUMBER of 2 lesions were treated today on the ANATOMIC LOCATION: Right frontal scalp and right cheek  The patient tolerated the procedure well, and after-care instructions were provided             Scribe Attestation    I,:  Paty Snell am acting as a scribe while in the presence of the attending physician :       I,:  Sea Rosa MD personally performed the services described in this documentation    as scribed in my presence :

## 2021-08-30 ENCOUNTER — HOSPITAL ENCOUNTER (OUTPATIENT)
Dept: CT IMAGING | Facility: HOSPITAL | Age: 76
Discharge: HOME | DRG: 645 | End: 2021-08-30
Payer: MEDICARE

## 2021-08-30 ENCOUNTER — APPOINTMENT (OUTPATIENT)
Dept: LAB | Facility: HOSPITAL | Age: 76
DRG: 645 | End: 2021-08-30
Payer: MEDICARE

## 2021-08-30 DIAGNOSIS — G25.81 RLS (RESTLESS LEGS SYNDROME): ICD-10-CM

## 2021-08-30 DIAGNOSIS — D64.9 ANEMIA, UNSPECIFIED TYPE: ICD-10-CM

## 2021-08-30 DIAGNOSIS — R91.1 PULMONARY NODULE: ICD-10-CM

## 2021-08-30 DIAGNOSIS — E03.9 HYPOTHYROIDISM, UNSPECIFIED TYPE: ICD-10-CM

## 2021-08-30 LAB
BASOPHILS # BLD AUTO: 0.07 THOUSANDS/ΜL (ref 0–0.1)
BASOPHILS NFR BLD AUTO: 1 % (ref 0–1)
EOSINOPHIL # BLD AUTO: 0.36 THOUSAND/ΜL (ref 0–0.61)
EOSINOPHIL NFR BLD AUTO: 5 % (ref 0–6)
ERYTHROCYTE [DISTWIDTH] IN BLOOD BY AUTOMATED COUNT: 14.7 % (ref 11.6–15.1)
FERRITIN SERPL-MCNC: 15 NG/ML (ref 8–388)
HCT VFR BLD AUTO: 33.6 % (ref 34.8–46.1)
HGB BLD-MCNC: 10.4 G/DL (ref 11.5–15.4)
IMM GRANULOCYTES # BLD AUTO: 0.02 THOUSAND/UL (ref 0–0.2)
IMM GRANULOCYTES NFR BLD AUTO: 0 % (ref 0–2)
LYMPHOCYTES # BLD AUTO: 1.73 THOUSANDS/ΜL (ref 0.6–4.47)
LYMPHOCYTES NFR BLD AUTO: 24 % (ref 14–44)
MCH RBC QN AUTO: 28.1 PG (ref 26.8–34.3)
MCHC RBC AUTO-ENTMCNC: 31 G/DL (ref 31.4–37.4)
MCV RBC AUTO: 91 FL (ref 82–98)
MONOCYTES # BLD AUTO: 0.58 THOUSAND/ΜL (ref 0.17–1.22)
MONOCYTES NFR BLD AUTO: 8 % (ref 4–12)
NEUTROPHILS # BLD AUTO: 4.49 THOUSANDS/ΜL (ref 1.85–7.62)
NEUTS SEG NFR BLD AUTO: 62 % (ref 43–75)
NRBC BLD AUTO-RTO: 0 /100 WBCS
PLATELET # BLD AUTO: 267 THOUSANDS/UL (ref 149–390)
PMV BLD AUTO: 10.2 FL (ref 8.9–12.7)
RBC # BLD AUTO: 3.7 MILLION/UL (ref 3.81–5.12)
TSH SERPL DL<=0.05 MIU/L-ACNC: 62.92 UIU/ML (ref 0.36–3.74)
WBC # BLD AUTO: 7.25 THOUSAND/UL (ref 4.31–10.16)

## 2021-08-30 PROCEDURE — 84443 ASSAY THYROID STIM HORMONE: CPT

## 2021-08-30 PROCEDURE — 85025 COMPLETE CBC W/AUTO DIFF WBC: CPT

## 2021-08-30 PROCEDURE — 82728 ASSAY OF FERRITIN: CPT

## 2021-08-30 PROCEDURE — G1004 CDSM NDSC: HCPCS

## 2021-08-30 PROCEDURE — 71250 CT THORAX DX C-: CPT

## 2021-08-30 PROCEDURE — 36415 COLL VENOUS BLD VENIPUNCTURE: CPT

## 2021-08-31 ENCOUNTER — HOSPITAL ENCOUNTER (EMERGENCY)
Facility: HOSPITAL | Age: 76
Discharge: HOME | DRG: 645 | End: 2021-08-31
Attending: EMERGENCY MEDICINE
Payer: MEDICARE

## 2021-08-31 ENCOUNTER — APPOINTMENT (EMERGENCY)
Dept: RADIOLOGY | Facility: HOSPITAL | Age: 76
DRG: 645 | End: 2021-08-31
Payer: MEDICARE

## 2021-08-31 VITALS
HEART RATE: 70 BPM | OXYGEN SATURATION: 97 % | SYSTOLIC BLOOD PRESSURE: 162 MMHG | RESPIRATION RATE: 18 BRPM | WEIGHT: 179 LBS | TEMPERATURE: 98.1 F | DIASTOLIC BLOOD PRESSURE: 74 MMHG | HEIGHT: 60 IN | BODY MASS INDEX: 35.14 KG/M2

## 2021-08-31 DIAGNOSIS — M25.521 RIGHT ELBOW PAIN: ICD-10-CM

## 2021-08-31 DIAGNOSIS — M79.601 RIGHT ARM PAIN: ICD-10-CM
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
## 2021-08-31 DIAGNOSIS — S50.311A ABRASION OF RIGHT ELBOW, INITIAL ENCOUNTER: ICD-10-CM

## 2021-08-31 DIAGNOSIS — S46.911A STRAIN OF RIGHT SHOULDER, INITIAL ENCOUNTER: ICD-10-CM

## 2021-08-31 DIAGNOSIS — M89.8X1 PAIN OF RIGHT SCAPULA: ICD-10-CM

## 2021-08-31 PROCEDURE — 90471 IMMUNIZATION ADMIN: CPT

## 2021-08-31 PROCEDURE — 73080 X-RAY EXAM OF ELBOW: CPT

## 2021-08-31 PROCEDURE — 99284 EMERGENCY DEPT VISIT MOD MDM: CPT | Performed by: EMERGENCY MEDICINE

## 2021-08-31 PROCEDURE — 99283 EMERGENCY DEPT VISIT LOW MDM: CPT

## 2021-08-31 PROCEDURE — 73060 X-RAY EXAM OF HUMERUS: CPT

## 2021-08-31 PROCEDURE — 90715 TDAP VACCINE 7 YRS/> IM: CPT | Performed by: STUDENT IN AN ORGANIZED HEALTH CARE EDUCATION/TRAINING PROGRAM

## 2021-08-31 PROCEDURE — 73030 X-RAY EXAM OF SHOULDER: CPT

## 2021-08-31 PROCEDURE — 73010 X-RAY EXAM OF SHOULDER BLADE: CPT

## 2021-08-31 RX ORDER — ACETAMINOPHEN 325 MG/1
975 TABLET ORAL ONCE
Status: COMPLETED | OUTPATIENT
Start: 2021-08-31 | End: 2021-08-31

## 2021-08-31 RX ADMIN — ACETAMINOPHEN 975 MG: 325 TABLET, FILM COATED ORAL at 11:03

## 2021-08-31 RX ADMIN — TETANUS TOXOID, REDUCED DIPHTHERIA TOXOID AND ACELLULAR PERTUSSIS VACCINE, ADSORBED 0.5 ML: 5; 2.5; 8; 8; 2.5 SUSPENSION INTRAMUSCULAR at 11:05

## 2021-08-31 NOTE — ED ATTENDING ATTESTATION
8/31/2021  IGracia MD, saw and evaluated the patient  I have discussed the patient with the resident/non-physician practitioner and agree with the resident's/non-physician practitioner's findings, Plan of Care, and MDM as documented in the resident's/non-physician practitioner's note, except where noted  All available labs and Radiology studies were reviewed  I was present for key portions of any procedure(s) performed by the resident/non-physician practitioner and I was immediately available to provide assistance  At this point I agree with the current assessment done in the Emergency Department  I have conducted an independent evaluation of this patient a history and physical is as follows:    Patient reports that this morning prior to arrival she was walking when she tripped and fell onto her right arm  The patient denies any antecedent symptoms and denies syncope or near syncope  The patient reports that the fall was due to the trip and was mechanical in nature  The patient states she landed on her right arm and did not injure anything else  Specifically the patient denies striking her head, neck, or back  The patient denies loss of consciousness, weakness, or paresthesias  The patient's chief complaint of pain is in the right shoulder  The patient denies lower extremity pain and she has ambulated since the injury without difficulty  Physical exam demonstrates an elderly female no acute distress  HEENT exam is normal without evidence of craniofacial trauma  The neck was supple nontender  The midline thoracic and lumbar spine were nontender  There is mild tenderness over the right scapula without any deformity, crepitance, or skin changes  There is mild tenderness in the right shoulder, humerus, and elbow without deformity  There are abrasions on the dorsal elbow that was superficial   The right forearm, wrist, and hand were nontender    The left upper extremity was completely normal   Both upper extremities had normal strength and sensation  Testing to the radial, ulnar, and median nerves for motor and sensory function were normal in the right upper extremity  Both lower extremities were nontender with a full range of motion    ED Course         Critical Care Time  Procedures

## 2021-08-31 NOTE — DISCHARGE INSTRUCTIONS
You have been evaluated in the Emergency Department today for right arm pain after a fall  Your evaluation did not find evidence of medical conditions requiring emergent intervention at this time  You were given a sling for comfort  Please do not wear more than 3 days as it may lead to a frozen shoulder  You may take ibuprofen every 6 hours or tylenol every 6 hours as needed for pain  If needed, you can alternate these medications so that you take one medication every 3 hours  For instance, at noon take ibuprofen, then at 3pm take tylenol, then at 6pm take ibuprofen  Please schedule an appointment for follow up with your primary care physician this week  Return to the Emergency Department if you experience worsening pain, numbness, tingling, change of color in your fingers, or any other concerning symptoms

## 2021-08-31 NOTE — ED PROVIDER NOTES
History  Chief Complaint   Patient presents with   Giacomo Anders     pt reports falling from a standing position on a concrete path     Patient is a 65 YO F, PMhx includes arthritis, hypertension, and hyperlipidemia, who presents to the ED after a ground level fall about 1-2 hours ago  Patient states she was walking outside on concrete when she accidentally tripped, causing her to fall onto her RUE  She did not strike her head and did not lose consciousness  She was able to get up and walk immediately after the fall  She states since then, she has had pain in her R shoulder, elbow, arm, and upper back  Pain is worse with any movement  She tried applying ice to her shoulder with only mild relief  There are no associated symptoms  She denies any other injuries  She denies any other new or concerning symptoms  She is not on any blood thinners  She is unsure of her last tetanus shot  Prior to Admission Medications   Prescriptions Last Dose Informant Patient Reported? Taking? Cholecalciferol (Vitamin D3) 1 25 MG (02876 UT) CAPS   Yes No   Sig: Take 5,000 Units by mouth daily   RESTASIS 0 05 % ophthalmic emulsion  Self Yes No   Wheat Dextrin (Benefiber) POWD   Yes No   Sig: Take by mouth   acetaminophen (TYLENOL) 325 mg tablet   Yes No   Sig: Take 325 mg by mouth as needed   albuterol (Ventolin HFA) 90 mcg/act inhaler   No No   Sig: Inhale 2 puffs every 6 (six) hours as needed for wheezing   amLODIPine (NORVASC) 10 mg tablet   No No   Sig: Take 1 tablet (10 mg total) by mouth daily   atorvastatin (LIPITOR) 20 mg tablet   No No   Sig: Take 1 tablet (20 mg total) by mouth daily   buPROPion (WELLBUTRIN SR) 150 mg 12 hr tablet   No No   Sig: Take 1 tablet (150 mg total) by mouth 2 (two) times a day   fluticasone (FLOVENT HFA) 110 MCG/ACT inhaler   No No   Sig: Inhale 2 puffs 2 (two) times a day Rinse mouth after use     levothyroxine 137 mcg tablet   No No   Sig: Take 1 tablet (137 mcg total) by mouth daily   lisinopril (ZESTRIL) 40 mg tablet   No No   Sig: TAKE 1 TABLET BY MOUTH EVERY DAY   meclizine (ANTIVERT) 25 mg tablet  Self No No   Sig: Take 1 tablet (25 mg total) by mouth 3 (three) times a day as needed for dizziness for up to 10 days   metroNIDAZOLE (METROGEL) 1 % gel  Self No No   Sig: Apply topically daily   pantoprazole (PROTONIX) 40 mg tablet   No No   Sig: Take 1 tablet (40 mg total) by mouth 2 (two) times a day   polyethylene glycol (GLYCOLAX) 17 GM/SCOOP powder   Yes No   Sig: Take 17 g by mouth   sertraline (ZOLOFT) 100 mg tablet   No No   Sig: Take 2 tablets (200 mg total) by mouth daily   sucralfate (CARAFATE) 1 g tablet   No No   Sig: Take 1 tablet (1 g total) by mouth 4 (four) times a day   Patient not taking: Reported on 6/14/2021   triamcinolone (KENALOG) 0 1 % ointment   No No   Sig: Apply topically twice a day to upper back for 1 month   Patient not taking: Reported on 6/14/2021      Facility-Administered Medications: None       Past Medical History:   Diagnosis Date    Acid reflux     Anxiety     Arthritis     Asthma     C1-C4 level with central cord syndrome (HCC)     Resolved 2/1/2017     Carpal tunnel syndrome Resolved 4/21/2015    Colitis     Colon polyp     Concussion     Depressed     Dysthymic disorder     Resolved 1/5/2018    Gastric ulcer 2013    small - on EGD - [facility]    Hemorrhoids     Hx of blood clots     Hx of colonic polyps     [place]    Hyperlipemia     Hypertension     Hypothyroid     Lung nodule     stable x 2 yrs on CT    Migraines     Obesity     Postural dizziness with presyncope     Resolved 8/28/2018     Tendinitis     Ulnar neuropathy     Resolved 5/21/2015        Past Surgical History:   Procedure Laterality Date    APPENDECTOMY      BREAST SURGERY Left 01/1966    BREAST LUMPECTOMY    CARPAL TUNNEL RELEASE      CHOLECYSTECTOMY      DILATION AND CURETTAGE OF UTERUS      ELBOW SURGERY      EYE SURGERY Bilateral 2019    Cataract     GALLBLADDER SURGERY  JOINT REPLACEMENT Right     REPLACEMENT TOTAL KNEE    KNEE ARTHROSCOPY      KNEE SURGERY Right     NECK SURGERY      POSTERIOR LAMINECTOMY / DECOMPRESSION CERVICAL SPINE  2015    TONSILLECTOMY      VAGINAL DELIVERY      x3    WRIST SURGERY Right        Family History   Problem Relation Age of Onset    Breast cancer Mother     Alzheimer's disease Mother     Coronary artery disease Mother     Hypertension Mother    [facility] Migraines Mother     Peripheral vascular disease Mother    [facility] Parkinsonism Father     Other Father         Cardiovascular disease     Coronary artery disease Father     Hypertension Father     Bipolar disorder Sister     Asthma Daughter     Asthma Son      I have reviewed and agree with the history as documented  E-Cigarette/Vaping    E-Cigarette Use Never User      E-Cigarette/Vaping Substances    Nicotine No     THC No     CBD No     Flavoring No     Other No     Unknown No      Social History     Tobacco Use    Smoking status: Former Smoker     Packs/day: 0 00     Years: 0 00     Pack years: 0 00     Types: Cigarettes     Start date:      Quit date: 10/14/2013     Years since quittin 8    Smokeless tobacco: Never Used   Vaping Use    Vaping Use: Never used   Substance Use Topics    Alcohol use: No    Drug use: No        Review of Systems   Constitutional: Negative for chills and fever  Respiratory: Negative for shortness of breath  Cardiovascular: Negative for chest pain  Gastrointestinal: Negative for abdominal pain, diarrhea, nausea and vomiting  Musculoskeletal: Positive for back pain  Negative for neck pain and neck stiffness  Right shoulder pain   Skin: Positive for wound  All other systems reviewed and are negative        Physical Exam  ED Triage Vitals [21 1045]   Temperature Pulse Respirations Blood Pressure SpO2   98 1 °F (36 7 °C) 70 18 162/74 97 %      Temp Source Heart Rate Source Patient Position - Orthostatic VS BP Location FiO2 (%)   Oral -- -- -- --      Pain Score       5             Orthostatic Vital Signs  Vitals:    08/31/21 1045   BP: 162/74   Pulse: 70       Physical Exam  Vitals and nursing note reviewed  Constitutional:       General: She is not in acute distress  Appearance: She is well-developed  She is not diaphoretic  HENT:      Head: Normocephalic and atraumatic  Right Ear: External ear normal       Left Ear: External ear normal       Nose: Nose normal    Eyes:      General: Lids are normal  No scleral icterus  Cardiovascular:      Rate and Rhythm: Normal rate and regular rhythm  Heart sounds: Normal heart sounds  No murmur heard  No friction rub  No gallop  Pulmonary:      Effort: Pulmonary effort is normal  No respiratory distress  Breath sounds: Normal breath sounds  No wheezing or rales  Abdominal:      Palpations: Abdomen is soft  Tenderness: There is no abdominal tenderness  There is no guarding or rebound  Musculoskeletal:         General: Tenderness present  No deformity  Normal range of motion  Cervical back: Normal range of motion and neck supple  Comments: The right humerus, AC joint, trapezius, elbow, and scapula are tender to palpation  There are no obvious deformities  Full range of motion of the elbow, shoulder, wrist, and fingers  Finger opposition intact  RUE sensation intact  No obvious deformities  Compartments are soft  RUE peripheral pulses intact  Skin:     General: Skin is warm and dry  Comments: There are several small superficial abrasions to the right elbow  Neurological:      General: No focal deficit present  Mental Status: She is alert     Psychiatric:         Mood and Affect: Mood normal          Behavior: Behavior normal          ED Medications  Medications   tetanus-diphtheria-acellular pertussis (BOOSTRIX) IM injection 0 5 mL (0 5 mL Intramuscular Given 8/31/21 1105)   acetaminophen (TYLENOL) tablet 975 mg (975 mg Oral Given 8/31/21 1103)       Diagnostic Studies  Results Reviewed     None                 XR shoulder 2+ views RIGHT   ED Interpretation by Alayna Balbuena DO (08/31 1216)   No acute osseous abnormality      Final Result by Wang Workman MD (08/31 2744)      No acute osseous abnormality  Degenerative changes as described  Workstation performed: [id number]         XR humerus RIGHT   ED Interpretation by Alayna Balbuena DO (08/31 1216)   No acute osseous abnormality      Final Result by Wang Workman MD (08/31 3909)      No acute osseous abnormality  Degenerative changes as described  Workstation performed: [id number]         XR elbow 3+ views RIGHT   ED Interpretation by Alayna Balbuena DO (08/31 1216)   No acute osseous abnormality      Final Result by Wang Workman MD (08/31 0862)      No acute osseous abnormality  Degenerative changes as described  Workstation performed: [id number]         XR scapula RIGHT   ED Interpretation by Alayna Balbuena DO (08/31 1216)   No acute osseous abnormality      Final Result by Wang Workman MD (08/31 7215)      No acute osseous abnormality  Degenerative changes as described  Workstation performed: [id number]               Procedures  Procedures      ED Course               Identification of Seniors at Risk      Most Recent Value   (ISAR) Identification of Seniors at Risk   Before the illness or injury that brought you to the Emergency, did you need someone to help you on a regular basis? 0 Filed at: 08/31/2021 1055   In the last 24 hours, have you needed more help than usual?  0 Filed at: 08/31/2021 1055   Have you been hospitalized for one or more nights during the past 6 months? 0 Filed at: 08/31/2021 1055   In general, do you see well?  0 Filed at: 08/31/2021 1055   In general, do you have serious problems with your memory?   0 Filed at: 08/31/2021 1055 Do you take more than three different medications every day? 1 Filed at: 08/31/2021 1055   ISAR Score  1 Filed at: 08/31/2021 1055                              [facility]  Number of Diagnoses or Management Options  Abrasion of right elbow, initial encounter  Fall, initial encounter  Right elbow pain  Strain of right shoulder, initial encounter  Diagnosis management comments: Patient is a 64 y o  female who presents to the ED for right upper extremity pain after a fall  Significant physical exam findings include tenderness to palpation over the right humerus, elbow trapezius, ac joint, and scapula       Likely strain/sprain  Low suspicion for fracture or dislocation as patient is able to move her entire RUE without much difficulty  Plan: Tdap, Plain films, likely d/c with sling for comfort  Portions of the record may have been created with voice recognition software  Occasional wrong word or "sound a like" substitutions may have occurred due to the inherent limitations of voice recognition software  Read the chart carefully and recognize, using context, where substitutions have occurred  Amount and/or Complexity of Data Reviewed  Tests in the radiology section of CPT®: ordered and reviewed  Independent visualization of images, tracings, or specimens: yes    Risk of Complications, Morbidity, and/or Mortality  Presenting problems: moderate  Diagnostic procedures: moderate  Management options: low    Patient Progress  Patient progress: stable      Disposition  Final diagnoses:   Fall, initial encounter   Strain of right shoulder, initial encounter   Right elbow pain   Abrasion of right elbow, initial encounter   Pain of right scapula   Right arm pain     Time reflects when diagnosis was documented in both MDM as applicable and the Disposition within this note     Time User Action Codes Description Comment    8/31/2021 12:01 PM Alexsandra Delvalle Add [I97  GTML] Fall, initial encounter     8/31/2021 12:14 PM Sharmon Tee Add [V33 734I] Strain of right shoulder, initial encounter     8/31/2021 12:14 PM Sharmon Tee Add [B23 206] Right elbow pain     8/31/2021 12:15 PM Sharmon Tee Add [S50 311A] Abrasion of right elbow, initial encounter     9/1/2021  2:49 PM Sharmon Tee Add [D98 8X7] Pain of right scapula     9/1/2021  2:49 PM Sharmon Tee Add [G39 769] Right arm pain       ED Disposition     ED Disposition Condition Date/Time Comment    Discharge Stable Tue Aug 31, 2021 12:01 PM Daniela Fragmin discharge to home/self care  Follow-up Information     Follow up With Specialties Details Why Contact Info Additional Beto Moya 46, DO Family Medicine   [address] [facility] [address] [place] [address] Emergency Department Emergency Medicine  As needed [address] [facility] [address]  [address] [place] [address] Emergency Department, [address], [place], [place], [facility]   [phone]          Discharge Medication List as of 8/31/2021 12:16 PM      CONTINUE these medications which have NOT CHANGED    Details   acetaminophen (TYLENOL) 325 mg tablet Take 325 mg by mouth as needed, Historical Med      albuterol (Ventolin HFA) 90 mcg/act inhaler Inhale 2 puffs every 6 (six) hours as needed for wheezing, Starting Tue 3/31/2020, Normal      amLODIPine (NORVASC) 10 mg tablet Take 1 tablet (10 mg total) by mouth daily, Starting Mon 2/15/2021, Normal      atorvastatin (LIPITOR) 20 mg tablet Take 1 tablet (20 mg total) by mouth daily, Starting Mon 2/15/2021, Normal      buPROPion (WELLBUTRIN SR) 150 mg 12 hr tablet Take 1 tablet (150 mg total) by mouth 2 (two) times a day, Starting Mon 4/19/2021, Normal      Cholecalciferol (Vitamin D3) 1 25 MG (85188 UT) CAPS Take 5,000 Units by mouth daily, Historical Med      fluticasone (FLOVENT HFA) 110 MCG/ACT inhaler Inhale 2 puffs 2 (two) times a day Rinse mouth after use , Starting Tue 3/31/2020, Normal      levothyroxine 137 mcg tablet Take 1 tablet (137 mcg total) by mouth daily, Starting Tue 8/31/2021, Normal      lisinopril (ZESTRIL) 40 mg tablet TAKE 1 TABLET BY MOUTH EVERY DAY, Normal      meclizine (ANTIVERT) 25 mg tablet Take 1 tablet (25 mg total) by mouth 3 (three) times a day as needed for dizziness for up to 10 days, Starting Tue 2/4/2020, Until Mon 6/14/2021 at 2359, Normal      metroNIDAZOLE (METROGEL) 1 % gel Apply topically daily, Starting Mon 8/12/2019, Normal      pantoprazole (PROTONIX) 40 mg tablet Take 1 tablet (40 mg total) by mouth 2 (two) times a day, Starting Fri 1/15/2021, Normal      polyethylene glycol (GLYCOLAX) 17 GM/SCOOP powder Take 17 g by mouth, Historical Med      RESTASIS 0 05 % ophthalmic emulsion Starting Wed 11/7/2018, Historical Med      sertraline (ZOLOFT) 100 mg tablet Take 2 tablets (200 mg total) by mouth daily, Starting Mon 2/15/2021, Normal      sucralfate (CARAFATE) 1 g tablet Take 1 tablet (1 g total) by mouth 4 (four) times a day, Starting Thu 1/21/2021, Normal      triamcinolone (KENALOG) 0 1 % ointment Apply topically twice a day to upper back for 1 month, Normal      Wheat Dextrin (Benefiber) POWD Take by mouth, Historical Med           No discharge procedures on file  PDMP Review     None           ED Provider  Attending physically available and evaluated Ian Arceo I managed the patient along with the ED Attending      Electronically Signed by         Sasha Pappas DO  09/01/21 8059

## 2021-09-01 ENCOUNTER — APPOINTMENT (EMERGENCY)
Dept: RADIOLOGY | Facility: HOSPITAL | Age: 76
DRG: 645 | End: 2021-09-01
Payer: MEDICARE

## 2021-09-01 ENCOUNTER — APPOINTMENT (INPATIENT)
Dept: NON INVASIVE DIAGNOSTICS | Facility: HOSPITAL | Age: 76
DRG: 645 | End: 2021-09-01
Payer: MEDICARE

## 2021-09-01 ENCOUNTER — TELEPHONE (OUTPATIENT)
Dept: FAMILY MEDICINE CLINIC | Facility: CLINIC | Age: 76
End: 2021-09-01

## 2021-09-01 ENCOUNTER — HOSPITAL ENCOUNTER (INPATIENT)
Facility: HOSPITAL | Age: 76
LOS: 1 days | Discharge: HOME | DRG: 645 | End: 2021-09-02
Attending: EMERGENCY MEDICINE | Admitting: INTERNAL MEDICINE
Payer: MEDICARE

## 2021-09-01 DIAGNOSIS — R42 LIGHTHEADEDNESS: ICD-10-CM

## 2021-09-01 DIAGNOSIS — R55 SYNCOPE: Primary | ICD-10-CM

## 2021-09-01 DIAGNOSIS — R42 VERTIGO: ICD-10-CM

## 2021-09-01 PROBLEM — D64.9 ANEMIA: Status: ACTIVE | Noted: 2021-09-01

## 2021-09-01 LAB
ANION GAP SERPL CALCULATED.3IONS-SCNC: 6 MMOL/L (ref 4–13)
BASOPHILS # BLD AUTO: 0.06 THOUSANDS/ΜL (ref 0–0.1)
BASOPHILS NFR BLD AUTO: 1 % (ref 0–1)
BUN SERPL-MCNC: 28 MG/DL (ref 5–25)
CALCIUM SERPL-MCNC: 8.5 MG/DL (ref 8.3–10.1)
CHLORIDE SERPL-SCNC: 112 MMOL/L (ref 100–108)
CO2 SERPL-SCNC: 24 MMOL/L (ref 21–32)
CREAT SERPL-MCNC: 0.89 MG/DL (ref 0.6–1.3)
EOSINOPHIL # BLD AUTO: 0.39 THOUSAND/ΜL (ref 0–0.61)
EOSINOPHIL NFR BLD AUTO: 5 % (ref 0–6)
ERYTHROCYTE [DISTWIDTH] IN BLOOD BY AUTOMATED COUNT: 15 % (ref 11.6–15.1)
GFR SERPL CREATININE-BSD FRML MDRD: 62 ML/MIN/1.73SQ M
GLUCOSE SERPL-MCNC: 101 MG/DL (ref 65–140)
HCT VFR BLD AUTO: 32.6 % (ref 34.8–46.1)
HGB BLD-MCNC: 10 G/DL (ref 11.5–15.4)
IMM GRANULOCYTES # BLD AUTO: 0.02 THOUSAND/UL (ref 0–0.2)
IMM GRANULOCYTES NFR BLD AUTO: 0 % (ref 0–2)
LYMPHOCYTES # BLD AUTO: 1.23 THOUSANDS/ΜL (ref 0.6–4.47)
LYMPHOCYTES NFR BLD AUTO: 16 % (ref 14–44)
MCH RBC QN AUTO: 27.6 PG (ref 26.8–34.3)
MCHC RBC AUTO-ENTMCNC: 30.7 G/DL (ref 31.4–37.4)
MCV RBC AUTO: 90 FL (ref 82–98)
MONOCYTES # BLD AUTO: 0.64 THOUSAND/ΜL (ref 0.17–1.22)
MONOCYTES NFR BLD AUTO: 8 % (ref 4–12)
NEUTROPHILS # BLD AUTO: 5.35 THOUSANDS/ΜL (ref 1.85–7.62)
NEUTS SEG NFR BLD AUTO: 70 % (ref 43–75)
NRBC BLD AUTO-RTO: 0 /100 WBCS
PLATELET # BLD AUTO: 261 THOUSANDS/UL (ref 149–390)
PMV BLD AUTO: 10.3 FL (ref 8.9–12.7)
POTASSIUM SERPL-SCNC: 4.1 MMOL/L (ref 3.5–5.3)
RBC # BLD AUTO: 3.62 MILLION/UL (ref 3.81–5.12)
SODIUM SERPL-SCNC: 142 MMOL/L (ref 136–145)
TROPONIN I SERPL-MCNC: <0.02 NG/ML
WBC # BLD AUTO: 7.69 THOUSAND/UL (ref 4.31–10.16)

## 2021-09-01 PROCEDURE — 99223 1ST HOSP IP/OBS HIGH 75: CPT | Performed by: INTERNAL MEDICINE

## 2021-09-01 PROCEDURE — 93306 TTE W/DOPPLER COMPLETE: CPT

## 2021-09-01 PROCEDURE — 80048 BASIC METABOLIC PNL TOTAL CA: CPT | Performed by: EMERGENCY MEDICINE

## 2021-09-01 PROCEDURE — 36415 COLL VENOUS BLD VENIPUNCTURE: CPT | Performed by: EMERGENCY MEDICINE

## 2021-09-01 PROCEDURE — 93005 ELECTROCARDIOGRAM TRACING: CPT

## 2021-09-01 PROCEDURE — 99285 EMERGENCY DEPT VISIT HI MDM: CPT

## 2021-09-01 PROCEDURE — 85025 COMPLETE CBC W/AUTO DIFF WBC: CPT | Performed by: EMERGENCY MEDICINE

## 2021-09-01 PROCEDURE — 84484 ASSAY OF TROPONIN QUANT: CPT | Performed by: EMERGENCY MEDICINE

## 2021-09-01 PROCEDURE — 70450 CT HEAD/BRAIN W/O DYE: CPT

## 2021-09-01 PROCEDURE — 71045 X-RAY EXAM CHEST 1 VIEW: CPT

## 2021-09-01 PROCEDURE — 99285 EMERGENCY DEPT VISIT HI MDM: CPT | Performed by: EMERGENCY MEDICINE

## 2021-09-01 RX ORDER — SERTRALINE HYDROCHLORIDE 100 MG/1
200 TABLET, FILM COATED ORAL DAILY
Status: DISCONTINUED | OUTPATIENT
Start: 2021-09-01 | End: 2021-09-02 | Stop reason: HOSPADM

## 2021-09-01 RX ORDER — PANTOPRAZOLE SODIUM 40 MG/1
40 TABLET, DELAYED RELEASE ORAL
Status: DISCONTINUED | OUTPATIENT
Start: 2021-09-01 | End: 2021-09-02 | Stop reason: HOSPADM

## 2021-09-01 RX ORDER — LISINOPRIL 20 MG/1
40 TABLET ORAL DAILY
Status: DISCONTINUED | OUTPATIENT
Start: 2021-09-02 | End: 2021-09-02 | Stop reason: HOSPADM

## 2021-09-01 RX ORDER — ALBUTEROL SULFATE 90 UG/1
2 AEROSOL, METERED RESPIRATORY (INHALATION) EVERY 6 HOURS PRN
Status: DISCONTINUED | OUTPATIENT
Start: 2021-09-01 | End: 2021-09-02 | Stop reason: HOSPADM

## 2021-09-01 RX ORDER — ATORVASTATIN CALCIUM 20 MG/1
20 TABLET, FILM COATED ORAL
Status: DISCONTINUED | OUTPATIENT
Start: 2021-09-01 | End: 2021-09-02 | Stop reason: HOSPADM

## 2021-09-01 RX ORDER — BUPROPION HYDROCHLORIDE 150 MG/1
150 TABLET ORAL DAILY
Status: DISCONTINUED | OUTPATIENT
Start: 2021-09-01 | End: 2021-09-01

## 2021-09-01 RX ORDER — BUPROPION HYDROCHLORIDE 150 MG/1
300 TABLET ORAL DAILY
Status: DISCONTINUED | OUTPATIENT
Start: 2021-09-01 | End: 2021-09-02 | Stop reason: HOSPADM

## 2021-09-01 RX ORDER — FLUTICASONE PROPIONATE 110 UG/1
2 AEROSOL, METERED RESPIRATORY (INHALATION) 2 TIMES DAILY
Status: DISCONTINUED | OUTPATIENT
Start: 2021-09-01 | End: 2021-09-02 | Stop reason: HOSPADM

## 2021-09-01 RX ORDER — AMLODIPINE BESYLATE 10 MG/1
10 TABLET ORAL DAILY
Status: DISCONTINUED | OUTPATIENT
Start: 2021-09-02 | End: 2021-09-02 | Stop reason: HOSPADM

## 2021-09-01 RX ORDER — ACETAMINOPHEN 325 MG/1
325 TABLET ORAL EVERY 6 HOURS PRN
Status: DISCONTINUED | OUTPATIENT
Start: 2021-09-01 | End: 2021-09-02 | Stop reason: HOSPADM

## 2021-09-01 RX ORDER — LANOLIN ALCOHOL/MO/W.PET/CERES
3 CREAM (GRAM) TOPICAL
Status: DISCONTINUED | OUTPATIENT
Start: 2021-09-01 | End: 2021-09-02 | Stop reason: HOSPADM

## 2021-09-01 RX ADMIN — ATORVASTATIN CALCIUM 20 MG: 20 TABLET, FILM COATED ORAL at 17:24

## 2021-09-01 RX ADMIN — PANTOPRAZOLE SODIUM 40 MG: 40 TABLET, DELAYED RELEASE ORAL at 17:25

## 2021-09-01 RX ADMIN — ACETAMINOPHEN 325 MG: 325 TABLET, FILM COATED ORAL at 17:29

## 2021-09-01 RX ADMIN — BUPROPION HYDROCHLORIDE 300 MG: 150 TABLET, FILM COATED, EXTENDED RELEASE ORAL at 17:24

## 2021-09-01 RX ADMIN — SERTRALINE 200 MG: 100 TABLET, FILM COATED ORAL at 17:24

## 2021-09-01 RX ADMIN — Medication 3 MG: at 23:56

## 2021-09-01 RX ADMIN — LEVOTHYROXINE SODIUM 137 MCG: 25 TABLET ORAL at 17:24

## 2021-09-01 NOTE — TELEPHONE ENCOUNTER
Matthias Xie from [facility] Radiology called, patient had a Chest CT and there are significant findings in [facility]

## 2021-09-01 NOTE — ED PROVIDER NOTES
History  Chief Complaint   Patient presents with    Dizziness     pt walked from waiting room to ED bed without a problem but states that she started to feel lightheaded this morning and thought she should come over for eval  no change in meds  HPI  12-year-old woman presenting with chief complaint of dizziness  Patient states that over the past 3 months she will often become lightheaded and feel like she is going to slump over, and will then go to sleep suddenly  This often happens any sleeping position  It has become much more common in the past week  Patient's sister is present and based on the history she is providing, it actually sounds like the patient may be having frequent syncopal episodes  Patient denies any chest pain or shortness of breath but does note increased fatigue with exertion  She has also had a worsening cough over the past few months which she recently had imaging for but has not been read yet  Patient denies any fever or chills  Denies leg pain or swelling  Denies abdominal pain, nausea or vomiting  Denies headaches  Denies weakness, numbness, tingling in extremities  Prior to Admission Medications   Prescriptions Last Dose Informant Patient Reported? Taking?    Cholecalciferol (Vitamin D3) 1 25 MG (73217 UT) CAPS   Yes No   Sig: Take 5,000 Units by mouth daily   RESTASIS 0 05 % ophthalmic emulsion  Self Yes No   Wheat Dextrin (Benefiber) POWD   Yes No   Sig: Take by mouth   acetaminophen (TYLENOL) 325 mg tablet   Yes No   Sig: Take 325 mg by mouth as needed   albuterol (Ventolin HFA) 90 mcg/act inhaler   No No   Sig: Inhale 2 puffs every 6 (six) hours as needed for wheezing   amLODIPine (NORVASC) 10 mg tablet   No No   Sig: Take 1 tablet (10 mg total) by mouth daily   atorvastatin (LIPITOR) 20 mg tablet   No No   Sig: Take 1 tablet (20 mg total) by mouth daily   buPROPion (WELLBUTRIN SR) 150 mg 12 hr tablet   No No   Sig: Take 1 tablet (150 mg total) by mouth 2 (two) times a day   fluticasone (FLOVENT HFA) 110 MCG/ACT inhaler   No No   Sig: Inhale 2 puffs 2 (two) times a day Rinse mouth after use     levothyroxine 137 mcg tablet   No No   Sig: Take 1 tablet (137 mcg total) by mouth daily   lisinopril (ZESTRIL) 40 mg tablet   No No   Sig: TAKE 1 TABLET BY MOUTH EVERY DAY   meclizine (ANTIVERT) 25 mg tablet  Self No No   Sig: Take 1 tablet (25 mg total) by mouth 3 (three) times a day as needed for dizziness for up to 10 days   metroNIDAZOLE (METROGEL) 1 % gel  Self No No   Sig: Apply topically daily   pantoprazole (PROTONIX) 40 mg tablet   No No   Sig: Take 1 tablet (40 mg total) by mouth 2 (two) times a day   polyethylene glycol (GLYCOLAX) 17 GM/SCOOP powder   Yes No   Sig: Take 17 g by mouth   sertraline (ZOLOFT) 100 mg tablet   No No   Sig: Take 2 tablets (200 mg total) by mouth daily   sucralfate (CARAFATE) 1 g tablet   No No   Sig: Take 1 tablet (1 g total) by mouth 4 (four) times a day   Patient not taking: Reported on 6/14/2021   triamcinolone (KENALOG) 0 1 % ointment   No No   Sig: Apply topically twice a day to upper back for 1 month   Patient not taking: Reported on 6/14/2021      Facility-Administered Medications: None       Past Medical History:   Diagnosis Date    Acid reflux     Anxiety     Arthritis     Asthma     C1-C4 level with central cord syndrome (HCC)     Resolved 2/1/2017     Carpal tunnel syndrome Resolved 4/21/2015    Colitis     Colon polyp     Concussion     Depressed     Dysthymic disorder     Resolved 1/5/2018    Gastric ulcer 2013    small - on EGD - [facility]    Hemorrhoids     Hx of blood clots     Hx of colonic polyps     [place]    Hyperlipemia     Hypertension     Hypothyroid     Lung nodule     stable x 2 yrs on CT    Migraines     Obesity     Postural dizziness with presyncope     Resolved 8/28/2018     Tendinitis     Ulnar neuropathy     Resolved 5/21/2015        Past Surgical History:   Procedure Laterality Date    APPENDECTOMY      BREAST SURGERY Left 1966    BREAST LUMPECTOMY    CARPAL TUNNEL RELEASE      CHOLECYSTECTOMY      DILATION AND CURETTAGE OF UTERUS      ELBOW SURGERY      EYE SURGERY Bilateral 2019    Cataract     GALLBLADDER SURGERY      JOINT REPLACEMENT Right     REPLACEMENT TOTAL KNEE    KNEE ARTHROSCOPY      KNEE SURGERY Right     NECK SURGERY      POSTERIOR LAMINECTOMY / DECOMPRESSION CERVICAL SPINE  2015    TONSILLECTOMY      VAGINAL DELIVERY      x3    WRIST SURGERY Right        Family History   Problem Relation Age of Onset    Breast cancer Mother     Alzheimer's disease Mother     Coronary artery disease Mother     Hypertension Mother    Areta Emmer Migraines Mother     Peripheral vascular disease Mother    Areta Emmer Parkinsonism Father     Other Father         Cardiovascular disease     Coronary artery disease Father     Hypertension Father     Bipolar disorder Sister     Asthma Daughter     Asthma Son      I have reviewed and agree with the history as documented  E-Cigarette/Vaping    E-Cigarette Use Never User      E-Cigarette/Vaping Substances    Nicotine No     THC No     CBD No     Flavoring No     Other No     Unknown No      Social History     Tobacco Use    Smoking status: Former Smoker     Packs/day: 0 00     Years: 0 00     Pack years: 0 00     Types: Cigarettes     Start date:      Quit date: 10/14/2013     Years since quittin 8    Smokeless tobacco: Never Used   Vaping Use    Vaping Use: Never used   Substance Use Topics    Alcohol use: No    Drug use: No       Review of Systems   Constitutional: Positive for fatigue  Negative for chills and fever  HENT: Negative  Eyes: Negative  Respiratory: Negative for cough and shortness of breath  Cardiovascular: Negative for chest pain and leg swelling  Gastrointestinal: Negative for abdominal pain, diarrhea, nausea and vomiting  Endocrine: Negative  Genitourinary: Negative      Musculoskeletal: Negative  Skin: Negative  Neurological: Positive for dizziness and syncope  Negative for weakness and numbness  Hematological: Negative  Physical Exam  Physical Exam  Vitals reviewed  Constitutional:       General: She is not in acute distress  Appearance: Normal appearance  HENT:      Head: Normocephalic and atraumatic  Mouth/Throat:      Mouth: Mucous membranes are moist       Pharynx: Oropharynx is clear  Eyes:      Conjunctiva/sclera: Conjunctivae normal       Pupils: Pupils are equal, round, and reactive to light  Cardiovascular:      Rate and Rhythm: Normal rate and regular rhythm  Heart sounds: No murmur heard  No friction rub  No gallop  Pulmonary:      Effort: No respiratory distress  Breath sounds: Normal breath sounds  Abdominal:      General: There is no distension  Palpations: Abdomen is soft  Tenderness: There is no abdominal tenderness  Musculoskeletal:      Cervical back: Neck supple  Skin:     General: Skin is warm and dry  Neurological:      General: No focal deficit present  Mental Status: She is alert and oriented to person, place, and time           Vital Signs  ED Triage Vitals   Temperature Pulse Respirations BP SpO2   09/01/21 1038 09/01/21 1038 09/01/21 1038 -- 09/01/21 1038   97 9 °F (36 6 °C) 79 20  96 %      Temp Source Heart Rate Source Patient Position - Orthostatic VS BP Location FiO2 (%)   09/01/21 1117 09/01/21 1117 -- -- --   Oral Monitor         Pain Score       --                  Vitals:    09/01/21 1038 09/01/21 1117   Pulse: 79 65         Visual Acuity      ED Medications  Medications - No data to display    Diagnostic Studies  Results Reviewed     Procedure Component Value Units Date/Time    Troponin I [[id number]]  (Normal) Collected: 09/01/21 1129    Lab Status: Final result Specimen: Blood from Arm, Left Updated: 09/01/21 1209     Troponin I <0 02 ng/mL     Basic metabolic panel [[id number]]  (Abnormal) Collected: 09/01/21 1129    Lab Status: Final result Specimen: Blood from Arm, Left Updated: 09/01/21 1204     Sodium 142 mmol/L      Potassium 4 1 mmol/L      Chloride 112 mmol/L      CO2 24 mmol/L      ANION GAP 6 mmol/L      BUN 28 mg/dL      Creatinine 0 89 mg/dL      Glucose 101 mg/dL      Calcium 8 5 mg/dL      eGFR 62 ml/min/1 73sq m     Narrative:      [place] guidelines for Chronic Kidney Disease (CKD):     Stage 1 with normal or high GFR (GFR > 90 mL/min/1 73 square meters)    Stage 2 Mild CKD (GFR = 60-89 mL/min/1 73 square meters)    Stage 3A Moderate CKD (GFR = 45-59 mL/min/1 73 square meters)    Stage 3B Moderate CKD (GFR = 30-44 mL/min/1 73 square meters)    Stage 4 Severe CKD (GFR = 15-29 mL/min/1 73 square meters)    Stage 5 End Stage CKD (GFR <15 mL/min/1 73 square meters)  Note: GFR calculation is accurate only with a steady state creatinine    CBC and differential [[id number]]  (Abnormal) Collected: 09/01/21 1129    Lab Status: Final result Specimen: Blood from Arm, Left Updated: 09/01/21 1150     WBC 7 69 Thousand/uL      RBC 3 62 Million/uL      Hemoglobin 10 0 g/dL      Hematocrit 32 6 %      MCV 90 fL      MCH 27 6 pg      MCHC 30 7 g/dL      RDW 15 0 %      MPV 10 3 fL      Platelets 212 Thousands/uL      nRBC 0 /100 WBCs      Neutrophils Relative 70 %      Immat GRANS % 0 %      Lymphocytes Relative 16 %      Monocytes Relative 8 %      Eosinophils Relative 5 %      Basophils Relative 1 %      Neutrophils Absolute 5 35 Thousands/µL      Immature Grans Absolute 0 02 Thousand/uL      Lymphocytes Absolute 1 23 Thousands/µL      Monocytes Absolute 0 64 Thousand/µL      Eosinophils Absolute 0 39 Thousand/µL      Basophils Absolute 0 06 Thousands/µL                  CT head without contrast   Final Result by He Wynn DO (09/01 1250)      No acute intracranial abnormality  Microangiopathic changes                    Workstation performed: [id number] XR chest 1 view portable   Final Result by Jeremy Johnston MD (09/01 1213)      No acute cardiopulmonary disease  Workstation performed: [id number]                    Procedures  ECG 12 Lead Documentation Only    Date/Time: 9/1/2021 11:59 AM  Performed by: Janet Velez MD  Authorized by: Janet Velez MD     ECG reviewed by me, the ED Provider: yes    Patient location:  ED  Interpretation:     Interpretation comment:  Normal sinus rhythm with occasional PVC  Intraventricular conduction delay present  Normal QTC  No acute ST abnormality  Rate:     ECG rate:  70             ED Course               Identification of Seniors at Risk      Most Recent Value   (ISAR) Identification of Seniors at Risk   Before the illness or injury that brought you to the Emergency, did you need someone to help you on a regular basis? 0 Filed at: 09/01/2021 1041   In the last 24 hours, have you needed more help than usual?  0 Filed at: 09/01/2021 1041   Have you been hospitalized for one or more nights during the past 6 months? 0 Filed at: 09/01/2021 1041   In general, do you see well?  0 Filed at: 09/01/2021 1041   In general, do you have serious problems with your memory? 0 Filed at: 09/01/2021 1041   Do you take more than three different medications every day? 1 Filed at: 09/01/2021 1041   ISAR Score  1 Filed at: 09/01/2021 1041                    SBIRT 20yo+      Most Recent Value   SBIRT (25 yo +)   In order to provide better care to our patients, we are screening all of our patients for alcohol and drug use  Would it be okay to ask you these screening questions? No Filed at: 09/01/2021 1108                    MDM  Number of Diagnoses or Management Options  Lightheadedness  Syncope  Diagnosis management comments: 66-year-old woman presenting with what sounds like multiple syncopal episodes happening over the past 3 months    Labs, EKG, and imaging done in the emergency department did not demonstrate any significant acute abnormality, patient does have some chronic anemia  However she did recently have TSH testing which resulted significantly abnormal   She is not currently on any treatment for hypothyroidism  Will admit for further workup and management  Amount and/or Complexity of Data Reviewed  Clinical lab tests: ordered and reviewed  Tests in the radiology section of CPT®: ordered and reviewed        Disposition  Final diagnoses:   Syncope   Lightheadedness     Time reflects when diagnosis was documented in both MDM as applicable and the Disposition within this note     Time User Action Codes Description Comment    9/1/2021 12:55 PM Baylee Choe Add [R55] Syncope     9/1/2021 12:55 PM Baylee Choe Add [R42] [address] [facility]       ED Disposition     ED Disposition Condition Date/Time Comment    Admit Stable Wed Sep 1, 2021  1:00 PM Case was discussed with Dr Goldie Mcclure and the patient's admission status was agreed to be Admission Status: inpatient status to the service of Dr Goldie Mcclure   Follow-up Information    None         Patient's Medications   Discharge Prescriptions    No medications on file     No discharge procedures on file      PDMP Review     None          ED Provider  Electronically Signed by           Devin Galvan MD  09/01/21 9435

## 2021-09-01 NOTE — TELEPHONE ENCOUNTER
Pt is at times sitting at table and feels like she is going to slip and fall off  States she felt limp, but was awake  Denies numbness/ tingling, no palpitations or chest pain or SOB  States it has happened at least four times yesterday, and has been moving slower than usual   Pt did fall yesterday- shoe got caught and injured shoulder  States she did not tell them about the above symptoms because they were not the cause of the fall and didn't really start until after the fall  Instructed pt to go to the ER as soon as possible- to have someone drive her there- that they should do labs, check her heart, and a CT of her head

## 2021-09-01 NOTE — ASSESSMENT & PLAN NOTE
· Patient experienced 4 near syncopal episodes yesterday evening  · Per sister for the past month she has been dozing off in the middle of the conversation for a few minutes, then regains consciousness and she is back at her baseline in a few seconds  · Suspect secondary to profound hypothyroidism, patient is noncompliant with her levothyroxine, last TSH 2 days ago was 62, rule out conduction disorder from profound hypothyroidism  · EKG with normal sinus rhythm and occasional PVCs  · No electrolyte disbalance  · Nonfocal neurologic exam  · CT head showed no acute intracranial abnormality    Has microangiopathic changes  · Will keep patient on telemetry overnight  · Echo  · Orthostatic vitals  · Stressed the importance of compliance with medications

## 2021-09-01 NOTE — TELEPHONE ENCOUNTER
Results reviewed  Can await PCP return to office  See pending PCP result note  IMPRESSION:        1  Multiple small stable nonspecific pulmonary nodules including the dominant left lower lobe nodule noted on CT abdomen pelvis 4/3/2021 and PET/CT 4/20/2021  Lack of interim growth since the previous examinations suggests a benign etiology but on   current Fleischner Society 2017 Guidelines on incidental pulmonary nodule, additional 12 month follow-up CT suggested  2   Additional findings as noted

## 2021-09-01 NOTE — H&P
[address] [facility]  H&P- Worthy Simyael 1945, 64 y o  female MRN: [id number]  Unit/Bed#: ED 26 Encounter: [id number]  Primary Care Provider: Chris Mclean DO   Date and time admitted to hospital: 9/1/2021 10:30 AM    * Near syncope  Assessment & Plan  · Patient experienced 4 near syncopal episodes yesterday evening  · Per sister for the past month she has been dozing off in the middle of the conversation for a few minutes, then regains consciousness and she is back at her baseline in a few seconds  · Suspect secondary to profound hypothyroidism, patient is noncompliant with her levothyroxine, last TSH 2 days ago was 62, rule out conduction disorder from profound hypothyroidism  · EKG with normal sinus rhythm and occasional PVCs  · No electrolyte disbalance  · Nonfocal neurologic exam  · CT head showed no acute intracranial abnormality  Has microangiopathic changes  · Will keep patient on telemetry overnight  · Echo  · Orthostatic vitals  · Stressed the importance of compliance with medications      Hypothyroidism  Assessment & Plan  Per patient she was on 200 mcg of levothyroxine in the past, last year it was lowered to 150 mcg and then to 125 mcg due to over suppressed TSH  Patient is noncompliant with levothyroxine  Her TSH in July 2021 was 68, repeat one 2 days ago 62  Suspect her symptoms are related due to hypothyroidism, noncompliance with levothyroxine  Her PCP increased her levothyroxine to 137 mcg yesterday, will continue the same dose  Recommend repeat TSH in 4 weeks     Anemia  Assessment & Plan  Chronic anemia, hemoglobin is at baseline  Outpatient workup    Mixed anxiety and depressive disorder  Assessment & Plan  Continue Wellbutrin and Zoloft    Mild persistent asthma without complication  Assessment & Plan  Not in acute exacerbation  Continue albuterol inhaler p r n      Gastroesophageal reflux disease  Assessment & Plan  Continue on a PPI    Essential hypertension  Assessment & Plan  Well controlled on amlodipine 10 mg daily and lisinopril 40 mg daily      VTE Pharmacologic Prophylaxis: VTE Score: 4 Moderate Risk (Score 3-4) - Pharmacological DVT Prophylaxis Ordered: enoxaparin (Lovenox)  Code Status: Level 1 - Full Code   Discussion with family: Updated  (sister) at bedside  Anticipated Length of Stay: Patient will be admitted on an inpatient basis with an anticipated length of stay of greater than 2 midnights secondary to Syncope workup  Total Time for Visit, including Counseling / Coordination of Care: 45 minutes Greater than 50% of this total time spent on direct patient counseling and coordination of care  Chief Complaint:  Near-syncope    History of Present Illness:  Carrie Mcrae is a 64 y o  female with a PMH of hypertension, hyperlipidemia, anxiety/depression, hypothyroidism who presents with near-syncope  Patient went to bed yesterday around 7:00 a m     Woke up around 11:00 a m  and felt lightheaded  She was sitting and looking at her iPad and had 4 near syncopal episodes  Denies losing consciousness  After that she fell asleep and woke up this morning at 80 a m  Slightly lightheaded  She has been lightheaded on and off for the past month  Sister who is at bedside stated that while they were watching TV she would just dose off for a few minutes, snoring and then she woke up and be back at her baseline a few seconds  She is not remembering these events  Patient denies dizziness, visual changes, chest pain palpitation shortness of breath prior to these episodes  No bladder or bowel incontinence  Denies numbness or weakness in the extremities  Patient has a history of hypothyroidism, noncompliant with levothyroxine for the past month  Her last TSH was 62  Her PCP increase the dose to 137 mcg from 125 mcg yesterday  Upon my evaluation patient is awake alert and oriented, she was walking back from the restroom    Denies dizziness, lightheadedness, visual changes, chest pain palpitation shortness of breath nausea vomiting abdominal pain  Review of Systems:  Review of Systems all reviewed and negative except as above    Past Medical and Surgical History:   Past Medical History:   Diagnosis Date    Acid reflux     Anxiety     Arthritis     Asthma     C1-C4 level with central cord syndrome ([address] )     Resolved 2/1/2017     Carpal tunnel syndrome Resolved 4/21/2015    Colitis     Colon polyp     Concussion     Depressed     Dysthymic disorder     Resolved 1/5/2018    Gastric ulcer 2013    small - on EGD - [facility]    Hemorrhoids     Hx of blood clots     Hx of colonic polyps     [place]    Hyperlipemia     Hypertension     Hypothyroid     Lung nodule     stable x 2 yrs on CT    Migraines     Obesity     Postural dizziness with presyncope     Resolved 8/28/2018     Tendinitis     Ulnar neuropathy     Resolved 5/21/2015        Past Surgical History:   Procedure Laterality Date    APPENDECTOMY      BREAST SURGERY Left 01/1966    BREAST LUMPECTOMY    CARPAL TUNNEL RELEASE      CHOLECYSTECTOMY      DILATION AND CURETTAGE OF UTERUS      ELBOW SURGERY      EYE SURGERY Bilateral 2019    Cataract     GALLBLADDER SURGERY      JOINT REPLACEMENT Right     REPLACEMENT TOTAL KNEE    KNEE ARTHROSCOPY      KNEE SURGERY Right     NECK SURGERY      POSTERIOR LAMINECTOMY / DECOMPRESSION CERVICAL SPINE  02/2015    TONSILLECTOMY      VAGINAL DELIVERY      x3    WRIST SURGERY Right        Meds/Allergies:  Prior to Admission medications    Medication Sig Start Date End Date Taking?  Authorizing Provider   acetaminophen (TYLENOL) 325 mg tablet Take 325 mg by mouth as needed    Historical Provider, MD   albuterol (Ventolin HFA) 90 mcg/act inhaler Inhale 2 puffs every 6 (six) hours as needed for wheezing 3/31/20   Chata Monahan DO   amLODIPine (NORVASC) 10 mg tablet Take 1 tablet (10 mg total) by mouth daily 2/15/21   Boby Alcala Vijay Gonzalez,    atorvastatin (LIPITOR) 20 mg tablet Take 1 tablet (20 mg total) by mouth daily 2/15/21   Antonio Byrd DO   buPROPion [facility]) 150 mg 12 hr tablet Take 1 tablet (150 mg total) by mouth 2 (two) times a day 4/19/21   Antonio Byrd DO   Cholecalciferol (Vitamin D3) 1 25 MG (13033 UT) CAPS Take 5,000 Units by mouth daily    Historical Provider, MD   fluticasone (FLOVENT HFA) 110 MCG/ACT inhaler Inhale 2 puffs 2 (two) times a day Rinse mouth after use  3/31/20   Antonio Byrd DO   levothyroxine 137 mcg tablet Take 1 tablet (137 mcg total) by mouth daily 8/31/21   Antonio Byrd DO   lisinopril (ZESTRIL) 40 mg tablet TAKE 1 TABLET BY MOUTH EVERY DAY 4/21/21   Antonio Byrd DO   meclizine (ANTIVERT) 25 mg tablet Take 1 tablet (25 mg total) by mouth 3 (three) times a day as needed for dizziness for up to 10 days 2/4/20 6/14/21  To Presley MD   metroNIDAZOLE (METROGEL) 1 % gel Apply topically daily 8/12/19   Ivone Murphy DO   pantoprazole (PROTONIX) 40 mg tablet Take 1 tablet (40 mg total) by mouth 2 (two) times a day 1/15/21   Antonio Byrd DO   polyethylene glycol (GLYCOLAX) 17 GM/SCOOP powder Take 17 g by mouth    Historical Provider, MD   RESTASIS 0 05 % ophthalmic emulsion  11/7/18   Historical Provider, MD   sertraline (ZOLOFT) 100 mg tablet Take 2 tablets (200 mg total) by mouth daily 2/15/21   Antonio Byrd DO   sucralfate (CARAFATE) 1 g tablet Take 1 tablet (1 g total) by mouth 4 (four) times a day  Patient not taking: Reported on 6/14/2021 1/21/21   Antonio Byrd DO   triamcinolone (KENALOG) 0 1 % ointment Apply topically twice a day to upper back for 1 month  Patient not taking: Reported on 6/14/2021 10/20/20   Destin Dickson MD   Wheat Dextrin (Benefiber) POWD Take by mouth    Historical Provider, MD     I have reviewed home medications with patient personally  Allergies: Allergies   Allergen Reactions    Ceftin [Cefuroxime] Hives       Social History:  Marital Status:     Substance Use History:   Social History     Substance and Sexual Activity   Alcohol Use No     Social History     Tobacco Use   Smoking Status Former Smoker    Packs/day: 0 00    Years: 0 00    Pack years: 0 00    Types: Cigarettes    Start date:     Quit date: 10/14/2013    Years since quittin 8   Smokeless Tobacco Never Used     Social History     Substance and Sexual Activity   Drug Use No       Family History:  Family History   Problem Relation Age of Onset    Breast cancer Mother     Alzheimer's disease Mother     Coronary artery disease Mother     Hypertension Mother    [facility] Migraines Mother     Peripheral vascular disease Mother    [facility] Parkinsonism Father     Other Father         Cardiovascular disease     Coronary artery disease Father     Hypertension Father     Bipolar disorder Sister     Asthma Daughter     Asthma Son        Physical Exam:     Vitals:   Pulse: 65 (21 111)  Temperature: 97 5 °F (36 4 °C) (21 111)  Temp Source: Oral (21 111)  Respirations: 16 (21 111)  SpO2: 98 % (21 111)    Physical Exam  Constitutional:       General: She is not in acute distress  HENT:      Head: Atraumatic  Cardiovascular:      Rate and Rhythm: Normal rate and regular rhythm  Heart sounds: No murmur heard  No friction rub  No gallop  Pulmonary:      Effort: Pulmonary effort is normal  No respiratory distress  Breath sounds: Normal breath sounds  No wheezing  Abdominal:      General: Bowel sounds are normal  There is no distension  Palpations: Abdomen is soft  Tenderness: There is no abdominal tenderness  Musculoskeletal:         General: No swelling  Cervical back: Neck supple  Skin:     General: Skin is warm and dry  Neurological:      General: No focal deficit present  Mental Status: She is alert  Mental status is at baseline  Cranial Nerves: No cranial nerve deficit  Motor: No weakness        Gait: Gait normal  Psychiatric:         Mood and Affect: Mood normal           Additional Data:     Lab Results:  Results from last 7 days   Lab Units 09/01/21  1129   WBC Thousand/uL 7 69   HEMOGLOBIN g/dL 10 0*   HEMATOCRIT % 32 6*   PLATELETS Thousands/uL 261   NEUTROS PCT % 70   LYMPHS PCT % 16   MONOS PCT % 8   EOS PCT % 5     Results from last 7 days   Lab Units 09/01/21  1129   SODIUM mmol/L 142   POTASSIUM mmol/L 4 1   CHLORIDE mmol/L 112*   CO2 mmol/L 24   BUN mg/dL 28*   CREATININE mg/dL 0 89   ANION GAP mmol/L 6   CALCIUM mg/dL 8 5   GLUCOSE RANDOM mg/dL 101                       Imaging: Reviewed radiology reports from this admission including: CT head  CT head without contrast   Final Result by Ricardo Macias DO (09/01 1250)      No acute intracranial abnormality  Microangiopathic changes  Workstation performed: [id number]         XR chest 1 view portable   Final Result by Sachin Crain MD (09/01 1213)      No acute cardiopulmonary disease  Workstation performed: [id number]             EKG and Other Studies Reviewed on Admission:   · EKG: Normal sinus rhythm with occasional PVCs  ** Please Note: This note has been constructed using a voice recognition system   **

## 2021-09-01 NOTE — ASSESSMENT & PLAN NOTE
Per patient she was on 200 mcg of levothyroxine in the past, last year it was lowered to 150 mcg and then to 125 mcg due to over suppressed TSH  Patient is noncompliant with levothyroxine  Her TSH in July 2021 was 68, repeat one 2 days ago 62    Suspect her symptoms are related due to hypothyroidism, noncompliance with levothyroxine  Her PCP increased her levothyroxine to 137 mcg yesterday, will continue the same dose  Recommend repeat TSH in 4 weeks

## 2021-09-02 VITALS
HEART RATE: 74 BPM | RESPIRATION RATE: 18 BRPM | TEMPERATURE: 98.4 F | SYSTOLIC BLOOD PRESSURE: 140 MMHG | DIASTOLIC BLOOD PRESSURE: 71 MMHG | OXYGEN SATURATION: 94 %

## 2021-09-02 LAB
ANION GAP SERPL CALCULATED.3IONS-SCNC: 7 MMOL/L (ref 4–13)
ATRIAL RATE: 70 BPM
BASOPHILS # BLD AUTO: 0.05 THOUSANDS/ΜL (ref 0–0.1)
BASOPHILS NFR BLD AUTO: 1 % (ref 0–1)
BUN SERPL-MCNC: 24 MG/DL (ref 5–25)
CALCIUM SERPL-MCNC: 8.9 MG/DL (ref 8.3–10.1)
CHLORIDE SERPL-SCNC: 110 MMOL/L (ref 100–108)
CO2 SERPL-SCNC: 24 MMOL/L (ref 21–32)
CREAT SERPL-MCNC: 0.91 MG/DL (ref 0.6–1.3)
EOSINOPHIL # BLD AUTO: 0.49 THOUSAND/ΜL (ref 0–0.61)
EOSINOPHIL NFR BLD AUTO: 7 % (ref 0–6)
ERYTHROCYTE [DISTWIDTH] IN BLOOD BY AUTOMATED COUNT: 14.9 % (ref 11.6–15.1)
GFR SERPL CREATININE-BSD FRML MDRD: 61 ML/MIN/1.73SQ M
GLUCOSE SERPL-MCNC: 103 MG/DL (ref 65–140)
HCT VFR BLD AUTO: 33.7 % (ref 34.8–46.1)
HGB BLD-MCNC: 10.5 G/DL (ref 11.5–15.4)
IMM GRANULOCYTES # BLD AUTO: 0.02 THOUSAND/UL (ref 0–0.2)
IMM GRANULOCYTES NFR BLD AUTO: 0 % (ref 0–2)
LYMPHOCYTES # BLD AUTO: 2.07 THOUSANDS/ΜL (ref 0.6–4.47)
LYMPHOCYTES NFR BLD AUTO: 28 % (ref 14–44)
MAGNESIUM SERPL-MCNC: 2.6 MG/DL (ref 1.6–2.6)
MCH RBC QN AUTO: 27.9 PG (ref 26.8–34.3)
MCHC RBC AUTO-ENTMCNC: 31.2 G/DL (ref 31.4–37.4)
MCV RBC AUTO: 90 FL (ref 82–98)
MONOCYTES # BLD AUTO: 0.76 THOUSAND/ΜL (ref 0.17–1.22)
MONOCYTES NFR BLD AUTO: 10 % (ref 4–12)
NEUTROPHILS # BLD AUTO: 3.92 THOUSANDS/ΜL (ref 1.85–7.62)
NEUTS SEG NFR BLD AUTO: 54 % (ref 43–75)
NRBC BLD AUTO-RTO: 0 /100 WBCS
P AXIS: 68 DEGREES
PLATELET # BLD AUTO: 259 THOUSANDS/UL (ref 149–390)
PMV BLD AUTO: 9.8 FL (ref 8.9–12.7)
POTASSIUM SERPL-SCNC: 3.8 MMOL/L (ref 3.5–5.3)
PR INTERVAL: 166 MS
QRS AXIS: -9 DEGREES
QRSD INTERVAL: 86 MS
QT INTERVAL: 394 MS
QTC INTERVAL: 425 MS
RBC # BLD AUTO: 3.76 MILLION/UL (ref 3.81–5.12)
SODIUM SERPL-SCNC: 141 MMOL/L (ref 136–145)
T WAVE AXIS: 63 DEGREES
T4 FREE SERPL-MCNC: 0.75 NG/DL (ref 0.76–1.46)
TROPONIN I SERPL-MCNC: <0.02 NG/ML
VENTRICULAR RATE: 70 BPM
WBC # BLD AUTO: 7.31 THOUSAND/UL (ref 4.31–10.16)

## 2021-09-02 PROCEDURE — 85025 COMPLETE CBC W/AUTO DIFF WBC: CPT | Performed by: INTERNAL MEDICINE

## 2021-09-02 PROCEDURE — 93010 ELECTROCARDIOGRAM REPORT: CPT | Performed by: INTERNAL MEDICINE

## 2021-09-02 PROCEDURE — 99239 HOSP IP/OBS DSCHRG MGMT >30: CPT | Performed by: INTERNAL MEDICINE

## 2021-09-02 PROCEDURE — 80048 BASIC METABOLIC PNL TOTAL CA: CPT | Performed by: INTERNAL MEDICINE

## 2021-09-02 PROCEDURE — 97162 PT EVAL MOD COMPLEX 30 MIN: CPT

## 2021-09-02 PROCEDURE — 93306 TTE W/DOPPLER COMPLETE: CPT | Performed by: INTERNAL MEDICINE

## 2021-09-02 PROCEDURE — 83735 ASSAY OF MAGNESIUM: CPT | Performed by: INTERNAL MEDICINE

## 2021-09-02 PROCEDURE — 84439 ASSAY OF FREE THYROXINE: CPT | Performed by: INTERNAL MEDICINE

## 2021-09-02 PROCEDURE — 84484 ASSAY OF TROPONIN QUANT: CPT | Performed by: INTERNAL MEDICINE

## 2021-09-02 PROCEDURE — 97166 OT EVAL MOD COMPLEX 45 MIN: CPT

## 2021-09-02 RX ADMIN — LEVOTHYROXINE SODIUM 137 MCG: 25 TABLET ORAL at 09:07

## 2021-09-02 RX ADMIN — BUPROPION HYDROCHLORIDE 300 MG: 150 TABLET, FILM COATED, EXTENDED RELEASE ORAL at 09:06

## 2021-09-02 RX ADMIN — SERTRALINE 200 MG: 100 TABLET, FILM COATED ORAL at 09:08

## 2021-09-02 RX ADMIN — ENOXAPARIN SODIUM 40 MG: 40 INJECTION SUBCUTANEOUS at 09:07

## 2021-09-02 RX ADMIN — ATORVASTATIN CALCIUM 20 MG: 20 TABLET, FILM COATED ORAL at 15:47

## 2021-09-02 RX ADMIN — FLUTICASONE PROPIONATE 2 PUFF: 110 AEROSOL, METERED RESPIRATORY (INHALATION) at 09:07

## 2021-09-02 RX ADMIN — PANTOPRAZOLE SODIUM 40 MG: 40 TABLET, DELAYED RELEASE ORAL at 09:08

## 2021-09-02 RX ADMIN — PANTOPRAZOLE SODIUM 40 MG: 40 TABLET, DELAYED RELEASE ORAL at 15:47

## 2021-09-02 RX ADMIN — AMLODIPINE BESYLATE 10 MG: 10 TABLET ORAL at 09:06

## 2021-09-02 RX ADMIN — LISINOPRIL 40 MG: 20 TABLET ORAL at 09:07

## 2021-09-02 NOTE — DISCHARGE SUMMARY
[address] [facility]  Discharge- Qamar Gutiérrez 1945, 64 y o  female MRN: [id number]  Unit/Bed#: -01 Encounter: [id number]  Primary Care Provider: Laurie Rousseau DO   Date and time admitted to hospital: 9/1/2021 10:30 AM    * Near syncope  Assessment & Plan  · Patient experienced experienced episodes with extreme tiredness and fell asleep but no definite LOC  · Per sister for the past month she has been dozing off in the middle of the conversation for a few minutes, then regains consciousness and she is back at her baseline in a few seconds  · Suspect secondary to profound hypothyroidism, patient is noncompliant with her levothyroxine, last TSH 2 days ago was   · EKG with normal sinus rhythm and occasional PVCs  · No electrolyte disbalance  · Nonfocal neurologic exam  · CT head showed no acute intracranial abnormality  Has microangiopathic changes  · No alarms on tele borderline kadeem likely due to not being in euthyroid state  · Trop negative  · S/p echo, preserved ef, no wall motion abnormality and no overt major valvular d/o  · Will keep patient on telemetry overnight  · Pt w/ underlying hx of vertigo for which she takes meclizine prn  · S/p PT eval, recommending op vestibular therapy is should have reoccuring vertiginous symptoms    Hypothyroidism  Assessment & Plan  Per patient she was on 200 mcg of levothyroxine in the past, last year it was lowered to 150 mcg and then to 125 mcg due to over suppressed TSH  Patient is noncompliant with levothyroxine which she admits to and is evidenced per elevated tsh  Her TSH in July 2021 was 68, repeat one 2 days ago 62    Suspect her symptoms are related due to hypothyroidism, noncompliance with levothyroxine  Her PCP increased her levothyroxine to 137 mcg yesterday, will continue the same dose  Recommend repeat TSH in 4 weeks   Re-iterated to patient multiple times of importance of compliance and to make sure it is taken on an empty stomach    Anemia  Assessment & Plan  Chronic anemia, hemoglobin is at baseline      Mixed anxiety and depressive disorder  Assessment & Plan  Continue Wellbutrin and Zoloft    Mild persistent asthma without complication  Assessment & Plan  Not in acute exacerbation  Continue albuterol inhaler p r n  Gastroesophageal reflux disease  Assessment & Plan  Continue  PPI    Essential hypertension  Assessment & Plan  Well controlled on amlodipine 10 mg daily and lisinopril 40 mg daily    Discharging Physician / Practitioner: Adeel Mccain DO  PCP: Arline Hale DO  Admission Date:   Admission Orders (From admission, onward)     Ordered        09/01/21 1301  Inpatient Admission  Once                   Discharge Date: 09/02/21    Medical Problems     Resolved Problems  Date Reviewed: 9/2/2021    None                Consultations During Hospital Stay:  ·     Procedures Performed:   · Echo  · CT head    Significant Findings / Test Results:   See above  Incidental Findings:   ·     Test Results Pending at Discharge (will require follow up):   ·      Outpatient Tests Requested:  ·     Complications:  none    Reason for Admission: Near syncope    Hospital Course:     HPI: Daniela Holloway is a 64 y o  female with a PMH of hypertension, hyperlipidemia, anxiety/depression, hypothyroidism who presents with near-syncope  Patient went to bed yesterday around 7:00 a m     Woke up around 11:00 a m  and felt lightheaded  She was sitting and looking at her iPad and had 4 near syncopal episodes  Denies losing consciousness  After that she fell asleep and woke up this morning at 80 a m  Slightly lightheaded  She has been lightheaded on and off for the past month  Sister who is at bedside stated that while they were watching TV she would just dose off for a few minutes, snoring and then she woke up and be back at her baseline a few seconds  She is not remembering these events    Patient denies dizziness, visual changes, chest pain palpitation shortness of breath prior to these episodes  No bladder or bowel incontinence  Denies numbness or weakness in the extremities  Patient has a history of hypothyroidism, noncompliant with levothyroxine for the past month  Her last TSH was 62  Her PCP increase the dose to 137 mcg from 125 mcg yesterday  Upon my evaluation patient is awake alert and oriented, she was walking back from the restroom  Denies dizziness, lightheadedness, visual changes, chest pain palpitation shortness of breath nausea vomiting abdominal pain  Please see above list of diagnoses and related plan for additional information  Condition at Discharge: stable     Discharge Day Visit / Exam:     Subjective:  Pt seen and examined at bedside  No recurring symptoms  On tele borderline bradycardia  Denies any cardiac symptoms    Vitals: Blood Pressure: 142/75 (09/02/21 1048)  Pulse: 69 (09/02/21 1048)  Temperature: 98 2 °F (36 8 °C) (09/02/21 1048)  Temp Source: Oral (09/02/21 1048)  Respirations: 20 (09/02/21 1048)  SpO2: 93 % (09/02/21 1048)  Exam:   Physical Exam  Cardiovascular:      Rate and Rhythm: Normal rate and regular rhythm  Pulses: Normal pulses  Heart sounds: Normal heart sounds  No murmur heard  Pulmonary:      Effort: Pulmonary effort is normal  No respiratory distress  Breath sounds: Normal breath sounds  No wheezing or rales  Abdominal:      General: Abdomen is flat  Bowel sounds are normal  There is no distension  Palpations: Abdomen is soft  Tenderness: There is no abdominal tenderness  There is no guarding  Musculoskeletal:         General: Normal range of motion  Cervical back: Normal range of motion and neck supple  Right lower leg: No edema  Left lower leg: No edema  Skin:     General: Skin is warm and dry  Neurological:      General: No focal deficit present  Mental Status: She is alert and oriented to person, place, and time  Mental status is at baseline  Cranial Nerves: No cranial nerve deficit  Motor: No weakness  Discussion with Family: D/w patient and I called her dtr in law, no answer left msg    Discharge instructions/Information to patient and family:   See after visit summary for information provided to patient and family  Provisions for Follow-Up Care:  See after visit summary for information related to follow-up care and any pertinent home health orders  Disposition:     Home    For Discharges to [facility] SNF:   · Not Applicable to this Patient - Not Applicable to this Patient    Planned Readmission: No      Discharge Statement:  I spent > 30 minutes discharging the patient  This time was spent on the day of discharge  I had direct contact with the patient on the day of discharge  Greater than 50% of the total time was spent examining patient, answering all patient questions, arranging and discussing plan of care with patient as well as directly providing post-discharge instructions  Additional time then spent on discharge activities  Discharge Medications:  See after visit summary for reconciled discharge medications provided to patient and family        ** Please Note: This note has been constructed using a voice recognition system **

## 2021-09-02 NOTE — OCCUPATIONAL THERAPY NOTE
Occupational Therapy Evaluation     Patient Name: Greg Jackson  Today's Date: 9/2/2021  Problem List  Principal Problem:    Near syncope  Active Problems:    Essential hypertension    Gastroesophageal reflux disease    Mild persistent asthma without complication    Mixed anxiety and depressive disorder    Hypothyroidism    Anemia    Past Medical History  Past Medical History:   Diagnosis Date    Acid reflux     Anxiety     Arthritis     Asthma     C1-C4 level with central cord syndrome ([address] )     Resolved 2/1/2017     Carpal tunnel syndrome Resolved 4/21/2015    Colitis     Colon polyp     Concussion     Depressed     Dysthymic disorder     Resolved 1/5/2018    Gastric ulcer 2013    small - on EGD - [facility]    Hemorrhoids     Hx of blood clots     Hx of colonic polyps     [place]    Hyperlipemia     Hypertension     Hypothyroid     Lung nodule     stable x 2 yrs on CT    Migraines     Obesity     Postural dizziness with presyncope     Resolved 8/28/2018     Tendinitis     Ulnar neuropathy     Resolved 5/21/2015      Past Surgical History  Past Surgical History:   Procedure Laterality Date    APPENDECTOMY      BREAST SURGERY Left 01/1966    BREAST LUMPECTOMY    CARPAL TUNNEL RELEASE      CHOLECYSTECTOMY      DILATION AND CURETTAGE OF UTERUS      ELBOW SURGERY      EYE SURGERY Bilateral 2019    Cataract     GALLBLADDER SURGERY      JOINT REPLACEMENT Right     REPLACEMENT TOTAL KNEE    KNEE ARTHROSCOPY      KNEE SURGERY Right     NECK SURGERY      POSTERIOR LAMINECTOMY / DECOMPRESSION CERVICAL SPINE  02/2015    TONSILLECTOMY      VAGINAL DELIVERY      x3    WRIST SURGERY Right            09/02/21 1046   OT Last Visit   OT Visit Date 09/02/21   Note Type   Note type Evaluation   Restrictions/Precautions   Weight Bearing Precautions Per Order No   Other Precautions Fall Risk;Telemetry   Pain Assessment   Pain Assessment Tool 0-10   Pain Score No Pain   Home Living   Type of Home Apartment   Home Layout One level  (No VIVIAN)   Bathroom Shower/Tub Tub only   Bathroom Toilet Standard   Bathroom Equipment Grab bars in shower; Shower chair;Commode  (Pt has DME in storage; does not use PTA)   Home Equipment Walker;Cane  (Pt has DME in storage; does not use DME PTA)   Additional Comments Pt resides with sister in a single story apartment    Prior Function   Level of [place] Independent with ADLs and functional mobility   Lives With Other (Comment)  (Sister)   Receives Help From Family;Friend(s)   ADL Assistance Independent   IADLs Independent   Falls in the last 6 months 1 to 4   Vocational Retired   Comments Pt previously worked as an  in a [employer] office    [address] [facility] Pt lives in a one floor apartment with her sister; she drives, cooks, and completes all ADL and IADLs independently    Reciprocal Relationships Pt lives with her sister and states the two "help each other out when needed"; pt also mentions her daughter who lives close by    Service to Others Pt is retired but previously worked in a [employer] office    [address] Pt enjoys visiting with friends, [place], and engages in exercise classes   Psychosocial   Psychosocial (WDL) WDL   ADL   Where Assessed Standing at [address] [facility] [address]  Independent   Grooming Assistance Other (Comment)  (CGA)   Grooming Deficit Setup;Steadying;Supervision/safety   UB Bathing Assistance 5  Supervision/Setup   LB [address] Other (Comment)  (CGA)   UB Dressing Assistance 5  Supervision/Setup   LB Dressing Assistance Other (Comment)  (CGA)   LB Dressing Deficit Setup;Steadying;Supervision/safety   [address]  Other (Comment)  (CGA)   Additional Comments No AD    Bed Mobility   Supine to Sit 7  Independent   Sit to Supine 7  Independent   Transfers   Sit to Stand 5  Supervision   Stand to Sit 5  Supervision   Functional Mobility   Functional Mobility   (CGA)   Additional Comments [facility] Additional items   (No AD)   Balance   Static Sitting Normal   Dynamic Sitting Good   Static Standing Good   Dynamic Standing Fair +   Ambulatory Fair -   Activity Tolerance   Activity Tolerance Patient tolerated treatment well   Nurse Made Aware RN cleared pt for treatment    RUE Assessment   RUE Assessment WFL   LUE Assessment   LUE Assessment WFL   Cognition   Overall Cognitive Status WFL   Arousal/Participation Alert; Responsive; Cooperative   Attention Within functional limits   Orientation Level Oriented X4   Memory Within functional limits   Following Commands Follows all commands and directions without difficulty   Assessment   Prognosis Good   Assessment Pt is a 64 y o  female admitted to [facility] on [facility]/1/2021 w/ near syncope  Pt  has a past medical history of Acid reflux, Anxiety, Arthritis, Asthma, C1-C4 level with central cord syndrome [facility]), Carpal tunnel syndrome (Resolved 4/21/2015), Colitis, Colon polyp, Concussion, Depressed, Dysthymic disorder, Gastric ulcer (2013), Hemorrhoids, blood clots, colonic polyps, Hyperlipemia, Hypertension, Hypothyroid, Lung nodule, Migraines, Obesity, Postural dizziness with presyncope, Tendinitis, and Ulnar neuropathy  Pt with active OT orders and ambulate  orders  Pt resides in a single floor apartment home with her sister who is available to assist her as needed  Pt was I w/  ADLS and IADLS, drives, & required no use of DME PTA  Currently pt performs transfers with supervision, functional ambulation with CGA and no AD  Pt ambulated from bed to bathroom sink with CGA and brushed hair with supervision  Based on the aforementioned OT evaluation, functional performance deficits, and assessments, pt has been identified as a moderate complexity evaluation  From OT standpoint, recommendation would be home with family support No further acute OT needs  D/C OT  Please re-consult if needed  Thank you  Pt was left in bed after session with all current needs met   The patient's raw score on the AM-PAC Daily Activity inpatient short form is 20, standardized score is 42 03, greater than 39 4  Patients at this level are likely to benefit from discharge to home  Please refer to the recommendation of the Occupational Therapist for safe discharge planning     Recommendation   OT Discharge Recommendation No rehabilitation needs  (Home with family support )   Equipment Recommended   (None )   OT - OK to Discharge Yes  (When medically appropriate )   AM-PAC Daily Activity Inpatient   Lower Body Dressing 3   Bathing 3   Toileting 3   Upper Body Dressing 4   Grooming 3   Eating 4   Daily Activity Raw Score 20   Daily Activity Standardized Score (Calc for Raw Score >=11) 42 03   AM-PAC Applied Cognition Inpatient   Following a Speech/Presentation 4   Understanding Ordinary Conversation 4   Taking Medications 4   Remembering Where Things Are Placed or Put Away 4   Remembering List of 4-5 Errands 4   Taking Care of Complicated Tasks 4   Applied Cognition Raw Score 24   Applied Cognition Standardized Score 62 21     Joe Castle, OTR/L

## 2021-09-02 NOTE — DISCHARGE INSTRUCTIONS
Hypothyroidism   WHAT YOU NEED TO KNOW:   What is hypothyroidism? Hypothyroidism is a condition that develops when the thyroid gland does not make enough thyroid hormone  Thyroid hormones help control body temperature, heart rate, growth, and weight  What causes or increases my risk for hypothyroidism? · A family history of hypothyroidism    · Age 61 years or older or being female    · Autoimmune disease, such as inflammation of your thyroid, or Hashimoto disease    · Thyroid surgery, head or neck radiation therapy, or medicines such as lithium, sedatives, or narcotics    · Thyroid cancer, a goiter, or a viral infection    · Low iodine level    · Down syndrome or Mandujano syndrome    What are the signs and symptoms of hypothyroidism? The signs and symptoms may develop slowly, sometimes over several years  · Exhaustion, depression, or irritability    · Sensitivity to cold    · Muscle aches, headaches, weakness, or trouble concentrating    · Dry, flaky skin, brittle nails, or thinning hair    · Recent weight gain without overeating, or constipation    · Heavy or irregular monthly periods    · Puffiness around your eyes or swelling in your hands and feet    · Low heart rate, changes in your blood pressure    How is hypothyroidism diagnosed? Your healthcare provider will ask about your symptoms and what medicines you take  He or she will ask about your medical history and if anyone in your family has hypothyroidism  A blood test will show your thyroid hormone level  How is hypothyroidism treated? Thyroid hormone replacement medicine may bring your thyroid hormone level back to normal  You will need to take this medicine for the rest of your life to control hypothyroidism  It is important to take the medicine every day as directed  You will be given instructions for what to do if you miss a dose  Ask your healthcare provider for more information on other medicines you may need    How can I manage hypothyroidism? · Get more iodine  The thyroid gland uses iodine to work correctly and to make thyroid hormones  Your healthcare provider may tell you to eat foods that are rich in iodine  He or she will tell you how much of these foods to eat  Milk and seafood are good sources of iodine  You may also need iodine supplements  · Keep track of your blood pressure and weight:      ? Check your blood pressure  and write it down as often as directed  It is important to measure your blood pressure on the same arm and in the same position each time  Keep track of your blood pressure readings, along with the date and time you took them  Take this record with you to your followup visits  ? Weigh yourself daily  before breakfast after you urinate  Weight gain may be a sign of extra fluid in your body  Keep track of your daily weights and take the record with you to your followup visits  Call your local emergency number (911 in the [address] [place] [address],[address]) or have someone call if:   · You have sudden chest pain or shortness of breath  · You have a seizure  · You feel like you are going to faint  When should I seek immediate care? · You have diarrhea, tremors, or trouble sleeping  · Your legs, ankles, or feet are swollen  When should I call my doctor? · You have a fever  · You have chills, a cough, or feel weak and achy  · You have pain and swelling in your muscles and joints  · Your skin is itchy, swollen, or you have a rash  · Your signs and symptoms return or get worse, even after treatment  · You have questions or concerns about your condition or care  CARE AGREEMENT:   You have the right to help plan your care  Learn about your health condition and how it may be treated  Discuss treatment options with your healthcare providers to decide what care you want to receive  You always have the right to refuse treatment  The above information is an  only   It is not intended as medical advice for individual conditions or treatments  Talk to your doctor, nurse or pharmacist before following any medical regimen to see if it is safe and effective for you  © Copyright [employer] 2021 Information is for End User's use only and may not be sold, redistributed or otherwise used for commercial purposes  All illustrations and images included in CareNotes® are the copyrighted property of A D A M [employer] Inc  or [facility] [employer]       Hypothyroidism   AMBULATORY CARE:   Hypothyroidism  is a condition that develops when the thyroid gland does not make enough thyroid hormone  Thyroid hormones help control body temperature, heart rate, growth, and weight  Common signs and symptoms include the following: The signs and symptoms may develop slowly, sometimes over several years  · Exhaustion, depression, or irritability    · Sensitivity to cold    · Muscle aches, headaches, weakness, or trouble concentrating    · Dry, flaky skin, brittle nails, or thinning hair    · Recent weight gain without overeating, or constipation    · Heavy or irregular monthly periods    · Puffiness around your eyes or swelling in your hands and feet    · Low heart rate, changes in your blood pressure    Call your local emergency number (911 in the [address] [place] [address],[address]) or have someone call if:   · You have sudden chest pain or shortness of breath  · You have a seizure  · You feel like you are going to faint  Seek care immediately if:   · You have diarrhea, tremors, or trouble sleeping  · Your legs, ankles, or feet are swollen  Call your doctor or endocrinologist if:   · You have a fever  · You have chills, a cough, or feel weak and achy  · You have pain and swelling in your muscles and joints  · Your skin is itchy, swollen, or you have a rash  · Your signs and symptoms return or get worse, even after treatment  · You have questions or concerns about your condition or care      Treatment:  Thyroid hormone replacement medicine may bring your thyroid hormone level back to normal  You will need to take this medicine for the rest of your life to control hypothyroidism  It is important to take the medicine every day as directed  You will be given instructions for what to do if you miss a dose  Ask your healthcare provider for more information on other medicines you may need  Manage hypothyroidism:   · Get more iodine  The thyroid gland uses iodine to work correctly and to make thyroid hormones  Your healthcare provider may tell you to eat foods that are rich in iodine  He or she will tell you how much of these foods to eat  Milk and seafood are good sources of iodine  You may also need iodine supplements  · Keep track of your blood pressure and weight:      ? Check your blood pressure  and write it down as often as directed  It is important to measure your blood pressure on the same arm and in the same position each time  Keep track of your blood pressure readings, along with the date and time you took them  Take this record with you to your followup visits  ? Weigh yourself daily  before breakfast after you urinate  Weight gain may be a sign of extra fluid in your body  Keep track of your daily weights and take the record with you to your followup visits  Follow up with your doctor or endocrinologist as directed: You may need to return for more blood tests to check your thyroid hormone level  This will show if you are getting the right amount of thyroid medicine  Write down your questions so you remember to ask them during your visits  © Copyright [employer] 2021 Information is for End User's use only and may not be sold, redistributed or otherwise used for commercial purposes  All illustrations and images included in CareNotes® are the copyrighted property of [employer]  or [employer]  The above information is an  only   It is not intended as medical advice for individual conditions or treatments  Talk to your doctor, nurse or pharmacist before following any medical regimen to see if it is safe and effective for you

## 2021-09-02 NOTE — PLAN OF CARE
Problem: PHYSICAL THERAPY ADULT  Goal: Performs mobility at highest level of function for planned discharge setting  See evaluation for individualized goals  Description: Treatment/Interventions: Functional transfer training, Therapeutic exercise, LE strengthening/ROM, Endurance training, Equipment eval/education, Patient/family training, Cognitive reorientation, Bed mobility, Gait training          See flowsheet documentation for full assessment, interventions and recommendations  9/2/2021 1545 by Kurt Fang, PT  Note: Prognosis: Good  Problem List: Decreased strength, Decreased endurance, Impaired balance, Decreased mobility  Assessment: Patient seen for moderate complexity physical therapy evaluation  Patient is a 64-year-old female with history/comorbidities of hypertension, hyperlipidemia, anxiety/depression, hypothyroidism, hypertension, migraines, who is now admitted with near syncopal episode at home  Complains of active dizziness  Patient undergoing workup best diagnosis  Due to acute medical issues, unclear medical diagnosis, fall risk, note evolving clinical picture  PT consult to assess mobility, DC needs  Patient presents with minimally decreased functional mobility, standing balance, endurance, bilateral lower extremity strength  Patient will benefit from continued skilled acute care PT services to correct for the above problems, and may DC home when medically stable with outpatient vestibular PT should patient still have symptoms at discharge  PT Discharge Recommendation: Home with outpatient rehabilitation (Vestibular outpatient PT if still needed at discharge)     PT - OK to Discharge: (S) Yes (When stable to home, questionable outpatient vestibular PT)    See flowsheet documentation for full assessment

## 2021-09-02 NOTE — ASSESSMENT & PLAN NOTE
· Patient experienced experienced episodes with extreme tiredness and fell asleep but no definite LOC  · Per sister for the past month she has been dozing off in the middle of the conversation for a few minutes, then regains consciousness and she is back at her baseline in a few seconds  · Suspect secondary to profound hypothyroidism, patient is noncompliant with her levothyroxine, last TSH 2 days ago was   · EKG with normal sinus rhythm and occasional PVCs  · No electrolyte disbalance  · Nonfocal neurologic exam  · CT head showed no acute intracranial abnormality    Has microangiopathic changes  · No alarms on tele borderline kadeem likely due to not being in euthyroid state  · Trop negative  · S/p echo, preserved ef, no wall motion abnormality and no overt major valvular d/o  · Will keep patient on telemetry overnight  · Pt w/ underlying hx of vertigo for which she takes meclizine prn  · S/p PT eval, recommending op vestibular therapy is should have reoccuring vertiginous symptoms

## 2021-09-02 NOTE — PHYSICAL THERAPY NOTE
Physical Therapy Evaluation    Patient's Name: Felicia Carver    Admitting Diagnosis  Dizziness [R42]  Lightheadedness [R42]  Syncope [R55]    Problem List  Patient Active Problem List   Diagnosis    Carpal tunnel syndrome of right wrist    Cataract    Cervical radiculitis    Vertigo    Essential hypertension    Gastroesophageal reflux disease    Hyperlipidemia    History of colonic polyps    Injury of tendon of rotator cuff    Mild persistent asthma without complication    Mixed anxiety and depressive disorder    Occipital neuralgia of left side    Osteoarthritis of knee    Postconcussion syndrome    Hypothyroidism    Cervical spinal stenosis    Tinnitus, bilateral    Ulnar nerve compression, right    Fracture of orbital floor, right side, initial encounter for closed fracture ([facility] [address] )    IFG (impaired fasting glucose)    Rosacea    History of gastric ulcer    RUQ pain    Intestinal metaplasia of gastric mucosa    URI (upper respiratory infection)    Near syncope    Anemia       Past Medical History  Past Medical History:   Diagnosis Date    Acid reflux     Anxiety     Arthritis     Asthma     C1-C4 level with central cord syndrome ([facility] [address] )     Resolved 2/1/2017     Carpal tunnel syndrome Resolved 4/21/2015    Colitis     Colon polyp     Concussion     Depressed     Dysthymic disorder     Resolved 1/5/2018    Gastric ulcer 2013    small - on EGD - [facility]    Hemorrhoids     Hx of blood clots     Hx of colonic polyps     D'Merrick    Hyperlipemia     Hypertension     Hypothyroid     Lung nodule     stable x 2 yrs on CT    Migraines     Obesity     Postural dizziness with presyncope     Resolved 8/28/2018     Tendinitis     Ulnar neuropathy     Resolved 5/21/2015        Past Surgical History  Past Surgical History:   Procedure Laterality Date    APPENDECTOMY      BREAST SURGERY Left 01/1966    BREAST LUMPECTOMY    CARPAL TUNNEL RELEASE      CHOLECYSTECTOMY      DILATION AND CURETTAGE OF UTERUS      ELBOW SURGERY      EYE SURGERY Bilateral 2019    Cataract     GALLBLADDER SURGERY      JOINT REPLACEMENT Right     REPLACEMENT TOTAL KNEE    KNEE ARTHROSCOPY      KNEE SURGERY Right     NECK SURGERY      POSTERIOR LAMINECTOMY / DECOMPRESSION CERVICAL SPINE  02/2015    TONSILLECTOMY      VAGINAL DELIVERY      x3    WRIST SURGERY Right           09/02/21 1015   PT Last Visit   PT Visit Date 09/02/21   Note Type   Note type Evaluation   Pain Assessment   Pain Assessment Tool 0-10   Pain Score 3   Pain Location/Orientation Location: Head   Patient's Stated Pain Goal No pain   Hospital Pain Intervention(s) Ambulation/increased activity   Home Living   Type of Home Apartment   Additional Comments Resides w/ sister in 1 story apt  Indep self care  ambulates w/ out device       Prior Function   Level of [place] Independent with ADLs and functional mobility   Falls in the last 6 months 0   Restrictions/Precautions   Weight Bearing Precautions Per Order No   Other Precautions Fall Risk   General   Family/Caregiver Present No   Cognition   Overall Cognitive Status WFL   Arousal/Participation Responsive   Orientation Level Oriented X4   Memory Unable to assess   Following Commands Follows all commands and directions without difficulty   RLE Assessment   RLE Assessment   (strength grossly 4/5)   LLE Assessment   LLE Assessment   (strength grossly 4/5)   Coordination   Movements are Fluid and Coordinated 1   Bed Mobility   Supine to Sit 7  Independent   Transfers   Sit to Stand 5  Supervision   Stand to Sit 5  Supervision   Ambulation/Elevation   Gait pattern   (slow, mild lateral sway, LOB x 2 requiring CGA and VC to cor)   Gait Assistance 5  Supervision   Additional items Assist x 1   Assistive Device Rolling walker   Distance 120'x1   Balance   Static Sitting Normal   Dynamic Sitting Good   Static Standing Good   Dynamic Standing Fair +   Ambulatory Fair -   Endurance Deficit   Endurance Deficit No   Activity Tolerance   Activity Tolerance Treatment limited secondary to medical complications (Comment); Patient tolerated treatment well;Patient limited by fatigue  (some subjective SOB post gait- nurse aware- pt states abnorm)   Nurse Made Aware yes   Assessment   Prognosis Good   Problem List Decreased strength;Decreased endurance; Impaired balance;Decreased mobility   Assessment Patient seen for moderate complexity physical therapy evaluation  Patient is a 26-year-old female with history/comorbidities of hypertension, hyperlipidemia, anxiety/depression, hypothyroidism, hypertension, migraines, who is now admitted with near syncopal episode at home  Complains of active dizziness  Patient undergoing workup best diagnosis  Due to acute medical issues, unclear medical diagnosis, fall risk, note evolving clinical picture  PT consult to assess mobility, DC needs  Patient presents with minimally decreased functional mobility, standing balance, endurance, bilateral lower extremity strength  Patient will benefit from continued skilled acute care PT services to correct for the above problems, and may DC home when medically stable with outpatient vestibular PT should patient still have symptoms at discharge  Goals   Patient Goals To go home   STG Expiration Date 09/16/21   Short Term Goal #1 1-2 weeks:  Bed mobility and transfers with independent, standing balance to good/normal dynamically with device, ambulate 200-300 feet with roller walker versus least restrictive device and modified independent, increased bilateral lower extremity strength by 1/2 to 1 full grade  PT Treatment Day 0   Plan   Treatment/Interventions Functional transfer training; Therapeutic exercise;LE strengthening/ROM; Endurance training;Equipment eval/education;Patient/family training;Cognitive reorientation; Bed mobility;Gait training   PT Frequency Other (Comment)  (3-5 times per week)   Recommendation   PT Discharge Recommendation Home with outpatient rehabilitation  (Vestibular outpatient PT if still needed at discharge)   PT - OK to Discharge Yes  (When stable to home, questionable outpatient vestibular PT)   AM-PAC Basic Mobility Inpatient   Turning in Bed Without Bedrails 4   Lying on Back to Sitting on Edge of Flat Bed 4   Moving Bed to Chair 4   Standing Up From Chair 3   Walk in Room 3   Climb 3-5 Stairs 3   Basic Mobility Inpatient Raw Score 21   Basic Mobility Standardized Score 45 55         Bj Darci PT, DPT, CSRS

## 2021-09-02 NOTE — ASSESSMENT & PLAN NOTE
Per patient she was on 200 mcg of levothyroxine in the past, last year it was lowered to 150 mcg and then to 125 mcg due to over suppressed TSH  Patient is noncompliant with levothyroxine which she admits to and is evidenced per elevated tsh  Her TSH in July 2021 was 68, repeat one 2 days ago 62    Suspect her symptoms are related due to hypothyroidism, noncompliance with levothyroxine  Her PCP increased her levothyroxine to 137 mcg yesterday, will continue the same dose  Recommend repeat TSH in 4 weeks   Re-iterated to patient multiple times of importance of compliance and to make sure it is taken on an empty stomach

## 2021-09-03 ENCOUNTER — TRANSITIONAL CARE MANAGEMENT (OUTPATIENT)
Dept: FAMILY MEDICINE CLINIC | Facility: CLINIC | Age: 76
End: 2021-09-03

## 2021-09-04 ENCOUNTER — OFFICE VISIT (OUTPATIENT)
Dept: URGENT CARE | Facility: MEDICAL CENTER | Age: 76
End: 2021-09-04
Payer: MEDICARE

## 2021-09-04 VITALS
DIASTOLIC BLOOD PRESSURE: 67 MMHG | TEMPERATURE: 99.3 F | RESPIRATION RATE: 20 BRPM | OXYGEN SATURATION: 93 % | SYSTOLIC BLOOD PRESSURE: 163 MMHG | BODY MASS INDEX: 35.14 KG/M2 | WEIGHT: 179 LBS | HEART RATE: 84 BPM | HEIGHT: 60 IN

## 2021-09-04 DIAGNOSIS — R30.0 DYSURIA: ICD-10-CM

## 2021-09-04 DIAGNOSIS — R31.9 URINARY TRACT INFECTION WITH HEMATURIA, SITE UNSPECIFIED: Primary | ICD-10-CM

## 2021-09-04 DIAGNOSIS — N39.0 URINARY TRACT INFECTION WITH HEMATURIA, SITE UNSPECIFIED: Primary | ICD-10-CM

## 2021-09-04 LAB
SL AMB  POCT GLUCOSE, UA: ABNORMAL
SL AMB LEUKOCYTE ESTERASE,UA: POSITIVE
SL AMB POCT BILIRUBIN,UA: ABNORMAL
SL AMB POCT BLOOD,UA: ABNORMAL
SL AMB POCT CLARITY,UA: CLEAR
SL AMB POCT COLOR,UA: ABNORMAL
SL AMB POCT KETONES,UA: ABNORMAL
SL AMB POCT NITRITE,UA: POSITIVE
SL AMB POCT PH,UA: 6
SL AMB POCT SPECIFIC GRAVITY,UA: 1.03
SL AMB POCT URINE PROTEIN: ABNORMAL
SL AMB POCT UROBILINOGEN: ABNORMAL

## 2021-09-04 PROCEDURE — 81002 URINALYSIS NONAUTO W/O SCOPE: CPT | Performed by: PHYSICIAN ASSISTANT

## 2021-09-04 PROCEDURE — 87086 URINE CULTURE/COLONY COUNT: CPT | Performed by: PHYSICIAN ASSISTANT

## 2021-09-04 PROCEDURE — G0463 HOSPITAL OUTPT CLINIC VISIT: HCPCS | Performed by: PHYSICIAN ASSISTANT

## 2021-09-04 PROCEDURE — 87077 CULTURE AEROBIC IDENTIFY: CPT | Performed by: PHYSICIAN ASSISTANT

## 2021-09-04 PROCEDURE — 87186 SC STD MICRODIL/AGAR DIL: CPT | Performed by: PHYSICIAN ASSISTANT

## 2021-09-04 PROCEDURE — 99213 OFFICE O/P EST LOW 20 MIN: CPT | Performed by: PHYSICIAN ASSISTANT

## 2021-09-04 RX ORDER — SULFAMETHOXAZOLE AND TRIMETHOPRIM 800; 160 MG/1; MG/1
1 TABLET ORAL EVERY 12 HOURS SCHEDULED
Qty: 14 TABLET | Refills: 0 | Status: SHIPPED | OUTPATIENT
Start: 2021-09-04 | End: 2021-09-11

## 2021-09-04 NOTE — PATIENT INSTRUCTIONS
Patient was educated on UTI  Patient was educated on anti-biotics prescribed  Patient was educated on antibiotics prescribed  Patient was told if symptoms persist follow up with PCP  Patient understood  Urinary Tract Infection in Women   WHAT YOU NEED TO KNOW:   A urinary tract infection (UTI) is caused by bacteria that get inside your urinary tract  Most bacteria that enter your urinary tract come out when you urinate  If the bacteria stay in your urinary tract, you may get an infection  Your urinary tract includes your kidneys, ureters, bladder, and urethra  Urine is made in your kidneys, and it flows from the ureters to the bladder  Urine leaves the bladder through the urethra  A UTI is more common in your lower urinary tract, which includes your bladder and urethra  DISCHARGE INSTRUCTIONS:   Return to the emergency department if:   · You are urinating very little or not at all  · You have a high fever with shaking chills  · You have side or back pain that gets worse  Call your doctor if:   · You have a fever  · You do not feel better after 2 days of taking antibiotics  · You are vomiting  · You have questions or concerns about your condition or care  Medicines:   · Antibiotics  help fight a bacterial infection  If you have UTIs often (called recurrent UTIs), you may be given antibiotics to take regularly  You will be given directions for when and how to use antibiotics  The goal is to prevent UTIs but not cause antibiotic resistance by using antibiotics too often  · Medicines  may be given to decrease pain and burning when you urinate  They will also help decrease the feeling that you need to urinate often  These medicines will make your urine orange or red  · Take your medicine as directed  Contact your healthcare provider if you think your medicine is not helping or if you have side effects  Tell him or her if you are allergic to any medicine   Keep a list of the medicines, vitamins, and herbs you take  Include the amounts, and when and why you take them  Bring the list or the pill bottles to follow-up visits  Carry your medicine list with you in case of an emergency  Follow up with your healthcare provider as directed:  Write down your questions so you remember to ask them during your visits  Prevent another UTI:   · Empty your bladder often  Urinate and empty your bladder as soon as you feel the need  Do not hold your urine for long periods of time  · Wipe from front to back after you urinate or have a bowel movement  This will help prevent germs from getting into your urinary tract through your urethra  · Drink liquids as directed  Ask how much liquid to drink each day and which liquids are best for you  You may need to drink more liquids than usual to help flush out the bacteria  Do not drink alcohol, caffeine, or citrus juices  These can irritate your bladder and increase your symptoms  Your healthcare provider may recommend cranberry juice to help prevent a UTI  · Urinate after you have sex  This can help flush out bacteria passed during sex  · Do not douche or use feminine deodorants  These can change the chemical balance in your vagina  · Change sanitary pads or tampons often  This will help prevent germs from getting into your urinary tract  · Talk to your healthcare provider about your birth control method  You may need to change your method if it is increasing your risk for UTIs  · Wear cotton underwear and clothes that are loose  Tight pants and nylon underwear can trap moisture and cause bacteria to grow  · Vaginal estrogen may be recommended  This medicine helps prevent UTIs in women who have gone through menopause or are in ronaldo-menopause  · Do pelvic muscle exercises often  Pelvic muscle exercises may help you start and stop urinating  Strong pelvic muscles may help you empty your bladder easier   Squeeze these muscles tightly for 5 seconds like you are trying to hold back urine  Then relax for 5 seconds  Gradually work up to squeezing for 10 seconds  Do 3 sets of 15 repetitions a day, or as directed  © Copyright [employer] 2021 Information is for End User's use only and may not be sold, redistributed or otherwise used for commercial purposes  All illustrations and images included in CareNotes® are the copyrighted property of [employer]  or [employer]  The above information is an  only  It is not intended as medical advice for individual conditions or treatments  Talk to your doctor, nurse or pharmacist before following any medical regimen to see if it is safe and effective for you

## 2021-09-04 NOTE — PROGRESS NOTES
[address] [employer] [facility]        NAME: Ladarius Ramirez is a 64 y o  female  : 1945    MRN: [id number]  DATE: 2021  TIME: 10:01 AM    Assessment and Plan   Urinary tract infection with hematuria, site unspecified [N39 0, R31 9]  1  Urinary tract infection with hematuria, site unspecified  sulfamethoxazole-trimethoprim (BACTRIM DS) 800-160 mg per tablet   2  Dysuria  POCT urine dip    Urine culture       Urine had leukocytes and nitrates in it  Urine will be sent for culture  Patient Instructions       Patient was educated on UTI  Patient was educated on anti-biotics prescribed  Patient was educated on antibiotics prescribed  Patient was told if symptoms persist follow up with PCP  Patient understood  Chief Complaint     Chief Complaint   Patient presents with    Possible UTI     Patient presents with increased frequency for a week  She denies any foul smell or blood in her urine         History of Present Illness       Patient presents today with cramps and urinary frequency  Patient reports she also noticed a decrease in urine  Patient denies any prior UTI"s  Patient was just in the hospital for shortness of breath due to not taking her thyroid medication  Review of Systems   Review of Systems   Constitutional: Negative  Respiratory: Negative  Cardiovascular: Negative  Genitourinary: Positive for decreased urine volume, frequency, hematuria and urgency  Psychiatric/Behavioral: Negative            Current Medications       Current Outpatient Medications:     acetaminophen (TYLENOL) 325 mg tablet, Take 325 mg by mouth as needed, Disp: , Rfl:     albuterol (Ventolin HFA) 90 mcg/act inhaler, Inhale 2 puffs every 6 (six) hours as needed for wheezing, Disp: 1 Inhaler, Rfl: 2    amLODIPine (NORVASC) 10 mg tablet, Take 1 tablet (10 mg total) by mouth daily, Disp: 90 tablet, Rfl: 1    atorvastatin (LIPITOR) 20 mg tablet, Take 1 tablet (20 mg total) by mouth daily, Disp: 90 tablet, Rfl: 1    buPROPion (WELLBUTRIN SR) 150 mg 12 hr tablet, Take 1 tablet (150 mg total) by mouth 2 (two) times a day, Disp: 180 tablet, Rfl: 1    Cholecalciferol (Vitamin D3) 1 25 MG (52066 UT) CAPS, Take 5,000 Units by mouth daily, Disp: , Rfl:     fluticasone (FLOVENT HFA) 110 MCG/ACT inhaler, Inhale 2 puffs 2 (two) times a day Rinse mouth after use , Disp: 3 Inhaler, Rfl: 1    levothyroxine 137 mcg tablet, Take 1 tablet (137 mcg total) by mouth daily, Disp: 30 tablet, Rfl: 3    lisinopril (ZESTRIL) 40 mg tablet, TAKE 1 TABLET BY MOUTH EVERY DAY, Disp: 90 tablet, Rfl: 1    metroNIDAZOLE (METROGEL) 1 % gel, Apply topically daily, Disp: 45 g, Rfl: 0    pantoprazole (PROTONIX) 40 mg tablet, Take 1 tablet (40 mg total) by mouth 2 (two) times a day, Disp: 60 tablet, Rfl: 1    polyethylene glycol (GLYCOLAX) 17 GM/SCOOP powder, Take 17 g by mouth daily , Disp: , Rfl:     RESTASIS 0 05 % ophthalmic emulsion, Administer 1 drop to both eyes every 12 (twelve) hours , Disp: , Rfl:     sertraline (ZOLOFT) 100 mg tablet, Take 2 tablets (200 mg total) by mouth daily, Disp: 60 tablet, Rfl: 3    Wheat Dextrin (Benefiber) POWD, Take by mouth daily , Disp: , Rfl:     meclizine (ANTIVERT) 25 mg tablet, Take 1 tablet (25 mg total) by mouth 3 (three) times a day as needed for dizziness for up to 10 days, Disp: 30 tablet, Rfl: 0    sulfamethoxazole-trimethoprim (BACTRIM DS) 800-160 mg per tablet, Take 1 tablet by mouth every 12 (twelve) hours for 7 days, Disp: 14 tablet, Rfl: 0    Current Allergies     Allergies as of 09/04/2021 - Reviewed 09/04/2021   Allergen Reaction Noted    Ceftin [cefuroxime] Hives 11/27/2018            The following portions of the patient's history were reviewed and updated as appropriate: allergies, current medications, past family history, past medical history, past social history, past surgical history and problem list      Past Medical History:   Diagnosis Date    Acid reflux     Anxiety     Arthritis     Asthma     C1-C4 level with central cord syndrome ([facility] [address] )     Resolved 2/1/2017     Carpal tunnel syndrome Resolved 4/21/2015    Colitis     Colon polyp     Concussion     Depressed     Dysthymic disorder     Resolved 1/5/2018    Gastric ulcer 2013    small - on EGD - [facility]    Hemorrhoids     Hx of blood clots     Hx of colonic polyps     D'Merrick    Hyperlipemia     Hypertension     Hypothyroid     Lung nodule     stable x 2 yrs on CT    Migraines     Obesity     Postural dizziness with presyncope     Resolved 8/28/2018     Tendinitis     Ulnar neuropathy     Resolved 5/21/2015        Past Surgical History:   Procedure Laterality Date    APPENDECTOMY      BREAST SURGERY Left 01/1966    BREAST LUMPECTOMY    CARPAL TUNNEL RELEASE      CHOLECYSTECTOMY      DILATION AND CURETTAGE OF UTERUS      ELBOW SURGERY      EYE SURGERY Bilateral 2019    Cataract     GALLBLADDER SURGERY      JOINT REPLACEMENT Right     REPLACEMENT TOTAL KNEE    KNEE ARTHROSCOPY      KNEE SURGERY Right     NECK SURGERY      POSTERIOR LAMINECTOMY / DECOMPRESSION CERVICAL SPINE  02/2015    TONSILLECTOMY      VAGINAL DELIVERY      x3    WRIST SURGERY Right        Family History   Problem Relation Age of Onset    Breast cancer Mother     Alzheimer's disease Mother     Coronary artery disease Mother     Hypertension Mother    Maggy Silence Migraines Mother     Peripheral vascular disease Mother    Maggy Silence Parkinsonism Father     Other Father         Cardiovascular disease     Coronary artery disease Father     Hypertension Father     Bipolar disorder Sister     Asthma Daughter     Asthma Son          Medications have been verified  Objective   /67   Pulse 84   Temp 99 3 °F (37 4 °C)   Resp 20   Ht 5' (1 524 m)   Wt 81 2 kg (179 lb)   SpO2 93%   BMI 34 96 kg/m²   No LMP recorded   Patient is postmenopausal        Physical Exam     Physical Exam  Constitutional:       Appearance: She is normal weight  HENT:      Head: Normocephalic  Cardiovascular:      Rate and Rhythm: Normal rate and regular rhythm  Heart sounds: Normal heart sounds  Pulmonary:      Breath sounds: Normal breath sounds  Abdominal:      Palpations: Abdomen is soft  Tenderness: There is no abdominal tenderness  There is no right CVA tenderness or left CVA tenderness  Neurological:      Mental Status: She is alert and oriented to person, place, and time     Psychiatric:         Mood and Affect: Mood normal          Behavior: Behavior normal

## 2021-09-06 LAB
BACTERIA UR CULT: ABNORMAL
BACTERIA UR CULT: ABNORMAL

## 2021-09-07 ENCOUNTER — TELEPHONE (OUTPATIENT)
Dept: URGENT CARE | Facility: MEDICAL CENTER | Age: 76
End: 2021-09-07

## 2021-09-08 ENCOUNTER — TELEPHONE (OUTPATIENT)
Dept: FAMILY MEDICINE CLINIC | Facility: CLINIC | Age: 76
End: 2021-09-08

## 2021-09-08 NOTE — PHYSICIAN ADVISOR
Current patient class: Inpatient  The patient is currently on Hospital Day: 2 at [address] [place] [address]      The patient was admitted to the hospital  on 9/1/21 at  1:01 PM for the following diagnosis:  Dizziness [R42]  Lightheadedness [R42]  Syncope [R55]     After review of the relevant documentation, labs, vital signs and test results, this is a PROVIDER LIABLE case  In this particular case the patient was admitted to the hospital as an inpatient  The patient however failed to satisfy the 2 midnight benchmark and closer scrutiny of the case was warranted  After review of the patient presentation and relevant labs the patient was most appropriate for observation or outpatient class at the time of admission  Given that this patient has already been discharged prior to this review they become a provider liable case  Rationale is as follows: The patient is a 64 yrs   Female who presented to the ED at 9/1/2021 10:30 AM with a chief complaint of Dizziness (pt walked from waiting room to ED bed without a problem but states that she started to feel lightheaded this morning and thought she should come over for eval  no change in meds  )   Patient had vertigo like symptoms per physical therapy evaluation they recommended outpatient vestibular therapy  There is no focal signs on admission  Patient also is extremely hypothyroid with a TSH of 60+  Patient was then discharged the following day  Given that the patient had near-syncope and lightheadedness likely secondary to vertigo and severe hypothyroidism patient was most appropriate for observation status on admission  This case is provider liable      The patients vitals on arrival were   ED Triage Vitals   Temperature Pulse Respirations Blood Pressure SpO2   09/01/21 1038 09/01/21 1038 09/01/21 1038 09/01/21 1515 09/01/21 1038   97 9 °F (36 6 °C) 79 20 166/74 96 %      Temp Source Heart Rate Source Patient Position - Orthostatic VS BP Location FiO2 (%)   09/01/21 1117 09/01/21 1117 09/02/21 1048 09/02/21 1048 --   Oral Monitor Sitting Left arm       Pain Score       09/01/21 1526       No Pain           Past Medical History:   Diagnosis Date    Acid reflux     Anxiety     Arthritis     Asthma     C1-C4 level with central cord syndrome ([address] )     Resolved 2/1/2017     Carpal tunnel syndrome Resolved 4/21/2015    Colitis     Colon polyp     Concussion     Depressed     Dysthymic disorder     Resolved 1/5/2018    Gastric ulcer 2013    small - on EGD - [facility]    Hemorrhoids     Hx of blood clots     Hx of colonic polyps     D'Merrick    Hyperlipemia     Hypertension     Hypothyroid     Lung nodule     stable x 2 yrs on CT    Migraines     Obesity     Postural dizziness with presyncope     Resolved 8/28/2018     Tendinitis     Ulnar neuropathy     Resolved 5/21/2015      Past Surgical History:   Procedure Laterality Date    APPENDECTOMY      BREAST SURGERY Left 01/1966    BREAST LUMPECTOMY    CARPAL TUNNEL RELEASE      CHOLECYSTECTOMY      DILATION AND CURETTAGE OF UTERUS      ELBOW SURGERY      EYE SURGERY Bilateral 2019    Cataract     GALLBLADDER SURGERY      JOINT REPLACEMENT Right     REPLACEMENT TOTAL KNEE    KNEE ARTHROSCOPY      KNEE SURGERY Right     NECK SURGERY      POSTERIOR LAMINECTOMY / DECOMPRESSION CERVICAL SPINE  02/2015    TONSILLECTOMY      VAGINAL DELIVERY      x3    WRIST SURGERY Right            Consults have been placed to:   None    Vitals:    09/01/21 2319 09/02/21 0717 09/02/21 1048 09/02/21 1509   BP: 155/70 144/75 142/75 140/71   BP Location:   Left arm Left arm   Pulse: 67 62 69 74   Resp:   20 18   Temp: 98 2 °F (36 8 °C) 97 9 °F (36 6 °C) 98 2 °F (36 8 °C) 98 4 °F (36 9 °C)   TempSrc:   Oral Oral   SpO2: 91% 91% 93% 94%       Most recent labs:    No results for input(s): WBC, HGB, HCT, PLT, K, NA, CALCIUM, BUN, CREATININE, LIPASE, AMYLASE, INR, TROPONINI, CKTOTAL, AST, ALT, ALKPHOS, BILITOT in the last 72 hours  Scheduled Meds:  Continuous Infusions:No current facility-administered medications for this encounter  PRN Meds:      Surgical procedures (if appropriate):

## 2021-09-09 DIAGNOSIS — H81.10 BENIGN PAROXYSMAL POSITIONAL VERTIGO, UNSPECIFIED LATERALITY: ICD-10-CM

## 2021-09-09 RX ORDER — MECLIZINE HYDROCHLORIDE 25 MG/1
25 TABLET ORAL 3 TIMES DAILY PRN
Qty: 30 TABLET | Refills: 1 | Status: SHIPPED | OUTPATIENT
Start: 2021-09-09 | End: 2022-05-23 | Stop reason: ALTCHOICE

## 2021-09-10 ENCOUNTER — APPOINTMENT (OUTPATIENT)
Dept: LAB | Facility: MEDICAL CENTER | Age: 76
End: 2021-09-10
Payer: MEDICARE

## 2021-09-10 ENCOUNTER — EVALUATION (OUTPATIENT)
Dept: PHYSICAL THERAPY | Facility: REHABILITATION | Age: 76
End: 2021-09-10
Payer: MEDICARE

## 2021-09-10 DIAGNOSIS — E03.9 HYPOTHYROIDISM, UNSPECIFIED TYPE: ICD-10-CM

## 2021-09-10 DIAGNOSIS — R42 VERTIGO: ICD-10-CM

## 2021-09-10 DIAGNOSIS — H81.12 BPPV (BENIGN PAROXYSMAL POSITIONAL VERTIGO), LEFT: Primary | ICD-10-CM

## 2021-09-10 LAB
BASOPHILS # BLD AUTO: 0.07 THOUSANDS/ΜL (ref 0–0.1)
BASOPHILS NFR BLD AUTO: 1 % (ref 0–1)
EOSINOPHIL # BLD AUTO: 0.46 THOUSAND/ΜL (ref 0–0.61)
EOSINOPHIL NFR BLD AUTO: 7 % (ref 0–6)
ERYTHROCYTE [DISTWIDTH] IN BLOOD BY AUTOMATED COUNT: 15.4 % (ref 11.6–15.1)
FERRITIN SERPL-MCNC: 31 NG/ML (ref 8–388)
HCT VFR BLD AUTO: 34.1 % (ref 34.8–46.1)
HGB BLD-MCNC: 10.3 G/DL (ref 11.5–15.4)
IMM GRANULOCYTES # BLD AUTO: 0.01 THOUSAND/UL (ref 0–0.2)
IMM GRANULOCYTES NFR BLD AUTO: 0 % (ref 0–2)
LYMPHOCYTES # BLD AUTO: 2.16 THOUSANDS/ΜL (ref 0.6–4.47)
LYMPHOCYTES NFR BLD AUTO: 31 % (ref 14–44)
MCH RBC QN AUTO: 27.5 PG (ref 26.8–34.3)
MCHC RBC AUTO-ENTMCNC: 30.2 G/DL (ref 31.4–37.4)
MCV RBC AUTO: 91 FL (ref 82–98)
MONOCYTES # BLD AUTO: 0.55 THOUSAND/ΜL (ref 0.17–1.22)
MONOCYTES NFR BLD AUTO: 8 % (ref 4–12)
NEUTROPHILS # BLD AUTO: 3.68 THOUSANDS/ΜL (ref 1.85–7.62)
NEUTS SEG NFR BLD AUTO: 53 % (ref 43–75)
NRBC BLD AUTO-RTO: 0 /100 WBCS
PLATELET # BLD AUTO: 344 THOUSANDS/UL (ref 149–390)
PMV BLD AUTO: 10 FL (ref 8.9–12.7)
RBC # BLD AUTO: 3.74 MILLION/UL (ref 3.81–5.12)
TSH SERPL DL<=0.05 MIU/L-ACNC: 20.9 UIU/ML (ref 0.36–3.74)
WBC # BLD AUTO: 6.93 THOUSAND/UL (ref 4.31–10.16)

## 2021-09-10 PROCEDURE — 95992 CANALITH REPOSITIONING PROC: CPT | Performed by: PHYSICAL THERAPIST

## 2021-09-10 PROCEDURE — 85025 COMPLETE CBC W/AUTO DIFF WBC: CPT

## 2021-09-10 PROCEDURE — 84443 ASSAY THYROID STIM HORMONE: CPT

## 2021-09-10 PROCEDURE — 97161 PT EVAL LOW COMPLEX 20 MIN: CPT | Performed by: PHYSICAL THERAPIST

## 2021-09-10 PROCEDURE — 36415 COLL VENOUS BLD VENIPUNCTURE: CPT

## 2021-09-10 PROCEDURE — 82728 ASSAY OF FERRITIN: CPT

## 2021-09-10 NOTE — PROGRESS NOTES
PT Evaluation     Today's date: 9/10/2021  Patient name: Tony Ramirez  : 1945  MRN: [id number]  Referring provider: Eric Palm*  Dx:   Encounter Diagnosis     ICD-10-CM    1  BPPV (benign paroxysmal positional vertigo), left  H81 12    2  Vertigo  R42 Ambulatory referral to Physical Therapy                  Assessment  Assessment details: Pt is a 64year old female referred with vertigo  Pt positive for L BPPV today as seen with [facility]  Nystagmus not present initially, only symptoms, however after second round of CRM nystagmus noted (upbeating torsional to left)  Nystagmus resolved with CRM, symptoms improved however not resolved  Pt to follow-up with PT on Monday for continued positional testing and treatment as needed  Other impairment: increased dizziness  Understanding of Dx/Px/POC: good   Prognosis: good    Goals  STG  1  Pt will demonstrate negative positional testing within 2 weeks  2  Pt will demonstrate independence with HEP as needed within 2 weeks  LTG  1  Pt will deny dizziness within 4 weeks  2  Pt will demonstrate normal vestibular testing within 4 weeks      Plan  Patient would benefit from: skilled physical therapy  Planned therapy interventions: canalith repositioning, balance, neuromuscular re-education, patient education and home exercise program  Frequency: 2x week  Duration in weeks: 4  Plan of Care beginning date: 9/10/2021  Plan of Care expiration date: 10/8/2021  Treatment plan discussed with: patient        Subjective    Objective    Sharp-Tee: negative  Vertebral Artery Screen: negative  Cervical AROM: limited due to cervical fusion, no pain, complaints of stifness  DixHallpike: Right: negative; Left: positive  Roll Test: Right negative;  Left negative         Precautions: bppv, cervical fusion      Manuals 9/10            CRM L side Epley x2; Erlin x2                                                   Neuro Re-Ed Ther Ex                                                                                                                     Ther Activity                                       Gait Training                                       Modalities

## 2021-09-13 ENCOUNTER — OFFICE VISIT (OUTPATIENT)
Dept: PHYSICAL THERAPY | Facility: REHABILITATION | Age: 76
End: 2021-09-13
Payer: MEDICARE

## 2021-09-13 DIAGNOSIS — H81.12 BPPV (BENIGN PAROXYSMAL POSITIONAL VERTIGO), LEFT: Primary | ICD-10-CM

## 2021-09-13 DIAGNOSIS — R42 VERTIGO: ICD-10-CM

## 2021-09-13 PROCEDURE — 97112 NEUROMUSCULAR REEDUCATION: CPT | Performed by: PHYSICAL THERAPIST

## 2021-09-13 NOTE — PROGRESS NOTES
Treatment Note     Today's date: 2021  Patient name: David Baltazar  : 1945  MRN: [id number]  Referring provider: Lisa Luis  Dx:   Encounter Diagnosis     ICD-10-CM    1  BPPV (benign paroxysmal positional vertigo), left  H81 12    2  Vertigo  R42      Subjective:  She is still getting dizzy with getting up quick out of bed, she has to go slow and take her time  She also gets dizzy when walking to fast and turning too quick which she states feels like an imbalance to her where if she were to lose her balance she would not catch herself  She reports being better but not back to normal yet  She does have a positive fall history  Objective: See treatment diary below    FGA:    Romberg: normal, mild sway with eyes closed  Sharpened Romberg: abnormal, LOB  Roll test right: negative  Roll test left: negative  [employer] Right: negative  [employer] Left: positive, no nystagmus visible, dizziness reported from patient    Assessment: Bayron Newman still had slight symptoms with left [employer] that she reported while no visible nystagmus was observed  Her biggest limitation is her decreased balance, scoring 21/30 on the FGA having difficulty with narrow base, eyes closed, and head turns while walking due to imbalance  Plan going forward is to address her balance deficits to improve her steadiness and reduce her risk of falling  She would benefit from continued skilled therapy to improve her confidence with balance and reduce her fall risk  Goals  STG  1  Pt will demonstrate negative positional testing within 2 weeks  2  Pt will demonstrate independence with HEP as needed within 2 weeks  3  Pt will score 25/30 on FGA to improve balance by 10/13/21 (added 21)  LTG  1  Pt will deny dizziness within 4 weeks    2  Pt will demonstrate normal vestibular testing within 4 weeks    3  Pt will report no unsteadiness with daily tasks, walking and turning by 10/27/21 (added 9/13/21)    Plan: She would benefit from skilled therapy for 2x a week for 4 weeks with focus on Neuro re-education, therapeutic exercise, and therapeutic activity to improve her balance and unsteadiness        Precautions: bppv, cervical fusion      Manuals 9/10 9/13   CRM L side Epley x2; Semont x2 Epley x2                  Neuro Re-Ed  Romberg  Sharpened Romberg  FGA    HEP: Provided Semi Tandem stance balance and walking                                      Ther Ex                                             Ther Activity               Gait Training               Modalities

## 2021-09-15 ENCOUNTER — OFFICE VISIT (OUTPATIENT)
Dept: FAMILY MEDICINE CLINIC | Facility: CLINIC | Age: 76
End: 2021-09-15
Payer: MEDICARE

## 2021-09-15 VITALS
OXYGEN SATURATION: 98 % | SYSTOLIC BLOOD PRESSURE: 120 MMHG | RESPIRATION RATE: 16 BRPM | BODY MASS INDEX: 35.57 KG/M2 | HEIGHT: 60 IN | HEART RATE: 87 BPM | DIASTOLIC BLOOD PRESSURE: 62 MMHG | WEIGHT: 181.2 LBS | TEMPERATURE: 97.6 F

## 2021-09-15 DIAGNOSIS — H81.10 BENIGN PAROXYSMAL POSITIONAL VERTIGO, UNSPECIFIED LATERALITY: ICD-10-CM

## 2021-09-15 DIAGNOSIS — E03.9 HYPOTHYROIDISM, UNSPECIFIED TYPE: Primary | ICD-10-CM

## 2021-09-15 DIAGNOSIS — D64.9 ANEMIA, UNSPECIFIED TYPE: ICD-10-CM

## 2021-09-15 PROCEDURE — 99495 TRANSJ CARE MGMT MOD F2F 14D: CPT | Performed by: FAMILY MEDICINE

## 2021-09-15 NOTE — PROGRESS NOTES
Assessment/Plan:    No problem-specific Assessment & Plan notes found for this encounter  Diagnoses and all orders for this visit:    Hypothyroidism, unspecified type  Comments:  improving enough in the last 2 weeks that I don't want to push up her dosing at this time  recheck 4 weeks and then recheck pt  suspect her fatigue and feeling generally unwell is her thyroid  Orders:  -     TSH, 3rd generation; Future    Anemia, unspecified type  Comments:  suspect this is her thyroid causing the anemia   nml cbc in january  recheck in 1 mo  Orders:  -     CBC and differential; Future    Benign paroxysmal positional vertigo, unspecified laterality  Comments:  continue f/u with PT  recheck 1 mo        Subjective:      Patient ID: Ivonne Camarena is a 64 y o  female  HPI  TCM Call (since 8/15/2021)     Date and time call was made  9/3/2021  2:00 [address] [facility] reviewed  Records reviewed    Patient was hospitialized at  [facility]        Date of Admission  09/01/21    Date of discharge  09/02/21    Diagnosis  Near syncope    Disposition  Home    Current Symptoms  None      TCM Call (since 8/15/2021)     Post hospital issues  None    Should patient be enrolled in anticoag monitoring? No    Scheduled for follow up? Yes    Did you obtain your prescribed medications  Yes    Do you need help managing your prescriptions or medications  No    Is transportation to your appointment needed  No    I have advised the patient to call PCP with any new or worsening symptoms  Chantell STEWART MA         Pt presents in f/u from recent hospitalization  She was increasingly fatigued and fell asleep during conversation quite abruptly, and she had increased vertigo symptoms  There was some question as to whether this was syncopal   She had nml CT brain (microangio changes), unremarkable telly, negative troponin, mild stable anemia, unremarkable ekg, nonfocal exam, unremarkable echo    She had been profoundly hypothyroid for several months due to med noncompliance, and ultimately, this was felt to be the likely cause of her complaints  Was was observed, treated with PT and d/c'd with instructions for outpt PT and with instructions to take her thyroid meds daily  Since coming home, she has been sure to take her thyroid meds daily on an empty stomach first thing in the AM   Most recent tsh two weeks later is 20  No palps, chest pain, shortness of breath  Notes her fatigue is improving  Pt's cbc shows stable mild anemia which has been present in the past when her thyroid is uncontrolled  Has had recent egd, colonoscopies  Following with GI  She continues with PT for her vertigo and balance  The following portions of the patient's history were reviewed and updated as appropriate: allergies, current medications, past family history, past medical history, past social history, past surgical history and problem list     Review of Systems   Constitutional: Positive for fatigue  Negative for chills, fever and unexpected weight change  HENT: Negative for congestion, ear pain, hearing loss, postnasal drip, rhinorrhea, sinus pressure, sinus pain, sore throat, trouble swallowing and voice change  Eyes: Negative for pain, redness and visual disturbance  Respiratory: Negative for cough and shortness of breath  Cardiovascular: Negative for chest pain, palpitations and leg swelling  Gastrointestinal: Negative for abdominal pain, constipation, diarrhea and nausea  Endocrine: Negative for cold intolerance, heat intolerance, polydipsia and polyuria  Genitourinary: Negative for dysuria, frequency and urgency  Musculoskeletal: Negative for arthralgias, joint swelling and myalgias  Skin: Negative for rash  No suspicious lesions   Neurological: Positive for dizziness  Negative for weakness, numbness and headaches  Hematological: Negative for adenopathy           Objective:      /62   Pulse 87   Temp 97 6 °F (36 4 °C)   Resp 16   Ht 5' (1 524 m)   Wt 82 2 kg (181 lb 3 2 oz)   SpO2 98%   BMI 35 39 kg/m²          Physical Exam  Constitutional:       General: She is not in acute distress  Appearance: She is well-developed  HENT:      Head: Normocephalic and atraumatic  Right Ear: Tympanic membrane, ear canal and external ear normal       Left Ear: Tympanic membrane, ear canal and external ear normal       Nose: Nose normal       Mouth/Throat:      Pharynx: No oropharyngeal exudate  Eyes:      Conjunctiva/sclera: Conjunctivae normal       Pupils: Pupils are equal, round, and reactive to light  Neck:      Thyroid: No thyromegaly  Vascular: No carotid bruit or JVD  Cardiovascular:      Rate and Rhythm: Regular rhythm  Heart sounds: S1 normal and S2 normal  No murmur heard  No friction rub  No gallop  No S3 or S4 sounds  Pulmonary:      Effort: Pulmonary effort is normal       Breath sounds: Normal breath sounds  No wheezing, rhonchi or rales  Abdominal:      General: Bowel sounds are normal  There is no distension  Palpations: Abdomen is soft  Tenderness: There is no abdominal tenderness  Lymphadenopathy:      Cervical: No cervical adenopathy  Neurological:      Mental Status: She is alert and oriented to person, place, and time  Cranial Nerves: No cranial nerve deficit  Sensory: No sensory deficit  Deep Tendon Reflexes: Reflexes are normal and symmetric

## 2021-09-15 NOTE — PATIENT INSTRUCTIONS
Medicare Preventive Visit Patient Instructions  Thank you for completing your Welcome to Medicare Visit or Medicare Annual Wellness Visit today  Your next wellness visit will be due in one year (9/16/2022)  The screening/preventive services that you may require over the next 5-10 years are detailed below  Some tests may not apply to you based off risk factors and/or age  Screening tests ordered at today's visit but not completed yet may show as past due  Also, please note that scanned in results may not display below  Preventive Screenings:  Service Recommendations Previous Testing/Comments   Colorectal Cancer Screening  * Colonoscopy    * Fecal Occult Blood Test (FOBT)/Fecal Immunochemical Test (FIT)  * Fecal DNA/Cologuard Test  * Flexible Sigmoidoscopy Age: 54-65 years old   Colonoscopy: every 10 years (may be performed more frequently if at higher risk)  OR  FOBT/FIT: every 1 year  OR  Cologuard: every 3 years  OR  Sigmoidoscopy: every 5 years  Screening may be recommended earlier than age 48 if at higher risk for colorectal cancer  Also, an individualized decision between you and your healthcare provider will decide whether screening between the ages of 74-80 would be appropriate  Colonoscopy: 10/01/2020  FOBT/FIT: 10/19/2019  Cologuard: Not on file  Sigmoidoscopy: Not on file          Breast Cancer Screening Age: 36 years old  Frequency: every 1-2 years  Not required if history of left and right mastectomy Mammogram: Not on file        Cervical Cancer Screening Between the ages of 21-29, pap smear recommended once every 3 years  Between the ages of 33-67, can perform pap smear with HPV co-testing every 5 years     Recommendations may differ for women with a history of total hysterectomy, cervical cancer, or abnormal pap smears in past  Pap Smear: Not on file        Hepatitis C Screening Once for adults born between [facility]  More frequently in patients at high risk for Hepatitis C Hep C Antibody: Not on file        Diabetes Screening 1-2 times per year if you're at risk for diabetes or have pre-diabetes Fasting glucose: 92 mg/dL   A1C: 5 9 %        Cholesterol Screening Once every 5 years if you don't have a lipid disorder  May order more often based on risk factors  Lipid panel: 09/21/2020          Other Preventive Screenings Covered by Medicare:  1  Abdominal Aortic Aneurysm (AAA) Screening: covered once if your at risk  You're considered to be at risk if you have a family history of AAA  2  Lung Cancer Screening: covers low dose CT scan once per year if you meet all of the following conditions: (1) Age 50-69; (2) No signs or symptoms of lung cancer; (3) Current smoker or have quit smoking within the last 15 years; (4) You have a tobacco smoking history of at least 30 pack years (packs per day multiplied by number of years you smoked); (5) You get a written order from a healthcare provider  3  Glaucoma Screening: covered annually if you're considered high risk: (1) You have diabetes OR (2) Family history of glaucoma OR (3)  aged 48 and older OR (3)  American aged 72 and older  3  Osteoporosis Screening: covered every 2 years if you meet one of the following conditions: (1) You're estrogen deficient and at risk for osteoporosis based off medical history and other findings; (2) Have a vertebral abnormality; (3) On glucocorticoid therapy for more than 3 months; (4) Have primary hyperparathyroidism; (5) On osteoporosis medications and need to assess response to drug therapy  · Last bone density test (DXA Scan): Not on file  5  HIV Screening: covered annually if you're between the age of 12-76  Also covered annually if you are younger than 13 and older than 72 with risk factors for HIV infection  For pregnant patients, it is covered up to 3 times per pregnancy      Immunizations:  Immunization Recommendations   Influenza Vaccine Annual influenza vaccination during flu season is recommended for all persons aged >= 6 months who do not have contraindications   Pneumococcal Vaccine (Prevnar and Pneumovax)  * Prevnar = PCV13  * Pneumovax = PPSV23   Adults 25-60 years old: 1-3 doses may be recommended based on certain risk factors  Adults 72 years old: Prevnar (PCV13) vaccine recommended followed by Pneumovax (PPSV23) vaccine  If already received PPSV23 since turning 65, then PCV13 recommended at least one year after PPSV23 dose  Hepatitis B Vaccine 3 dose series if at intermediate or high risk (ex: diabetes, end stage renal disease, liver disease)   Tetanus (Td) Vaccine - COST NOT COVERED BY MEDICARE PART B Following completion of primary series, a booster dose should be given every 10 years to maintain immunity against tetanus  Td may also be given as tetanus wound prophylaxis  Tdap Vaccine - COST NOT COVERED BY MEDICARE PART B Recommended at least once for all adults  For pregnant patients, recommended with each pregnancy  Shingles Vaccine (Shingrix) - COST NOT COVERED BY MEDICARE PART B  2 shot series recommended in those aged 48 and above     Health Maintenance Due:      Topic Date Due    Hepatitis C Screening  Never done    Colorectal Cancer Screening  10/01/2023     Immunizations Due:      Topic Date Due    Influenza Vaccine (1) 09/01/2021     Advance Directives   What are advance directives? Advance directives are legal documents that state your wishes and plans for medical care  These plans are made ahead of time in case you lose your ability to make decisions for yourself  Advance directives can apply to any medical decision, such as the treatments you want, and if you want to donate organs  What are the types of advance directives? There are many types of advance directives, and each state has rules about how to use them  You may choose a combination of any of the following:  · Living will: This is a written record of the treatment you want   You can also choose which treatments you do not want, which to limit, and which to stop at a certain time  This includes surgery, medicine, IV fluid, and tube feedings  · Durable power of  for healthcare [facility]): This is a written record that states who you want to make healthcare choices for you when you are unable to make them for yourself  This person, called a proxy, is usually a family member or a friend  You may choose more than 1 proxy  · Do not resuscitate (DNR) order:  A DNR order is used in case your heart stops beating or you stop breathing  It is a request not to have certain forms of treatment, such as CPR  A DNR order may be included in other types of advance directives  · Medical directive: This covers the care that you want if you are in a coma, near death, or unable to make decisions for yourself  You can list the treatments you want for each condition  Treatment may include pain medicine, surgery, blood transfusions, dialysis, IV or tube feedings, and a ventilator (breathing machine)  · Values history: This document has questions about your views, beliefs, and how you feel and think about life  This information can help others choose the care that you would choose  Why are advance directives important? An advance directive helps you control your care  Although spoken wishes may be used, it is better to have your wishes written down  Spoken wishes can be misunderstood, or not followed  Treatments may be given even if you do not want them  An advance directive may make it easier for your family to make difficult choices about your care  Weight Management   Why it is important to manage your weight:  Being overweight increases your risk of health conditions such as heart disease, high blood pressure, type 2 diabetes, and certain types of cancer  It can also increase your risk for osteoarthritis, sleep apnea, and other respiratory problems  Aim for a slow, steady weight loss   Even a small amount of weight loss can lower your risk of health problems  How to lose weight safely:  A safe and healthy way to lose weight is to eat fewer calories and get regular exercise  You can lose up about 1 pound a week by decreasing the number of calories you eat by 500 calories each day  Healthy meal plan for weight management:  A healthy meal plan includes a variety of foods, contains fewer calories, and helps you stay healthy  A healthy meal plan includes the following:  · Eat whole-grain foods more often  A healthy meal plan should contain fiber  Fiber is the part of grains, fruits, and vegetables that is not broken down by your body  Whole-grain foods are healthy and provide extra fiber in your diet  Some examples of whole-grain foods are whole-wheat breads and pastas, oatmeal, brown rice, and bulgur  · Eat a variety of vegetables every day  Include dark, leafy greens such as spinach, kale, marci greens, and mustard greens  Eat yellow and orange vegetables such as carrots, sweet potatoes, and winter squash  · Eat a variety of fruits every day  Choose fresh or canned fruit (canned in its own juice or light syrup) instead of juice  Fruit juice has very little or no fiber  · Eat low-fat dairy foods  Drink fat-free (skim) milk or 1% milk  Eat fat-free yogurt and low-fat cottage cheese  Try low-fat cheeses such as mozzarella and other reduced-fat cheeses  · Choose meat and other protein foods that are low in fat  Choose beans or other legumes such as split peas or lentils  Choose fish, skinless poultry (chicken or turkey), or lean cuts of red meat (beef or pork)  Before you cook meat or poultry, cut off any visible fat  · Use less fat and oil  Try baking foods instead of frying them  Add less fat, such as margarine, sour cream, regular salad dressing and mayonnaise to foods  Eat fewer high-fat foods  Some examples of high-fat foods include french fries, doughnuts, ice cream, and cakes  · Eat fewer sweets    Limit foods and drinks that are high in sugar  This includes candy, cookies, regular soda, and sweetened drinks  Exercise:  Exercise at least 30 minutes per day on most days of the week  Some examples of exercise include walking, biking, dancing, and swimming  You can also fit in more physical activity by taking the stairs instead of the elevator or parking farther away from stores  Ask your healthcare provider about the best exercise plan for you  © Copyright [employer] 2018 Information is for End User's use only and may not be sold, redistributed or otherwise used for commercial purposes   All illustrations and images included in CareNotes® are the copyrighted property of [employer]  or [address] [place] [address]

## 2021-09-15 NOTE — PROGRESS NOTES
Assessment and Plan:     Problem List Items Addressed This Visit     None           Preventive health issues were discussed with patient, and age appropriate screening tests were ordered as noted in patient's After Visit Summary  Personalized health advice and appropriate referrals for health education or preventive services given if needed, as noted in patient's After Visit Summary       History of Present Illness:     Patient presents for Medicare Annual Wellness visit    Patient Care Team:  Diamond Lopez DO as PCP - General (Family Medicine)  Ayaan Gamez MD Arva Chestnut, MD Vira Antu, PA-C Dorethea Blotter, MD (Gastroenterology)     Problem List:     Patient Active Problem List   Diagnosis    Carpal tunnel syndrome of right wrist    Cataract    Cervical radiculitis    Vertigo    Essential hypertension    Gastroesophageal reflux disease    Hyperlipidemia    History of colonic polyps    Injury of tendon of rotator cuff    Mild persistent asthma without complication    Mixed anxiety and depressive disorder    Occipital neuralgia of left side    Osteoarthritis of knee    Postconcussion syndrome    Hypothyroidism    Cervical spinal stenosis    Tinnitus, bilateral    Ulnar nerve compression, right    Fracture of orbital floor, right side, initial encounter for closed fracture ([facility] [address] )    IFG (impaired fasting glucose)    Rosacea    History of gastric ulcer    RUQ pain    Intestinal metaplasia of gastric mucosa    URI (upper respiratory infection)    Near syncope    Anemia      Past Medical and Surgical History:     Past Medical History:   Diagnosis Date    Acid reflux     Anxiety     Arthritis     Asthma     C1-C4 level with central cord syndrome ([address] )     Resolved 2/1/2017     Carpal tunnel syndrome Resolved 4/21/2015    Colitis     Colon polyp     Concussion     Depressed     Dysthymic disorder     Resolved 1/5/2018    Gastric ulcer 2013 small - on EGD - [facility]    Hemorrhoids     Hx of blood clots     Hx of colonic polyps     [place]    Hyperlipemia     Hypertension     Hypothyroid     Lung nodule     stable x 2 yrs on CT    Migraines     Obesity     Postural dizziness with presyncope     Resolved 2018     Tendinitis     Ulnar neuropathy     Resolved 2015      Past Surgical History:   Procedure Laterality Date    APPENDECTOMY      BREAST SURGERY Left 1966    BREAST LUMPECTOMY    CARPAL TUNNEL RELEASE      CHOLECYSTECTOMY      DILATION AND CURETTAGE OF UTERUS      ELBOW SURGERY      EYE SURGERY Bilateral 2019    Cataract     GALLBLADDER SURGERY      JOINT REPLACEMENT Right     REPLACEMENT TOTAL KNEE    KNEE ARTHROSCOPY      KNEE SURGERY Right     NECK SURGERY      POSTERIOR LAMINECTOMY / DECOMPRESSION CERVICAL SPINE  2015    TONSILLECTOMY      VAGINAL DELIVERY      x3    WRIST SURGERY Right       Family History:     Family History   Problem Relation Age of Onset    Breast cancer Mother     Alzheimer's disease Mother     Coronary artery disease Mother     Hypertension Mother    Santos Coulter Migraines Mother     Peripheral vascular disease Mother    Levora Coulter Parkinsonism Father     Other Father         Cardiovascular disease     Coronary artery disease Father     Hypertension Father     Bipolar disorder Sister     Asthma Daughter     Asthma Son       Social History:     Social History     Socioeconomic History    Marital status:       Spouse name: None    Number of children: None    Years of education: None    Highest education level: None   Occupational History    Occupation: Retired    Tobacco Use    Smoking status: Former Smoker     Packs/day: 0 00     Years: 0 00     Pack years: 0 00     Types: Cigarettes     Start date:      Quit date: 10/14/2013     Years since quittin 9    Smokeless tobacco: Never Used   Vaping Use    Vaping Use: Never used   Substance and Sexual Activity    Alcohol use: No    Drug use: No    Sexual activity: Not Currently   Other Topics Concern    None   Social History Narrative    Consumes 2-4 cups per day     Social Determinants of Health     Financial Resource Strain:     Difficulty of Paying Living Expenses:    Food Insecurity:     Worried About Running Out of Food in the Last Year:     [address] in the Last Year:    Transportation Needs:     Lack of Transportation (Medical):  Lack of Transportation (Non-Medical):    Physical Activity:     Days of Exercise per Week:     Minutes of Exercise per Session:    Stress:     Feeling of Stress :    Social Connections:     Frequency of Communication with Friends and Family:     Frequency of Social Gatherings with Friends and Family:     Attends Nondenominational Services:     Active Member of Clubs or Organizations:     Attends Club or Organization Meetings:     Marital Status:    Intimate Partner Violence:     Fear of Current or Ex-Partner:     Emotionally Abused:     Physically Abused:     Sexually Abused:       Medications and Allergies:     Current Outpatient Medications   Medication Sig Dispense Refill    acetaminophen (TYLENOL) 325 mg tablet Take 325 mg by mouth as needed      albuterol (Ventolin HFA) 90 mcg/act inhaler Inhale 2 puffs every 6 (six) hours as needed for wheezing 1 Inhaler 2    amLODIPine (NORVASC) 10 mg tablet Take 1 tablet (10 mg total) by mouth daily 90 tablet 1    atorvastatin (LIPITOR) 20 mg tablet Take 1 tablet (20 mg total) by mouth daily 90 tablet 1    buPROPion (WELLBUTRIN SR) 150 mg 12 hr tablet Take 1 tablet (150 mg total) by mouth 2 (two) times a day 180 tablet 1    Cholecalciferol (Vitamin D3) 1 25 MG (30931 UT) CAPS Take 5,000 Units by mouth daily      fluticasone (FLOVENT HFA) 110 MCG/ACT inhaler Inhale 2 puffs 2 (two) times a day Rinse mouth after use   3 Inhaler 1    levothyroxine 137 mcg tablet Take 1 tablet (137 mcg total) by mouth daily 30 tablet 3    lisinopril (ZESTRIL) 40 mg tablet TAKE 1 TABLET BY MOUTH EVERY DAY 90 tablet 1    meclizine (ANTIVERT) 25 mg tablet Take 1 tablet (25 mg total) by mouth 3 (three) times a day as needed for dizziness for up to 10 days 30 tablet 1    metroNIDAZOLE (METROGEL) 1 % gel Apply topically daily 45 g 0    pantoprazole (PROTONIX) 40 mg tablet Take 1 tablet (40 mg total) by mouth 2 (two) times a day 60 tablet 1    polyethylene glycol (GLYCOLAX) 17 GM/SCOOP powder Take 17 g by mouth daily       RESTASIS 0 05 % ophthalmic emulsion Administer 1 drop to both eyes every 12 (twelve) hours       sertraline (ZOLOFT) 100 mg tablet Take 2 tablets (200 mg total) by mouth daily 60 tablet 3    Wheat Dextrin (Benefiber) POWD Take by mouth daily        No current facility-administered medications for this visit  Allergies   Allergen Reactions    Ceftin [Cefuroxime] Hives      Immunizations:     Immunization History   Administered Date(s) Administered    INFLUENZA 10/16/2008, 11/04/2009, 10/09/2012, 11/05/2013, 11/19/2014, 10/14/2015, 10/18/2016, 01/05/2018, 10/02/2018    Influenza Split High Dose Preservative Free IM 10/14/2015, 10/18/2016, 01/05/2018, 10/02/2018, 11/05/2019, 10/08/2020    Pneumococcal Conjugate 13-Valent 10/14/2015    Pneumococcal Polysaccharide PPV23 10/18/2016    SARS-CoV-2 / COVID-19 mRNA IM (Pfizer-BioNTech) 01/19/2021, 02/14/2021    Td (adult), adsorbed 08/25/2010    Tdap 07/26/2012, 06/19/2015, 08/31/2021    Zoster 05/23/2013, 01/23/2014      Health Maintenance:         Topic Date Due    Hepatitis C Screening  Never done    Colorectal Cancer Screening  10/01/2023         Topic Date Due    Influenza Vaccine (1) 09/01/2021      Medicare Health Risk Assessment:     /62   Pulse 87   Temp 97 6 °F (36 4 °C)   Resp 16   Ht 5' (1 524 m)   Wt 82 2 kg (181 lb 3 2 oz)   SpO2 98%   BMI 35 39 kg/m²      Amber Molina is here for her Subsequent Wellness visit       Health Risk Assessment:   Patient rates overall health as fair  Patient feels that their physical health rating is slightly worse  Patient is satisfied with their life  Eyesight was rated as slightly worse  Hearing was rated as same  Patient feels that their emotional and mental health rating is much better  Patients states they are sometimes angry  Patient states they are often unusually tired/fatigued  Pain experienced in the last 7 days has been some  Patient's pain rating has been 2/10  Patient states that she has experienced no weight loss or gain in last 6 months  Fall Risk Screening: In the past year, patient has experienced: history of falling in past year    Number of falls: 1  Injured during fall?: Yes    Feels unsteady when standing or walking?: Yes    Worried about falling?: Yes      Home Safety:  Patient does not have trouble with stairs inside or outside of their home  Patient has working smoke alarms and has working carbon monoxide detector  Home safety hazards include: none  Nutrition:   Current diet is Regular  Medications:   Patient is not currently taking any over-the-counter supplements  Patient is able to manage medications  Activities of Daily Living (ADLs)/Instrumental Activities of Daily Living (IADLs):   Walk and transfer into and out of bed and chair?: Yes  Dress and groom yourself?: Yes    Bathe or shower yourself?: Yes    Feed yourself? Yes  Do your laundry/housekeeping?: Yes  Manage your money, pay your bills and track your expenses?: Yes  Make your own meals?: Yes    Do your own shopping?: Yes    Previous Hospitalizations:   Any hospitalizations or ED visits within the last 12 months?: Yes    How many hospitalizations have you had in the last year?: 1-2    Advance Care Planning:   Living will: Yes    Durable POA for healthcare:  Yes    Advanced directive: Yes      Cognitive Screening:   Provider or family/friend/caregiver concerned regarding cognition?: No    PREVENTIVE SCREENINGS      Cardiovascular Screening: General: Screening Not Indicated and History Lipid Disorder      Diabetes Screening:     General: Screening Current      Colorectal Cancer Screening:     General: Screening Current      Cervical Cancer Screening:    General: Screening Not Indicated      Lung Cancer Screening:     General: Screening Not Indicated    Screening, Brief Intervention, and Referral to Treatment (SBIRT)    Screening  Typical number of drinks in a day: 0  Typical number of drinks in a week: 0  Interpretation: Low risk drinking behavior      Single Item Drug Screening:  How often have you used an illegal drug (including marijuana) or a prescription medication for non-medical reasons in the past year? never    Single Item Drug Screen Score: 0  Interpretation: Negative screen for possible drug use disorder      Christofer Torres, DO

## 2021-09-16 ENCOUNTER — OFFICE VISIT (OUTPATIENT)
Dept: PHYSICAL THERAPY | Facility: REHABILITATION | Age: 76
End: 2021-09-16
Payer: MEDICARE

## 2021-09-16 DIAGNOSIS — R42 VERTIGO: ICD-10-CM

## 2021-09-16 DIAGNOSIS — H81.12 BPPV (BENIGN PAROXYSMAL POSITIONAL VERTIGO), LEFT: Primary | ICD-10-CM

## 2021-09-16 PROCEDURE — 97112 NEUROMUSCULAR REEDUCATION: CPT | Performed by: PHYSICAL THERAPIST

## 2021-09-16 NOTE — PROGRESS NOTES
Treatment Note     Today's date: 2021  Patient name: Ray Alston  : 1945  MRN: [id number]  Referring provider: Rex Sheehan*  Dx:   Encounter Diagnosis     ICD-10-CM    1  BPPV (benign paroxysmal positional vertigo), left  H81 12    2  Vertigo  R42      Subjective:  She has been going for walks more often about 2 times a day and no experience of dizziness, walking about 15 minutes with a slow pace  Moving her head back and fourth causes her dizziness  With semi tandem and eyes closed at home she is getting dizzy  Objective: See treatment diary below  21    FGA:    Romberg: normal, mild sway with eyes closed  Sharpened Romberg: abnormal, LOB  Roll test right: negative  Roll test left: negative  [employer] Right: negative  [employer] Left: positive, no nystagmus visible, dizziness reported from patient Right Roll test: negative  Left Roll test: negative   [employer] Right: negative  [employer] Left: no visibile nystagmus, dizziness present          Assessment: Tatianna Garza still had slight symptoms with left [employer] that she reported while no visible nystagmus was observed  Her dizziness is brought on by eyes closed where it gets to a 6/10, she is only getting 1-2/10 dizziness with head turns while walking  It seems more likely that movement sensitivity, rather than positional vertigo, is present  Continue to progress her as tolerated with habituation exercises, static and dynamic balance  Goals  STG  1  Pt will demonstrate negative positional testing within 2 weeks  2  Pt will demonstrate independence with HEP as needed within 2 weeks  3  Pt will score 25/30 on FGA to improve balance by 10/13/21 (added 21)  LTG  1  Pt will deny dizziness within 4 weeks    2  Pt will demonstrate normal vestibular testing within 4 weeks    3  Pt will report no unsteadiness with daily tasks, walking and turning by 10/27/21 (added 21)    Plan: She would benefit from skilled therapy for 2x a week for 4 weeks with focus on Neuro re-education, therapeutic exercise, and therapeutic activity to improve her balance and unsteadiness        Precautions: bppv, cervical fusion      Manuals 9/10 9/13 9/16/21   CRM L side Epley x2; Semont x2 Epley x2 L epley x1                       Neuro Re-Ed  Romberg  Sharpened Romberg  FGA    HEP: Provided Semi Tandem stance balance and walking VOR x 1 vertical no symptoms, horizontal dizziness, goes away in matter of seconds   4x30 seconds    Seated head turns 30 seconds x 2, dizziness     Walking 20 feet in solostep with horizontal head turns, 8x (1-2/10 dizziness with this)    Airex balance with eyes closed  3x30 seconds (6/10 dizziness with)    1x30 seconds eye closed on floor                                             Ther Ex                                                      Ther Activity                  Gait Training                  Modalities

## 2021-09-21 ENCOUNTER — OFFICE VISIT (OUTPATIENT)
Dept: PHYSICAL THERAPY | Facility: REHABILITATION | Age: 76
End: 2021-09-21
Payer: MEDICARE

## 2021-09-21 DIAGNOSIS — H81.12 BPPV (BENIGN PAROXYSMAL POSITIONAL VERTIGO), LEFT: Primary | ICD-10-CM

## 2021-09-21 DIAGNOSIS — R42 VERTIGO: ICD-10-CM

## 2021-09-21 PROCEDURE — 97112 NEUROMUSCULAR REEDUCATION: CPT | Performed by: PHYSICAL THERAPIST

## 2021-09-21 NOTE — PROGRESS NOTES
Treatment Note     Today's date: 2021  Patient name: Ladarius Ramirez  : 1945  MRN: [id number]  Referring provider: Lul Pruitt*  Dx:   Encounter Diagnosis     ICD-10-CM    1  BPPV (benign paroxysmal positional vertigo), left  H81 12    2  Vertigo  R42      Subjective:  She still gets symptoms with head motions, she is keeping up with her 2 walks a day about 15 minutes each using a cane  Objective: See treatment diary below  21    FGA:    Romberg: normal, mild sway with eyes closed  Sharpened Romberg: abnormal, LOB  Roll test right: negative  Roll test left: negative  [employer] Right: negative  [employer] Left: positive, no nystagmus visible, dizziness reported from patient Right Roll test: negative  Left Roll test: negative   [employer] Right: negative  [employer] Left: no visibile nystagmus, dizziness present          Assessment: Calli Gruber is still experiencing dizziness with head turns and motion where the background is moving when doing VOR cx  She is still having dizziness upon getting up that lasts for about 3 seconds  Overall she is improving with reporting less dizziness and will continue with static, dynamic balance as well as neuro-reeductiation with vestibular training to reduce her dizziness and improve her functional mobility  Not as much difficulty with 360 turns     Goals  STG  1  Pt will demonstrate negative positional testing within 2 weeks  2  Pt will demonstrate independence with HEP as needed within 2 weeks  3  Pt will score 25/30 on FGA to improve balance by 10/13/21 (added 21)  LTG  1  Pt will deny dizziness within 4 weeks    2  Pt will demonstrate normal vestibular testing within 4 weeks    3  Pt will report no unsteadiness with daily tasks, walking and turning by 10/27/21 (added 21)    Plan: She would benefit from skilled therapy for 2x a week for 4 weeks with focus on Neuro re-education, therapeutic exercise, and therapeutic activity to improve her balance and unsteadiness        Precautions: bppv, cervical fusion      Manuals 9/10 9/13 9/16/21 9/21/21   CRM L side Epley x2; Semont x2 Epley x2 L epley x1                           Neuro Re-Ed  Romberg  Sharpened Romberg  FGA    HEP: Provided Semi Tandem stance balance and walking VOR x 1 vertical no symptoms, horizontal dizziness, goes away in matter of seconds   4x30 seconds    Seated head turns 30 seconds x 2, dizziness     Walking 20 feet in solostep with horizontal head turns, 8x (1-2/10 dizziness with this)    Airex balance with eyes closed  3x30 seconds (6/10 dizziness with)    1x30 seconds eye closed on floor 200 feet walking  VOR x1 horizontal 4x30 seconds, cueing for larger amplitude movements    VORcx seated 1st trial 15 seconds, got dizzy and slight nausea  2nd trial shorter motions with more fixed background 30 seconds x 3  Walking with HT's, 3 min   Walking with HT and keeping eyes fixated on something in distance, 3 min   Feet together on airex eyes closed balance, 30s x 3  360 turns, 5 min while walking                                                            Ther Ex                                                               Ther Activity                     Gait Training                     Modalities

## 2021-09-23 ENCOUNTER — OFFICE VISIT (OUTPATIENT)
Dept: PHYSICAL THERAPY | Facility: REHABILITATION | Age: 76
End: 2021-09-23
Payer: MEDICARE

## 2021-09-23 DIAGNOSIS — H81.12 BPPV (BENIGN PAROXYSMAL POSITIONAL VERTIGO), LEFT: Primary | ICD-10-CM

## 2021-09-23 DIAGNOSIS — R42 VERTIGO: ICD-10-CM

## 2021-09-23 PROCEDURE — 97112 NEUROMUSCULAR REEDUCATION: CPT | Performed by: PHYSICAL THERAPIST

## 2021-09-23 NOTE — PROGRESS NOTES
Treatment Note     Today's date: 2021  Patient name: Jessica Post  : 1945  MRN: [id number]  Referring provider: Roosevelt Cai*  Dx:   Encounter Diagnosis     ICD-10-CM    1  BPPV (benign paroxysmal positional vertigo), left  H81 12    2  Vertigo  R42      Subjective:  Getting better since she first started  Minimal complaints at home for the most part with her dizziness  Denies spinning dizziness  Reports compliance with HEP  Objective: See treatment diary below      Assessment: With improved tolerance for session today as seen by less time needed to complete all exercises and minimal complaints of symptoms  Able to increase VOR times while maintaining focus on her target  Most difficulty with EC activities specifically on foam as seen by increased sway and occasional UE support, however with more reps sway decreased and no UE support required  Plan: Potential d/c to HEP after next week  Precautions: bppv, cervical fusion      Manuals 21   CRM L side NP  L epley x1                           Neuro Re-Ed VORx1 H/V standing plain 2x45s ea;  VORcx H/V 2x45s ea    Walking with HTs H/V 4x30' ea    Tandem gait EC // bars limited UE 3 laps    Walking with 360 turns 4x30'    FTEC foam 3x30s; FA EC HTs H/V foam 2x30s ea  VOR x 1 vertical no symptoms, horizontal dizziness, goes away in matter of seconds   4x30 seconds    Seated head turns 30 seconds x 2, dizziness     Walking 20 feet in solostep with horizontal head turns, 8x (1-2/10 dizziness with this)    Airex balance with eyes closed  3x30 seconds (6/10 dizziness with)    1x30 seconds eye closed on floor 200 feet walking  VOR x1 horizontal 4x30 seconds, cueing for larger amplitude movements    VORcx seated 1st trial 15 seconds, got dizzy and slight nausea  2nd trial shorter motions with more fixed background 30 seconds x 3  Walking with HT's, 3 min   Walking with HT and keeping eyes fixated on something in distance, 3 min   Feet together on airex eyes closed balance, 30s x 3  360 turns, 5 min while walking                                                            Ther Ex                                                               Ther Activity                     Gait Training                     Modalities

## 2021-09-28 ENCOUNTER — OFFICE VISIT (OUTPATIENT)
Dept: PHYSICAL THERAPY | Facility: REHABILITATION | Age: 76
End: 2021-09-28
Payer: MEDICARE

## 2021-09-28 DIAGNOSIS — H81.12 BPPV (BENIGN PAROXYSMAL POSITIONAL VERTIGO), LEFT: Primary | ICD-10-CM

## 2021-09-28 DIAGNOSIS — R42 VERTIGO: ICD-10-CM

## 2021-09-28 PROCEDURE — 97112 NEUROMUSCULAR REEDUCATION: CPT | Performed by: PHYSICAL THERAPIST

## 2021-09-28 NOTE — PROGRESS NOTES
Treatment Note     Today's date: 2021  Patient name: Petrona Vega  : 1945  MRN: [id number]  Referring provider: Jewel Hargrove  Dx:   Encounter Diagnosis     ICD-10-CM    1  BPPV (benign paroxysmal positional vertigo), left  H81 12    2  Vertigo  R42      Subjective:  Having about 1-2 dizzy spells a day that occur when she is getting up and turning her head fast  She has been using the cane only when she goes for a walk to be safe  Objective: See treatment diary below    Assessment: Improvement with VOR cx with no symptoms following that  She was challenged by the biodex treadmill with turning and felt unsteady during this, improved with more time doing it  She is still having episodes of dizziness with getting up quickly and head turns that lasts about a couple seconds to 20 seconds, will recheck positional testing next visit  Plan: Potential d/c to HEP within the next 1-2 weeks, recheck positional testing before d/c     Precautions: bppv, cervical fusion      Manuals 21   CRM L side NP                   Neuro Re-Ed VORx1 H/V standing plain 2x45s ea;  VORcx H/V 2x45s ea    Walking with HTs H/V 4x30' ea    Tandem gait EC // bars limited UE 3 laps    Walking with 360 turns 4x30'    FTEC foam 3x30s; FA EC HTs H/V foam 2x30s ea TM Biodex 1 5 mph   - 2 minutes 55 seconds then needed a standing rest break due to breathing   - 1 minute 30 seconds  Then with head turns, 1 4 mph  - 30 seconds x 5   - no hands for last 3  Then 0 7 mph with turns 90 and 180 degrees for 5 min, use of no hands, occasionally used hands during turns  VOR cx standing 3x30s  Airex balance with eyes closed, 30 seconds x 4  Walking with 360 degree turns, 4 min                                       Ther Ex                                             Ther Activity               Gait Training               Modalities

## 2021-09-30 ENCOUNTER — APPOINTMENT (OUTPATIENT)
Dept: PHYSICAL THERAPY | Facility: REHABILITATION | Age: 76
End: 2021-09-30
Payer: MEDICARE

## 2021-09-30 ENCOUNTER — TELEPHONE (OUTPATIENT)
Dept: PHYSICAL THERAPY | Facility: REHABILITATION | Age: 76
End: 2021-09-30

## 2021-09-30 NOTE — TELEPHONE ENCOUNTER
Spoke to patient by phone, as she'd contact us via patient portal   She reports she's getting dizziness/vertigo with changing body positions/laying down, lasting briefly along lines of positional vertigo  Feels some imbalance moving around, didn't feel well enough to come into therapy today  Symptoms sound consistent with positional vertigo, she can call us when feeling well enough to get here, and we can retest for positional vertigo  If she feels imbalance or dizziness in sitting, not changing positions, seek emergent medical care

## 2021-10-02 ENCOUNTER — IMMUNIZATIONS (OUTPATIENT)
Dept: FAMILY MEDICINE CLINIC | Facility: HOSPITAL | Age: 76
End: 2021-10-02

## 2021-10-02 DIAGNOSIS — Z23 ENCOUNTER FOR IMMUNIZATION: Primary | ICD-10-CM

## 2021-10-02 PROCEDURE — 91300 SARS-COV-2 / COVID-19 MRNA VACCINE (PFIZER-BIONTECH) 30 MCG: CPT

## 2021-10-02 PROCEDURE — 0001A SARS-COV-2 / COVID-19 MRNA VACCINE (PFIZER-BIONTECH) 30 MCG: CPT

## 2021-10-04 ENCOUNTER — OFFICE VISIT (OUTPATIENT)
Dept: PHYSICAL THERAPY | Facility: REHABILITATION | Age: 76
End: 2021-10-04
Payer: MEDICARE

## 2021-10-04 DIAGNOSIS — R42 VERTIGO: ICD-10-CM

## 2021-10-04 DIAGNOSIS — H81.12 BPPV (BENIGN PAROXYSMAL POSITIONAL VERTIGO), LEFT: Primary | ICD-10-CM

## 2021-10-04 PROCEDURE — 97112 NEUROMUSCULAR REEDUCATION: CPT | Performed by: PHYSICAL THERAPIST

## 2021-10-07 ENCOUNTER — APPOINTMENT (OUTPATIENT)
Dept: LAB | Facility: MEDICAL CENTER | Age: 76
End: 2021-10-07
Payer: MEDICARE

## 2021-10-07 DIAGNOSIS — D64.9 ANEMIA, UNSPECIFIED TYPE: ICD-10-CM

## 2021-10-07 DIAGNOSIS — E03.9 HYPOTHYROIDISM, UNSPECIFIED TYPE: ICD-10-CM

## 2021-10-07 LAB
BASOPHILS # BLD AUTO: 0.05 THOUSANDS/ΜL (ref 0–0.1)
BASOPHILS NFR BLD AUTO: 1 % (ref 0–1)
EOSINOPHIL # BLD AUTO: 0.37 THOUSAND/ΜL (ref 0–0.61)
EOSINOPHIL NFR BLD AUTO: 7 % (ref 0–6)
ERYTHROCYTE [DISTWIDTH] IN BLOOD BY AUTOMATED COUNT: 14.7 % (ref 11.6–15.1)
HCT VFR BLD AUTO: 34.6 % (ref 34.8–46.1)
HGB BLD-MCNC: 10.5 G/DL (ref 11.5–15.4)
IMM GRANULOCYTES # BLD AUTO: 0.01 THOUSAND/UL (ref 0–0.2)
IMM GRANULOCYTES NFR BLD AUTO: 0 % (ref 0–2)
LYMPHOCYTES # BLD AUTO: 1.24 THOUSANDS/ΜL (ref 0.6–4.47)
LYMPHOCYTES NFR BLD AUTO: 24 % (ref 14–44)
MCH RBC QN AUTO: 27.3 PG (ref 26.8–34.3)
MCHC RBC AUTO-ENTMCNC: 30.3 G/DL (ref 31.4–37.4)
MCV RBC AUTO: 90 FL (ref 82–98)
MONOCYTES # BLD AUTO: 0.46 THOUSAND/ΜL (ref 0.17–1.22)
MONOCYTES NFR BLD AUTO: 9 % (ref 4–12)
NEUTROPHILS # BLD AUTO: 3.06 THOUSANDS/ΜL (ref 1.85–7.62)
NEUTS SEG NFR BLD AUTO: 59 % (ref 43–75)
NRBC BLD AUTO-RTO: 0 /100 WBCS
PLATELET # BLD AUTO: 317 THOUSANDS/UL (ref 149–390)
PMV BLD AUTO: 9.7 FL (ref 8.9–12.7)
RBC # BLD AUTO: 3.85 MILLION/UL (ref 3.81–5.12)
TSH SERPL DL<=0.05 MIU/L-ACNC: 6.39 UIU/ML (ref 0.36–3.74)
WBC # BLD AUTO: 5.19 THOUSAND/UL (ref 4.31–10.16)

## 2021-10-07 PROCEDURE — 84443 ASSAY THYROID STIM HORMONE: CPT

## 2021-10-07 PROCEDURE — 36415 COLL VENOUS BLD VENIPUNCTURE: CPT

## 2021-10-07 PROCEDURE — 85025 COMPLETE CBC W/AUTO DIFF WBC: CPT

## 2021-10-08 ENCOUNTER — OFFICE VISIT (OUTPATIENT)
Dept: PHYSICAL THERAPY | Facility: REHABILITATION | Age: 76
End: 2021-10-08
Payer: MEDICARE

## 2021-10-08 DIAGNOSIS — R42 VERTIGO: ICD-10-CM

## 2021-10-08 DIAGNOSIS — H81.12 BPPV (BENIGN PAROXYSMAL POSITIONAL VERTIGO), LEFT: Primary | ICD-10-CM

## 2021-10-08 PROCEDURE — 97112 NEUROMUSCULAR REEDUCATION: CPT | Performed by: PHYSICAL THERAPIST

## 2021-10-11 ENCOUNTER — APPOINTMENT (OUTPATIENT)
Dept: PHYSICAL THERAPY | Facility: REHABILITATION | Age: 76
End: 2021-10-11
Payer: MEDICARE

## 2021-10-13 ENCOUNTER — OFFICE VISIT (OUTPATIENT)
Dept: PHYSICAL THERAPY | Facility: REHABILITATION | Age: 76
End: 2021-10-13
Payer: MEDICARE

## 2021-10-13 DIAGNOSIS — H81.12 BPPV (BENIGN PAROXYSMAL POSITIONAL VERTIGO), LEFT: Primary | ICD-10-CM

## 2021-10-13 DIAGNOSIS — R42 VERTIGO: ICD-10-CM

## 2021-10-13 PROCEDURE — 97112 NEUROMUSCULAR REEDUCATION: CPT | Performed by: PHYSICAL THERAPIST

## 2021-10-26 ENCOUNTER — OFFICE VISIT (OUTPATIENT)
Dept: FAMILY MEDICINE CLINIC | Facility: CLINIC | Age: 76
End: 2021-10-26
Payer: MEDICARE

## 2021-10-26 VITALS
SYSTOLIC BLOOD PRESSURE: 120 MMHG | RESPIRATION RATE: 18 BRPM | WEIGHT: 184 LBS | HEART RATE: 84 BPM | OXYGEN SATURATION: 97 % | BODY MASS INDEX: 36.12 KG/M2 | TEMPERATURE: 98.1 F | DIASTOLIC BLOOD PRESSURE: 58 MMHG | HEIGHT: 60 IN

## 2021-10-26 DIAGNOSIS — E03.9 HYPOTHYROIDISM, UNSPECIFIED TYPE: Primary | ICD-10-CM

## 2021-10-26 DIAGNOSIS — J45.909 MILD ASTHMA, UNSPECIFIED WHETHER COMPLICATED, UNSPECIFIED WHETHER PERSISTENT: ICD-10-CM

## 2021-10-26 DIAGNOSIS — F51.01 PRIMARY INSOMNIA: ICD-10-CM

## 2021-10-26 DIAGNOSIS — R10.13 EPIGASTRIC PAIN: ICD-10-CM

## 2021-10-26 DIAGNOSIS — F32.A DEPRESSION, UNSPECIFIED DEPRESSION TYPE: ICD-10-CM

## 2021-10-26 PROCEDURE — 99214 OFFICE O/P EST MOD 30 MIN: CPT | Performed by: FAMILY MEDICINE

## 2021-10-26 RX ORDER — PANTOPRAZOLE SODIUM 40 MG/1
40 TABLET, DELAYED RELEASE ORAL 2 TIMES DAILY
Qty: 60 TABLET | Refills: 1 | Status: SHIPPED | OUTPATIENT
Start: 2021-10-26 | End: 2021-11-29 | Stop reason: SDUPTHER

## 2021-10-26 RX ORDER — SERTRALINE HYDROCHLORIDE 100 MG/1
200 TABLET, FILM COATED ORAL DAILY
Qty: 60 TABLET | Refills: 3 | Status: CANCELLED | OUTPATIENT
Start: 2021-10-26

## 2021-10-26 RX ORDER — TRAZODONE HYDROCHLORIDE 50 MG/1
50 TABLET ORAL
Qty: 30 TABLET | Refills: 3 | Status: SHIPPED | OUTPATIENT
Start: 2021-10-26 | End: 2022-02-23 | Stop reason: SDUPTHER

## 2021-10-27 ENCOUNTER — TELEPHONE (OUTPATIENT)
Dept: FAMILY MEDICINE CLINIC | Facility: CLINIC | Age: 76
End: 2021-10-27

## 2021-10-27 DIAGNOSIS — J45.20 MILD INTERMITTENT ASTHMA WITHOUT COMPLICATION: ICD-10-CM

## 2021-10-27 DIAGNOSIS — F32.A DEPRESSION, UNSPECIFIED DEPRESSION TYPE: ICD-10-CM

## 2021-10-27 RX ORDER — FLUTICASONE PROPIONATE 110 UG/1
2 AEROSOL, METERED RESPIRATORY (INHALATION) 2 TIMES DAILY
Qty: 12 G | Refills: 1 | Status: SHIPPED | OUTPATIENT
Start: 2021-10-27

## 2021-10-27 RX ORDER — SERTRALINE HYDROCHLORIDE 100 MG/1
200 TABLET, FILM COATED ORAL DAILY
Qty: 60 TABLET | Refills: 3 | Status: SHIPPED | OUTPATIENT
Start: 2021-10-27 | End: 2021-10-27

## 2021-11-04 DIAGNOSIS — E78.5 HYPERLIPIDEMIA, UNSPECIFIED HYPERLIPIDEMIA TYPE: ICD-10-CM

## 2021-11-04 RX ORDER — ATORVASTATIN CALCIUM 20 MG/1
TABLET, FILM COATED ORAL
Qty: 90 TABLET | Refills: 1 | Status: SHIPPED | OUTPATIENT
Start: 2021-11-04

## 2021-11-19 ENCOUNTER — LAB (OUTPATIENT)
Dept: LAB | Facility: MEDICAL CENTER | Age: 76
End: 2021-11-19
Payer: MEDICARE

## 2021-11-19 DIAGNOSIS — E03.9 HYPOTHYROIDISM, UNSPECIFIED TYPE: ICD-10-CM

## 2021-11-19 DIAGNOSIS — Z11.59 NEED FOR HEPATITIS C SCREENING TEST: ICD-10-CM

## 2021-11-19 LAB
BASOPHILS # BLD AUTO: 0.07 THOUSANDS/ΜL (ref 0–0.1)
BASOPHILS NFR BLD AUTO: 1 % (ref 0–1)
EOSINOPHIL # BLD AUTO: 0.39 THOUSAND/ΜL (ref 0–0.61)
EOSINOPHIL NFR BLD AUTO: 6 % (ref 0–6)
ERYTHROCYTE [DISTWIDTH] IN BLOOD BY AUTOMATED COUNT: 14.3 % (ref 11.6–15.1)
HCT VFR BLD AUTO: 34.4 % (ref 34.8–46.1)
HCV AB SER QL: NORMAL
HGB BLD-MCNC: 10.3 G/DL (ref 11.5–15.4)
IMM GRANULOCYTES # BLD AUTO: 0.01 THOUSAND/UL (ref 0–0.2)
IMM GRANULOCYTES NFR BLD AUTO: 0 % (ref 0–2)
LYMPHOCYTES # BLD AUTO: 2.13 THOUSANDS/ΜL (ref 0.6–4.47)
LYMPHOCYTES NFR BLD AUTO: 33 % (ref 14–44)
MCH RBC QN AUTO: 26.8 PG (ref 26.8–34.3)
MCHC RBC AUTO-ENTMCNC: 29.9 G/DL (ref 31.4–37.4)
MCV RBC AUTO: 89 FL (ref 82–98)
MONOCYTES # BLD AUTO: 0.5 THOUSAND/ΜL (ref 0.17–1.22)
MONOCYTES NFR BLD AUTO: 8 % (ref 4–12)
NEUTROPHILS # BLD AUTO: 3.31 THOUSANDS/ΜL (ref 1.85–7.62)
NEUTS SEG NFR BLD AUTO: 52 % (ref 43–75)
NRBC BLD AUTO-RTO: 0 /100 WBCS
PLATELET # BLD AUTO: 311 THOUSANDS/UL (ref 149–390)
PMV BLD AUTO: 9.9 FL (ref 8.9–12.7)
RBC # BLD AUTO: 3.85 MILLION/UL (ref 3.81–5.12)
TSH SERPL DL<=0.05 MIU/L-ACNC: 2.33 UIU/ML (ref 0.36–3.74)
WBC # BLD AUTO: 6.41 THOUSAND/UL (ref 4.31–10.16)

## 2021-11-19 PROCEDURE — 85025 COMPLETE CBC W/AUTO DIFF WBC: CPT

## 2021-11-19 PROCEDURE — 84443 ASSAY THYROID STIM HORMONE: CPT

## 2021-11-19 PROCEDURE — 86803 HEPATITIS C AB TEST: CPT

## 2021-11-19 PROCEDURE — 36415 COLL VENOUS BLD VENIPUNCTURE: CPT

## 2021-12-07 ENCOUNTER — APPOINTMENT (OUTPATIENT)
Dept: LAB | Facility: CLINIC | Age: 76
End: 2021-12-07
Payer: MEDICARE

## 2021-12-07 ENCOUNTER — OFFICE VISIT (OUTPATIENT)
Dept: FAMILY MEDICINE CLINIC | Facility: CLINIC | Age: 76
End: 2021-12-07
Payer: MEDICARE

## 2021-12-07 ENCOUNTER — HOSPITAL ENCOUNTER (OUTPATIENT)
Dept: CT IMAGING | Facility: HOSPITAL | Age: 76
Discharge: HOME | End: 2021-12-07
Attending: FAMILY MEDICINE
Payer: MEDICARE

## 2021-12-07 VITALS
DIASTOLIC BLOOD PRESSURE: 64 MMHG | HEART RATE: 57 BPM | WEIGHT: 181.2 LBS | RESPIRATION RATE: 18 BRPM | TEMPERATURE: 97.5 F | SYSTOLIC BLOOD PRESSURE: 126 MMHG | OXYGEN SATURATION: 97 % | HEIGHT: 60 IN | BODY MASS INDEX: 35.57 KG/M2

## 2021-12-07 DIAGNOSIS — B37.0 THRUSH: ICD-10-CM

## 2021-12-07 DIAGNOSIS — R10.31 CHRONIC RLQ PAIN: ICD-10-CM

## 2021-12-07 DIAGNOSIS — R10.13 EPIGASTRIC PAIN: ICD-10-CM

## 2021-12-07 DIAGNOSIS — G89.29 CHRONIC RLQ PAIN: ICD-10-CM

## 2021-12-07 DIAGNOSIS — J45.30 MILD PERSISTENT ASTHMA WITHOUT COMPLICATION: ICD-10-CM

## 2021-12-07 DIAGNOSIS — G56.02 CARPAL TUNNEL SYNDROME OF LEFT WRIST: ICD-10-CM

## 2021-12-07 DIAGNOSIS — R10.13 EPIGASTRIC PAIN: Primary | ICD-10-CM

## 2021-12-07 LAB
ALBUMIN SERPL BCP-MCNC: 4 G/DL (ref 3.5–5)
ALP SERPL-CCNC: 88 U/L (ref 46–116)
ALT SERPL W P-5'-P-CCNC: 21 U/L (ref 12–78)
ANION GAP SERPL CALCULATED.3IONS-SCNC: 8 MMOL/L (ref 4–13)
AST SERPL W P-5'-P-CCNC: 15 U/L (ref 5–45)
BASOPHILS # BLD AUTO: 0.07 THOUSANDS/ΜL (ref 0–0.1)
BASOPHILS NFR BLD AUTO: 1 % (ref 0–1)
BILIRUB SERPL-MCNC: 0.34 MG/DL (ref 0.2–1)
BUN SERPL-MCNC: 25 MG/DL (ref 5–25)
CALCIUM SERPL-MCNC: 9.1 MG/DL (ref 8.3–10.1)
CHLORIDE SERPL-SCNC: 107 MMOL/L (ref 100–108)
CO2 SERPL-SCNC: 26 MMOL/L (ref 21–32)
CREAT SERPL-MCNC: 1.08 MG/DL (ref 0.6–1.3)
EOSINOPHIL # BLD AUTO: 0.42 THOUSAND/ΜL (ref 0–0.61)
EOSINOPHIL NFR BLD AUTO: 6 % (ref 0–6)
ERYTHROCYTE [DISTWIDTH] IN BLOOD BY AUTOMATED COUNT: 14.4 % (ref 11.6–15.1)
GFR SERPL CREATININE-BSD FRML MDRD: 49 ML/MIN/1.73SQ M
GLUCOSE SERPL-MCNC: 107 MG/DL (ref 65–140)
HCT VFR BLD AUTO: 35.7 % (ref 34.8–46.1)
HGB BLD-MCNC: 10.6 G/DL (ref 11.5–15.4)
IMM GRANULOCYTES # BLD AUTO: 0.02 THOUSAND/UL (ref 0–0.2)
IMM GRANULOCYTES NFR BLD AUTO: 0 % (ref 0–2)
LYMPHOCYTES # BLD AUTO: 1.66 THOUSANDS/ΜL (ref 0.6–4.47)
LYMPHOCYTES NFR BLD AUTO: 22 % (ref 14–44)
MCH RBC QN AUTO: 26.5 PG (ref 26.8–34.3)
MCHC RBC AUTO-ENTMCNC: 29.7 G/DL (ref 31.4–37.4)
MCV RBC AUTO: 89 FL (ref 82–98)
MONOCYTES # BLD AUTO: 0.61 THOUSAND/ΜL (ref 0.17–1.22)
MONOCYTES NFR BLD AUTO: 8 % (ref 4–12)
NEUTROPHILS # BLD AUTO: 4.69 THOUSANDS/ΜL (ref 1.85–7.62)
NEUTS SEG NFR BLD AUTO: 63 % (ref 43–75)
NRBC BLD AUTO-RTO: 0 /100 WBCS
PLATELET # BLD AUTO: 275 THOUSANDS/UL (ref 149–390)
PMV BLD AUTO: 9.7 FL (ref 8.9–12.7)
POTASSIUM SERPL-SCNC: 4.2 MMOL/L (ref 3.5–5.3)
PROT SERPL-MCNC: 7.4 G/DL (ref 6.4–8.2)
RBC # BLD AUTO: 4 MILLION/UL (ref 3.81–5.12)
SODIUM SERPL-SCNC: 141 MMOL/L (ref 136–145)
WBC # BLD AUTO: 7.47 THOUSAND/UL (ref 4.31–10.16)

## 2021-12-07 PROCEDURE — 80053 COMPREHEN METABOLIC PANEL: CPT

## 2021-12-07 PROCEDURE — 99214 OFFICE O/P EST MOD 30 MIN: CPT | Performed by: FAMILY MEDICINE

## 2021-12-07 PROCEDURE — 74177 CT ABD & PELVIS W/CONTRAST: CPT

## 2021-12-07 PROCEDURE — 85025 COMPLETE CBC W/AUTO DIFF WBC: CPT

## 2021-12-07 PROCEDURE — 36415 COLL VENOUS BLD VENIPUNCTURE: CPT

## 2021-12-07 RX ORDER — SUCRALFATE ORAL 1 G/10ML
1 SUSPENSION ORAL
Qty: 420 ML | Refills: 0 | Status: SHIPPED | OUTPATIENT
Start: 2021-12-07 | End: 2022-03-25 | Stop reason: SDUPTHER

## 2021-12-07 RX ADMIN — IOHEXOL 100 ML: 350 INJECTION, SOLUTION INTRAVENOUS at 13:31

## 2021-12-07 RX ADMIN — IOHEXOL 50 ML: 240 INJECTION, SOLUTION INTRATHECAL; INTRAVASCULAR; INTRAVENOUS; ORAL at 13:31

## 2021-12-10 ENCOUNTER — TELEPHONE (OUTPATIENT)
Dept: FAMILY MEDICINE CLINIC | Facility: CLINIC | Age: 76
End: 2021-12-10

## 2021-12-13 DIAGNOSIS — F32.A DEPRESSION, UNSPECIFIED DEPRESSION TYPE: Primary | ICD-10-CM

## 2021-12-13 RX ORDER — SERTRALINE HYDROCHLORIDE 100 MG/1
100 TABLET, FILM COATED ORAL 2 TIMES DAILY
COMMUNITY
End: 2021-12-13 | Stop reason: SDUPTHER

## 2021-12-14 RX ORDER — SERTRALINE HYDROCHLORIDE 100 MG/1
100 TABLET, FILM COATED ORAL 2 TIMES DAILY
Qty: 60 TABLET | Refills: 0 | Status: SHIPPED | OUTPATIENT
Start: 2021-12-14 | End: 2022-01-24 | Stop reason: SDUPTHER

## 2021-12-15 ENCOUNTER — OFFICE VISIT (OUTPATIENT)
Dept: URGENT CARE | Facility: MEDICAL CENTER | Age: 76
End: 2021-12-15
Payer: MEDICARE

## 2021-12-15 VITALS — TEMPERATURE: 97.8 F | RESPIRATION RATE: 18 BRPM | HEART RATE: 87 BPM | OXYGEN SATURATION: 98 %

## 2021-12-15 DIAGNOSIS — J01.90 ACUTE SINUSITIS, RECURRENCE NOT SPECIFIED, UNSPECIFIED LOCATION: ICD-10-CM

## 2021-12-15 DIAGNOSIS — Z20.822 ENCOUNTER FOR LABORATORY TESTING FOR COVID-19 VIRUS: Primary | ICD-10-CM

## 2021-12-15 PROCEDURE — 87636 SARSCOV2 & INF A&B AMP PRB: CPT | Performed by: PHYSICIAN ASSISTANT

## 2021-12-15 PROCEDURE — G0463 HOSPITAL OUTPT CLINIC VISIT: HCPCS | Performed by: PHYSICIAN ASSISTANT

## 2021-12-15 PROCEDURE — 99213 OFFICE O/P EST LOW 20 MIN: CPT | Performed by: PHYSICIAN ASSISTANT

## 2021-12-15 RX ORDER — AZITHROMYCIN 250 MG/1
TABLET, FILM COATED ORAL
Qty: 6 TABLET | Refills: 0 | Status: SHIPPED | OUTPATIENT
Start: 2021-12-15 | End: 2021-12-19

## 2021-12-16 ENCOUNTER — PATIENT MESSAGE (OUTPATIENT)
Dept: FAMILY MEDICINE CLINIC | Facility: CLINIC | Age: 76
End: 2021-12-16

## 2021-12-16 LAB
FLUAV RNA RESP QL NAA+PROBE: NEGATIVE
FLUBV RNA RESP QL NAA+PROBE: NEGATIVE
SARS-COV-2 RNA RESP QL NAA+PROBE: NEGATIVE

## 2021-12-17 ENCOUNTER — OFFICE VISIT (OUTPATIENT)
Dept: URGENT CARE | Facility: MEDICAL CENTER | Age: 76
End: 2021-12-17
Payer: MEDICARE

## 2021-12-17 VITALS
WEIGHT: 181 LBS | TEMPERATURE: 99.2 F | HEART RATE: 74 BPM | RESPIRATION RATE: 18 BRPM | SYSTOLIC BLOOD PRESSURE: 109 MMHG | OXYGEN SATURATION: 96 % | BODY MASS INDEX: 35.35 KG/M2 | DIASTOLIC BLOOD PRESSURE: 72 MMHG

## 2021-12-17 DIAGNOSIS — L30.9 ECZEMA, UNSPECIFIED TYPE: Primary | ICD-10-CM

## 2021-12-17 DIAGNOSIS — I10 ESSENTIAL HYPERTENSION: ICD-10-CM

## 2021-12-17 PROCEDURE — G0463 HOSPITAL OUTPT CLINIC VISIT: HCPCS | Performed by: PHYSICIAN ASSISTANT

## 2021-12-17 PROCEDURE — 99213 OFFICE O/P EST LOW 20 MIN: CPT | Performed by: PHYSICIAN ASSISTANT

## 2021-12-17 RX ORDER — AMLODIPINE BESYLATE 10 MG/1
TABLET ORAL
Qty: 90 TABLET | Refills: 1 | OUTPATIENT
Start: 2021-12-17

## 2021-12-17 RX ORDER — TRIAMCINOLONE ACETONIDE 1 MG/G
CREAM TOPICAL 2 TIMES DAILY
Qty: 453.6 G | Refills: 0 | Status: SHIPPED | OUTPATIENT
Start: 2021-12-17 | End: 2022-05-23

## 2022-01-10 DIAGNOSIS — I10 ESSENTIAL HYPERTENSION: ICD-10-CM

## 2022-01-10 RX ORDER — AMLODIPINE BESYLATE 10 MG/1
10 TABLET ORAL DAILY
Qty: 90 TABLET | Refills: 0 | Status: SHIPPED | OUTPATIENT
Start: 2022-01-10 | End: 2022-01-24 | Stop reason: SDUPTHER

## 2022-01-21 ENCOUNTER — OFFICE VISIT (OUTPATIENT)
Dept: GASTROENTEROLOGY | Facility: AMBULARY SURGERY CENTER | Age: 77
End: 2022-01-21
Payer: MEDICARE

## 2022-01-21 VITALS
HEIGHT: 60 IN | BODY MASS INDEX: 35.53 KG/M2 | DIASTOLIC BLOOD PRESSURE: 80 MMHG | WEIGHT: 181 LBS | SYSTOLIC BLOOD PRESSURE: 140 MMHG

## 2022-01-21 DIAGNOSIS — K31.A0 INTESTINAL METAPLASIA OF GASTRIC MUCOSA: Primary | ICD-10-CM

## 2022-01-21 DIAGNOSIS — D50.9 IRON DEFICIENCY ANEMIA, UNSPECIFIED IRON DEFICIENCY ANEMIA TYPE: ICD-10-CM

## 2022-01-21 DIAGNOSIS — R10.13 EPIGASTRIC PAIN: ICD-10-CM

## 2022-01-21 DIAGNOSIS — K76.89 LIVER CYST: ICD-10-CM

## 2022-01-21 PROCEDURE — 99214 OFFICE O/P EST MOD 30 MIN: CPT | Performed by: PHYSICIAN ASSISTANT

## 2022-01-21 NOTE — PROGRESS NOTES
[facility] - Outpatient Follow-up Note  Regina Roach 64 y o  female MRN: [id number]  Encounter: [id number]          ASSESSMENT AND PLAN:      1  Epigastric pain  Resolved with carafate given by PCP  She is on PPI BID  She does take NSAIDs every few days  CT scan unremarkable  EGD 5/2021 with moderate gastritis and intestinal metaplasia as noted below     -Patient stopped carafate due to improvement of symptoms      -Continue PPI BID    -Recommend trying to avoid NSAIDs and using Tylenol instead   -Continue to avoid smoking and alcohol  2  Intestinal metaplasia  Last EGD 5/2021 with gastric mapping and intestinal metaplasia at the antrum  Recommended repeat in 1-2 years  She is on PPI BID  She does take NSAIDs    -Will schedule repeat EGD for 5/2022   -discussed risks of procedure including bleeding, infection and perforation  -recommend avoiding NSAIDs such as ibuprofen and using tylenol instead   -continue to avoid smoking and alcohol use  3  Liver cyst  Noted on CT scan which appears stable  Most recent CT 12/2021 without significant change in size of appearance of right hepatic lobe cystic lesion      -no prior ultrasound   -will obtain ultrasound now  Then likely can monitor with US once yearly  4  Normocytic anemia  Appears to have persistent anemia  Hgb was normal one year ago, now 10 8 No signs of GI bleeding  Last colonoscopy 2020  Capsule endoscopy at that time without source of anemia noted however ecchymotic lesion as noted in the proximal small bowel and balloon enteroscopy was recommended and not performed      -Patient is due for EGD  Will repeat colonoscopy at that time due to anemia   -If unremarkable for source of anemia, patient may need repeat capsule endoscopy to assess if area of ecchymosis is still present   If so, would likely need advanced endoscopy to further assess    -consider hematology consult if GI workup unremarkable  -continue f/u with PCP    discussed risks of procedure including bleeding, infection and perforation  -miralax with mag citrate prep          Patient will follow up after procedures  ______________________________________________________________________    SUBJECTIVE:      Romeo Manzanares is a 33-year-old female with past medical history of GERD, intestinal metaplasia, hypothyroidism, asthma, hypertension who presents for follow-up for abdominal pain  Patient was last seen in the office 03/2021 for epigastric/right upper quadrant pain  EGD at that time with mild gastritis  Followed up with PCP due to recurrence of symptoms recently with recent CT scan unremarkable  Patient reports the epigastric pain has now resolved  She reports it was pretty persistent for about 3-4 weeks however when she started Carafate the acidic/burning sensation improved  She is still taking PPI b i d  Patient does take ibuprofen around 2 pills every few days  Bowel movements are normal   She denies any constipation, diarrhea or blood in the stool  Reports weight has been increasing  Appetite is normal     Lab work last month with normal CMP  CBC showing anemia at 10 6, platelets normal   Appears patient has had anemia for a long time  She denies prior workup with Hematology  Denies any signs of bleeding  Her hemoglobin was normal around 1 year ago  The patient she had significant workup for anemia in 2020  Biopsies negative for H pylori, celiac disease  Proximal small bowel ecchymotic lesion was noted  Patient was previously recommended to obtain balloon enteroscopy however this was not performed  She is not on iron supplement  No family history of colon cancer  REVIEW OF SYSTEMS IS OTHERWISE NEGATIVE        Historical Information   Past Medical History:   Diagnosis Date    Acid reflux     Anxiety     Arthritis     Asthma     C1-C4 level with central cord syndrome ([facility] [address] )     Resolved 2/1/2017     Carpal tunnel syndrome Resolved 2015    Colitis     Colon polyp     Concussion     Depressed     Dysthymic disorder     Resolved 2018    Gastric ulcer 2013    small - on EGD - [facility]    Hemorrhoids     Hx of blood clots     Hx of colonic polyps     [place]    Hyperlipemia     Hypertension     Hypothyroid     Lung nodule     stable x 2 yrs on CT    Migraines     Obesity     Postural dizziness with presyncope     Resolved 2018     Tendinitis     Ulnar neuropathy     Resolved 2015      Past Surgical History:   Procedure Laterality Date    APPENDECTOMY      BREAST SURGERY Left 1966    BREAST LUMPECTOMY    CARPAL TUNNEL RELEASE      CHOLECYSTECTOMY      DILATION AND CURETTAGE OF UTERUS      ELBOW SURGERY      EYE SURGERY Bilateral 2019    Cataract     GALLBLADDER SURGERY      JOINT REPLACEMENT Right     REPLACEMENT TOTAL KNEE    KNEE ARTHROSCOPY      KNEE SURGERY Right     NECK SURGERY      POSTERIOR LAMINECTOMY / DECOMPRESSION CERVICAL SPINE  2015    TONSILLECTOMY      VAGINAL DELIVERY      x3    WRIST SURGERY Right      Social History   Social History     Substance and Sexual Activity   Alcohol Use No     Social History     Substance and Sexual Activity   Drug Use No     Social History     Tobacco Use   Smoking Status Former Smoker    Packs/day: 0 00    Years: 0 00    Pack years: 0 00    Types: Cigarettes    Start date:     Quit date: 10/14/2013    Years since quittin 2   Smokeless Tobacco Never Used     Family History   Problem Relation Age of Onset    Breast cancer Mother     Alzheimer's disease Mother     Coronary artery disease Mother     Hypertension Mother    [facility] Migraines Mother     Peripheral vascular disease Mother    [facility] Parkinsonism Father     Other Father         Cardiovascular disease     Coronary artery disease Father     Hypertension Father     Bipolar disorder Sister     Asthma Daughter     Asthma Son        Meds/Allergies       Current Outpatient Medications:     acetaminophen (TYLENOL) 325 mg tablet    albuterol (Ventolin HFA) 90 mcg/act inhaler    amLODIPine (NORVASC) 10 mg tablet    atorvastatin (LIPITOR) 20 mg tablet    Cholecalciferol (Vitamin D3) 1 25 MG (05817 UT) CAPS    fluticasone (FLOVENT HFA) 110 MCG/ACT inhaler    levothyroxine 137 mcg tablet    lisinopril (ZESTRIL) 40 mg tablet    metroNIDAZOLE (METROGEL) 1 % gel    pantoprazole (PROTONIX) 40 mg tablet    polyethylene glycol (GLYCOLAX) 17 GM/SCOOP powder    RESTASIS 0 05 % ophthalmic emulsion    sertraline (ZOLOFT) 100 mg tablet    sucralfate (CARAFATE) 1 g/10 mL suspension    traZODone (DESYREL) 50 mg tablet    triamcinolone (KENALOG) 0 1 % cream    Wheat Dextrin (Benefiber) POWD    meclizine (ANTIVERT) 25 mg tablet    Allergies   Allergen Reactions    Ceftin [Cefuroxime] Hives           Objective     Blood pressure 140/80, height 5' (1 524 m), weight 82 1 kg (181 lb)  Body mass index is 35 35 kg/m²  PHYSICAL EXAM:      General Appearance:   Alert, cooperative, no distress   HEENT:   Normocephalic, atraumatic, anicteric      Neck:  Supple, symmetrical, trachea midline   Lungs:   Clear to auscultation bilaterally; no rales, rhonchi or wheezing; respirations unlabored    Heart[de-identified]   Regular rate and rhythm; no murmur, rub, or gallop  Abdomen:   Soft, non-tender, non-distended; normal bowel sounds; no masses, no organomegaly    Genitalia:   Deferred    Rectal:   Deferred    Extremities:  No cyanosis, clubbing or edema    Pulses:  2+ and symmetric    Skin:  No jaundice, rashes, or lesions    Lymph nodes:  No palpable cervical lymphadenopathy        Lab Results:   No visits with results within 1 Day(s) from this visit  Latest known visit with results is:   Office Visit on 12/15/2021   Component Date Value    SARS-CoV-2 12/15/2021 Negative     INFLUENZA A PCR 12/15/2021 Negative     INFLUENZA B PCR 12/15/2021 Negative          Radiology Results:   No results found

## 2022-01-21 NOTE — PATIENT INSTRUCTIONS
Recommend using acetaminophen (Tyelnol) instead of ibuprofen   Scheduled date of EGD/colonoscopy (as of today):5/20/2022  Physician performing EGD/colonoscopy:dr haynes  Location of EGD/colonoscopy:[facility]  Desired bowel prep reviewed with patient:MIRALAX/MAG CITRATE PER AZUCENA BOYLE  Instructions reviewed with patient by:AKOSUA HENDRICKS  Clearances:

## 2022-01-27 DIAGNOSIS — I10 ESSENTIAL HYPERTENSION: ICD-10-CM

## 2022-01-28 RX ORDER — AMLODIPINE BESYLATE 10 MG/1
10 TABLET ORAL DAILY
Qty: 90 TABLET | Refills: 0 | OUTPATIENT
Start: 2022-01-28

## 2022-02-02 ENCOUNTER — HOSPITAL ENCOUNTER (OUTPATIENT)
Dept: RADIOLOGY | Facility: HOSPITAL | Age: 77
Discharge: HOME | End: 2022-02-02
Attending: ORTHOPAEDIC SURGERY
Payer: MEDICARE

## 2022-02-02 ENCOUNTER — OFFICE VISIT (OUTPATIENT)
Dept: OBGYN CLINIC | Facility: HOSPITAL | Age: 77
End: 2022-02-02
Payer: MEDICARE

## 2022-02-02 VITALS
HEART RATE: 76 BPM | SYSTOLIC BLOOD PRESSURE: 134 MMHG | BODY MASS INDEX: 35.82 KG/M2 | WEIGHT: 183.4 LBS | DIASTOLIC BLOOD PRESSURE: 76 MMHG

## 2022-02-02 DIAGNOSIS — M18.12 DEGENERATIVE ARTHRITIS OF THUMB, LEFT: Primary | ICD-10-CM

## 2022-02-02 DIAGNOSIS — M79.645 THUMB PAIN, LEFT: ICD-10-CM

## 2022-02-02 DIAGNOSIS — G56.02 CARPAL TUNNEL SYNDROME OF LEFT WRIST: ICD-10-CM

## 2022-02-02 PROCEDURE — 99213 OFFICE O/P EST LOW 20 MIN: CPT | Performed by: ORTHOPAEDIC SURGERY

## 2022-02-02 PROCEDURE — 73140 X-RAY EXAM OF FINGER(S): CPT

## 2022-02-02 RX ORDER — LIDOCAINE 50 MG/G
1 PATCH TOPICAL DAILY
Qty: 12 PATCH | Refills: 0 | Status: SHIPPED | OUTPATIENT
Start: 2022-02-02 | End: 2022-05-23

## 2022-02-02 NOTE — PROGRESS NOTES
ASSESSMENT/PLAN:    Assessment:   Left thumb interphalangeal, MCP and CMC arthritis    Plan:   Conservative management for arthritis  Steroid injections offered, but decline  NSAIDs  Comfort cool braces  Hand therapy  Return to clinic in 6 weeks    Follow Up:  6  week(s)    To Do Next Visit:  Exam    General Discussions:  CMC Arthritis: The anatomy and physiology of carpometacarpal joint arthritis was discussed with the patient today in the office  Deterioration of the articular cartilage eventually leads to hypermobility at the thumb [facility] joint, resulting in joint subluxation, osteophyte formation, cystic changes within the trapezium and base of the first metacarpal, as well as subchondral sclerosis  Eventually, pain, limited mobility, and compensatory hyperextension at the metacarpophalangeal joint may develop  While normal activity and usage of the thumb joint may provide a painful experience to the patient, this typically does not result in damage to the thumb or hand  Treatment options include resting thumb spica splints to decreased joint edema, pain, and inflammation  Therapy exercises to strengthen the thenar musculature may relieve pain, but do not alter the overall continued development of osteoarthritis  Oral medications, topical medications, corticosteroid injections may decrease pain and increase overall function  Eventually, approximately 5% of patients may require surgical intervention  Osteoarthritis:  The anatomy and physiology of osteoarthritis was discussed with the patient today in the office  Deterioration of the articular cartilage eventually leads to altered mobility at the joint, resulting in joint subluxation, osteophyte formation, cystic changes, as well as subchondral sclerosis  Eventually, pain, limited mobility, and compensatory hypermobility at surrounding joints may develop    While normal activity and usage of the joint may provide a painful experience to the patient, this typically does not result in damage to the limb  Treatment options include splints to decreased joint edema, pain, and inflammation  Therapy exercises to strengthen the surrounding musculature may relieve pain, but do not alter the overall continued development of osteoarthritis  Oral medications, topical medications, corticosteroid injections may decrease pain and increase overall function  Eventually, some patients may require surgical intervention  _____________________________________________________  CHIEF COMPLAINT:  Chief Complaint   Patient presents with    Left Wrist - Carpal Tunnel     Last seen 2/22/19          SUBJECTIVE:  Debbie Jacobs is a 64 y o  female who presents with left thumb pain x 1 month  The patient reports that she has had no trauma   The pain is worse with activity and gripping and better with rest  The patient has no numbness or tingling to the thumb  Radiation: None  Previous Treatments: NSAIDs and activity modification with only partial relief  Associated symptoms: Pain  Moderate  Intermittant  Aching  Handedness: right  Work status: retired, deana    PAST MEDICAL HISTORY:  Past Medical History:   Diagnosis Date    Acid reflux     Anxiety     Arthritis     Asthma     C1-C4 level with central cord syndrome ([facility] [address] )     Resolved 2/1/2017     Carpal tunnel syndrome Resolved 4/21/2015    Colitis     Colon polyp     Concussion     Depressed     Dysthymic disorder     Resolved 1/5/2018    Gastric ulcer 2013    small - on EGD - [facility]    Hemorrhoids     Hx of blood clots     Hx of colonic polyps     [place]    Hyperlipemia     Hypertension     Hypothyroid     Lung nodule     stable x 2 yrs on CT    Migraines     Obesity     Postural dizziness with presyncope     Resolved 8/28/2018     Tendinitis     Ulnar neuropathy     Resolved 5/21/2015        PAST SURGICAL HISTORY:  Past Surgical History:   Procedure Laterality Date    APPENDECTOMY      BREAST SURGERY Left 1966    BREAST LUMPECTOMY    CARPAL TUNNEL RELEASE      CHOLECYSTECTOMY      DILATION AND CURETTAGE OF UTERUS      ELBOW SURGERY      EYE SURGERY Bilateral 2019    Cataract     GALLBLADDER SURGERY      JOINT REPLACEMENT Right     REPLACEMENT TOTAL KNEE    KNEE ARTHROSCOPY      KNEE SURGERY Right     NECK SURGERY      POSTERIOR LAMINECTOMY / DECOMPRESSION CERVICAL SPINE  2015    TONSILLECTOMY      VAGINAL DELIVERY      x3    WRIST SURGERY Right        FAMILY HISTORY:  Family History   Problem Relation Age of Onset    Breast cancer Mother     Alzheimer's disease Mother     Coronary artery disease Mother     Hypertension Mother    [de-identified] Migraines Mother     Peripheral vascular disease Mother    [de-identified] Parkinsonism Father     Other Father         Cardiovascular disease     Coronary artery disease Father     Hypertension Father     Bipolar disorder Sister     Asthma Daughter     Asthma Son        SOCIAL HISTORY:  Social History     Tobacco Use    Smoking status: Former Smoker     Packs/day: 0 00     Years: 0 00     Pack years: 0 00     Types: Cigarettes     Start date:      Quit date: 10/14/2013     Years since quittin 3    Smokeless tobacco: Never Used   Vaping Use    Vaping Use: Never used   Substance Use Topics    Alcohol use: No    Drug use: No       MEDICATIONS:    Current Outpatient Medications:     acetaminophen (TYLENOL) 325 mg tablet, Take 325 mg by mouth as needed, Disp: , Rfl:     albuterol (Ventolin HFA) 90 mcg/act inhaler, Inhale 2 puffs every 6 (six) hours as needed for wheezing, Disp: 1 Inhaler, Rfl: 2    amLODIPine (NORVASC) 10 mg tablet, Take 1 tablet (10 mg total) by mouth daily, Disp: 90 tablet, Rfl: 1    atorvastatin (LIPITOR) 20 mg tablet, TAKE 1 TABLET BY MOUTH EVERY DAY, Disp: 90 tablet, Rfl: 1    Cholecalciferol (Vitamin D3) 1 25 MG (80538 UT) CAPS, Take 5,000 Units by mouth daily, Disp: , Rfl:     fluticasone (FLOVENT HFA) 110 MCG/ACT inhaler, Inhale 2 puffs 2 (two) times a day Rinse mouth after use , Disp: 12 g, Rfl: 1    levothyroxine 137 mcg tablet, Take 1 tablet (137 mcg total) by mouth daily, Disp: 30 tablet, Rfl: 5    lisinopril (ZESTRIL) 40 mg tablet, Take 1 tablet (40 mg total) by mouth daily, Disp: 90 tablet, Rfl: 1    meclizine (ANTIVERT) 25 mg tablet, Take 1 tablet (25 mg total) by mouth 3 (three) times a day as needed for dizziness for up to 10 days, Disp: 30 tablet, Rfl: 1    metroNIDAZOLE (METROGEL) 1 % gel, Apply topically daily, Disp: 45 g, Rfl: 0    pantoprazole (PROTONIX) 40 mg tablet, Take 1 tablet (40 mg total) by mouth 2 (two) times a day, Disp: 60 tablet, Rfl: 1    polyethylene glycol (GLYCOLAX) 17 GM/SCOOP powder, Take 17 g by mouth daily , Disp: , Rfl:     RESTASIS 0 05 % ophthalmic emulsion, Administer 1 drop to both eyes every 12 (twelve) hours , Disp: , Rfl:     sertraline (ZOLOFT) 100 mg tablet, Take 1 tablet (100 mg total) by mouth 2 (two) times a day, Disp: 60 tablet, Rfl: 1    sucralfate (CARAFATE) 1 g/10 mL suspension, Take 10 mL (1 g total) by mouth 4 (four) times a day (with meals and at bedtime), Disp: 420 mL, Rfl: 0    traZODone (DESYREL) 50 mg tablet, Take 1 tablet (50 mg total) by mouth daily at bedtime, Disp: 30 tablet, Rfl: 3    triamcinolone (KENALOG) 0 1 % cream, Apply topically 2 (two) times a day, Disp: 453 6 g, Rfl: 0    Wheat Dextrin (Benefiber) POWD, Take by mouth daily , Disp: , Rfl:     ALLERGIES:  Allergies   Allergen Reactions    Ceftin [Cefuroxime] Hives       REVIEW OF SYSTEMS:  Pertinent items are noted in HPI  A comprehensive review of systems was negative      LABS:  HgA1c:   Lab Results   Component Value Date    HGBA1C 5 9 (H) 09/21/2020     BMP:   Lab Results   Component Value Date    GLUCOSE 121 02/27/2015    CALCIUM 9 1 12/07/2021     02/27/2015    K 4 2 12/07/2021    CO2 26 12/07/2021     12/07/2021    BUN 25 12/07/2021    CREATININE 1 08 12/07/2021 _____________________________________________________  PHYSICAL EXAMINATION:  Vital signs: There were no vitals taken for this visit    General: well developed and well nourished, alert, oriented times 3 and appears comfortable  Psychiatric: Normal  HEENT: Trachea Midline, No torticollis  Cardiovascular: No discernable arrhythmia  Pulmonary: No wheezing or stridor  Abdomen: No rebound or guarding  Extremities: No peripheral edema  Skin: No masses, erythema, lacerations, fluctation, ulcerations  Neurovascular: Sensation Intact to the Median, Ulnar, Radial Nerve, Motor Intact to the Median, Ulnar, Radial Nerve and Pulses Intact    MUSCULOSKELETAL EXAMINATION:  Left thumb  - Skin intact  - TTP over thumb IP, MCP, and CMC  - Pain with grind test  - No ulnar/radial laxity at MCP  - Pain with ROM thumb IP, MCP, and CMC flexion/extension  - < 2 sec cap refill    _____________________________________________________  STUDIES REVIEWED:  Images were reviewed in PACS by Dr Saul Nuñez and demonstrate: x-ray left thumb demonstrating diffuse degenerative changes of the thumb IP, MCP, and CMC      PROCEDURES PERFORMED:  Procedures  No Procedures performed today   Scribe Attestation    I,:   am acting as a scribe while in the presence of the attending physician :       I,:   personally performed the services described in this documentation    as scribed in my presence :

## 2022-02-02 NOTE — PATIENT INSTRUCTIONS
What is it [facility] Arthritis? In a normal joint, cartilage covers the end of the bones and serves as a shock absorber to allow smooth, pain-free movement  In osteoarthritis (OA, or degenerative arthritis) the cartilage layer wears out, resulting in direct contact between the bones and producing pain and deformity  One of the most common joints to develop OA in the hand is the base of the thumb  The thumb basal joint, also called the carpometacarpal [place]) joint, is a specialized saddle shaped joint that is formed by a small bone of the wrist (trapezium) and the first bone of the thumb (metacarpal)  The saddle shaped joint allows the thumb to have a wide range of motions, including up, down, across the palm, and the ability to pinch (see Figure 1)  Who gets it? OA at the base of the thumb is more commonly seen in women over the age of 36  The exact cause is unknown, but genetics, previous injuries such as fractures or dislocations, and generalized joint laxity may predispose towards development of this type of arthritis  What are the symptoms and signs? The most common symptom is pain at the base of the thumb  The pain can be aggravated by activities that require pinching, such as opening jars, turning door knobs or keys, and writing  Severity can also progress to pain at rest and pain at night  In more severe cases, progressive destruction and mal-alignment of the joint occurs, and a bump develops at the base of the thumb as the metacarpal moves out of the saddle joint  This shift in the joint can cause limited motion and weakness, making pinch difficult (see Figure 2)  The next joint above the [facility] may compensate by loosening, causing it to bend further back (hyperextension)  How is the diagnosis made? The diagnosis is made by history and physical evaluation  Pressure and movement such as twisting will produce pain at the joint  A grinding sensation may also be present at the joint (see Figure 3)  X-rays are used to confirm the diagnosis, although symptom severity often does not correlate with x-ray findings  What are the treatment options? Less severe thumb arthritis will usually respond to non-surgical care  Arthritis medication, splinting and limited cortisone injections may help alleviate pain  A hand therapist might provide a variety of rigid and non-rigid splints which can be used while sleeping or during activities  Patients with advanced disease or who fail non-surgical treatment may be candidates for surgical reconstruction  A variety of surgical techniques are available that can successfully reduce or eliminate pain  Surgical procedures include removal of arthritic bone and joint reconstruction (arthroplasty), joint fusion, bone realignment, and even arthroscopy in select cases  A consultation with your hand surgeon can help decide the best option for you (see Figure 4)  © 2012 [employer]  www [email or web address]  org No

## 2022-02-09 ENCOUNTER — EVALUATION (OUTPATIENT)
Dept: PHYSICAL THERAPY | Facility: MEDICAL CENTER | Age: 77
End: 2022-02-09
Payer: MEDICARE

## 2022-02-09 DIAGNOSIS — M18.12 DEGENERATIVE ARTHRITIS OF THUMB, LEFT: Primary | ICD-10-CM

## 2022-02-09 PROCEDURE — 97161 PT EVAL LOW COMPLEX 20 MIN: CPT | Performed by: PHYSICAL THERAPIST

## 2022-02-09 PROCEDURE — 97140 MANUAL THERAPY 1/> REGIONS: CPT | Performed by: PHYSICAL THERAPIST

## 2022-02-09 NOTE — PROGRESS NOTES
PT Evaluation     Today's date: 2022  Patient name: Jovan Denis  : 1945  MRN: [id number]  Referring provider: Lauren Wade MD  Dx:   Encounter Diagnosis   Name Primary?  Degenerative arthritis of thumb, left                   Assessment  Assessment details: Jovan Denis is a 65 y/o female who presents with complaints of L thumb pain  The patients greatest concern is pain c/ crocheting and c/ opening jars  Primary movement impairment diagnosis of L CMC, MCP, and IP hypomobility, resulting in pathoanatomical symptoms of degenerative arthritis of left thumb, which limits her ability to perform functional activities without pain  Pt  will benefit from skilled PT services that includes manual therapy techniques to enhance tissue extensibility, neuromuscular re-education to facilitate motor control, therapeutic exercise to increase functional mobility, and modalities prn to reduce pain and inflammation  Impairments: impaired physical strength, lacks appropriate home exercise program and pain with function  Understanding of Dx/Px/POC: good   Prognosis: good    Goals  Impairment Goals  - Pt I with initial HEP in 1-2 visits    Functional Goals  - Increase Functional Status Measure to: 76  - Patient will be independent with comprehensive HEP in 6 weeks  - Patient will be able to deana and open jars without pain at time of discharge    Plan  Patient would benefit from: PT eval  Planned modality interventions: thermotherapy: hydrocollator packs  Planned therapy interventions: joint mobilization, manual therapy, patient education, therapeutic exercise and home exercise program  Frequency: 1-2x/week  Duration in weeks: 6  Treatment plan discussed with: patient      Subjective Evaluation    History of Present Illness  Mechanism of injury: Patient reports that about 6 months ago she began having pain t/o the left thumb    The constant aching and throbbing pain began without AISHWARYA and now it is much better  She states that it only bothers her occasionally  Patient notes that Tylenol, heat, and Diclofenac have helped  She denies neck pain and L UE paresthesias  Patient crochets often and this tends to increase the soreness  Patient would like to know what she can do to for the arthritis in her thumb  Pain  Current pain ratin  At best pain ratin  At worst pain ratin  Quality: throbbing, dull ache and grinding  Relieving factors: heat      Diagnostic Tests  Abnormal x-ray: Degenerative changes as described  Treatments  Current treatment: physical therapy  Patient Goals  Patient goals for therapy: increased strength, decreased pain, independence with ADLs/IADLs and return to sport/leisure activities  Patient goal: Patient would like to be able to continue crocheting without pain  Objective     Observations     Additional Observation Details  +2 brachial/radial pulses  Tenderness     Left Wrist/Hand   Tenderness in the CMC and trapezium  Neurological Testing     Sensation     Wrist/Hand   Left   Intact: light touch    Right   Intact: light touch    Additional Neurological Details  Reflexes NT  Active Range of Motion     Left Wrist   Normal active range of motion    Right Wrist   Normal active range of motion    Left Thumb   Opposition: WNL    Right Thumb   Opposition: WNL    Strength/Myotome Testing     Left Wrist/Hand   Wrist extension: 5  Wrist flexion: 5  Radial deviation: 5  Ulnar deviation: 5    Right Wrist/Hand   Wrist extension: 5  Wrist flexion: 5  Radial deviation: 5  Ulnar deviation: 5    Tests     Left Wrist/Hand   Positive CMC grind  Right Wrist/Hand   Positive CMC grind          Precautions:  Arthritis; HTN    Manuals /            CMC distraction AZ            Joint mobs AZ            STM/MFR AZ                         Neuro Re-Ed                                                    Ther Ex Ther Activity                                       Gait Training                                       Modalities             [address]  2/9/2022,11:12 AM

## 2022-02-15 ENCOUNTER — OFFICE VISIT (OUTPATIENT)
Dept: PHYSICAL THERAPY | Facility: MEDICAL CENTER | Age: 77
End: 2022-02-15
Payer: MEDICARE

## 2022-02-15 DIAGNOSIS — M18.12 DEGENERATIVE ARTHRITIS OF THUMB, LEFT: Primary | ICD-10-CM

## 2022-02-15 PROCEDURE — 97140 MANUAL THERAPY 1/> REGIONS: CPT

## 2022-02-15 PROCEDURE — 97110 THERAPEUTIC EXERCISES: CPT

## 2022-02-15 NOTE — PROGRESS NOTES
Daily Note     Today's date: 2/15/2022  Patient name: Gita Arellano  : 1945  MRN: [id number]  Referring provider: Willie Donahue MD  Dx:   Encounter Diagnosis     ICD-10-CM    1  Degenerative arthritis of thumb, left  M18 12                   Subjective: Pt reports most of her issues are along MCP and CMC joint in L thumb  Objective: See treatment diary below      Assessment: Tolerated treatment well  Patient exhibited good technique with therapeutic exercises and would benefit from continued PT  Initiated program  Pt had increased stiffness at her MCP joint; able to reach 35-40 degrees flexion post manuals  Added active thumb ext and abd to HEP; also provided Pt with yellow rubberbands to add some light resistance to exercises,pt understood instructions  Also demonstrated MCP and IP stretch into flexion  No complaints post session  Plan: Continue per plan of care        Precautions:  Arthritis; HTN  HEP: thumb abd/ext ( added yellow rubberband)  Manuals  2/15           CMC distraction AZ 2           Joint mobs PROM AZ 13           STM/MFR AZ              15           Neuro Re-Ed             Grooved pegs  1 board                                     Ther Ex             rubberband thumb ext  2 rounds           flexbar towel twist  Yellow 20x           flexbar sup/pron   yellow 20x                                                                 10           Ther Activity                                       Gait Training                                       Modalities              10' 10 min

## 2022-02-21 ENCOUNTER — OFFICE VISIT (OUTPATIENT)
Dept: PHYSICAL THERAPY | Facility: MEDICAL CENTER | Age: 77
End: 2022-02-21
Payer: MEDICARE

## 2022-02-21 DIAGNOSIS — M18.12 DEGENERATIVE ARTHRITIS OF THUMB, LEFT: Primary | ICD-10-CM

## 2022-02-21 PROCEDURE — 97140 MANUAL THERAPY 1/> REGIONS: CPT

## 2022-02-21 PROCEDURE — 97110 THERAPEUTIC EXERCISES: CPT

## 2022-02-21 NOTE — PROGRESS NOTES
Daily Note     Today's date: 2022  Patient name: Osman Elias  : 1945  MRN: [id number]  Referring provider: Lucas Martino MD  Dx:   Encounter Diagnosis     ICD-10-CM    1  Degenerative arthritis of thumb, left  M18 12                   Subjective: Pt reports her thumbs have been feeling better  rubberband exercise is getting easy  Objective: See treatment diary below      Assessment: Tolerated treatment well  Patient exhibited good technique with therapeutic exercises and would benefit from continued PT  Pt has full ROM moving into abd and extension, significant improvement; Still some mild stiffness with MPC flexion on L  Added putty exercises for home for added strengthening, pt able to demonstrate each  No complaints post session    Plan: Continue per plan of care        Precautions:  Arthritis; HTN  HEP: thumb abd/ext ( added yellow rubberband); (yellow) putty roll/pinch, putty IP flexion, thumb ext/abd  Manuals 2/9 2/15 2/21          CMC distraction AZ 2 2          Joint mobs PROM AZ 13 13          STM/MFR AZ  Ex instruction 10            15 25          Neuro Re-Ed             Grooved pegs  1 board                                     Ther Ex             rubberband thumb ext  2 rounds           flexbar towel twist  Yellow 20x yellow 20x          flexbar sup/pron   yellow 20x  yellow 20x          clothespins   5 min red /green                                                   10 10          Ther Activity                                       Gait Training                                       Modalities              10' 10 min 10 min

## 2022-02-23 ENCOUNTER — OFFICE VISIT (OUTPATIENT)
Dept: PHYSICAL THERAPY | Facility: MEDICAL CENTER | Age: 77
End: 2022-02-23
Payer: MEDICARE

## 2022-02-23 DIAGNOSIS — M18.12 DEGENERATIVE ARTHRITIS OF THUMB, LEFT: Primary | ICD-10-CM

## 2022-02-23 DIAGNOSIS — F51.01 PRIMARY INSOMNIA: ICD-10-CM

## 2022-02-23 PROCEDURE — 97140 MANUAL THERAPY 1/> REGIONS: CPT

## 2022-02-23 RX ORDER — TRAZODONE HYDROCHLORIDE 50 MG/1
50 TABLET ORAL
Qty: 30 TABLET | Refills: 0 | Status: SHIPPED | OUTPATIENT
Start: 2022-02-23 | End: 2022-03-25 | Stop reason: SDUPTHER

## 2022-02-23 NOTE — PROGRESS NOTES
Daily Note/Discharge     Today's date: 2022  Patient name: Cj Acuña  : 1945  MRN: [id number]  Referring provider: Brigida Farah MD  Dx:   Encounter Diagnosis     ICD-10-CM    1  Degenerative arthritis of thumb, left  M18 12                   Subjective: Pt feels a lot better, feels that she can do her exercises on her own, will make today her last appointment  Objective: See treatment diary below      Assessment: Tolerated treatment well  AROM   R th MCP IP flex 45/50  abd 60; ext 60  L 45/45  abd 53; ext 55  Strength  Thumb ext R/L 4/4+  abd 4+/4+     II R/L 50/40 lbs  Lp 12/15 lbs  3pt pinch 10/12 lbs    Goals  Impairment Goals  - Pt I with initial HEP in 1-2 visits- met    Functional Goals  - Increase Functional Status Measure to: 76- not met  - Patient will be independent with comprehensive HEP in 6 weeks- met  - Patient will be able to deana and open jars without pain at time of discharge- partially met   Pt has improved ROM and strength in each thumb  Pt feels ready to D/C to HEP  Educated pt on importance of continuing her stretches and strengthening exercises to maintain good mobility and reducing stresses on thumb to prevent further joint deterioration       Plan: D/C to HEP     Precautions:  Arthritis; HTN  HEP: thumb abd/ext ( added yellow rubberband); (yellow) putty roll/pinch, putty IP flexion, thumb ext/abd  Manuals 2/9 2/15 2/21 2/23         CMC distraction AZ 2 2 2         Joint mobs PROM AZ 13 13 13         STM/MFR AZ  Ex instruction 10 assessment 10           15 25 25         Neuro Re-Ed             Grooved pegs  1 board                                     Ther Ex             rubberband thumb ext  2 rounds           flexbar towel twist  Yellow 20x yellow 20x          flexbar sup/pron   yellow 20x  yellow 20x          clothespins   5 min red /green                                                   10 10          Ther Activity Gait Training                                       Modalities              10' 10 min 10 min 10 min

## 2022-02-25 ENCOUNTER — TELEPHONE (OUTPATIENT)
Dept: FAMILY MEDICINE CLINIC | Facility: CLINIC | Age: 77
End: 2022-02-25

## 2022-02-25 NOTE — TELEPHONE ENCOUNTER
Spoke to patient and obtained the below Pre-op information:    Name of procedure: L knee replacement  Diagnosis: knee pain  Date of procedure (within 30 days):4/6  Surgeon:Dr Kaylie Hester  Location of procedure (hospital or out patient facility name):[address] date/location/ type (Which labs? EKG?  CXR?): labs & EKG 3/4 [facility]  Records (on epic or requested): EPIC  Type of anaesthesia:  general  Paperwork to complete for Pre-op (patient bringing vs  calling office to obtain prior to appointment): patient bringing form  Pre-op appointment scheduled (date/time): 3/25/22 @ 12 noon

## 2022-03-17 ENCOUNTER — TELEPHONE (OUTPATIENT)
Dept: OBGYN CLINIC | Facility: HOSPITAL | Age: 77
End: 2022-03-17

## 2022-03-17 DIAGNOSIS — I10 ESSENTIAL HYPERTENSION: ICD-10-CM

## 2022-03-17 DIAGNOSIS — F32.A DEPRESSION, UNSPECIFIED DEPRESSION TYPE: ICD-10-CM

## 2022-03-17 DIAGNOSIS — E03.9 HYPOTHYROIDISM, UNSPECIFIED TYPE: ICD-10-CM

## 2022-03-17 RX ORDER — LISINOPRIL 40 MG/1
40 TABLET ORAL DAILY
Qty: 90 TABLET | Refills: 0 | Status: SHIPPED | OUTPATIENT
Start: 2022-03-17

## 2022-03-17 RX ORDER — LEVOTHYROXINE SODIUM 137 UG/1
137 TABLET ORAL DAILY
Qty: 30 TABLET | Refills: 0 | Status: SHIPPED | OUTPATIENT
Start: 2022-03-17 | End: 2022-05-13 | Stop reason: SDUPTHER

## 2022-03-17 RX ORDER — SERTRALINE HYDROCHLORIDE 100 MG/1
100 TABLET, FILM COATED ORAL 2 TIMES DAILY
Qty: 60 TABLET | Refills: 0 | Status: SHIPPED | OUTPATIENT
Start: 2022-03-17 | End: 2022-06-06 | Stop reason: SDUPTHER

## 2022-03-22 DIAGNOSIS — F51.01 PRIMARY INSOMNIA: ICD-10-CM

## 2022-03-22 RX ORDER — TRAZODONE HYDROCHLORIDE 50 MG/1
TABLET ORAL
Qty: 30 TABLET | Refills: 0 | OUTPATIENT
Start: 2022-03-22

## 2022-03-25 ENCOUNTER — OFFICE VISIT (OUTPATIENT)
Dept: FAMILY MEDICINE CLINIC | Facility: CLINIC | Age: 77
End: 2022-03-25
Payer: MEDICARE

## 2022-03-25 VITALS
OXYGEN SATURATION: 96 % | TEMPERATURE: 98.1 F | RESPIRATION RATE: 16 BRPM | SYSTOLIC BLOOD PRESSURE: 132 MMHG | WEIGHT: 186 LBS | BODY MASS INDEX: 36.52 KG/M2 | DIASTOLIC BLOOD PRESSURE: 62 MMHG | HEIGHT: 60 IN | HEART RATE: 83 BPM

## 2022-03-25 DIAGNOSIS — J45.20 MILD INTERMITTENT ASTHMA WITHOUT COMPLICATION: ICD-10-CM

## 2022-03-25 DIAGNOSIS — D64.9 ANEMIA, UNSPECIFIED TYPE: ICD-10-CM

## 2022-03-25 DIAGNOSIS — Z01.818 PREOPERATIVE CLEARANCE: Primary | ICD-10-CM

## 2022-03-25 DIAGNOSIS — E03.9 HYPOTHYROIDISM, UNSPECIFIED TYPE: ICD-10-CM

## 2022-03-25 DIAGNOSIS — M17.12 PRIMARY OSTEOARTHRITIS OF LEFT KNEE: ICD-10-CM

## 2022-03-25 DIAGNOSIS — I10 ESSENTIAL HYPERTENSION: ICD-10-CM

## 2022-03-25 DIAGNOSIS — E78.5 HYPERLIPIDEMIA, UNSPECIFIED HYPERLIPIDEMIA TYPE: ICD-10-CM

## 2022-03-25 DIAGNOSIS — N18.31 STAGE 3A CHRONIC KIDNEY DISEASE (HCC): ICD-10-CM

## 2022-03-25 DIAGNOSIS — R10.13 EPIGASTRIC PAIN: ICD-10-CM

## 2022-03-25 DIAGNOSIS — F51.01 PRIMARY INSOMNIA: ICD-10-CM

## 2022-03-25 PROBLEM — S02.31XA FRACTURE OF ORBITAL FLOOR, RIGHT SIDE, INITIAL ENCOUNTER FOR CLOSED FRACTURE (HCC): Status: RESOLVED | Noted: 2019-05-03 | Resolved: 2022-03-25

## 2022-03-25 PROCEDURE — 99214 OFFICE O/P EST MOD 30 MIN: CPT | Performed by: FAMILY MEDICINE

## 2022-03-25 RX ORDER — SUCRALFATE ORAL 1 G/10ML
1 SUSPENSION ORAL
Qty: 420 ML | Refills: 0 | Status: SHIPPED | OUTPATIENT
Start: 2022-03-25

## 2022-03-25 RX ORDER — TRAZODONE HYDROCHLORIDE 50 MG/1
50 TABLET ORAL
Qty: 30 TABLET | Refills: 0 | Status: SHIPPED | OUTPATIENT
Start: 2022-03-25 | End: 2022-05-02 | Stop reason: SDUPTHER

## 2022-03-25 NOTE — PROGRESS NOTES
Assessment/Plan:    No problem-specific Assessment & Plan notes found for this encounter  Diagnoses and all orders for this visit:    Preoperative clearance  Comments:  low risk per franco and ninoska criteria  stable ekg; asymptomatic from CV standpoint  labs stable  pt optimized for surgery  recommend post-op inpt rehab    Primary osteoarthritis of left knee  Comments:  mgmt per ortho    Anemia, unspecified type  Comments:  mild, stable x >6 mo  needs GI work-up, but given stability, this doesn't need to delay surgery  will need to follow cbc post-op  avoid NSAIDS    Epigastric pain  Comments:  restart carafate as symptoms resolved on this med  continue BID protonix per GI  continue f/u with GI    Orders:  -     sucralfate (CARAFATE) 1 g/10 mL suspension; Take 10 mL (1 g total) by mouth 4 (four) times a day (with meals and at bedtime)    Hypothyroidism, unspecified type  Comments:  check tsh  Orders:  -     TSH, 3rd generation with Free T4 reflex; Future    Essential hypertension  Comments:  well-controlled on current meds  continue same    Hyperlipidemia, unspecified hyperlipidemia type  Comments:  check non-urgent Lipid panel  Orders:  -     Lipid panel; Future    Primary insomnia  Comments:  stable on trazodone nightly  Orders:  -     traZODone (DESYREL) 50 mg tablet; Take 1 tablet (50 mg total) by mouth daily at bedtime    Mild intermittent asthma without complication  Comments:  stable on current meds  continue same    Stage 3a chronic kidney disease ([facility] [address] )  Comments:  stable  continue lisinopril and bp control  avoid NSAIDS post-op    Other orders  -     mupirocin (BACTROBAN) 2 % ointment; Apply 1 application topically 2 (two) times a day        Subjective:      Patient ID: Kimberly Holloway is a 64 y o  female  HPI  Pt presents in preoperative evaluation for upcoming L TKA under sedation/epidural anesthesias with Dr Nichelle Leonard at [facility] on 4/6/22  Pt has been having pain in knee for years    Has had shots with no improvement  She and ortho feel she is now ready for TKR  In general, pt feels okay  Preoperative labs, ekg, cxr reviewed  She has epigastric pain and anemia with most recent htb 10 5 which has been ongoing for months  She is having an egd/colonoscopy in may   hgb has been stable for many months  She notes mild epigastric pain associated with empty stomach daily  This had completely gone away when she was using carafate, but she stopped several months ago when symptoms resolved  No black or blood stools other than dark stools with pepto use  From a CV standpoint, her franco risk is 0 2%  Jeff criteria low risk at 0 4%  Pre-operative ekg and labs reviewed  EKG has been stable for years  She denies chest pain or GUIDO  No shortness of breath other than that which is associated with her asthma and which immediately resolves after albuterol use--this is less than twice weekly  No cough or night cough  She can walk up 2 flights without chest pain, shortness of breath  Can walk on a flat surface without being stopped by chest pain, shortness of breath, calf pain  Pt had R TKR in 2015  At that time, she benefited from inpt rehab  She lives with her sister who has multiple medical problems and feels that she won't be able to rehab as well outpt  The following portions of the patient's history were reviewed and updated as appropriate: allergies, current medications, past family history, past medical history, past social history, past surgical history and problem list     Review of Systems   Constitutional: Positive for fatigue  Negative for chills, fever and unexpected weight change  HENT: Negative for congestion, ear pain, hearing loss, postnasal drip, rhinorrhea, sinus pressure, sinus pain, sore throat, trouble swallowing and voice change  Eyes: Negative for pain, redness and visual disturbance  Respiratory: Negative for cough and shortness of breath      Cardiovascular: Negative for chest pain, palpitations and leg swelling  Gastrointestinal: Positive for abdominal pain  Negative for constipation, diarrhea and nausea  Endocrine: Negative for cold intolerance, heat intolerance, polydipsia and polyuria  Genitourinary: Negative for dysuria, frequency and urgency  Musculoskeletal: Negative for arthralgias, joint swelling and myalgias  Skin: Negative for rash  No suspicious lesions   Neurological: Negative for weakness, numbness and headaches  Hematological: Negative for adenopathy  Objective:      /62   Pulse 83   Temp 98 1 °F (36 7 °C)   Resp 16   Ht 5' (1 524 m)   Wt 84 4 kg (186 lb)   SpO2 96%   BMI 36 33 kg/m²          Physical Exam  Constitutional:       General: She is not in acute distress  Appearance: She is well-developed  HENT:      Head: Normocephalic and atraumatic  Right Ear: Tympanic membrane, ear canal and external ear normal       Left Ear: Tympanic membrane, ear canal and external ear normal       Nose: Nose normal       Mouth/Throat:      Pharynx: No oropharyngeal exudate  Eyes:      Conjunctiva/sclera: Conjunctivae normal       Pupils: Pupils are equal, round, and reactive to light  Neck:      Thyroid: No thyromegaly  Vascular: No carotid bruit or JVD  Cardiovascular:      Rate and Rhythm: Regular rhythm  Heart sounds: S1 normal and S2 normal  No murmur heard  No friction rub  No gallop  No S3 or S4 sounds  Pulmonary:      Effort: Pulmonary effort is normal       Breath sounds: Normal breath sounds  No wheezing, rhonchi or rales  Abdominal:      General: Bowel sounds are normal  There is no distension  Palpations: Abdomen is soft  Tenderness: There is no abdominal tenderness  Lymphadenopathy:      Cervical: No cervical adenopathy  Neurological:      Mental Status: She is alert and oriented to person, place, and time  Cranial Nerves: No cranial nerve deficit        Sensory: No sensory deficit  Deep Tendon Reflexes: Reflexes are normal and symmetric

## 2022-04-19 ENCOUNTER — TRANSITIONAL CARE MANAGEMENT (OUTPATIENT)
Dept: FAMILY MEDICINE CLINIC | Facility: CLINIC | Age: 77
End: 2022-04-19

## 2022-05-02 RX ORDER — TRAZODONE HYDROCHLORIDE 50 MG/1
100 TABLET ORAL
Qty: 60 TABLET | Refills: 0 | Status: SHIPPED | OUTPATIENT
Start: 2022-05-02 | End: 2022-05-31 | Stop reason: SDUPTHER

## 2022-05-03 ENCOUNTER — TELEMEDICINE (OUTPATIENT)
Dept: FAMILY MEDICINE CLINIC | Facility: CLINIC | Age: 77
End: 2022-05-03
Payer: MEDICARE

## 2022-05-03 VITALS — HEIGHT: 60 IN | BODY MASS INDEX: 36.52 KG/M2 | WEIGHT: 186 LBS

## 2022-05-03 DIAGNOSIS — D64.9 ANEMIA, UNSPECIFIED TYPE: ICD-10-CM

## 2022-05-03 DIAGNOSIS — F51.01 PRIMARY INSOMNIA: Primary | ICD-10-CM

## 2022-05-03 DIAGNOSIS — Z96.652 HISTORY OF LEFT KNEE REPLACEMENT: ICD-10-CM

## 2022-05-03 PROCEDURE — 99495 TRANSJ CARE MGMT MOD F2F 14D: CPT | Performed by: FAMILY MEDICINE

## 2022-05-03 RX ORDER — TRAZODONE HYDROCHLORIDE 50 MG/1
100 TABLET ORAL
Qty: 60 TABLET | Refills: 0 | Status: CANCELLED | OUTPATIENT
Start: 2022-05-03

## 2022-05-07 DIAGNOSIS — E03.9 HYPOTHYROIDISM, UNSPECIFIED TYPE: ICD-10-CM

## 2022-05-09 DIAGNOSIS — E78.5 HYPERLIPIDEMIA, UNSPECIFIED HYPERLIPIDEMIA TYPE: ICD-10-CM

## 2022-05-09 RX ORDER — LEVOTHYROXINE SODIUM 137 UG/1
TABLET ORAL
Qty: 30 TABLET | Refills: 0 | OUTPATIENT
Start: 2022-05-09

## 2022-05-09 RX ORDER — ATORVASTATIN CALCIUM 20 MG/1
TABLET, FILM COATED ORAL
Qty: 90 TABLET | Refills: 1 | OUTPATIENT
Start: 2022-05-09

## 2022-05-10 ENCOUNTER — OFFICE VISIT (OUTPATIENT)
Dept: URGENT CARE | Facility: MEDICAL CENTER | Age: 77
End: 2022-05-10
Payer: MEDICARE

## 2022-05-10 VITALS
SYSTOLIC BLOOD PRESSURE: 144 MMHG | TEMPERATURE: 98.7 F | RESPIRATION RATE: 18 BRPM | DIASTOLIC BLOOD PRESSURE: 78 MMHG | OXYGEN SATURATION: 97 % | HEART RATE: 78 BPM

## 2022-05-10 DIAGNOSIS — J01.40 ACUTE NON-RECURRENT PANSINUSITIS: Primary | ICD-10-CM

## 2022-05-10 PROCEDURE — G0463 HOSPITAL OUTPT CLINIC VISIT: HCPCS

## 2022-05-10 PROCEDURE — 99213 OFFICE O/P EST LOW 20 MIN: CPT

## 2022-05-10 RX ORDER — OXYCODONE HYDROCHLORIDE 5 MG/1
TABLET ORAL
COMMUNITY
Start: 2022-04-24 | End: 2022-05-23

## 2022-05-10 RX ORDER — AMOXICILLIN 500 MG/1
CAPSULE ORAL
COMMUNITY
Start: 2022-05-09 | End: 2022-05-23

## 2022-05-10 RX ORDER — AMOXICILLIN AND CLAVULANATE POTASSIUM 875; 125 MG/1; MG/1
1 TABLET, FILM COATED ORAL EVERY 12 HOURS SCHEDULED
Qty: 20 TABLET | Refills: 0 | Status: SHIPPED | OUTPATIENT
Start: 2022-05-10 | End: 2022-05-20

## 2022-05-10 NOTE — PROGRESS NOTES
[address] [facility]        NAME: Shreya Bergman is a 64 y o  female  : 1945    MRN: [id number]  DATE: May 10, 2022  TIME: 10:29 AM    Assessment and Plan   Acute non-recurrent pansinusitis [J01 40]  1  Acute non-recurrent pansinusitis  amoxicillin-clavulanate (AUGMENTIN) 875-125 mg per tablet         Patient Instructions     Sinusitis   - Take 1 tablet of amoxicillin/ clavulanate 2 times a day for the next 10 days  - You have been given an antibiotic, which a common side effect is diarrhea  Eat yogurt, fresh fruits and veggies, increase daily fiber intake, take probiotics, or try kombucha    - Take Vitamin D3 200IU daily, Vitamin C, and zinc  Rest and drink electrolyte rich fluids  Tylenol and ibuprofen as needed for fevers and aches following package dosing instructions  Chicken noodle soup  - Sore throat: Throat lozenges and/or salt water gurgles  - Congestion relief with mucinex 600mg 1 tablet every 12 hours  You can use afrin or flonase for nasal congestion as directed on their packaging  Warm or cool air mist humidifiers    - Nighttime cough relief with Mucinex DM or NyQuil   - Go to the ED if you have trouble breathing or shortness of breath at rest, chest pain or pressure that lasts longer than 5 minutes, become confused or hard to wake, your lips or face are blue, you have a fever of 104°F (40°C) or higher   - Follow up with Family doctor as needed, however your symptoms may persists for 2-3 weeks from onset  Follow up with PCP in 3-5 days  Proceed to  ER if symptoms worsen      Chief Complaint     Chief Complaint   Patient presents with    Nasal Congestion     Patient c/o headache with nasal congestion and pressure x 4 days  Patient reports that she tested negitive in a home covid test           History of Present Illness     Shreya Bergman is a 64 y o  female who has history significant for hypothyroidism, asthma, hypertension, chronic kidney disease, and hyperlipidemia who is presenting today with complaint of headache and worsening nasal congestion and pressure over the past 4 days  Patient states that she has had cold-like symptoms over the past week, but starting 4 days ago she developed worsening nasal congestion and pressure with associated odynophagia, ear pressure, and minor cough  She denies dysphagia, drooling, fever, chills, night sweats, nausea, vomiting  Review of Systems   Review of Systems   Constitutional: Negative for chills, fatigue and fever  HENT: Positive for congestion, rhinorrhea, sinus pressure, sinus pain and sore throat  Negative for drooling, ear discharge, ear pain, hearing loss, tinnitus and trouble swallowing  Respiratory: Positive for cough  Negative for shortness of breath and wheezing  Cardiovascular: Negative for chest pain and palpitations  Gastrointestinal: Negative for abdominal pain, constipation, diarrhea, nausea and vomiting  Musculoskeletal: Negative for myalgias  Neurological: Positive for headaches           Current Medications       Current Outpatient Medications:     Sennosides 8 6 MG CAPS, Take 8 6 mg by mouth 2 (two) times a day, Disp: , Rfl:     acetaminophen (TYLENOL) 325 mg tablet, Take 325 mg by mouth as needed, Disp: , Rfl:     albuterol (Ventolin HFA) 90 mcg/act inhaler, Inhale 2 puffs every 6 (six) hours as needed for wheezing, Disp: 1 Inhaler, Rfl: 2    amLODIPine (NORVASC) 10 mg tablet, Take 1 tablet (10 mg total) by mouth daily, Disp: 90 tablet, Rfl: 1    amoxicillin (AMOXIL) 500 mg capsule, , Disp: , Rfl:     amoxicillin-clavulanate (AUGMENTIN) 875-125 mg per tablet, Take 1 tablet by mouth every 12 (twelve) hours for 10 days, Disp: 20 tablet, Rfl: 0    atorvastatin (LIPITOR) 20 mg tablet, TAKE 1 TABLET BY MOUTH EVERY DAY, Disp: 90 tablet, Rfl: 1    Cholecalciferol (Vitamin D3) 1 25 MG (38471 UT) CAPS, Take 5,000 Units by mouth daily, Disp: , Rfl:     Diclofenac Sodium (VOLTAREN) 1 %, Apply 2 g topically 4 (four) times a day for 15 days, Disp: 120 g, Rfl: 0    fluticasone (FLOVENT HFA) 110 MCG/ACT inhaler, Inhale 2 puffs 2 (two) times a day Rinse mouth after use , Disp: 12 g, Rfl: 1    levothyroxine 137 mcg tablet, Take 1 tablet (137 mcg total) by mouth daily, Disp: 30 tablet, Rfl: 0    lidocaine (Lidoderm) 5 %, Apply 1 patch topically daily for 12 days Remove & Discard patch within 12 hours or as directed by MD, Disp: 12 patch, Rfl: 0    lisinopril (ZESTRIL) 40 mg tablet, Take 1 tablet (40 mg total) by mouth daily, Disp: 90 tablet, Rfl: 0    meclizine (ANTIVERT) 25 mg tablet, Take 1 tablet (25 mg total) by mouth 3 (three) times a day as needed for dizziness for up to 10 days, Disp: 30 tablet, Rfl: 1    metroNIDAZOLE (METROGEL) 1 % gel, Apply topically daily, Disp: 45 g, Rfl: 0    mupirocin (BACTROBAN) 2 % ointment, Apply 1 application topically 2 (two) times a day, Disp: , Rfl:     oxyCODONE (ROXICODONE) 5 immediate release tablet, PLEASE SEE ATTACHED FOR DETAILED DIRECTIONS, Disp: , Rfl:     pantoprazole (PROTONIX) 40 mg tablet, Take 1 tablet (40 mg total) by mouth 2 (two) times a day, Disp: 60 tablet, Rfl: 5    polyethylene glycol (GLYCOLAX) 17 GM/SCOOP powder, Take 17 g by mouth daily  , Disp: , Rfl:     sertraline (ZOLOFT) 100 mg tablet, Take 1 tablet (100 mg total) by mouth 2 (two) times a day, Disp: 60 tablet, Rfl: 0    sucralfate (CARAFATE) 1 g/10 mL suspension, Take 10 mL (1 g total) by mouth 4 (four) times a day (with meals and at bedtime), Disp: 420 mL, Rfl: 0    traZODone (DESYREL) 50 mg tablet, Take 2 tablets (100 mg total) by mouth daily at bedtime, Disp: 60 tablet, Rfl: 0    triamcinolone (KENALOG) 0 1 % cream, Apply topically 2 (two) times a day, Disp: 453 6 g, Rfl: 0    Current Allergies     Allergies as of 05/10/2022 - Reviewed 05/10/2022   Allergen Reaction Noted    Ceftin [cefuroxime] Hives 11/27/2018            The following portions of the patient's history were reviewed and updated as appropriate: allergies, current medications, past family history, past medical history, past social history, past surgical history and problem list      Past Medical History:   Diagnosis Date    Acid reflux     Anxiety     Arthritis     Asthma     C1-C4 level with central cord syndrome ([address] )     Resolved 2/1/2017     Carpal tunnel syndrome Resolved 4/21/2015    Colitis     Colon polyp     Concussion     Depressed     Dysthymic disorder     Resolved 1/5/2018    Gastric ulcer 2013    small - on EGD - [facility]    Hemorrhoids     Hx of blood clots     Hx of colonic polyps     [place]    Hyperlipemia     Hypertension     Hypothyroid     Lung nodule     stable x 2 yrs on CT    Migraines     Obesity     Postural dizziness with presyncope     Resolved 8/28/2018     Tendinitis     Ulnar neuropathy     Resolved 5/21/2015        Past Surgical History:   Procedure Laterality Date    APPENDECTOMY      BREAST SURGERY Left 01/1966    BREAST LUMPECTOMY    CARPAL TUNNEL RELEASE      CHOLECYSTECTOMY      DILATION AND CURETTAGE OF UTERUS      ELBOW SURGERY      EYE SURGERY Bilateral 2019    Cataract     GALLBLADDER SURGERY      JOINT REPLACEMENT Right     REPLACEMENT TOTAL KNEE    KNEE ARTHROSCOPY      KNEE SURGERY Right     NECK SURGERY      POSTERIOR LAMINECTOMY / DECOMPRESSION CERVICAL SPINE  02/2015    TONSILLECTOMY      VAGINAL DELIVERY      x3    WRIST SURGERY Right        Family History   Problem Relation Age of Onset    Breast cancer Mother     Alzheimer's disease Mother     Coronary artery disease Mother     Hypertension Mother    [facility] Migraines Mother     Peripheral vascular disease Mother    [facility] Parkinsonism Father     Other Father         Cardiovascular disease     Coronary artery disease Father     Hypertension Father     Bipolar disorder Sister     Asthma Daughter     Asthma Son          Medications have been verified          Objective   /78   Pulse 78   Temp 98 7 °F (37 1 °C)   Resp 18   SpO2 97%   No LMP recorded  Patient is postmenopausal        Physical Exam     Physical Exam  Vitals and nursing note reviewed  Constitutional:       General: She is not in acute distress  Appearance: Normal appearance  She is normal weight  She is not ill-appearing  HENT:      Head: Normocephalic and atraumatic  Salivary Glands: Right salivary gland is diffusely enlarged and tender  Left salivary gland is diffusely enlarged and tender  Right Ear: Hearing, tympanic membrane, ear canal and external ear normal  No tenderness  There is no impacted cerumen  Tympanic membrane is not injected, perforated or erythematous  Left Ear: Hearing, tympanic membrane, ear canal and external ear normal  No tenderness  There is no impacted cerumen  Tympanic membrane is not injected, perforated or erythematous  Nose: Congestion present  No rhinorrhea  Mouth/Throat:      Lips: Pink  Mouth: Mucous membranes are moist       Pharynx: Oropharynx is clear  Uvula midline  Posterior oropharyngeal erythema and uvula swelling present  No oropharyngeal exudate  Tonsils: No tonsillar exudate or tonsillar abscesses  0 on the right  0 on the left  Comments: Tongue was brown in color in the central region as outlined  Patient states that she has thrush from her inhaler, and will be speaking with her primary care doctor about it  Eyes:      General:         Right eye: No discharge  Left eye: No discharge  Extraocular Movements: Extraocular movements intact  Conjunctiva/sclera: Conjunctivae normal       Pupils: Pupils are equal, round, and reactive to light  Cardiovascular:      Rate and Rhythm: Normal rate and regular rhythm  Heart sounds: Normal heart sounds  No murmur heard  No friction rub  No gallop  Pulmonary:      Effort: Pulmonary effort is normal       Breath sounds: Normal breath sounds  No wheezing, rhonchi or rales     Neurological: Mental Status: She is alert

## 2022-05-10 NOTE — PATIENT INSTRUCTIONS
Sinusitis   - Take 1 tablet of amoxicillin/ clavulanate 2 times a day for the next 10 days  - You have been given an antibiotic, which a common side effect is diarrhea  Eat yogurt, fresh fruits and veggies, increase daily fiber intake, take probiotics, or try kombucha    - Take Vitamin D3 200IU daily, Vitamin C, and zinc  Rest and drink electrolyte rich fluids  Tylenol and ibuprofen as needed for fevers and aches following package dosing instructions  Chicken noodle soup  - Sore throat: Throat lozenges and/or salt water gurgles  - Congestion relief with mucinex 600mg 1 tablet every 12 hours  You can use afrin or flonase for nasal congestion as directed on their packaging  Warm or cool air mist humidifiers    - Nighttime cough relief with Mucinex DM or NyQuil   - Go to the ED if you have trouble breathing or shortness of breath at rest, chest pain or pressure that lasts longer than 5 minutes, become confused or hard to wake, your lips or face are blue, you have a fever of 104°F (40°C) or higher   - Follow up with Family doctor as needed, however your symptoms may persists for 2-3 weeks from onset  WHAT YOU NEED TO KNOW:   Sinusitis is inflammation or infection of your sinuses  Sinusitis is most often caused by a virus  Acute sinusitis may last up to 12 weeks  Chronic sinusitis lasts longer than 12 weeks  DISCHARGE INSTRUCTIONS:   Go to the emergency department if:   -You have trouble breathing or wheezing that is getting worse   -You have a stiff neck, a fever, or a bad headache    -You cannot open your eye    -Your eyeball bulges out or you cannot move your eye    -You are more sleepy than normal, or you notice changes in your ability to think, move, or talk  -You have swelling of your forehead or scalp      Call your doctor if:   -You have vision changes, such as double vision   -Your eye and eyelid are red, swollen, and painful    -Your symptoms do not improve or go away after 10 days   -You have nausea and are vomiting   -Your nose is bleeding   -You have questions or concerns about your condition or care  Medicines: Your symptoms may go away on their own  Your healthcare provider may recommend watchful waiting for up to 10 days before starting antibiotics  You may need any of the following:  -Acetaminophen  decreases pain and fever  Read the labels of all other medicines you are using to see if they also contain acetaminophen, or ask your doctor or pharmacist  Acetaminophen can cause liver damage if not taken correctly  Do not use more than 4 grams (4,000 milligrams) total of acetaminophen in one day  -NSAIDs , such as ibuprofen or naproxen, help decrease swelling, pain, and fever  This medicine is available with or without a doctor's order  NSAIDs can cause stomach bleeding or kidney problems in certain people  If you take blood thinner medicine, always ask your healthcare provider if NSAIDs are safe for you  Always read the medicine label and follow directions   -Nasal steroid sprays: Flonase- may help decrease inflammation in your nose and sinuses  -Decongestants: Mucinex - help reduce swelling and drain mucus in the nose and sinuses  They may help you breathe easier    -Antihistamines: Claritin, Allegra, Zyrtec, Benadryl - help dry mucus in the nose and relieve sneezing    -Take your medicine as directed  Contact your healthcare provider if you think your medicine is not helping or if you have side effects  Tell him or her if you are allergic to any medicine  Keep a list of the medicines, vitamins, and herbs you take  Include the amounts, and when and why you take them  Bring the list or the pill bottles to follow-up visits  Carry your medicine list with you in case of an emergency  Self-care:   -Rinse your sinuses as directed  Use a sinus rinse device to rinse your nasal passages with a saline (salt water) solution or distilled water  Do not use tap water   This will help thin the mucus in your nose and rinse away pollen and dirt  It will also help reduce swelling so you can breathe normally   -Use a humidifier  to increase air moisture in your home  This may make it easier for you to breathe and help decrease your cough  -Sleep with your head elevated  Place an extra pillow under your head before you go to sleep to help your sinuses drain    -Drink liquids as directed  Ask your healthcare provider how much liquid to drink each day and which liquids are best for you  Liquids will thin the mucus in your nose and help it drain  Avoid drinks that contain alcohol or caffeine    -Do not smoke, and avoid secondhand smoke  Nicotine and other chemicals in cigarettes and cigars can make your symptoms worse  Ask your healthcare provider for information if you currently smoke and need help to quit  E-cigarettes or smokeless tobacco still contain nicotine  Talk to your healthcare provider before you use these products  Prevent the spread of germs:   -Wash your hands often with soap and water for 20 seconds

## 2022-05-14 NOTE — PROGRESS NOTES
Virtual TCM Visit:    Verification of patient location:    Patient is located in the following state in which I hold an active license [place]        Assessment/Plan:        Problem List Items Addressed This Visit        Other    Anemia    Relevant Orders    CBC and differential    Ferritin      Other Visit Diagnoses     Primary insomnia    -  Primary    increase trazodone to 100mg qHS    Relevant Medications    traZODone (DESYREL) 50 mg tablet    History of left knee replacement        continue PT  mgmt per ortho             Reason for visit is f/u from hospitalization for L TKR  Doing well in general   Has some minor swelling which is not worse than it was in inpt rehab  Working with PT at home  No redness or pain  Has f/u with ortho as outpt  Pt's only complaint is difficulty sleeping since surgery  Has been taking trazodone 50mg, but can't maintain sleep  No chest pain, shortness of breath, n/v, abd pain, visual changes, paresthesias, weakness  Pt's hgb dropped to 8 after surgery  Went in slightly anemic--has been following with GI  Will need recheck cbc  Encounter provider Rashid Preston DO       Provider located at [address] [place] [address],[address]  [address] [address] [phone]      Recent Visits  No visits were found meeting these conditions  Showing recent visits within past 7 days and meeting all other requirements  Future Appointments  No visits were found meeting these conditions  Showing future appointments within next 150 days and meeting all other requirements       After connecting through [place]the [employer], the patient was identified by name and date of birth  Cosme Morris was informed that this is a telemedicine visit and that the visit is being conducted through [facility] and patient was informed this is a secure, HIPAA-complaint platform  She agrees to proceed     My office door was closed  No one else was in the room    She acknowledged consent and understanding of privacy and security of the video platform  The patient has agreed to participate and understands they can discontinue the visit at any time  Patient is aware this is a billable service  Subjective:     Patient ID: Raegan Rocha is a 76 y o  female  HPI    Review of Systems      Objective:    Vitals:    05/03/22 1034   Weight: 84 4 kg (186 lb)   Height: 5' (1 524 m)       Physical Exam  Constitutional:       Appearance: Normal appearance  HENT:      Mouth/Throat:      Mouth: Mucous membranes are moist    Eyes:      Extraocular Movements: Extraocular movements intact  Conjunctiva/sclera: Conjunctivae normal    Pulmonary:      Effort: Pulmonary effort is normal  No respiratory distress  Neurological:      General: No focal deficit present  Mental Status: She is alert and oriented to person, place, and time  Transitional Care Management Review:  Raegan Rocha is a 76 y o  female here for TCM follow up  During the TCM phone call patient stated:    TCM Call (since 4/13/2022)     Date and time call was made  4/19/2022  9:54 AM    Patient was hospitialized at  [facility]    Date of Admission  04/07/22    Date of discharge  04/19/22    Diagnosis  Left knee OA     Disposition  Home    Were the patients medications reviewed and updated  No    Current Symptoms  None      TCM Call (since 4/13/2022)     Post hospital issues  None    Should patient be enrolled in anticoag monitoring? No    Scheduled for follow up? Yes    Did you obtain your prescribed medications  Yes    Do you need help managing your prescriptions or medications  No    Is transportation to your appointment needed  No    I have advised the patient to call PCP with any new or worsening symptoms  Chantell STEWART MA           I spent 10 minutes with the patient during this visit      Theresa Dakin, DO      VIRTUAL VISIT [facility] verbally agrees to participate in Virtual Care Services  Pt is aware that [facility] could be limited without vital signs or the ability to perform a full hands-on physical Percy Johnson understands she or the provider may request at any time to terminate the video visit and request the patient to seek care or treatment in person

## 2022-05-23 ENCOUNTER — OFFICE VISIT (OUTPATIENT)
Dept: FAMILY MEDICINE CLINIC | Facility: CLINIC | Age: 77
End: 2022-05-23
Payer: MEDICARE

## 2022-05-23 ENCOUNTER — HOSPITAL ENCOUNTER (OUTPATIENT)
Dept: RADIOLOGY | Facility: HOSPITAL | Age: 77
Discharge: HOME | End: 2022-05-23
Attending: FAMILY MEDICINE
Payer: MEDICARE

## 2022-05-23 VITALS
SYSTOLIC BLOOD PRESSURE: 122 MMHG | BODY MASS INDEX: 36.95 KG/M2 | HEIGHT: 60 IN | OXYGEN SATURATION: 97 % | TEMPERATURE: 98.4 F | WEIGHT: 188.2 LBS | RESPIRATION RATE: 16 BRPM | HEART RATE: 52 BPM | DIASTOLIC BLOOD PRESSURE: 78 MMHG

## 2022-05-23 DIAGNOSIS — R05.9 COUGH: ICD-10-CM

## 2022-05-23 DIAGNOSIS — K21.9 GASTROESOPHAGEAL REFLUX DISEASE WITHOUT ESOPHAGITIS: ICD-10-CM

## 2022-05-23 DIAGNOSIS — I10 ESSENTIAL HYPERTENSION: ICD-10-CM

## 2022-05-23 DIAGNOSIS — J45.30 MILD PERSISTENT ASTHMA WITHOUT COMPLICATION: ICD-10-CM

## 2022-05-23 DIAGNOSIS — J06.9 VIRAL UPPER RESPIRATORY TRACT INFECTION: Primary | ICD-10-CM

## 2022-05-23 DIAGNOSIS — N18.31 STAGE 3A CHRONIC KIDNEY DISEASE (HCC): ICD-10-CM

## 2022-05-23 PROCEDURE — 99214 OFFICE O/P EST MOD 30 MIN: CPT | Performed by: FAMILY MEDICINE

## 2022-05-23 PROCEDURE — 71046 X-RAY EXAM CHEST 2 VIEWS: CPT

## 2022-05-23 NOTE — PATIENT INSTRUCTIONS
Advise Chelle White med sinus rinse kit, Mucinex, Claritin/Zyrtec/Allegra, Flonase  Avoid decongestants if you have high blood pressure  You may use Tylenol (Acetaminophen) up to 3,000mg daily (in 24 hours) as needed for pain or fever

## 2022-05-23 NOTE — PROGRESS NOTES
Assessment/Plan:  Problem List Items Addressed This Visit        Digestive    Gastroesophageal reflux disease     Watch GERD diet  Respiratory    Mild persistent asthma without complication     Uncontrolled  First, restart albuterol HFA p r n  Then if necessary, restart Flovent and monitor for thrush  Patient may need alternate inhaled corticosteroid per PCP in future  URI (upper respiratory infection) - Primary    Advise Rosa Kern med sinus rinse kit, Mucinex, Claritin/Zyrtec/Allegra, Flonase  Avoid decongestants if you have high blood pressure  You may use Tylenol (Acetaminophen) up to 3,000mg daily (in 24 hours) as needed for pain or fever  Cardiovascular and Mediastinum    Essential hypertension     Stable  Check blood pressure outside of office  Recommend lifestyle modifications  Genitourinary    Stage 3a chronic kidney disease (HCC)    Avoid NSAIDs  Other Visit Diagnoses     Cough        Relevant Orders    XR chest pa & lateral    See asthma advice above  Pending CXR to rule out pneumonia  Return in about 2 weeks (around 6/6/2022) for c PCP Dr Manuel Haas - F/U Asthma  Future Appointments   Date Time Provider Temo Roy   6/8/2022 12:40 PM Selwyn Quezada DO [facility]        Subjective:     Desean Santoro is a 76 y o  female who presents today for a follow-up on her acute medical conditions  HPI:  Chief Complaint   Patient presents with    Cough     Seen 5/11/22 sinus infection was given amox - completed course  Sinus pressure resolved  Since yesterday there is a cough and it is raspy  Covid at was negative this AM  Chest tightness, HA  Denies CP, just feels tight  Stool is soft, going 2-3 times a day  Symptoms started 1 week ago     Nausea    Headache     -- Above per clinical staff and reviewed  --    HPI      Today:      Symptoms x 2 weeks  Coughing continues, more frequent, has chest tightness  Her mouth feels dry  Seen at [facility] on 5/10/22, Dx Sinusitis, Rx Augmentin 1 pill BID x 10 days - finished 4 days ago  Sinus pressure resolved  Not using Flovent and Albuterol HFA due to thrush despite after rinsing mouth- last used 4/22  Negative COVID home test 5/23/22  The following portions of the patient's history were reviewed and updated as appropriate: allergies, current medications, past family history, past medical history, past social history, past surgical history and problem list       Review of Systems   Constitutional: Negative for appetite change, chills, diaphoresis, fatigue and fever  Respiratory: Positive for cough, chest tightness, shortness of breath and wheezing  Cardiovascular: Negative for chest pain  Gastrointestinal: Positive for nausea  Negative for abdominal pain, blood in stool, diarrhea and vomiting  Genitourinary: Negative for dysuria  Current Outpatient Medications   Medication Sig Dispense Refill    albuterol (Ventolin HFA) 90 mcg/act inhaler Inhale 2 puffs every 6 (six) hours as needed for wheezing 1 Inhaler 2    amLODIPine (NORVASC) 10 mg tablet Take 1 tablet (10 mg total) by mouth daily 90 tablet 1    atorvastatin (LIPITOR) 20 mg tablet TAKE 1 TABLET BY MOUTH EVERY DAY 90 tablet 1    Cholecalciferol (Vitamin D3) 1 25 MG (66808 UT) CAPS Take 5,000 Units by mouth daily      fluticasone (FLOVENT HFA) 110 MCG/ACT inhaler Inhale 2 puffs 2 (two) times a day Rinse mouth after use  12 g 1    levothyroxine 137 mcg tablet Take 1 tablet (137 mcg total) by mouth in the morning   30 tablet 5    lidocaine (Lidoderm) 5 % Apply 1 patch topically daily for 12 days Remove & Discard patch within 12 hours or as directed by MD Khan patch 0    lisinopril (ZESTRIL) 40 mg tablet Take 1 tablet (40 mg total) by mouth daily 90 tablet 0    metroNIDAZOLE (METROGEL) 1 % gel Apply topically daily 45 g 0    pantoprazole (PROTONIX) 40 mg tablet Take 1 tablet (40 mg total) by mouth 2 (two) times a day 60 tablet 5    sertraline (ZOLOFT) 100 mg tablet Take 1 tablet (100 mg total) by mouth 2 (two) times a day 60 tablet 0    traZODone (DESYREL) 50 mg tablet Take 2 tablets (100 mg total) by mouth daily at bedtime 60 tablet 0    Diclofenac Sodium (VOLTAREN) 1 % Apply 2 g topically 4 (four) times a day for 15 days 120 g 0    Sennosides 8 6 MG CAPS Take 8 6 mg by mouth 2 (two) times a day (Patient not taking: Reported on 5/23/2022)      sucralfate (CARAFATE) 1 g/10 mL suspension Take 10 mL (1 g total) by mouth 4 (four) times a day (with meals and at bedtime) (Patient not taking: Reported on 5/23/2022) 420 mL 0     No current facility-administered medications for this visit  Objective:  /78   Pulse (!) 52   Temp 98 4 °F (36 9 °C) (Temporal)   Resp 16   Ht 5' (1 524 m)   Wt 85 4 kg (188 lb 3 2 oz)   SpO2 97%   BMI 36 76 kg/m²    Wt Readings from Last 3 Encounters:   05/23/22 85 4 kg (188 lb 3 2 oz)   05/03/22 84 4 kg (186 lb)   03/25/22 84 4 kg (186 lb)      BP Readings from Last 3 Encounters:   05/23/22 122/78   05/10/22 144/78   03/25/22 132/62          Physical Exam  Vitals and nursing note reviewed  Constitutional:       Appearance: Normal appearance  She is well-developed  She is obese  HENT:      Head: Normocephalic and atraumatic  Right Ear: Tympanic membrane, ear canal and external ear normal       Left Ear: Tympanic membrane, ear canal and external ear normal       Nose: Nose normal       Right Sinus: No maxillary sinus tenderness or frontal sinus tenderness  Left Sinus: No maxillary sinus tenderness or frontal sinus tenderness  Mouth/Throat:      Mouth: Mucous membranes are moist       Pharynx: Oropharynx is clear  Uvula midline  No oropharyngeal exudate or posterior oropharyngeal erythema  Tonsils: No tonsillar exudate  Eyes:      Conjunctiva/sclera: Conjunctivae normal    Cardiovascular:      Rate and Rhythm: Normal rate and regular rhythm  Pulses: Normal pulses  Heart sounds: Normal heart sounds  Pulmonary:      Effort: Pulmonary effort is normal       Breath sounds: Normal breath sounds  Musculoskeletal:         General: No swelling or tenderness  Cervical back: Neck supple  Right lower leg: No edema  Left lower leg: No edema  Lymphadenopathy:      Cervical: No cervical adenopathy  Skin:     Findings: No rash  Neurological:      General: No focal deficit present  Mental Status: She is alert and oriented to person, place, and time  Psychiatric:         Mood and Affect: Mood normal          Behavior: Behavior normal          Thought Content: Thought content normal          Judgment: Judgment normal          Lab Results:      Lab Results   Component Value Date    WBC 7 47 12/07/2021    HGB 10 6 (L) 12/07/2021    HCT 35 7 12/07/2021     12/07/2021    TRIG 58 09/21/2020    HDL 70 09/21/2020    ALT 21 12/07/2021    AST 15 12/07/2021     02/27/2015    K 4 2 12/07/2021     12/07/2021    CREATININE 1 08 12/07/2021    BUN 25 12/07/2021    CO2 26 12/07/2021    INR 1 12 02/26/2015    GLUF 92 03/31/2021    HGBA1C 5 9 (H) 09/21/2020     No results found for: URICACID  Invalid input(s): BASENAME Vitamin D    No results found  POCT Labs      BMI Counseling: Body mass index is 36 76 kg/m²  The BMI is above normal  Nutrition recommendations include encouraging healthy choices of fruits and vegetables  Exercise recommendations include exercising 3-5 times per week  No pharmacotherapy was ordered  Rationale for BMI follow-up plan is due to patient being overweight or obese

## 2022-05-26 NOTE — ASSESSMENT & PLAN NOTE
Uncontrolled  First, restart albuterol HFA p r n  Then if necessary, restart Flovent and monitor for thrush  Patient may need alternate inhaled corticosteroid per PCP in future

## 2022-05-27 NOTE — RESULT ENCOUNTER NOTE
Stable Chest Xray  Continue plan of care        Message sent to patient via [email or web address] patient portal

## 2022-05-31 DIAGNOSIS — F51.01 PRIMARY INSOMNIA: ICD-10-CM

## 2022-05-31 RX ORDER — TRAZODONE HYDROCHLORIDE 50 MG/1
100 TABLET ORAL
Qty: 60 TABLET | Refills: 0 | Status: SHIPPED | OUTPATIENT
Start: 2022-05-31 | End: 2022-07-14 | Stop reason: SDUPTHER

## 2022-06-06 DIAGNOSIS — F32.A DEPRESSION, UNSPECIFIED DEPRESSION TYPE: ICD-10-CM

## 2022-06-07 RX ORDER — SERTRALINE HYDROCHLORIDE 100 MG/1
100 TABLET, FILM COATED ORAL 2 TIMES DAILY
Qty: 60 TABLET | Refills: 2 | Status: SHIPPED | OUTPATIENT
Start: 2022-06-07

## 2022-06-07 NOTE — TELEPHONE ENCOUNTER
Medication refill requested: sertraline    Last office visit: 5/23/22  Next office visit: 6/8/22  Last refilled: 3/17/22  Labs: No  Ordering Provider: Temo Bello (select pharmacy send RX to):   [id number], [address] [place] [address] [phone] [id number]  Phone: [phone] Fax: [phone]

## 2022-06-08 ENCOUNTER — OFFICE VISIT (OUTPATIENT)
Dept: FAMILY MEDICINE CLINIC | Facility: CLINIC | Age: 77
End: 2022-06-08
Payer: MEDICARE

## 2022-06-08 ENCOUNTER — HOSPITAL ENCOUNTER (OUTPATIENT)
Dept: NON INVASIVE DIAGNOSTICS | Facility: HOSPITAL | Age: 77
Discharge: HOME | End: 2022-06-08
Payer: MEDICARE

## 2022-06-08 VITALS
RESPIRATION RATE: 16 BRPM | WEIGHT: 186 LBS | OXYGEN SATURATION: 95 % | HEIGHT: 60 IN | SYSTOLIC BLOOD PRESSURE: 138 MMHG | TEMPERATURE: 98.2 F | HEART RATE: 79 BPM | BODY MASS INDEX: 36.52 KG/M2 | DIASTOLIC BLOOD PRESSURE: 72 MMHG

## 2022-06-08 DIAGNOSIS — R06.02 SHORTNESS OF BREATH: ICD-10-CM

## 2022-06-08 DIAGNOSIS — E66.9 OBESITY (BMI 35.0-39.9 WITHOUT COMORBIDITY): ICD-10-CM

## 2022-06-08 DIAGNOSIS — R60.0 LEG EDEMA, LEFT: Primary | ICD-10-CM

## 2022-06-08 DIAGNOSIS — R60.0 LEG EDEMA, LEFT: ICD-10-CM

## 2022-06-08 PROCEDURE — 93971 EXTREMITY STUDY: CPT | Performed by: SURGERY

## 2022-06-08 PROCEDURE — 99214 OFFICE O/P EST MOD 30 MIN: CPT | Performed by: FAMILY MEDICINE

## 2022-06-08 PROCEDURE — 93971 EXTREMITY STUDY: CPT

## 2022-06-08 RX ORDER — TIOTROPIUM BROMIDE INHALATION SPRAY 1.56 UG/1
2 SPRAY, METERED RESPIRATORY (INHALATION) DAILY
Qty: 4 G | Refills: 1 | Status: SHIPPED | OUTPATIENT
Start: 2022-06-08

## 2022-06-08 NOTE — PATIENT INSTRUCTIONS
Call [facility] at [phone] for the hand specialists  Obtain US today  Use spiriva 2 puffs daily for breathing  F/u 1 mo

## 2022-06-08 NOTE — PROGRESS NOTES
Assessment/Plan:    No problem-specific Assessment & Plan notes found for this encounter  Diagnoses and all orders for this visit:    Leg edema, left  Comments:  negative duplex  continue elevation and compression stockings  Orders:  -     VAS lower limb venous duplex study, unilateral/limited; Future    Shortness of breath  Comments:  ? whether this is asthma or copd  she doesn't want steroid inhaler  trial of spiriva to see if that is helpful  albuterol prn  t/c pfts or allergy referral in t  Orders:  -     tiotropium (Spiriva Respimat) 1 25 MCG/ACT AERS inhaler; Inhale 2 puffs daily    Obesity (BMI 35 0-39 9 without comorbidity)  -     Ambulatory Referral to Weight Management; Future    Other orders  -     Cancel: Ambulatory Referral to Allergy; Future        Subjective:      Patient ID: Sylvia Grace is a 76 y o  female  HPI  Pt presents in f/u for her asthma  States asthma started about 20 yrs ago  Has remote smoking hx--quit 20 yrs ago  Has shortness of breath with walking and needs albuterol which always helps  No chest pain, neck/arm/jaw pain, diaphoresis  Recently she was ill and needed albuterol more often  She knows she does better on steroid inhaler but no matter what she does she gets thrush, so she doesn't take  Pt has ongoing L LE edema which is severe enough she has trouble putting on her shoe since TKR 2 mo ago  Gets better at night and worse throughout day  Uses compression stocking which isn't always helpful  Wants help with weight loss  Did well with medically managed weight loss at [facility]  Would like to try that here at [facility] [place]    The following portions of the patient's history were reviewed and updated as appropriate: allergies, current medications, past family history, past medical history, past social history, past surgical history and problem list     Review of Systems   Respiratory: Positive for shortness of breath  Cardiovascular: Positive for leg swelling  Musculoskeletal: Positive for arthralgias  Objective:      /72 (BP Location: Left arm, Patient Position: Sitting, Cuff Size: Standard)   Pulse 79   Temp 98 2 °F (36 8 °C)   Resp 16   Ht 5' (1 524 m)   Wt 84 4 kg (186 lb)   SpO2 95%   BMI 36 33 kg/m²          Physical Exam  Constitutional:       General: She is not in acute distress  Appearance: She is well-developed  HENT:      Head: Normocephalic and atraumatic  Right Ear: Tympanic membrane, ear canal and external ear normal       Left Ear: Tympanic membrane, ear canal and external ear normal       Nose: Nose normal       Mouth/Throat:      Pharynx: No oropharyngeal exudate  Eyes:      Conjunctiva/sclera: Conjunctivae normal       Pupils: Pupils are equal, round, and reactive to light  Neck:      Thyroid: No thyromegaly  Vascular: No carotid bruit or JVD  Cardiovascular:      Rate and Rhythm: Regular rhythm  Heart sounds: S1 normal and S2 normal  No murmur heard  No friction rub  No gallop  No S3 or S4 sounds  Pulmonary:      Effort: Pulmonary effort is normal       Breath sounds: Normal breath sounds  No wheezing, rhonchi or rales  Abdominal:      General: Bowel sounds are normal  There is no distension  Palpations: Abdomen is soft  Tenderness: There is no abdominal tenderness  Musculoskeletal:      Left lower leg: Edema present  Lymphadenopathy:      Cervical: No cervical adenopathy  Neurological:      Mental Status: She is alert and oriented to person, place, and time  Cranial Nerves: No cranial nerve deficit  Sensory: No sensory deficit  Deep Tendon Reflexes: Reflexes are normal and symmetric

## 2022-06-09 ENCOUNTER — TELEPHONE (OUTPATIENT)
Dept: FAMILY MEDICINE CLINIC | Facility: CLINIC | Age: 77
End: 2022-06-09

## 2022-06-09 RX ORDER — TIOTROPIUM BROMIDE INHALATION SPRAY 1.56 UG/1
SPRAY, METERED RESPIRATORY (INHALATION)
Refills: 1 | OUTPATIENT
Start: 2022-06-09

## 2022-06-15 ENCOUNTER — CONSULT (OUTPATIENT)
Dept: BARIATRICS | Facility: CLINIC | Age: 77
End: 2022-06-15
Payer: MEDICARE

## 2022-06-15 VITALS
HEART RATE: 69 BPM | TEMPERATURE: 98.7 F | BODY MASS INDEX: 36.12 KG/M2 | DIASTOLIC BLOOD PRESSURE: 72 MMHG | HEIGHT: 60 IN | WEIGHT: 184 LBS | SYSTOLIC BLOOD PRESSURE: 136 MMHG

## 2022-06-15 DIAGNOSIS — E78.2 MIXED HYPERLIPIDEMIA: ICD-10-CM

## 2022-06-15 DIAGNOSIS — Z91.89 AT RISK FOR SLEEP APNEA: ICD-10-CM

## 2022-06-15 DIAGNOSIS — I10 ESSENTIAL HYPERTENSION: ICD-10-CM

## 2022-06-15 DIAGNOSIS — J45.30 MILD PERSISTENT ASTHMA WITHOUT COMPLICATION: ICD-10-CM

## 2022-06-15 DIAGNOSIS — E03.9 HYPOTHYROIDISM, UNSPECIFIED TYPE: ICD-10-CM

## 2022-06-15 DIAGNOSIS — R73.01 IFG (IMPAIRED FASTING GLUCOSE): ICD-10-CM

## 2022-06-15 DIAGNOSIS — F41.8 MIXED ANXIETY AND DEPRESSIVE DISORDER: ICD-10-CM

## 2022-06-15 DIAGNOSIS — E66.9 OBESITY (BMI 35.0-39.9 WITHOUT COMORBIDITY): Primary | ICD-10-CM

## 2022-06-15 DIAGNOSIS — R73.01 IMPAIRED FASTING GLUCOSE: ICD-10-CM

## 2022-06-15 DIAGNOSIS — K21.9 GASTROESOPHAGEAL REFLUX DISEASE WITHOUT ESOPHAGITIS: ICD-10-CM

## 2022-06-15 PROCEDURE — 99204 OFFICE O/P NEW MOD 45 MIN: CPT | Performed by: NURSE PRACTITIONER

## 2022-06-15 NOTE — PROGRESS NOTES
Assessment/Plan:    Obesity (BMI 35 0-39 9 without comorbidity)  - Discussed options of HealthyCORE-Intensive Lifestyle Intervention Program, Very Low Calorie Diet-VLCD and Conservative Program and the role of weight loss medications  Will avoid VLCD due to age and eGFR less than 60  - Explained the importance of making lifestyle changes first before starting any anti-obesity medications  Patient should demonstrate lifestyle changes first before anti-obesity medication can be initiated  - Patient is interested in pursuing HealthyCORE-Intensive Lifestyle Intervention Program with partial meal replacement  - Initial weight loss goal of 5-10% weight loss for improved health  - Weight loss can improve patient's co-morbid conditions and/or prevent weight-related complications  - Stop Jeremy Aguayo 4/8  - Labs reviewed: CMP 3/18/2022 eGFR 55, otherwise within acceptable range TSH 11/19/2021 within normal range  - Check lipid panel, A1C and fasting insulin  Goals:  Do not skip meals  Food log (ie ) www myfitnesspal com,sparkpeople  com,loseit com,calorieking  com,etc  baritastic (use skinnytaste  com, dietdoctor  com or smartphone danielle E-Semble for recipes)  No sugary beverages  At least 64oz of water daily  Increase physical activity by 10 minutes daily  Gradually increase physical activity to a goal of 5 days per week for 30 minutes of MODERATE intensity PLUS 2 days per week of FULL BODY resistance training (use smartphone apps [employer], Home Workout, etc )      Mixed anxiety and depressive disorder  - Taking sertraline and trazodone  Continue management with prescribing provider  Hyperlipidemia  - Taking atorvastatin  May improve with weight loss and lifestyle modification  Continue management with prescribing provider  Essential hypertension  - Taking norvasc and lisinopril  May improve with weight loss and lifestyle modification  Continue management with prescribing provider         Mild persistent asthma without complication  - Taking albuterol as needed  Continue management with prescribing provider  IFG (impaired fasting glucose)  - Check A1C  Hypothyroidism  - Taking levothyroxine  Continue management with prescribing provider  Gastroesophageal reflux disease  - Taking protonix  May improve with weight loss and lifestyle modification  Continue management with prescribing provider  At risk for sleep apnea  - Stop bang 4/8    - Risks of untreated sleep apnea reviewed, including increased risk for myocardial infarction and stroke, increased difficulty with weight loss, and risk of sudden cardiac death due to arrhythmia   - Referral to sleep medicine antoni Angulo was seen today for consult  Diagnoses and all orders for this visit:    Obesity (BMI 35 0-39 9 without comorbidity)  -     Ambulatory Referral to Weight Management  -     Lipid panel; Future  -     HEMOGLOBIN A1C W/ EAG ESTIMATION; Future  -     Insulin, fasting; Future    Essential hypertension    Mixed hyperlipidemia    Gastroesophageal reflux disease without esophagitis    Hypothyroidism, unspecified type    IFG (impaired fasting glucose)    Mild persistent asthma without complication    Mixed anxiety and depressive disorder    At risk for sleep apnea  -     Ambulatory referral to Sleep Medicine; Future    Impaired fasting glucose  -     Lipid panel; Future  -     HEMOGLOBIN A1C W/ EAG ESTIMATION; Future  -     Insulin, fasting; Future            Follow up in approximately [facility] with medical weight management provider  Subjective:   Chief Complaint   Patient presents with    Consult     Mwm goal wt 150/160, S/b 4-8 pos       Patient ID: Luis A Obrien  is a 76 y o  female with excess weight/obesity here to pursue weight management  Previous notes and records have been reviewed      Past Medical History:   Diagnosis Date    Acid reflux     Anxiety     Arthritis     Asthma     C1-C4 level with central cord syndrome ([facility] [address] )     Resolved 2/1/2017     Carpal tunnel syndrome Resolved 4/21/2015    Colitis     Colon polyp     Concussion     Depressed     Dysthymic disorder     Resolved 1/5/2018    Gastric ulcer 2013    small - on EGD - [facility]    Hemorrhoids     Hx of blood clots     Hx of colonic polyps     [place]    Hyperlipemia     Hypertension     Hypothyroid     Lung nodule     stable x 2 yrs on CT    Migraines     Obesity     Postural dizziness with presyncope     Resolved 8/28/2018     Tendinitis     Ulnar neuropathy     Resolved 5/21/2015      Past Surgical History:   Procedure Laterality Date    APPENDECTOMY      BREAST SURGERY Left 01/1966    BREAST LUMPECTOMY    CARPAL TUNNEL RELEASE      CHOLECYSTECTOMY      DILATION AND CURETTAGE OF UTERUS      ELBOW SURGERY      EYE SURGERY Bilateral 2019    Cataract     GALLBLADDER SURGERY      JOINT REPLACEMENT Right     REPLACEMENT TOTAL KNEE    KNEE ARTHROSCOPY      KNEE SURGERY Right     NECK SURGERY      POSTERIOR LAMINECTOMY / DECOMPRESSION CERVICAL SPINE  02/2015    REPLACEMENT TOTAL KNEE Left 2022    TONSILLECTOMY      TOTAL KNEE ARTHROPLASTY Left 04/06/2022    [facility] Dr Palmer Tobar      x3    WRIST SURGERY Right        HPI:  Wt Readings from Last 20 Encounters:   06/15/22 83 5 kg (184 lb)   06/08/22 84 4 kg (186 lb)   05/23/22 85 4 kg (188 lb 3 2 oz)   05/03/22 84 4 kg (186 lb)   03/25/22 84 4 kg (186 lb)   02/02/22 83 2 kg (183 lb 6 4 oz)   01/21/22 82 1 kg (181 lb)   12/17/21 82 1 kg (181 lb)   12/07/21 82 2 kg (181 lb 3 2 oz)   10/26/21 83 5 kg (184 lb)   09/15/21 82 2 kg (181 lb 3 2 oz)   09/04/21 81 2 kg (179 lb)   08/31/21 81 2 kg (179 lb)   08/26/21 83 3 kg (183 lb 9 6 oz)   06/14/21 81 4 kg (179 lb 6 4 oz)   06/07/21 78 kg (172 lb)   05/20/21 78 kg (172 lb)   03/23/21 78 kg (172 lb)   01/15/21 78 5 kg (173 lb)   12/04/20 79 5 kg (175 lb 3 2 oz)     Obesity/Excess Weight:  Severity: Moderate  Onset:  Since childhood, but worse after having children and knee surgery    Modifiers: Diet and Exercise, Physician Supervised Weight Loss Program, Commercial Weight Loss Programs-ie  Weight Watchers, Areatha Montes De Oca, Nutrisystem, etc  and VLCD [facility] about 7 years ago  Contributing factors: Insufficient Physical Activity and Stress/Emotional Eating  Associated symptoms: increased joint pain and clothes do not fit    Hydration: 32 oz water, 2-3 cups of coffee with creamer, 1 soda every 3 weeks  Alcohol: none  Smoking: denies  Exercise: none  Occupation: retired   Sleep: 6 hours  STOP ban/8    Highest weight: 205 lbs about 7 years ago  Current weight: 184 lbs  Goal weight: 160 lbs    Colonoscopy: due, has order, she will be scheduling   Mammogram: she will discuss with her primary care  The following portions of the patient's history were reviewed and updated as appropriate: allergies, current medications, past family history, past medical history, past social history, past surgical history, and problem list     Review of Systems   HENT: Negative for sore throat  Respiratory: Positive for shortness of breath (with exertion)  Negative for cough  Cardiovascular: Negative for chest pain and palpitations  Gastrointestinal: Positive for constipation (due to iron)  Negative for diarrhea, nausea and vomiting         + GERD controlled with medication   Endocrine: Negative for cold intolerance and heat intolerance  Genitourinary: Negative for dysuria  Musculoskeletal: Positive for arthralgias  Negative for back pain  Skin: Negative for rash  Neurological: Negative for headaches  Psychiatric/Behavioral: Negative for suicidal ideas (or HI)          + depression and anxiety controlled with medication       Objective:  /72 (BP Location: Left arm, Patient Position: Sitting, Cuff Size: Standard)   Pulse 69   Temp 98 7 °F (37 1 °C) (Tympanic)   Ht 5' (1 524 m) Wt 83 5 kg (184 lb)   BMI 35 94 kg/m²     Physical Exam  Vitals and nursing note reviewed  Constitutional   General appearance: Abnormal   well developed and obese  Eyes No conjunctival injection  Ears, Nose, Mouth, and Throat Oral mucosa moist    Pulmonary   Respiratory effort: No increased work of breathing or signs of respiratory distress  Cardiovascular     Examination of extremities for edema and/or varicosities: Normal   no edema  Abdomen   Abdomen: Abnormal   The abdomen was obese      Musculoskeletal   Gait and station: Normal     Psychiatric   Orientation to person, place and time: Normal     Affect: appropriate

## 2022-06-16 NOTE — ASSESSMENT & PLAN NOTE
- Taking protonix  May improve with weight loss and lifestyle modification  Continue management with prescribing provider

## 2022-06-16 NOTE — ASSESSMENT & PLAN NOTE
- Discussed options of HealthyCORE-Intensive Lifestyle Intervention Program, Very Low Calorie Diet-VLCD and Conservative Program and the role of weight loss medications  Will avoid VLCD due to age and eGFR less than 60  - Explained the importance of making lifestyle changes first before starting any anti-obesity medications  Patient should demonstrate lifestyle changes first before anti-obesity medication can be initiated  - Patient is interested in pursuing HealthyCORE-Intensive Lifestyle Intervention Program with partial meal replacement  - Initial weight loss goal of 5-10% weight loss for improved health  - Weight loss can improve patient's co-morbid conditions and/or prevent weight-related complications  - Stop Sydna Fix 4/8  - Labs reviewed: CMP 3/18/2022 eGFR 55, otherwise within acceptable range TSH 11/19/2021 within normal range  - Check lipid panel, A1C and fasting insulin  Goals:  Do not skip meals  Food log (ie ) www myfitnesspal com,sparkpeople  com,loseit com,calorieking  com,etc  baritastic (use skinnytaste  com, dietdoctor  com or smartphone danielle CELtrak for recipes)  No sugary beverages  At least 64oz of water daily  Increase physical activity by 10 minutes daily   Gradually increase physical activity to a goal of 5 days per week for 30 minutes of MODERATE intensity PLUS 2 days per week of FULL BODY resistance training (use smartphone apps [employer], Home Workout, etc )

## 2022-06-16 NOTE — ASSESSMENT & PLAN NOTE
- Taking atorvastatin  May improve with weight loss and lifestyle modification  Continue management with prescribing provider

## 2022-06-16 NOTE — ASSESSMENT & PLAN NOTE
- Stop bang 4/8    - Risks of untreated sleep apnea reviewed, including increased risk for myocardial infarction and stroke, increased difficulty with weight loss, and risk of sudden cardiac death due to arrhythmia   - Referral to sleep medicine placed

## 2022-06-16 NOTE — ASSESSMENT & PLAN NOTE
- Taking norvasc and lisinopril  May improve with weight loss and lifestyle modification  Continue management with prescribing provider

## 2022-06-28 ENCOUNTER — LAB (OUTPATIENT)
Dept: LAB | Facility: MEDICAL CENTER | Age: 77
End: 2022-06-28
Payer: MEDICARE

## 2022-06-28 DIAGNOSIS — D64.9 ANEMIA, UNSPECIFIED TYPE: ICD-10-CM

## 2022-06-28 DIAGNOSIS — E66.9 OBESITY (BMI 35.0-39.9 WITHOUT COMORBIDITY): ICD-10-CM

## 2022-06-28 DIAGNOSIS — E03.9 HYPOTHYROIDISM, UNSPECIFIED TYPE: ICD-10-CM

## 2022-06-28 DIAGNOSIS — R73.01 IMPAIRED FASTING GLUCOSE: ICD-10-CM

## 2022-06-28 LAB
BASOPHILS # BLD AUTO: 0.08 THOUSANDS/ΜL (ref 0–0.1)
BASOPHILS NFR BLD AUTO: 1 % (ref 0–1)
CHOLEST SERPL-MCNC: 157 MG/DL
EOSINOPHIL # BLD AUTO: 0.5 THOUSAND/ΜL (ref 0–0.61)
EOSINOPHIL NFR BLD AUTO: 7 % (ref 0–6)
ERYTHROCYTE [DISTWIDTH] IN BLOOD BY AUTOMATED COUNT: 14.2 % (ref 11.6–15.1)
EST. AVERAGE GLUCOSE BLD GHB EST-MCNC: 111 MG/DL
FERRITIN SERPL-MCNC: 25 NG/ML (ref 8–388)
HBA1C MFR BLD: 5.5 %
HCT VFR BLD AUTO: 37.8 % (ref 34.8–46.1)
HDLC SERPL-MCNC: 61 MG/DL
HGB BLD-MCNC: 11.7 G/DL (ref 11.5–15.4)
IMM GRANULOCYTES # BLD AUTO: 0.02 THOUSAND/UL (ref 0–0.2)
IMM GRANULOCYTES NFR BLD AUTO: 0 % (ref 0–2)
INSULIN SERPL-ACNC: 10.8 MU/L (ref 3–25)
LDLC SERPL CALC-MCNC: 73 MG/DL (ref 0–100)
LYMPHOCYTES # BLD AUTO: 1.94 THOUSANDS/ΜL (ref 0.6–4.47)
LYMPHOCYTES NFR BLD AUTO: 29 % (ref 14–44)
MCH RBC QN AUTO: 27.3 PG (ref 26.8–34.3)
MCHC RBC AUTO-ENTMCNC: 31 G/DL (ref 31.4–37.4)
MCV RBC AUTO: 88 FL (ref 82–98)
MONOCYTES # BLD AUTO: 0.56 THOUSAND/ΜL (ref 0.17–1.22)
MONOCYTES NFR BLD AUTO: 8 % (ref 4–12)
NEUTROPHILS # BLD AUTO: 3.66 THOUSANDS/ΜL (ref 1.85–7.62)
NEUTS SEG NFR BLD AUTO: 55 % (ref 43–75)
NONHDLC SERPL-MCNC: 96 MG/DL
NRBC BLD AUTO-RTO: 0 /100 WBCS
PLATELET # BLD AUTO: 272 THOUSANDS/UL (ref 149–390)
PMV BLD AUTO: 9.8 FL (ref 8.9–12.7)
RBC # BLD AUTO: 4.28 MILLION/UL (ref 3.81–5.12)
TRIGL SERPL-MCNC: 117 MG/DL
TSH SERPL DL<=0.05 MIU/L-ACNC: 4.09 UIU/ML (ref 0.45–4.5)
WBC # BLD AUTO: 6.76 THOUSAND/UL (ref 4.31–10.16)

## 2022-06-28 PROCEDURE — 83525 ASSAY OF INSULIN: CPT

## 2022-06-28 PROCEDURE — 84443 ASSAY THYROID STIM HORMONE: CPT

## 2022-06-28 PROCEDURE — 80061 LIPID PANEL: CPT

## 2022-06-28 PROCEDURE — 82728 ASSAY OF FERRITIN: CPT

## 2022-06-28 PROCEDURE — 85025 COMPLETE CBC W/AUTO DIFF WBC: CPT

## 2022-06-28 PROCEDURE — 83036 HEMOGLOBIN GLYCOSYLATED A1C: CPT

## 2022-06-28 PROCEDURE — 36415 COLL VENOUS BLD VENIPUNCTURE: CPT

## 2022-06-29 ENCOUNTER — OFFICE VISIT (OUTPATIENT)
Dept: BARIATRICS | Facility: CLINIC | Age: 77
End: 2022-06-29

## 2022-06-29 VITALS — WEIGHT: 182.65 LBS | HEIGHT: 60 IN | BODY MASS INDEX: 35.86 KG/M2

## 2022-06-29 DIAGNOSIS — R63.5 ABNORMAL WEIGHT GAIN: Primary | ICD-10-CM

## 2022-06-29 PROCEDURE — WMPRO12: Performed by: DIETITIAN, REGISTERED

## 2022-06-29 PROCEDURE — RECHECK: Performed by: DIETITIAN, REGISTERED

## 2022-06-29 NOTE — PROGRESS NOTES
Weight Management Medical Nutrition Assessment  presented for the New Start session with the [facility]   Today's weight is  182 65#       Per dietary recall patient consumes excess calories from food choices  Wants to do a partial meal replacement plan (rec 2 meal replacements due to protein needs)    We developed and reviewed a low calorie meal plan  Patient seen by Medical Provider in past 6 months:  yes  Requested to schedule appointment with Medical Provider: No      Anthropometric Measurements  Start Weight (#): 182 65  Current Weight (#): 182 65  TBW % Change from start weight:n/a  Ideal Body Weight (#):128 (BMI =25 due to age)  Goal Weight (#):150-160    Weight Loss History  Previous weight loss attempts: Counseling with  MD  Meal Replacements (Medifast, Slim Fast, etc )  Nutrition Counseling with RD    Food and Nutrition Related History  Wake up: 7-8a  Bed Time:11a    Food Recall  7-8a Coffee with cream  8:30-9a Breakfast:skips 2 days or PB toast (60 jagdeep bread), coffee with creamer   Snack:-  11:30-12p Lunch: home, leftovers ex spaghetti and meatballs or eggs with murphy and 1 slice toast OR salad (lf creamy ranch, spring mix, cucumbers, tomatoes, shredded carrots, grated cheese 1/4 cup)  drinks water  2:30-3p Snack: ice cream cup or bowl of chips  5-6p Dinner:fish with string beans and rice (protein  starch/veggies)  Snack: ice cream cup      Beverages: water and coffee/tea  Volume of beverage intake: 32oz water, 2-3 cups coffee daily (creamers)    Weekends: Same  Cravings: sweets or CHO  Trouble area of day:after dinner    Frequency of Eating out: irregularly  Food restrictions:none  Lives with her sister  Cooking: self and sister  Food Shopping: self    Physical Activity Intake  Activity:house work, activities of daily living  Frequency:daily  Physical limitations/barriers to exercise: knee surgery in [place], recently d/c from PT    Estimated Needs  Energy  SECA: [id number]   X 1 3 -1000 =834  [facility] [employer] Energy Needs: BMR : 5035   1-2# loss weekly sedentary:  818-419            1-2# loss weekly lightly active:693-1193  Maintenance calories for sedentary activity level: 1477  Protein:70-85   (1 2-1 5g/kg IBW); 1 0g/kg IBW= 58  Fluid: 68oz     (35mL/kg IBW)    Nutrition Diagnosis  Yes; Overweight/obesity  related to Excess energy intake as evidenced by  BMI more than normative standard for age and sex (obesity-grade II 35-39  9)       Nutrition Intervention    Nutrition Prescription  Calories:6370-9846  Protein:70-80g  Fluid:65oz    Meal Plan (Riky/Pro/Carb)  Breakfast:200/26-27  Snack:-  Lunch:200/26-27  Snack:100-150/5-10  Dinner:200-300/15-20  Snack:<200/0-5    Nutrition Education:    Healthy Core Manual  Calorie controlled menu  Lean protein food choices  Healthy snack options  Food journaling tips      Nutrition Counseling:  Strategies: meal planning, portion sizes, healthy snack choices, hydration, fiber intake, protein intake, exercise, food journal      Monitoring and Evaluation:  Evaluation criteria:  Energy Intake  Meet protein needs  Maintain adequate hydration  Monitor weekly weight  Meal planning/preparation  Food journal   Decreased portions at mealtimes and snacks  Physical activity     Barriers to learning:none  Readiness to change: Preparation:  (Getting ready to change)   Comprehension: very good  Expected Compliance: good

## 2022-07-07 ENCOUNTER — CLINICAL SUPPORT (OUTPATIENT)
Dept: BARIATRICS | Facility: CLINIC | Age: 77
End: 2022-07-07

## 2022-07-07 VITALS — HEIGHT: 60 IN | BODY MASS INDEX: 34.95 KG/M2 | WEIGHT: 178 LBS

## 2022-07-07 DIAGNOSIS — R63.5 ABNORMAL WEIGHT GAIN: Primary | ICD-10-CM

## 2022-07-07 PROCEDURE — RECHECK

## 2022-07-11 NOTE — PROGRESS NOTES
Weight Management Medical Nutrition Assessment  Janeth Birmingham is here today for 2 week [facility] f/u  Current Weight: *** #       presented for the [employer] with the [address] [place] [address]   Today's weight is  182 65#       Per dietary recall patient consumes excess calories from food choices  Wants to do a partial meal replacement plan (rec 2 meal replacements due to protein needs) We developed and reviewed a low calorie meal plan       Patient seen by Medical Provider in past 6 months:  yes  Requested to schedule appointment with Medical Provider***: No    Anthropometric Measurements  Start Weight (#): 182 65 # (6/29/22)  Current Weight (#): ***  TBW % Change from start weight: ***  Ideal Body Weight (#): 128# ([email or web address]; due to age)  Goal Weight (#): 150-160#    Weight Loss History  Previous weight loss attempts: Counseling with  MD  Meal Replacements (Medifast, Slim Fast, etc )  Nutrition Counseling with RD    Food and Nutrition Related History  Wake up: 7-8 am  Bed Time:11 pm    Food Recall***  7-8 am Coffee with cream  Breakfast (8:30-9 am): skips 2 days or PB toast (60 jagdeep bread), coffee with creamer   Snack: ***  Lunch (1130-12): leftovers OR salad (creamy ranch, spring mix, cucumbers, tomatoes, shredded carrots, 1/4 cup grated cheese)  Snack (230-3 pm): ice cream cup or bowl of chips  Dinner (5-6 pm): fish with string beans and rice (protein/starch/veggies)  Snack: ice cream cup    Beverages: water and coffee/tea  Volume of beverage intake: 32 ounces water, 2-3 cups coffee daily (w/ creamer)    Weekends: Same  Cravings: sweets or other carb  Trouble area of day: after dinner    Frequency of Eating out: irregularly  Food restrictions: none    Cooking: self and sister  Food Shopping: self  Lives with her sister    Physical Activity Intake***  Activity: house work, activities of daily living  Frequency:daily  Physical limitations/barriers to exercise: [facility], recently d/c from PT    Estimated Needs*** (NEEDS  65#)  Energy  SECA: 1,411 X 1 3 -1000 = 834 kcal  [employer] Energy Needs  BMR: 1,231 kcal  Maintenance calories for sedentary activity level: 1,477 kcal  1-2# loss weekly sedentary: 477-977 kcal  1-2# loss weekly lightly active: 693-1,193 kcal    Protein: 70-85 grams (1 2-1 5g/kg IBW); 1 0g/kg IBW= 58  Fluid: 68 ounces (35mL/kg IBW)    Nutrition Diagnosis  Yes; Overweight/obesity  related to Excess energy intake as evidenced by  BMI more than normative standard for age and sex (obesity-grade I 26-30  9)     Nutrition Intervention    Nutrition Prescription  Calories: 1,000-1,200 kcal  Protein: 70-80 grams  Fluid: 65 ounces    Meal Plan (Riky/Pro)  Breakfast:200/26-27  Snack:-  Lunch:200/26-27  Snack:100-150/5-10  Dinner:200-300/15-20  Snack:<200/0-5    Nutrition Education***:    Calorie controlled menu  Lean protein food choices  Healthy snack options  Food journaling tips    Nutrition Counseling***:  Strategies: meal planning, portion sizes, healthy snack choices, hydration, fiber intake, protein intake, exercise, food journal    Monitoring and Evaluation:  Evaluation criteria:  Energy Intake  Meet protein needs  Maintain adequate hydration  Monitor weekly weight  Meal planning/preparation  Food journal   Decreased portions at mealtimes and snacks  Physical activity     Barriers to learning:none  Readiness to change***: Preparation:  (Getting ready to change)   Comprehension: very good  Expected Compliance: good

## 2022-07-13 ENCOUNTER — OFFICE VISIT (OUTPATIENT)
Dept: BARIATRICS | Facility: CLINIC | Age: 77
End: 2022-07-13

## 2022-07-13 VITALS — HEIGHT: 60 IN | BODY MASS INDEX: 34.4 KG/M2 | WEIGHT: 175.2 LBS

## 2022-07-13 DIAGNOSIS — R63.5 ABNORMAL WEIGHT GAIN: Primary | ICD-10-CM

## 2022-07-13 PROCEDURE — RECHECK: Performed by: DIETITIAN, REGISTERED

## 2022-07-13 NOTE — PROGRESS NOTES
Weight Management Medical Nutrition Assessment  Chandler Tobar presented for the 2 week follow-up  session with the [facility]   Today's weight is 175 2#       She has lost 8 8# in the past month  She has made many dietary changes including interval eating, reducing portion sizes at snacks and meals, and food journaling  She stated sh is food logging and averaging 1000 -1200 calories daily  We reviewed a low calorie meal plan using partial meal replacements  Patient not meeting fluid needs will try to increase   Hunger at night - discussed second snack and prelogging            Patient seen by Medical Provider in past 6 months:  yes  Requested to schedule appointment with Medical Provider: No        Anthropometric Measurements  Start Weight (#): 182 65#  Current Weight (#): 175 2#  TBW % Change from start weight:5%  Ideal Body Weight (#):128 (BMI =25 due to age)  Goal Weight (#):150-160     Weight Loss History  Previous weight loss attempts: Counseling with  MD  Meal Replacements (Medifast, Slim Fast, etc )  Nutrition Counseling with RD     Food and Nutrition Related History  Wake up: 7-8a  Bed Time:11a     Food Recall    Breakfast:Shake / fruit   Coffee - creamer (70cal)   Snack:skip  Lunch:Soup   Snack:fruit or yogurt   Dinner:lean protein/ veggie/ carb   Snack: Protein Bar or rice cake with PB         Beverages: water and coffee/tea  Volume of beverage intake: 46oz water, 2 cups coffee daily (creamers- measured )     Weekends: Same  Cravings: sweets or CHO  Trouble area of day:after dinner     Frequency of Eating out: irregularly  Food restrictions:none  Lives with her sister  Cooking: self and sister  Food Shopping: self     Physical Activity Intake  Activity:house work, activities of daily living- walking 15-30 minutes   Frequency:daily  Physical limitations/barriers to exercise: [facility], recently d/c from PT     Estimated Needs  Energy  SECA: [id number]   X 1 3 -1000 =834  Janelle Cash8 Energy Needs: BMR : 1191 calories           1-2# loss weekly lightly active:638-1138 calories  Maintenance calories for sedentary activity level: 1429 calories   Protein:70-85   (1 2-1 5g/kg IBW); 1 0g/kg IBW= 58  Fluid: 58-68oz     (30-35mL/kg IBW)     Nutrition Diagnosis  Yes; Overweight/obesity  related to Excess energy intake as evidenced by  BMI more than normative standard for age and sex (obesity-grade II 35-39  9)     Nutrition Intervention     Nutrition Prescription  Calories:1428-9859 calories   Protein:70-80g  Fluid:65oz     Meal Plan (Riky/Pro/Carb)  Breakfast:200/26-27  Snack:-  Lunch:200/26-27  Snack:100-150/5-10  Dinner:200-300/15-20  Snack:<200/0-5     Nutrition Education:    Healthy Core Manual  Calorie controlled menu  Lean protein food choices  Healthy snack options  Food journaling tips        Nutrition Counseling:  Strategies: meal planning, portion sizes, healthy snack choices, hydration, fiber intake, protein intake, exercise, food journal        Monitoring and Evaluation:  Evaluation criteria:  Energy Intake  Meet protein needs  Maintain adequate hydration  Monitor weekly weight  Meal planning/preparation  Food journal   Decreased portions at mealtimes and snacks  Physical activity      Barriers to learning:none  Readiness to change: Preparation:  (Getting ready to change)   Comprehension: very good  Expected Compliance: good

## 2022-07-14 ENCOUNTER — CLINICAL SUPPORT (OUTPATIENT)
Dept: BARIATRICS | Facility: CLINIC | Age: 77
End: 2022-07-14

## 2022-07-14 VITALS — HEIGHT: 60 IN | WEIGHT: 175.2 LBS | BODY MASS INDEX: 34.4 KG/M2

## 2022-07-14 DIAGNOSIS — R63.5 ABNORMAL WEIGHT GAIN: Primary | ICD-10-CM

## 2022-07-14 DIAGNOSIS — F51.01 PRIMARY INSOMNIA: ICD-10-CM

## 2022-07-14 PROCEDURE — RECHECK

## 2022-07-14 RX ORDER — TRAZODONE HYDROCHLORIDE 50 MG/1
100 TABLET ORAL
Qty: 60 TABLET | Refills: 0 | Status: SHIPPED | OUTPATIENT
Start: 2022-07-14 | End: 2022-08-22 | Stop reason: SDUPTHER

## 2022-07-21 ENCOUNTER — CLINICAL SUPPORT (OUTPATIENT)
Dept: BARIATRICS | Facility: CLINIC | Age: 77
End: 2022-07-21

## 2022-07-21 VITALS — WEIGHT: 174.4 LBS | BODY MASS INDEX: 34.24 KG/M2 | HEIGHT: 60 IN

## 2022-07-21 DIAGNOSIS — R63.5 ABNORMAL WEIGHT GAIN: Primary | ICD-10-CM

## 2022-07-21 PROCEDURE — RECHECK

## 2022-07-25 ENCOUNTER — OFFICE VISIT (OUTPATIENT)
Dept: DERMATOLOGY | Facility: CLINIC | Age: 77
End: 2022-07-25
Payer: MEDICARE

## 2022-07-25 VITALS — TEMPERATURE: 97.7 F | WEIGHT: 174 LBS | BODY MASS INDEX: 34.16 KG/M2 | HEIGHT: 60 IN

## 2022-07-25 DIAGNOSIS — D48.5 NEOPLASM OF UNCERTAIN BEHAVIOR OF SKIN: ICD-10-CM

## 2022-07-25 DIAGNOSIS — L28.0 LICHEN SIMPLEX CHRONICUS: Primary | ICD-10-CM

## 2022-07-25 PROCEDURE — 11102 TANGNTL BX SKIN SINGLE LES: CPT | Performed by: DERMATOLOGY

## 2022-07-25 PROCEDURE — 99214 OFFICE O/P EST MOD 30 MIN: CPT | Performed by: DERMATOLOGY

## 2022-07-25 PROCEDURE — 88305 TISSUE EXAM BY PATHOLOGIST: CPT | Performed by: STUDENT IN AN ORGANIZED HEALTH CARE EDUCATION/TRAINING PROGRAM

## 2022-07-25 PROCEDURE — 11103 TANGNTL BX SKIN EA SEP/ADDL: CPT | Performed by: DERMATOLOGY

## 2022-07-25 RX ORDER — NITROFURANTOIN 25; 75 MG/1; MG/1
CAPSULE ORAL
COMMUNITY
Start: 2022-06-17 | End: 2022-10-19 | Stop reason: ALTCHOICE

## 2022-07-25 RX ORDER — FLUOCINONIDE 0.5 MG/G
OINTMENT TOPICAL 2 TIMES DAILY
Qty: 30 G | Refills: 1 | Status: SHIPPED | OUTPATIENT
Start: 2022-07-25

## 2022-07-25 NOTE — PROGRESS NOTES
[facility] Follow Up Note    Patient Name: Sanam Knowles  Encounter Date: 7/25/2022    Today's Chief Concerns:  Kelly Hdez Concern #1:  Skin lesion on right forearm and right neck     Concern #2:  Skin lesion on right behin ear    Concern #3:  Lump on upper back     Current Medications:    Current Outpatient Medications:     albuterol (Ventolin HFA) 90 mcg/act inhaler, Inhale 2 puffs every 6 (six) hours as needed for wheezing, Disp: 1 Inhaler, Rfl: 2    amLODIPine (NORVASC) 10 mg tablet, Take 1 tablet (10 mg total) by mouth daily, Disp: 90 tablet, Rfl: 1    atorvastatin (LIPITOR) 20 mg tablet, TAKE 1 TABLET BY MOUTH EVERY DAY, Disp: 90 tablet, Rfl: 1    Cholecalciferol (Vitamin D3) 1 25 MG (72452 UT) CAPS, Take 5,000 Units by mouth daily, Disp: , Rfl:     fluticasone (FLOVENT HFA) 110 MCG/ACT inhaler, Inhale 2 puffs 2 (two) times a day Rinse mouth after use , Disp: 12 g, Rfl: 1    levothyroxine 137 mcg tablet, Take 1 tablet (137 mcg total) by mouth in the morning , Disp: 30 tablet, Rfl: 5    lisinopril (ZESTRIL) 40 mg tablet, Take 1 tablet (40 mg total) by mouth daily, Disp: 90 tablet, Rfl: 0    metroNIDAZOLE (METROGEL) 1 % gel, Apply topically daily, Disp: 45 g, Rfl: 0    pantoprazole (PROTONIX) 40 mg tablet, Take 1 tablet (40 mg total) by mouth 2 (two) times a day, Disp: 60 tablet, Rfl: 5    sertraline (ZOLOFT) 100 mg tablet, Take 1 tablet (100 mg total) by mouth 2 (two) times a day, Disp: 60 tablet, Rfl: 2    tiotropium (Spiriva Respimat) 1 25 MCG/ACT AERS inhaler, Inhale 2 puffs daily, Disp: 4 g, Rfl: 1    traZODone (DESYREL) 50 mg tablet, Take 2 tablets (100 mg total) by mouth daily at bedtime, Disp: 60 tablet, Rfl: 0    Diclofenac Sodium (VOLTAREN) 1 %, Apply 2 g topically 4 (four) times a day for 15 days, Disp: 120 g, Rfl: 0    lidocaine (Lidoderm) 5 %, Apply 1 patch topically daily for 12 days Remove & Discard patch within 12 hours or as directed by MD, Disp: 12 patch, Rfl: 0   nitrofurantoin (MACROBID) 100 mg capsule, , Disp: , Rfl:     Sennosides 8 6 MG CAPS, Take 8 6 mg by mouth 2 (two) times a day (Patient not taking: No sig reported), Disp: , Rfl:     sucralfate (CARAFATE) 1 g/10 mL suspension, Take 10 mL (1 g total) by mouth 4 (four) times a day (with meals and at bedtime) (Patient not taking: Reported on 7/25/2022), Disp: 420 mL, Rfl: 0    CONSTITUTIONAL:   Vitals:    07/25/22 0916   Temp: 97 7 °F (36 5 °C)   TempSrc: Temporal   Weight: 78 9 kg (174 lb)   Height: 5' (1 524 m)         Specific Alerts:    Have you been seen by a [facility] D[facility]atologist in the last 3 years? YES, Dr Nicolas Nelson and then Dr Judd Ansari    Are you pregnant or planning to become pregnant? N/A    Are you currently or planning to be nursing or breast feeding? N/A    Allergies   Allergen Reactions    Ceftin [Cefuroxime] Hives       May we call your Preferred Phone number to discuss your specific medical information? YES    May we leave a detailed message that includes your specific medical information? YES    Have you traveled outside of the [facility] in the past 3 months? No    Do you currently have a pacemaker or defibrillator? No     Do you have any artificial heart valves, joints, plates, screws, rods, stents, pins, etc? YES, Bilateral knee replacement right knee in 2013 and left knee done in 2022  Patient had pins in neck (2015)    Do you require any medications prior to a surgical procedure? YES   - If Yes, for which procedure? Dental    - If Yes, what medications to you require? Antibiotic     Are you taking any medications that cause you to bleed more easily ("blood thinners") No    Have you ever experienced a rapid heartbeat with epinephrine? No      Review of Systems:  Have you recently had or currently have any of the following?     · Fever or chills: No  · Night Sweats: No  · Headaches: No  · Weight Gain: No  · Weight Loss: No  · Blurry Vision: No  · Nausea: No  · Vomiting: No  · Diarrhea: No  · Blood in Stool: No  · Abdominal Pain: No  · Itchy Skin: No  · Painful Joints: YES  · Swollen Joints: No  · Muscle Pain: No  · Irregular Mole: No  · Sun Burn: No  · Dry Skin: YES  · Skin Color Changes: No  · Scar or Keloid: No  · Cold Sores/Fever Blisters: No  · Bacterial Infections/MRSA: No  · Anxiety: No  · Depression: No  · Suicidal or Homicidal Thoughts: No      PSYCH: Normal mood and affect  EYES: Normal conjunctiva  ENT: Normal lips and oral mucosa  CARDIOVASCULAR: No edema  RESPIRATORY: Normal respirations  HEME/LYMPH/IMMUNO:  No regional lymphadenopathy except as noted below in ASSESSMENT AND PLAN BY DIAGNOSIS    FOCUSED ORGAN SYSTEM SKIN EXAM (SKIN)    Ears, Face Normal except as noted below in Assessment   Neck Normal except as noted below in Assessment   Right Arm Normal except as noted below in Assessment               Back Normal except as noted below in Assessment       NEOPLASM OF UNCERTAIN BEHAVIOR OF SKIN    Physical Exam:   (Anatomic Location); (Size and Morphological Description); (Differential Diagnosis):  o A: Right forearm; Skin; Shave Biopsy; 76year old female with a overlying scale; Differential Diagnosis: Prurigo nodularis versus Actinic Keratosis      o B: Postauricular; Skin; Shave Biopsy; 76year old female with coalescing pearly papules and comedones in Blaschkoid distribution; Differential Diagnosis: Nevus sebaceous versus Basal Cell Carcinoma versus Other   Pertinent Positives:   Pertinent Negatives: No regional LAD; no obvious skin cancers within Lesion B    Additional History of Present Condition:  Right forearm present for about 4-6 months  Right postauricular lesion present since childhood  Assessment and Plan:   I have discussed with the patient that a sample of skin via a "skin biopsy would be potentially helpful to further make a specific diagnosis under the microscope     Based on a thorough discussion of this condition and the management approach to it (including a comprehensive discussion of the known risks, side effects and potential benefits of treatment), the patient (family) agrees to implement the following specific plan:    o Procedure:  Skin Biopsy  After a thorough discussion of treatment options and risk/benefits/alternatives (including but not limited to local pain, scarring, dyspigmentation, blistering, possible superinfection, and inability to confirm a diagnosis via histopathology), verbal and written consent were obtained and portion of the rash was biopsied for tissue sample  See below for consent that was obtained from patient and subsequent Procedure Note  PROCEDURE TANGENTIAL (SHAVE) BIOPSY NOTE:     Performing Physician: Dr Jil Sanches Anatomic Location; Clinical Description with size (cm); Pre-Op Diagnosis:   o A: Right forearm; Skin; Shave Biopsy; 76year old female with a overlying scale; Differential Diagnosis: Prurigo nodularis versus Actinic Keratosis      o B: Postauricular; Skin; Shave Biopsy; 76year old female with crusted pearly papules and comedones; Differential Diagnosis: Nevus sebaceous versus Basal Cell Carcinoma versus Other     Post-op diagnosis: Same      Local anesthesia: 1% Lidocaine HCL      Topical anesthesia: None     Hemostasis: Aluminum chloride       After obtaining informed consent  at which time there was a discussion about the purpose of biopsy  and low risks of infection and bleeding  The area was prepped and draped in the usual fashion  Anesthesia was obtained with 1% lidocaine with epinephrine  A shave biopsy to an appropriate sampling depth was obtained by Shave (Dermablade or 15 blade) The resulting wound was covered with surgical ointment and bandaged appropriately  The patient tolerated the procedure well without complications and was without signs of functional compromise  Specimen has been sent for review by Dermatopathology      Standard post-procedure care has been explained and has been included in written form within the patient's copy of Informed Consent  INFORMED CONSENT DISCUSSION AND POST-OPERATIVE INSTRUCTIONS FOR PATIENT    I   RATIONALE FOR PROCEDURE  I understand that a skin biopsy allows the Dermatologist to test a lesion or rash under the microscope to obtain a diagnosis  It usually involves numbing the area with numbing medication and removing a small piece of skin; sometimes the area will be closed with sutures  In this specific procedure, sutures are not usually needed  If any sutures are placed, then they are usually need to be removed in 2 weeks or less  I understand that my Dermatologist recommends that a skin "shave" biopsy be performed today  A local anesthetic, similar to the kind that a dentist uses when filling a cavity, will be injected with a very small needle into the skin area to be sampled  The injected skin and tissue underneath "will go to sleep and become numb so no pain should be felt afterwards  An instrument shaped like a tiny "razor blade" (shave biopsy instrument) will be used to cut a small piece of tissue and skin from the area so that a sample of tissue can be taken and examined more closely under the microscope  A slight amount of bleeding will occur, but it will be stopped with direct pressure and a pressure bandage and any other appropriate methods  I understands that a scar will form where the wound was created  Surgical ointment will be applied to help protect the wound  Sutures are not usually needed      II   RISKS AND POTENTIAL COMPLICATIONS   I understand the risks and potential complications of a skin biopsy include but are not limited to the following:   Bleeding   Infection   Pain   Scar/keloid   Skin discoloration   Incomplete Removal   Recurrence   Nerve Damage/Numbness/Loss of Function   Allergic Reaction to Anesthesia   Biopsies are diagnostic procedures and based on findings additional treatment or evaluation may be required   Loss or destruction of specimen resulting in no additional findings    My Dermatologist has explained to me the nature of the condition, the nature of the procedure, and the benefits to be reasonably expected compared with alternative approaches  My Dermatologist has discussed the likelihood of major risks or complications of this procedure including the specific risks listed above, such as bleeding, infection, and scarring/keloid  I understand that a scar is expected after this procedure  I understand that my physician cannot predict if the scar will form a "keloid," which extends beyond the borders of the wound that is created  A keloid is a thick, painful, and bumpy scar  A keloid can be difficult to treat, as it does not always respond well to therapy, which includes injecting cortisone directly into the keloid every few weeks  While this usually reduces the pain and size of the scar, it does not eliminate it  I understand that photographs may be taken before and after the procedure  These will be maintained as part of the medical providers confidential records and may not be made available to me  I further authorize the medical provider to use the photographs for teaching purposes or to illustrate scientific papers, books, or lectures if in his/her judgment, medical research, education, or science may benefit from its use  I have had an opportunity to fully inquire about the risks and benefits of this procedure and its alternatives  I have been given ample time and opportunity to ask questions and to seek a second opinion if I wished to do so  I acknowledge that there have specifically been no guarantees as to the cosmetic results from the procedure  I am aware that with any procedure there is always the possibility of an unexpected complication  III   POST-PROCEDURAL CARE (WHAT YOU WILL NEED TO DO "AFTER THE BIOPSY" TO OPTIMIZE HEALING)     Keep the area clean and dry  Try NOT to remove the bandage or get it wet for the first 24 hours   Gently clean the area and apply surgical ointment (such as Vaseline petrolatum ointment, which is available "over the counter" and not a prescription) to the biopsy site for up to 2 weeks straight  This acts to protect the wound from the outside world   Sutures are not usually placed in this procedure  If any sutures were placed, return for suture removal as instructed (generally 1 week for the face, 2 weeks for the body)   Take Acetaminophen (Tylenol) for discomfort, if no contraindications  Ibuprofen or aspirin could make bleeding worse   Call our office immediately for signs of infection: fever, chills, increased redness, warmth, tenderness, discomfort/pain, or pus or foul smell coming from the wound  WHAT TO DO IF THERE IS ANY BLEEDING? If a small amount of bleeding is noticed, place a clean cloth over the area and apply firm pressure for ten minutes  Check the wound after 10 minutes of direct pressure  If bleeding persists, try one more time for an additional 10 minutes of direct pressure on the area  If the bleeding becomes heavier or does not stop after the second attempt, or if you have any other questions about this procedure, then please call your [facility]'s Dermatologist by calling [phone] (SKIN)  I hereby acknowledge that I have reviewed and verified the site with my Dermatologist and have requested and authorized my Dermatologist to proceed with the procedure  LICHEN SIMPLEX CHRONICUS    Physical Exam:   Anatomic Location Affected:  Right upper back    Morphological Description:  Hyperpigmented epidermis with tube-shaped thickening    Pertinent Positives:   Pertinent Negatives: Additional History of Present Condition:  Present since at least 2015  Itchy at times   Prescribed triamcinolone in past but never picked up prescription     Assessment and Plan:  Based on a thorough discussion of this condition and the management approach to it (including a comprehensive discussion of the known risks, side effects and potential benefits of treatment), the patient (family) agrees to implement the following specific plan:   Start Lidex ointment to upper back twice a day (ordered)   Discussed intralesional steroid injections as option for treatment, patient deferred at this time and would like to opt for topical therapy     Lichen simplex chronicus is a localized area of thickened skin from repeated rubbing, itching, and scratching of the skin  There may be a single or multiple well-circumscribed plaques on the skin  It is also called "neurodermatitis "    What causes lichen simplex chronicus? Although the mechanism is not understood, lichen simplex chronicus follows repetitive scratching and rubbing, which arises because of chronic localized itch  Lichen simplex occurs in adult males and females  It is unusual in children and is more common in people with anxiety and/or obsessive compulsive disorder  The primary itch can be due to:   Atopic eczema   Contact eczema   Venous eczema   Psoriasis   Lichen planus   Fungal infection   Insect bite   Neuropathy (radiculopathy) eg, brachioradial pruritus  The itching may involve alterations in the way the nervous system perceives and processes itchy sensations  Skin that tends toward eczematous conditions (eg, atopic dermatitis) is more prone to lichenification  What are the clinical features of lichen simplex? A solitary plaque of lichen simplex is circumscribed, somewhat linear or oval in shape, and markedly thickened  It is intensely itchy  Other features may include:   Exaggerated skin markings   Dry or scaly surface   Leathery induration   Broken-off hairs   Pigmentation   Scratch marks    Lichen simplex is often solitary and unilateral, usually affecting the patient's dominant side      Multiple areas of skin can also be involved with symmetrical or asymmetrical distribution  It mainly involves easily reached sites, most commonly the legs, arms, neck, upper trunk, and anal region  How do we diagnose lichen simplex chronicus? Diagnosis is usually done by history and skin examination  At times, it may be helpful to do some investigations  These would include:   Skin scrapings for possible tinea (fungal) infection   Skin biopsy  In the absence of known underlying skin problem or infection, you doctor may look for clues that it is due to nerve damage  In severe cases, the following tests may be performed (although they are often unhelpful):  Spinal imaging: X-rays, CT scans, MRI scans   Electrophysiological nerve conduction studies  It is important to understand that the itchy patch of skin is, at least in part, due to scratching and rubbing  Treatment addresses the symptoms and any underlying cause  Treatment of the lichen simplex may include:   Potent topical steroids until the plaque is resolved (4-6 weeks) -- covering the affected area a few hours after application may enhance efficacy   Reduce potency or frequency of topical steroids once lichenification has resolved   Steroid injections every 4-6 weeks   Coal tar preparations   Moisturizers to relieve dryness and reduce desire to scratch   Cooling creams containing menthol   Antihistamine or tricyclic antidepressant medications (eg, amitriptyline or nortriptyline), to help sleep      The primary condition needs treating; for example:   Antifungal agents for dermatophyte infection   Phototherapy and immunomodulatory medications for inflammatory skin conditions (oral corticosteroids, methotrexate, azathioprine or ciclosporin)   Tricyclic antidepressants (amitriptyline, nortriptyline, doxepin), other antidepressants (eg, duloxetine) or antiepileptic medications (valproate, lamotrigine, gabapentin) for nerve causes      Scribe Attestation    I,:  Dayana Guevara Earl Greene am acting as a scribe while in the presence of the attending physician :       I,:  Rohini Mcclellan MD personally performed the services described in this documentation    as scribed in my presence :         Lorena Grajeda MD  Dermatology, PGY-2

## 2022-07-25 NOTE — PATIENT INSTRUCTIONS
INFORMED CONSENT DISCUSSION AND POST-OPERATIVE INSTRUCTIONS FOR PATIENT    I   RATIONALE FOR PROCEDURE  I understand that a skin biopsy allows the Dermatologist to test a lesion or rash under the microscope to obtain a diagnosis  It usually involves numbing the area with numbing medication and removing a small piece of skin; sometimes the area will be closed with sutures  In this specific procedure, sutures are not usually needed  If any sutures are placed, then they are usually need to be removed in 2 weeks or less  I understand that my Dermatologist recommends that a skin "shave" biopsy be performed today  A local anesthetic, similar to the kind that a dentist uses when filling a cavity, will be injected with a very small needle into the skin area to be sampled  The injected skin and tissue underneath "will go to sleep and become numb so no pain should be felt afterwards  An instrument shaped like a tiny "razor blade" (shave biopsy instrument) will be used to cut a small piece of tissue and skin from the area so that a sample of tissue can be taken and examined more closely under the microscope  A slight amount of bleeding will occur, but it will be stopped with direct pressure and a pressure bandage and any other appropriate methods  I understands that a scar will form where the wound was created  Surgical ointment will be applied to help protect the wound  Sutures are not usually needed      II   RISKS AND POTENTIAL COMPLICATIONS   I understand the risks and potential complications of a skin biopsy include but are not limited to the following:  Bleeding  Infection  Pain  Scar/keloid  Skin discoloration  Incomplete Removal  Recurrence  Nerve Damage/Numbness/Loss of Function  Allergic Reaction to Anesthesia  Biopsies are diagnostic procedures and based on findings additional treatment or evaluation may be required  Loss or destruction of specimen resulting in no additional findings    My Dermatologist has explained to me the nature of the condition, the nature of the procedure, and the benefits to be reasonably expected compared with alternative approaches  My Dermatologist has discussed the likelihood of major risks or complications of this procedure including the specific risks listed above, such as bleeding, infection, and scarring/keloid  I understand that a scar is expected after this procedure  I understand that my physician cannot predict if the scar will form a "keloid," which extends beyond the borders of the wound that is created  A keloid is a thick, painful, and bumpy scar  A keloid can be difficult to treat, as it does not always respond well to therapy, which includes injecting cortisone directly into the keloid every few weeks  While this usually reduces the pain and size of the scar, it does not eliminate it  I understand that photographs may be taken before and after the procedure  These will be maintained as part of the medical providers confidential records and may not be made available to me  I further authorize the medical provider to use the photographs for teaching purposes or to illustrate scientific papers, books, or lectures if in his/her judgment, medical research, education, or science may benefit from its use  I have had an opportunity to fully inquire about the risks and benefits of this procedure and its alternatives  I have been given ample time and opportunity to ask questions and to seek a second opinion if I wished to do so  I acknowledge that there have specifically been no guarantees as to the cosmetic results from the procedure  I am aware that with any procedure there is always the possibility of an unexpected complication  III  POST-PROCEDURAL CARE (WHAT YOU WILL NEED TO DO "AFTER THE BIOPSY" TO OPTIMIZE HEALING)    Keep the area clean and dry  Try NOT to remove the bandage or get it wet for the first 24 hours      Gently clean the area and apply surgical ointment (such as Vaseline petrolatum ointment, which is available "over the counter" and not a prescription) to the biopsy site for up to 2 weeks straight  This acts to protect the wound from the outside world  Sutures are not usually placed in this procedure  If any sutures were placed, return for suture removal as instructed (generally 1 week for the face, 2 weeks for the body)  Take Acetaminophen (Tylenol) for discomfort, if no contraindications  Ibuprofen or aspirin could make bleeding worse  Call our office immediately for signs of infection: fever, chills, increased redness, warmth, tenderness, discomfort/pain, or pus or foul smell coming from the wound  WHAT TO DO IF THERE IS ANY BLEEDING? If a small amount of bleeding is noticed, place a clean cloth over the area and apply firm pressure for ten minutes  Check the wound after 10 minutes of direct pressure  If bleeding persists, try one more time for an additional 10 minutes of direct pressure on the area  If the bleeding becomes heavier or does not stop after the second attempt, or if you have any other questions about this procedure, then please call your [facility] Dermatologist by calling [phone] (SKIN)  I hereby acknowledge that I have reviewed and verified the site with my Dermatologist and have requested and authorized my Dermatologist to proceed with the procedure

## 2022-07-28 ENCOUNTER — CLINICAL SUPPORT (OUTPATIENT)
Dept: BARIATRICS | Facility: CLINIC | Age: 77
End: 2022-07-28

## 2022-07-28 VITALS — BODY MASS INDEX: 33.85 KG/M2 | HEIGHT: 60 IN | WEIGHT: 172.4 LBS

## 2022-07-28 DIAGNOSIS — I10 ESSENTIAL HYPERTENSION: ICD-10-CM

## 2022-07-28 DIAGNOSIS — R63.5 ABNORMAL WEIGHT GAIN: Primary | ICD-10-CM

## 2022-07-28 PROCEDURE — RECHECK

## 2022-07-28 RX ORDER — AMLODIPINE BESYLATE 10 MG/1
TABLET ORAL
Qty: 90 TABLET | Refills: 1 | OUTPATIENT
Start: 2022-07-28

## 2022-08-03 DIAGNOSIS — G89.29 CHRONIC LOW BACK PAIN WITHOUT SCIATICA, UNSPECIFIED BACK PAIN LATERALITY: Primary | ICD-10-CM

## 2022-08-03 DIAGNOSIS — M54.50 CHRONIC LOW BACK PAIN WITHOUT SCIATICA, UNSPECIFIED BACK PAIN LATERALITY: Primary | ICD-10-CM

## 2022-08-03 NOTE — RESULT ENCOUNTER NOTE
DERMATOPATHOLOGY RESULT NOTE    Results reviewed by ordering physician  Called patient to personally discuss results  No answer, left voicemail with result  Instructions for Clinical Derm Team:   (remember to route Result Note to appropriate staff):    None    Result & Plan by Specimen:    Specimen A: benign  Plan: reassured, benign    Specimen B: benign  Plan: reassured, benign and discussed that if the remaining lesion were to be bothersome to patient we would see her back in the office to discuss further removal options  Tissue Exam: [id number]  Order: [id number]  Status: Final result    Visible to patient: Yes (seen)    Dx: Neoplasm of uncertain behavior of skin    0 Result Notes    Component   Case Report  Surgical Pathology Report                         Case: [id number]                                   Authorizing Provider: Tian Flores MD           Collected:           07/25/2022 1025              Ordering Location:     [facility]      Received:            07/25/2022 [facility]                                     [place]                                                                       Pathologist:           Simona Alfaro MD                                                           Specimens:   A) - Skin, Other, Specimen A: Rigth Forearm; Shave                                                  B) - Skin, Other, Specimen B: Right Postauricular; Shave                                 Final Diagnosis  A  Skin, right forearm, shave biopsy: Consistent with prurigo nodule (see note)      Note: If the lesion were to progress/persist, additional sampling should be sought        B   Skin, right postauricular, shave biopsy: Consistent with multiple vellus hair cysts         Electronically signed by Simona Alfaro MD on 8/2/2022 at 11:43 AM  Additional Information   All reported additional testing was performed with appropriately reactive controls   These tests were developed and their performance characteristics determined by [facility] or appropriate performing facility, though some tests may be performed on tissues which have not been validated for performance characteristics (such as staining performed on alcohol exposed cell blocks and decalcified tissues)   Results should be interpreted with caution and in the context of the patients' clinical condition  These tests may not be cleared or approved by the [place]  Food and Drug Administration, though the FDA has determined that such clearance or approval is not necessary  These tests are used for clinical purposes and they should not be regarded as investigational or for research  This laboratory has been approved by [facility], designated as a high-complexity laboratory and is qualified to perform these tests  Gross Description     A  The specimen is received in formalin, labeled with the patient's name and hospital number, and is designated "right forearm shave"  The specimen consists of a tan skin shave measuring 0 6 x 0 6 x 0 1 cm  The epithelial surface exhibits a white papule measuring 0 5 x 0 5 x 0 2 cm which is less than 0 1 cm from the nearest peripheral margin  The apparent margin of resection is inked green  Trisected and entirely submitted between sponges in 1 cassette        B  The specimen is received in formalin, labeled with the patient's name and hospital number, and is designated "right postauricular"  The specimen consists of a tan-pink skin shave measuring 1 3 x 0 9 x 0 1 cm  The epithelial surface exhibits a pink rough appearing papule measuring 0 9 x 0 9 x 0 2 cm which abuts the nearest peripheral margin  The apparent margin of resection is inked green  Serially sectioned and entirely submitted between sponges in 1 cassette      Note: The estimated total formalin fixation time based upon information provided by the submitting clinician and the standard processing schedule is under 72 hours      Carlos Blanton    Clinical Information   ATTENTION:  DERMPATH GROUP     SPECIMEN A; Skin; Anatomic Location: Right Forearm; Procedure/Protocol: Skin Specimen (submit in FORMALIN):Tangential Biopsy (includes shave, scoop, saucerization, curette) (CPT 86277; each additional tangential biopsy is CPT 03626)   76y o  year old  Female with a Morphological Description:overlying scale   Differential Diagnosis and/or Specific Clinical Question: Prurigo nodularis versus Actinic Keratosis     SPECIMEN B; Skin; Anatomic Location: Right Postauricular; Procedure/Protocol: Skin Specimen (submit in FORMALIN):Tangential Biopsy (includes shave, scoop, saucerization, curette) (CPT 71291; each additional tangential biopsy is CPT 46289)   76y o  year old  Female with a Morphological Description: coalescing pearly papules and comedones  Differential Diagnosis and/or Specific Clinical Question: Nevus Sebaceous versus Basal Cell Carcinoma versus Other      Resulting Agency [facility]  Specimen Collected: 07/25/22 10:25 AM Last Resulted: 08/02/22 11:43 AM  Scans on Order [id number]  Lab Result Document - Document on 8/2/2022 11:43 AM

## 2022-08-04 ENCOUNTER — CLINICAL SUPPORT (OUTPATIENT)
Dept: BARIATRICS | Facility: CLINIC | Age: 77
End: 2022-08-04

## 2022-08-04 VITALS — BODY MASS INDEX: 33.73 KG/M2 | WEIGHT: 171.8 LBS | HEIGHT: 60 IN

## 2022-08-04 DIAGNOSIS — R63.5 ABNORMAL WEIGHT GAIN: Primary | ICD-10-CM

## 2022-08-04 PROCEDURE — RECHECK

## 2022-08-08 NOTE — PROGRESS NOTES
Name           Michael Dad    Today's weight 171 8    Behavioral Health Follow Up Note:  [facility]    Mental health and wellness -   Patient presents to the office today for a weight check and support visit  The patient explained she is learning a lot from her classes  When she resided on her own she was depressed and reported eating unhealthy because she did not care  The patient recognized this behavior and started to see a therapist which she reports  helped her through some difficult times and  family stressors  Two years ago her sister moved in with her and they support one another  The patient explained she is applying what she is learning from her classes to shape how she eats  The patient explained she takes sleep medication and is able to sleep well throughout the night  The patient explained she hydrates with 36 0z a daily and has two cups of coffee a day one in the morning and one before bed time  Reviewed daily eating habits and educated the patient on pre-planning meals to help her become more mindful of her food intake  The patient is a late night eater at times and reports not sure why because she has dinner  Sw educated her on activities to distract self to substitute late night eating, but explained  if she still feels physical hunger review list of healthy snacks  Educated the patient by tracking what she eats in the day can help monitor eating habits  The patient explained she walks 3 x a week for half an hour 15 minutes in the morning and 15 minutes in the late afternoon       Reviewed and discussed  Adequate hydration  Exercise  Meal planning and preparation  Lifestyle changes  Possible problems with poor eating habits  Techniques for self monitoring and keeping daily food journal

## 2022-08-11 ENCOUNTER — CLINICAL SUPPORT (OUTPATIENT)
Dept: BARIATRICS | Facility: CLINIC | Age: 77
End: 2022-08-11

## 2022-08-11 VITALS — HEIGHT: 60 IN | WEIGHT: 173.6 LBS | BODY MASS INDEX: 34.08 KG/M2

## 2022-08-11 DIAGNOSIS — R63.5 ABNORMAL WEIGHT GAIN: Primary | ICD-10-CM

## 2022-08-11 PROCEDURE — RECHECK

## 2022-08-11 NOTE — PROGRESS NOTES
Weight Management Medical Nutrition Assessment  Carla Palma presented for the 2 month follow-up  session with the [address] [place] [address]   Today's weight is 173#  Keren Lucero has lost 11# in the past 2 months from MD visit   She has made many dietary changes including interval eating, reducing portion sizes at snacks and meals, and food journaling  She stated she is not measuring and not planning meal  and but attempting to manually logging and averaging 1000 -1200 calories daily  We reviewed a low calorie meal plan using partial meal replacements  Patient not meeting fluid needs will try to increase  Hunger at night - discussed second snack and prelogging  Measure dinner meal            Patient seen by Medical Provider in past 6 months:  yes  Requested to schedule appointment with Medical Provider: No        Anthropometric Measurements  Start Weight (#): 182  65#    184 # ( 6/15/22 -MD)   205# (4/22)  Current Weight (#): 173#  TBW % Change from start weight:6 3 % overall from MD visit   Lefor Body Weight (#):128 (BMI =25 due to age)  Goal Weight (#):160#      Lowest: 145#     Weight Loss History  Previous weight loss attempts: Counseling with  MD  Meal Replacements (Medifast, Slim Fast, etc )  Nutrition Counseling with RD     Food and Nutrition Related History  Wake up: 7-8a  Bed Time:11a     Food Recall     Breakfast:Raisin Bran flakes - 1 cup with 1/2 cup milk    Coffee - creamer (35cal) x2  Snack:skip  Lunch:Soup   Snack:fruit or yogurt or protein ( 160-190 calories)   Dinner:lean protein/ veggie/ carb -   Snack: Protein Bar or fruit or ice cream         Beverages: water and coffee/tea  Volume of beverage intake: 40oz water, 2 cups coffee daily (creamers- measured )     Weekends: Same  Cravings: sweets or CHO  Trouble area of day:after dinner     Frequency of Eating out: irregularly  Food restrictions:none  Lives with her sister  Cooking: self and sister  Food Shopping: self     Physical Activity Intake  Activity:house work, activities of daily living- walking 15-30 minutes   Frequency:3-4x   Physical limitations/barriers to exercise: knee surgery in apirl, recently d/c from PT     Estimated Needs  Energy  SECA: [id number]   Z 1 3 -1000 =834  [employer] Energy Needs: BMR : 1191 calories           7-0# loss weekly lightly active:638-1138 calories  Maintenance calories for sedentary activity level: 1429 calories   Protein:70-85   (1 2-1 5g/kg IBW); 1 0g/kg IBW= 58  Fluid: 58-68oz     (30-35mL/kg IBW)     Nutrition Diagnosis  Yes;    Overweight/obesity  related to Excess energy intake as evidenced by  BMI more than normative standard for age and sex (obesity-grade I 35  8)     Nutrition Intervention     Nutrition Prescription  Calories:9547-6938 calories   Protein:70-80g  Fluid:65oz     Meal Plan (Riky/Pro/Carb)  Breakfast:200/26-27  Snack:-  Lunch:200/26-27  Snack:100-150/5-10  Dinner:200-300/15-20  Snack:<200/0-5     Nutrition Education:    Healthy Core Manual  Calorie controlled menu  Lean protein food choices  Healthy snack options  Food journaling tips        Nutrition Counseling:  Strategies: meal planning, portion sizes, healthy snack choices, hydration, fiber intake, protein intake, exercise, food journal        Monitoring and Evaluation:  Evaluation criteria:  Energy Intake  Meet protein needs  Maintain adequate hydration  Monitor weekly weight  Meal planning/preparation  Food journal   Decreased portions at mealtimes and snacks  Physical activity      Barriers to learning:none  Readiness to change: Preparation:  (Getting ready to change)   Comprehension: very good  Expected Compliance: good

## 2022-08-12 ENCOUNTER — OFFICE VISIT (OUTPATIENT)
Dept: BARIATRICS | Facility: CLINIC | Age: 77
End: 2022-08-12

## 2022-08-12 VITALS — BODY MASS INDEX: 33.55 KG/M2 | WEIGHT: 171.8 LBS

## 2022-08-12 DIAGNOSIS — R63.5 ABNORMAL WEIGHT GAIN: Primary | ICD-10-CM

## 2022-08-12 PROCEDURE — RECHECK

## 2022-08-15 ENCOUNTER — OFFICE VISIT (OUTPATIENT)
Dept: URGENT CARE | Facility: MEDICAL CENTER | Age: 77
End: 2022-08-15
Payer: MEDICARE

## 2022-08-15 VITALS
SYSTOLIC BLOOD PRESSURE: 142 MMHG | TEMPERATURE: 98.4 F | RESPIRATION RATE: 18 BRPM | OXYGEN SATURATION: 94 % | DIASTOLIC BLOOD PRESSURE: 62 MMHG | HEART RATE: 69 BPM

## 2022-08-15 DIAGNOSIS — R05.1 ACUTE COUGH: ICD-10-CM

## 2022-08-15 DIAGNOSIS — J01.90 ACUTE NON-RECURRENT SINUSITIS, UNSPECIFIED LOCATION: Primary | ICD-10-CM

## 2022-08-15 LAB
SARS-COV-2 AG UPPER RESP QL IA: NEGATIVE
VALID CONTROL: NORMAL

## 2022-08-15 PROCEDURE — G0463 HOSPITAL OUTPT CLINIC VISIT: HCPCS | Performed by: PHYSICIAN ASSISTANT

## 2022-08-15 PROCEDURE — 87811 SARS-COV-2 COVID19 W/OPTIC: CPT | Performed by: PHYSICIAN ASSISTANT

## 2022-08-15 PROCEDURE — 99213 OFFICE O/P EST LOW 20 MIN: CPT | Performed by: PHYSICIAN ASSISTANT

## 2022-08-15 RX ORDER — METHYLPREDNISOLONE 4 MG/1
TABLET ORAL
Qty: 1 EACH | Refills: 0 | Status: SHIPPED | OUTPATIENT
Start: 2022-08-15 | End: 2022-08-24 | Stop reason: ALTCHOICE

## 2022-08-15 RX ORDER — AZITHROMYCIN 250 MG/1
TABLET, FILM COATED ORAL
Qty: 6 TABLET | Refills: 0 | Status: SHIPPED | OUTPATIENT
Start: 2022-08-15 | End: 2022-08-19

## 2022-08-15 NOTE — PATIENT INSTRUCTIONS
Patient was educated on sinus infection and acute cough  Patient was prescribed steroids and antibiotics  Patient was educated on side effects  Patient was told any chest pain, shortness of breath go to ED  Patient understood  Sinusitis   WHAT YOU NEED TO KNOW:   Sinusitis is inflammation or infection of your sinuses  Sinusitis is most often caused by a virus  Acute sinusitis may last up to 12 weeks  Chronic sinusitis lasts longer than 12 weeks  Recurrent sinusitis means you have 4 or more infections in 1 year  DISCHARGE INSTRUCTIONS:   Return to the emergency department if:   You have trouble breathing or wheezing that is getting worse  You have a stiff neck, a fever, or a bad headache  You cannot open your eye  Your eyeball bulges out or you cannot move your eye  You are more sleepy than normal, or you notice changes in your ability to think, move, or talk  You have swelling of your forehead or scalp  Call your doctor if:   You have vision changes, such as double vision  Your eye and eyelid are red, swollen, and painful  Your symptoms do not improve or go away after 10 days  You have nausea and are vomiting  Your nose is bleeding  You have questions or concerns about your condition or care  Medicines: Your symptoms may go away on their own  Your healthcare provider may recommend watchful waiting for up to 10 days before starting antibiotics  You may need any of the following:  Acetaminophen  decreases pain and fever  It is available without a doctor's order  Ask how much to take and how often to take it  Follow directions  Read the labels of all other medicines you are using to see if they also contain acetaminophen, or ask your doctor or pharmacist  Acetaminophen can cause liver damage if not taken correctly  Do not use more than 4 grams (4,000 milligrams) total of acetaminophen in one day  NSAIDs , such as ibuprofen, help decrease swelling, pain, and fever  This medicine is available with or without a doctor's order  NSAIDs can cause stomach bleeding or kidney problems in certain people  If you take blood thinner medicine, always ask your healthcare provider if NSAIDs are safe for you  Always read the medicine label and follow directions  Nasal steroid sprays  may help decrease inflammation in your nose and sinuses  Decongestants  help reduce swelling and drain mucus in the nose and sinuses  They may help you breathe easier  Antihistamines  help dry mucus in the nose and relieve sneezing  Antibiotics  help treat or prevent a bacterial infection  Take your medicine as directed  Contact your healthcare provider if you think your medicine is not helping or if you have side effects  Tell him or her if you are allergic to any medicine  Keep a list of the medicines, vitamins, and herbs you take  Include the amounts, and when and why you take them  Bring the list or the pill bottles to follow-up visits  Carry your medicine list with you in case of an emergency  Self-care:   Rinse your sinuses as directed  Use a sinus rinse device to rinse your nasal passages with a saline (salt water) solution or distilled water  Do not use tap water  This will help thin the mucus in your nose and rinse away pollen and dirt  It will also help reduce swelling so you can breathe normally  Use a humidifier  to increase air moisture in your home  This may make it easier for you to breathe and help decrease your cough  Sleep with your head elevated  Place an extra pillow under your head before you go to sleep to help your sinuses drain  Drink liquids as directed  Ask your healthcare provider how much liquid to drink each day and which liquids are best for you  Liquids will thin the mucus in your nose and help it drain  Avoid drinks that contain alcohol or caffeine  Do not smoke, and avoid secondhand smoke    Nicotine and other chemicals in cigarettes and cigars can make your symptoms worse  Ask your healthcare provider for information if you currently smoke and need help to quit  E-cigarettes or smokeless tobacco still contain nicotine  Talk to your healthcare provider before you use these products  Prevent the spread of germs:   Wash your hands often with soap and water  Wash your hands after you use the bathroom, change a child's diaper, or sneeze  Wash your hands before you prepare or eat food  Stay away from people who are sick  Some germs spread easily and quickly through contact  Follow up with your doctor as directed: You may be referred to an ear, nose, and throat specialist  Write down your questions so you remember to ask them during your visits  © Copyright [employer] 2022 Information is for End User's use only and may not be sold, redistributed or otherwise used for commercial purposes  All illustrations and images included in CareNotes® are the copyrighted property of A D A M , Inc  or Formerly named [facility] Sanjuana Capps   The above information is an  only  It is not intended as medical advice for individual conditions or treatments  Talk to your doctor, nurse or pharmacist before following any medical regimen to see if it is safe and effective for you

## 2022-08-18 ENCOUNTER — OFFICE VISIT (OUTPATIENT)
Dept: BARIATRICS | Facility: CLINIC | Age: 77
End: 2022-08-18

## 2022-08-18 ENCOUNTER — CLINICAL SUPPORT (OUTPATIENT)
Dept: BARIATRICS | Facility: CLINIC | Age: 77
End: 2022-08-18

## 2022-08-18 VITALS — WEIGHT: 173.2 LBS | HEIGHT: 60 IN | BODY MASS INDEX: 34 KG/M2

## 2022-08-18 DIAGNOSIS — R63.5 ABNORMAL WEIGHT GAIN: Primary | ICD-10-CM

## 2022-08-18 PROCEDURE — RECHECK

## 2022-08-18 PROCEDURE — RECHECK: Performed by: DIETITIAN, REGISTERED

## 2022-08-22 DIAGNOSIS — F51.01 PRIMARY INSOMNIA: ICD-10-CM

## 2022-08-22 RX ORDER — TRAZODONE HYDROCHLORIDE 50 MG/1
100 TABLET ORAL
Qty: 60 TABLET | Refills: 0 | Status: SHIPPED | OUTPATIENT
Start: 2022-08-22 | End: 2022-10-05 | Stop reason: SDUPTHER

## 2022-08-24 ENCOUNTER — OFFICE VISIT (OUTPATIENT)
Dept: BARIATRICS | Facility: CLINIC | Age: 77
End: 2022-08-24
Payer: MEDICARE

## 2022-08-24 VITALS
HEIGHT: 61 IN | BODY MASS INDEX: 32.51 KG/M2 | SYSTOLIC BLOOD PRESSURE: 120 MMHG | DIASTOLIC BLOOD PRESSURE: 70 MMHG | WEIGHT: 172.2 LBS | HEART RATE: 64 BPM | RESPIRATION RATE: 16 BRPM

## 2022-08-24 DIAGNOSIS — Z91.89 AT RISK FOR SLEEP APNEA: ICD-10-CM

## 2022-08-24 DIAGNOSIS — E03.9 HYPOTHYROIDISM, UNSPECIFIED TYPE: ICD-10-CM

## 2022-08-24 DIAGNOSIS — Z12.31 ENCOUNTER FOR SCREENING MAMMOGRAM FOR MALIGNANT NEOPLASM OF BREAST: ICD-10-CM

## 2022-08-24 DIAGNOSIS — K21.9 GASTROESOPHAGEAL REFLUX DISEASE WITHOUT ESOPHAGITIS: ICD-10-CM

## 2022-08-24 DIAGNOSIS — F41.8 MIXED ANXIETY AND DEPRESSIVE DISORDER: ICD-10-CM

## 2022-08-24 DIAGNOSIS — E66.9 OBESITY, CLASS I, BMI 30-34.9: Primary | ICD-10-CM

## 2022-08-24 DIAGNOSIS — E78.2 MIXED HYPERLIPIDEMIA: ICD-10-CM

## 2022-08-24 DIAGNOSIS — I10 ESSENTIAL HYPERTENSION: ICD-10-CM

## 2022-08-24 PROBLEM — E66.811 OBESITY, CLASS I, BMI 30-34.9: Status: ACTIVE | Noted: 2022-06-15

## 2022-08-24 PROCEDURE — 99213 OFFICE O/P EST LOW 20 MIN: CPT | Performed by: NURSE PRACTITIONER

## 2022-08-24 NOTE — ASSESSMENT & PLAN NOTE
- Patient is pursuing [facility]ensive Lifestyle Intervention Program  - Initial weight loss goal of 5-10% weight loss for improved health  - Labs reviewed: Fasting insulin, A1C, and lipid panel 6/28/2022 and all were within acceptable range  - Not a GLP-1 candidate due to history of pancreatitis  - Avoid metformin given eGFR 55  - Avoid phentermine due to age and history of hypertension    - Denies history of glaucoma, kidney stones or seizures  Initial: 184 lbs  Current: 172 2 lbs  Change: -11 8 lbs  Goal: 150-160 lbs    Goals:  Do not skip meals  Get back to food logging  Increase water intake to at least 64 oz daily  Keep up the great work with exercise  Continue nutrition recommendations per dietician     Nutrition Prescription  Calories:9681-9729 calories   Protein:70-80g  Fluid:65oz

## 2022-08-24 NOTE — ASSESSMENT & PLAN NOTE
- Stop bang 4/8 last visit  - Sleep medicine referral placed last visit, but she does not want to schedule with them  - Risks of untreated sleep apnea reviewed, including increased risk for myocardial infarction and stroke, increased difficulty with weight loss, and risk of sudden cardiac death due to arrhythmia

## 2022-08-24 NOTE — PROGRESS NOTES
Assessment/Plan:     Obesity, Class I, BMI 30-34 9  - Patient is pursuing [facility] Lifestyle Intervention Program  - Initial weight loss goal of 5-10% weight loss for improved health  - Labs reviewed: Fasting insulin, A1C, and lipid panel 6/28/2022 and all were within acceptable range  - Not a GLP-1 candidate due to history of pancreatitis  - Avoid metformin given eGFR 55  - Avoid phentermine due to age and history of hypertension    - Denies history of glaucoma, kidney stones or seizures  Initial: 184 lbs  Current: 172 2 lbs  Change: -11 8 lbs  Goal: 150-160 lbs    Goals:  Do not skip meals  Get back to food logging  Increase water intake to at least 64 oz daily  Keep up the great work with exercise  Continue nutrition recommendations per dietician  Nutrition Prescription  Calories:9532-8395 calories   ZVXCCFT:16-94N  Fluid:65oz      At risk for sleep apnea  - Stop bang 4/8 last visit  - Sleep medicine referral placed last visit, but she does not want to schedule with them  - Risks of untreated sleep apnea reviewed, including increased risk for myocardial infarction and stroke, increased difficulty with weight loss, and risk of sudden cardiac death due to arrhythmia  Mixed anxiety and depressive disorder  - Taking sertraline and trazodone  Continue management with prescribing provider  Hyperlipidemia  - Taking atorvastatin  May improve with weight loss and lifestyle modification  Continue management with prescribing provider  Essential hypertension  - Taking norvasc and lisinopril  May improve with weight loss and lifestyle modification  Continue management with prescribing provider  Hypothyroidism  - Taking levothyroxine  Continue management with prescribing provider  Gastroesophageal reflux disease  - Taking protonix  May improve with weight loss and lifestyle modification  Continue management with prescribing provider              Lorene Hernandez was seen today for follow-up  Diagnoses and all orders for this visit:    Obesity, Class I, BMI 30-34 9    Encounter for screening mammogram for malignant neoplasm of breast  -     Mammo screening bilateral w 3d & cad; Future    At risk for sleep apnea    Mixed anxiety and depressive disorder    Mixed hyperlipidemia    Essential hypertension    Hypothyroidism, unspecified type    Gastroesophageal reflux disease without esophagitis        Follow up in approximately 2 months with Non-Surgical Physician/Advanced Practitioner  Subjective:   Chief Complaint   Patient presents with    Follow-up     MWM 2mth f/u; waist 41in       Patient ID: Rina Baumgarten  is a 76 y o  female with excess weight/obesity here to pursue weight management  Patient is pursuing [facility]    Most recent notes and records were reviewed  HPI    Wt Readings from Last 10 Encounters:   08/24/22 78 1 kg (172 lb 3 2 oz)   08/18/22 78 6 kg (173 lb 3 2 oz)   08/12/22 77 9 kg (171 lb 12 8 oz)   08/11/22 78 7 kg (173 lb 9 6 oz)   08/04/22 77 9 kg (171 lb 12 8 oz)   07/28/22 78 2 kg (172 lb 6 4 oz)   07/25/22 78 9 kg (174 lb)   07/21/22 79 1 kg (174 lb 6 4 oz)   07/14/22 79 5 kg (175 lb 3 2 oz)   07/13/22 79 5 kg (175 lb 3 2 oz)     Currently enrolled in [facility]  Feels that [employer] has been helpful  Was food logging, but getting over recent illness, hence has not been logging as much recently  Plans on getting back to logging  Was getting 900-1000 calories per day  Having some cravings       Hydration: 40 oz water, 2-3 cups of coffee with creamer  Alcohol: none  Exercise: walking 5-7 days per week for 30 minutes  Occupation: retired        B- protein shake or raisin bran with milk or egg and toast  S- none or protein bar or fruit or yogurt  L- soup or skips  S- protein bar or pudding or string cheese or veggies and dip  D- salad with protein and sometimes rice or beans  S- 100 calorie crackers or biscotti     Colonoscopy: due, has order, she will be scheduling  Mammogram: due, order placed     The following portions of the patient's history were reviewed and updated as appropriate: allergies, current medications, past family history, past medical history, past social history, past surgical history, and problem list     Review of Systems   HENT: Negative for sore throat  Respiratory: Positive for cough (improving since sinus infection) and shortness of breath (intermittent since recovering from sinus infection, walking without shortness of breath)  Cardiovascular: Negative for chest pain and palpitations  Gastrointestinal: Positive for constipation (chronic)  Negative for abdominal pain, diarrhea, nausea and vomiting  Denies GERD   Skin: Negative for rash  Psychiatric/Behavioral: Negative for suicidal ideas (or HI)  Denies depression and anxiety       Objective:  /70   Pulse 64   Resp 16   Ht 5' 0 75" (1 543 m)   Wt 78 1 kg (172 lb 3 2 oz)   Breastfeeding No   BMI 32 81 kg/m²     Physical Exam  Vitals and nursing note reviewed  Constitutional   General appearance: Abnormal   well developed and obese  Eyes No conjunctival injection  Ears, Nose, Mouth, and Throat Oral mucosa moist    Pulmonary   Respiratory effort: No increased work of breathing or signs of respiratory distress  Cardiovascular     Examination of extremities for edema and/or varicosities: Normal   no edema  Abdomen   Abdomen: Abnormal   The abdomen was obese      Musculoskeletal   Gait and station: Normal     Psychiatric   Orientation to person, place and time: Normal     Affect: appropriate

## 2022-08-25 ENCOUNTER — CLINICAL SUPPORT (OUTPATIENT)
Dept: BARIATRICS | Facility: CLINIC | Age: 77
End: 2022-08-25

## 2022-08-25 VITALS — BODY MASS INDEX: 32.55 KG/M2 | HEIGHT: 61 IN | WEIGHT: 172.4 LBS

## 2022-08-25 DIAGNOSIS — R63.5 ABNORMAL WEIGHT GAIN: Primary | ICD-10-CM

## 2022-08-25 PROCEDURE — RECHECK

## 2022-08-28 DIAGNOSIS — I10 ESSENTIAL HYPERTENSION: ICD-10-CM

## 2022-08-29 RX ORDER — LISINOPRIL 40 MG/1
TABLET ORAL
Qty: 90 TABLET | Refills: 0 | OUTPATIENT
Start: 2022-08-29

## 2022-09-01 ENCOUNTER — CLINICAL SUPPORT (OUTPATIENT)
Dept: BARIATRICS | Facility: CLINIC | Age: 77
End: 2022-09-01

## 2022-09-01 VITALS — HEIGHT: 61 IN | BODY MASS INDEX: 32.47 KG/M2 | WEIGHT: 172 LBS

## 2022-09-01 DIAGNOSIS — R63.5 ABNORMAL WEIGHT GAIN: Primary | ICD-10-CM

## 2022-09-01 PROCEDURE — RECHECK

## 2022-09-08 ENCOUNTER — CLINICAL SUPPORT (OUTPATIENT)
Dept: BARIATRICS | Facility: CLINIC | Age: 77
End: 2022-09-08

## 2022-09-08 VITALS — WEIGHT: 169 LBS | HEIGHT: 60 IN | BODY MASS INDEX: 33.18 KG/M2

## 2022-09-08 DIAGNOSIS — R63.5 ABNORMAL WEIGHT GAIN: Primary | ICD-10-CM

## 2022-09-08 PROCEDURE — RECHECK

## 2022-09-12 DIAGNOSIS — I10 ESSENTIAL HYPERTENSION: ICD-10-CM

## 2022-09-12 RX ORDER — AMLODIPINE BESYLATE 10 MG/1
10 TABLET ORAL DAILY
Qty: 90 TABLET | Refills: 1 | Status: SHIPPED | OUTPATIENT
Start: 2022-09-12

## 2022-09-12 NOTE — TELEPHONE ENCOUNTER
Medication refill requested: norvasc  Last office visit: 6/8/22  Next office visit: none  Last refilled: 1/25/22 #90x1   Labs: No  Ordering Provider: Temo Bello (select pharmacy send RX to):   [id number] Ignacio, [address] [address]  Phone: [phone] Fax: [phone]

## 2022-09-14 NOTE — PROGRESS NOTES
Weight Management Medical Nutrition Assessment  Yokasta presented for the 3 month follow-up  session with the [facility]   Today's weight is 169 8#   She has lost 3 2#  in the past month and overall has lost 14 2# in the past 3 months  Reevue indirect calorimter revealed REE is slow (-18%) compared to the predictive normal for someone her same age, height, and gender  Patient stated has skipped meals a good portion of her life and this could contribute to a decreased reading today  Currently she is often skipping lunch and just having a late afternoon snack  Recommended eating lunch or moving her afternoon snack closer to the lunch timeframe to help increase her metabolism and prevent rebound night hunger  She has made many dietary changes including interval eating, reducing portion sizes at snacks and meals, and food journaling   She stated she is not measuring and not planning meal and but attempting to manually logging and averaging 1000 -1200 calories daily    We reviewed a low calorie meal plan using partial meal replacements   Patient not meeting fluid needs will try to increase             Patient seen by Medical Provider in past 6 months:  yes  Requested to schedule appointment with Medical Provider: No        Anthropometric Measurements  Start Weight (#):  184 # ( 6/15/22 -MD)   205# (4/22)  Current Weight (#): 169 8#  TBW % Change from start weight:7 7 %   Ideal Body Weight (#): 100#  Goal Weight (#):160#      Lowest: 145#     Weight Loss History  Previous weight loss attempts: Counseling with  MD  Meal Replacements (Medifast, Slim Fast, etc )  Nutrition Counseling with KAYCE     Food and Nutrition Related History  Wake up: 7-8a  Bed Time:11a     Food Recall     Breakfast:Raisin Bran flakes - 1 cup with 1/2 cup milk    Shake   Coffee - creamer (35cal) x2  Snack:skip  Lunch:Soup or skip   Snack:fruit or yogurt or protein ( 160-190 calories)   Dinner:lean protein/ veggie/ carb - smaller portions  Snack: Protein Bar or fruit or ice cream   Wheat thins or popcorn - measured         Beverages: water and coffee/tea  Volume of beverage intake: 40oz water, 2 cups coffee daily (creamers- measured )     Weekends: Same  Cravings: sweets or CHO  Trouble area of day:after dinner     Frequency of Eating out: irregularly  Food restrictions:none  Lives with her sister  Cooking: self and sister  Food Shopping: self     Physical Activity Intake  Activity:house work, activities of daily living- walking 15-30 minutes   Frequency:Daily   Physical limitations/barriers to exercise: knee surgery in [facility], recently d/c from PT     Estimated Needs  Energy  Bear Vy Energy Needs: BMR : 2810 calories           1-0 5# loss weekly lightly active:4345-4562 calories  Maintenance calories for sedentary -lightly active level: 4395-7165 calories   Protein:70-85   (1 2-1 5g/kg IBW); 1 0g/kg IBW= 58  Fluid: 58-68oz     (30-35mL/kg IBW)     Nutrition Diagnosis  Yes;    Overweight/obesity  related to Excess energy intake as evidenced by  BMI more than normative standard for age and sex (obesity-grade I 35  1)     Nutrition Intervention     Nutrition Prescription  Calories:3686-1187 calories   Protein:70-80g  Fluid:65oz     Meal Plan (Riky/Pro/Carb)  Breakfast:200/26-27  Snack:-  Lunch:200/26-27  Snack:100-150/5-10  Dinner:200-300/15-20  Snack:<200/0-5     Nutrition Education:    [facility] Manual  Calorie controlled menu  Lean protein food choices  Healthy snack options  Food journaling tips        Nutrition Counseling:  Strategies: meal planning, portion sizes, healthy snack choices, hydration, fiber intake, protein intake, exercise, food journal        Monitoring and Evaluation:  Evaluation criteria:  Energy Intake  Meet protein needs  Maintain adequate hydration  Monitor weekly weight  Meal planning/preparation  Food journal   Decreased portions at mealtimes and snacks  Physical activity      Barriers to learning:none  Readiness to change: Preparation:  (Getting ready to change)   Comprehension: very good  Expected Compliance: good

## 2022-09-15 ENCOUNTER — CLINICAL SUPPORT (OUTPATIENT)
Dept: BARIATRICS | Facility: CLINIC | Age: 77
End: 2022-09-15

## 2022-09-15 VITALS — WEIGHT: 169.8 LBS | BODY MASS INDEX: 32.06 KG/M2 | HEIGHT: 61 IN

## 2022-09-15 DIAGNOSIS — R63.5 ABNORMAL WEIGHT GAIN: Primary | ICD-10-CM

## 2022-09-15 PROCEDURE — RECHECK

## 2022-09-16 ENCOUNTER — OFFICE VISIT (OUTPATIENT)
Dept: BARIATRICS | Facility: CLINIC | Age: 77
End: 2022-09-16

## 2022-09-16 VITALS — BODY MASS INDEX: 33.34 KG/M2 | WEIGHT: 169.8 LBS | HEIGHT: 60 IN

## 2022-09-16 DIAGNOSIS — R63.5 ABNORMAL WEIGHT GAIN: Primary | ICD-10-CM

## 2022-09-16 PROCEDURE — RECHECK: Performed by: DIETITIAN, REGISTERED

## 2022-09-29 ENCOUNTER — CLINICAL SUPPORT (OUTPATIENT)
Dept: BARIATRICS | Facility: CLINIC | Age: 77
End: 2022-09-29

## 2022-09-29 VITALS — BODY MASS INDEX: 33.06 KG/M2 | WEIGHT: 168.4 LBS | HEIGHT: 60 IN

## 2022-09-29 DIAGNOSIS — R63.5 ABNORMAL WEIGHT GAIN: Primary | ICD-10-CM

## 2022-09-29 PROCEDURE — RECHECK

## 2022-10-05 DIAGNOSIS — F51.01 PRIMARY INSOMNIA: ICD-10-CM

## 2022-10-05 RX ORDER — TRAZODONE HYDROCHLORIDE 50 MG/1
100 TABLET ORAL
Qty: 60 TABLET | Refills: 0 | Status: SHIPPED | OUTPATIENT
Start: 2022-10-05

## 2022-10-06 ENCOUNTER — CLINICAL SUPPORT (OUTPATIENT)
Dept: BARIATRICS | Facility: CLINIC | Age: 77
End: 2022-10-06

## 2022-10-06 VITALS — BODY MASS INDEX: 32.79 KG/M2 | HEIGHT: 60 IN | WEIGHT: 167 LBS

## 2022-10-06 DIAGNOSIS — R63.5 ABNORMAL WEIGHT GAIN: Primary | ICD-10-CM

## 2022-10-06 PROCEDURE — RECHECK

## 2022-10-12 NOTE — PROGRESS NOTES
Weight Management Medical Nutrition Assessment  Yokasta presented for a follow-up session with the [facility]   Today's weight is 169 3#   She has had a 0 5# weight loss in the past month and overall has lost 14 7# in the past 4 months  Patient stated she was down to 167# at last class (10/6) but experienced a gain this week related to increased calorie intake from snacking and decreased physical activity with recent surgery  She has not been food logging but stated she plans to resume  We reviewed a low calorie meal plan using partial meal replacements   Patient not meeting fluid needs will try to increase       Patient seen by Medical Provider in past 6 months:  yes  Requested to schedule appointment with Medical Provider: No        Anthropometric Measurements  Start Weight (#):  184 # ( 6/15/22 -MD)   205# (4/22)  Current Weight (#): 169 3#  TBW % Change from start weight:8%   Ideal Body Weight (#): 100#  Goal Weight (#):160#      Lowest: 145#     Weight Loss History  Previous weight loss attempts: Counseling with  MD  Meal Replacements (Medifast, Slim Fast, etc )  Nutrition Counseling with RD     Food and Nutrition Related History  Wake up: 7-8a  Bed Time:11a     Food Recall     Breakfast:Raisin Bran flakes - 1 cup with 1/2 cup milk    Shake   Coffee - creamer (35cal) x2  Snack:skip  Lunch:Diced apples and cheese and tomato   Snack:  Dinner:lean protein/ veggie/ carb - smaller portions  Snack: Ice cream/ sweets this past week         Beverages: water and coffee/tea  Volume of beverage intake: 40oz water, 2 cups coffee daily (creamers- measured )     Weekends: Same  Cravings: sweets or CHO  Trouble area of day:after dinner     Frequency of Eating out: irregularly  Food restrictions:none  Lives with her sister  Cooking: self and sister  Food Shopping: self     Physical Activity Intake  Activity:house work, activities of daily living- walking 15-30 minutes   Frequency:Daily   Physical limitations/barriers to exercise: [facility], recently d/c from PT     Estimated Needs  Energy  Bear Vy Energy Needs: BMR : 9317 calories           1-0 5# loss weekly lightly active:5996-9143 calories  Maintenance calories for sedentary -lightly active level: 6646-2076 calories   Protein:70-85   (1 2-1 5g/kg IBW); 1 0g/kg IBW= 58  Fluid: 58-68oz     (30-35mL/kg IBW)     Nutrition Diagnosis  Yes;    Overweight/obesity  related to Excess energy intake as evidenced by  BMI more than normative standard for age and sex (obesity-grade I 31  1)     Nutrition Intervention     Nutrition Prescription  Calories:0451-1399 calories   Protein:70-80g  Fluid:65oz     Meal Plan (Riky/Pro/Carb)  Breakfast:200/26-27  Snack:-  Lunch:200/26-27  Snack:100-150/5-10  Dinner:200-300/15-20  Snack:<200/0-5     Nutrition Education:    [facility] Manual  Calorie controlled menu  Lean protein food choices  Healthy snack options  Food journaling tips        Nutrition Counseling:  Strategies: meal planning, portion sizes, healthy snack choices, hydration, fiber intake, protein intake, exercise, food journal        Monitoring and Evaluation:  Evaluation criteria:  Energy Intake  Meet protein needs  Maintain adequate hydration  Monitor weekly weight  Meal planning/preparation  Food journal   Decreased portions at mealtimes and snacks  Physical activity      Barriers to learning:none  Readiness to change: Preparation:  (Getting ready to change)   Comprehension: very good  Expected Compliance: good

## 2022-10-13 ENCOUNTER — OFFICE VISIT (OUTPATIENT)
Dept: BARIATRICS | Facility: CLINIC | Age: 77
End: 2022-10-13

## 2022-10-13 VITALS — WEIGHT: 169.3 LBS | HEIGHT: 60 IN | BODY MASS INDEX: 33.24 KG/M2

## 2022-10-13 DIAGNOSIS — R63.5 ABNORMAL WEIGHT GAIN: Primary | ICD-10-CM

## 2022-10-13 PROCEDURE — RECHECK: Performed by: DIETITIAN, REGISTERED

## 2022-10-17 ENCOUNTER — CONSULT (OUTPATIENT)
Dept: PAIN MEDICINE | Facility: MEDICAL CENTER | Age: 77
End: 2022-10-17
Payer: MEDICARE

## 2022-10-17 ENCOUNTER — APPOINTMENT (OUTPATIENT)
Dept: RADIOLOGY | Facility: MEDICAL CENTER | Age: 77
End: 2022-10-17
Payer: MEDICARE

## 2022-10-17 VITALS
HEIGHT: 60 IN | WEIGHT: 169 LBS | BODY MASS INDEX: 33.18 KG/M2 | SYSTOLIC BLOOD PRESSURE: 156 MMHG | OXYGEN SATURATION: 93 % | HEART RATE: 58 BPM | DIASTOLIC BLOOD PRESSURE: 60 MMHG

## 2022-10-17 DIAGNOSIS — M54.2 NECK PAIN: Primary | ICD-10-CM

## 2022-10-17 DIAGNOSIS — G89.29 CHRONIC BILATERAL THORACIC BACK PAIN: ICD-10-CM

## 2022-10-17 DIAGNOSIS — M54.6 CHRONIC BILATERAL THORACIC BACK PAIN: ICD-10-CM

## 2022-10-17 DIAGNOSIS — M79.18 MYOFASCIAL PAIN SYNDROME: ICD-10-CM

## 2022-10-17 DIAGNOSIS — M54.2 NECK PAIN: ICD-10-CM

## 2022-10-17 PROCEDURE — 72050 X-RAY EXAM NECK SPINE 4/5VWS: CPT

## 2022-10-17 PROCEDURE — 72072 X-RAY EXAM THORAC SPINE 3VWS: CPT

## 2022-10-17 PROCEDURE — 99204 OFFICE O/P NEW MOD 45 MIN: CPT | Performed by: PHYSICAL MEDICINE & REHABILITATION

## 2022-10-17 NOTE — PROGRESS NOTES
Assessment  1  Neck pain    2  Chronic bilateral thoracic back pain    3  Myofascial pain syndrome        Plan  1  Obtain cervical and thoracic spine x-rays  2  Initiate physical therapy  3  Consider following up for trigger point injections into the trapezius region necessary  4  Continue with topical lidocaine and NSAID products    My impressions and treatment recommendations were discussed in detail with the patient who verbalized understanding and had no further questions  Discharge instructions were provided  I personally saw and examined the patient and I agree with the above discussed plan of care  Orders Placed This Encounter   Procedures   • XR spine cervical complete 4 or 5 vw non injury     Standing Status:   Future     Standing Expiration Date:   10/17/2026     Scheduling Instructions:      Bring along any outside films relating to this procedure  Order Specific Question:   Reason for Exam:     Answer:   chronic neck pain   • X-ray thoracic spine 2 views     Standing Status:   Future     Standing Expiration Date:   10/17/2026     Scheduling Instructions:      Bring along any outside films relating to this procedure  • Ambulatory referral to Physical Therapy     Standing Status:   Future     Standing Expiration Date:   10/17/2023     Referral Priority:   Routine     Referral Type:   Physical Therapy     Referral Reason:   Specialty Services Required     Requested Specialty:   Physical Therapy     Number of Visits Requested:   1     Expiration Date:   10/17/2023     No orders of the defined types were placed in this encounter  History of Present Illness    No Yeh is a 76 y o  female seen in consultation at the request of Dr Bianca South regarding chronic lower cervical and upper thoracic pain  Patient has been experiencing symptoms chronically and has had prior cervical spine surgery      She describes mild to moderate pain currently rated as a 4/10 which is occasional in typically worse in the morning, evening, and nighttime  She characterizes the pain as shooting sharp pins and needles as well as dull and aching  She denies any radicular features  Aggravating factors include bending sitting for long periods of time such as well doing a puzzle or drawing  Alleviating factors are unknown  No imaging is available of the cervical or thoracic spine regions  She has participated in physical therapy in the past with moderate relief and also tried heat or ice application with moderate relief  Social history negative for tobacco marijuana and alcohol use  Currently using diclofenac lidocaine acetaminophen and ibuprofen or naproxen  I have personally reviewed and/or updated the patient's past medical history, past surgical history, family history, social history, current medications, allergies, and vital signs today  Review of Systems   Constitutional: Negative for fever and unexpected weight change  HENT: Negative for trouble swallowing  Eyes: Negative for visual disturbance  Respiratory: Negative for shortness of breath and wheezing  Cardiovascular: Negative for chest pain and palpitations  Gastrointestinal: Negative for constipation, diarrhea, nausea and vomiting  Endocrine: Negative for cold intolerance, heat intolerance and polydipsia  Genitourinary: Negative for difficulty urinating and frequency  Musculoskeletal: Positive for back pain, gait problem, joint swelling and myalgias  Negative for arthralgias  Skin: Negative for rash  Neurological: Negative for dizziness, seizures, syncope, weakness and headaches  Hematological: Does not bruise/bleed easily  Psychiatric/Behavioral: Negative for dysphoric mood  All other systems reviewed and are negative        Patient Active Problem List   Diagnosis   • Carpal tunnel syndrome of right wrist   • Cataract   • Cervical radiculitis   • Vertigo   • Essential hypertension   • Gastroesophageal reflux disease   • Hyperlipidemia   • History of colonic polyps   • Injury of tendon of rotator cuff   • Mild persistent asthma without complication   • Mixed anxiety and depressive disorder   • Occipital neuralgia of left side   • Osteoarthritis of knee   • Postconcussion syndrome   • Hypothyroidism   • Cervical spinal stenosis   • Tinnitus, bilateral   • Ulnar nerve compression, right   • IFG (impaired fasting glucose)   • Rosacea   • History of gastric ulcer   • RUQ pain   • Intestinal metaplasia of gastric mucosa   • URI (upper respiratory infection)   • Near syncope   • Anemia   • Stage 3a chronic kidney disease (HCC)   • Obesity, Class I, BMI 30-34 9   • At risk for sleep apnea       Past Medical History:   Diagnosis Date   • Acid reflux    • Anxiety    • Arthritis    • Asthma    • C1-C4 level with central cord syndrome ([address] )     Resolved 2/1/2017    • Carpal tunnel syndrome Resolved 4/21/2015   • Colitis    • Colon polyp    • Concussion    • Depressed    • Dysthymic disorder     Resolved 1/5/2018   • Gastric ulcer 2013    small - on EGD - [facility]   • Hemorrhoids    • Hx of blood clots    • Hx of colonic polyps     [place]   • Hyperlipemia    • Hypertension    • Hypothyroid    • Lung nodule     stable x 2 yrs on CT   • Migraines    • Obesity    • Postural dizziness with presyncope     Resolved 8/28/2018    • Tendinitis    • Ulnar neuropathy     Resolved 5/21/2015        Past Surgical History:   Procedure Laterality Date   • APPENDECTOMY     • BREAST SURGERY Left 01/1966    BREAST LUMPECTOMY   • CARPAL TUNNEL RELEASE     • CHOLECYSTECTOMY     • DILATION AND CURETTAGE OF UTERUS     • ELBOW SURGERY     • EYE SURGERY Bilateral 2019    Cataract    • GALLBLADDER SURGERY     • JOINT REPLACEMENT Right     REPLACEMENT TOTAL KNEE   • KNEE ARTHROSCOPY     • KNEE SURGERY Right    • NECK SURGERY     • ORTHOPEDIC SURGERY     • POSTERIOR LAMINECTOMY / DECOMPRESSION CERVICAL SPINE  02/2015   • REPLACEMENT TOTAL KNEE Left 2022 • TONSILLECTOMY     • TOTAL KNEE ARTHROPLASTY Left 2022    [facility] Dr [facility]ett [facility]   • [address]      x3   • WRIST SURGERY Right        Family History   Problem Relation Age of Onset   • Breast cancer Mother    • Alzheimer's disease Mother    • Coronary artery disease Mother    • Hypertension Mother    • Migraines Mother    • Peripheral vascular disease Mother    • Arthritis Mother    • Cancer Mother    • Parkinsonism Father    • Other Father         Cardiovascular disease    • Coronary artery disease Father    • Hypertension Father    • Bipolar disorder Sister    • Thyroid disease Sister    • Alzheimer's disease Sister    • Depression Sister    • Asthma Daughter    • Asthma Son    • Throat cancer Maternal Grandfather    • Cancer Maternal Grandfather    • Diabetes Neg Hx    • Stroke Neg Hx        Social History     Occupational History   • Occupation: Retired    Tobacco Use   • Smoking status: Former Smoker     Packs/day: 0 00     Years: 30 00     Pack years: 0 00     Types: Cigarettes     Start date: 1983     Quit date: 10/14/2013     Years since quittin 0   • Smokeless tobacco: Never Used   Vaping Use   • Vaping Use: Never used   Substance and Sexual Activity   • Alcohol use: No   • Drug use: No   • Sexual activity: Not Currently     Birth control/protection: None       Current Outpatient Medications on File Prior to Visit   Medication Sig   • albuterol (Ventolin HFA) 90 mcg/act inhaler Inhale 2 puffs every 6 (six) hours as needed for wheezing   • amLODIPine (NORVASC) 10 mg tablet Take 1 tablet (10 mg total) by mouth daily   • Ascorbic Acid (VITAMIN C PO) Vitamin C   • atorvastatin (LIPITOR) 20 mg tablet Take 1 tablet (20 mg total) by mouth daily   • Cholecalciferol (Vitamin D3) 1 25 MG (81620 UT) CAPS Take 5,000 Units by mouth daily   • fluocinonide (LIDEX) 0 05 % ointment Apply topically 2 (two) times a day To thickened area of the left upper back  Use for two weeks straight, twice daily   After two weeks, use only on Monday through Friday  • fluticasone (FLOVENT HFA) 110 MCG/ACT inhaler Inhale 2 puffs 2 (two) times a day Rinse mouth after use  • levothyroxine 137 mcg tablet Take 1 tablet (137 mcg total) by mouth in the morning  • lisinopril (ZESTRIL) 40 mg tablet Take 1 tablet (40 mg total) by mouth daily   • metroNIDAZOLE (METROGEL) 1 % gel Apply topically daily   • Multiple Vitamin (MULTIVITAMIN ADULT PO) multivitamin   • nitrofurantoin (MACROBID) 100 mg capsule    • pantoprazole (PROTONIX) 40 mg tablet Take 1 tablet (40 mg total) by mouth 2 (two) times a day   • Sennosides 8 6 MG CAPS Take 8 6 mg by mouth 2 (two) times a day   • sertraline (ZOLOFT) 100 mg tablet Take 1 tablet (100 mg total) by mouth 2 (two) times a day   • sucralfate (CARAFATE) 1 g/10 mL suspension Take 10 mL (1 g total) by mouth 4 (four) times a day (with meals and at bedtime)   • tiotropium (Spiriva Respimat) 1 25 MCG/ACT AERS inhaler Inhale 2 puffs daily   • traZODone (DESYREL) 50 mg tablet Take 2 tablets (100 mg total) by mouth daily at bedtime   • Diclofenac Sodium (VOLTAREN) 1 % Apply 2 g topically 4 (four) times a day for 15 days     No current facility-administered medications on file prior to visit  Allergies   Allergen Reactions   • Ceftin [Cefuroxime] Hives       Physical Exam    /60   Pulse 58   Ht 5' (1 524 m)   Wt 76 7 kg (169 lb)   SpO2 93%   BMI 33 01 kg/m²     Constitutional: normal, well developed, well nourished, alert, in no distress and non-toxic and no overt pain behavior    Eyes: anicteric  HEENT: grossly intact  Neck: supple, symmetric, trachea midline and no masses   Pulmonary:even and unlabored  Cardiovascular:No edema or pitting edema present  Skin:Normal without rashes or lesions and well hydrated  Psychiatric:Mood and affect appropriate  Neurologic:Cranial Nerves II-XII grossly intact, bilateral upper extremity muscle stretch reflexes are absent, bilateral upper extremity strength is normal  Musculoskeletal:normal, except for tenderness to palpation along the cervical and thoracic paraspinal musculature reproducing pain complaint, she does have a somewhat forward head position and rounded shoulders    Imaging

## 2022-10-17 NOTE — PATIENT INSTRUCTIONS
Arthritis   WHAT YOU NEED TO KNOW:   Arthritis is pain or disease in one or more joints  There are many types of arthritis  Types such as rheumatoid arthritis cause inflammation in the joints  Other types wear away the cartilage between joints, such as osteoarthritis  This makes the bones of the joint rub together when you move the joint  An infection from bacteria, a virus, or a fungus can also cause arthritis  Your symptoms may be constant, or symptoms may come and go  Arthritis often gets worse over time and can cause permanent joint damage  DISCHARGE INSTRUCTIONS:   Call your doctor or rheumatologist if:   You have a fever and severe joint pain or swelling  You cannot move the affected joint  You have severe joint pain you cannot tolerate  You have a new or worsening rash  Your pain or swelling does not get better with treatment  You have questions or concerns about your condition or care  Medicines:   Acetaminophen  decreases pain and fever  It is available without a doctor's order  Ask how much to take and how often to take it  Follow directions  Read the labels of all other medicines you are using to see if they also contain acetaminophen, or ask your doctor or pharmacist  Acetaminophen can cause liver damage if not taken correctly  Do not use more than 4 grams (4,000 milligrams) total of acetaminophen in one day  NSAIDs , such as ibuprofen, help decrease swelling, pain, and fever  This medicine is available with or without a doctor's order  NSAIDs can cause stomach bleeding or kidney problems in certain people  If you take blood thinner medicine, always ask your healthcare provider if NSAIDs are safe for you  Always read the medicine label and follow directions  Steroids  reduce swelling and pain  Prescription pain medicine  may be given  Ask your healthcare provider how to take this medicine safely  Some prescription pain medicines contain acetaminophen   Do not take other medicines that contain acetaminophen without talking to your healthcare provider  Too much acetaminophen may cause liver damage  Prescription pain medicine may cause constipation  Ask your healthcare provider how to prevent or treat constipation  Take your medicine as directed  Contact your healthcare provider if you think your medicine is not helping or if you have side effects  Tell him of her if you are allergic to any medicine  Keep a list of the medicines, vitamins, and herbs you take  Include the amounts, and when and why you take them  Bring the list or the pill bottles to follow-up visits  Carry your medicine list with you in case of an emergency  Manage your symptoms:   Rest your painful joint so it can heal   Your healthcare provider may recommend crutches or a walker if the affected joint is in a leg  Apply ice or heat to the joint  Both can help decrease swelling and pain  Ice may also help prevent tissue damage  Use an ice pack, or put crushed ice in a plastic bag  Cover it with a towel and place it on your joint for 15 to 20 minutes every hour or as directed  You can apply heat for 20 minutes every 2 hours  Heat treatment includes hot packs or heat lamps  Elevate your joint  Elevation helps reduce swelling and pain  Raise your joint above the level of your heart as often as you can  Prop your painful joint on pillows to keep it above your heart comfortably  Manage arthritis:   Talk to your healthcare providers about your arthritis medicines  Some medicines may only be needed when you have arthritis pain  You may need to take other medicines every day to prevent arthritis from getting worse  Your healthcare providers will help you understand all your medicines and when to take them  It is important to take the medicines as directed, even if you start to feel better  You can continue to have joint damage and inflammation even if you do not feel it      Eat a variety of healthy foods   Healthy foods include fruits, vegetables, whole-grain breads, low-fat dairy products, beans, lean meats, and fish  Ask if you need to be on a special diet  A diet rich in calcium and vitamin D may decrease your risk of osteoporosis  Foods high in calcium include milk, cheese, broccoli, and tofu  Vitamin D may be found in meat, fish, fortified milk, cereal and bread  Ask if you need calcium or vitamin D supplements  Go to physical or occupational therapy as directed  A physical therapist can teach you exercises to improve flexibility and range of motion  You may also be shown non-weight-bearing exercises that are safe for your joints, such as swimming  Exercise can help keep your joints flexible and reduce pain  An occupational therapist can help you learn to do your daily activities when your joints are stiff or sore  Maintain a healthy weight  Extra weight puts increased pressure on your joints  Ask your healthcare provider what you should weigh  If you need to lose weight, he or she can help you create a weight loss program  Weight loss can help reduce pain and increase your ability to do your activities  Wear flat or low-heeled shoes  This will help decrease pain and reduce pressure on your ankle, knee, and hip joints  Do not smoke  Nicotine and other chemicals in cigarettes and cigars can damage your bones and joints  Ask your healthcare provider for information if you currently smoke and need help to quit  E-cigarettes or smokeless tobacco still contain nicotine  Talk to your healthcare provider before you use these products  Support devices:   Orthotic shoes or insoles  help support your feet when you walk  Crutches, a cane, or a walker  may help decrease your risk for falling  They also decrease stress on affected joints  Devices to prevent falls  include raised toilet seats and bathtub bars to help you get up from sitting   Handrails can be placed in areas where you need balance and support  Devices to help with support and rest  include splints to wear on your hands and a firm pillow while you sleep  Use a pillow that is firm enough to support your neck and head  Follow up with your healthcare provider or rheumatologist as directed:  Write down your questions so you remember to ask them during your visits  © Copyright [employer] 2022 Information is for End User's use only and may not be sold, redistributed or otherwise used for commercial purposes  All illustrations and images included in CareNotes® are the copyrighted property of [employer]  or [facility] [employer]   The above information is an  only  It is not intended as medical advice for individual conditions or treatments  Talk to your doctor, nurse or pharmacist before following any medical regimen to see if it is safe and effective for you

## 2022-10-19 ENCOUNTER — PATIENT MESSAGE (OUTPATIENT)
Dept: FAMILY MEDICINE CLINIC | Facility: CLINIC | Age: 77
End: 2022-10-19

## 2022-10-19 ENCOUNTER — OFFICE VISIT (OUTPATIENT)
Dept: BARIATRICS | Facility: CLINIC | Age: 77
End: 2022-10-19
Payer: MEDICARE

## 2022-10-19 VITALS
HEART RATE: 70 BPM | HEIGHT: 60 IN | BODY MASS INDEX: 33.24 KG/M2 | WEIGHT: 169.3 LBS | DIASTOLIC BLOOD PRESSURE: 60 MMHG | RESPIRATION RATE: 16 BRPM | SYSTOLIC BLOOD PRESSURE: 136 MMHG

## 2022-10-19 DIAGNOSIS — K21.9 GASTROESOPHAGEAL REFLUX DISEASE WITHOUT ESOPHAGITIS: ICD-10-CM

## 2022-10-19 DIAGNOSIS — F32.A DEPRESSION, UNSPECIFIED DEPRESSION TYPE: Primary | ICD-10-CM

## 2022-10-19 DIAGNOSIS — E03.9 HYPOTHYROIDISM, UNSPECIFIED TYPE: ICD-10-CM

## 2022-10-19 DIAGNOSIS — I10 ESSENTIAL HYPERTENSION: ICD-10-CM

## 2022-10-19 DIAGNOSIS — E78.2 MIXED HYPERLIPIDEMIA: ICD-10-CM

## 2022-10-19 DIAGNOSIS — F41.8 MIXED ANXIETY AND DEPRESSIVE DISORDER: ICD-10-CM

## 2022-10-19 DIAGNOSIS — E66.9 OBESITY, CLASS I, BMI 30-34.9: Primary | ICD-10-CM

## 2022-10-19 DIAGNOSIS — G47.33 OSA (OBSTRUCTIVE SLEEP APNEA): ICD-10-CM

## 2022-10-19 PROCEDURE — 99213 OFFICE O/P EST LOW 20 MIN: CPT | Performed by: NURSE PRACTITIONER

## 2022-10-19 RX ORDER — BUPROPION HYDROCHLORIDE 150 MG/1
150 TABLET ORAL EVERY MORNING
Qty: 30 TABLET | Refills: 1 | Status: SHIPPED | OUTPATIENT
Start: 2022-10-19

## 2022-10-19 NOTE — ASSESSMENT & PLAN NOTE
- Patient is pursuing [facility] after completing [facility]   - Initial weight loss goal of 5-10% weight loss for improved health  Had carpal tunnel surgery and regained about 2 lbs in the setting of that - not as active and was not watching diet as much  She is getting back on track  Tends to feel more hungry later in the day and she asked about weight loss medications  We discussed weight loss medications, but given her age and eGFR, would not recommend them, especially since she has been successfully losing with lifestyle modifications  Orlistat discussed, but she is understandably concerned about the side effect profile  Previously she was on Wellbutrin and now remains on zoloft  I have asked her to follow-up with her primary care provider to see if going back on wellbutrin is an option, as that can help with appetite  - Not a GLP-1 candidate due to history of pancreatitis  - I would avoid metformin given eGFR 55  - Avoid phentermine due to age and history of hypertension  Initial: 184 lbs  Current: 169 2 lbs BMI 33 06  Change: -14 8 lbs  Goal: 150-160 lbs    Goals:  Do not skip meals  Continue food logging  Increase water intake to at least 64 oz daily  Get back to walking 5-7 days per week for 20 minutes  Continue nutrition recommendations per dietician     Nutrition Prescription  Calories:6551-8069 calories   Protein:70-80g  Fluid:65oz

## 2022-10-19 NOTE — PROGRESS NOTES
Assessment/Plan:     Obesity, Class I, BMI 30-34 9  - Patient is pursuing [facility] after completing [facility]   - Initial weight loss goal of 5-10% weight loss for improved health  Had carpal tunnel surgery and regained about 2 lbs in the setting of that - not as active and was not watching diet as much  She is getting back on track  Tends to feel more hungry later in the day and she asked about weight loss medications  We discussed weight loss medications, but given her age and eGFR, would not recommend them, especially since she has been successfully losing with lifestyle modifications  Orlistat discussed, but she is understandably concerned about the side effect profile  Previously she was on Wellbutrin and now remains on zoloft  I have asked her to follow-up with her primary care provider to see if going back on wellbutrin is an option, as that can help with appetite  - Not a GLP-1 candidate due to history of pancreatitis  - I would avoid metformin given eGFR 55  - Avoid phentermine due to age and history of hypertension  Initial: 184 lbs  Current: 169 2 lbs BMI 33 06  Change: -14 8 lbs  Goal: 150-160 lbs    Goals:  Do not skip meals  Continue food logging  Increase water intake to at least 64 oz daily  Get back to walking 5-7 days per week for 20 minutes  Continue nutrition recommendations per dietician  Nutrition Prescription  Calories:3378-1048 calories   Protein:70-80g  Fluid:65oz    DIANA (obstructive sleep apnea)  - Reports history of DIANA, but not using CPAP   - Risks of untreated sleep apnea reviewed, including increased risk for myocardial infarction and stroke, increased difficulty with weight loss, and risk of sudden cardiac death due to arrhythmia   - Referral to sleep medicine recommended and she was agreeable  Essential hypertension  - Taking norvasc and lisinopril  May improve with weight loss and lifestyle modification  Continue management with prescribing provider  Hypothyroidism  - Taking levothyroxine  Continue management with prescribing provider  Gastroesophageal reflux disease  - Taking protonix  May improve with weight loss and lifestyle modification  Continue management with prescribing provider  Hyperlipidemia  - Taking atorvastatin  May improve with weight loss and lifestyle modification  Continue management with prescribing provider  Mixed anxiety and depressive disorder  - Taking sertraline and trazodone  Continue management with prescribing provider  Anusha Gaona was seen today for follow-up  Diagnoses and all orders for this visit:    Obesity, Class I, BMI 30-34 9    DIANA (obstructive sleep apnea)  -     Ambulatory referral to Sleep Medicine; Future    Essential hypertension    Hypothyroidism, unspecified type    Gastroesophageal reflux disease without esophagitis    Mixed hyperlipidemia    Mixed anxiety and depressive disorder            Follow up in approximately 2 months with Non-Surgical Physician/Advanced Practitioner  Subjective:   Chief Complaint   Patient presents with   • Follow-up     MWM 4mth f/u; waist 40 5in       Patient ID: Pascale Rowland  is a 76 y o  female with excess weight/obesity here to pursue weight management  Patient is pursuing [facility] and completing [facility]  Most recent notes and records were reviewed  HPI    Wt Readings from Last 10 Encounters:   10/19/22 76 8 kg (169 lb 4 8 oz)   10/17/22 76 7 kg (169 lb)   10/13/22 76 8 kg (169 lb 4 8 oz)   10/06/22 75 8 kg (167 lb)   09/29/22 76 4 kg (168 lb 6 4 oz)   09/16/22 77 kg (169 lb 12 8 oz)   09/15/22 77 kg (169 lb 12 8 oz)   09/08/22 76 7 kg (169 lb)   09/01/22 78 kg (172 lb)   08/25/22 78 2 kg (172 lb 6 4 oz)     Completed [facility] and is now in [facility]  Enjoying the program  Had left carpal tunnel surgery and regained about 2 lbs following that - not as active and was eating more, but is working on getting back on track  Has been food logging on paper  Getting around 1000 calories per day  Feels that she gets more hungry later in the day  Recalls being on Wellbutrin the past and no negative side effects        Hydration: 40 oz water, 2-3 cups of coffee with creamer  Alcohol: none  Exercise: walking 7 days per week for 20 minutes prior to recent surgery, working on getting back to that     Occupation: retired   Sleep: 7 hours  Has DIANA, does not wear CPAP          B- protein shake (200 calories)  S- none   L- salad with cheese or chicken with ranch dressing or protein shake or sandwich 647 bread with turkey with cheese and young - tries to keep under 1 T and veggies   S- none or protein bar  D- ground turkey hamburgers or turkey meatloaf or salmon or fish or steak or pork chops and veggies or salad sometimes 1/3 cup mashed potatoes or rice   S- raising and cheese and nuts or wheat thins and cheese or grapes or apples      Colonoscopy: due, has order, encouraged to schedule   Mammogram: due, has order, encouraged to schedule          The following portions of the patient's history were reviewed and updated as appropriate: allergies, current medications, past family history, past medical history, past social history, past surgical history, and problem list     Family History   Problem Relation Age of Onset   • Breast cancer Mother    • Alzheimer's disease Mother    • Coronary artery disease Mother    • Hypertension Mother    • Migraines Mother    • Peripheral vascular disease Mother    • Arthritis Mother    • Cancer Mother    • Parkinsonism Father    • Other Father         Cardiovascular disease    • Coronary artery disease Father    • Hypertension Father    • Bipolar disorder Sister    • Thyroid disease Sister    • Alzheimer's disease Sister    • Depression Sister    • Asthma Daughter    • Asthma Son    • Throat cancer Maternal Grandfather    • Cancer Maternal Grandfather    • Diabetes Neg Hx    • Stroke Neg Hx Review of Systems   HENT: Negative for sore throat  Respiratory: Negative for cough and shortness of breath  Cardiovascular: Negative for chest pain and palpitations  Gastrointestinal: Negative for abdominal pain, constipation, diarrhea, nausea and vomiting  Denies GERD   Skin: Negative for rash  Psychiatric/Behavioral: Negative for suicidal ideas (or HI)  + depression and anxiety somewhat controlled       Objective:  /60   Pulse 70   Resp 16   Ht 5' (1 524 m)   Wt 76 8 kg (169 lb 4 8 oz)   Breastfeeding No   BMI 33 06 kg/m²     Physical Exam  Vitals and nursing note reviewed  Constitutional   General appearance: Abnormal   well developed and obese  Eyes No conjunctival injection  Ears, Nose, Mouth, and Throat Oral mucosa moist    Pulmonary   Respiratory effort: No increased work of breathing or signs of respiratory distress  Cardiovascular     Examination of extremities for edema and/or varicosities: Normal   no edema  Abdomen   Abdomen: Abnormal   The abdomen was obese      Musculoskeletal   Normal range of motion  Neurological   Gait and station: Normal     Psychiatric   Orientation to person, place and time: Normal     Affect: appropriate

## 2022-10-20 ENCOUNTER — CLINICAL SUPPORT (OUTPATIENT)
Dept: BARIATRICS | Facility: CLINIC | Age: 77
End: 2022-10-20

## 2022-10-20 VITALS — WEIGHT: 171 LBS | HEIGHT: 60 IN | BODY MASS INDEX: 33.57 KG/M2

## 2022-10-20 DIAGNOSIS — R63.5 ABNORMAL WEIGHT GAIN: Primary | ICD-10-CM

## 2022-10-20 PROCEDURE — RECHECK

## 2022-10-20 NOTE — ASSESSMENT & PLAN NOTE
- Taking levothyroxine  Continue management with prescribing provider  Tarsorrhaphy Text: A tarsorrhaphy was performed using Frost sutures.

## 2022-10-24 ENCOUNTER — TELEPHONE (OUTPATIENT)
Dept: PAIN MEDICINE | Facility: MEDICAL CENTER | Age: 77
End: 2022-10-24

## 2022-10-24 NOTE — TELEPHONE ENCOUNTER
Caller: patient     Doctor: Remy Musa    Reason for call: Please reach back out to patient      Call back#: [phone]

## 2022-10-24 NOTE — TELEPHONE ENCOUNTER
----- Message from Mei Rayo DO sent at 10/24/2022  3:17 PM EDT -----  Age related degenerative changes are seen

## 2022-10-24 NOTE — TELEPHONE ENCOUNTER
Pt appreciative of same  Reviewed plan from consult 10/17 pt is going to start PT and is using lidocaine patches and NSAIDS

## 2022-10-24 NOTE — TELEPHONE ENCOUNTER
----- Message from Mei Rayo DO sent at 10/24/2022  3:16 PM EDT -----  Posterior spinal decompression and instrumented fusion spanning C3-C7, unchanged in alignment

## 2022-10-26 ENCOUNTER — OFFICE VISIT (OUTPATIENT)
Dept: URGENT CARE | Facility: MEDICAL CENTER | Age: 77
End: 2022-10-26
Payer: MEDICARE

## 2022-10-26 VITALS
TEMPERATURE: 98 F | WEIGHT: 168 LBS | HEART RATE: 57 BPM | BODY MASS INDEX: 32.98 KG/M2 | DIASTOLIC BLOOD PRESSURE: 70 MMHG | HEIGHT: 60 IN | RESPIRATION RATE: 20 BRPM | SYSTOLIC BLOOD PRESSURE: 154 MMHG | OXYGEN SATURATION: 96 %

## 2022-10-26 DIAGNOSIS — R42 VERTIGO: Primary | ICD-10-CM

## 2022-10-26 DIAGNOSIS — R68.83 CHILLS (WITHOUT FEVER): ICD-10-CM

## 2022-10-26 LAB
SARS-COV-2 AG UPPER RESP QL IA: NEGATIVE
VALID CONTROL: NORMAL

## 2022-10-26 PROCEDURE — 93005 ELECTROCARDIOGRAM TRACING: CPT

## 2022-10-26 PROCEDURE — 87811 SARS-COV-2 COVID19 W/OPTIC: CPT

## 2022-10-26 PROCEDURE — 87636 SARSCOV2 & INF A&B AMP PRB: CPT

## 2022-10-26 PROCEDURE — G0463 HOSPITAL OUTPT CLINIC VISIT: HCPCS

## 2022-10-26 PROCEDURE — 99213 OFFICE O/P EST LOW 20 MIN: CPT

## 2022-10-26 NOTE — PATIENT INSTRUCTIONS
Hydration and rest   Recommend flonase 1 spray daily in each nostril for nasal congestion  Acetaminophen  for pain relief and fever reduction  COVID/influenza testing  Use the [facility] MyChart to obtain lab results  PCP follow up in 3-5 days  Go to an emergency department if difficulty breathing occurs or if symptoms worsen

## 2022-10-26 NOTE — PROGRESS NOTES
[id number]        NAME: Reilly Urban is a 76 y o  female  : 1945    MRN: [id number]  DATE: 2022  TIME: 11:55 AM      Assessment and Plan     Vertigo [R42]  1  Vertigo  ECG 12 lead   2  Chills (without fever)  Poct Covid 19 Rapid Antigen Test    Covid19 and INFLUENZA A/B PCR     Rapid covid negative    Patient agreeable to covid/influenza PCR  EKG shows NSR rate 60 bpm  No acute ST abnormalities noted  Patient Instructions       Hydration and rest   Recommend flonase 1 spray daily in each nostril for nasal congestion  Acetaminophen  for pain relief and fever reduction  COVID/influenza testing  Use the [facility] to obtain [facility] results  PCP follow up in 3-5 days  Go to an emergency department if difficulty breathing occurs or if symptoms worsen  Chief Complaint     Chief Complaint   Patient presents with   • Cold Like Symptoms     Patient presents with Monday with congestion, nausea, headache,ear pain  No covid test   Patietn has fatigue and dizziness         History of Present Illness     Patient is a 64-year-old female who presents with nasal congestion, chills, ear pair and headache for three days  Reports episodes of dizziness when she lies down  States she lied down last night and the room was spinning  Reports history of vertigo  States she took meclizine at that time and the dizziness resolved  Denies vomiting or diarrhea  Denies cough  Denies CP or SOB  Denies DM history  Review of Systems     Review of Systems   Constitutional: Positive for chills  Negative for fever  HENT: Positive for congestion and ear pain  Negative for sinus pressure, sinus pain and sore throat  Respiratory: Negative for cough and shortness of breath  Cardiovascular: Negative for chest pain  Gastrointestinal: Positive for nausea  Negative for abdominal pain, diarrhea and vomiting  Neurological: Positive for dizziness (room spinning) and headaches     All other systems reviewed and are negative  Current Medications       Current Outpatient Medications:   •  albuterol (Ventolin HFA) 90 mcg/act inhaler, Inhale 2 puffs every 6 (six) hours as needed for wheezing, Disp: 1 Inhaler, Rfl: 2  •  amLODIPine (NORVASC) 10 mg tablet, Take 1 tablet (10 mg total) by mouth daily, Disp: 90 tablet, Rfl: 1  •  Ascorbic Acid (VITAMIN C PO), Vitamin C, Disp: , Rfl:   •  atorvastatin (LIPITOR) 20 mg tablet, Take 1 tablet (20 mg total) by mouth daily, Disp: 90 tablet, Rfl: 1  •  buPROPion (Wellbutrin XL) 150 mg 24 hr tablet, Take 1 tablet (150 mg total) by mouth every morning, Disp: 30 tablet, Rfl: 1  •  Cholecalciferol (Vitamin D3) 1 25 MG (15614 UT) CAPS, Take 5,000 Units by mouth daily, Disp: , Rfl:   •  Diclofenac Sodium (VOLTAREN) 1 %, Apply 2 g topically 4 (four) times a day for 15 days, Disp: 120 g, Rfl: 0  •  fluocinonide (LIDEX) 0 05 % ointment, Apply topically 2 (two) times a day To thickened area of the left upper back  Use for two weeks straight, twice daily   After two weeks, use only on Monday through Friday , Disp: 30 g, Rfl: 1  •  fluticasone (FLOVENT HFA) 110 MCG/ACT inhaler, Inhale 2 puffs 2 (two) times a day Rinse mouth after use , Disp: 12 g, Rfl: 1  •  levothyroxine 137 mcg tablet, Take 1 tablet (137 mcg total) by mouth in the morning , Disp: 30 tablet, Rfl: 5  •  lisinopril (ZESTRIL) 40 mg tablet, Take 1 tablet (40 mg total) by mouth daily, Disp: 90 tablet, Rfl: 0  •  metroNIDAZOLE (METROGEL) 1 % gel, Apply topically daily, Disp: 45 g, Rfl: 0  •  Multiple Vitamin (MULTIVITAMIN ADULT PO), multivitamin, Disp: , Rfl:   •  pantoprazole (PROTONIX) 40 mg tablet, Take 1 tablet (40 mg total) by mouth 2 (two) times a day, Disp: 60 tablet, Rfl: 5  •  Sennosides 8 6 MG CAPS, Take 8 6 mg by mouth 2 (two) times a day, Disp: , Rfl:   •  sertraline (ZOLOFT) 100 mg tablet, Take 1 tablet (100 mg total) by mouth 2 (two) times a day, Disp: 60 tablet, Rfl: 5  •  tiotropium (Spiriva Respimat) 1 25 MCG/ACT AERS inhaler, Inhale 2 puffs daily, Disp: 4 g, Rfl: 1  •  traZODone (DESYREL) 50 mg tablet, Take 2 tablets (100 mg total) by mouth daily at bedtime, Disp: 60 tablet, Rfl: 0    Current Allergies     Allergies as of 10/26/2022 - Reviewed 10/26/2022   Allergen Reaction Noted   • Ceftin [cefuroxime] Hives 11/27/2018              The following portions of the patient's history were reviewed and updated as appropriate: allergies, current medications, past family history, past medical history, past social history, past surgical history and problem list      Past Medical History:   Diagnosis Date   • Acid reflux    • Anxiety    • Arthritis    • Asthma    • C1-C4 level with central cord syndrome ([address] )     Resolved 2/1/2017    • Carpal tunnel syndrome Resolved 4/21/2015   • Colitis    • Colon polyp    • Concussion    • Depressed    • Dysthymic disorder     Resolved 1/5/2018   • Gastric ulcer 2013    small - on EGD - [facility]   • Hemorrhoids    • Hx of blood clots    • Hx of colonic polyps     [place]   • Hyperlipemia    • Hypertension    • Hypothyroid    • Lung nodule     stable x 2 yrs on CT   • Migraines    • Obesity    • Postural dizziness with presyncope     Resolved 8/28/2018    • Tendinitis    • Ulnar neuropathy     Resolved 5/21/2015        Past Surgical History:   Procedure Laterality Date   • APPENDECTOMY     • BREAST SURGERY Left 01/1966    BREAST LUMPECTOMY   • CARPAL TUNNEL RELEASE     • CHOLECYSTECTOMY     • DILATION AND CURETTAGE OF UTERUS     • ELBOW SURGERY     • EYE SURGERY Bilateral 2019    Cataract    • GALLBLADDER SURGERY     • JOINT REPLACEMENT Right     REPLACEMENT TOTAL KNEE   • KNEE ARTHROSCOPY     • KNEE SURGERY Right    • NECK SURGERY     • ORTHOPEDIC SURGERY     • POSTERIOR LAMINECTOMY / DECOMPRESSION CERVICAL SPINE  02/2015   • REPLACEMENT TOTAL KNEE Left 2022   • TONSILLECTOMY     • TOTAL KNEE ARTHROPLASTY Left 04/06/2022    [facility] Dr Jerardo Quintana   • VAGINAL DELIVERY      x3   • WRIST SURGERY Right        Family History   Problem Relation Age of Onset   • Breast cancer Mother    • Alzheimer's disease Mother    • Coronary artery disease Mother    • Hypertension Mother    • Migraines Mother    • Peripheral vascular disease Mother    • Arthritis Mother    • Cancer Mother    • Parkinsonism Father    • Other Father         Cardiovascular disease    • Coronary artery disease Father    • Hypertension Father    • Bipolar disorder Sister    • Thyroid disease Sister    • Alzheimer's disease Sister    • Depression Sister    • Asthma Daughter    • Asthma Son    • Throat cancer Maternal Grandfather    • Cancer Maternal Grandfather    • Diabetes Neg Hx    • Stroke Neg Hx          Medications have been verified  Objective     /70   Pulse 57   Temp 98 °F (36 7 °C)   Resp 20   Ht 5' (1 524 m)   Wt 76 2 kg (168 lb)   SpO2 96%   BMI 32 81 kg/m²   No LMP recorded  Patient is postmenopausal          Physical Exam     Physical Exam  Vitals and nursing note reviewed  Constitutional:       General: She is awake  She is not in acute distress  Appearance: Normal appearance  She is not ill-appearing, toxic-appearing or diaphoretic  HENT:      Right Ear: Tympanic membrane, ear canal and external ear normal  There is no impacted cerumen  Left Ear: Tympanic membrane, ear canal and external ear normal  There is no impacted cerumen  Nose: Congestion present  Mouth/Throat:      Lips: Pink  Mouth: Mucous membranes are moist       Pharynx: Oropharynx is clear  Eyes:      Pupils: Pupils are equal, round, and reactive to light  Cardiovascular:      Rate and Rhythm: Normal rate  Pulses: Normal pulses  Heart sounds: Normal heart sounds, S1 normal and S2 normal    Pulmonary:      Effort: Pulmonary effort is normal       Breath sounds: Normal breath sounds and air entry  Skin:     General: Skin is warm        Capillary Refill: Capillary refill takes less than 2 seconds  Neurological:      General: No focal deficit present  Mental Status: She is alert and oriented to person, place, and time  GCS: GCS eye subscore is 4  GCS verbal subscore is 5  GCS motor subscore is 6  Cranial Nerves: No cranial nerve deficit  Sensory: No sensory deficit  Motor: No weakness  Coordination: Coordination is intact  Gait: Gait is intact  Psychiatric:         Mood and Affect: Mood normal          Behavior: Behavior normal          Thought Content:  Thought content normal          Judgment: Judgment normal

## 2022-10-29 DIAGNOSIS — F32.A DEPRESSION, UNSPECIFIED DEPRESSION TYPE: ICD-10-CM

## 2022-10-29 DIAGNOSIS — F51.01 PRIMARY INSOMNIA: ICD-10-CM

## 2022-10-31 ENCOUNTER — EVALUATION (OUTPATIENT)
Dept: PHYSICAL THERAPY | Facility: MEDICAL CENTER | Age: 77
End: 2022-10-31

## 2022-10-31 DIAGNOSIS — G89.29 CHRONIC BILATERAL THORACIC BACK PAIN: ICD-10-CM

## 2022-10-31 DIAGNOSIS — M54.2 NECK PAIN: ICD-10-CM

## 2022-10-31 DIAGNOSIS — M79.18 MYOFASCIAL PAIN SYNDROME: ICD-10-CM

## 2022-10-31 DIAGNOSIS — M54.6 CHRONIC BILATERAL THORACIC BACK PAIN: ICD-10-CM

## 2022-10-31 RX ORDER — BUPROPION HYDROCHLORIDE 150 MG/1
150 TABLET ORAL EVERY MORNING
Qty: 30 TABLET | Refills: 0 | Status: SHIPPED | OUTPATIENT
Start: 2022-10-31

## 2022-10-31 RX ORDER — TRAZODONE HYDROCHLORIDE 50 MG/1
100 TABLET ORAL
Qty: 60 TABLET | Refills: 0 | Status: SHIPPED | OUTPATIENT
Start: 2022-10-31

## 2022-10-31 NOTE — TELEPHONE ENCOUNTER
Additional Information  • Negative: Is this related to a work injury? • Negative: Is this related to an MVA? • Negative: Are you currently recieving homecare services? • Negative: Has the patient had unexplained weight loss? • Negative: Does the patient have a fever? • Negative: Is the patient experiencing urine retention? • Negative: Is the patient experiencing acute drop foot or paralysis?     Protocols used: [facility] COMPREHENSIVE SPINE PROGRAM PROTOCOL

## 2022-10-31 NOTE — PROGRESS NOTES
PT Evaluation     Today's date: 10/31/2022  Patient name: Jackie Calvin  : 1945  MRN: [id number]  Referring provider: Carolina Henderson DO  Dx:   Encounter Diagnosis     ICD-10-CM    1  Neck pain  M54 2 Ambulatory referral to Physical Therapy   2  Chronic bilateral thoracic back pain  M54 6 Ambulatory referral to Physical Therapy    G89 29    3  Myofascial pain syndrome  M79 18 Ambulatory referral to Physical Therapy                  Assessment/Plan  Patient is a very pleasant 77 yo woman with a long and complicated hx of neck pain as well as UE and thoracic dysfunction  Patient presents today with moderate to severe pain in her upper cervical spine as well as suboccipitals secondary to hypomobility and postural abnormality  muscle spasm and central sensitization present  Patient improved with gentle mobilization and traction  Care was taken as she has a prior ACDF of C3-7  Patient will benefit from skilled PT intervention to address her issues of cervicogenic headaches and neck/upper back pain  2x a week for 4-6 weeks  Subjective  Patient states that she has been having neck/upper back pain over the past several years  Cervical surgery was done to prevent possible injury from fall  She has been having discomfort since that time but has noticed that symptoms have been worsening over the past several months  Patient notes that she works on [employer] and spends hours sometimes working on them  Her neck pain is always worse following this  Patient would like to be able to have the ROM in her neck to drive and would like to stop having such bad headaches  Objective  Red Flags: none  Pain: 7/10  Reflexes: 2/5 BUE  Posture: forward head with rounded shoulder  AROM: limited shoulder mobility B  Cervical ROM limited by 90% throughout  PROM: deferred  PAROM: hypomobility of upper cervical spine with comparable sign    Palpation: TTP of suboccipital musculature  Special Tests: no UMN signs, no ULTT findings  Improved with manual traction  Coordination of Movement/strengthe:Patient demonstrates poor activation of Longus Capitus and Longus Coli with loss of feed forward mechanism with reaching tasks  Patient was able to perform activation with cueing  Goals  - Pt I with initial HEP in 1-2 visits  - Improve ROM to functional driving  - Improved Longus Coli and Longus Capitus activation and return of feed forward mechanism   - Increase Functional Status Measure measured by FOTO by [facility]  - reduce pain to 4/10     Precautions: C3-7 PCDF 2014        Manuals                       C1-2 UPA right  Gr   Iv-- 10sec x5                     Manual traction Upper cervical 5 min                                                                                                                                                                                                                                                                     Ther Ex                                                                                                                                                                                                                       Ther Activity                                                                       Gait Training                                                                       Modalities

## 2022-10-31 NOTE — TELEPHONE ENCOUNTER
Additional Information  • Negative: Is this related to a work injury? • Negative: Is this related to an MVA? • Negative: Are you currently recieving homecare services? • Negative: Has the patient had unexplained weight loss? • Negative: Does the patient have a fever? • Negative: Is the patient experiencing urine retention? • Negative: Is the patient experiencing acute drop foot or paralysis? • Negative: Has the patient experienced major trauma? (fall from height, high speed collision, direct blow to spine) and is also experiencing nausea, light-headedness, or loss of consciousness? • Negative: Is the patient experiencing blood in sputum?     Protocols used: [facility] COMPREHENSIVE SPINE PROGRAM PROTOCOL

## 2022-10-31 NOTE — TELEPHONE ENCOUNTER
Additional Information  • Negative: Is this related to a work injury? • Negative: Is this related to an MVA? • Negative: Are you currently recieving homecare services? • Negative: Has the patient had unexplained weight loss?     Protocols used:  [facility] COMPREHENSIVE SPINE PROGRAM PROTOCOL

## 2022-10-31 NOTE — TELEPHONE ENCOUNTER
Additional Information  • Negative: Is this related to a work injury? • Negative: Is this related to an MVA?     Protocols used: [facility] COMPREHENSIVE SPINE PROGRAM PROTOCOL

## 2022-10-31 NOTE — TELEPHONE ENCOUNTER
Additional Information  • Negative: Is this related to a work injury? • Negative: Is this related to an MVA? • Negative: Are you currently recieving homecare services? • Negative: Has the patient had unexplained weight loss? • Negative: Does the patient have a fever? • Negative: Is the patient experiencing urine retention?     Protocols used: [facility] COMPREHENSIVE SPINE PROGRAM PROTOCOL

## 2022-10-31 NOTE — TELEPHONE ENCOUNTER
Additional Information  • Negative: Is this related to a work injury? • Negative: Is this related to an MVA? • Negative: Are you currently recieving homecare services? • Negative: Has the patient had unexplained weight loss? • Negative: Does the patient have a fever? • Negative: Is the patient experiencing urine retention? • Negative: Is the patient experiencing acute drop foot or paralysis? • Negative: Has the patient experienced major trauma? (fall from height, high speed collision, direct blow to spine) and is also experiencing nausea, light-headedness, or loss of consciousness?     Protocols used: [facility] COMPREHENSIVE SPINE PROGRAM PROTOCOL

## 2022-10-31 NOTE — TELEPHONE ENCOUNTER
Additional Information  • Negative: Is this related to a work injury? • Negative: Is this related to an MVA? • Negative: Are you currently recieving homecare services? • Negative: Has the patient had unexplained weight loss? • Negative: Does the patient have a fever?     Protocols used: [facility] COMPREHENSIVE SPINE PROGRAM PROTOCOL

## 2022-10-31 NOTE — TELEPHONE ENCOUNTER
Additional Information  • Negative: Is this related to a work injury? • Negative: Is this related to an MVA? • Negative: Are you currently recieving homecare services? • Negative: Has the patient had unexplained weight loss? • Negative: Does the patient have a fever? • Negative: Is the patient experiencing urine retention? • Negative: Is the patient experiencing acute drop foot or paralysis? • Negative: Has the patient experienced major trauma? (fall from height, high speed collision, direct blow to spine) and is also experiencing nausea, light-headedness, or loss of consciousness? • Negative: Is the patient experiencing blood in sputum? • Affirmative: Is this a chronic condition?     Protocols used: [facility] COMPREHENSIVE SPINE PROGRAM PROTOCOL

## 2022-10-31 NOTE — TELEPHONE ENCOUNTER
Additional Information  • Negative: Is this related to a work injury?     Protocols used: [facility] COMPREHENSIVE SPINE PROGRAM PROTOCOL

## 2022-10-31 NOTE — TELEPHONE ENCOUNTER
Medication refill requested: buPROPion (Wellbutrin XL) 150 mg 24 hr tablet  traZODone (DESYREL) 50 mg tablet  Last office visit: 06/08/2022  Next office visit: NONE   Last refilled: 10/19/2022  10/05/2022  Labs: No  Ordering Provider: Temo Bello (select pharmacy send RX to):   [facility]/pharmacy #[id number] Erlinda, [address] [place] [address] [phone] [id number]  Phone: [phone] Fax: [phone]

## 2022-10-31 NOTE — TELEPHONE ENCOUNTER
Additional Information  • Negative: Is this related to a work injury? • Negative: Is this related to an MVA? • Negative: Are you currently recieving homecare services?     Protocols used: [facility] COMPREHENSIVE SPINE PROGRAM PROTOCOL

## 2022-11-03 ENCOUNTER — CLINICAL SUPPORT (OUTPATIENT)
Dept: BARIATRICS | Facility: CLINIC | Age: 77
End: 2022-11-03

## 2022-11-03 VITALS — HEIGHT: 60 IN | BODY MASS INDEX: 32.67 KG/M2 | WEIGHT: 166.4 LBS

## 2022-11-03 DIAGNOSIS — R63.5 ABNORMAL WEIGHT GAIN: Primary | ICD-10-CM

## 2022-11-04 ENCOUNTER — OFFICE VISIT (OUTPATIENT)
Dept: PHYSICAL THERAPY | Facility: MEDICAL CENTER | Age: 77
End: 2022-11-04

## 2022-11-04 DIAGNOSIS — M79.18 MYOFASCIAL PAIN SYNDROME: ICD-10-CM

## 2022-11-04 DIAGNOSIS — M54.6 CHRONIC BILATERAL THORACIC BACK PAIN: ICD-10-CM

## 2022-11-04 DIAGNOSIS — M54.2 NECK PAIN: Primary | ICD-10-CM

## 2022-11-04 DIAGNOSIS — G89.29 CHRONIC BILATERAL THORACIC BACK PAIN: ICD-10-CM

## 2022-11-07 ENCOUNTER — OFFICE VISIT (OUTPATIENT)
Dept: PHYSICAL THERAPY | Facility: MEDICAL CENTER | Age: 77
End: 2022-11-07

## 2022-11-07 DIAGNOSIS — M79.18 MYOFASCIAL PAIN SYNDROME: ICD-10-CM

## 2022-11-07 DIAGNOSIS — G89.29 CHRONIC BILATERAL THORACIC BACK PAIN: ICD-10-CM

## 2022-11-07 DIAGNOSIS — M54.6 CHRONIC BILATERAL THORACIC BACK PAIN: ICD-10-CM

## 2022-11-07 DIAGNOSIS — M54.2 NECK PAIN: Primary | ICD-10-CM

## 2022-11-07 NOTE — PROGRESS NOTES
Daily Note     Today's date: 2022  Patient name: Linnette York  : 1945  MRN: [id number]  Referring provider: Francine Byrd DO  Dx:   Encounter Diagnosis     ICD-10-CM    1  Neck pain  M54 2    2  Chronic bilateral thoracic back pain  M54 6     G89 29    3  Myofascial pain syndrome  M79 18                   Subjective: patient states that she is feeling fairly good today  She has been having some issues with headache and neck pain but today seems to be not too bad  Objective: See treatment diary below      Assessment: Tolerated treatment well  Patient exhibited good technique with therapeutic exercises and would benefit from continued PT  Patient was progressed with gentle ROM and manual therapy exercises  She was given several basic postural control exercises which she did well with and had no increased symptoms  Plan: Continue per plan of care        Precautions:  Arthritis; HTN    Daily Treatment Diary     Manual              Upper cervical STM 10min            C1-2 rotational mob Gr  II 10sec x5                                                       Exercise Diary              UBE             pec stretch [id number]            No monnies 15 green band            Shoulder extension 15 green band            Scapular retractions 15 green band            Horizontal abduction 15 green band            Chin tucks 15x2            Thoracic extension over foam roller             Foam roller on wall                                                                                                                                                                Modalities              Heat supine 10min

## 2022-11-07 NOTE — PROGRESS NOTES
Daily Note     Today's date: 2022  Patient name: Matthew Pabon  : 1945  MRN: [id number]  Referring provider: Rafaela Doe DO  Dx:   Encounter Diagnosis     ICD-10-CM    1  Neck pain  M54 2    2  Myofascial pain syndrome  M79 18    3  Chronic bilateral thoracic back pain  M54 6     G89 29                   Subjective: patient states that she is feeling fairly good today  Had much less pain and felt really good after last session  Objective: See treatment diary below      Assessment: Tolerated treatment well  Patient exhibited good technique with therapeutic exercises and would benefit from continued PT  Patient was progressed with gentle ROM and manual therapy exercises  She was given several basic postural control exercises which she did well with and had no increased symptoms  Plan: Continue per plan of care        Precautions:  Arthritis; HTN    Daily Treatment Diary     Manual              Upper cervical STM 10min            C1-2 rotational mob Gr  II 10sec x5                                                       Exercise Diary              UBE             pec stretch [id number]            No monnies 15 green band            Shoulder extension 15 green band            Scapular retractions 15 green band            Horizontal abduction 15 green band            Chin tucks 15x2            Thoracic extension over foam roller inclined 10sec x5            Foam roller on wall                                                                                                                                                                Modalities              Heat supine 10min

## 2022-11-10 ENCOUNTER — APPOINTMENT (OUTPATIENT)
Dept: PHYSICAL THERAPY | Facility: MEDICAL CENTER | Age: 77
End: 2022-11-10

## 2022-11-10 ENCOUNTER — CLINICAL SUPPORT (OUTPATIENT)
Dept: BARIATRICS | Facility: CLINIC | Age: 77
End: 2022-11-10

## 2022-11-10 VITALS — BODY MASS INDEX: 32.59 KG/M2 | HEIGHT: 60 IN | WEIGHT: 166 LBS

## 2022-11-10 DIAGNOSIS — R63.5 ABNORMAL WEIGHT GAIN: Primary | ICD-10-CM

## 2022-11-14 ENCOUNTER — OFFICE VISIT (OUTPATIENT)
Dept: PHYSICAL THERAPY | Facility: MEDICAL CENTER | Age: 77
End: 2022-11-14

## 2022-11-14 DIAGNOSIS — M79.18 MYOFASCIAL PAIN SYNDROME: ICD-10-CM

## 2022-11-14 DIAGNOSIS — M54.2 NECK PAIN: Primary | ICD-10-CM

## 2022-11-14 DIAGNOSIS — M54.6 CHRONIC BILATERAL THORACIC BACK PAIN: ICD-10-CM

## 2022-11-14 DIAGNOSIS — G89.29 CHRONIC BILATERAL THORACIC BACK PAIN: ICD-10-CM

## 2022-11-14 NOTE — PROGRESS NOTES
Daily Note     Today's date: 2022  Patient name: Rina Baumgarten  : 1945  MRN: [id number]  Referring provider: Rena Lopez DO  Dx:   Encounter Diagnosis     ICD-10-CM    1  Neck pain  M54 2    2  Myofascial pain syndrome  M79 18    3  Chronic bilateral thoracic back pain  M54 6     G89 29                   Subjective: patient states that she is feeling fairly good today  Objective: See treatment diary below      Assessment: Tolerated treatment well  Patient exhibited good technique with therapeutic exercises and would benefit from continued PT  Patient was progressed with gentle ROM and manual therapy exercises  Addition of some LE exercises for hip and pelvic strengthening  Plan: Continue per plan of care        Precautions:  Arthritis; HTN    Daily Treatment Diary     Manual              Upper cervical STM 10min            C1-2 rotational mob Gr  II 10sec x5                                                       Exercise Diary              UBE             pec stretch [id number]            No monnies 15 green band            Shoulder extension 15 green band            Scapular retractions 15 green band            Horizontal abduction 15 green band            Chin tucks 15x2            Thoracic extension over foam roller inclined 10sec x5            Foam roller on wall             bike 10min            Standing hip abduction 10x2            Lunge stretch 10sec x2                                                                                                                        Modalities              Heat supine 10min

## 2022-11-17 ENCOUNTER — CLINICAL SUPPORT (OUTPATIENT)
Dept: BARIATRICS | Facility: CLINIC | Age: 77
End: 2022-11-17

## 2022-11-17 ENCOUNTER — OFFICE VISIT (OUTPATIENT)
Dept: PHYSICAL THERAPY | Facility: MEDICAL CENTER | Age: 77
End: 2022-11-17

## 2022-11-17 VITALS — BODY MASS INDEX: 32.3 KG/M2 | HEIGHT: 60 IN | WEIGHT: 164.5 LBS

## 2022-11-17 DIAGNOSIS — M54.6 CHRONIC BILATERAL THORACIC BACK PAIN: ICD-10-CM

## 2022-11-17 DIAGNOSIS — G89.29 CHRONIC BILATERAL THORACIC BACK PAIN: ICD-10-CM

## 2022-11-17 DIAGNOSIS — M79.18 MYOFASCIAL PAIN SYNDROME: ICD-10-CM

## 2022-11-17 DIAGNOSIS — M54.2 NECK PAIN: Primary | ICD-10-CM

## 2022-11-17 DIAGNOSIS — R63.5 ABNORMAL WEIGHT GAIN: Primary | ICD-10-CM

## 2022-11-17 NOTE — PROGRESS NOTES
Daily Note     Today's date: 2022  Patient name: Michael Bazzi  : 1945  MRN: [id number]  Referring provider: Alecia Kramer DO  Dx:   Encounter Diagnosis     ICD-10-CM    1  Neck pain  M54 2       2  Myofascial pain syndrome  M79 18       3  Chronic bilateral thoracic back pain  M54 6     G89 29                      Subjective: patient states that she is feeling fairly good today  Objective: See treatment diary below      Assessment: Tolerated treatment well  Patient exhibited good technique with therapeutic exercises and would benefit from continued PT  Patient was progressed with gentle ROM and manual therapy exercises  Addition of some LE exercises for hip and pelvic strengthening  Plan: Continue per plan of care        Precautions:  Arthritis; HTN    Daily Treatment Diary     Manual              Upper cervical STM 10min            C1-2 rotational mob Gr  II 10sec x5                                                       Exercise Diary              UBE             pec stretch [id number]            No monnies 15 green band            Shoulder extension 15 green band            Scapular retractions 15 green band            Horizontal abduction 15 green band            Chin tucks 15x2            Thoracic extension over foam roller inclined 10sec x5            Foam roller on wall             bike 10min            Standing hip abduction 10x2            Lunge stretch 10sec x2                                                                                                                        Modalities              Heat supine 10min

## 2022-11-22 ENCOUNTER — APPOINTMENT (OUTPATIENT)
Dept: PHYSICAL THERAPY | Facility: MEDICAL CENTER | Age: 77
End: 2022-11-22

## 2022-11-22 ENCOUNTER — CLINICAL SUPPORT (OUTPATIENT)
Dept: BARIATRICS | Facility: CLINIC | Age: 77
End: 2022-11-22

## 2022-11-22 VITALS — WEIGHT: 163.2 LBS | BODY MASS INDEX: 32.04 KG/M2 | HEIGHT: 60 IN

## 2022-11-22 DIAGNOSIS — R63.5 ABNORMAL WEIGHT GAIN: Primary | ICD-10-CM

## 2022-11-29 ENCOUNTER — OFFICE VISIT (OUTPATIENT)
Dept: PHYSICAL THERAPY | Facility: MEDICAL CENTER | Age: 77
End: 2022-11-29

## 2022-11-29 DIAGNOSIS — M54.6 CHRONIC BILATERAL THORACIC BACK PAIN: ICD-10-CM

## 2022-11-29 DIAGNOSIS — M54.2 NECK PAIN: Primary | ICD-10-CM

## 2022-11-29 DIAGNOSIS — G89.29 CHRONIC BILATERAL THORACIC BACK PAIN: ICD-10-CM

## 2022-11-29 DIAGNOSIS — M79.18 MYOFASCIAL PAIN SYNDROME: ICD-10-CM

## 2022-11-29 NOTE — PROGRESS NOTES
Daily Note     Today's date: 2022  Patient name: Radha Burgess  : 1945  MRN: [id number]  Referring provider: Mike Soares DO  Dx:   Encounter Diagnosis     ICD-10-CM    1  Neck pain  M54 2       2  Myofascial pain syndrome  M79 18       3  Chronic bilateral thoracic back pain  M54 6     G89 29                      Subjective: patient states that she is feeling fairly good today  Notes that her headaches and neck pain are much better  Objective: See treatment diary below      Assessment: Tolerated treatment well  Patient exhibited good technique with therapeutic exercises and would benefit from continued PT  Patient was progressed with gentle ROM and manual therapy exercises  Addition of some LE exercises for hip and pelvic strengthening  Plan: Continue per plan of care        Precautions:  Arthritis; HTN    Daily Treatment Diary     Manual              Upper cervical STM 10min            C1-2 rotational mob Gr  II 10sec x5                                                       Exercise Diary              UBE             pec stretch [id number]            No monnies 15 green band            Shoulder extension 15 green band            Scapular retractions 15 green band            Horizontal abduction 15 green band            Chin tucks 15x2            Thoracic extension over foam roller inclined 10sec x5            Foam roller on wall             bike 10min            Standing hip abduction 10x2            Lunge stretch 10sec x2                                                                                                                        Modalities              Heat supine 10min

## 2022-12-01 ENCOUNTER — CLINICAL SUPPORT (OUTPATIENT)
Dept: BARIATRICS | Facility: CLINIC | Age: 77
End: 2022-12-01

## 2022-12-01 VITALS — WEIGHT: 163.4 LBS | BODY MASS INDEX: 32.08 KG/M2 | HEIGHT: 60 IN

## 2022-12-01 DIAGNOSIS — R63.5 ABNORMAL WEIGHT GAIN: Primary | ICD-10-CM

## 2022-12-08 ENCOUNTER — CLINICAL SUPPORT (OUTPATIENT)
Dept: BARIATRICS | Facility: CLINIC | Age: 77
End: 2022-12-08

## 2022-12-08 VITALS — WEIGHT: 164 LBS | HEIGHT: 60 IN | BODY MASS INDEX: 32.2 KG/M2

## 2022-12-08 DIAGNOSIS — R63.5 ABNORMAL WEIGHT GAIN: Primary | ICD-10-CM

## 2022-12-13 NOTE — PROGRESS NOTES
Weight Management Medical Nutrition Assessment  Yokasta presented for a follow-up session with the [facility]   Today's weight is 161 5 #   She has had a 2 5# weight loss in the past week and overall has lost 22 5# in the past 6 months  She stated she has been food logging and averages 6438-5074 daily  She did increased her physical activity by walking a few times this past week  Discussed upcoming holiday meal plans  We reviewed a low calorie meal plan using partial meal replacements   Patient not meeting fluid needs will try to increase       Patient seen by Medical Provider in past 6 months:  yes  Requested to schedule appointment with Medical Provider: No        Anthropometric Measurements  Start Weight (#):  184 # ( 6/15/22 -MD)   205# (4/22)  Current Weight (#): 161 5#  TBW % Change from start weight:12 2%   Ideal Body Weight (#): 100#  Goal Weight (#):160#      Lowest: 145#     Weight Loss History  Previous weight loss attempts: Counseling with  MD  Meal Replacements (Medifast, Slim Fast, etc )  Nutrition Counseling with RD     Food and Nutrition Related History  Wake up: 7-8a  Bed Time:11a     Food Recall     Breakfast:Raisin Bran flakes - 1 cup with 1/2 cup milk    1 Egg/ 2 Toast   Shake   Coffee - creamer (35cal) x2  Snack:fruit or bar   Lunch:Diced apples and cheese and tomato   Chicken Salad [place] ( 2 bread)   Snack:skip   Dinner:lean protein/ veggie/ carb - smaller portions  Snack: coffee         Beverages: water and coffee/tea  Volume of beverage intake: 40oz water, 2 cups coffee daily (creamers- measured )     Weekends: Same  Cravings: sweets or CHO  Trouble area of day:after dinner     Frequency of Eating out: irregularly  Food restrictions:none  Lives with her sister  Cooking: self and sister  Food Shopping: self     Physical Activity Intake  Activity:house work, activities of daily living- walking 15-30 minutes   Frequency:Daily   Physical limitations/barriers to exercise: knee surgery in [facility], recently d/c from PT     Estimated Needs  Energy  Janelle 1898 Energy Needs: BMR : 6951 calories           1-0 5# loss weekly lightly active:5389-9440 calories  Maintenance calories for sedentary -lightly active level: 6753-8571 calories   Protein:70-85   (1 2-1 5g/kg IBW); 1 0g/kg IBW= 58  Fluid: 58-68oz     (30-35mL/kg IBW)     Nutrition Diagnosis  Yes;    Overweight/obesity  related to Excess energy intake as evidenced by  BMI more than normative standard for age and sex (obesity-grade I 31  1)     Nutrition Intervention     Nutrition Prescription  Calories:5343-3452 calories   Protein:70-80g  Fluid:65oz     Meal Plan (Riky/Pro/Carb)  Breakfast:200/26-27  Snack:-  Lunch:200/26-27  Snack:100-150/5-10  Dinner:200-300/15-20  Snack:<200/0-5     Nutrition Education:    Healthy Core Manual  Calorie controlled menu  Lean protein food choices  Healthy snack options  Food journaling tips        Nutrition Counseling:  Strategies: meal planning, portion sizes, healthy snack choices, hydration, fiber intake, protein intake, exercise, food journal        Monitoring and Evaluation:  Evaluation criteria:  Energy Intake  Meet protein needs  Maintain adequate hydration  Monitor weekly weight  Meal planning/preparation  Food journal   Decreased portions at mealtimes and snacks  Physical activity      Barriers to learning:none  Readiness to change: Preparation:  (Getting ready to change)   Comprehension: very good  Expected Compliance: good

## 2022-12-14 ENCOUNTER — OFFICE VISIT (OUTPATIENT)
Dept: BARIATRICS | Facility: CLINIC | Age: 77
End: 2022-12-14

## 2022-12-14 ENCOUNTER — APPOINTMENT (OUTPATIENT)
Dept: LAB | Facility: MEDICAL CENTER | Age: 77
End: 2022-12-14

## 2022-12-14 VITALS — HEIGHT: 60 IN | WEIGHT: 161.5 LBS | BODY MASS INDEX: 31.71 KG/M2

## 2022-12-14 DIAGNOSIS — R63.5 ABNORMAL WEIGHT GAIN: Primary | ICD-10-CM

## 2022-12-14 DIAGNOSIS — E78.5 HYPERLIPIDEMIA, UNSPECIFIED HYPERLIPIDEMIA TYPE: ICD-10-CM

## 2022-12-14 LAB
CHOLEST SERPL-MCNC: 180 MG/DL
HDLC SERPL-MCNC: 81 MG/DL
LDLC SERPL CALC-MCNC: 82 MG/DL (ref 0–100)
NONHDLC SERPL-MCNC: 99 MG/DL
TRIGL SERPL-MCNC: 86 MG/DL

## 2022-12-21 DIAGNOSIS — F32.A DEPRESSION, UNSPECIFIED DEPRESSION TYPE: ICD-10-CM

## 2022-12-21 DIAGNOSIS — F51.01 PRIMARY INSOMNIA: ICD-10-CM

## 2022-12-21 DIAGNOSIS — I10 ESSENTIAL HYPERTENSION: ICD-10-CM

## 2022-12-29 ENCOUNTER — CLINICAL SUPPORT (OUTPATIENT)
Dept: BARIATRICS | Facility: CLINIC | Age: 77
End: 2022-12-29

## 2022-12-29 VITALS — BODY MASS INDEX: 32 KG/M2 | HEIGHT: 60 IN | WEIGHT: 163 LBS

## 2022-12-29 DIAGNOSIS — R63.5 ABNORMAL WEIGHT GAIN: Primary | ICD-10-CM

## 2023-01-03 RX ORDER — TRAZODONE HYDROCHLORIDE 50 MG/1
100 TABLET ORAL
Qty: 60 TABLET | Refills: 0 | Status: SHIPPED | OUTPATIENT
Start: 2023-01-03

## 2023-01-03 RX ORDER — AMLODIPINE BESYLATE 10 MG/1
10 TABLET ORAL DAILY
Qty: 90 TABLET | Refills: 0 | Status: SHIPPED | OUTPATIENT
Start: 2023-01-03

## 2023-01-03 RX ORDER — BUPROPION HYDROCHLORIDE 150 MG/1
150 TABLET ORAL EVERY MORNING
Qty: 30 TABLET | Refills: 0 | Status: SHIPPED | OUTPATIENT
Start: 2023-01-03

## 2023-01-10 ENCOUNTER — OFFICE VISIT (OUTPATIENT)
Dept: BARIATRICS | Facility: CLINIC | Age: 78
End: 2023-01-10

## 2023-01-10 VITALS
DIASTOLIC BLOOD PRESSURE: 60 MMHG | RESPIRATION RATE: 16 BRPM | HEART RATE: 60 BPM | BODY MASS INDEX: 32.2 KG/M2 | HEIGHT: 60 IN | WEIGHT: 164 LBS | SYSTOLIC BLOOD PRESSURE: 124 MMHG

## 2023-01-10 DIAGNOSIS — K21.9 GASTROESOPHAGEAL REFLUX DISEASE WITHOUT ESOPHAGITIS: ICD-10-CM

## 2023-01-10 DIAGNOSIS — E78.2 MIXED HYPERLIPIDEMIA: ICD-10-CM

## 2023-01-10 DIAGNOSIS — F41.8 MIXED ANXIETY AND DEPRESSIVE DISORDER: ICD-10-CM

## 2023-01-10 DIAGNOSIS — I10 ESSENTIAL HYPERTENSION: ICD-10-CM

## 2023-01-10 DIAGNOSIS — E66.9 OBESITY, CLASS I, BMI 30-34.9: Primary | ICD-10-CM

## 2023-01-10 DIAGNOSIS — G47.33 OSA (OBSTRUCTIVE SLEEP APNEA): ICD-10-CM

## 2023-01-10 DIAGNOSIS — E03.9 HYPOTHYROIDISM, UNSPECIFIED TYPE: ICD-10-CM

## 2023-01-10 NOTE — ASSESSMENT & PLAN NOTE
- Patient is pursuing [facility] after completing [facility]   - Initial weight loss goal of 5-10% weight loss for improved health  - On Wellbutrin through primary care provider  Ran out of medication, but now has a refill and plans on restarting  Unsure if helped with appetite, but she will monitor    - Feels that she got a bit off track recently with diet, but feels that she has the knowledge to get back on track   - Not a GLP-1 candidate due to history of pancreatitis  - I would avoid metformin given eGFR 55  - Avoid phentermine due to age and history of hypertension   -Labs reviewed: Lipid panel completed December 14, 2022 was within acceptable range  Initial: 184 lbs  Current: 164 lbs BMI 32 03  Change: -20 lbs  Goal: 150-160 lbs    Goals:  Do not skip meals  Get back to food logging  Increase water intake to at least 64 oz daily  Increase fruits and vegetables   Get protein with each meal, including breakfast   Keep up the great work with walking  Continue nutrition recommendations per dietician     Nutrition Prescription  Calories:8886-4121 calories   Protein:70-80g  Fluid:65oz

## 2023-01-10 NOTE — ASSESSMENT & PLAN NOTE
- Has DIANA, but not using CPAP   - Risks of untreated sleep apnea reviewed, including increased risk for myocardial infarction and stroke, increased difficulty with weight loss, and risk of sudden cardiac death due to arrhythmia   - Referral to sleep medicine placed previously and she was encouraged to call to schedule

## 2023-01-10 NOTE — PROGRESS NOTES
Assessment/Plan:     Obesity, Class I, BMI 30-34 9  - Patient is pursuing [facility] after completing [facility]   - Initial weight loss goal of 5-10% weight loss for improved health  - On Wellbutrin through primary care provider  Ran out of medication, but now has a refill and plans on restarting  Unsure if helped with appetite, but she will monitor    - Feels that she got a bit off track recently with diet, but feels that she has the knowledge to get back on track   - Not a GLP-1 candidate due to history of pancreatitis  - I would avoid metformin given eGFR 55  - Avoid phentermine due to age and history of hypertension   -Labs reviewed: Lipid panel completed December 14, 2022 was within acceptable range  Initial: 184 lbs  Current: 164 lbs BMI 32 03  Change: -20 lbs  Goal: 150-160 lbs    Goals:  Do not skip meals  Get back to food logging  Increase water intake to at least 64 oz daily  Increase fruits and vegetables   Get protein with each meal, including breakfast   Keep up the great work with walking  Continue nutrition recommendations per dietician  Nutrition Prescription  Calories:8072-9755 calories   Protein:70-80g  Fluid:65oz    Gastroesophageal reflux disease  - Taking protonix  May improve with weight loss and lifestyle modification  Continue management with prescribing provider  Hypothyroidism  - Taking levothyroxine  Continue management with prescribing provider  DIANA (obstructive sleep apnea)  - Has DIANA, but not using CPAP   - Risks of untreated sleep apnea reviewed, including increased risk for myocardial infarction and stroke, increased difficulty with weight loss, and risk of sudden cardiac death due to arrhythmia   - Referral to sleep medicine placed previously and she was encouraged to call to schedule  Essential hypertension  - Taking norvasc and lisinopril  May improve with weight loss and lifestyle modification  Continue management with prescribing provider  Hyperlipidemia  - Taking atorvastatin  May improve with weight loss and lifestyle modification  Continue management with prescribing provider  Mixed anxiety and depressive disorder  - Taking sertraline, Wellbutrin, and trazodone  Continue management with prescribing provider  J Luis Olmedo was seen today for follow-up  Diagnoses and all orders for this visit:    Obesity, Class I, BMI 30-34 9    Gastroesophageal reflux disease without esophagitis    Hypothyroidism, unspecified type    Essential hypertension    DIANA (obstructive sleep apnea)    Mixed hyperlipidemia    Mixed anxiety and depressive disorder          Follow up in approximately 2 months with Non-Surgical Physician/Advanced Practitioner  Subjective:   Chief Complaint   Patient presents with   • Follow-up     MWM 2mth f/u; waist 41in       Patient ID: Caterina Mcallister  is a 76 y o  female with excess weight/obesity here to pursue weight management  Patient is pursuing [facility], previously completed [facility]  Most recent notes and records were reviewed  HPI    Wt Readings from Last 10 Encounters:   01/10/23 74 4 kg (164 lb)   12/29/22 73 9 kg (163 lb)   12/14/22 73 3 kg (161 lb 8 oz)   12/08/22 74 4 kg (164 lb)   12/01/22 74 1 kg (163 lb 6 4 oz)   11/22/22 74 kg (163 lb 3 2 oz)   11/17/22 74 6 kg (164 lb 8 oz)   11/10/22 75 3 kg (166 lb)   11/03/22 75 5 kg (166 lb 6 4 oz)   10/26/22 76 2 kg (168 lb)     Completed [facility] and is now in [facility]  Find the program very helpful  Not currently food logging, but plans on getting back on track  Feels that she has been a bit off track with diet the past week, but feels that she can get back on track  Wellbutrin restarted through primary care for mood  No negative side effects  Has not been taking it, needed a refill and now has that and plans on restarting it  Unsure if it made a difference with appetite  Having cravings for carbs in the evening       VINICIUS purcell with butter (300 calories)  S- none   L- taco salad   S- none   D- spaghetti or fish and veggies and baked potato  S- toast with less than 1 T PB or biscuits      Hydration: over 40 oz water, 3 cups of coffee with creamer  Alcohol: none  Exercise: walking 15-60 minutes 7 days per week   Occupation: retired   Sleep: 7 hours  Has DIANA, does not wear CPAP          Colonoscopy: due, has order, encouraged to schedule   Mammogram: due, has order, encouraged to schedule          The following portions of the patient's history were reviewed and updated as appropriate: allergies, current medications, past family history, past medical history, past social history, past surgical history, and problem list     Family History   Problem Relation Age of Onset   • Breast cancer Mother    • Alzheimer's disease Mother    • Coronary artery disease Mother    • Hypertension Mother    • Migraines Mother    • Peripheral vascular disease Mother    • Arthritis Mother    • Cancer Mother    • Parkinsonism Father    • Other Father         Cardiovascular disease    • Coronary artery disease Father    • Hypertension Father    • Bipolar disorder Sister    • Thyroid disease Sister    • Alzheimer's disease Sister    • Depression Sister    • Asthma Daughter    • Asthma Son    • Throat cancer Maternal Grandfather    • Cancer Maternal Grandfather    • Diabetes Neg Hx    • Stroke Neg Hx         Review of Systems   HENT: Negative for sore throat  Respiratory: Negative for cough and shortness of breath  Cardiovascular: Negative for chest pain and palpitations  Gastrointestinal: Positive for constipation  Negative for abdominal pain, diarrhea, nausea and vomiting  Denies GERD   Musculoskeletal: Negative for arthralgias and back pain  Skin: Negative for rash  Psychiatric/Behavioral: Negative for suicidal ideas (or HI)          + depression and anxiety controlled with medication       Objective:  /60   Pulse 60   Resp 16 Ht 5' (1 524 m)   Wt 74 4 kg (164 lb)   BMI 32 03 kg/m²     Physical Exam  Vitals and nursing note reviewed  Constitutional   General appearance: Abnormal   well developed and obese  Eyes No conjunctival injection  Ears, Nose, Mouth, and Throat Oral mucosa moist    Pulmonary   Respiratory effort: No increased work of breathing or signs of respiratory distress  Cardiovascular     Examination of extremities for edema and/or varicosities: Normal   no edema  Abdomen   Abdomen: Abnormal   The abdomen was obese      Musculoskeletal   Normal range of motion  Neurological   Gait and station: Normal     Psychiatric   Orientation to person, place and time: Normal     Affect: appropriate

## 2023-01-12 ENCOUNTER — CLINICAL SUPPORT (OUTPATIENT)
Dept: BARIATRICS | Facility: CLINIC | Age: 78
End: 2023-01-12

## 2023-01-12 VITALS — BODY MASS INDEX: 32.47 KG/M2 | WEIGHT: 165.4 LBS | HEIGHT: 60 IN

## 2023-01-12 DIAGNOSIS — R63.5 ABNORMAL WEIGHT GAIN: Primary | ICD-10-CM

## 2023-01-18 NOTE — PROGRESS NOTES
Weight Management Medical Nutrition Assessment  Yokasta presented for a follow-up session with the [facility]   Today's weight is 162 3 #   She has had a 2 1 # weight loss in the past 2 week and overall has lost 21 7# in the past 7 months  She stated she has been food logging and averages 9478-7854 daily  She did increased her physical activity by walking 3-5x this past week  We reviewed a low calorie meal plan using partial meal replacements   Patient not meeting fluid needs will try to increase       Patient seen by Medical Provider in past 6 months:  yes  Requested to schedule appointment with Medical Provider: No        Anthropometric Measurements  Start Weight (#):  184 # ( 6/15/22 -MD)   205# (4/22)  Current Weight (#): 162 3#  TBW % Change from start weight:11 8%   Ideal Body Weight (#): 100#  Goal Weight (#):160#      Lowest: 145#     Weight Loss History  Previous weight loss attempts: Counseling with  MD  Meal Replacements (Medifast, Slim Fast, etc )  Nutrition Counseling with RD     Food and Nutrition Related History  Wake up: 7-8a  Bed Time:11a     Food Recall     Breakfast:Raisin Bran flakes - 1 cup with 1/2 cup milk    1 Egg/ 2 Toast   Coffee - creamer (35cal) x2  Snack:fruit   Lunch:Chicken Salad with [place] / lettuce and tomato   Chicken Salad [place] ( 2 bread)   Snack:skip   Dinner:lean protein/ veggie/ carb - smaller portions  Snack: fruit   English Muffin         Beverages: water and coffee/tea  Volume of beverage intake: 30-40oz water, 2 cups coffee daily (creamers- measured )     Weekends: Same  Cravings: sweets or CHO  Trouble area of day:after dinner     Frequency of Eating out: irregularly  Food restrictions:none  Lives with her sister  Cooking: self and sister  Food Shopping: self     Physical Activity Intake  Activity:house work, activities of daily living- walking 15-30 minutes   Frequency:Daily   Physical limitations/barriers to exercise: recent injury     Estimated Needs  Energy  Bear Vy Energy Needs: BMR : 9197 calories           1-0 5# loss weekly lightly active:4688-5941 calories  Maintenance calories for sedentary -lightly active level: 3592-7427 calories   Protein:70-85   (1 2-1 5g/kg IBW); 1 0g/kg IBW= 58  Fluid: 58-68oz     (30-35mL/kg IBW)     Nutrition Diagnosis  Yes;    Overweight/obesity  related to Excess energy intake as evidenced by  BMI more than normative standard for age and sex (obesity-grade I 33  7)     Nutrition Intervention     Nutrition Prescription  Calories:7311-4462 calories   Protein:70-80g  Fluid:65oz     Meal Plan (Riky/Pro/Carb)  Breakfast:200/26-27  Snack:-  Lunch:200/26-27  Snack:100-150/5-10  Dinner:200-300/15-20  Snack:<200/0-5     Nutrition Education:    Healthy Core Manual  Calorie controlled menu  Lean protein food choices  Healthy snack options  Food journaling tips        Nutrition Counseling:  Strategies: meal planning, portion sizes, healthy snack choices, hydration, fiber intake, protein intake, exercise, food journal        Monitoring and Evaluation:  Evaluation criteria:  Energy Intake  Meet protein needs  Maintain adequate hydration  Monitor weekly weight  Meal planning/preparation  Food journal   Decreased portions at mealtimes and snacks  Physical activity      Barriers to learning:none  Readiness to change: Preparation:  (Getting ready to change)   Comprehension: very good  Expected Compliance: good

## 2023-01-19 ENCOUNTER — OFFICE VISIT (OUTPATIENT)
Dept: BARIATRICS | Facility: CLINIC | Age: 78
End: 2023-01-19

## 2023-01-19 VITALS — WEIGHT: 162.3 LBS | HEIGHT: 60 IN | BODY MASS INDEX: 31.86 KG/M2

## 2023-01-19 DIAGNOSIS — R63.5 ABNORMAL WEIGHT GAIN: Primary | ICD-10-CM

## 2023-02-07 DIAGNOSIS — E78.5 HYPERLIPIDEMIA, UNSPECIFIED HYPERLIPIDEMIA TYPE: ICD-10-CM

## 2023-02-07 RX ORDER — ATORVASTATIN CALCIUM 20 MG/1
TABLET, FILM COATED ORAL
Qty: 90 TABLET | Refills: 1 | Status: SHIPPED | OUTPATIENT
Start: 2023-02-07

## 2023-02-09 ENCOUNTER — CLINICAL SUPPORT (OUTPATIENT)
Dept: BARIATRICS | Facility: CLINIC | Age: 78
End: 2023-02-09

## 2023-02-09 VITALS — BODY MASS INDEX: 32.12 KG/M2 | WEIGHT: 163.6 LBS | HEIGHT: 60 IN

## 2023-02-09 DIAGNOSIS — R63.5 ABNORMAL WEIGHT GAIN: Primary | ICD-10-CM

## 2023-02-16 ENCOUNTER — CLINICAL SUPPORT (OUTPATIENT)
Dept: BARIATRICS | Facility: CLINIC | Age: 78
End: 2023-02-16

## 2023-02-16 VITALS — WEIGHT: 162 LBS | HEIGHT: 60 IN | BODY MASS INDEX: 31.8 KG/M2

## 2023-02-16 DIAGNOSIS — R63.5 ABNORMAL WEIGHT GAIN: Primary | ICD-10-CM

## 2023-02-22 ENCOUNTER — OFFICE VISIT (OUTPATIENT)
Dept: BARIATRICS | Facility: CLINIC | Age: 78
End: 2023-02-22

## 2023-02-22 VITALS — BODY MASS INDEX: 31.94 KG/M2 | HEIGHT: 60 IN | WEIGHT: 162.7 LBS

## 2023-02-22 DIAGNOSIS — R63.5 ABNORMAL WEIGHT GAIN: Primary | ICD-10-CM

## 2023-02-22 DIAGNOSIS — F51.01 PRIMARY INSOMNIA: ICD-10-CM

## 2023-02-22 RX ORDER — TRAZODONE HYDROCHLORIDE 50 MG/1
100 TABLET ORAL
Qty: 60 TABLET | Refills: 0 | Status: SHIPPED | OUTPATIENT
Start: 2023-02-22

## 2023-02-22 NOTE — PROGRESS NOTES
Weight Management Medical Nutrition Assessment  Yokasta presented for a follow-up session with the [facility]   Today's weight is 162 7 #   She has maintained her weight in the past month and overall has lost 21 3# in the past 8 months  She stated she has been food logging and averages 4697-1579 daily  She has not been as physically active  She stated she still finds herself with increased urgers to snack at night  Recommend pre plan and prelog her night snack  We reviewed a low calorie meal plan using partial meal replacements   Patient not meeting fluid needs will try to increase       Patient seen by Medical Provider in past 6 months:  yes  Requested to schedule appointment with Medical Provider: No        Anthropometric Measurements  Start Weight (#):  184 # ( 6/15/22 -MD)   205# (4/22)  Current Weight (#): 162 7#  TBW % Change from start weight:11 6%   Ideal Body Weight (#): 100#  Goal Weight (#):160#      Lowest: 145#     Weight Loss History  Previous weight loss attempts: Counseling with  MD  Meal Replacements (Medifast, Slim Fast, etc )  Nutrition Counseling with KAYCE     Food and Nutrition Related History  Wake up: 7-8a  Bed Time:11a     Food Recall     Breakfast:Raisin Bran flakes - 1 cup with 1/2 cup milk    1 Egg/ 2 Toast   Coffee - creamer (35cal) x2  Snack:fruit   Lunch:Chicken Salad with [place] / lettuce and tomato   Chicken Salad [place] ( 2 bread)   Snack:skip   Dinner:lean protein/ veggie/ carb - smaller portions  Snack: fruit   English Muffin         Beverages: water and coffee/tea  Volume of beverage intake: 30-40oz water, 2 cups coffee daily (creamers- measured )     Weekends: Same  Cravings: sweets or CHO  Trouble area of day:after dinner     Frequency of Eating out: irregularly  Food restrictions:none  Lives with her sister  Cooking: self and sister  Food Shopping: self     Physical Activity Intake  Activity:house work, activities of daily living- MIKE minutes   Frequency:Daily   Physical limitations/barriers to exercise: recent injury     Estimated Needs  Energy  Bear Vy Energy Needs: BMR : 0738 calories           1-0 5# loss weekly lightly active:3635-4279 calories  Maintenance calories for sedentary -lightly active level: 5014-2425 calories   Protein:70-85   (1 2-1 5g/kg IBW); 1 0g/kg IBW= 58  Fluid: 58-68oz     (30-35mL/kg IBW)     Nutrition Diagnosis  Yes;    Overweight/obesity  related to Excess energy intake as evidenced by  BMI more than normative standard for age and sex (obesity-grade I 33  7)     Nutrition Intervention     Nutrition Prescription  Calories:9028-7117 calories   Protein:70-80g  Fluid:65oz     Meal Plan (Riky/Pro/Carb)  Breakfast:200/26-27  Snack:-  Lunch:200/26-27  Snack:100-150/5-10  Dinner:200-300/15-20  Snack:<200/0-5     Nutrition Education:    Healthy Core Manual  Calorie controlled menu  Lean protein food choices  Healthy snack options  Food journaling tips        Nutrition Counseling:  Strategies: meal planning, portion sizes, healthy snack choices, hydration, fiber intake, protein intake, exercise, food journal        Monitoring and Evaluation:  Evaluation criteria:  Energy Intake  Meet protein needs  Maintain adequate hydration  Monitor weekly weight  Meal planning/preparation  Food journal   Decreased portions at mealtimes and snacks  Physical activity      Barriers to learning:none  Readiness to change: Preparation:  (Getting ready to change)   Comprehension: very good  Expected Compliance: good

## 2023-03-01 ENCOUNTER — VBI (OUTPATIENT)
Dept: ADMINISTRATIVE | Facility: OTHER | Age: 78
End: 2023-03-01

## 2023-03-09 ENCOUNTER — CLINICAL SUPPORT (OUTPATIENT)
Dept: BARIATRICS | Facility: CLINIC | Age: 78
End: 2023-03-09

## 2023-03-09 ENCOUNTER — OFFICE VISIT (OUTPATIENT)
Dept: BARIATRICS | Facility: CLINIC | Age: 78
End: 2023-03-09

## 2023-03-09 VITALS — WEIGHT: 162.4 LBS | HEIGHT: 60 IN | BODY MASS INDEX: 31.88 KG/M2

## 2023-03-09 VITALS
SYSTOLIC BLOOD PRESSURE: 130 MMHG | BODY MASS INDEX: 31.64 KG/M2 | DIASTOLIC BLOOD PRESSURE: 64 MMHG | HEART RATE: 68 BPM | OXYGEN SATURATION: 95 % | WEIGHT: 162 LBS

## 2023-03-09 DIAGNOSIS — E66.9 OBESITY, CLASS I, BMI 30-34.9: Primary | ICD-10-CM

## 2023-03-09 DIAGNOSIS — Z79.899 MEDICATION MANAGEMENT: ICD-10-CM

## 2023-03-09 DIAGNOSIS — E78.2 MIXED HYPERLIPIDEMIA: ICD-10-CM

## 2023-03-09 DIAGNOSIS — R63.5 ABNORMAL WEIGHT GAIN: Primary | ICD-10-CM

## 2023-03-09 DIAGNOSIS — E03.9 HYPOTHYROIDISM, UNSPECIFIED TYPE: ICD-10-CM

## 2023-03-09 DIAGNOSIS — K21.9 GASTROESOPHAGEAL REFLUX DISEASE WITHOUT ESOPHAGITIS: ICD-10-CM

## 2023-03-09 DIAGNOSIS — F41.8 MIXED ANXIETY AND DEPRESSIVE DISORDER: ICD-10-CM

## 2023-03-09 RX ORDER — NALTREXONE HYDROCHLORIDE 50 MG/1
TABLET, FILM COATED ORAL
Qty: 15 TABLET | Refills: 2 | Status: SHIPPED | OUTPATIENT
Start: 2023-03-09

## 2023-03-09 NOTE — PROGRESS NOTES
Assessment/Plan:     Obesity, Class I, BMI 30-34 9  - Patient is pursuing [facility] after completing [facility]   - Initial weight loss goal of 5-10% weight loss for improved health  - On Wellbutrin through primary care provider    - Not a GLP-1 candidate due to history of pancreatitis  - Avoid metformin given eGFR 55  - Avoid phentermine due to age and history of hypertension   - She is struggling with cravings and frustrated that weight loss has hit a bit of a plateau  Discussed adding naltrexone to Wellbutrin to mimic Contrave and she was agreeable  - Start Naltrexone 50 mg tablets, 1/2 tablet daily (started 3/9/2023 at weight of 162 lbs)  - Side effects of naltrexone discussed: nausea, vomiting, diarrhea, constipation, headache, anxiety, and confusion  Advised not to consume alcohol with naltrexone  Must be discontinued prior to surgery  - Check CMP about one month after starting Naltrexone  Initial: 184 lbs  Last visit: 164 lbs BMI 32 03  Current: 162 lbs BMI 31 64  Change: -22 lbs (-2 lbs since last office visit)  Goal: 150-160 lbs    Goals:  Do not skip meals  Get back to food logging  Increase water intake to at least 64 oz daily  Exercise limited currently due to knee pain - will be having it evaluated  Continue nutrition recommendations per dietician  Nutrition Prescription  Calories:3601-1486 calories   Protein:70-80g  Fluid:65oz  Start Naltrexone    Hyperlipidemia  - Taking atorvastatin  May improve with weight loss and lifestyle modification  Continue management with prescribing provider  Gastroesophageal reflux disease  - Taking protonix  May improve with weight loss and lifestyle modification  Continue management with prescribing provider  Hypothyroidism  - Taking levothyroxine  Continue management with prescribing provider  Mixed anxiety and depressive disorder  - Taking sertraline, Wellbutrin, and trazodone  Continue management with prescribing provider  Lee Simms was seen today for follow-up  Diagnoses and all orders for this visit:    Obesity, Class I, BMI 30-34 9  -     naltrexone (REVIA) 50 mg tablet; Take 1/2 tablet daily in the morning   -     Comprehensive metabolic panel; Future    Medication management  -     naltrexone (REVIA) 50 mg tablet; Take 1/2 tablet daily in the morning   -     Comprehensive metabolic panel; Future    Mixed hyperlipidemia    Gastroesophageal reflux disease without esophagitis    Hypothyroidism, unspecified type    Mixed anxiety and depressive disorder          Follow up in approximately 2-3 months with Non-Surgical Physician/Advanced Practitioner  Subjective:   Chief Complaint   Patient presents with   • Follow-up     MWM 2 mth fu          Patient ID: Mian Escamilla  is a 76 y o  female with excess weight/obesity here to pursue weight management  Patient is pursuing [facility], previously completed [facility]  Most recent notes and records were reviewed  HPI    Wt Readings from Last 10 Encounters:   03/09/23 73 5 kg (162 lb)   03/09/23 73 7 kg (162 lb 6 4 oz)   02/22/23 73 8 kg (162 lb 11 2 oz)   02/16/23 73 5 kg (162 lb)   02/09/23 74 2 kg (163 lb 9 6 oz)   01/19/23 73 6 kg (162 lb 4 8 oz)   01/12/23 75 kg (165 lb 6 4 oz)   01/10/23 74 4 kg (164 lb)   12/29/22 73 9 kg (163 lb)   12/14/22 73 3 kg (161 lb 8 oz)     Currently in [facility]  Having knee pain and not as active  Taking Wellbutrin  mg daily through PCP  Having cravings for foods  Frustrated with not being able to lose more weight  Feels that she has hit a bit of a plateau  Not currently food logging, but eating similarly       Hydration: over 40 oz water, 3 cups of coffee with creamer  Alcohol: none  Exercise: none currently due to knee pain, was walking previously   Occupation: retired   Sleep: 7-8 hours  Has DIANA, does not wear CPAP          Colonoscopy: due, has order, encouraged to schedule   Mammogram: due, has order, encouraged to schedule          The following portions of the patient's history were reviewed and updated as appropriate: allergies, current medications, past family history, past medical history, past social history, past surgical history, and problem list     Family History   Problem Relation Age of Onset   • Breast cancer Mother    • Alzheimer's disease Mother    • Coronary artery disease Mother    • Hypertension Mother    • Migraines Mother    • Peripheral vascular disease Mother    • Arthritis Mother    • Cancer Mother    • Parkinsonism Father    • Other Father         Cardiovascular disease    • Coronary artery disease Father    • Hypertension Father    • Bipolar disorder Sister    • Thyroid disease Sister    • Alzheimer's disease Sister    • Depression Sister    • Asthma Daughter    • Asthma Son    • Throat cancer Maternal Grandfather    • Cancer Maternal Grandfather    • Diabetes Neg Hx    • Stroke Neg Hx         Review of Systems   HENT: Negative for sore throat  Respiratory: Negative for cough and shortness of breath  Cardiovascular: Negative for chest pain and palpitations  Gastrointestinal: Positive for constipation (takes medication)  Negative for abdominal pain, diarrhea, nausea and vomiting  Denies GERD   Musculoskeletal: Positive for arthralgias and back pain  Skin: Negative for rash  Psychiatric/Behavioral: Negative for suicidal ideas (or HI)  + depression and anxiety controlled with medication       Objective:  /64   Pulse 68   Wt 73 5 kg (162 lb)   SpO2 95%   BMI 31 64 kg/m²     Physical Exam  Vitals and nursing note reviewed  Constitutional   General appearance: Abnormal   well developed and obese  Eyes No conjunctival injection  Ears, Nose, Mouth, and Throat Oral mucosa moist    Pulmonary   Respiratory effort: No increased work of breathing or signs of respiratory distress       Cardiovascular     Examination of extremities for edema and/or varicosities: Normal   no edema  Abdomen   Abdomen: Abnormal   The abdomen was obese      Musculoskeletal   Normal range of motion  Neurological   Gait and station: Normal     Psychiatric   Orientation to person, place and time: Normal     Affect: appropriate

## 2023-03-10 NOTE — ASSESSMENT & PLAN NOTE
- Patient is pursuing [facility] after completing [facility]   - Initial weight loss goal of 5-10% weight loss for improved health  - On Wellbutrin through primary care provider    - Not a GLP-1 candidate due to history of pancreatitis  - Avoid metformin given eGFR 55  - Avoid phentermine due to age and history of hypertension   - She is struggling with cravings and frustrated that weight loss has hit a bit of a plateau  Discussed adding naltrexone to Wellbutrin to mimic Contrave and she was agreeable  - Start Naltrexone 50 mg tablets, 1/2 tablet daily (started 3/9/2023 at weight of 162 lbs)  - Side effects of naltrexone discussed: nausea, vomiting, diarrhea, constipation, headache, anxiety, and confusion  Advised not to consume alcohol with naltrexone  Must be discontinued prior to surgery  - Check CMP about one month after starting Naltrexone  Initial: 184 lbs  Last visit: 164 lbs BMI 32 03  Current: 162 lbs BMI 31 64  Change: -22 lbs (-2 lbs since last office visit)  Goal: 150-160 lbs    Goals:  Do not skip meals  Get back to food logging  Increase water intake to at least 64 oz daily  Exercise limited currently due to knee pain - will be having it evaluated  Continue nutrition recommendations per dietician     Nutrition Prescription  Calories:1532-4038 calories   Protein:70-80g  Fluid:65oz  Start Naltrexone

## 2023-03-16 ENCOUNTER — CLINICAL SUPPORT (OUTPATIENT)
Dept: BARIATRICS | Facility: CLINIC | Age: 78
End: 2023-03-16

## 2023-03-16 ENCOUNTER — OFFICE VISIT (OUTPATIENT)
Dept: URGENT CARE | Facility: MEDICAL CENTER | Age: 78
End: 2023-03-16

## 2023-03-16 VITALS — WEIGHT: 163 LBS | HEIGHT: 60 IN | BODY MASS INDEX: 32 KG/M2

## 2023-03-16 VITALS
WEIGHT: 161 LBS | TEMPERATURE: 98.6 F | RESPIRATION RATE: 20 BRPM | SYSTOLIC BLOOD PRESSURE: 140 MMHG | HEART RATE: 70 BPM | OXYGEN SATURATION: 93 % | BODY MASS INDEX: 31.44 KG/M2 | DIASTOLIC BLOOD PRESSURE: 50 MMHG

## 2023-03-16 DIAGNOSIS — N89.8 VAGINAL DISCHARGE: ICD-10-CM

## 2023-03-16 DIAGNOSIS — R63.5 ABNORMAL WEIGHT GAIN: Primary | ICD-10-CM

## 2023-03-16 DIAGNOSIS — R10.2 SUPRAPUBIC DISCOMFORT: Primary | ICD-10-CM

## 2023-03-16 DIAGNOSIS — R30.0 DYSURIA: ICD-10-CM

## 2023-03-16 LAB
SL AMB  POCT GLUCOSE, UA: NORMAL
SL AMB LEUKOCYTE ESTERASE,UA: NORMAL
SL AMB POCT BILIRUBIN,UA: NORMAL
SL AMB POCT BLOOD,UA: NORMAL
SL AMB POCT CLARITY,UA: CLEAR
SL AMB POCT COLOR,UA: YELLOW
SL AMB POCT KETONES,UA: NORMAL
SL AMB POCT NITRITE,UA: NORMAL
SL AMB POCT PH,UA: 5
SL AMB POCT SPECIFIC GRAVITY,UA: 1.01
SL AMB POCT URINE PROTEIN: NORMAL
SL AMB POCT UROBILINOGEN: NORMAL

## 2023-03-16 NOTE — PATIENT INSTRUCTIONS
Urine test was negative today- Will send for culture - you can monitor for results on MyChart  You mentioned you noticed vaginal discharge - will refer you to GYN and have you follow up with them for further evaluation and treatment  Follow up with PCP in 3-5 days  Proceed to ER if symptoms worsen  Vaginal Discharge   WHAT YOU NEED TO KNOW:   Vaginal discharge is normal  It is usually clear or white and odorless  Vaginal discharge is your body's way of cleaning your vagina so it is healthy  Irritation, itching, burning, or a change in the amount, smell, or color may indicate a problem  DISCHARGE INSTRUCTIONS:   Contact your healthcare provider or gynecologist if:   You have swelling, burning, itching, or irritation in or around your vagina  You have an increase in the amount of discharge  The color or smell of your discharge changes  Your discharge looks similar to cottage cheese  Your discharge is bloody and it is not your monthly period  You have pain during sexual intercourse  You have trouble urinating, or you urinate often and with urgency  You have abdominal pain or cramps  You have a fever or chills  You have low back pain or side pain  You have questions or concerns about your condition or care  Keep your vagina healthy:   Always wipe from front to back  after you use the toilet  This prevents spreading bacteria from your rectal area into your vagina  Clean in and around your vagina with mild soap and warm water each day  Gently dry the area after washing  Do not use hot tubs  The heat and moisture from hot tubs can increase your risk for another yeast infection  Do not wear tight-fitting clothes or undergarments  for long periods  Wear cotton underwear during the day  Cotton helps keep your genital area dry and does not hold in warmth or moisture  Do not wear underwear at night      Change your laundry soap or fabric softener  if you think it is irritating your skin  Do not douche  or use feminine hygiene sprays or bubble bath  Do not use pads or tampons that are scented, or colored or perfumed toilet paper  Ask your healthcare provider about birth control options if necessary  Condoms have latex and diaphragms have gel that kill sperm  Both of these may irritate your genital area  Follow up with your doctor as directed:  Write down your questions so you remember to ask them during your visits  © Copyright [employer] 2022 Information is for End User's use only and may not be sold, redistributed or otherwise used for commercial purposes  The above information is an  only  It is not intended as medical advice for individual conditions or treatments  Talk to your doctor, nurse or pharmacist before following any medical regimen to see if it is safe and effective for you  Urinary Urgency and Frequency   AMBULATORY CARE:   Urinary urgency and frequency  is a condition that increases how strongly or how often you need to urinate  The condition may also be called urgency-frequency syndrome  Urinary urgency means you feel such a strong need to urinate that you have trouble waiting  You may also feel discomfort in your bladder  Urinary frequency means you need to urinate many times during the day  This may also be called increased daytime frequency  You may be woken from sleep by the need to urinate  Urgency and frequency often happen together, but you may only have one  Contact your healthcare provider if:   Your urine is pink, or you notice blood in your urine  You have pain with urination  You continue to have symptoms even after you take your medicine  You have new or worsening symptoms  You have questions or concerns about your condition or care  Treatment  will depend on the type and cause of your urination problems   You may need any of the following:  Medicines  may be given to relax your bladder and decrease urination  You may also need antibiotics if your symptoms are caused by a bacterial infection  Sacral nerve stimulation  sends electrical signals to your sacral nerve through a small device implanted under your skin  Your sacral nerve controls your bladder, sphincter, and pelvic floor muscles  Botox injections  into your bladder may help relax your bladder muscle to decrease urgency and frequency  Surgery  may be done if all other treatments cannot help you control your bladder  Manage urinary urgency and frequency:   Keep a record of your urination patterns for a few days  Write down the number of times you urinate over 24 hours, the amount, and if you have urine leakage  Record how strong the urge to urinate was each time  Your healthcare provider may also want you to record the type and amount of liquids you drink  Train your bladder  Go to the bathroom at set times, such as every 2 hours, even if you do not feel the urge to go  You can also try to hold your urine when you feel the urge to go  For example, hold your urine for 5 minutes when you feel the urge to go  As that becomes easier, hold your urine for 10 minutes  Work up to every 3 or 4 hours to help control your bladder  Limit liquids as directed  Limit liquids to decrease the amount you urinate  Ask how much liquid to drink each day and which liquids are best for you  You may need to avoid drinking liquids several hours before you go to sleep  Your healthcare provider may also recommend that you limit caffeine and alcohol  Do Kegel exercises often  Kegel exercises help strengthen your pelvic muscles and improve bladder control  These muscles help you stop urinating  Squeeze these muscles tightly for 5 seconds like you are trying to stop the flow of urine  Then relax for 5 seconds  Gradually work up to squeezing for 10 seconds  Do 3 sets of 15 repetitions a day, or as directed      Exercise regularly and maintain a healthy weight  Ask your healthcare provider how much you should weigh and about the best exercise plan for you  Extra weight puts pressure on your bladder and may make your symptoms worse  Ask your provider to help you create a safe weight loss plan if you are overweight  Follow up with your healthcare provider as directed: Your healthcare provider may refer you to a specialist to find the cause of your symptoms  Write down your questions so you remember to ask them during your visits  © [facility] 2022 Information is for End User's use only and may not be sold, redistributed or otherwise used for commercial purposes  The above information is an  only  It is not intended as medical advice for individual conditions or treatments  Talk to your doctor, nurse or pharmacist before following any medical regimen to see if it is safe and effective for you

## 2023-03-16 NOTE — PROGRESS NOTES
[address] [facility]        NAME: Manfred Aburto is a 76 y o  female  : 1945    MRN: [id number]  DATE: 2023  TIME: 12:52 PM    Assessment and Plan   Suprapubic discomfort [R10 2]  1  Suprapubic discomfort  Ambulatory Referral to Gynecology      2  Vaginal discharge        3  Dysuria  POCT urine dip    Urine culture            Patient Instructions   Urine test was negative today- Will send for culture - you can monitor for results on MyChart  You mentioned you noticed vaginal discharge - will refer you to GYN and have you follow up with them for further evaluation and treatment  Follow up with PCP in 3-5 days  Proceed to ER if symptoms worsen  Chief Complaint     Chief Complaint   Patient presents with   • Possible UTI     Yesterday she noticed her urine was strong and foul smelling  She has urgency and today is nauseated         History of Present Illness       Urinary frequency, foul smelling, lower abdominal discomfort starting yesterday  No fevers, chills, vomiting  Reports new vaginal discharge  Review of Systems   Review of Systems   Constitutional: Negative for activity change and appetite change  Respiratory: Negative  Cardiovascular: Negative  Genitourinary: Positive for frequency, pelvic pain, urgency and vaginal discharge  Neurological: Negative            Current Medications       Current Outpatient Medications:   •  albuterol (Ventolin HFA) 90 mcg/act inhaler, Inhale 2 puffs every 6 (six) hours as needed for wheezing, Disp: 1 Inhaler, Rfl: 2  •  amLODIPine (NORVASC) 10 mg tablet, Take 1 tablet (10 mg total) by mouth daily, Disp: 90 tablet, Rfl: 0  •  Ascorbic Acid (VITAMIN C PO), Vitamin C, Disp: , Rfl:   •  atorvastatin (LIPITOR) 20 mg tablet, TAKE 1 TABLET BY MOUTH EVERY DAY, Disp: 90 tablet, Rfl: 1  •  buPROPion (Wellbutrin XL) 150 mg 24 hr tablet, Take 1 tablet (150 mg total) by mouth every morning, Disp: 30 tablet, Rfl: 0  •  Cholecalciferol (Vitamin D3) 1 25 MG (88598 UT) CAPS, Take 5,000 Units by mouth daily, Disp: , Rfl:   •  Diclofenac Sodium (VOLTAREN) 1 %, Apply 2 g topically 4 (four) times a day for 15 days, Disp: 120 g, Rfl: 0  •  fluocinonide (LIDEX) 0 05 % ointment, Apply topically 2 (two) times a day To thickened area of the left upper back  Use for two weeks straight, twice daily   After two weeks, use only on Monday through Friday , Disp: 30 g, Rfl: 1  •  fluticasone (FLOVENT HFA) 110 MCG/ACT inhaler, Inhale 2 puffs 2 (two) times a day Rinse mouth after use , Disp: 12 g, Rfl: 1  •  levothyroxine 137 mcg tablet, Take 1 tablet (137 mcg total) by mouth daily, Disp: 30 tablet, Rfl: 5  •  lisinopril (ZESTRIL) 40 mg tablet, Take 1 tablet (40 mg total) by mouth daily, Disp: 90 tablet, Rfl: 1  •  metroNIDAZOLE (METROGEL) 1 % gel, Apply topically daily, Disp: 45 g, Rfl: 0  •  Multiple Vitamin (MULTIVITAMIN ADULT PO), multivitamin, Disp: , Rfl:   •  naltrexone (REVIA) 50 mg tablet, Take 1/2 tablet daily in the morning , Disp: 15 tablet, Rfl: 2  •  pantoprazole (PROTONIX) 40 mg tablet, Take 1 tablet (40 mg total) by mouth 2 (two) times a day, Disp: 60 tablet, Rfl: 5  •  Sennosides 8 6 MG CAPS, Take 8 6 mg by mouth 2 (two) times a day, Disp: , Rfl:   •  sertraline (ZOLOFT) 100 mg tablet, Take 1 tablet (100 mg total) by mouth 2 (two) times a day, Disp: 60 tablet, Rfl: 5  •  traZODone (DESYREL) 50 mg tablet, Take 2 tablets (100 mg total) by mouth daily at bedtime, Disp: 60 tablet, Rfl: 0    Current Allergies     Allergies as of 03/16/2023 - Reviewed 03/16/2023   Allergen Reaction Noted   • Ceftin [cefuroxime] Hives 11/27/2018            The following portions of the patient's history were reviewed and updated as appropriate: allergies, current medications, past family history, past medical history, past social history, past surgical history and problem list      Past Medical History:   Diagnosis Date   • Acid reflux    • Anxiety    • Arthritis    • Asthma    • C1-C4 level with central cord syndrome ([facility] [address] )     Resolved 2/1/2017    • Carpal tunnel syndrome Resolved 4/21/2015   • Colitis    • Colon polyp    • Concussion    • Depressed    • Dysthymic disorder     Resolved 1/5/2018   • Gastric ulcer 2013    small - on EGD - [facility]   • Hemorrhoids    • Hx of blood clots    • Hx of colonic polyps     [facility]   • Hyperlipemia    • Hypertension    • Hypothyroid    • Lung nodule     stable x 2 yrs on CT   • Migraines    • Obesity    • Postural dizziness with presyncope     Resolved 8/28/2018    • Tendinitis    • Ulnar neuropathy     Resolved 5/21/2015        Past Surgical History:   Procedure Laterality Date   • APPENDECTOMY     • BREAST SURGERY Left 01/1966    BREAST LUMPECTOMY   • CARPAL TUNNEL RELEASE     • CHOLECYSTECTOMY     • DILATION AND CURETTAGE OF UTERUS     • ELBOW SURGERY     • EYE SURGERY Bilateral 2019    Cataract    • GALLBLADDER SURGERY     • JOINT REPLACEMENT Right     REPLACEMENT TOTAL KNEE   • KNEE ARTHROSCOPY     • KNEE SURGERY Right    • NECK SURGERY     • ORTHOPEDIC SURGERY     • POSTERIOR LAMINECTOMY / DECOMPRESSION CERVICAL SPINE  02/2015   • REPLACEMENT TOTAL KNEE Left 2022   • TONSILLECTOMY     • TOTAL KNEE ARTHROPLASTY Left 04/06/2022    [facility] Dr Christine Ortega   • VAGINAL DELIVERY      x3   • WRIST SURGERY Right        Family History   Problem Relation Age of Onset   • Breast cancer Mother    • Alzheimer's disease Mother    • Coronary artery disease Mother    • Hypertension Mother    • Migraines Mother    • Peripheral vascular disease Mother    • Arthritis Mother    • Cancer Mother    • Parkinsonism Father    • Other Father         Cardiovascular disease    • Coronary artery disease Father    • Hypertension Father    • Bipolar disorder Sister    • Thyroid disease Sister    • Alzheimer's disease Sister    • Depression Sister    • Asthma Daughter    • Asthma Son    • Throat cancer Maternal Grandfather    • Cancer Maternal Grandfather    • Diabetes Neg Hx    • Stroke Neg Hx          Medications have been verified  Objective   /50   Pulse 70   Temp 98 6 °F (37 °C)   Resp 20   Wt 73 kg (161 lb)   SpO2 93%   BMI 31 44 kg/m²   No LMP recorded  Patient is postmenopausal        Physical Exam     Physical Exam  Constitutional:       General: She is not in acute distress  Appearance: She is not ill-appearing  HENT:      Mouth/Throat:      Mouth: Mucous membranes are moist    Cardiovascular:      Rate and Rhythm: Normal rate  Pulses: Normal pulses  Heart sounds: Murmur heard  Pulmonary:      Effort: Pulmonary effort is normal       Breath sounds: Normal breath sounds  Abdominal:      Palpations: Abdomen is soft  Tenderness: There is abdominal tenderness  There is rebound  There is no right CVA tenderness or left CVA tenderness  Comments: Patient reports left abdominal rebound tenderness that is chronic and consistent with when she needs to have a BM  Genitourinary:     Comments: Patient reports she has vaginal discharge however exam not performed at this visit  Neurological:      Mental Status: She is alert

## 2023-03-17 ENCOUNTER — OFFICE VISIT (OUTPATIENT)
Dept: OBGYN CLINIC | Facility: MEDICAL CENTER | Age: 78
End: 2023-03-17

## 2023-03-17 VITALS — HEIGHT: 60 IN | WEIGHT: 161.8 LBS | BODY MASS INDEX: 31.77 KG/M2

## 2023-03-17 DIAGNOSIS — R10.2 SUPRAPUBIC DISCOMFORT: ICD-10-CM

## 2023-03-17 LAB — BACTERIA UR CULT: ABNORMAL

## 2023-03-22 NOTE — PROGRESS NOTES
A/P    1   UTi    Was treated by her PCP this week    She noticed a foul odor    But its normal now    She no longer has any pain      Will wait it out and watch    Will look for Dc or itching    Will increase po intake

## 2023-03-23 ENCOUNTER — CLINICAL SUPPORT (OUTPATIENT)
Dept: BARIATRICS | Facility: CLINIC | Age: 78
End: 2023-03-23

## 2023-03-23 VITALS — BODY MASS INDEX: 31.37 KG/M2 | HEIGHT: 60 IN | WEIGHT: 159.8 LBS

## 2023-03-23 DIAGNOSIS — R63.5 ABNORMAL WEIGHT GAIN: Primary | ICD-10-CM

## 2023-03-28 ENCOUNTER — OFFICE VISIT (OUTPATIENT)
Dept: FAMILY MEDICINE CLINIC | Facility: CLINIC | Age: 78
End: 2023-03-28

## 2023-03-28 VITALS
OXYGEN SATURATION: 98 % | HEIGHT: 60 IN | DIASTOLIC BLOOD PRESSURE: 70 MMHG | RESPIRATION RATE: 14 BRPM | HEART RATE: 74 BPM | TEMPERATURE: 97.5 F | BODY MASS INDEX: 31.9 KG/M2 | WEIGHT: 162.5 LBS | SYSTOLIC BLOOD PRESSURE: 152 MMHG

## 2023-03-28 DIAGNOSIS — E78.5 HYPERLIPIDEMIA, UNSPECIFIED HYPERLIPIDEMIA TYPE: ICD-10-CM

## 2023-03-28 DIAGNOSIS — M54.50 CHRONIC BILATERAL LOW BACK PAIN WITHOUT SCIATICA: ICD-10-CM

## 2023-03-28 DIAGNOSIS — Z00.00 MEDICARE ANNUAL WELLNESS VISIT, SUBSEQUENT: Primary | ICD-10-CM

## 2023-03-28 DIAGNOSIS — Z78.0 POSTMENOPAUSAL: ICD-10-CM

## 2023-03-28 DIAGNOSIS — G89.29 CHRONIC BILATERAL LOW BACK PAIN WITHOUT SCIATICA: ICD-10-CM

## 2023-03-28 DIAGNOSIS — F32.A DEPRESSION, UNSPECIFIED DEPRESSION TYPE: ICD-10-CM

## 2023-03-28 DIAGNOSIS — E03.9 HYPOTHYROIDISM, UNSPECIFIED TYPE: ICD-10-CM

## 2023-03-28 DIAGNOSIS — N18.31 STAGE 3A CHRONIC KIDNEY DISEASE (HCC): ICD-10-CM

## 2023-03-28 DIAGNOSIS — I10 ESSENTIAL HYPERTENSION: ICD-10-CM

## 2023-03-28 DIAGNOSIS — R91.8 PULMONARY NODULES: ICD-10-CM

## 2023-03-28 RX ORDER — BUPROPION HYDROCHLORIDE 150 MG/1
150 TABLET ORAL EVERY MORNING
Qty: 30 TABLET | Refills: 0 | Status: SHIPPED | OUTPATIENT
Start: 2023-03-28 | End: 2023-03-28 | Stop reason: SDUPTHER

## 2023-03-28 RX ORDER — BUPROPION HYDROCHLORIDE 150 MG/1
150 TABLET ORAL EVERY MORNING
Qty: 30 TABLET | Refills: 3 | Status: SHIPPED | OUTPATIENT
Start: 2023-03-28

## 2023-03-28 NOTE — PROGRESS NOTES
Assessment and Plan:     Problem List Items Addressed This Visit        Endocrine    Hypothyroidism  Check tsh    Relevant Orders    TSH, 3rd generation       Cardiovascular and Mediastinum    Essential hypertension  Mildly elevated today  Watch at home and send me readings in 2 weeks    Relevant Orders    Comprehensive metabolic panel    CBC and differential       Genitourinary    Stage 3a chronic kidney disease (HCC)  Stable  Continue acei       Other    Hyperlipidemia  Last lipids acceptable  Continue current meds     Other Visit Diagnoses     Medicare annual wellness visit, subsequent    -  Primary    obtain dexa scan  bivalent covid booster  otherwise preventively up to date  healthy diet and exercise      Postmenopausal        check dexa scan  bivalent covid immunization  continue healthy diet and exercise      Relevant Orders    DXA bone density spine hip and pelvis    Depression, unspecified depression type        doing well on current regimen   continue same    Relevant Medications    buPROPion (Wellbutrin XL) 150 mg 24 hr tablet    Chronic bilateral low back pain without sciatica        tylenol 1000mg TID for known djd  sparing use of naproxen  make f/u with pain mgmt per pt request      Pulmonary nodules        Overdue for CT; advised to schedule          Falls Plan of Care: balance, strength, and gait training instructions were provided  Urinary Incontinence Plan of Care: counseling topics discussed: limiting fluid intake 3-4 hours before bed  Preventive health issues were discussed with patient, and age appropriate screening tests were ordered as noted in patient's After Visit Summary  Personalized health advice and appropriate referrals for health education or preventive services given if needed, as noted in patient's After Visit Summary  History of Present Illness:     Patient presents for a Medicare Wellness Visit and chronic f/u  Preventively, pt due for dexa scan    Too late for flu shot this season  Due for mammogram   Trying to be active and work on weight loss  Seeing dental   POLST today and has living will  From a problem standpoint, pt due for CT chest for f/u pulmonary nodules  Pt has more low back pain  B/l  Non radiating  No paresthesias or weakness  Known djd  Also L 1st finger pain related to arthritis  She would like to see the spine program and has seen dr Pablo Lopez in the past for her djd  Would like to f/u  States tylenol doesn't help her much  Takes 500mg prn  PT has been very helpful while she's in it, but she has to continually go  Naproxen helpful, but hx of ulcer in the past     Taking thyroid meds  Has hx of stopping meds and then developing anemia  Due for tsh  Last was at goal     Blood pressure mildly high today  No chest pain, shortness of breath, n/v, abd pain, visual changes, paresthesias, weakness  Last lipids reviewed  Tolerating statin  No myalgias  Mood appropriate on current meds  Would like to continue same  No sadness, hopelessness  Has tried to stop the buproprion but notices her mood worsens  HPI   Patient Care Team:  Len Miranda DO as PCP - General (Family Medicine)  [facility], MD Christopher Saez MD Cathleen Reel, PA-C Evelina Petri, MD (Gastroenterology)     Review of Systems:     Review of Systems   Constitutional: Negative for chills, fatigue, fever and unexpected weight change  HENT: Negative for congestion, ear pain, hearing loss, postnasal drip, rhinorrhea, sinus pressure, sinus pain, sore throat, trouble swallowing and voice change  Eyes: Negative for pain, redness and visual disturbance  Respiratory: Negative for cough and shortness of breath  Cardiovascular: Negative for chest pain, palpitations and leg swelling  Gastrointestinal: Negative for abdominal pain, constipation, diarrhea and nausea     Endocrine: Negative for cold intolerance, heat intolerance, polydipsia and polyuria  Genitourinary: Negative for dysuria, frequency and urgency  Musculoskeletal: Positive for arthralgias, back pain and neck pain  Negative for joint swelling and myalgias  Skin: Negative for rash  No suspicious lesions   Neurological: Negative for weakness, numbness and headaches  Hematological: Negative for adenopathy          Problem List:     Patient Active Problem List   Diagnosis   • Carpal tunnel syndrome of right wrist   • Cataract   • Cervical radiculitis   • Vertigo   • Essential hypertension   • Gastroesophageal reflux disease   • Hyperlipidemia   • History of colonic polyps   • Injury of tendon of rotator cuff   • Mild persistent asthma without complication   • Mixed anxiety and depressive disorder   • Occipital neuralgia of left side   • Osteoarthritis of knee   • Postconcussion syndrome   • Hypothyroidism   • Cervical spinal stenosis   • Tinnitus, bilateral   • Ulnar nerve compression, right   • IFG (impaired fasting glucose)   • Rosacea   • History of gastric ulcer   • RUQ pain   • Intestinal metaplasia of gastric mucosa   • URI (upper respiratory infection)   • Near syncope   • Anemia   • Stage 3a chronic kidney disease (HCC)   • Obesity, Class I, BMI 30-34 9   • At risk for sleep apnea   • DIANA (obstructive sleep apnea)      Past Medical and Surgical History:     Past Medical History:   Diagnosis Date   • Acid reflux    • Anxiety    • Arthritis    • Asthma    • C1-C4 level with central cord syndrome ([facility] [address] )     Resolved 2/1/2017    • Carpal tunnel syndrome Resolved 4/21/2015   • Colitis    • Colon polyp    • Concussion    • Depressed    • Dysthymic disorder     Resolved 1/5/2018   • Gastric ulcer 2013    small - on EGD - [facility]   • Hemorrhoids    • Hx of blood clots    • Hx of colonic polyps     [place]   • Hyperlipemia    • Hypertension    • Hypothyroid    • Lung nodule     stable x 2 yrs on CT   • Migraines    • Obesity    • Postural dizziness with presyncope Resolved 2018    • Tendinitis    • Ulnar neuropathy     Resolved 2015      Past Surgical History:   Procedure Laterality Date   • APPENDECTOMY     • BREAST SURGERY Left 1966    BREAST LUMPECTOMY   • CARPAL TUNNEL RELEASE     • CHOLECYSTECTOMY     • DILATION AND CURETTAGE OF UTERUS     • ELBOW SURGERY     • EYE SURGERY Bilateral 2019    Cataract    • GALLBLADDER SURGERY     • JOINT REPLACEMENT Right     REPLACEMENT TOTAL KNEE   • KNEE ARTHROSCOPY     • KNEE SURGERY Right    • NECK SURGERY     • ORTHOPEDIC SURGERY     • POSTERIOR LAMINECTOMY / DECOMPRESSION CERVICAL SPINE  2015   • REPLACEMENT TOTAL KNEE Left    • TONSILLECTOMY     • TOTAL KNEE ARTHROPLASTY Left 2022    [facility] Dr Grimm Dry   • VAGINAL DELIVERY      x3   • WRIST SURGERY Right       Family History:     Family History   Problem Relation Age of Onset   • Breast cancer Mother    • Alzheimer's disease Mother    • Coronary artery disease Mother    • Hypertension Mother    • Migraines Mother    • Peripheral vascular disease Mother    • Arthritis Mother    • Cancer Mother    • Parkinsonism Father    • Other Father         Cardiovascular disease    • Coronary artery disease Father    • Hypertension Father    • Bipolar disorder Sister    • Thyroid disease Sister    • Alzheimer's disease Sister    • Depression Sister    • Asthma Daughter    • Asthma Son    • Throat cancer Maternal Grandfather    • Cancer Maternal Grandfather    • Diabetes Neg Hx    • Stroke Neg Hx       Social History:     Social History     Socioeconomic History   • Marital status:       Spouse name: None   • Number of children: None   • Years of education: None   • Highest education level: None   Occupational History   • Occupation: Retired    Tobacco Use   • Smoking status: Former     Packs/day: 0 00     Years: 30 00     Pack years: 0 00     Types: Cigarettes     Start date: 1983     Quit date: 10/14/2013     Years since quittin 4   • Smokeless tobacco: Never   Vaping Use   • Vaping Use: Never used   Substance and Sexual Activity   • Alcohol use: No   • Drug use: No   • Sexual activity: Not Currently     Birth control/protection: None   Other Topics Concern   • None   Social History Narrative    Consumes 2-4 cups per day     Social Determinants of Health     Financial Resource Strain: Low Risk    • Difficulty of Paying Living Expenses: Not hard at all   Food Insecurity: Not on file   Transportation Needs: Unknown   • Lack of Transportation (Medical): No   • Lack of Transportation (Non-Medical): Not on file   Physical Activity: Not on file   Stress: Not on file   Social Connections: Not on file   Intimate Partner Violence: Not on file   Housing Stability: Not on file      Medications and Allergies:     Current Outpatient Medications   Medication Sig Dispense Refill   • albuterol (Ventolin HFA) 90 mcg/act inhaler Inhale 2 puffs every 6 (six) hours as needed for wheezing 1 Inhaler 2   • amLODIPine (NORVASC) 10 mg tablet Take 1 tablet (10 mg total) by mouth daily 90 tablet 0   • Ascorbic Acid (VITAMIN C PO) Vitamin C     • atorvastatin (LIPITOR) 20 mg tablet TAKE 1 TABLET BY MOUTH EVERY DAY 90 tablet 1   • buPROPion (Wellbutrin XL) 150 mg 24 hr tablet Take 1 tablet (150 mg total) by mouth every morning 30 tablet 3   • Cholecalciferol (Vitamin D3) 1 25 MG (92014 UT) CAPS Take 5,000 Units by mouth daily     • Diclofenac Sodium (VOLTAREN) 1 % Apply 2 g topically 4 (four) times a day for 15 days 120 g 0   • fluocinonide (LIDEX) 0 05 % ointment Apply topically 2 (two) times a day To thickened area of the left upper back  Use for two weeks straight, twice daily  After two weeks, use only on Monday through Friday  30 g 1   • fluticasone (FLOVENT HFA) 110 MCG/ACT inhaler Inhale 2 puffs 2 (two) times a day Rinse mouth after use   12 g 1   • levothyroxine 137 mcg tablet Take 1 tablet (137 mcg total) by mouth daily 30 tablet 5   • lisinopril (ZESTRIL) 40 mg tablet Take 1 tablet (40 mg total) by mouth daily 90 tablet 1   • metroNIDAZOLE (METROGEL) 1 % gel Apply topically daily 45 g 0   • Multiple Vitamin (MULTIVITAMIN ADULT PO) multivitamin     • naltrexone (REVIA) 50 mg tablet Take 1/2 tablet daily in the morning  15 tablet 2   • pantoprazole (PROTONIX) 40 mg tablet Take 1 tablet (40 mg total) by mouth 2 (two) times a day 60 tablet 5   • Sennosides 8 6 MG CAPS Take 8 6 mg by mouth 2 (two) times a day     • sertraline (ZOLOFT) 100 mg tablet Take 1 tablet (100 mg total) by mouth 2 (two) times a day 60 tablet 5   • traZODone (DESYREL) 50 mg tablet TAKE 2 TABLETS (100 MG TOTAL) BY MOUTH DAILY AT BEDTIME 60 tablet 0     No current facility-administered medications for this visit  Allergies   Allergen Reactions   • Ceftin [Cefuroxime] Hives      Immunizations:     Immunization History   Administered Date(s) Administered   • COVID-19 PFIZER VACCINE 0 3 ML IM 01/19/2021, 02/14/2021, 10/02/2021, 05/09/2022   • COVID-19 Pfizer vac (Gómez-sucrose, gray cap) 12 yr+ IM 01/19/2021, 02/14/2021   • INFLUENZA 10/16/2008, 11/04/2009, 10/09/2012, 11/05/2013, 11/19/2014, 10/14/2015, 10/18/2016, 01/05/2018, 10/02/2018, 10/08/2020   • Influenza Split High Dose Preservative Free IM 10/14/2015, 10/18/2016, 01/05/2018, 10/02/2018, 11/05/2019, 10/08/2020, 10/08/2021   • Pneumococcal Conjugate 13-Valent 10/14/2015   • Pneumococcal Polysaccharide PPV23 10/18/2016   • Td (adult), adsorbed 08/25/2010   • Tdap 07/26/2012, 06/19/2015, 08/31/2021, 01/18/2023   • Zoster 05/23/2013, 01/23/2014      Health Maintenance:         Topic Date Due   • Colorectal Cancer Screening  10/01/2023   • Hepatitis C Screening  Completed         Topic Date Due   • Influenza Vaccine (1) 09/01/2022      Medicare Screening Tests and Risk Assessments:     Cristina Sina is here for her Subsequent Wellness visit  Health Risk Assessment:   Patient rates overall health as very good  Patient feels that their physical health rating is same   Patient is very satisfied with their life  Eyesight was rated as same  Hearing was rated as same  Patient feels that their emotional and mental health rating is same  Patients states they are never, rarely angry  Patient states they are never, rarely unusually tired/fatigued  Pain experienced in the last 7 days has been none  Patient states that she has experienced no weight loss or gain in last 6 months  Fall Risk Screening: In the past year, patient has experienced: history of falling in past year    Number of falls: 1  Injured during fall?: Yes    Feels unsteady when standing or walking?: No    Worried about falling?: Yes      Urinary Incontinence Screening:   Patient has leaked urine accidently in the last six months  Home Safety:  Patient does not have trouble with stairs inside or outside of their home  Patient has working smoke alarms and has working carbon monoxide detector  Home safety hazards include: none  Nutrition:   Current diet is Regular  Medications:   Patient is currently taking over-the-counter supplements  OTC medications include: see medication list  Patient is not able to manage medications  Activities of Daily Living (ADLs)/Instrumental Activities of Daily Living (IADLs):   Walk and transfer into and out of bed and chair?: Yes  Dress and groom yourself?: Yes    Bathe or shower yourself?: Yes    Feed yourself?  Yes  Do your laundry/housekeeping?: Yes  Manage your money, pay your bills and track your expenses?: Yes  Make your own meals?: Yes    Do your own shopping?: Yes    Previous Hospitalizations:   Any hospitalizations or ED visits within the last 12 months?: Yes    How many hospitalizations have you had in the last year?: 1-2    Advance Care Planning:   Living will: Yes    Advanced directive: Yes      Comments: polst completed    Cognitive Screening:   Provider or family/friend/caregiver concerned regarding cognition?: No    PREVENTIVE SCREENINGS      Cardiovascular Screening:    General: Screening Not Indicated and History Lipid Disorder      Diabetes Screening:     General: Screening Current      Colorectal Cancer Screening:     General: Screening Current      Breast Cancer Screening:     General: Risks and Benefits Discussed    Due for: Mammogram        Cervical Cancer Screening:    General: Screening Not Indicated      Osteoporosis Screening:    General: Risks and Benefits Discussed    Due for: DXA Axial      Abdominal Aortic Aneurysm (AAA) Screening:        General: Screening Not Indicated      Lung Cancer Screening:     General: Screening Not Indicated      Hepatitis C Screening:    General: Screening Current    Screening, Brief Intervention, and Referral to Treatment (SBIRT)    Screening  Typical number of drinks in a day: 0  Typical number of drinks in a week: 0  Interpretation: Low risk drinking behavior  Single Item Drug Screening:  How often have you used an illegal drug (including marijuana) or a prescription medication for non-medical reasons in the past year? never    Single Item Drug Screen Score: 0  Interpretation: Negative screen for possible drug use disorder    No results found  Physical Exam:     /70   Pulse 74   Temp 97 5 °F (36 4 °C)   Resp 14   Ht 5' (1 524 m)   Wt 73 7 kg (162 lb 8 oz)   SpO2 98%   BMI 31 74 kg/m²     Physical Exam  Vitals and nursing note reviewed  Constitutional:       General: She is not in acute distress  Appearance: She is well-developed  HENT:      Head: Normocephalic and atraumatic  Right Ear: Tympanic membrane, ear canal and external ear normal       Left Ear: Tympanic membrane, ear canal and external ear normal       Nose: Nose normal       Mouth/Throat:      Mouth: Mucous membranes are moist       Pharynx: No oropharyngeal exudate or posterior oropharyngeal erythema  Eyes:      Conjunctiva/sclera: Conjunctivae normal    Cardiovascular:      Rate and Rhythm: Normal rate and regular rhythm  Heart sounds: No murmur heard  Pulmonary:      Effort: Pulmonary effort is normal  No respiratory distress  Breath sounds: Normal breath sounds  Abdominal:      Palpations: Abdomen is soft  Tenderness: There is no abdominal tenderness  Musculoskeletal:         General: No swelling  Cervical back: Neck supple  Skin:     General: Skin is warm and dry  Capillary Refill: Capillary refill takes less than 2 seconds  Neurological:      General: No focal deficit present  Mental Status: She is alert and oriented to person, place, and time  Cranial Nerves: No cranial nerve deficit  Sensory: No sensory deficit  Motor: No weakness     Psychiatric:         Mood and Affect: Mood normal           Speedy Guzman DO

## 2023-03-29 NOTE — PROGRESS NOTES
Chart noted. Thank you. Will re-fax once appears in media or new paperwork has been received. Weight Management Medical Nutrition Assessment  Yokasta presented for a follow-up session with the [facility]   Today's weight is 161 5#   She has lost 1 2 # in the past month and overall has lost 22 5# in the past 9 months  She stated she has been food logging and averages 2306-7651 daily  She has not been as physically active  She stated she still finds herself with increased urges to snack at night- started on Naltrexone and she stated that appears to be helping with her cravings at night  Recommend pre plan and prelog her night snack  We reviewed a low calorie meal plan using partial meal replacements   Patient not meeting fluid needs will try to increase- discussed ways to increase fluid intake daily        Patient seen by Medical Provider in past 6 months:  yes  Requested to schedule appointment with Medical Provider: No        Anthropometric Measurements  Start Weight (#):  184 # ( 6/15/22 -MD)   205# (4/22) home scale   Current Weight (#): 161 5#  TBW % Change from start weight:12 2%   Ideal Body Weight (#): 100#  Goal Weight (#):160#      Lowest: 145#     Weight Loss History  Previous weight loss attempts: Counseling with  MD  Meal Replacements (Medifast, Slim Fast, etc )  Nutrition Counseling with RD     Food and Nutrition Related History  Wake up: 7-8a  Bed Time:11a     Food Recall     Breakfast:Raisin Bran flakes - 1 cup with 1/2 cup milk    1 Egg/ 2 Toast   Coffee - creamer (35cal) x2  Snack:fruit   Lunch:Chicken Salad with [place] / lettuce and tomato   Soup - Meal Replacement   Chicken Salad Piffard ( 2 bread)   Snack:skip   Dinner:lean protein/ veggie/ carb - smaller portions  Snack: Shake- Meal replacement    English Muffin         Beverages: water and coffee/tea  Volume of beverage intake: 40oz water, 3 cups coffee daily (creamers- measured )     Weekends: Same  Cravings: sweets or CHO  Trouble area of day:after dinner     Frequency of Eating out: irregularly  Food restrictions:none  Lives with her sister  Cooking: self and sister  Food Shopping: self     Physical Activity Intake  Activity:Walking 20 min   Frequency:3x week    Physical limitations/barriers to exercise: recent injury     Estimated Needs  Energy  [employer] [place] Energy Needs: BMR : 7614 calories           1-0 5# loss weekly lightly active:9108-1664 calories  Maintenance calories for sedentary -lightly active level: 5228-9121 calories   Protein:70-85   (1 2-1 5g/kg IBW); 1 0g/kg IBW= 58  Fluid: 58-68oz     (30-35mL/kg IBW)     Nutrition Diagnosis  Yes;    Overweight/obesity  related to Excess energy intake as evidenced by  BMI more than normative standard for age and sex (obesity-grade I 33  7)     Nutrition Intervention     Nutrition Prescription  Calories:2359-7026 calories   Protein:70-80g  Fluid:65oz     Meal Plan (Riky/Pro/Carb)  Breakfast:200/26-27  Snack:-  Lunch:200/26-27  Snack:100-150/5-10  Dinner:200-300/15-20  Snack:<200/0-5     Nutrition Education:    Healthy Core Manual  Calorie controlled menu  Lean protein food choices  Healthy snack options  Food journaling tips        Nutrition Counseling:  Strategies: meal planning, portion sizes, healthy snack choices, hydration, fiber intake, protein intake, exercise, food journal        Monitoring and Evaluation:  Evaluation criteria:  Energy Intake  Meet protein needs  Maintain adequate hydration  Monitor weekly weight  Meal planning/preparation  Food journal   Decreased portions at mealtimes and snacks  Physical activity      Barriers to learning:none  Readiness to change: Preparation:  (Getting ready to change)   Comprehension: very good  Expected Compliance: good

## 2023-03-30 ENCOUNTER — OFFICE VISIT (OUTPATIENT)
Dept: BARIATRICS | Facility: CLINIC | Age: 78
End: 2023-03-30

## 2023-03-30 VITALS — HEIGHT: 60 IN | WEIGHT: 161.5 LBS | BODY MASS INDEX: 31.71 KG/M2

## 2023-03-30 DIAGNOSIS — R63.5 ABNORMAL WEIGHT GAIN: Primary | ICD-10-CM

## 2023-04-24 ENCOUNTER — TELEPHONE (OUTPATIENT)
Dept: BARIATRICS | Facility: CLINIC | Age: 78
End: 2023-04-24

## 2023-04-24 DIAGNOSIS — F51.01 PRIMARY INSOMNIA: ICD-10-CM

## 2023-04-24 RX ORDER — TRAZODONE HYDROCHLORIDE 50 MG/1
100 TABLET ORAL
Qty: 60 TABLET | Refills: 0 | Status: SHIPPED | OUTPATIENT
Start: 2023-04-24

## 2023-04-29 NOTE — PATIENT INSTRUCTIONS
Tylenol extra strength (500mg) 2 tabs three times daily as needed  Naproxen sparingly  If you're having a lot of hand or thumb pain, refer to [facility]  Back to dr Socrates Escamilla for your back  Obtain labs  Obtain CT chest  Obtain mammogram   Obtain dexa scan  F/u 6 months      Medicare Preventive Visit Patient Instructions  Thank you for completing your Welcome to Medicare Visit or Medicare Annual Wellness Visit today  Your next wellness visit will be due in one year (3/28/2024)  The screening/preventive services that you may require over the next 5-10 years are detailed below  Some tests may not apply to you based off risk factors and/or age  Screening tests ordered at today's visit but not completed yet may show as past due  Also, please note that scanned in results may not display below  Preventive Screenings:  Service Recommendations Previous Testing/Comments   Colorectal Cancer Screening  * Colonoscopy    * Fecal Occult Blood Test (FOBT)/Fecal Immunochemical Test (FIT)  * Fecal DNA/Cologuard Test  * Flexible Sigmoidoscopy Age: 39-70 years old   Colonoscopy: every 10 years (may be performed more frequently if at higher risk)  OR  FOBT/FIT: every 1 year  OR  Cologuard: every 3 years  OR  Sigmoidoscopy: every 5 years  Screening may be recommended earlier than age 39 if at higher risk for colorectal cancer  Also, an individualized decision between you and your healthcare provider will decide whether screening between the ages of 74-80 would be appropriate  Colonoscopy: 10/01/2020  FOBT/FIT: Not on file  Cologuard: Not on file  Sigmoidoscopy: Not on file    Screening Current     Breast Cancer Screening Age: 36 years old  Frequency: every 1-2 years  Not required if history of left and right mastectomy Mammogram: Not on file        Cervical Cancer Screening Between the ages of 21-29, pap smear recommended once every 3 years  Between the ages of 33-67, can perform pap smear with HPV co-testing every 5 years  Recommendations may differ for women with a history of total hysterectomy, cervical cancer, or abnormal pap smears in past  Pap Smear: Not on file    Screening Not Indicated   Hepatitis C Screening Once for adults born between 1945 and 1965  More frequently in patients at high risk for Hepatitis C Hep C Antibody: 11/19/2021    Screening Current   Diabetes Screening 1-2 times per year if you're at risk for diabetes or have pre-diabetes Fasting glucose: 92 mg/dL (3/31/2021)  A1C: 5 5 % (6/28/2022)  Screening Current   Cholesterol Screening Once every 5 years if you don't have a lipid disorder  May order more often based on risk factors  Lipid panel: 12/14/2022    Screening Not Indicated  History Lipid Disorder     Other Preventive Screenings Covered by Medicare:  Abdominal Aortic Aneurysm (AAA) Screening: covered once if your at risk  You're considered to be at risk if you have a family history of AAA  Lung Cancer Screening: covers low dose CT scan once per year if you meet all of the following conditions: (1) Age 50-69; (2) No signs or symptoms of lung cancer; (3) Current smoker or have quit smoking within the last 15 years; (4) You have a tobacco smoking history of at least 20 pack years (packs per day multiplied by number of years you smoked); (5) You get a written order from a healthcare provider  Glaucoma Screening: covered annually if you're considered high risk: (1) You have diabetes OR (2) Family history of glaucoma OR (3)  aged 48 and older OR (3)  American aged 72 and older  Osteoporosis Screening: covered every 2 years if you meet one of the following conditions: (1) You're estrogen deficient and at risk for osteoporosis based off medical history and other findings; (2) Have a vertebral abnormality; (3) On glucocorticoid therapy for more than 3 months; (4) Have primary hyperparathyroidism; (5) On osteoporosis medications and need to assess response to drug therapy     Last bone density test (DXA Scan): Not on file  HIV Screening: covered annually if you're between the age of 12-76  Also covered annually if you are younger than 13 and older than 72 with risk factors for HIV infection  For pregnant patients, it is covered up to 3 times per pregnancy  Immunizations:  Immunization Recommendations   Influenza Vaccine Annual influenza vaccination during flu season is recommended for all persons aged >= 6 months who do not have contraindications   Pneumococcal Vaccine   * Pneumococcal conjugate vaccine = PCV13 (Prevnar 13), PCV15 (Vaxneuvance), PCV20 (Prevnar 20)  * Pneumococcal polysaccharide vaccine = PPSV23 (Pneumovax) Adults 25-60 years old: 1-3 doses may be recommended based on certain risk factors  Adults 72 years old: 1-2 doses may be recommended based off what pneumonia vaccine you previously received   Hepatitis B Vaccine 3 dose series if at intermediate or high risk (ex: diabetes, end stage renal disease, liver disease)   Tetanus (Td) Vaccine - COST NOT COVERED BY MEDICARE PART B Following completion of primary series, a booster dose should be given every 10 years to maintain immunity against tetanus  Td may also be given as tetanus wound prophylaxis  Tdap Vaccine - COST NOT COVERED BY MEDICARE PART B Recommended at least once for all adults  For pregnant patients, recommended with each pregnancy  Shingles Vaccine (Shingrix) - COST NOT COVERED BY MEDICARE PART B  2 shot series recommended in those aged 48 and above     Health Maintenance Due:      Topic Date Due    Colorectal Cancer Screening  10/01/2023    Hepatitis C Screening  Completed     Immunizations Due:      Topic Date Due    Influenza Vaccine (1) 09/01/2022     Advance Directives   What are advance directives? Advance directives are legal documents that state your wishes and plans for medical care  These plans are made ahead of time in case you lose your ability to make decisions for yourself   Advance directives can apply to any medical decision, such as the treatments you want, and if you want to donate organs  What are the types of advance directives? There are many types of advance directives, and each state has rules about how to use them  You may choose a combination of any of the following:  Living will: This is a written record of the treatment you want  You can also choose which treatments you do not want, which to limit, and which to stop at a certain time  This includes surgery, medicine, IV fluid, and tube feedings  Durable power of  for [facility]): This is a written record that states who you want to make healthcare choices for you when you are unable to make them for yourself  This person, called a proxy, is usually a family member or a friend  You may choose more than 1 proxy  Do not resuscitate (DNR) order:  A DNR order is used in case your heart stops beating or you stop breathing  It is a request not to have certain forms of treatment, such as CPR  A DNR order may be included in other types of advance directives  Medical directive: This covers the care that you want if you are in a coma, near death, or unable to make decisions for yourself  You can list the treatments you want for each condition  Treatment may include pain medicine, surgery, blood transfusions, dialysis, IV or tube feedings, and a ventilator (breathing machine)  Values history: This document has questions about your views, beliefs, and how you feel and think about life  This information can help others choose the care that you would choose  Why are advance directives important? An advance directive helps you control your care  Although spoken wishes may be used, it is better to have your wishes written down  Spoken wishes can be misunderstood, or not followed  Treatments may be given even if you do not want them  An advance directive may make it easier for your family to make difficult choices about your care  Fall Prevention    Fall prevention  includes ways to make your home and other areas safer  It also includes ways you can move more carefully to prevent a fall  Health conditions that cause changes in your blood pressure, vision, or muscle strength and coordination may increase your risk for falls  Medicines may also increase your risk for falls if they make you dizzy, weak, or sleepy  Fall prevention tips:   Stand or sit up slowly  Use assistive devices as directed  Wear shoes that fit well and have soles that   Wear a personal alarm  Stay active  Manage your medical conditions  Home Safety Tips:  Add items to prevent falls in the bathroom  Keep paths clear  Install bright lights in your home  Keep items you use often on shelves within reach  [place] or place reflective tape on the edges of your stairs  Urinary Incontinence   Urinary incontinence (UI)  is when you lose control of your bladder  UI develops because your bladder cannot store or empty urine properly  The 3 most common types of UI are stress incontinence, urge incontinence, or both  Medicines:   May be given to help strengthen your bladder control  Report any side effects of medication to your healthcare provider  Do pelvic muscle exercises often:  Your pelvic muscles help you stop urinating  Squeeze these muscles tight for 5 seconds, then relax for 5 seconds  Gradually work up to squeezing for 10 seconds  Do 3 sets of 15 repetitions a day, or as directed  This will help strengthen your pelvic muscles and improve bladder control  Train your bladder:  Go to the bathroom at set times, such as every 2 hours, even if you do not feel the urge to go  You can also try to hold your urine when you feel the urge to go  For example, hold your urine for 5 minutes when you feel the urge to go  As that becomes easier, hold your urine for 10 minutes  Self-care:   Keep a UI record    Write down how often you leak urine and how much you leak  Make a note of what you were doing when you leaked urine  Drink liquids as directed  You may need to limit the amount of liquid you drink to help control your urine leakage  Do not drink any liquid right before you go to bed  Limit or do not have drinks that contain caffeine or alcohol  Prevent constipation  Eat a variety of high-fiber foods  Good examples are high-fiber cereals, beans, vegetables, and whole-grain breads  Walking is the best way to trigger your intestines to have a bowel movement  Exercise regularly and maintain a healthy weight  Weight loss and exercise will decrease pressure on your bladder and help you control your leakage  Use a catheter as directed  to help empty your bladder  A catheter is a tiny, plastic tube that is put into your bladder to drain your urine  Go to behavior therapy as directed  Behavior therapy may be used to help you learn to control your urge to urinate  Weight Management   Why it is important to manage your weight:  Being overweight increases your risk of health conditions such as heart disease, high blood pressure, type 2 diabetes, and certain types of cancer  It can also increase your risk for osteoarthritis, sleep apnea, and other respiratory problems  Aim for a slow, steady weight loss  Even a small amount of weight loss can lower your risk of health problems  How to lose weight safely:  A safe and healthy way to lose weight is to eat fewer calories and get regular exercise  You can lose up about 1 pound a week by decreasing the number of calories you eat by 500 calories each day  Healthy meal plan for weight management:  A healthy meal plan includes a variety of foods, contains fewer calories, and helps you stay healthy  A healthy meal plan includes the following:  Eat whole-grain foods more often  A healthy meal plan should contain fiber  Fiber is the part of grains, fruits, and vegetables that is not broken down by your body  Whole-grain foods are healthy and provide extra fiber in your diet  Some examples of whole-grain foods are whole-wheat breads and pastas, oatmeal, brown rice, and bulgur  Eat a variety of vegetables every day  Include dark, leafy greens such as spinach, kale, marci greens, and mustard greens  Eat yellow and orange vegetables such as carrots, sweet potatoes, and winter squash  Eat a variety of fruits every day  Choose fresh or canned fruit (canned in its own juice or light syrup) instead of juice  Fruit juice has very little or no fiber  Eat low-fat dairy foods  Drink fat-free (skim) milk or 1% milk  Eat fat-free yogurt and low-fat cottage cheese  Try low-fat cheeses such as mozzarella and other reduced-fat cheeses  Choose meat and other protein foods that are low in fat  Choose beans or other legumes such as split peas or lentils  Choose fish, skinless poultry (chicken or turkey), or lean cuts of red meat (beef or pork)  Before you cook meat or poultry, cut off any visible fat  Use less fat and oil  Try baking foods instead of frying them  Add less fat, such as margarine, sour cream, regular salad dressing and mayonnaise to foods  Eat fewer high-fat foods  Some examples of high-fat foods include french fries, doughnuts, ice cream, and cakes  Eat fewer sweets  Limit foods and drinks that are high in sugar  This includes candy, cookies, regular soda, and sweetened drinks  Exercise:  Exercise at least 30 minutes per day on most days of the week  Some examples of exercise include walking, biking, dancing, and swimming  You can also fit in more physical activity by taking the stairs instead of the elevator or parking farther away from stores  Ask your healthcare provider about the best exercise plan for you  © Copyright [employer] 2018 Information is for End User's use only and may not be sold, redistributed or otherwise used for commercial purposes   All illustrations and images included in CareNotes® are the copyrighted property of A D A M , [employer]  or [address] [place], [place] [address]benny donta  Septic shock

## 2023-05-04 ENCOUNTER — CLINICAL SUPPORT (OUTPATIENT)
Dept: BARIATRICS | Facility: CLINIC | Age: 78
End: 2023-05-04

## 2023-05-04 VITALS — HEIGHT: 60 IN | WEIGHT: 161 LBS | BODY MASS INDEX: 31.61 KG/M2

## 2023-05-04 DIAGNOSIS — R63.5 ABNORMAL WEIGHT GAIN: Primary | ICD-10-CM

## 2023-05-11 ENCOUNTER — CLINICAL SUPPORT (OUTPATIENT)
Dept: BARIATRICS | Facility: CLINIC | Age: 78
End: 2023-05-11

## 2023-05-11 VITALS — WEIGHT: 160.6 LBS | HEIGHT: 60 IN | BODY MASS INDEX: 31.53 KG/M2

## 2023-05-11 DIAGNOSIS — R63.5 ABNORMAL WEIGHT GAIN: Primary | ICD-10-CM

## 2023-05-18 ENCOUNTER — CLINICAL SUPPORT (OUTPATIENT)
Dept: BARIATRICS | Facility: CLINIC | Age: 78
End: 2023-05-18

## 2023-05-18 VITALS — HEIGHT: 60 IN | WEIGHT: 161.2 LBS | BODY MASS INDEX: 31.65 KG/M2

## 2023-05-18 DIAGNOSIS — R63.5 ABNORMAL WEIGHT GAIN: Primary | ICD-10-CM

## 2023-05-20 DIAGNOSIS — I10 ESSENTIAL HYPERTENSION: ICD-10-CM

## 2023-05-22 DIAGNOSIS — R10.13 EPIGASTRIC PAIN: ICD-10-CM

## 2023-05-22 DIAGNOSIS — E03.9 HYPOTHYROIDISM, UNSPECIFIED TYPE: ICD-10-CM

## 2023-05-22 RX ORDER — LISINOPRIL 40 MG/1
TABLET ORAL
Qty: 90 TABLET | Refills: 1 | Status: SHIPPED | OUTPATIENT
Start: 2023-05-22

## 2023-05-22 RX ORDER — LEVOTHYROXINE SODIUM 137 UG/1
TABLET ORAL
Qty: 30 TABLET | Refills: 5 | Status: SHIPPED | OUTPATIENT
Start: 2023-05-22

## 2023-05-22 RX ORDER — PANTOPRAZOLE SODIUM 40 MG/1
TABLET, DELAYED RELEASE ORAL
Qty: 60 TABLET | Refills: 5 | Status: SHIPPED | OUTPATIENT
Start: 2023-05-22

## 2023-05-25 ENCOUNTER — CLINICAL SUPPORT (OUTPATIENT)
Dept: BARIATRICS | Facility: CLINIC | Age: 78
End: 2023-05-25

## 2023-05-25 VITALS — BODY MASS INDEX: 31.8 KG/M2 | WEIGHT: 162 LBS | HEIGHT: 60 IN

## 2023-05-25 DIAGNOSIS — R63.5 ABNORMAL WEIGHT GAIN: Primary | ICD-10-CM

## 2023-05-30 ENCOUNTER — OFFICE VISIT (OUTPATIENT)
Dept: BARIATRICS | Facility: CLINIC | Age: 78
End: 2023-05-30

## 2023-05-30 VITALS
HEART RATE: 62 BPM | SYSTOLIC BLOOD PRESSURE: 120 MMHG | RESPIRATION RATE: 16 BRPM | BODY MASS INDEX: 31.57 KG/M2 | WEIGHT: 160.8 LBS | HEIGHT: 60 IN | DIASTOLIC BLOOD PRESSURE: 70 MMHG

## 2023-05-30 DIAGNOSIS — I10 ESSENTIAL HYPERTENSION: ICD-10-CM

## 2023-05-30 DIAGNOSIS — F41.8 MIXED ANXIETY AND DEPRESSIVE DISORDER: ICD-10-CM

## 2023-05-30 DIAGNOSIS — E78.2 MIXED HYPERLIPIDEMIA: ICD-10-CM

## 2023-05-30 DIAGNOSIS — E03.9 HYPOTHYROIDISM, UNSPECIFIED TYPE: ICD-10-CM

## 2023-05-30 DIAGNOSIS — E66.9 OBESITY, CLASS I, BMI 30-34.9: Primary | ICD-10-CM

## 2023-05-30 DIAGNOSIS — K21.9 GASTROESOPHAGEAL REFLUX DISEASE WITHOUT ESOPHAGITIS: ICD-10-CM

## 2023-05-30 RX ORDER — CYCLOSPORINE 0.5 MG/ML
EMULSION OPHTHALMIC
COMMUNITY
Start: 2023-04-27

## 2023-05-30 NOTE — ASSESSMENT & PLAN NOTE
- Patient is pursuing [facility] after completing [facility]   - Initial weight loss goal of 5-10% weight loss for improved health  - On Wellbutrin through primary care provider    - Not a GLP-1 candidate due to history of pancreatitis  - Avoid metformin given eGFR  - Avoid phentermine due to age and history of hypertension   - Naltrexone 25 mg daily started March 2023 at weight of 162 lbs along with Wellbutrin to mimic Contrave  - Took for about 2 weeks, then stopped, as she did not feel that she needed it  Tolerated it well  - Having cravings for ice cream and carbs and reviewed that consistently taking naltrexone may help with that  Also reviewed that she is not obligated to take weight loss medications  She will restart Naltrexone 25 mg daily  Can increase to 50 mg daily in one month if needed or even 25 mg twice a day  - Side effects of naltrexone discussed: nausea, vomiting, diarrhea, constipation, headache, anxiety, and confusion  Advised not to consume alcohol with naltrexone  Must be discontinued prior to surgery  - Will be getting CBC, CMP, and TSH through primary care  Initial: 184 lbs  Last visit: 162 lbs BMI 31 64  Current: 160 8 lbs BMI 31 40  Change: -23 2 lbs (-1 2 lbs since last office visit)  Goal: 150 lbs    Goals:  Try to food log consistently  Increase water intake to at least 64 oz daily  Increase walking as tolerated - limited by knee pain  Continue nutrition recommendations per dietician     Nutrition Prescription  Calories:1934-2010 calories   Protein:70-80g  Fluid:65oz  Restart Naltrexone

## 2023-05-30 NOTE — PROGRESS NOTES
Assessment/Plan:     Obesity, Class I, BMI 30-34 9  - Patient is pursuing [facility] after completing [facility]   - Initial weight loss goal of 5-10% weight loss for improved health  - On Wellbutrin through primary care provider    - Not a GLP-1 candidate due to history of pancreatitis  - Avoid metformin given eGFR  - Avoid phentermine due to age and history of hypertension   - Naltrexone 25 mg daily started March 2023 at weight of 162 lbs along with Wellbutrin to mimic Contrave  - Took for about 2 weeks, then stopped, as she did not feel that she needed it  Tolerated it well  - Having cravings for ice cream and carbs and reviewed that consistently taking naltrexone may help with that  Also reviewed that she is not obligated to take weight loss medications  She will restart Naltrexone 25 mg daily  Can increase to 50 mg daily in one month if needed or even 25 mg twice a day  - Side effects of naltrexone discussed: nausea, vomiting, diarrhea, constipation, headache, anxiety, and confusion  Advised not to consume alcohol with naltrexone  Must be discontinued prior to surgery  - Will be getting CBC, CMP, and TSH through primary care  Initial: 184 lbs  Last visit: 162 lbs BMI 31 64  Current: 160 8 lbs BMI 31 40  Change: -23 2 lbs (-1 2 lbs since last office visit)  Goal: 150 lbs    Goals:  Try to food log consistently  Increase water intake to at least 64 oz daily  Increase walking as tolerated - limited by knee pain  Continue nutrition recommendations per dietician  Nutrition Prescription  Calories:6881-2681 calories   Protein:70-80g  Fluid:65oz  Restart Naltrexone    Hyperlipidemia  - Taking atorvastatin  May improve with weight loss and lifestyle modification  Continue management with prescribing provider  Gastroesophageal reflux disease  - Taking protonix  May improve with weight loss and lifestyle modification  Continue management with prescribing provider         Hypothyroidism  - Taking levothyroxine  Continue management with prescribing provider  Mixed anxiety and depressive disorder  - Taking sertraline, Wellbutrin, and trazodone  Continue management with prescribing provider  Essential hypertension  - Taking norvasc and lisinopril  May improve with weight loss and lifestyle modification  Continue management with prescribing provider  Kim Cornelio was seen today for follow-up  Diagnoses and all orders for this visit:    Obesity, Class I, BMI 30-34 9    Mixed hyperlipidemia    Gastroesophageal reflux disease without esophagitis    Hypothyroidism, unspecified type    Essential hypertension    Mixed anxiety and depressive disorder          Follow up in approximately 3 months with Non-Surgical Physician/Advanced Practitioner  Subjective:   Chief Complaint   Patient presents with   • Follow-up     MWM 2/3mth f/u; Kim       Patient ID: Fabio Abad  is a [de-identified] y o  female with excess weight/obesity here to pursue weight management  Patient is pursuing [facility], previously completed [facility]  Most recent notes and records were reviewed  HPI    Wt Readings from Last 10 Encounters:   05/30/23 72 9 kg (160 lb 12 8 oz)   05/25/23 73 5 kg (162 lb)   05/18/23 73 1 kg (161 lb 3 2 oz)   05/11/23 72 8 kg (160 lb 9 6 oz)   05/04/23 73 kg (161 lb)   04/20/23 73 7 kg (162 lb 6 4 oz)   03/30/23 73 3 kg (161 lb 8 oz)   03/28/23 73 7 kg (162 lb 8 oz)   03/23/23 72 5 kg (159 lb 12 8 oz)   03/17/23 73 4 kg (161 lb 12 8 oz)     Currently in [facility]  Taking Wellbutrin  mg daily through PCP  Had started Naltrexone 1/2 tablet daily for about 2 weeks  No negative side effects  Was helping with appetite  Stopped taking it because she did not feel that she needed it  Appetite almost under control  Having cravings for ice cream and carbs  Recently restarted food logging intermittently  Getting 800-1000 calories per day       Hydration: over 40 oz water sometimes with crystal light, 3 cups of coffee with 1 T creamer  Alcohol: none  Exercise: walking 3 days per week for 30 minutes    Occupation: retired   Sleep: 7-8 hours  Has DIANA, does not wear CPAP          Colonoscopy: LATESHA, due Oct 2023  Mammogram: due, has order, encouraged to schedule          The following portions of the patient's history were reviewed and updated as appropriate: allergies, current medications, past family history, past medical history, past social history, past surgical history, and problem list     Family History   Problem Relation Age of Onset   • Breast cancer Mother    • Alzheimer's disease Mother    • Coronary artery disease Mother    • Hypertension Mother    • Migraines Mother    • Peripheral vascular disease Mother    • Arthritis Mother    • Cancer Mother    • Parkinsonism Father    • Other Father         Cardiovascular disease    • Coronary artery disease Father    • Hypertension Father    • Bipolar disorder Sister    • Thyroid disease Sister    • Alzheimer's disease Sister    • Depression Sister    • Asthma Daughter    • Asthma Son    • Throat cancer Maternal Grandfather    • Cancer Maternal Grandfather    • Diabetes Neg Hx    • Stroke Neg Hx         Review of Systems   HENT: Positive for sore throat (allergies)  Respiratory: Negative for cough and shortness of breath  Cardiovascular: Negative for chest pain and palpitations  Gastrointestinal: Positive for constipation (takes medication)  Negative for abdominal pain, diarrhea, nausea and vomiting  Denies GERD   Musculoskeletal: Positive for arthralgias (chronic) and back pain (chronic)  Skin: Negative for rash  Psychiatric/Behavioral: Negative for suicidal ideas (or HI)  + depression controlled  Denies anxiety        Objective:  /70   Pulse 62   Resp 16   Ht 5' (1 524 m)   Wt 72 9 kg (160 lb 12 8 oz)   BMI 31 40 kg/m²     Physical Exam  Vitals and nursing note reviewed  Constitutional   General appearance: Abnormal   well developed and obese  Eyes No conjunctival injection  Ears, Nose, Mouth, and Throat Oral mucosa moist    Pulmonary   Respiratory effort: No increased work of breathing or signs of respiratory distress  Cardiovascular     Examination of extremities for edema and/or varicosities: Normal   no edema  Abdomen   Abdomen: Abnormal   The abdomen was obese      Musculoskeletal   Normal range of motion  Neurological   Gait and station: Normal     Psychiatric   Orientation to person, place and time: Normal     Affect: appropriate

## 2023-05-31 ENCOUNTER — TELEPHONE (OUTPATIENT)
Dept: BARIATRICS | Facility: CLINIC | Age: 78
End: 2023-05-31

## 2023-06-02 NOTE — PROGRESS NOTES
Weight Management Medical Nutrition Assessment  Yokasta presented for a follow-up session with [facility]   Today's weight is 159 8 #   She has lost 1 7 # since our last visit and overall has lost 24 2# in the past year  She stated she has not been food logging recently but plans to resume - logs given  When does log she attempts to average 0529-6883 daily  She has not been as physically active lately  She stated she still finds herself with increased urges to snack at night- started on Naltrexone and she stated that appears to be helping with her cravings at night  Recommend pre plan and prelog her night snack  We reviewed a low calorie meal plan using partial meal replacements   Patient not meeting fluid needs will try to increase- discussed ways to increase fluid intake daily        Patient seen by Medical Provider in past 6 months:  yes  Requested to schedule appointment with Medical Provider: No        Anthropometric Measurements  Start Weight (#):  184 # ( 6/15/22 -MD)   205# (4/22) home scale   Current Weight (#): 159 8 #  TBW % Change from start weight:13 1%   Ideal Body Weight (#): 100#  Goal Weight (#):160#      Lowest: 145#     Weight Loss History  Previous weight loss attempts: Counseling with  MD  Meal Replacements (Medifast, Slim Fast, etc )  Nutrition Counseling with RD     Food and Nutrition Related History  Wake up: 7-8a  Bed Time:11a     Food Recall     9:00am Breakfast:Raisin Bran flakes - 1 cup with 1/2 cup milk    1 Egg/ 2 Toast   Coffee - creamer (35cal) x2  Snack:skip  Lunch:Chicken Salad with [place] / lettuce and tomato   Soup - Meal Replacement   Chicken Salad [place] ( 2 bread)   Snack:skip or fruit   Dinner:lean protein/ veggie/ carb - smaller portions  Snack: Granola Bar           Beverages: water and coffee/tea  Volume of beverage intake: 40oz water, 3 cups coffee daily (creamers- measured )     Weekends: Same  Cravings: sweets or CHO  Trouble area of day:after dinner     Frequency of Eating out: irregularly  Food restrictions:none  Lives with her sister  Cooking: self and sister  Food Shopping: self     Physical Activity Intake  Activity:Walking 20 min   Frequency:3x week    Physical limitations/barriers to exercise: recent injury     Estimated Needs  Energy  [place] Energy Needs: BMR : 4152 calories           1-0 5# loss weekly lightly active:7220-0427 calories  Maintenance calories for sedentary -lightly active level: 5163-7464 calories   Protein:70-85   (1 2-1 5g/kg IBW); 1 0g/kg IBW= 58  Fluid: 58-68oz     (30-35mL/kg IBW)     Nutrition Diagnosis  Yes;    Overweight/obesity  related to Excess energy intake as evidenced by  BMI more than normative standard for age and sex (obesity-grade I 33  2)     Nutrition Intervention     Nutrition Prescription  Calories:1000 on sedentary days and flex to 1200 calories on walk days    Protein:70-80g  Fluid:65oz     Meal Plan (Riky/Pro/Carb)  Breakfast:200/26-27  Snack:-  Lunch:200/26-27  Snack:100-150/5-10  Dinner:200-300/15-20  Snack:<200/0-5     Nutrition Education:    Healthy Core Manual  Calorie controlled menu  Lean protein food choices  Healthy snack options  Food journaling tips        Nutrition Counseling:  Strategies: meal planning, portion sizes, healthy snack choices, hydration, fiber intake, protein intake, exercise, food journal        Monitoring and Evaluation:  Evaluation criteria:  Energy Intake  Meet protein needs  Maintain adequate hydration  Monitor weekly weight  Meal planning/preparation  Food journal   Decreased portions at mealtimes and snacks  Physical activity      Barriers to learning:none  Readiness to change: Preparation:  (Getting ready to change)   Comprehension: very good  Expected Compliance: good

## 2023-06-04 DIAGNOSIS — I10 ESSENTIAL HYPERTENSION: ICD-10-CM

## 2023-06-04 RX ORDER — AMLODIPINE BESYLATE 10 MG/1
TABLET ORAL
Qty: 90 TABLET | Refills: 0 | Status: SHIPPED | OUTPATIENT
Start: 2023-06-04

## 2023-06-07 ENCOUNTER — OFFICE VISIT (OUTPATIENT)
Dept: BARIATRICS | Facility: CLINIC | Age: 78
End: 2023-06-07

## 2023-06-07 VITALS — BODY MASS INDEX: 31.37 KG/M2 | HEIGHT: 60 IN | WEIGHT: 159.8 LBS

## 2023-06-07 DIAGNOSIS — R63.5 ABNORMAL WEIGHT GAIN: Primary | ICD-10-CM

## 2023-06-07 PROCEDURE — RECHECK: Performed by: DIETITIAN, REGISTERED

## 2023-06-12 ENCOUNTER — OFFICE VISIT (OUTPATIENT)
Dept: PAIN MEDICINE | Facility: MEDICAL CENTER | Age: 78
End: 2023-06-12
Payer: MEDICARE

## 2023-06-12 VITALS
DIASTOLIC BLOOD PRESSURE: 62 MMHG | HEIGHT: 60 IN | HEART RATE: 76 BPM | SYSTOLIC BLOOD PRESSURE: 133 MMHG | WEIGHT: 161 LBS | BODY MASS INDEX: 31.61 KG/M2

## 2023-06-12 DIAGNOSIS — M79.18 MYOFASCIAL PAIN SYNDROME: Primary | ICD-10-CM

## 2023-06-12 PROCEDURE — 99214 OFFICE O/P EST MOD 30 MIN: CPT | Performed by: PHYSICAL MEDICINE & REHABILITATION

## 2023-06-12 RX ORDER — TIZANIDINE HYDROCHLORIDE 4 MG/1
4 CAPSULE, GELATIN COATED ORAL 3 TIMES DAILY
Qty: 90 CAPSULE | Refills: 0 | Status: SHIPPED | OUTPATIENT
Start: 2023-06-12 | End: 2023-07-12

## 2023-06-12 NOTE — PROGRESS NOTES
Assessment:  1  Myofascial pain syndrome        Plan:  1  At this time we will initiate tizanidine for the patient's myofascial pain  2   Continue physical therapy home exercise regimen  3  Patient may contact us for trigger point injections if she needs further relief currently not desiring this    My impressions and treatment recommendations were discussed in detail with the patient who verbalized understanding and had no further questions  Discharge instructions were provided  I personally saw and examined the patient and I agree with the above discussed plan of care  No orders of the defined types were placed in this encounter  New Medications Ordered This Visit   Medications   • TiZANidine (ZANAFLEX) 4 MG capsule     Sig: Take 1 capsule (4 mg total) by mouth 3 (three) times a day     Dispense:  90 capsule     Refill:  0       History of Present Illness:  Justine Rich is a [de-identified] y o  female who presents for a follow up office visit in regards to myofascial pain encompassing the cervical through thoracic regions posteriorly  At this time she has had some improvement in her pain and rates this as a 2/10 but this was quite bothersome prior to her coming in today  She is describing intermittent pain that is worse in the evening depending on her activity throughout the day  She characterizes the pain as burning dull aching and pressure-like  She denies any radicular components  This is leading to moderate functional deficits  I have personally reviewed and/or updated the patient's past medical history, past surgical history, family history, social history, current medications, allergies, and vital signs today  Review of Systems   Respiratory: Negative for shortness of breath  Cardiovascular: Negative for chest pain  Gastrointestinal: Negative for constipation, diarrhea, nausea and vomiting  Musculoskeletal: Positive for back pain, joint swelling, neck pain and neck stiffness   Negative for arthralgias, gait problem and myalgias  Skin: Negative for rash  Neurological: Positive for dizziness  Negative for seizures and weakness  All other systems reviewed and are negative        Patient Active Problem List   Diagnosis   • Carpal tunnel syndrome of right wrist   • Cataract   • Cervical radiculitis   • Vertigo   • Essential hypertension   • Gastroesophageal reflux disease   • Hyperlipidemia   • History of colonic polyps   • Injury of tendon of rotator cuff   • Mild persistent asthma without complication   • Mixed anxiety and depressive disorder   • Occipital neuralgia of left side   • Osteoarthritis of knee   • Postconcussion syndrome   • Hypothyroidism   • Cervical spinal stenosis   • Tinnitus, bilateral   • Ulnar nerve compression, right   • IFG (impaired fasting glucose)   • Rosacea   • History of gastric ulcer   • RUQ pain   • Intestinal metaplasia of gastric mucosa   • URI (upper respiratory infection)   • Near syncope   • Anemia   • Stage 3a chronic kidney disease (HCC)   • Obesity, Class I, BMI 30-34 9   • At risk for sleep apnea   • DIANA (obstructive sleep apnea)       Past Medical History:   Diagnosis Date   • Acid reflux    • Anxiety    • Arthritis    • Asthma    • C1-C4 level with central cord syndrome ([address] )     Resolved 2/1/2017    • Carpal tunnel syndrome Resolved 4/21/2015   • Colitis    • Colon polyp    • Concussion    • Depressed    • Dysthymic disorder     Resolved 1/5/2018   • Gastric ulcer 2013    small - on EGD - [facility]   • Hemorrhoids    • Hx of blood clots    • Hx of colonic polyps     [place]   • Hyperlipemia    • Hypertension    • Hypothyroid    • Lung nodule     stable x 2 yrs on CT   • Migraines    • Obesity    • Postural dizziness with presyncope     Resolved 8/28/2018    • Tendinitis    • Ulnar neuropathy     Resolved 5/21/2015        Past Surgical History:   Procedure Laterality Date   • APPENDECTOMY     • BREAST SURGERY Left 01/1966    BREAST LUMPECTOMY   • CARPAL TUNNEL RELEASE     • CHOLECYSTECTOMY     • DILATION AND CURETTAGE OF UTERUS     • ELBOW SURGERY     • EYE SURGERY Bilateral     Cataract    • GALLBLADDER SURGERY     • JOINT REPLACEMENT Right     REPLACEMENT TOTAL KNEE   • KNEE ARTHROSCOPY     • KNEE SURGERY Right    • NECK SURGERY     • ORTHOPEDIC SURGERY     • POSTERIOR LAMINECTOMY / DECOMPRESSION CERVICAL SPINE  2015   • REPLACEMENT TOTAL KNEE Left    • TONSILLECTOMY     • TOTAL KNEE ARTHROPLASTY Left 2022    [facility] Dr Shikha Mcneal   • VAGINAL DELIVERY      x3   • WRIST SURGERY Right        Family History   Problem Relation Age of Onset   • Breast cancer Mother    • Alzheimer's disease Mother    • Coronary artery disease Mother    • Hypertension Mother    • Migraines Mother    • Peripheral vascular disease Mother    • Arthritis Mother    • Cancer Mother    • Parkinsonism Father    • Other Father         Cardiovascular disease    • Coronary artery disease Father    • Hypertension Father    • Bipolar disorder Sister    • Thyroid disease Sister    • Alzheimer's disease Sister    • Depression Sister    • Asthma Daughter    • Asthma Son    • Throat cancer Maternal Grandfather    • Cancer Maternal Grandfather    • Diabetes Neg Hx    • Stroke Neg Hx        Social History     Occupational History   • Occupation: Retired    Tobacco Use   • Smoking status: Former     Packs/day: 0 00     Years: 30 00     Total pack years: 0 00     Types: Cigarettes     Start date: 1983     Quit date: 10/14/2013     Years since quittin 6   • Smokeless tobacco: Never   Vaping Use   • Vaping Use: Never used   Substance and Sexual Activity   • Alcohol use: No   • Drug use: No   • Sexual activity: Not Currently     Birth control/protection: None       Current Outpatient Medications on File Prior to Visit   Medication Sig   • albuterol (Ventolin HFA) 90 mcg/act inhaler Inhale 2 puffs every 6 (six) hours as needed for wheezing   • amLODIPine (NORVASC) 10 mg tablet TAKE 1 TABLET BY MOUTH EVERY DAY   • Ascorbic Acid (VITAMIN C PO) Vitamin C   • atorvastatin (LIPITOR) 20 mg tablet TAKE 1 TABLET BY MOUTH EVERY DAY   • buPROPion (Wellbutrin XL) 150 mg 24 hr tablet Take 1 tablet (150 mg total) by mouth every morning   • Cholecalciferol (Vitamin D3) 1 25 MG (03667 UT) CAPS Take 5,000 Units by mouth daily   • fluocinonide (LIDEX) 0 05 % ointment Apply topically 2 (two) times a day To thickened area of the left upper back  Use for two weeks straight, twice daily  After two weeks, use only on Monday through Friday  • fluticasone (FLOVENT HFA) 110 MCG/ACT inhaler Inhale 2 puffs 2 (two) times a day Rinse mouth after use  • levothyroxine 137 mcg tablet TAKE 1 TABLET BY MOUTH EVERY DAY   • lisinopril (ZESTRIL) 40 mg tablet TAKE 1 TABLET BY MOUTH EVERY DAY   • metroNIDAZOLE (METROGEL) 1 % gel Apply topically daily   • Multiple Vitamin (MULTIVITAMIN ADULT PO) multivitamin   • naltrexone (REVIA) 50 mg tablet Take 1/2 tablet daily in the morning  • pantoprazole (PROTONIX) 40 mg tablet TAKE 1 TABLET BY MOUTH TWICE A DAY   • Restasis 0 05 % ophthalmic emulsion INSTILL 1 DROP BY OPHTHALMIC ROUTE EVERY 12 HOURS IN BOTH EYES   • Sennosides 8 6 MG CAPS Take 8 6 mg by mouth 2 (two) times a day   • sertraline (ZOLOFT) 100 mg tablet Take 1 tablet (100 mg total) by mouth 2 (two) times a day   • traZODone (DESYREL) 50 mg tablet TAKE 2 TABLETS (100 MG TOTAL) BY MOUTH DAILY AT BEDTIME   • Diclofenac Sodium (VOLTAREN) 1 % Apply 2 g topically 4 (four) times a day for 15 days     No current facility-administered medications on file prior to visit  Allergies   Allergen Reactions   • Ceftin [Cefuroxime] Hives       Physical Exam:    /62   Pulse 76   Ht 5' (1 524 m)   Wt 73 kg (161 lb)   BMI 31 44 kg/m²     Constitutional:normal, well developed, well nourished, alert, in no distress and non-toxic and no overt pain behavior    Eyes:anicteric  HEENT:grossly intact  Neck: symmetric, trachea midline and no masses   Pulmonary:even and unlabored  Cardiovascular:No edema or pitting edema present  Psychiatric:Mood and affect appropriate  Neurologic:Cranial Nerves II-XII grossly intact  Musculoskeletal:normal, except for tenderness to palpation throughout the thoracic and cervical paraspinal regions including the trapezius, cervical paraspinal muscles, rhomboids bilaterally    Imaging

## 2023-06-15 DIAGNOSIS — E78.5 HYPERLIPIDEMIA, UNSPECIFIED HYPERLIPIDEMIA TYPE: ICD-10-CM

## 2023-06-15 RX ORDER — ATORVASTATIN CALCIUM 20 MG/1
20 TABLET, FILM COATED ORAL DAILY
Qty: 90 TABLET | Refills: 1 | Status: SHIPPED | OUTPATIENT
Start: 2023-06-15

## 2023-06-15 NOTE — PROGRESS NOTES
Assessment/Plan:  Problem List Items Addressed This Visit        Cardiovascular and Mediastinum    Essential hypertension     Stable  Check blood pressure outside of office  Recommend lifestyle modifications  Other Visit Diagnoses     Fall, initial encounter    -  Primary    Mechanical   No recurrence  Facial laceration, initial encounter        6 simple sutures removed today s difficulty  Use Vitamin E oil to massage scar or OTC Scar Guard product  Ecchymosis        Self-limiting  Visit for suture removal     See above  Return if symptoms worsen or fail to improve  Future Appointments   Date Time Provider Temo Roy   6/22/2023  2:00 PM [facility]   6/29/2023  2:00 PM [facility]   7/6/2023 10:30 AM Zuleima Gibson RD [facility]   7/13/2023  2:00 PM [facility]   8/7/2023  2:00 PM DO ASHLEY Blount [facility]   9/21/2023 11:15 AM MAGALY Mcelroy [facility]   10/4/2023  1:00 PM Cheli Deshpande DO [facility]        Subjective:     Svetlana Haywood is a [de-identified] y o  female who presents today for a follow-up on her acute medical conditions  HPI:  Chief Complaint   Patient presents with   • Suture / Staple Removal     Suture removal from above the R eyebrow  Seen in the ER s/p fall  -- Above per clinical staff and reviewed  --    HPI      Today:      F/U ER [facility] 6/13/23    1  Fall -sustained a mechanical fall when she tripped over an elevated piece of sidewalk, hitting her forehead  She is not on blood thinners and she denies loss of consciousness    Negative Head CT s contrast, CT chest abdomen pelvis without contrast, CT maxillofacial, CT lumbar spine, CT cervical spine without contrast   Patient was initially placed in a c-collar and met criteria for clearance  Tdap is up to date  She denies falling in the interim  2   Facial Laceration -right forehead times two 2 cm lacerations with 3 simple sutures each were placed  Sutures were to be removed 6/18/2023  She has been using antibiotic ointment  Denies D/C or suture pain  She has sinus pain  3   Left Knee Joint Swelling - Stable Left Knee and Patella Xray  Denies pain  The following portions of the patient's history were reviewed and updated as appropriate: allergies, current medications, past family history, past medical history, past social history, past surgical history and problem list       Review of Systems   Constitutional: Negative for appetite change, chills, diaphoresis, fatigue and fever  Respiratory: Negative for chest tightness and shortness of breath  Cardiovascular: Negative for chest pain  Gastrointestinal: Negative for abdominal pain, blood in stool, diarrhea, nausea and vomiting  Genitourinary: Negative for dysuria  Current Outpatient Medications   Medication Sig Dispense Refill   • albuterol (Ventolin HFA) 90 mcg/act inhaler Inhale 2 puffs every 6 (six) hours as needed for wheezing 1 Inhaler 2   • amLODIPine (NORVASC) 10 mg tablet TAKE 1 TABLET BY MOUTH EVERY DAY 90 tablet 0   • Ascorbic Acid (VITAMIN C PO) Vitamin C     • atorvastatin (LIPITOR) 20 mg tablet Take 1 tablet (20 mg total) by mouth daily 90 tablet 1   • buPROPion (Wellbutrin XL) 150 mg 24 hr tablet Take 1 tablet (150 mg total) by mouth every morning 30 tablet 3   • Cholecalciferol (Vitamin D3) 1 25 MG (69279 UT) CAPS Take 5,000 Units by mouth daily     • Diclofenac Sodium (VOLTAREN) 1 % Apply 2 g topically 4 (four) times a day for 15 days 120 g 0   • fluticasone (FLOVENT HFA) 110 MCG/ACT inhaler Inhale 2 puffs 2 (two) times a day Rinse mouth after use   12 g 1   • levothyroxine 137 mcg tablet TAKE 1 TABLET BY MOUTH EVERY DAY 30 tablet 5   • lisinopril (ZESTRIL) 40 mg tablet TAKE 1 TABLET BY MOUTH EVERY DAY 90 tablet 1   • metroNIDAZOLE (METROGEL) 1 % gel Apply topically daily 45 g 0   • Multiple Vitamin (MULTIVITAMIN ADULT PO) multivitamin     • naltrexone (REVIA) 50 mg tablet Take 1/2 tablet daily in the morning  15 tablet 2   • pantoprazole (PROTONIX) 40 mg tablet TAKE 1 TABLET BY MOUTH TWICE A DAY 60 tablet 5   • Restasis 0 05 % ophthalmic emulsion INSTILL 1 DROP BY OPHTHALMIC ROUTE EVERY 12 HOURS IN BOTH EYES     • Sennosides 8 6 MG CAPS Take 8 6 mg by mouth 2 (two) times a day     • sertraline (ZOLOFT) 100 mg tablet Take 1 tablet (100 mg total) by mouth 2 (two) times a day 60 tablet 5   • TiZANidine (ZANAFLEX) 4 MG capsule Take 1 capsule (4 mg total) by mouth 3 (three) times a day (Patient taking differently: Take 4 mg by mouth 3 (three) times a day as needed) 90 capsule 0   • traZODone (DESYREL) 50 mg tablet TAKE 2 TABLETS (100 MG TOTAL) BY MOUTH DAILY AT BEDTIME 60 tablet 0     No current facility-administered medications for this visit  Objective:  /64   Pulse 70   Temp 98 3 °F (36 8 °C)   Resp 16   Ht 5' (1 524 m)   Wt 73 kg (161 lb)   SpO2 98%   BMI 31 44 kg/m²    Wt Readings from Last 3 Encounters:   06/19/23 73 kg (161 lb)   06/12/23 73 kg (161 lb)   06/07/23 72 5 kg (159 lb 12 8 oz)      BP Readings from Last 3 Encounters:   06/19/23 130/64   06/12/23 133/62   05/30/23 120/70          Physical Exam  Vitals and nursing note reviewed  Exam conducted with a chaperone present Martha Sanchez LPN)  Constitutional:       Appearance: Normal appearance  She is well-developed  She is obese  HENT:      Head: Normocephalic and atraumatic  Comments: Right inferior eye c ecchymosis     Nose:      Right Sinus: Maxillary sinus tenderness and frontal sinus tenderness present  Left Sinus: Maxillary sinus tenderness and frontal sinus tenderness present  Eyes:      Extraocular Movements: Extraocular movements intact        Conjunctiva/sclera: Conjunctivae normal    Neck: "     Thyroid: No thyromegaly  Cardiovascular:      Rate and Rhythm: Normal rate and regular rhythm  Pulses: Normal pulses  Heart sounds: Normal heart sounds  Pulmonary:      Effort: Pulmonary effort is normal       Breath sounds: Normal breath sounds  Musculoskeletal:         General: Tenderness (B/L [place] facial bones) present  No swelling  Cervical back: Neck supple  Right lower leg: No edema  Left lower leg: No edema  Lymphadenopathy:      Cervical: No cervical adenopathy  Skin:     Findings: No rash  Comments: Right inferior forehead c 2 lacerations of 3 simples sutures, well-approximated  6 simple sutures removed s difficulty  Neurological:      General: No focal deficit present  Mental Status: She is alert and oriented to person, place, and time  Psychiatric:         Mood and Affect: Mood normal          Lab Results:      Lab Results   Component Value Date    WBC 6 76 06/28/2022    HGB 11 7 06/28/2022    HCT 37 8 06/28/2022     06/28/2022    TRIG 86 12/14/2022    HDL 81 12/14/2022    ALT 21 12/07/2021    AST 15 12/07/2021     02/27/2015    K 4 2 12/07/2021     12/07/2021    CREATININE 1 08 12/07/2021    BUN 25 12/07/2021    CO2 26 12/07/2021    INR 1 12 02/26/2015    GLUF 92 03/31/2021    HGBA1C 5 5 06/28/2022     No results found for: \"URICACID\"  Invalid input(s): \"BASENAME\" Vitamin D    CT CERVICAL SPINE WO CONTRAST    Result Date: 6/14/2023  Narrative: History: Trauma  Procedure: Thin section axial CT scan of the cervical spine was obtained by departmental protocol followed by sagittal and coronal reformations  Comparison: CT dated 1/18/2023  Findings:   Osteopenia  Straightening of cervical lordosis  The craniocervical junction is intact  The odontoid is intact  There is anterolisthesis at C2-C3  Postsurgical changes from C3 through C7 with evidence of fixation screws and multilevel laminectomies   The cervical vertebral bodies maintain " normal height  There is no acute fracture  There are multilevel degenerative changes  The current study does not assess for ligamentous laxity or injury  Contents of the spinal canal are suboptimally evaluated  Follow trauma protocol  Impression: Impression: 1  No acute fracture or dislocation in the cervical spine  Continue to follow trauma protocol  2  Postsurgical and multilevel degenerative changes  CT examination performed with dose lowering protocol in accordance with [employer]  Workstation:[id number]    NO CHARGE CT LUMBAR SPINE    Result Date: 6/14/2023  Narrative: History: Trauma  Technique: Thin section axial images of the thoracic and lumbar spine were reconstructed from the raw data of the unenhanced CT scan of the chest, abdomen and pelvis  Sagittal and coronal reformations were obtained  The images were  reviewed in bone algorithm  Comparison: None  Findings: Scoliotic curvature of the thoracic spine  Normal thoracic kyphosis and lumbar lordosis  Mild grade 1 anterolisthesis at L3-L4 and L5-S1  The thoracolumbar vertebral bodies maintain normal height  No acute fracture in the thoracolumbar spine  There are multilevel degenerative changes  The current study does not evaluate for ligamentous laxity or injury  Contents of the spinal canal are suboptimally evaluated  Follow trauma protocol  For detailed soft tissue evaluation please refer to the report of the CT scan of the chest, abdomen and pelvis  Impression: Impression: No acute fracture or dislocation involving the thoracic or lumbar spine  Continue to follow trauma protocol  CT examination performed with dose lowering protocol in accordance with [employer]  Workstation:[id number]    NO CHARGE CT THORACIC SPINE    Result Date: 6/14/2023  Narrative: History: Trauma  Technique: Thin section axial images of the thoracic and lumbar spine were reconstructed from the raw data of the unenhanced CT scan of the chest, abdomen and pelvis   Sagittal and coronal reformations were obtained  The images were  reviewed in bone algorithm  Comparison: None  Findings: Scoliotic curvature of the thoracic spine  Normal thoracic kyphosis and lumbar lordosis  Mild grade 1 anterolisthesis at L3-L4 and L5-S1  The thoracolumbar vertebral bodies maintain normal height  No acute fracture in the thoracolumbar spine  There are multilevel degenerative changes  The current study does not evaluate for ligamentous laxity or injury  Contents of the spinal canal are suboptimally evaluated  Follow trauma protocol  For detailed soft tissue evaluation please refer to the report of the CT scan of the chest, abdomen and pelvis  Impression: Impression: No acute fracture or dislocation involving the thoracic or lumbar spine  Continue to follow trauma protocol  CT examination performed with dose lowering protocol in accordance with [employer]  Workstation:[id number]    CT MAXILOFACIAL    Result Date: 6/14/2023  Narrative: CLINICAL HISTORY: Trauma  STUDY: Noncontrast CT examination of the facial bones was performed by obtaining direct axial images and generating sagittal and coronal reformations  Images reviewed in bone and soft tissue windows  COMPARISON: CT dated 6/13/2023  Findings: The frontal, sphenoid and maxillary sinuses are well-aerated and intact  The ethmoid air cells are well-aerated and intact  There is a chronic defect of the right lamina papyracea  Bilateral lens replacement noted  The globes and the extraocular muscles are symmetric  No retrobulbar fat infiltration  The orbital walls are intact  The nasal bones are intact  The nasal septum is essentially midline  The zygomatic arches and the pterygoid plates are intact  The mandible is intact  Impression: IMPRESSION: No acute maxillofacial fracture  CT examination performed with dose lowering protocol in accordance with [employer]  Workstation:[id number]    CT head wo contrast    Result Date: 6/13/2023  Narrative: Clinical information: Trauma  Technique: Unenhanced CT scan of the brain was performed by department technique  Images were reviewed in soft tissue and bone algorithms with 2 5 mm axial sections, sagittal and coronal reformations  Comparison: CT dated 1/18/2023  Findings Brain Parenchyma: No large acute territorial infarction  Cerebral white matter hypoattenuation is nonspecific and likely related to microangiopathy  Ventricular system, basal cisterns and extra-axial spaces: Prominent, compatible with cerebral volume loss  Intracranial hemorrhage: None  Midline shift: None  Skull base & Calvarium: No depressed calvarial fracture  Atherosclerotic vascular calcifications are noted at the skull base  Paranasal sinuses and mastoid air cells: Clear  Visualized orbits: Bilateral lens replacement  Sella: Unremarkable  Impression: Impression: No acute posttraumatic intracranial abnormality or depressed calvarial fracture  Workstation:[id number]    CT CHEST ABDOMEN AND PELVIS WO CONTRAST    Result Date: 6/13/2023  Narrative: HISTORY: Polytrauma, blunt    MRN:  [id number]     DATE OF SERVICE:  6/13/2023 10:35 PM                       EXAM DESCRIPTION:  CT CHEST ABDOMEN AND PELVIS WO CONTRAST   COMPARISON:  No relevant recent priors  TECHNIQUE: CT of the chest, abdomen and pelvis was performed after the intravenous administration of IV contrast  FINDINGS: CHEST: Lungs/Pleura: Overall stable micronodules  Mediastinum/Lymph nodes: The visualized thyroid gland is unremarkable  Limited evaluation for bilateral hilar lymph nodes due to lack of IV contrast  Cardiovascular: There is no cardiomegaly  There is no pericardial effusion  Chest wall/Soft Tissues: Unremarkable  Bones: No acute posttraumatic osseous abnormalities identified  Interval followup per trauma protocol    ABDOMEN/PELVIS: Liver: No significant change in right hepatic cysts  Bile Ducts/Gallbladder: Unremarkable  Spleen: Within normal limits    Pancreas: Unremarkable    Adrenal glands: Both adrenals are unremarkable  Kidneys/Ureters: There is no evidence of contour deforming mass    No evidence of hydronephrosis    Vessels: No evidence of abnormal aortic aneurysm  Bowel/Mesentery: No bowel obstruction  No evidence of appendicitis  Abdominal Wall/Soft Tissues:  Unremarkable    Pelvis: No loculated fluid collection or abscess    Lymph nodes: No evidence of retroperitoneal lymphadenopathy    Bones: No acute posttraumatic osseous abnormalities identified  Interval followup per trauma protocol        Impression: IMPRESSION: 1  No CT evidence of acute posttraumatic abnormalities in the chest, abdomen, and pelvis  Overall stable bilateral micronodules  CT examination performed with dose lowering protocol in accordance with [employer]  Workstation:[id number]    XR KNEE 4+ VW BILATERAL    Result Date: 6/13/2023  Narrative: EXAMINATION: 4 views of the right knee  INDICATION:  Fall COMPARISON:  None  FINDINGS:  No evidence of acute fracture or dislocation is seen  Evidence of total knee replacement  No loosening of the processes or hardware failure  Vascular calcification  Impression:  IMPRESSION:  No acute bony abnormality identified  Total knee replacement EXAMINATION: 4 views of the left knee  INDICATION:  History of fall COMPARISON:  None  FINDINGS: Evidence of total knee replacement  No fracture or dislocation  Vascular calcification  Soft tissue swelling seen anterior to the knee  Sai Parker IMPRESSION:  No Acute fracture or dislocation  Bilateral total knee replacement  No loosening of prosthesis or hardware failure  Mild soft tissue swelling ventral to the left knee    Workstation:[id number]       POCT Labs

## 2023-06-19 ENCOUNTER — OFFICE VISIT (OUTPATIENT)
Dept: FAMILY MEDICINE CLINIC | Facility: CLINIC | Age: 78
End: 2023-06-19
Payer: MEDICARE

## 2023-06-19 VITALS
TEMPERATURE: 98.3 F | OXYGEN SATURATION: 98 % | HEIGHT: 60 IN | DIASTOLIC BLOOD PRESSURE: 64 MMHG | WEIGHT: 161 LBS | HEART RATE: 70 BPM | RESPIRATION RATE: 16 BRPM | SYSTOLIC BLOOD PRESSURE: 130 MMHG | BODY MASS INDEX: 31.61 KG/M2

## 2023-06-19 DIAGNOSIS — S01.81XA FACIAL LACERATION, INITIAL ENCOUNTER: ICD-10-CM

## 2023-06-19 DIAGNOSIS — R58 ECCHYMOSIS: ICD-10-CM

## 2023-06-19 DIAGNOSIS — I10 ESSENTIAL HYPERTENSION: ICD-10-CM
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
## 2023-06-19 DIAGNOSIS — Z48.02 VISIT FOR SUTURE REMOVAL: ICD-10-CM

## 2023-06-19 PROCEDURE — 99214 OFFICE O/P EST MOD 30 MIN: CPT | Performed by: FAMILY MEDICINE

## 2023-06-22 ENCOUNTER — CLINICAL SUPPORT (OUTPATIENT)
Dept: BARIATRICS | Facility: CLINIC | Age: 78
End: 2023-06-22

## 2023-06-22 VITALS — HEIGHT: 60 IN | WEIGHT: 162.4 LBS | BODY MASS INDEX: 31.88 KG/M2

## 2023-06-22 DIAGNOSIS — R63.5 ABNORMAL WEIGHT GAIN: Primary | ICD-10-CM

## 2023-06-22 PROCEDURE — WMWO WEIGHT MANAGEMENT WEEKLY OPTION

## 2023-06-22 PROCEDURE — RECHECK

## 2023-06-29 ENCOUNTER — CLINICAL SUPPORT (OUTPATIENT)
Dept: BARIATRICS | Facility: CLINIC | Age: 78
End: 2023-06-29

## 2023-06-29 VITALS — HEIGHT: 60 IN | BODY MASS INDEX: 31.84 KG/M2 | WEIGHT: 162.2 LBS

## 2023-06-29 DIAGNOSIS — R63.5 ABNORMAL WEIGHT GAIN: Primary | ICD-10-CM

## 2023-06-29 PROCEDURE — RECHECK

## 2023-07-01 ENCOUNTER — OFFICE VISIT (OUTPATIENT)
Dept: URGENT CARE | Facility: MEDICAL CENTER | Age: 78
End: 2023-07-01
Payer: MEDICARE

## 2023-07-01 VITALS
RESPIRATION RATE: 20 BRPM | HEART RATE: 68 BPM | OXYGEN SATURATION: 98 % | TEMPERATURE: 97 F | WEIGHT: 162 LBS | HEIGHT: 60 IN | BODY MASS INDEX: 31.8 KG/M2

## 2023-07-01 DIAGNOSIS — J01.00 ACUTE MAXILLARY SINUSITIS, RECURRENCE NOT SPECIFIED: Primary | ICD-10-CM

## 2023-07-01 DIAGNOSIS — J40 BRONCHITIS: ICD-10-CM

## 2023-07-01 PROCEDURE — G0463 HOSPITAL OUTPT CLINIC VISIT: HCPCS | Performed by: PHYSICIAN ASSISTANT

## 2023-07-01 PROCEDURE — 99213 OFFICE O/P EST LOW 20 MIN: CPT | Performed by: PHYSICIAN ASSISTANT

## 2023-07-01 RX ORDER — DOXYCYCLINE 100 MG/1
100 TABLET ORAL 2 TIMES DAILY
Qty: 20 TABLET | Refills: 0 | Status: SHIPPED | OUTPATIENT
Start: 2023-07-01 | End: 2023-07-11

## 2023-07-01 RX ORDER — FLUTICASONE PROPIONATE 50 MCG
1 SPRAY, SUSPENSION (ML) NASAL DAILY
Qty: 9.9 ML | Refills: 0 | Status: SHIPPED | OUTPATIENT
Start: 2023-07-01

## 2023-07-01 RX ORDER — BENZONATATE 100 MG/1
100 CAPSULE ORAL 3 TIMES DAILY PRN
Qty: 20 CAPSULE | Refills: 0 | Status: SHIPPED | OUTPATIENT
Start: 2023-07-01

## 2023-07-01 NOTE — PROGRESS NOTES
[facility]        NAME: Jean Alvarado is a [de-identified] y o  female  : 1945    MRN: [id number]  DATE: 2023  TIME: 11:23 AM    Pulse 68   Temp (!) 97 °F (36 1 °C)   Resp 20   Ht 5' (1 524 m)   Wt 73 5 kg (162 lb)   SpO2 98%   BMI 31 64 kg/m²     Assessment and Plan   Acute maxillary sinusitis, recurrence not specified [J01 00]  1  Acute maxillary sinusitis, recurrence not specified  doxycycline (ADOXA) 100 MG tablet    fluticasone (FLONASE) 50 mcg/act nasal spray    benzonatate (TESSALON PERLES) 100 mg capsule      2  Bronchitis  doxycycline (ADOXA) 100 MG tablet    fluticasone (FLONASE) 50 mcg/act nasal spray    benzonatate (TESSALON PERLES) 100 mg capsule            Patient Instructions       Follow up with PCP in 3-5 days  Proceed to  ER if symptoms worsen  Chief Complaint     Chief Complaint   Patient presents with   • Sinusitis     Patient states she started with sinus congestion, headache, and now has a cough  She has used Flonase with no relief  She had a negative covid teat at home today         History of Present Illness       Pt with several days of sinus pain and pressure and minor cough,  Home covid test negative today , pt also with minor cough,       Review of Systems   Review of Systems   Constitutional: Negative  HENT: Positive for congestion, sinus pressure and sinus pain  Eyes: Negative  Respiratory: Positive for cough  Cardiovascular: Negative  Gastrointestinal: Negative  Endocrine: Negative  Genitourinary: Negative  Musculoskeletal: Negative  Skin: Negative  Allergic/Immunologic: Negative  Neurological: Positive for headaches  Hematological: Negative  Psychiatric/Behavioral: Negative  All other systems reviewed and are negative          Current Medications       Current Outpatient Medications:   •  benzonatate (TESSALON PERLES) 100 mg capsule, Take 1 capsule (100 mg total) by mouth 3 (three) times a day as needed for cough, Disp: 20 capsule, Rfl: 0  •  doxycycline (ADOXA) 100 MG tablet, Take 1 tablet (100 mg total) by mouth 2 (two) times a day for 10 days, Disp: 20 tablet, Rfl: 0  •  fluticasone (FLONASE) 50 mcg/act nasal spray, 1 spray into each nostril daily, Disp: 9 9 mL, Rfl: 0  •  albuterol (Ventolin HFA) 90 mcg/act inhaler, Inhale 2 puffs every 6 (six) hours as needed for wheezing, Disp: 1 Inhaler, Rfl: 2  •  amLODIPine (NORVASC) 10 mg tablet, TAKE 1 TABLET BY MOUTH EVERY DAY, Disp: 90 tablet, Rfl: 0  •  Ascorbic Acid (VITAMIN C PO), Vitamin C, Disp: , Rfl:   •  atorvastatin (LIPITOR) 20 mg tablet, Take 1 tablet (20 mg total) by mouth daily, Disp: 90 tablet, Rfl: 1  •  buPROPion (Wellbutrin XL) 150 mg 24 hr tablet, Take 1 tablet (150 mg total) by mouth every morning, Disp: 30 tablet, Rfl: 3  •  Cholecalciferol (Vitamin D3) 1 25 MG (58252 UT) CAPS, Take 5,000 Units by mouth daily, Disp: , Rfl:   •  Diclofenac Sodium (VOLTAREN) 1 %, Apply 2 g topically 4 (four) times a day for 15 days, Disp: 120 g, Rfl: 0  •  fluticasone (FLOVENT HFA) 110 MCG/ACT inhaler, Inhale 2 puffs 2 (two) times a day Rinse mouth after use , Disp: 12 g, Rfl: 1  •  levothyroxine 137 mcg tablet, TAKE 1 TABLET BY MOUTH EVERY DAY, Disp: 30 tablet, Rfl: 5  •  lisinopril (ZESTRIL) 40 mg tablet, TAKE 1 TABLET BY MOUTH EVERY DAY, Disp: 90 tablet, Rfl: 1  •  metroNIDAZOLE (METROGEL) 1 % gel, Apply topically daily, Disp: 45 g, Rfl: 0  •  Multiple Vitamin (MULTIVITAMIN ADULT PO), multivitamin, Disp: , Rfl:   •  naltrexone (REVIA) 50 mg tablet, Take 1/2 tablet daily in the morning , Disp: 15 tablet, Rfl: 2  •  pantoprazole (PROTONIX) 40 mg tablet, TAKE 1 TABLET BY MOUTH TWICE A DAY, Disp: 60 tablet, Rfl: 5  •  Restasis 0 05 % ophthalmic emulsion, INSTILL 1 DROP BY OPHTHALMIC ROUTE EVERY 12 HOURS IN BOTH EYES, Disp: , Rfl:   •  Sennosides 8 6 MG CAPS, Take 8 6 mg by mouth 2 (two) times a day, Disp: , Rfl:   •  sertraline (ZOLOFT) 100 mg tablet, Take 1 tablet (100 mg total) by mouth 2 (two) times a day, Disp: 60 tablet, Rfl: 5  •  TiZANidine (ZANAFLEX) 4 MG capsule, Take 1 capsule (4 mg total) by mouth 3 (three) times a day (Patient taking differently: Take 4 mg by mouth 3 (three) times a day as needed), Disp: 90 capsule, Rfl: 0  •  traZODone (DESYREL) 50 mg tablet, TAKE 2 TABLETS (100 MG TOTAL) BY MOUTH DAILY AT BEDTIME, Disp: 60 tablet, Rfl: 0    Current Allergies     Allergies as of 07/01/2023 - Reviewed 07/01/2023   Allergen Reaction Noted   • Ceftin [cefuroxime] Hives 11/27/2018            The following portions of the patient's history were reviewed and updated as appropriate: allergies, current medications, past family history, past medical history, past social history, past surgical history and problem list      Past Medical History:   Diagnosis Date   • Acid reflux    • Anxiety    • Arthritis    • Asthma    • C1-C4 level with central cord syndrome ([facility] [address] )     Resolved 2/1/2017    • Carpal tunnel syndrome Resolved 4/21/2015   • Colitis    • Colon polyp    • Concussion    • Depressed    • Dysthymic disorder     Resolved 1/5/2018   • Gastric ulcer 2013    small - on EGD - [facility]   • Hemorrhoids    • Hx of blood clots    • Hx of colonic polyps     [facility]   • Hyperlipemia    • Hypertension    • Hypothyroid    • Lung nodule     stable x 2 yrs on CT   • Migraines    • Obesity    • Postural dizziness with presyncope     Resolved 8/28/2018    • Tendinitis    • Ulnar neuropathy     Resolved 5/21/2015        Past Surgical History:   Procedure Laterality Date   • APPENDECTOMY     • BREAST SURGERY Left 01/1966    BREAST LUMPECTOMY   • CARPAL TUNNEL RELEASE     • CHOLECYSTECTOMY     • DILATION AND CURETTAGE OF UTERUS     • ELBOW SURGERY     • EYE SURGERY Bilateral 2019    Cataract    • GALLBLADDER SURGERY     • JOINT REPLACEMENT Right     REPLACEMENT TOTAL KNEE   • KNEE ARTHROSCOPY     • KNEE SURGERY Right    • NECK SURGERY     • ORTHOPEDIC SURGERY     • POSTERIOR LAMINECTOMY / DECOMPRESSION CERVICAL SPINE  02/2015   • REPLACEMENT TOTAL KNEE Left 2022   • TONSILLECTOMY     • TOTAL KNEE ARTHROPLASTY Left 04/06/2022    [facility] Dr Jazmyn Hernandez   • VAGINAL DELIVERY      x3   • WRIST SURGERY Right        Family History   Problem Relation Age of Onset   • Breast cancer Mother    • Alzheimer's disease Mother    • Coronary artery disease Mother    • Hypertension Mother    • Migraines Mother    • Peripheral vascular disease Mother    • Arthritis Mother    • Cancer Mother    • Parkinsonism Father    • Other Father         Cardiovascular disease    • Coronary artery disease Father    • Hypertension Father    • Bipolar disorder Sister    • Thyroid disease Sister    • Alzheimer's disease Sister    • Depression Sister    • Asthma Daughter    • Asthma Son    • Throat cancer Maternal Grandfather    • Cancer Maternal Grandfather    • Diabetes Neg Hx    • Stroke Neg Hx          Medications have been verified  Objective   Pulse 68   Temp (!) 97 °F (36 1 °C)   Resp 20   Ht 5' (1 524 m)   Wt 73 5 kg (162 lb)   SpO2 98%   BMI 31 64 kg/m²        Physical Exam     Physical Exam  Vitals and nursing note reviewed  Constitutional:       Appearance: Normal appearance  She is normal weight  Comments: Pt with minimal dizziness  Headache is frontal    HENT:      Head: Normocephalic and atraumatic  Right Ear: Tympanic membrane, ear canal and external ear normal       Left Ear: Tympanic membrane, ear canal and external ear normal       Nose: Congestion and rhinorrhea present  Comments: Boggy mucosa frontal ethmoid and max sinus tender to palp      Mouth/Throat:      Mouth: Mucous membranes are moist       Pharynx: Oropharynx is clear  Eyes:      Extraocular Movements: Extraocular movements intact  Conjunctiva/sclera: Conjunctivae normal       Pupils: Pupils are equal, round, and reactive to light  Cardiovascular:      Rate and Rhythm: Normal rate and regular rhythm  Pulses: Normal pulses        Heart sounds: Normal heart sounds  Pulmonary:      Effort: Pulmonary effort is normal       Breath sounds: Normal breath sounds  Abdominal:      General: Abdomen is flat  Bowel sounds are normal       Palpations: Abdomen is soft  Musculoskeletal:         General: Normal range of motion  Cervical back: Normal range of motion and neck supple  Skin:     General: Skin is warm  Capillary Refill: Capillary refill takes less than 2 seconds  Neurological:      General: No focal deficit present  Mental Status: She is alert and oriented to person, place, and time     Psychiatric:         Mood and Affect: Mood normal

## 2023-07-12 ENCOUNTER — OFFICE VISIT (OUTPATIENT)
Dept: URGENT CARE | Facility: MEDICAL CENTER | Age: 78
End: 2023-07-12
Payer: MEDICARE

## 2023-07-12 VITALS
RESPIRATION RATE: 18 BRPM | HEART RATE: 65 BPM | TEMPERATURE: 96.6 F | DIASTOLIC BLOOD PRESSURE: 60 MMHG | SYSTOLIC BLOOD PRESSURE: 138 MMHG | OXYGEN SATURATION: 96 %

## 2023-07-12 DIAGNOSIS — J01.90 ACUTE SINUSITIS, RECURRENCE NOT SPECIFIED, UNSPECIFIED LOCATION: Primary | ICD-10-CM

## 2023-07-12 PROCEDURE — 99213 OFFICE O/P EST LOW 20 MIN: CPT

## 2023-07-12 PROCEDURE — G0463 HOSPITAL OUTPT CLINIC VISIT: HCPCS

## 2023-07-12 RX ORDER — AMOXICILLIN 500 MG/1
500 CAPSULE ORAL EVERY 8 HOURS SCHEDULED
Qty: 21 CAPSULE | Refills: 0 | Status: SHIPPED | OUTPATIENT
Start: 2023-07-12 | End: 2023-07-19

## 2023-07-12 NOTE — PROGRESS NOTES
[facility]        NAME: Mara Vogt is a 78 y.o. female  : 1945    MRN: [id number]  DATE: 2023  TIME: 12:35 PM    Assessment and Plan   Acute sinusitis, recurrence not specified, unspecified location [J01.90]  1. Acute sinusitis, recurrence not specified, unspecified location  amoxicillin (AMOXIL) 500 mg capsule        Patient extremely tender over sinuses, middle ear effusions bilaterally. Prescribed course of amoxicillin. Advised symptomatic treatment with Flonase, meclizine. Discussed importance of follow up with PCP and strict ER precautions. Patient Instructions      You were prescribed a course of amoxicillin, take as directed. Start using your Flonase nasal spray. Continue with meclizine. It is recommended you follow up with your PCP for further evaluation of your symptoms as you have already been on multiple medications without relief. Call your PCP as soon as possible to schedule a follow up. Proceed to the ER if symptoms worsen. Chief Complaint     Chief Complaint   Patient presents with   • Dizziness     Pt states that two weeks ago she was here for a sinus infection that she got medication for. Pt states that last week she went to [facility] and got prescribed a different steroid for a sinus infection and meclozine because pt was dizzy. Pt states that she still feels her sinuses hurts, ear pain, headache, nauseous and is dizzy. History of Present Illness       Patient presents today with complaints of sinus pain, ear pain, headache, nausea, dizziness. She was seen on 23 here for the same symptoms, prescribed a course of doxycycline, tessalon perles. She states she did not use the tessalon perles, as her cough was not bad. She took the doxycycline as directed and finished it on 7/10/23. She went to [facility] a week ago and was prescribed a tapered course of steroids and meclizine for her symptoms.  She states the meclizine does help to slightly decrease her dizziness. She has not been taking anything else besides what she was prescribed besides maybe an Advil one time. She does have Flonase at home but has not been using it. She states the dizziness is constant but it is minimal, with certain movements it is worse, especially movements of her head. Review of Systems   Review of Systems   Constitutional: Positive for appetite change (decreased). Negative for chills and fever. HENT: Positive for congestion, ear pain (bilateral) and sinus pain. Negative for ear discharge, postnasal drip, rhinorrhea and sore throat. Eyes: Negative for pain, discharge, redness, itching and visual disturbance. Respiratory: Negative for cough, chest tightness, shortness of breath and wheezing. Gastrointestinal: Positive for constipation (chronic issue) and nausea. Negative for abdominal pain, diarrhea and vomiting. Musculoskeletal: Negative for myalgias. Skin: Negative for rash. Neurological: Positive for dizziness and headaches. Negative for syncope, facial asymmetry, speech difficulty and numbness. Psychiatric/Behavioral: Negative for confusion.          Current Medications       Current Outpatient Medications:   •  albuterol (Ventolin HFA) 90 mcg/act inhaler, Inhale 2 puffs every 6 (six) hours as needed for wheezing, Disp: 1 Inhaler, Rfl: 2  •  amLODIPine (NORVASC) 10 mg tablet, TAKE 1 TABLET BY MOUTH EVERY DAY, Disp: 90 tablet, Rfl: 0  •  amoxicillin (AMOXIL) 500 mg capsule, Take 1 capsule (500 mg total) by mouth every 8 (eight) hours for 7 days, Disp: 21 capsule, Rfl: 0  •  Ascorbic Acid (VITAMIN C PO), Vitamin C, Disp: , Rfl:   •  atorvastatin (LIPITOR) 20 mg tablet, Take 1 tablet (20 mg total) by mouth daily, Disp: 90 tablet, Rfl: 1  •  buPROPion (Wellbutrin XL) 150 mg 24 hr tablet, Take 1 tablet (150 mg total) by mouth every morning, Disp: 30 tablet, Rfl: 3  •  Cholecalciferol (Vitamin D3) 1.25 MG (86028 UT) CAPS, Take 5,000 Units by mouth daily, Disp: , Rfl:   •  Diclofenac Sodium (VOLTAREN) 1 %, Apply 2 g topically 4 (four) times a day for 15 days, Disp: 120 g, Rfl: 0  •  fluticasone (FLONASE) 50 mcg/act nasal spray, 1 spray into each nostril daily, Disp: 9.9 mL, Rfl: 0  •  fluticasone (FLOVENT HFA) 110 MCG/ACT inhaler, Inhale 2 puffs 2 (two) times a day Rinse mouth after use., Disp: 12 g, Rfl: 1  •  levothyroxine 137 mcg tablet, TAKE 1 TABLET BY MOUTH EVERY DAY, Disp: 30 tablet, Rfl: 5  •  lisinopril (ZESTRIL) 40 mg tablet, TAKE 1 TABLET BY MOUTH EVERY DAY, Disp: 90 tablet, Rfl: 1  •  metroNIDAZOLE (METROGEL) 1 % gel, Apply topically daily, Disp: 45 g, Rfl: 0  •  Multiple Vitamin (MULTIVITAMIN ADULT PO), multivitamin, Disp: , Rfl:   •  naltrexone (REVIA) 50 mg tablet, Take 1/2 tablet daily in the morning., Disp: 15 tablet, Rfl: 2  •  pantoprazole (PROTONIX) 40 mg tablet, TAKE 1 TABLET BY MOUTH TWICE A DAY, Disp: 60 tablet, Rfl: 5  •  Restasis 0.05 % ophthalmic emulsion, INSTILL 1 DROP BY OPHTHALMIC ROUTE EVERY 12 HOURS IN BOTH EYES, Disp: , Rfl:   •  Sennosides 8.6 MG CAPS, Take 8.6 mg by mouth 2 (two) times a day, Disp: , Rfl:   •  sertraline (ZOLOFT) 100 mg tablet, Take 1 tablet (100 mg total) by mouth 2 (two) times a day, Disp: 60 tablet, Rfl: 5  •  TiZANidine (ZANAFLEX) 4 MG capsule, Take 1 capsule (4 mg total) by mouth 3 (three) times a day (Patient taking differently: Take 4 mg by mouth 3 (three) times a day as needed), Disp: 90 capsule, Rfl: 0  •  traZODone (DESYREL) 50 mg tablet, TAKE 2 TABLETS (100 MG TOTAL) BY MOUTH DAILY AT BEDTIME, Disp: 60 tablet, Rfl: 0  •  benzonatate (TESSALON PERLES) 100 mg capsule, Take 1 capsule (100 mg total) by mouth 3 (three) times a day as needed for cough (Patient not taking: Reported on 7/12/2023), Disp: 20 capsule, Rfl: 0    Current Allergies     Allergies as of 07/12/2023 - Reviewed 07/12/2023   Allergen Reaction Noted   • Ceftin [cefuroxime] Hives 11/27/2018            The following portions of the patient's history were reviewed and updated as appropriate: allergies, current medications, past family history, past medical history, past social history, past surgical history and problem list.     Past Medical History:   Diagnosis Date   • Acid reflux    • Anxiety    • Arthritis    • Asthma    • C1-C4 level with central cord syndrome ([address])     Resolved 2/1/2017    • Carpal tunnel syndrome Resolved 4/21/2015   • Colitis    • Colon polyp    • Concussion    • Depressed    • Dysthymic disorder     Resolved 1/5/2018   • Gastric ulcer 2013    small - on EGD - [facility]   • Hemorrhoids    • Hx of blood clots    • Hx of colonic polyps     [facility]   • Hyperlipemia    • Hypertension    • Hypothyroid    • Lung nodule     stable x 2 yrs on CT   • Migraines    • Obesity    • Postural dizziness with presyncope     Resolved 8/28/2018    • Tendinitis    • Ulnar neuropathy     Resolved 5/21/2015        Past Surgical History:   Procedure Laterality Date   • APPENDECTOMY     • BREAST SURGERY Left 01/1966    BREAST LUMPECTOMY   • CARPAL TUNNEL RELEASE     • CHOLECYSTECTOMY     • DILATION AND CURETTAGE OF UTERUS     • ELBOW SURGERY     • EYE SURGERY Bilateral 2019    Cataract    • GALLBLADDER SURGERY     • JOINT REPLACEMENT Right     REPLACEMENT TOTAL KNEE   • KNEE ARTHROSCOPY     • KNEE SURGERY Right    • NECK SURGERY     • ORTHOPEDIC SURGERY     • POSTERIOR LAMINECTOMY / DECOMPRESSION CERVICAL SPINE  02/2015   • REPLACEMENT TOTAL KNEE Left 2022   • TONSILLECTOMY     • TOTAL KNEE ARTHROPLASTY Left 04/06/2022    [facility] Dr. Weston Notch   • VAGINAL DELIVERY      x3   • WRIST SURGERY Right        Family History   Problem Relation Age of Onset   • Breast cancer Mother    • Alzheimer's disease Mother    • Coronary artery disease Mother    • Hypertension Mother    • Migraines Mother    • Peripheral vascular disease Mother    • Arthritis Mother    • Cancer Mother    • Parkinsonism Father    • Other Father         Cardiovascular disease    • Coronary artery disease Father    • Hypertension Father    • Bipolar disorder Sister    • Thyroid disease Sister    • Alzheimer's disease Sister    • Depression Sister    • Asthma Daughter    • Asthma Son    • Throat cancer Maternal Grandfather    • Cancer Maternal Grandfather    • Diabetes Neg Hx    • Stroke Neg Hx          Medications have been verified. Objective   /60 (BP Location: Right arm)   Pulse 65   Temp (!) 96.6 °F (35.9 °C) (Tympanic)   Resp 18   SpO2 96%        Physical Exam     Physical Exam  Vitals and nursing note reviewed. Constitutional:       General: She is not in acute distress. Appearance: Normal appearance. She is not ill-appearing. HENT:      Right Ear: Tympanic membrane, ear canal and external ear normal.      Left Ear: Tympanic membrane, ear canal and external ear normal.      Nose: Congestion (boggy turbinates) present. No rhinorrhea. Right Sinus: Maxillary sinus tenderness and frontal sinus tenderness present. Left Sinus: Maxillary sinus tenderness and frontal sinus tenderness present. Mouth/Throat:      Mouth: Mucous membranes are moist.      Pharynx: No oropharyngeal exudate or posterior oropharyngeal erythema. Eyes:      General:         Right eye: No discharge. Left eye: No discharge. Extraocular Movements: Extraocular movements intact. Pupils: Pupils are equal, round, and reactive to light. Cardiovascular:      Rate and Rhythm: Normal rate and regular rhythm. Pulses: Normal pulses. Heart sounds: Normal heart sounds. Pulmonary:      Effort: Pulmonary effort is normal. No respiratory distress. Breath sounds: Normal breath sounds. No wheezing, rhonchi or rales. Abdominal:      General: Abdomen is flat. Bowel sounds are normal. There is no distension. Palpations: Abdomen is soft. Tenderness: There is no abdominal tenderness. There is no guarding. Musculoskeletal:      Cervical back: Neck supple. Lymphadenopathy:      Cervical: No cervical adenopathy. Skin:     General: Skin is warm and dry. Neurological:      Mental Status: She is alert. Cranial Nerves: Cranial nerves 2-12 are intact.

## 2023-07-12 NOTE — PATIENT INSTRUCTIONS
You were prescribed a course of amoxicillin, take as directed. Start using your Flonase nasal spray. Continue with meclizine. It is recommended you follow up with your PCP for further evaluation of your symptoms as you have already been on multiple medications without relief. Call your PCP as soon as possible to schedule a follow up. Proceed to the ER if symptoms worsen. Sinusitis   AMBULATORY CARE:   Sinusitis  is inflammation or infection of your sinuses. Sinusitis is most often caused by a virus. Acute sinusitis may last up to 12 weeks. Chronic sinusitis lasts longer than 12 weeks. Recurrent sinusitis means you have 4 or more infections in 1 year. Common signs and symptoms:   Fever    Pain, pressure, redness, or swelling around the forehead, cheeks, or eyes    Thick yellow or green discharge from your nose    Tenderness when you touch your face over your sinuses    Dry cough that happens mostly at night or when you lie down    Headache and face pain that is worse when you lean forward    Tooth pain, or pain when you chew    Seek care immediately if:   You have trouble breathing or wheezing that is getting worse. You have a stiff neck, a fever, or a bad headache. You cannot open your eye. Your eyeball bulges out or you cannot move your eye. You are more sleepy than normal, or you notice changes in your ability to think, move, or talk. You have swelling of your forehead or scalp. Call your doctor if:   You have vision changes, such as double vision. Your eye and eyelid are red, swollen, and painful. Your symptoms do not improve or go away after 10 days. You have nausea and are vomiting. Your nose is bleeding. You have questions or concerns about your condition or care. Medicines: Your symptoms may go away on their own. Your healthcare provider may recommend watchful waiting for up to 10 days before starting antibiotics.  You may need any of the following:  Acetaminophen  decreases pain and fever. It is available without a doctor's order. Ask how much to take and how often to take it. Follow directions. Read the labels of all other medicines you are using to see if they also contain acetaminophen, or ask your doctor or pharmacist. Acetaminophen can cause liver damage if not taken correctly. NSAIDs , such as ibuprofen, help decrease swelling, pain, and fever. This medicine is available with or without a doctor's order. NSAIDs can cause stomach bleeding or kidney problems in certain people. If you take blood thinner medicine, always ask your healthcare provider if NSAIDs are safe for you. Always read the medicine label and follow directions. Nasal steroid sprays  may help decrease inflammation in your nose and sinuses. Decongestants  help reduce swelling and drain mucus in the nose and sinuses. They may help you breathe easier. Antihistamines  help dry mucus in the nose and relieve sneezing. Antibiotics  help treat or prevent a bacterial infection. Self-care:   Rinse your sinuses as directed. Use a sinus rinse device to rinse your nasal passages with a saline (salt water) solution or distilled water. Do not use tap water. This will help thin the mucus in your nose and rinse away pollen and dirt. It will also help reduce swelling so you can breathe normally. Use a humidifier  to increase air moisture in your home. This may make it easier for you to breathe and help decrease your cough. Sleep with your head elevated. Place an extra pillow under your head before you go to sleep to help your sinuses drain. Drink liquids as directed. Ask your healthcare provider how much liquid to drink each day and which liquids are best for you. Liquids will thin the mucus in your nose and help it drain. Avoid drinks that contain alcohol or caffeine. Do not smoke, and avoid secondhand smoke.   Nicotine and other chemicals in cigarettes and cigars can make your symptoms worse. Ask your healthcare provider for information if you currently smoke and need help to quit. E-cigarettes or smokeless tobacco still contain nicotine. Talk to your healthcare provider before you use these products. Prevent the spread of germs:   Wash your hands often with soap and water. Wash your hands after you use the bathroom, change a child's diaper, or sneeze. Wash your hands before you prepare or eat food. Stay away from people who are sick. Some germs spread easily and quickly through contact. Follow up with your doctor as directed: You may be referred to an ear, nose, and throat specialist. Write down your questions so you remember to ask them during your visits. © Copyright [employer]el[employer]akes 2022 Information is for End User's use only and may not be sold, redistributed or otherwise used for commercial purposes. The above information is an  only. It is not intended as medical advice for individual conditions or treatments. Talk to your doctor, nurse or pharmacist before following any medical regimen to see if it is safe and effective for you.

## 2023-07-20 ENCOUNTER — CLINICAL SUPPORT (OUTPATIENT)
Dept: BARIATRICS | Facility: CLINIC | Age: 78
End: 2023-07-20

## 2023-07-20 VITALS — WEIGHT: 160.6 LBS | BODY MASS INDEX: 31.53 KG/M2 | HEIGHT: 60 IN

## 2023-07-20 DIAGNOSIS — R63.5 ABNORMAL WEIGHT GAIN: Primary | ICD-10-CM

## 2023-07-20 PROCEDURE — RECHECK

## 2023-07-22 DIAGNOSIS — F32.A DEPRESSION, UNSPECIFIED DEPRESSION TYPE: ICD-10-CM

## 2023-07-22 RX ORDER — BUPROPION HYDROCHLORIDE 150 MG/1
150 TABLET ORAL EVERY MORNING
Qty: 30 TABLET | Refills: 3 | Status: SHIPPED | OUTPATIENT
Start: 2023-07-22

## 2023-07-26 ENCOUNTER — TELEPHONE (OUTPATIENT)
Dept: FAMILY MEDICINE CLINIC | Facility: CLINIC | Age: 78
End: 2023-07-26

## 2023-07-26 ENCOUNTER — OFFICE VISIT (OUTPATIENT)
Dept: FAMILY MEDICINE CLINIC | Facility: CLINIC | Age: 78
End: 2023-07-26
Payer: MEDICARE

## 2023-07-26 VITALS
OXYGEN SATURATION: 99 % | DIASTOLIC BLOOD PRESSURE: 60 MMHG | BODY MASS INDEX: 31.61 KG/M2 | RESPIRATION RATE: 18 BRPM | SYSTOLIC BLOOD PRESSURE: 122 MMHG | TEMPERATURE: 97.3 F | HEART RATE: 72 BPM | HEIGHT: 60 IN | WEIGHT: 161 LBS

## 2023-07-26 DIAGNOSIS — R26.2 AMBULATORY DYSFUNCTION: ICD-10-CM

## 2023-07-26 DIAGNOSIS — I10 ESSENTIAL HYPERTENSION: ICD-10-CM

## 2023-07-26 DIAGNOSIS — E66.9 OBESITY, CLASS I, BMI 30-34.9: ICD-10-CM

## 2023-07-26 DIAGNOSIS — J34.89 TENDERNESS OVER FRONTAL SINUS: ICD-10-CM

## 2023-07-26 DIAGNOSIS — H61.21 IMPACTED CERUMEN OF RIGHT EAR: ICD-10-CM

## 2023-07-26 DIAGNOSIS — J34.89 TENDERNESS OVER MAXILLARY SINUS: ICD-10-CM

## 2023-07-26 DIAGNOSIS — H81.13 BENIGN PAROXYSMAL POSITIONAL VERTIGO DUE TO BILATERAL VESTIBULAR DISORDER: Primary | ICD-10-CM

## 2023-07-26 PROCEDURE — 99214 OFFICE O/P EST MOD 30 MIN: CPT | Performed by: FAMILY MEDICINE

## 2023-07-26 RX ORDER — MECLIZINE HYDROCHLORIDE 25 MG/1
25 TABLET ORAL EVERY 8 HOURS PRN
Qty: 30 TABLET | Refills: 0 | Status: SHIPPED | OUTPATIENT
Start: 2023-07-26 | End: 2023-08-05

## 2023-07-26 NOTE — TELEPHONE ENCOUNTER
Pt called in and reports sinus sx and vertigo/dizziness. Took a covid test that was negative this morning. Sx started the beginning of July. Was treated with abx twice. 7/1 [facility] was given Doxy and on 7/12 went to [facility] on [address] was given Amox. Meclizine for dizziness, which helps. Denies fevers and cough. Sinus pain/pressure, ear pain, facial pain. Pt scheduled with  at 3:40.

## 2023-07-26 NOTE — PROGRESS NOTES
COVID-19 Outpatient Progress Note    Assessment/Plan:    Problem List Items Addressed This Visit        Cardiovascular and Mediastinum    Essential hypertension       Other    Obesity, Class I, BMI 30-34.9   Other Visit Diagnoses     Benign paroxysmal positional vertigo due to bilateral vestibular disorder    -  Primary    Relevant Medications    meclizine (ANTIVERT) 25 mg tablet    Other Relevant Orders    Ambulatory Referral to Otolaryngology    Ambulatory Referral to Physical Therapy    Ambulatory dysfunction        Relevant Orders    Ambulatory Referral to Physical Therapy    Impacted cerumen of right ear        Tenderness over frontal sinus        Relevant Orders    Ambulatory Referral to Otolaryngology    Tenderness over maxillary sinus        Relevant Orders    Ambulatory Referral to Otolaryngology         Disposition:     After clarifying the patient's history, my suspicion for COVID-19 infection is very low. Risks and benefits of COVID-19 vaccination was discussed with patient. Discussed symptom directed medication options with patient. BPPV / Ambulatory Dysfunction / Sinus Tenderness / HA -     To ENT - may need nasopharyngoscope as recent CT Head and Maxillofacial are stable? To Vestibular Therapy for BPPV. Continue Meclizine 25mg 1 pill q6-8 hours PRN. Advise Chelle alvarenga sinus rinse kit, Mucinex, Meclizine / Claritin/Zyrtec/Allegra/Xyzal, Flonase / Nasacort nasal spray. Avoid decongestants if you have high blood pressure.     I have spent a total time of 20 minutes on the day of the encounter for this patient including discussing diagnostic results, discussing prognosis, risks and benefits of treatment options, instructions for management, patient and family education, importance of treatment compliance, risk factor reductions, impressions, counseling/coordination of care, documenting in the medical record, reviewing/ordering tests, medicine, procedures and obtaining or reviewing history. Encounter provider: Daniel Starr DO     Provider located at: [facility]  [address] [place] [id number] [phone]     Recent Visits  No visits were found meeting these conditions. Showing recent visits within past 7 days and meeting all other requirements  Today's Visits  Date Type Provider Dept   07/26/23 Telephone Shelly Rosario LPN [facility]   07/26/23 Office Visit Ellen Esparza DO [facility]  [address] today's visits and meeting all other requirements  Future Appointments  No visits were found meeting these conditions. Showing future appointments within next 150 days and meeting all other requirements     Subjective:   Sophia Cronin is a 78 y.o. female who is concerned about COVID-19. Patient's symptoms include fatigue, sore throat, nausea, myalgias (Chronic) and headache (Frontal and occipital). Patient denies fever, chills, congestion, rhinorrhea, anosmia, loss of taste, cough, shortness of breath, chest tightness, abdominal pain, vomiting (Due to dizziness) and diarrhea.          COVID-19 vaccination status: Fully vaccinated with booster    Exposure:   Contact with a person who is under investigation (PUI) for or who is positive for COVID-19 within the last 14 days?: No    Hospitalized recently for fever and/or lower respiratory symptoms?: No      Currently a healthcare worker that is involved in direct patient care?: No      Works in a special setting where the risk of COVID-19 transmission may be high? (this may include long-term care, correctional and [facility] facilities; homeless shelters; assisted-living facilities and group homes.): No      Resident in a special setting where the risk of COVID-19 transmission may be high? (this may include long-term care, correctional and [facility] facilities; homeless shelters; assisted-living facilities and group homes.): No      Negative COVID home test 7/26/23, 7/1/23. Seen at [facility] 7/12/23 - Dx Recurrent Sinusitis, Rx Amoxicillin 500mg BID x 10 days, Flonase,  Meclizine, F/U PCP. Seen at [facility] 7/5/23 - Dx Dizziness, Meclizine 1 pill q6-8 hours - no longer helpful. Seen at [facility] 7/1/23 - Dx Sinusitis, Rx Doxycyline 100mg BID x 10 days, Flonase, did not take [employer] as she did not have cough. Stable sinuses on CT Head s contrast and CT Maxillofacial 6/13/23, 1/18/23. Dizziness - Symptoms x 7 days, worsening, longer duration. Occurs c head movement, body movement, standing,  Lasts for duration of movement, then resolves with minutes of rest.  She is afraid to walk as she fears falling. She is walking c straight cane. Using Advil 400mg BID c benefit. Lab Results   Component Value Date    SARSCOV2 Negative 10/26/2022    SARSCOV2 Not Detected 11/17/2020    [address] [facility] Not Detected 04/06/2022    [facility] Negative 10/26/2022       Review of Systems   Constitutional: Positive for fatigue. Negative for chills and fever. HENT: Positive for sore throat. Negative for congestion and rhinorrhea. Respiratory: Negative for cough, chest tightness and shortness of breath. Gastrointestinal: Positive for nausea. Negative for abdominal pain, diarrhea and vomiting (Due to dizziness). Musculoskeletal: Positive for myalgias (Chronic). Neurological: Positive for headaches (Frontal and occipital).      Current Outpatient Medications on File Prior to Visit   Medication Sig   • albuterol (Ventolin HFA) 90 mcg/act inhaler Inhale 2 puffs every 6 (six) hours as needed for wheezing   • amLODIPine (NORVASC) 10 mg tablet TAKE 1 TABLET BY MOUTH EVERY DAY   • Ascorbic Acid (VITAMIN C PO) Vitamin C   • atorvastatin (LIPITOR) 20 mg tablet Take 1 tablet (20 mg total) by mouth daily   • buPROPion (WELLBUTRIN XL) 150 mg 24 hr tablet TAKE 1 TABLET BY MOUTH EVERY DAY IN THE MORNING   • Cholecalciferol (Vitamin D3) 1.25 MG (87863 UT) CAPS Take 5,000 Units by mouth daily   • Diclofenac Sodium (VOLTAREN) 1 % Apply 2 g topically 4 (four) times a day for 15 days   • fluticasone (FLONASE) 50 mcg/act nasal spray 1 spray into each nostril daily   • fluticasone (FLOVENT HFA) 110 MCG/ACT inhaler Inhale 2 puffs 2 (two) times a day Rinse mouth after use. • levothyroxine 137 mcg tablet TAKE 1 TABLET BY MOUTH EVERY DAY   • lisinopril (ZESTRIL) 40 mg tablet TAKE 1 TABLET BY MOUTH EVERY DAY   • metroNIDAZOLE (METROGEL) 1 % gel Apply topically daily   • Multiple Vitamin (MULTIVITAMIN ADULT PO) multivitamin   • naltrexone (REVIA) 50 mg tablet Take 1/2 tablet daily in the morning. • pantoprazole (PROTONIX) 40 mg tablet TAKE 1 TABLET BY MOUTH TWICE A DAY   • Restasis 0.05 % ophthalmic emulsion INSTILL 1 DROP BY OPHTHALMIC ROUTE EVERY 12 HOURS IN BOTH EYES   • Sennosides 8.6 MG CAPS Take 8.6 mg by mouth 2 (two) times a day   • sertraline (ZOLOFT) 100 mg tablet Take 1 tablet (100 mg total) by mouth 2 (two) times a day   • traZODone (DESYREL) 50 mg tablet TAKE 2 TABLETS (100 MG TOTAL) BY MOUTH DAILY AT BEDTIME   • [DISCONTINUED] benzonatate (TESSALON PERLES) 100 mg capsule Take 1 capsule (100 mg total) by mouth 3 (three) times a day as needed for cough (Patient not taking: Reported on 7/12/2023)   • [DISCONTINUED] TiZANidine (ZANAFLEX) 4 MG capsule Take 1 capsule (4 mg total) by mouth 3 (three) times a day (Patient not taking: Reported on 7/26/2023)       Objective:    /60   Pulse 72   Temp (!) 97.3 °F (36.3 °C)   Resp 18   Ht 5' (1.524 m)   Wt 73 kg (161 lb)   SpO2 99%   BMI 31.44 kg/m²        Physical Exam  Vitals and nursing note reviewed. Constitutional:       General: She is not in acute distress. Appearance: Normal appearance. She is well-developed. She is obese. HENT:      Head: Normocephalic and atraumatic.       Right Ear: Tympanic membrane and ear canal normal.      Left Ear: Tympanic membrane, ear canal and external ear normal.      Ears: Comments: Right TM occluded by flap of cerumen, moved c water only ear lavage. Right EAC c shallow abrasion at 5 o'clock due to plastic ear probe. Nose:      Right Sinus: Maxillary sinus tenderness and frontal sinus tenderness present. Left Sinus: Maxillary sinus tenderness and frontal sinus tenderness present. Mouth/Throat:      Mouth: Mucous membranes are moist.      Pharynx: Oropharynx is clear. No oropharyngeal exudate or posterior oropharyngeal erythema. Eyes:      Extraocular Movements: Extraocular movements intact. Conjunctiva/sclera: Conjunctivae normal.      Pupils: Pupils are equal, round, and reactive to light. Cardiovascular:      Rate and Rhythm: Normal rate and regular rhythm. Heart sounds: Normal heart sounds. No murmur heard. Pulmonary:      Effort: Pulmonary effort is normal. No respiratory distress. Breath sounds: Normal breath sounds. Musculoskeletal:         General: No swelling or tenderness. Cervical back: Neck supple. Right lower leg: No edema. Left lower leg: No edema. Skin:     General: Skin is warm and dry. Capillary Refill: Capillary refill takes less than 2 seconds. Neurological:      General: No focal deficit present. Mental Status: She is alert and oriented to person, place, and time. Cranial Nerves: No cranial nerve deficit. Sensory: No sensory deficit. Motor: No weakness. Coordination: Coordination normal.      Deep Tendon Reflexes: Reflexes normal.      Comments: +B/L Arlana Alosa. Walking c straight cane.      Psychiatric:         Mood and Affect: Mood normal.       Chelsey Scherer DO

## 2023-07-26 NOTE — PATIENT INSTRUCTIONS
Advise Tom Drought med sinus rinse kit, Mucinex, Meclizine / Claritin/Zyrtec/Allegra/Xyzal, Flonase / Nasacort nasal spray. Avoid decongestants if you have high blood pressure.

## 2023-07-31 ENCOUNTER — EVALUATION (OUTPATIENT)
Dept: PHYSICAL THERAPY | Facility: CLINIC | Age: 78
End: 2023-07-31
Payer: MEDICARE

## 2023-07-31 DIAGNOSIS — R26.2 AMBULATORY DYSFUNCTION: ICD-10-CM

## 2023-07-31 DIAGNOSIS — F51.01 PRIMARY INSOMNIA: ICD-10-CM

## 2023-07-31 DIAGNOSIS — H81.13 BENIGN PAROXYSMAL POSITIONAL VERTIGO DUE TO BILATERAL VESTIBULAR DISORDER: ICD-10-CM

## 2023-07-31 PROCEDURE — 97112 NEUROMUSCULAR REEDUCATION: CPT | Performed by: PHYSICAL THERAPIST

## 2023-07-31 PROCEDURE — 97110 THERAPEUTIC EXERCISES: CPT | Performed by: PHYSICAL THERAPIST

## 2023-07-31 PROCEDURE — 97162 PT EVAL MOD COMPLEX 30 MIN: CPT | Performed by: PHYSICAL THERAPIST

## 2023-07-31 RX ORDER — TRAZODONE HYDROCHLORIDE 50 MG/1
100 TABLET ORAL
Qty: 60 TABLET | Refills: 0 | Status: SHIPPED | OUTPATIENT
Start: 2023-07-31

## 2023-07-31 NOTE — PROGRESS NOTES
PT Evaluation     Today's date: 2023  Patient name: Renu Barber  : 1945  MRN: [id number]  Referring provider: Edwardo Williamson DO  Dx:   Encounter Diagnosis     ICD-10-CM    1. Benign paroxysmal positional vertigo due to bilateral vestibular disorder  H81.13 Ambulatory Referral to Physical Therapy      2. Ambulatory dysfunction  R26.2 Ambulatory Referral to Physical Therapy                     Assessment  Assessment details: 78year old female patient reports to PT with ambulatory dysfunction and dizziness. No red flags noted and patient denies numbness/tingling. Patient presents with decreased functional strength, and decreased ability to balance in NBOS positions and while solely relying on her vestibular system based on subjective and objective findings. Patient also had positive VOR x 1 horizontal for reproduction of her dizziness. Patient had some reproduction of symptoms during oculomotor testing with her glasses donned, but without them on the tests were negative. Educated to wear only for reading or activities that require her to normally wear them. Patient will benefit from skilled PT services to address current impairments and functional limitations to help patient return to her PLOF. Impairments: abnormal muscle firing, abnormal muscle tone, abnormal movement, activity intolerance, impaired balance, impaired physical strength, lacks appropriate home exercise program and pain with function    Symptom irritability: low  Goals  STG  1. Patient will be independent with completion of HEP throughout therapy  2. Patient will move her head consistently without increase in dizziness in 3 weeks. LTG  1. Patient will ambulate for prolonged periods without increase in dizziness so patient can navigate throughout the community with less difficulty in 6 weeks.    2. Patient will increase 5x STS to at least 12 seconds without use of hands and with good form indicating increase in power and strength and decrease in fall risk in 6 weeks. Plan  Patient would benefit from: skilled physical therapy  Planned therapy interventions: abdominal trunk stabilization, activity modification, balance, manual therapy, motor coordination training, neuromuscular re-education, patient education, strengthening, stretching, therapeutic activities, therapeutic exercise, home exercise program, functional ROM exercises, flexibility and gait training  Frequency: 2x week  Treatment plan discussed with: patient        Subjective Evaluation    History of Present Illness  Mechanism of injury: Patient notes had sinus infection and then she got vertigo. Patient notes when she walks she is dizzy. Patient notes her dizziness is improving. Patient denies difficulty driving. Patient denies difficulty reading. Patient notes she gets dizzy when she moves her head quick and moves quickly. Patient did have Epley Maneuver completed which did help her symptoms. Patient notes sitting she just feels uneasy. Patient denies numbness/tingling. Patient also has B knee pain. 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 Patient also has some hip and back discomfort since she was sick a couple weeks ago. Objective     Concurrent Complaints  Positive for nausea/motion sickness. Negative for headaches, tinnitus, visual change, hearing loss, memory loss, aural fullness, poor concentration and peripheral neuropathy    Functional Assessment        Comments  5x STS: 14 seconds with decreased eccentric control     MCSTIB  EO 30 seconds  EC 30 seconds  EO on foam: 30 seconds  EC on foam: 20 seconds    Tandem stance:  R LE behind: 10 seconds  L LE behind: 5 seconds  Neuro Exam:     Dizziness  Positive for disequilibrium, vertigo, light-headedness and diplopia. Negative for oscillopsia, motion sickness, rocking or swaying and floating or swimming.      Exacerbating factors  Positive for bending over, rolling in bed, looking up, walking, turning head, supine to/from sitting and walking in busy environment. Negative for optokinetic movement. Symptoms   Duration: seconds  Frequency: throughout the day  Intensity at best: 0/10  Intensity at worst: 3/10  Average intensity: 6/10    Headaches   Patient reports headaches: No.     Cervical exam   Ligament Laxity Testing   Alar ligament: WNL  Sharp Tee:  WNL  Modified VBI   Left: asymptomatic  Right: asymptomatic    Oculomotor exam   Oculomotor ROM: WNL  Resting nystagmus: not present   Gaze holding nystagmus: not present left  and not present right  Smooth pursuits: within normal limits  Vertical saccades: normal  Horizontal saccades: normal  Convergence: normal  Head thrust: left abnormal and right abnormal             Precautions: prior lower cervical surgery, CKD      Manuals 7/31                                                                Neuro Re-Ed             Tandem stance r            VOR x1 horizontal r                         Tandem walking             Walking with head turns             [email or web address]             Assess DGI or FGA             Ther Ex             bike                          LAQ                                                                              Ther Activity             STS r                         Gait Training                                       Modalities

## 2023-08-02 ENCOUNTER — OFFICE VISIT (OUTPATIENT)
Dept: PHYSICAL THERAPY | Facility: CLINIC | Age: 78
End: 2023-08-02
Payer: MEDICARE

## 2023-08-02 DIAGNOSIS — H81.13 BENIGN PAROXYSMAL POSITIONAL VERTIGO DUE TO BILATERAL VESTIBULAR DISORDER: Primary | ICD-10-CM

## 2023-08-02 DIAGNOSIS — R26.2 AMBULATORY DYSFUNCTION: ICD-10-CM

## 2023-08-02 PROCEDURE — 97112 NEUROMUSCULAR REEDUCATION: CPT

## 2023-08-02 PROCEDURE — 97110 THERAPEUTIC EXERCISES: CPT

## 2023-08-02 NOTE — PROGRESS NOTES
Daily Note     Today's date: 2023  Patient name: Lesly Miller  : 1945  MRN: [id number]  Referring provider: Eva Jama DO  Dx:   Encounter Diagnosis     ICD-10-CM    1. Benign paroxysmal positional vertigo due to bilateral vestibular disorder  H81.13       2. Ambulatory dysfunction  R26.2                      Subjective: Pt reports she is doing well. Objective: See treatment diary below      Assessment: Tolerated treatment well. Pt performed all exercises without issue. Pt tolerated addition and progression of exercises well, continue to progress to tolerance. Pt would benefit from continued focus on strengthening and balance. Patient demonstrated fatigue post treatment, exhibited good technique with therapeutic exercises and would benefit from continued PT      Plan: Continue per plan of care.       Precautions: prior lower cervical surgery, CKD      Manuals                                                                Neuro Re-Ed             Tandem stance r airex 3x30" ea           VOR x1 horizontal r                         Tandem walking  5 laps           Walking with head turns  3 laps           biodex             Assess DGI or FGA             Ther Ex             bike  5'           steps  6" step ups 10x ea           LAQ  3x10 ea 2#                                                                            Ther Activity             STS r 2x10                        Gait Training                                       Modalities

## 2023-08-03 ENCOUNTER — CLINICAL SUPPORT (OUTPATIENT)
Dept: BARIATRICS | Facility: CLINIC | Age: 78
End: 2023-08-03

## 2023-08-03 ENCOUNTER — APPOINTMENT (OUTPATIENT)
Dept: PHYSICAL THERAPY | Facility: CLINIC | Age: 78
End: 2023-08-03
Payer: MEDICARE

## 2023-08-03 VITALS — BODY MASS INDEX: 32 KG/M2 | WEIGHT: 163 LBS | HEIGHT: 60 IN

## 2023-08-03 DIAGNOSIS — R63.5 ABNORMAL WEIGHT GAIN: Primary | ICD-10-CM

## 2023-08-03 PROCEDURE — RECHECK

## 2023-08-08 ENCOUNTER — APPOINTMENT (OUTPATIENT)
Dept: RADIOLOGY | Facility: MEDICAL CENTER | Age: 78
End: 2023-08-08
Payer: MEDICARE

## 2023-08-08 ENCOUNTER — OFFICE VISIT (OUTPATIENT)
Dept: URGENT CARE | Facility: MEDICAL CENTER | Age: 78
End: 2023-08-08

## 2023-08-08 ENCOUNTER — OFFICE VISIT (OUTPATIENT)
Dept: PHYSICAL THERAPY | Facility: CLINIC | Age: 78
End: 2023-08-08
Payer: MEDICARE

## 2023-08-08 VITALS
WEIGHT: 163 LBS | HEART RATE: 65 BPM | BODY MASS INDEX: 31.83 KG/M2 | TEMPERATURE: 98.3 F | SYSTOLIC BLOOD PRESSURE: 148 MMHG | DIASTOLIC BLOOD PRESSURE: 86 MMHG | RESPIRATION RATE: 18 BRPM | OXYGEN SATURATION: 96 %

## 2023-08-08 DIAGNOSIS — H81.13 BENIGN PAROXYSMAL POSITIONAL VERTIGO DUE TO BILATERAL VESTIBULAR DISORDER: Primary | ICD-10-CM

## 2023-08-08 DIAGNOSIS — M79.671 RIGHT FOOT PAIN: Primary | ICD-10-CM

## 2023-08-08 DIAGNOSIS — R26.2 AMBULATORY DYSFUNCTION: ICD-10-CM

## 2023-08-08 DIAGNOSIS — S93.601A SPRAIN OF RIGHT FOOT, INITIAL ENCOUNTER: ICD-10-CM

## 2023-08-08 DIAGNOSIS — M79.671 RIGHT FOOT PAIN: ICD-10-CM

## 2023-08-08 PROCEDURE — 97110 THERAPEUTIC EXERCISES: CPT | Performed by: PHYSICAL THERAPIST

## 2023-08-08 PROCEDURE — 73630 X-RAY EXAM OF FOOT: CPT

## 2023-08-08 PROCEDURE — 97112 NEUROMUSCULAR REEDUCATION: CPT | Performed by: PHYSICAL THERAPIST

## 2023-08-08 NOTE — PROGRESS NOTES
Daily Note     Today's date: 2023  Patient name: Jose Mahmood  : 1945  MRN: [id number]  Referring provider: Jocelyne Tuttle DO  Dx:   Encounter Diagnosis     ICD-10-CM    1. Benign paroxysmal positional vertigo due to bilateral vestibular disorder  H81.13       2. Ambulatory dysfunction  R26.2                      Subjective: Patient reports her foot is bothering her today. Objective: See treatment diary below      Assessment: Tolerated treatment well. Patient demonstrated fatigue post treatment, exhibited good technique with therapeutic exercises and would benefit from continued PT. Patient educated to go to urgent care and see if can get xray as had significant TTP to R foot fifth met without AISHWARYA. Plan: Continue per plan of care. Precautions: prior lower cervical surgery, CKD      Manuals                                              Foot assessment   TTP to fifth met.  No pain with AROM         Neuro Re-Ed            Tandem stance r airex 3x30" ea 3x30" each airex         VOR x1 horizontal r                       Tandem walking  5 laps 5 laps         Walking with head turns  3 laps 3 laps         biodex            Assess DGI or FGA            Ther Ex            bike  5' 5 min          steps  6" step ups 10x ea 10x each 6"          LAQ  3x10 ea 2# 2x10 4 lbs each                                                                     Ther Activity            STS r 2x10 2x12                      Gait Training                                    Modalities

## 2023-08-08 NOTE — PROGRESS NOTES
[facility]        NAME: Jon Perkins is a 78 y.o. female  : 1945    MRN: [id number]  DATE: 2023  TIME: 1:00 PM    Assessment and Plan   Right foot pain [M79.671]  1. Right foot pain  XR foot 3+ vw right      2. Sprain of right foot, initial encounter  Ambulatory Referral to Physical Therapy            Patient Instructions       Follow up with PCP in 3-5 days. Proceed to  ER if symptoms worsen. Chief Complaint     Chief Complaint   Patient presents with   • right foot pain     For the past 2 weeks has been having throbbing pain, 6/10 to the right foot. Pain is constant and toes feel numb at times. Slight swelling in the area noted. Has full ROM. Has tried ice but no relief. Does put full weight bearing on it but it causes pain. Denies any injury that she is aware of. History of Present Illness       78-year-old female here today with complaint of right foot pain for the past 2 weeks or possibly longer. Patient cannot recall any recent trauma. Pain seems to be throbbing in nature she also describes constant numbness of her toes. She is able to bear weight however it is uncomfortable when she does so. Pain is predominantly located on the lateral aspect. She has noticed some swelling. She is currently taking over-the-counter ibuprofen when needed. At the present time she uses undergoing physical therapy for vertigo which she has had for the past 3 weeks. She feels her vertigo is improving with physical therapy. Pain is currently rated as 6 out of 10. Review of Systems   Review of Systems   Musculoskeletal: Positive for arthralgias and joint swelling.          Current Medications       Current Outpatient Medications:   •  amLODIPine (NORVASC) 10 mg tablet, TAKE 1 TABLET BY MOUTH EVERY DAY, Disp: 90 tablet, Rfl: 0  •  Ascorbic Acid (VITAMIN C PO), Vitamin C, Disp: , Rfl:   •  atorvastatin (LIPITOR) 20 mg tablet, Take 1 tablet (20 mg total) by mouth daily, Disp: 90 tablet, Rfl: 1  •  buPROPion (WELLBUTRIN XL) 150 mg 24 hr tablet, TAKE 1 TABLET BY MOUTH EVERY DAY IN THE MORNING, Disp: 30 tablet, Rfl: 3  •  Cholecalciferol (Vitamin D3) 1.25 MG (97977 UT) CAPS, Take 5,000 Units by mouth daily, Disp: , Rfl:   •  levothyroxine 137 mcg tablet, TAKE 1 TABLET BY MOUTH EVERY DAY, Disp: 30 tablet, Rfl: 5  •  lisinopril (ZESTRIL) 40 mg tablet, TAKE 1 TABLET BY MOUTH EVERY DAY, Disp: 90 tablet, Rfl: 1  •  Multiple Vitamin (MULTIVITAMIN ADULT PO), multivitamin, Disp: , Rfl:   •  pantoprazole (PROTONIX) 40 mg tablet, TAKE 1 TABLET BY MOUTH TWICE A DAY, Disp: 60 tablet, Rfl: 5  •  Restasis 0.05 % ophthalmic emulsion, INSTILL 1 DROP BY OPHTHALMIC ROUTE EVERY 12 HOURS IN BOTH EYES, Disp: , Rfl:   •  sertraline (ZOLOFT) 100 mg tablet, Take 1 tablet (100 mg total) by mouth 2 (two) times a day, Disp: 60 tablet, Rfl: 5  •  traZODone (DESYREL) 50 mg tablet, Take 2 tablets (100 mg total) by mouth daily at bedtime, Disp: 60 tablet, Rfl: 0  •  albuterol (Ventolin HFA) 90 mcg/act inhaler, Inhale 2 puffs every 6 (six) hours as needed for wheezing (Patient not taking: Reported on 8/8/2023), Disp: 1 Inhaler, Rfl: 2  •  Diclofenac Sodium (VOLTAREN) 1 %, Apply 2 g topically 4 (four) times a day for 15 days (Patient not taking: Reported on 8/8/2023), Disp: 120 g, Rfl: 0  •  fluticasone (FLONASE) 50 mcg/act nasal spray, 1 spray into each nostril daily (Patient not taking: Reported on 8/8/2023), Disp: 9.9 mL, Rfl: 0  •  fluticasone (FLOVENT HFA) 110 MCG/ACT inhaler, Inhale 2 puffs 2 (two) times a day Rinse mouth after use. (Patient not taking: Reported on 8/8/2023), Disp: 12 g, Rfl: 1  •  meclizine (ANTIVERT) 25 mg tablet, Take 1 tablet (25 mg total) by mouth every 8 (eight) hours as needed for dizziness for up to 10 days, Disp: 30 tablet, Rfl: 0  •  metroNIDAZOLE (METROGEL) 1 % gel, Apply topically daily, Disp: 45 g, Rfl: 0  •  naltrexone (REVIA) 50 mg tablet, Take 1/2 tablet daily in the morning.  (Patient not taking: Reported on 8/8/2023), Disp: 15 tablet, Rfl: 2  •  predniSONE 10 mg tablet, 4 tabs po for 3 days, 3 days po for 3 days, 2 tabs po for 3 days, and 1 tab po for 3 days.  (Patient not taking: Reported on 8/8/2023), Disp: 30 tablet, Rfl: 0  •  Sennosides 8.6 MG CAPS, Take 8.6 mg by mouth 2 (two) times a day (Patient not taking: Reported on 8/8/2023), Disp: , Rfl:     Current Allergies     Allergies as of 08/08/2023 - Reviewed 08/08/2023   Allergen Reaction Noted   • Ceftin [cefuroxime] Hives 11/27/2018            The following portions of the patient's history were reviewed and updated as appropriate: allergies, current medications, past family history, past medical history, past social history, past surgical history and problem list.     Past Medical History:   Diagnosis Date   • Acid reflux    • Anxiety    • Arthritis    • Asthma    • C1-C4 level with central cord syndrome ([address])     Resolved 2/1/2017    • Carpal tunnel syndrome Resolved 4/21/2015   • Colitis    • Colon polyp    • Concussion    • Depressed    • Dysthymic disorder     Resolved 1/5/2018   • Gastric ulcer 2013    small - on EGD - [facility]   • Hemorrhoids    • Hx of blood clots    • Hx of colonic polyps     [facility]   • Hyperlipemia    • Hypertension    • Hypothyroid    • Lung nodule     stable x 2 yrs on CT   • Migraines    • Obesity    • Postural dizziness with presyncope     Resolved 8/28/2018    • Tendinitis    • Ulnar neuropathy     Resolved 5/21/2015    • Vertigo        Past Surgical History:   Procedure Laterality Date   • APPENDECTOMY     • BREAST SURGERY Left 01/1966    BREAST LUMPECTOMY   • CARPAL TUNNEL RELEASE     • CHOLECYSTECTOMY     • DILATION AND CURETTAGE OF UTERUS     • ELBOW SURGERY     • EYE SURGERY Bilateral 2019    Cataract    • GALLBLADDER SURGERY     • JOINT REPLACEMENT Right     REPLACEMENT TOTAL KNEE   • KNEE ARTHROSCOPY     • KNEE SURGERY Right    • NECK SURGERY     • ORTHOPEDIC SURGERY     • POSTERIOR LAMINECTOMY / DECOMPRESSION CERVICAL SPINE  02/2015   • REPLACEMENT TOTAL KNEE Left 2022   • TONSILLECTOMY     • TOTAL KNEE ARTHROPLASTY Left 04/06/2022    [facility] Dr. Ildefonso Bell   • VAGINAL DELIVERY      x3   • WRIST SURGERY Right        Family History   Problem Relation Age of Onset   • Breast cancer Mother    • Alzheimer's disease Mother    • Coronary artery disease Mother    • Hypertension Mother    • Migraines Mother    • Peripheral vascular disease Mother    • Arthritis Mother    • Cancer Mother    • Parkinsonism Father    • Other Father         Cardiovascular disease    • Coronary artery disease Father    • Hypertension Father    • Bipolar disorder Sister    • Thyroid disease Sister    • Alzheimer's disease Sister    • Depression Sister    • Asthma Daughter    • Asthma Son    • Throat cancer Maternal Grandfather    • Cancer Maternal Grandfather    • Diabetes Neg Hx    • Stroke Neg Hx          Medications have been verified. Objective   /86 (BP Location: Left arm, Patient Position: Sitting)   Pulse 65   Temp 98.3 °F (36.8 °C)   Resp 18   Wt 73.9 kg (163 lb)   SpO2 96%   BMI 31.83 kg/m²   No LMP recorded. Patient is postmenopausal.       Physical Exam     Physical Exam  Vitals and nursing note reviewed. Constitutional:       Appearance: Normal appearance. Musculoskeletal:         General: Tenderness present. Normal range of motion. Comments: Right foot: Full range on plantarflexion dorsiflexion. No pain elicited on inversion or eversion. However there is tenderness over the proximal fifth metatarsal bone with no evidence of ecchymosis. .  There is also tenderness over the dorsal aspect of the right foot predominantly over the fourth third toe. There is some mild swelling noted over the dorsal aspect. No ecchymosis appreciated. Good tactile sensation appreciated and good capillary refill distally. Gait-antalgic. Neurological:      Mental Status: She is alert.

## 2023-08-09 NOTE — PROGRESS NOTES
Weight Management Medical Nutrition Assessment  Yokasta presented for a follow-up session with the [facility].  Today's weight is  161.6#.  She has lost 22.4 lbs since June 2022. Reports she would like to be lower in the 150's but does state she has not weighed this since early in her marriage. Discussed that weight maintenance may be the most appropriate goal moving forward. Has not been taking the naltrexone. Encouraged to discuss this with CRNP at next visit. Cravings managed for the most part. She does report she is hungry often after dinner. Tries to keep her calories around 1000, sometimes 800. Encouraged not to go lower than 1000. Suggest add additional carb such as fruit to her lunch to see if this helps with after dinner hunger.  She will continue in healthy ways.        Patient seen by Medical Provider in past 6 months:  yes  Requested to schedule appointment with Medical Provider: No      Anthropometric Measurements  Start Weight (#):  184 # ( 6/15/22 -MD)   205# (4/22) home scale   Current Weight (#): 161.6 #  TBW % Change from start weight:12.2%   Ideal Body Weight (#): 100#  Goal Weight (#):160#      Lowest: 145#     Weight Loss History  Previous weight loss attempts: Counseling with  MD  Meal Replacements (Medifast, Slim Fast, etc.)  Nutrition Counseling with [facility]     Food and Nutrition Related History  Wake up: 6-7  Bed Time:11a     Food Recall   Breakfast 6-7:00: cheerios, milk OR oatmeal, milk OR 1 Egg/ 1 Toast (light bread) or english muffin w/pb, sometimes yogurt  Coffee - creamer (35cal) x2  Snack:skip  Lunch 12:00: salad w/chicken, dressing OR  tuna w/light bread, young   Snack: skip  Dinner:4-5:00: ~3 oz lean protein/~1/2 cup veggie/ >1/2 cup carb    Snack: 2 pieces light bread w/margarine         Beverages: water and coffee/tea  Volume of beverage intake: 40oz water, 2 cups coffee     Weekends: Same  Cravings: sweets or CHO  Trouble area of day:after dinner     Frequency of Eating out: irregularly  Food restrictions:none  Lives with her sister  Cooking: self and sister  Food Shopping: self     Physical Activity Intake  Activity: Walking    Frequency: as able    Physical limitations/barriers to exercise:hx vertigo, foot injury     Estimated Needs  Energy  [employer] Energy Needs: BMR : 0274 calories            0.5# loss weekly lightly active:1099  calories; 0.5 lb wk lightly active: 1296  Maintenance calories for sedentary  level: 1349 calories   Protein:70-85   (1.2-1.5g/kg IBW); 1.0g/kg IBW= 58  Fluid: 58-68oz     (30-35mL/kg IBW)     Nutrition Diagnosis  Yes;    Overweight/obesity  related to Excess energy intake as evidenced by  BMI more than normative standard for age and sex (obesity-grade I 33. 5)     Nutrition Intervention     Nutrition Prescription  Calories:1000 on sedentary days and flex to 1200 calories on walk days.   Protein:70-80g  Fluid:65oz     Meal Plan (Riky/Pro/Carb)  Breakfast:200/26-27  Snack:-  Lunch:200/26-27  Snack:100-150/5-10  Dinner:200-300/15-20  Snack:<200/0-5     Nutrition Education:    Healthy Core Manual  Calorie controlled menu  Lean protein food choices  Healthy snack options  Food journaling tips        Nutrition Counseling:  Strategies: meal planning, portion sizes, healthy snack choices, hydration, fiber intake, protein intake, exercise, food journal        Monitoring and Evaluation:  Evaluation criteria:  Energy Intake  Meet protein needs  Maintain adequate hydration  Monitor weekly weight  Meal planning/preparation  Food journal   Decreased portions at mealtimes and snacks  Physical activity      Barriers to learning:none  Readiness to change: Action   Comprehension: very good  Expected Compliance: good

## 2023-08-14 ENCOUNTER — OFFICE VISIT (OUTPATIENT)
Dept: BARIATRICS | Facility: CLINIC | Age: 78
End: 2023-08-14

## 2023-08-14 VITALS — HEIGHT: 60 IN | BODY MASS INDEX: 31.73 KG/M2 | WEIGHT: 161.6 LBS

## 2023-08-14 DIAGNOSIS — R63.5 ABNORMAL WEIGHT GAIN: Primary | ICD-10-CM

## 2023-08-14 PROCEDURE — RECHECK

## 2023-08-15 ENCOUNTER — OFFICE VISIT (OUTPATIENT)
Dept: PHYSICAL THERAPY | Facility: CLINIC | Age: 78
End: 2023-08-15
Payer: MEDICARE

## 2023-08-15 DIAGNOSIS — R26.2 AMBULATORY DYSFUNCTION: ICD-10-CM

## 2023-08-15 DIAGNOSIS — H81.13 BENIGN PAROXYSMAL POSITIONAL VERTIGO DUE TO BILATERAL VESTIBULAR DISORDER: Primary | ICD-10-CM

## 2023-08-15 PROCEDURE — 97530 THERAPEUTIC ACTIVITIES: CPT | Performed by: PHYSICAL THERAPIST

## 2023-08-15 PROCEDURE — 97112 NEUROMUSCULAR REEDUCATION: CPT | Performed by: PHYSICAL THERAPIST

## 2023-08-15 PROCEDURE — 97110 THERAPEUTIC EXERCISES: CPT | Performed by: PHYSICAL THERAPIST

## 2023-08-15 NOTE — PROGRESS NOTES
Daily Note     Today's date: 8/15/2023  Patient name: Jeff Rodriguez  : 1945  MRN: [id number]  Referring provider: Cesilia Jauregui DO  Dx:   Encounter Diagnosis     ICD-10-CM    1. Benign paroxysmal positional vertigo due to bilateral vestibular disorder  H81.13       2. Ambulatory dysfunction  R26.2                      Subjective: Patient reports her foot is feeling better and dizziness is a little better. Objective: See treatment diary below      Assessment: Tolerated treatment well. Patient demonstrated fatigue post treatment, exhibited good technique with therapeutic exercises and would benefit from continued PT. Plan: Continue per plan of care. Precautions: prior lower cervical surgery, CKD      Manuals 7/31 8/2 8/8 8/15                                            Foot assessment   TTP to fifth met.  No pain with AROM         Neuro Re-Ed            Tandem stance r airex 3x30" ea 3x30" each airex         VOR x1 horizontal r           Standing ball toss    3 minutes         Tandem walking  5 laps 5 laps 3 laps         Walking with head turns  3 laps 3 laps 2 laps w/ more head turns horizontal and vertical each        biodex            Assess DGI or FGA            Ther Ex            bike  5' 5 min  5 min         steps  6" step ups 10x ea 10x each 6"          LAQ  3x10 ea 2# 2x10 4 lbs each                                                                     Ther Activity            STS r 2x10 2x12  2x12                    Gait Training                                    Modalities

## 2023-08-17 ENCOUNTER — OFFICE VISIT (OUTPATIENT)
Dept: PHYSICAL THERAPY | Facility: CLINIC | Age: 78
End: 2023-08-17
Payer: MEDICARE

## 2023-08-17 DIAGNOSIS — H81.13 BENIGN PAROXYSMAL POSITIONAL VERTIGO DUE TO BILATERAL VESTIBULAR DISORDER: Primary | ICD-10-CM

## 2023-08-17 DIAGNOSIS — R26.2 AMBULATORY DYSFUNCTION: ICD-10-CM

## 2023-08-17 PROCEDURE — 97110 THERAPEUTIC EXERCISES: CPT | Performed by: PHYSICAL THERAPIST

## 2023-08-17 PROCEDURE — 97112 NEUROMUSCULAR REEDUCATION: CPT | Performed by: PHYSICAL THERAPIST

## 2023-08-17 NOTE — PROGRESS NOTES
Daily Note     Today's date: 2023  Patient name: Mara Vogt  : 1945  MRN: [id number]  Referring provider: Edyth Fabry, DO  Dx:   Encounter Diagnosis     ICD-10-CM    1. Benign paroxysmal positional vertigo due to bilateral vestibular disorder  H81.13       2. Ambulatory dysfunction  R26.2                      Subjective: Patient reports her foot and dizziness are improving. Objective: See treatment diary below      Assessment: Tolerated treatment well. Patient demonstrated fatigue post treatment, exhibited good technique with therapeutic exercises and would benefit from continued PT. Plan: Continue per plan of care. Precautions: prior lower cervical surgery, CKD      Manuals 7/31 8/2 8/8 8/15 8/17                                           Foot assessment   TTP to fifth met.  No pain with AROM         Neuro Re-Ed            Tandem stance r airex 3x30" ea 3x30" each airex  3x30" holds        VOR x1 horizontal r           Standing ball toss    3 minutes  3 min       Tandem walking  5 laps 5 laps 3 laps  3 laps       Walking with head turns  3 laps 3 laps 2 laps w/ more head turns horizontal and vertical each 2 laps w/ more head turns horizontal and vertical each       biodex            Assess DGI or FGA            Ther Ex            bike  5' 5 min  5 min  5 min        steps  6" step ups 10x ea 10x each 6"  12x each 6" 12x each 6"        LAQ  3x10 ea 2# 2x10 4 lbs each 20x 5" holds 4 lbs each  20x 5" holds 4 lbs each                                                                    Ther Activity            STS r 2x10 2x12  2x12 3x10                   Gait Training                                    Modalities

## 2023-08-22 ENCOUNTER — OFFICE VISIT (OUTPATIENT)
Dept: PHYSICAL THERAPY | Facility: CLINIC | Age: 78
End: 2023-08-22
Payer: MEDICARE

## 2023-08-22 DIAGNOSIS — H81.13 BENIGN PAROXYSMAL POSITIONAL VERTIGO DUE TO BILATERAL VESTIBULAR DISORDER: Primary | ICD-10-CM

## 2023-08-22 DIAGNOSIS — R26.2 AMBULATORY DYSFUNCTION: ICD-10-CM

## 2023-08-22 PROCEDURE — 97112 NEUROMUSCULAR REEDUCATION: CPT

## 2023-08-22 PROCEDURE — 97110 THERAPEUTIC EXERCISES: CPT

## 2023-08-22 PROCEDURE — 97530 THERAPEUTIC ACTIVITIES: CPT

## 2023-08-22 NOTE — PROGRESS NOTES
Daily Note     Today's date: 2023  Patient name: Aramis Villasenor  : 1945  MRN: [id number]  Referring provider: Juan Willoughby DO  Dx:   Encounter Diagnosis     ICD-10-CM    1. Benign paroxysmal positional vertigo due to bilateral vestibular disorder  H81.13       2. Ambulatory dysfunction  R26.2                      Subjective: Patient reports her foot and dizziness are improving. Objective: See treatment diary below      Assessment: Tolerated treatment well. Patient demonstrated fatigue post treatment, exhibited good technique with therapeutic exercises and would benefit from continued PT. Plan: Continue per plan of care. Precautions: prior lower cervical surgery, CKD      Manuals 7/31 8/2 8/8 8/15 8/17 8/22                                          Foot assessment   TTP to fifth met.  No pain with AROM         Neuro Re-Ed            Tandem stance r airex 3x30" ea 3x30" each airex  3x30" holds  2x45" holds      VOR x1 horizontal r           Standing ball toss    3 minutes  3 min 3 min      Tandem walking  5 laps 5 laps 3 laps  3 laps 3 laps      Walking with head turns  3 laps 3 laps 2 laps w/ more head turns horizontal and vertical each 2 laps w/ more head turns horizontal and vertical each 3 laps w/ more head turns horizontal and vertical each      biodex            hurdles      4 laps       Assess DGI or FGA            Ther Ex            bike  5' 5 min  5 min  5 min  5 min      steps  6" step ups 10x ea 10x each 6"  12x each 6" 12x each 6"  15x each 6"      LAQ  3x10 ea 2# 2x10 4 lbs each 20x 5" holds 4 lbs each  20x 5" holds 4 lbs each  20x 5" holds 5 lbs each                                                                  Ther Activity            STS r 2x10 2x12  2x12 3x10 3x10                  Gait Training                                    Modalities

## 2023-08-24 ENCOUNTER — CLINICAL SUPPORT (OUTPATIENT)
Dept: BARIATRICS | Facility: CLINIC | Age: 78
End: 2023-08-24

## 2023-08-24 ENCOUNTER — OFFICE VISIT (OUTPATIENT)
Dept: PHYSICAL THERAPY | Facility: CLINIC | Age: 78
End: 2023-08-24
Payer: MEDICARE

## 2023-08-24 VITALS — HEIGHT: 60 IN | BODY MASS INDEX: 31.84 KG/M2 | WEIGHT: 162.2 LBS

## 2023-08-24 DIAGNOSIS — R63.5 ABNORMAL WEIGHT GAIN: Primary | ICD-10-CM

## 2023-08-24 DIAGNOSIS — H81.13 BENIGN PAROXYSMAL POSITIONAL VERTIGO DUE TO BILATERAL VESTIBULAR DISORDER: Primary | ICD-10-CM

## 2023-08-24 DIAGNOSIS — R26.2 AMBULATORY DYSFUNCTION: ICD-10-CM

## 2023-08-24 PROCEDURE — 97112 NEUROMUSCULAR REEDUCATION: CPT

## 2023-08-24 PROCEDURE — 97110 THERAPEUTIC EXERCISES: CPT

## 2023-08-24 PROCEDURE — RECHECK

## 2023-08-24 NOTE — PROGRESS NOTES
Daily Note     Today's date: 2023  Patient name: Manuel Rinaldi  : 1945  MRN: [id number]  Referring provider: Dalila Holland DO  Dx:   Encounter Diagnosis     ICD-10-CM    1. Benign paroxysmal positional vertigo due to bilateral vestibular disorder  H81.13       2. Ambulatory dysfunction  R26.2                      Subjective: Improving balance, dizziness, and foot pain. Objective: See treatment diary below     Precautions: prior lower cervical surgery, CKD    Manuals 8/2 8/8 8/15 8/17 8/22 8/24                                      Foot assessment  TTP to fifth met. No pain with AROM         Neuro Re-Ed           Tandem stance airex 3x30" ea 3x30" each airex  3x30" holds  2x45" holds 45" ea     VOR x1 horizontal           Standing ball toss   3 minutes  3 min 3 min 3'     Tandem walking 5 laps 5 laps 3 laps  3 laps 3 laps 3 laps     Walking with head turns 3 laps 3 laps 2 laps w/ more head turns horizontal and vertical each 2 laps w/ more head turns horizontal and vertical each 3 laps w/ more head turns horizontal and vertical each 3x ea turns and tilts     SLS      15"x3 ea     hurdles     4 laps       Assess DGI or FGA           Ther Ex           bike 5' 5 min  5 min  5 min  5 min 5'     steps 6" step ups 10x ea 10x each 6"  12x each 6" 12x each 6"  15x each 6" 1 flight     LAQ 3x10 ea 2# 2x10 4 lbs each 20x 5"   4 lbs each  20x 5"   4 lbs each  20x 5"   5 lbs each 5#  5"x20 ea                                      Ther Activity           STS 2x10 2x12  2x12 3x10 3x10 3x10                                          Assessment: Tolerated treatment well. Patient demonstrated fatigue post treatment, exhibited good technique with therapeutic exercises and would benefit from continued PT    Plan: Continue per plan of care. Progress treatment as tolerated.     Georgie Shook

## 2023-08-28 DIAGNOSIS — F51.01 PRIMARY INSOMNIA: ICD-10-CM

## 2023-08-28 RX ORDER — TRAZODONE HYDROCHLORIDE 50 MG/1
100 TABLET ORAL
Qty: 60 TABLET | Refills: 0 | Status: SHIPPED | OUTPATIENT
Start: 2023-08-28

## 2023-08-29 ENCOUNTER — OFFICE VISIT (OUTPATIENT)
Dept: PHYSICAL THERAPY | Facility: CLINIC | Age: 78
End: 2023-08-29
Payer: MEDICARE

## 2023-08-29 DIAGNOSIS — R42 DIZZINESS: ICD-10-CM

## 2023-08-29 DIAGNOSIS — R26.2 AMBULATORY DYSFUNCTION: Primary | ICD-10-CM

## 2023-08-29 PROCEDURE — 97110 THERAPEUTIC EXERCISES: CPT | Performed by: PHYSICAL THERAPIST

## 2023-08-29 PROCEDURE — 97112 NEUROMUSCULAR REEDUCATION: CPT | Performed by: PHYSICAL THERAPIST

## 2023-08-29 NOTE — PROGRESS NOTES
Daily Note     Today's date: 2023  Patient name: Soheila Wallace  : 1945  MRN: [id number]  Referring provider: Naomi Zhou DO  Dx:   Encounter Diagnosis     ICD-10-CM    1. Ambulatory dysfunction  R26.2       2. Dizziness  R42                      Subjective: Patient expresses difficulty when balancing on one leg. Believes progress has been made thus far. Objective: See treatment diary below      Assessment: Tolerated treatment well. Patient would benefit from further skilled PT in order to address ambulatory dysfunction. Plan: Plan to progress patient where appropriate. Precautions: prior lower cervical surgery, CKD    Manuals 8/2 8/8 8/15 8/17 8/22 8/24 8/29                                     Foot assessment  TTP to fifth met.  No pain with AROM         Neuro Re-Ed           Tandem stance airex 3x30" ea 3x30" each airex  3x30" holds  2x45" holds 45" ea 45" each    VOR x1 horizontal           Standing ball toss   3 minutes  3 min 3 min 3' 3'    Tandem walking 5 laps 5 laps 3 laps  3 laps 3 laps 3 laps 3 laps    Walking with head turns 3 laps 3 laps 2 laps w/ more head turns horizontal and vertical each 2 laps w/ more head turns horizontal and vertical each 3 laps w/ more head turns horizontal and vertical each 3x ea turns and tilts 3x ea turns and tilts    SLS      15"x3 ea     hurdles     4 laps   High knees 4 laps    Assess DGI or FGA           Ther Ex           bike 5' 5 min  5 min  5 min  5 min 5' 5'    steps 6" step ups 10x ea 10x each 6"  12x each 6" 12x each 6"  15x each 6" 1 flight 15x each 6"    LAQ 3x10 ea 2# 2x10 4 lbs each 20x 5"   4 lbs each  20x 5"   4 lbs each  20x 5"   5 lbs each 5#  5"x20 ea 5#  5"x20 ea                                     Ther Activity           STS 2x10 2x12  2x12 3x10 3x10 3x10 Leg press 2X12 65 lbs

## 2023-08-31 ENCOUNTER — OFFICE VISIT (OUTPATIENT)
Dept: PHYSICAL THERAPY | Facility: CLINIC | Age: 78
End: 2023-08-31
Payer: MEDICARE

## 2023-08-31 ENCOUNTER — CLINICAL SUPPORT (OUTPATIENT)
Dept: BARIATRICS | Facility: CLINIC | Age: 78
End: 2023-08-31

## 2023-08-31 VITALS — WEIGHT: 161 LBS | BODY MASS INDEX: 31.61 KG/M2 | HEIGHT: 60 IN

## 2023-08-31 DIAGNOSIS — R63.5 ABNORMAL WEIGHT GAIN: Primary | ICD-10-CM

## 2023-08-31 DIAGNOSIS — R42 DIZZINESS: ICD-10-CM

## 2023-08-31 DIAGNOSIS — R26.2 AMBULATORY DYSFUNCTION: Primary | ICD-10-CM

## 2023-08-31 PROCEDURE — RECHECK

## 2023-08-31 PROCEDURE — 97110 THERAPEUTIC EXERCISES: CPT

## 2023-08-31 PROCEDURE — 97112 NEUROMUSCULAR REEDUCATION: CPT

## 2023-08-31 NOTE — PROGRESS NOTES
Daily Note     Today's date: 2023  Patient name: Marli Sanchez  : 1945  MRN: [id number]  Referring provider: Adrianna Eaton DO  Dx:   Encounter Diagnosis     ICD-10-CM    1. Ambulatory dysfunction  R26.2       2. Dizziness  R42                    Subjective: Pt has not had any increase in dizziness and overall notes improved endurance and balance. Objective: See treatment diary below     Precautions: prior lower cervical surgery, CKD    Manuals 8/2 8/8 8/15 8/17 8/22 8/24 8/29 8/31   Foot assessment  TTP to fifth met. No pain with AROM         Neuro Re-Ed         Tandem stance airex 3x30" ea 3x30" each airex  3x30" holds  2x45" holds 45" ea 45" each 45" ea airex   VOR x1 horizontal           Standing ball toss   3 minutes  3 min 3 min 3' 3' 3'   Tandem walking 5 laps 5 laps 3 laps  3 laps 3 laps 3 laps 3 laps 3 laps   Walking with head turns 3 laps 3 laps 2 laps w/ more head turns & tilts each 2 laps w/ more head turns & tilts each 3 laps w/ more head turns & tilts each 3x ea turns and tilts 3x ea turns and tilts 3x ea turns and tilts   SLS      15"x3 ea     hurdles     4 laps   High knees  4 laps High knees 3 laps   Assess DGI or FGA           Ther Ex         bike 5' 5 min  5 min  5 min  5 min 5' 5' 5'   steps 6" step ups 10x ea 10x each 6"  12x each 6" 12x each 6"  15x each 6" 1 flight 15x each 6" 2 flights   LAQ 3x10 ea 2# 2x10 4 lbs each 20x 5"   4 lbs each  20x 5"   4 lbs each  20x 5"   5 lbs each 5#  5"x20 ea 5#  5"x20 ea 6.5# 5"x20   HS curls        5# 2x10 ea                         Ther Activity         STS 2x10 2x12  2x12 3x10 3x10 3x10     Leg press       65# 2x12 65# 3x10                             Assessment: Tolerated treatment well. Patient demonstrated fatigue post treatment, exhibited good technique with therapeutic exercises and would benefit from continued PT    Plan: Continue per plan of care. Progress treatment as tolerated.     Frederick Loaiza

## 2023-09-06 ENCOUNTER — OFFICE VISIT (OUTPATIENT)
Dept: PHYSICAL THERAPY | Facility: CLINIC | Age: 78
End: 2023-09-06
Payer: MEDICARE

## 2023-09-06 DIAGNOSIS — R42 DIZZINESS: ICD-10-CM

## 2023-09-06 DIAGNOSIS — R26.2 AMBULATORY DYSFUNCTION: Primary | ICD-10-CM

## 2023-09-06 PROCEDURE — 97110 THERAPEUTIC EXERCISES: CPT | Performed by: PHYSICAL THERAPIST

## 2023-09-06 PROCEDURE — 97112 NEUROMUSCULAR REEDUCATION: CPT | Performed by: PHYSICAL THERAPIST

## 2023-09-06 NOTE — PROGRESS NOTES
Daily Note     Today's date: 2023  Patient name: Lucien Galloway  : 1945  MRN: [id number]  Referring provider: Ira Montoya DO  Dx:   Encounter Diagnosis     ICD-10-CM    1. Ambulatory dysfunction  R26.2       2. Dizziness  R42                      Subjective: Patient feels " good" today with no soreness. Patient appeared alert and moderately fatigued after session. Enjoyed head turning balance exercise with numbers. Objective: See treatment diary below      Assessment: Tolerated treatment well. Patient would benefit from further skilled physical therapy in order to address balance dysfunction and general LE weakness. Plan: Plan to progress patient as tolerated.      Precautions: prior lower cervical surgery, CKD    Manuals    Foot assessment         Neuro Re-Ed       Tandem stance 3x30" holds  2x45" holds 45" ea 45" each 45" ea airex 45" ea airex   VOR x1 horizontal         Standing ball toss 3 min 3 min 3' 3' 3' 3'   Tandem walking 3 laps 3 laps 3 laps 3 laps 3 laps 3 laps   Walking with head turns 2 laps w/ more head turns & tilts each 3 laps w/ more head turns & tilts each 3x ea turns and tilts 3x ea turns and tilts 3x ea turns and tilts With numbers  30ft/1 lap    SLS   15"x3 ea      hurdles  4 laps   High knees  4 laps High knees 3 laps High knees 3 laps   Assess DGI or FGA         Ther Ex       bike 5 min  5 min 5' 5' 5' 5'   steps 12x each 6"  15x each 6" 1 flight 15x each 6" 2 flights 15x each 6"   LAQ 20x 5"   4 lbs each  20x 5"   5 lbs each 5#  5"x20 ea 5#  5"x20 ea 6.5# 5"x20 6.5# 5"x20   HS curls     5# 2x10 ea 5# 2x10 ea                     Ther Activity       STS 3x10 3x10 3x10   2X10   Leg press    65# 2x12 65# 3x10 65 lbs 3X10

## 2023-09-07 ENCOUNTER — OFFICE VISIT (OUTPATIENT)
Dept: PHYSICAL THERAPY | Facility: CLINIC | Age: 78
End: 2023-09-07
Payer: MEDICARE

## 2023-09-07 ENCOUNTER — CLINICAL SUPPORT (OUTPATIENT)
Dept: BARIATRICS | Facility: CLINIC | Age: 78
End: 2023-09-07

## 2023-09-07 VITALS — HEIGHT: 60 IN | WEIGHT: 163.4 LBS | BODY MASS INDEX: 32.08 KG/M2

## 2023-09-07 DIAGNOSIS — R26.2 AMBULATORY DYSFUNCTION: Primary | ICD-10-CM

## 2023-09-07 DIAGNOSIS — R63.5 ABNORMAL WEIGHT GAIN: Primary | ICD-10-CM

## 2023-09-07 DIAGNOSIS — R42 DIZZINESS: ICD-10-CM

## 2023-09-07 PROCEDURE — 97112 NEUROMUSCULAR REEDUCATION: CPT

## 2023-09-07 PROCEDURE — 97110 THERAPEUTIC EXERCISES: CPT

## 2023-09-07 PROCEDURE — RECHECK

## 2023-09-07 PROCEDURE — 97530 THERAPEUTIC ACTIVITIES: CPT

## 2023-09-07 NOTE — PROGRESS NOTES
Daily Note     Today's date: 2023  Patient name: Alphonse Sheikh  : 1945  MRN: [id number]  Referring provider: Imtiaz Prabhakar DO  Dx:   Encounter Diagnosis     ICD-10-CM    1. Ambulatory dysfunction  R26.2       2. Dizziness  R42                      Subjective: Pt notes dizziness since last night that has only slightly resolved. Objective: See treatment diary below     Precautions: prior lower cervical surgery, CKD    Manuals      Foot assessment           Neuro Re-Ed       Tandem stance 2x45" holds 45" ea 45" each 45" ea airex 45" ea airex 1' ea airex     VOR x1 horizontal           Standing ball toss 3 min 3' 3' 3' 3' 3'     Tandem walking 3 laps 3 laps 3 laps 3 laps 3 laps 3 laps     Walking with head turns 3 laps w/ more head turns & tilts each 3x ea turns and tilts 3x ea turns and tilts 3x ea turns and tilts With numbers  30ft/1 lap  2 laps ea     SLS  15"x3 ea         hurdles 4 laps   High knees  4 laps High knees 3 laps High knees 3 laps High knees 3 laps     Assess DGI or FGA           Ther Ex       bike 5 min 5' 5' 5' 5' 5'     steps 15x each 6" 1 flight 15x each 6" 2 flights 15x each 6" 2 flights     LAQ 20x 5"   5 lbs each 5#  5"x20 ea 5#  5"x20 ea 6.5# 5"x20 6.5# 5"x20 7.5# 5"x20 ea     HS curls    5# 2x10 ea 5# 2x10 ea 5# 20x ea     Standing marches      5# 20x ea                Ther Activity       STS 3x10 3x10   2X10 2x12     Leg press   65# 2x12 65# 3x10 65 lbs 3X10 75# 3x10                               Assessment: Tolerated treatment well. Patient demonstrated fatigue post treatment, exhibited good technique with therapeutic exercises and would benefit from continued PT    Plan: Continue per plan of care. Progress treatment as tolerated.     Shey Messina

## 2023-09-12 ENCOUNTER — APPOINTMENT (OUTPATIENT)
Dept: PHYSICAL THERAPY | Facility: CLINIC | Age: 78
End: 2023-09-12
Payer: MEDICARE

## 2023-09-14 ENCOUNTER — TELEPHONE (OUTPATIENT)
Dept: BARIATRICS | Facility: CLINIC | Age: 78
End: 2023-09-14

## 2023-09-18 ENCOUNTER — OFFICE VISIT (OUTPATIENT)
Dept: BARIATRICS | Facility: CLINIC | Age: 78
End: 2023-09-18

## 2023-09-18 VITALS — WEIGHT: 160.6 LBS | HEIGHT: 60 IN | BODY MASS INDEX: 31.53 KG/M2

## 2023-09-18 DIAGNOSIS — R63.5 ABNORMAL WEIGHT GAIN: Primary | ICD-10-CM

## 2023-09-18 PROCEDURE — RECHECK

## 2023-09-21 ENCOUNTER — OFFICE VISIT (OUTPATIENT)
Dept: BARIATRICS | Facility: CLINIC | Age: 78
End: 2023-09-21
Payer: MEDICARE

## 2023-09-21 VITALS
DIASTOLIC BLOOD PRESSURE: 60 MMHG | BODY MASS INDEX: 30.29 KG/M2 | RESPIRATION RATE: 16 BRPM | HEART RATE: 69 BPM | WEIGHT: 160.4 LBS | SYSTOLIC BLOOD PRESSURE: 131 MMHG | HEIGHT: 61 IN

## 2023-09-21 DIAGNOSIS — I10 ESSENTIAL HYPERTENSION: ICD-10-CM

## 2023-09-21 DIAGNOSIS — F41.8 MIXED ANXIETY AND DEPRESSIVE DISORDER: ICD-10-CM

## 2023-09-21 DIAGNOSIS — Z80.3 FAMILY HISTORY OF BREAST CANCER: ICD-10-CM

## 2023-09-21 DIAGNOSIS — K21.9 GASTROESOPHAGEAL REFLUX DISEASE WITHOUT ESOPHAGITIS: ICD-10-CM

## 2023-09-21 DIAGNOSIS — E66.9 OBESITY, CLASS I, BMI 30-34.9: Primary | ICD-10-CM

## 2023-09-21 DIAGNOSIS — Z12.31 ENCOUNTER FOR SCREENING MAMMOGRAM FOR MALIGNANT NEOPLASM OF BREAST: ICD-10-CM

## 2023-09-21 DIAGNOSIS — E03.9 HYPOTHYROIDISM, UNSPECIFIED TYPE: ICD-10-CM

## 2023-09-21 DIAGNOSIS — Z79.899 MEDICATION MANAGEMENT: ICD-10-CM

## 2023-09-21 DIAGNOSIS — E78.2 MIXED HYPERLIPIDEMIA: ICD-10-CM

## 2023-09-21 PROCEDURE — 99214 OFFICE O/P EST MOD 30 MIN: CPT | Performed by: NURSE PRACTITIONER

## 2023-09-21 RX ORDER — NALTREXONE HYDROCHLORIDE 50 MG/1
TABLET, FILM COATED ORAL
Qty: 15 TABLET | Refills: 2 | Status: SHIPPED | OUTPATIENT
Start: 2023-09-21

## 2023-09-21 NOTE — ASSESSMENT & PLAN NOTE
- Taking norvasc and lisinopril. May improve with weight loss and lifestyle modification. Continue management with prescribing provider.

## 2023-09-21 NOTE — PROGRESS NOTES
Assessment/Plan:     Obesity, Class I, BMI 30-34.9  - Patient is pursuing [facility] after completing [facility].  - Initial weight loss goal of 5-10% weight loss for improved health  - On Wellbutrin through primary care provider.   - Not a GLP-1 candidate due to history of pancreatitis. - Avoid metformin given eGFR. - Avoid phentermine due to age and history of hypertension.  - Naltrexone 25 mg daily started March 2023 at weight of 162 lbs along with Wellbutrin to mimic Contrave. Took for about 2 weeks, then stopped, as she did not feel that she needed it. Tolerated it well. Interested in restarting to help with appetite and cravings. - Restart naltrexone 50 mg 1/2 tablet daily at weight of 160.4 lbs. - Side effects of naltrexone discussed: nausea, vomiting, diarrhea, constipation, headache, anxiety, and confusion. Advised not to consume alcohol with naltrexone. Must be discontinued prior to surgery. - Labs reviewed: CBC and CMP 6/13/2023. BUN/Cr elevated and eGFR 44. Glucose somewhat elevated and will likely improve with weight loss. Remainder of the blood work within acceptable range. Initial: 184 lbs  Last visit: 160.8 lbs BMI 31.40  Current: 160.4 lbs BMI 30.31  Change: -24 lbs (-1 since last office visit)  Goal: 150 lbs    Goals:  Restart food logging  Increase water intake to at least 64 oz daily. Increase walking as tolerated - limited by knee pain. Continue nutrition recommendations per dietician. Nutrition Prescription  Calories:0329-9245 calories   Protein:70-80g  Fluid:65oz  Continue Wellbutrin through primary care  Restart Naltrexone    Hyperlipidemia  - Taking atorvastatin. May improve with weight loss and lifestyle modification. Continue management with prescribing provider. Gastroesophageal reflux disease  - Taking protonix. May improve with weight loss and lifestyle modification. Continue management with prescribing provider.        Hypothyroidism  - Taking levothyroxine. Continue management with prescribing provider. Mixed anxiety and depressive disorder  - Taking sertraline, Wellbutrin, and trazodone. Continue management with prescribing provider. Essential hypertension  - Taking norvasc and lisinopril. May improve with weight loss and lifestyle modification. Continue management with prescribing provider. Anthony Hart was seen today for follow-up. Diagnoses and all orders for this visit:    Obesity, Class I, BMI 30-34.9  -     naltrexone (REVIA) 50 mg tablet; Take 1/2 tablet daily in the morning. Encounter for screening mammogram for malignant neoplasm of breast  -     Mammo screening bilateral w 3d & cad; Future    Family history of breast cancer  -     Mammo screening bilateral w 3d & cad; Future    Medication management  -     naltrexone (REVIA) 50 mg tablet; Take 1/2 tablet daily in the morning. Mixed hyperlipidemia    Gastroesophageal reflux disease without esophagitis    Hypothyroidism, unspecified type    Mixed anxiety and depressive disorder    Essential hypertension          Follow up in approximately 2 month nurse visit and 4 months with Non-Surgical Physician/Advanced Practitioner. Subjective:   Chief Complaint   Patient presents with   • Follow-up     MWM 2F/u; waist-39.5in       Patient ID: Jackie Hollins  is a 78 y.o. female with excess weight/obesity here to pursue weight management. Patient is pursuing [facility], previously completed [facility]. Most recent notes and records were reviewed. HPI    Wt Readings from Last 10 Encounters:   09/21/23 72.8 kg (160 lb 6.4 oz)   09/18/23 72.8 kg (160 lb 9.6 oz)   09/07/23 74.1 kg (163 lb 6.4 oz)   08/31/23 73 kg (161 lb)   08/24/23 73.6 kg (162 lb 3.2 oz)   08/14/23 73.3 kg (161 lb 9.6 oz)   08/08/23 73.9 kg (163 lb)   08/03/23 73.9 kg (163 lb)   07/28/23 73 kg (161 lb)   07/26/23 73 kg (161 lb)     Currently in [facility].     Taking Wellbutrin  mg daily through primary care      Appetite up and down. Having cravings for sweets. Previously was on naltrexone 25 mg daily, tolerated it well, but stopped after about 2 weeks as she did not think that she needed it. She is interested in restarting it. Not food logging. Hydration: over 40 oz water, 3 cups of coffee with 1 T creamer, SF chocolate drink occ  Alcohol: none  Exercise: walking 0615-8801 steps daily     Occupation: retired   Sleep: 7-8 hours  Has DIANA, does not wear CPAP.         Colonoscopy: Due Oct 2023, she will contact GI to schedule. Mammogram: due, order placed          The following portions of the patient's history were reviewed and updated as appropriate: allergies, current medications, past family history, past medical history, past social history, past surgical history, and problem list.    Family History   Problem Relation Age of Onset   • Breast cancer Mother    • Alzheimer's disease Mother    • Coronary artery disease Mother    • Hypertension Mother    • Migraines Mother    • Peripheral vascular disease Mother    • Arthritis Mother    • Cancer Mother    • Parkinsonism Father    • Other Father         Cardiovascular disease    • Coronary artery disease Father    • Hypertension Father    • Bipolar disorder Sister    • Thyroid disease Sister    • Alzheimer's disease Sister    • Depression Sister    • Asthma Daughter    • Asthma Son    • Throat cancer Maternal Grandfather    • Cancer Maternal Grandfather    • Diabetes Neg Hx    • Stroke Neg Hx         Review of Systems   HENT: Negative for sore throat. Respiratory: Negative for cough and shortness of breath. Cardiovascular: Negative for chest pain and palpitations. Gastrointestinal: Negative for abdominal pain, constipation, diarrhea, nausea and vomiting. Denies GERD   Musculoskeletal: Positive for arthralgias (chronic) and back pain (chronic). Skin: Negative for rash.    Psychiatric/Behavioral: Negative for suicidal ideas (or HI). + depression controlled  Denies anxiety        Objective:  /60   Pulse 69   Resp 16   Ht 5' 1" (1.549 m)   Wt 72.8 kg (160 lb 6.4 oz)   BMI 30.31 kg/m²     Physical Exam  Vitals and nursing note reviewed. Constitutional   General appearance: Abnormal.  well developed and obese. Eyes No conjunctival injection. Ears, Nose, Mouth, and Throat Oral mucosa moist.   Pulmonary   Respiratory effort: No increased work of breathing or signs of respiratory distress. Cardiovascular     Examination of extremities for edema and/or varicosities: Normal.  no edema. Abdomen   Abdomen: Abnormal.  The abdomen was obese.     Musculoskeletal   Normal range of motion  Neurological   Gait and station: Normal.    Psychiatric   Orientation to person, place and time: Normal.    Affect: appropriate

## 2023-09-21 NOTE — ASSESSMENT & PLAN NOTE
- Patient is pursuing [facility] after completing [facility].  - Initial weight loss goal of 5-10% weight loss for improved health  - On Wellbutrin through primary care provider.   - Not a GLP-1 candidate due to history of pancreatitis. - Avoid metformin given eGFR. - Avoid phentermine due to age and history of hypertension.  - Naltrexone 25 mg daily started March 2023 at weight of 162 lbs along with Wellbutrin to mimic Contrave. Took for about 2 weeks, then stopped, as she did not feel that she needed it. Tolerated it well. Interested in restarting to help with appetite and cravings. - Restart naltrexone 50 mg 1/2 tablet daily at weight of 160.4 lbs. - Side effects of naltrexone discussed: nausea, vomiting, diarrhea, constipation, headache, anxiety, and confusion. Advised not to consume alcohol with naltrexone. Must be discontinued prior to surgery. - Labs reviewed: CBC and CMP 6/13/2023. BUN/Cr elevated and eGFR 44. Glucose somewhat elevated and will likely improve with weight loss. Remainder of the blood work within acceptable range. Initial: 184 lbs  Last visit: 160.8 lbs BMI 31.40  Current: 160.4 lbs BMI 30.31  Change: -24 lbs (-1 since last office visit)  Goal: 150 lbs    Goals:  Restart food logging  Increase water intake to at least 64 oz daily. Increase walking as tolerated - limited by knee pain. Continue nutrition recommendations per dietician.    Nutrition Prescription  Calories:3523-0112 calories   Protein:70-80g  Fluid:65oz  Continue Wellbutrin through primary care  Restart Naltrexone

## 2023-09-22 DIAGNOSIS — F32.A DEPRESSION, UNSPECIFIED DEPRESSION TYPE: ICD-10-CM

## 2023-09-22 RX ORDER — SERTRALINE HYDROCHLORIDE 100 MG/1
100 TABLET, FILM COATED ORAL 2 TIMES DAILY
Qty: 60 TABLET | Refills: 5 | Status: SHIPPED | OUTPATIENT
Start: 2023-09-22

## 2023-09-24 DIAGNOSIS — F51.01 PRIMARY INSOMNIA: ICD-10-CM

## 2023-09-25 RX ORDER — TRAZODONE HYDROCHLORIDE 50 MG/1
100 TABLET ORAL
Qty: 60 TABLET | Refills: 0 | Status: SHIPPED | OUTPATIENT
Start: 2023-09-25

## 2023-10-04 ENCOUNTER — OFFICE VISIT (OUTPATIENT)
Dept: FAMILY MEDICINE CLINIC | Facility: CLINIC | Age: 78
End: 2023-10-04
Payer: MEDICARE

## 2023-10-04 VITALS
RESPIRATION RATE: 16 BRPM | WEIGHT: 162.2 LBS | BODY MASS INDEX: 30.62 KG/M2 | TEMPERATURE: 97.7 F | OXYGEN SATURATION: 95 % | SYSTOLIC BLOOD PRESSURE: 124 MMHG | DIASTOLIC BLOOD PRESSURE: 68 MMHG | HEIGHT: 61 IN | HEART RATE: 63 BPM

## 2023-10-04 DIAGNOSIS — I10 ESSENTIAL HYPERTENSION: Primary | ICD-10-CM

## 2023-10-04 DIAGNOSIS — Z12.31 OTHER SCREENING MAMMOGRAM: ICD-10-CM

## 2023-10-04 DIAGNOSIS — J32.0 MAXILLARY SINUSITIS, UNSPECIFIED CHRONICITY: ICD-10-CM

## 2023-10-04 DIAGNOSIS — K21.9 GASTROESOPHAGEAL REFLUX DISEASE WITHOUT ESOPHAGITIS: ICD-10-CM

## 2023-10-04 DIAGNOSIS — R73.01 IFG (IMPAIRED FASTING GLUCOSE): ICD-10-CM

## 2023-10-04 DIAGNOSIS — E03.9 HYPOTHYROIDISM, UNSPECIFIED TYPE: ICD-10-CM

## 2023-10-04 DIAGNOSIS — R42 VERTIGO: ICD-10-CM

## 2023-10-04 PROCEDURE — 99214 OFFICE O/P EST MOD 30 MIN: CPT | Performed by: FAMILY MEDICINE

## 2023-10-04 RX ORDER — DOXYCYCLINE 100 MG/1
100 TABLET ORAL 2 TIMES DAILY
Qty: 20 TABLET | Refills: 0 | Status: SHIPPED | OUTPATIENT
Start: 2023-10-04 | End: 2023-10-14

## 2023-10-04 RX ORDER — MECLIZINE HYDROCHLORIDE 25 MG/1
25 TABLET ORAL EVERY 8 HOURS PRN
Qty: 30 TABLET | Refills: 0 | Status: SHIPPED | OUTPATIENT
Start: 2023-10-04 | End: 2023-10-14

## 2023-10-04 NOTE — PROGRESS NOTES
jfName: Jd Johansen      : 1945      MRN: [id number]  Encounter Provider: Vicente Do DO  Encounter Date: 10/4/2023   Encounter department: 1 [facility]    Assessment & Plan     1. Essential hypertension  Comments:  at goal on current meds  continue same    2. Hypothyroidism, unspecified type  Comments:  check TSH  Orders:  -     TSH, 3rd generation; Future    3. Gastroesophageal reflux disease without esophagitis  Comments:  continue current meds and f/u with GI    4. IFG (impaired fasting glucose)  Comments:  check a1c  Orders:  -     Hemoglobin A1C; Future    5. Maxillary sinusitis, unspecified chronicity  Comments:  doxycycline x 10 days  call if sx don't resolve  Orders:  -     doxycycline (ADOXA) 100 MG tablet; Take 1 tablet (100 mg total) by mouth 2 (two) times a day for 10 days    6. Vertigo  Comments:  mild symptoms; hopefully treating sinusitis will resolve, but if not, may return to PT  meclizine prn  Orders:  -     Ambulatory Referral to Physical Therapy; Future  -     meclizine (ANTIVERT) 25 mg tablet; Take 1 tablet (25 mg total) by mouth every 8 (eight) hours as needed for dizziness for up to 10 days    7. Other screening mammogram  -     Mammo screening bilateral w 3d & cad; Future; Expected date: 10/04/2023           Subjective      HPI   Pt presents in f/u. Blood pressure appropriate on current meds. No chest pain, shortness of breath, n/v, abd pain, visual changes, paresthesias, weakness. Last cholesterol at goal 6 mo ago. No myalgias    Does have chronic pain--triggerpoint injections offered by pain mgmt, but pt declines currently. Due for tsh. Pt has had issues with vertigo since sinus infection. Resolved after vestibular rehab; however over the last 2 weeks, she has noted pressures/pain in sinuses, congestion, feeling unwell, and starting to have dizziness back.     Pt due back with GI for colonoscopy (dr. Nayan Guerrero)    Review of Systems Constitutional: Negative for chills, fatigue, fever and unexpected weight change. HENT: Positive for congestion, sinus pressure and sinus pain. Negative for ear pain, hearing loss, postnasal drip, rhinorrhea, sore throat, trouble swallowing and voice change. Eyes: Negative for pain, redness and visual disturbance. Respiratory: Negative for cough and shortness of breath. Cardiovascular: Negative for chest pain, palpitations and leg swelling. Gastrointestinal: Negative for abdominal pain, constipation, diarrhea and nausea. Endocrine: Negative for cold intolerance, heat intolerance, polydipsia and polyuria. Genitourinary: Negative for dysuria, frequency and urgency. Musculoskeletal: Negative for arthralgias, joint swelling and myalgias. Skin: Negative for rash. No suspicious lesions   Neurological: Positive for dizziness. Negative for weakness, numbness and headaches. Hematological: Negative for adenopathy. Current Outpatient Medications on File Prior to Visit   Medication Sig   • albuterol (Ventolin HFA) 90 mcg/act inhaler Inhale 2 puffs every 6 (six) hours as needed for wheezing   • amLODIPine (NORVASC) 10 mg tablet TAKE 1 TABLET BY MOUTH EVERY DAY   • Ascorbic Acid (VITAMIN C PO) Vitamin C   • atorvastatin (LIPITOR) 20 mg tablet Take 1 tablet (20 mg total) by mouth daily   • buPROPion (WELLBUTRIN XL) 150 mg 24 hr tablet TAKE 1 TABLET BY MOUTH EVERY DAY IN THE MORNING   • Cholecalciferol (Vitamin D3) 1.25 MG (24781 UT) CAPS Take 5,000 Units by mouth daily   • Diclofenac Sodium (VOLTAREN) 1 % Apply 2 g topically 4 (four) times a day for 15 days   • fluticasone (FLONASE) 50 mcg/act nasal spray 1 spray into each nostril daily   • fluticasone (FLOVENT HFA) 110 MCG/ACT inhaler Inhale 2 puffs 2 (two) times a day Rinse mouth after use.    • levothyroxine 137 mcg tablet TAKE 1 TABLET BY MOUTH EVERY DAY   • lisinopril (ZESTRIL) 40 mg tablet TAKE 1 TABLET BY MOUTH EVERY DAY   • metroNIDAZOLE (METROGEL) 1 % gel Apply topically daily   • Multiple Vitamin (MULTIVITAMIN ADULT PO) multivitamin   • naltrexone (REVIA) 50 mg tablet Take 1/2 tablet daily in the morning. • pantoprazole (PROTONIX) 40 mg tablet TAKE 1 TABLET BY MOUTH TWICE A DAY   • Restasis 0.05 % ophthalmic emulsion INSTILL 1 DROP BY OPHTHALMIC ROUTE EVERY 12 HOURS IN BOTH EYES   • sertraline (ZOLOFT) 100 mg tablet TAKE 1 TABLET BY MOUTH TWICE A DAY   • traZODone (DESYREL) 50 mg tablet TAKE 2 TABLETS (100 MG TOTAL) BY MOUTH DAILY AT BEDTIME   • [DISCONTINUED] meclizine (ANTIVERT) 25 mg tablet Take 1 tablet (25 mg total) by mouth every 8 (eight) hours as needed for dizziness for up to 10 days   • [DISCONTINUED] predniSONE 10 mg tablet 4 tabs po for 3 days, 3 days po for 3 days, 2 tabs po for 3 days, and 1 tab po for 3 days. (Patient not taking: Reported on 8/8/2023)   • [DISCONTINUED] Sennosides 8.6 MG CAPS Take 8.6 mg by mouth in the morning (Patient not taking: Reported on 10/4/2023)       Objective     /68   Pulse 63   Temp 97.7 °F (36.5 °C)   Resp 16   Ht 5' 1" (1.549 m)   Wt 73.6 kg (162 lb 3.2 oz)   SpO2 95%   BMI 30.65 kg/m²     Physical Exam  Constitutional:       General: She is not in acute distress. Appearance: She is well-developed. HENT:      Head: Normocephalic and atraumatic. Right Ear: Tympanic membrane, ear canal and external ear normal.      Left Ear: Tympanic membrane, ear canal and external ear normal.      Nose: Mucosal edema present. Right Sinus: Maxillary sinus tenderness present. Left Sinus: Maxillary sinus tenderness present. Mouth/Throat:      Pharynx: No oropharyngeal exudate. Eyes:      Conjunctiva/sclera: Conjunctivae normal.      Pupils: Pupils are equal, round, and reactive to light. Neck:      Thyroid: No thyromegaly. Vascular: No carotid bruit or JVD. Cardiovascular:      Rate and Rhythm: Regular rhythm.       Heart sounds: S1 normal and S2 normal. No murmur heard.     No friction rub. No gallop. No S3 or S4 sounds. Pulmonary:      Effort: Pulmonary effort is normal.      Breath sounds: Normal breath sounds. No wheezing, rhonchi or rales. Abdominal:      General: Bowel sounds are normal. There is no distension. Palpations: Abdomen is soft. Tenderness: There is no abdominal tenderness. Lymphadenopathy:      Cervical: No cervical adenopathy. Neurological:      Mental Status: She is alert and oriented to person, place, and time. Cranial Nerves: No cranial nerve deficit. Sensory: No sensory deficit. Deep Tendon Reflexes: Reflexes are normal and symmetric.        Elly Bound, DO

## 2023-10-04 NOTE — PATIENT INSTRUCTIONS
Doxycycline twice daily x 10 days  Obtain non-fasting labs  Make appt with Dr. Ross Almeida for colonoscopy  Go back to vestibular rehab (dizziness PT) if you need it  Obtain mammogram

## 2023-10-05 NOTE — PROGRESS NOTES
Weight Management Medical Nutrition Assessment  Yokasta presented for a follow-up session with the [facility].  Today's weight is  164.6   lbs.  She has gained 4 lbs x 3 wks however is currently sick. Reports less activity & not eating to her plan. She feels it would be beneficial for her to begin food logging again. Food logs provided. At last visit with [facility] naltrexone was re-ordered. She does feel this decreases her appetite. Doing better having protein with her lunch.  She will continue in [facility] at this time.      Patient seen by Medical Provider in past 6 months:  yes  Requested to schedule appointment with Medical Provider: No      Anthropometric Measurements  Start Weight (#):  184 # ( 6/15/22 -MD)   205# (4/22) home scale   Current Weight (#): 164.6 #  TBW % Change from start weight:10.5%   Ideal Body Weight (#): 100#  Goal Weight (#):160#      Lowest: 145#     Weight Loss History  Previous weight loss attempts: Counseling with  MD  Meal Replacements (Medifast, Slim Fast, etc.)  Nutrition Counseling with RD     Food and Nutrition Related History  Wake up: 6-7  Bed Time:11a     Food Recall   Breakfast 6-7:00: oatmeal OR 1 Egg/ 1 Toast (light bread) or english muffin w/1 tbsp pb, sometimes yogurt OR cheerios cereal w/banana & milk  Coffee - creamer (35cal) x2  Snack:skip    Lunch 12:00: salad, chicken, dressing, grapes OR  Tuna/chicken salad w/light bread, young OR light bread w/cheese  Snack: crackers w/cheese or tuna OR fruit  Dinner:4-5:00: ~4 oz lean protein/~1/2 cup veggie, ~1/2 cup carb    Snack: skip       Beverages: water and coffee/tea  Volume of beverage intake: 60 oz water, 2 cups coffee     Weekends: Same  Cravings: sweets or CHO  Trouble area of day:after dinner     Frequency of Eating out: irregularly  Food restrictions:none  Lives with her sister  Cooking: self and sister  Food Shopping: self     Physical Activity Intake  Activity: Walking    Frequency: as able    Physical limitations/barriers to exercise:hx vertigo, foot injury     Estimated Needs  Energy  [place] Energy Needs: BMR : 4175 calories            0.5# loss weekly lightly active:1116  calories; 0.5 lb wk lightly active: 1315  Maintenance calories for sedentary  level: 1366 calories   Protein:70-85   (1.2-1.5g/kg IBW); 1.0g/kg IBW= 58  Fluid: 58-68oz     (30-35mL/kg IBW)     Nutrition Diagnosis  Yes;    Overweight/obesity  related to Excess energy intake as evidenced by  BMI more than normative standard for age and sex (obesity-grade I 29. 1)     Nutrition Intervention     Nutrition Prescription  Calories:1000 on sedentary days and flex to 1200 calories on walk days.   Protein:70-80g  Fluid:65oz     Meal Plan (Riky/Pro/Carb)  Breakfast:200/26-27  Snack:-  Lunch:200/26-27  Snack:100-150/5-10  Dinner:200-300/15-20  Snack:<200/0-5     Nutrition Education:    Healthy Core Manual  Calorie controlled menu  Lean protein food choices  Healthy snack options  Food journaling tips        Nutrition Counseling:  Strategies: meal planning, portion sizes, healthy snack choices, hydration, fiber intake, protein intake, exercise, food journal        Monitoring and Evaluation:  Evaluation criteria:  Energy Intake  Meet protein needs  Maintain adequate hydration  Monitor weekly weight  Meal planning/preparation  Food journal   Decreased portions at mealtimes and snacks  Physical activity      Barriers to learning:none  Readiness to change: Action   Comprehension: very good  Expected Compliance: good

## 2023-10-08 DIAGNOSIS — J45.20 MILD INTERMITTENT ASTHMA WITHOUT COMPLICATION: ICD-10-CM

## 2023-10-09 ENCOUNTER — OFFICE VISIT (OUTPATIENT)
Dept: BARIATRICS | Facility: CLINIC | Age: 78
End: 2023-10-09

## 2023-10-09 VITALS — WEIGHT: 164.6 LBS | BODY MASS INDEX: 32.31 KG/M2 | HEIGHT: 60 IN

## 2023-10-09 DIAGNOSIS — R63.5 ABNORMAL WEIGHT GAIN: Primary | ICD-10-CM

## 2023-10-09 PROCEDURE — RECHECK

## 2023-10-09 RX ORDER — ALBUTEROL SULFATE 90 UG/1
2 AEROSOL, METERED RESPIRATORY (INHALATION) EVERY 6 HOURS PRN
Qty: 18 G | Refills: 0 | Status: SHIPPED | OUTPATIENT
Start: 2023-10-09

## 2023-10-10 DIAGNOSIS — I10 ESSENTIAL HYPERTENSION: ICD-10-CM

## 2023-10-10 RX ORDER — AMLODIPINE BESYLATE 10 MG/1
10 TABLET ORAL DAILY
Qty: 90 TABLET | Refills: 0 | Status: SHIPPED | OUTPATIENT
Start: 2023-10-10

## 2023-10-19 ENCOUNTER — CLINICAL SUPPORT (OUTPATIENT)
Dept: BARIATRICS | Facility: CLINIC | Age: 78
End: 2023-10-19

## 2023-10-19 VITALS — HEIGHT: 60 IN | WEIGHT: 163.8 LBS | BODY MASS INDEX: 32.16 KG/M2

## 2023-10-19 DIAGNOSIS — R63.5 ABNORMAL WEIGHT GAIN: Primary | ICD-10-CM

## 2023-10-19 PROCEDURE — RECHECK

## 2023-10-23 DIAGNOSIS — F51.01 PRIMARY INSOMNIA: ICD-10-CM

## 2023-10-23 RX ORDER — TRAZODONE HYDROCHLORIDE 50 MG/1
100 TABLET ORAL
Qty: 60 TABLET | Refills: 0 | Status: SHIPPED | OUTPATIENT
Start: 2023-10-23

## 2023-11-09 ENCOUNTER — CLINICAL SUPPORT (OUTPATIENT)
Dept: BARIATRICS | Facility: CLINIC | Age: 78
End: 2023-11-09

## 2023-11-09 VITALS — BODY MASS INDEX: 32 KG/M2 | HEIGHT: 60 IN | WEIGHT: 163 LBS

## 2023-11-09 DIAGNOSIS — R63.5 ABNORMAL WEIGHT GAIN: Primary | ICD-10-CM

## 2023-11-09 PROCEDURE — RECHECK

## 2023-11-19 DIAGNOSIS — R10.13 EPIGASTRIC PAIN: ICD-10-CM

## 2023-11-19 DIAGNOSIS — F32.A DEPRESSION, UNSPECIFIED DEPRESSION TYPE: ICD-10-CM

## 2023-11-19 DIAGNOSIS — E03.9 HYPOTHYROIDISM, UNSPECIFIED TYPE: ICD-10-CM

## 2023-11-20 RX ORDER — PANTOPRAZOLE SODIUM 40 MG/1
TABLET, DELAYED RELEASE ORAL
Qty: 60 TABLET | Refills: 5 | Status: SHIPPED | OUTPATIENT
Start: 2023-11-20

## 2023-11-20 RX ORDER — BUPROPION HYDROCHLORIDE 150 MG/1
150 TABLET ORAL EVERY MORNING
Qty: 30 TABLET | Refills: 5 | Status: SHIPPED | OUTPATIENT
Start: 2023-11-20

## 2023-11-20 RX ORDER — LEVOTHYROXINE SODIUM 137 UG/1
TABLET ORAL
Qty: 30 TABLET | Refills: 0 | Status: SHIPPED | OUTPATIENT
Start: 2023-11-20

## 2023-11-21 ENCOUNTER — CLINICAL SUPPORT (OUTPATIENT)
Dept: BARIATRICS | Facility: CLINIC | Age: 78
End: 2023-11-21

## 2023-11-21 VITALS
SYSTOLIC BLOOD PRESSURE: 130 MMHG | HEART RATE: 90 BPM | HEIGHT: 59 IN | RESPIRATION RATE: 16 BRPM | BODY MASS INDEX: 33.1 KG/M2 | WEIGHT: 164.2 LBS | DIASTOLIC BLOOD PRESSURE: 65 MMHG

## 2023-11-21 DIAGNOSIS — I10 ESSENTIAL HYPERTENSION: ICD-10-CM

## 2023-11-21 DIAGNOSIS — R63.5 ABNORMAL WEIGHT GAIN: Primary | ICD-10-CM

## 2023-11-21 PROCEDURE — RECHECK

## 2023-11-21 RX ORDER — METHYLPREDNISOLONE 4 MG/1
TABLET ORAL
COMMUNITY
Start: 2023-11-20

## 2023-11-21 RX ORDER — LISINOPRIL 40 MG/1
TABLET ORAL
Qty: 90 TABLET | Refills: 1 | Status: SHIPPED | OUTPATIENT
Start: 2023-11-21

## 2023-11-21 RX ORDER — NITROFURANTOIN 25; 75 MG/1; MG/1
CAPSULE ORAL
COMMUNITY
Start: 2023-10-25

## 2023-11-21 NOTE — TELEPHONE ENCOUNTER
Refill must be reviewed and completed by the office or provider.  The refill is unable to be approved by the medication management team.     6/13/23 eGfr =[id number]
Render Risk Assessment In Note?: yes
Detail Level: Detailed
Comment: treatment #2 of 3
Comment: Sample of impoyz given to use once daily x 3 days to face.  \\nRecommend A-luminate cream nightly

## 2023-11-21 NOTE — PROGRESS NOTES
Patient last visit weight:  Patient current visit weight:    If you are taking phentermine or other oral weight loss medications, are you experiencing any of the following symptoms:  Headache:  No  Blurred Vision:  NO  Chest Pain:  No  Palpitations: No  Insomnia:  No  SPECIFY ORAL MEDICATION AND DOSAGE:  Wellbutrin    If you are taking an injectable medication,  are you experiencing any of the following symptoms:  Bloating:   Nausea:  Vomiting:   Constipation:   Diarrhea:  SPECIFY INJECTABLE MEDICATION AND CURRENT DOSAGE:      Vitals:    Is BP less than 100/60? NO  Is BP greater than 140/90? No  Is HR greater than 100? No  **If yes to any of the above, have patient relax and repeat in 5-10 minutes**    Repeat values:    Is BP less than 100/60? Is BP greater than 140/90? Is HR greater than 100?   **If values remain outside of ranges above, please consult provider for next steps**

## 2023-11-30 ENCOUNTER — CLINICAL SUPPORT (OUTPATIENT)
Dept: BARIATRICS | Facility: CLINIC | Age: 78
End: 2023-11-30

## 2023-11-30 VITALS — WEIGHT: 165.4 LBS | BODY MASS INDEX: 32.47 KG/M2 | HEIGHT: 60 IN

## 2023-11-30 DIAGNOSIS — R63.5 ABNORMAL WEIGHT GAIN: Primary | ICD-10-CM

## 2023-11-30 PROCEDURE — RECHECK

## 2023-12-01 DIAGNOSIS — F51.01 PRIMARY INSOMNIA: ICD-10-CM

## 2023-12-01 DIAGNOSIS — F32.A DEPRESSION, UNSPECIFIED DEPRESSION TYPE: ICD-10-CM

## 2023-12-01 RX ORDER — TRAZODONE HYDROCHLORIDE 50 MG/1
100 TABLET ORAL
Qty: 60 TABLET | Refills: 0 | Status: SHIPPED | OUTPATIENT
Start: 2023-12-01

## 2023-12-01 RX ORDER — SERTRALINE HYDROCHLORIDE 100 MG/1
100 TABLET, FILM COATED ORAL 2 TIMES DAILY
Qty: 60 TABLET | Refills: 0 | Status: SHIPPED | OUTPATIENT
Start: 2023-12-01

## 2023-12-07 ENCOUNTER — CLINICAL SUPPORT (OUTPATIENT)
Dept: BARIATRICS | Facility: CLINIC | Age: 78
End: 2023-12-07

## 2023-12-07 VITALS — BODY MASS INDEX: 32.47 KG/M2 | WEIGHT: 165.4 LBS | HEIGHT: 60 IN

## 2023-12-07 DIAGNOSIS — R63.5 ABNORMAL WEIGHT GAIN: Primary | ICD-10-CM

## 2023-12-07 PROCEDURE — RECHECK

## 2023-12-10 DIAGNOSIS — E78.5 HYPERLIPIDEMIA, UNSPECIFIED HYPERLIPIDEMIA TYPE: ICD-10-CM

## 2023-12-11 RX ORDER — ATORVASTATIN CALCIUM 20 MG/1
20 TABLET, FILM COATED ORAL DAILY
Qty: 90 TABLET | Refills: 0 | Status: SHIPPED | OUTPATIENT
Start: 2023-12-11

## 2023-12-12 DIAGNOSIS — E78.5 HYPERLIPIDEMIA, UNSPECIFIED HYPERLIPIDEMIA TYPE: Primary | ICD-10-CM

## 2023-12-21 ENCOUNTER — APPOINTMENT (OUTPATIENT)
Dept: LAB | Facility: MEDICAL CENTER | Age: 78
End: 2023-12-21
Payer: MEDICARE

## 2023-12-21 DIAGNOSIS — E03.9 HYPOTHYROIDISM, UNSPECIFIED TYPE: ICD-10-CM

## 2023-12-21 DIAGNOSIS — R73.01 IFG (IMPAIRED FASTING GLUCOSE): ICD-10-CM

## 2023-12-21 DIAGNOSIS — E78.5 HYPERLIPIDEMIA, UNSPECIFIED HYPERLIPIDEMIA TYPE: ICD-10-CM

## 2023-12-21 LAB
CHOLEST SERPL-MCNC: 162 MG/DL
EST. AVERAGE GLUCOSE BLD GHB EST-MCNC: 126 MG/DL
HBA1C MFR BLD: 6 %
HDLC SERPL-MCNC: 69 MG/DL
LDLC SERPL CALC-MCNC: 78 MG/DL (ref 0–100)
NONHDLC SERPL-MCNC: 93 MG/DL
TRIGL SERPL-MCNC: 77 MG/DL
TSH SERPL DL<=0.05 MIU/L-ACNC: 4.34 UIU/ML (ref 0.45–4.5)

## 2023-12-21 PROCEDURE — 84443 ASSAY THYROID STIM HORMONE: CPT

## 2023-12-21 PROCEDURE — 36415 COLL VENOUS BLD VENIPUNCTURE: CPT

## 2023-12-21 PROCEDURE — 83036 HEMOGLOBIN GLYCOSYLATED A1C: CPT

## 2023-12-21 PROCEDURE — 80061 LIPID PANEL: CPT

## 2024-01-02 DIAGNOSIS — F32.A DEPRESSION, UNSPECIFIED DEPRESSION TYPE: ICD-10-CM

## 2024-01-02 DIAGNOSIS — I10 ESSENTIAL HYPERTENSION: ICD-10-CM

## 2024-01-03 RX ORDER — AMLODIPINE BESYLATE 10 MG/1
10 TABLET ORAL DAILY
Qty: 90 TABLET | Refills: 1 | Status: SHIPPED | OUTPATIENT
Start: 2024-01-03

## 2024-01-03 RX ORDER — SERTRALINE HYDROCHLORIDE 100 MG/1
100 TABLET, FILM COATED ORAL 2 TIMES DAILY
Qty: 60 TABLET | Refills: 5 | Status: SHIPPED | OUTPATIENT
Start: 2024-01-03

## 2024-01-08 DIAGNOSIS — F51.01 PRIMARY INSOMNIA: ICD-10-CM

## 2024-01-09 RX ORDER — TRAZODONE HYDROCHLORIDE 50 MG/1
100 TABLET ORAL
Qty: 60 TABLET | Refills: 1 | Status: SHIPPED | OUTPATIENT
Start: 2024-01-09

## 2024-01-11 ENCOUNTER — CLINICAL SUPPORT (OUTPATIENT)
Dept: BARIATRICS | Facility: CLINIC | Age: 79
End: 2024-01-11

## 2024-01-11 VITALS — HEIGHT: 61 IN | WEIGHT: 168 LBS | BODY MASS INDEX: 31.72 KG/M2

## 2024-01-11 DIAGNOSIS — R63.5 ABNORMAL WEIGHT GAIN: Primary | ICD-10-CM

## 2024-01-11 PROCEDURE — RECHECK

## 2024-01-16 DIAGNOSIS — E03.9 HYPOTHYROIDISM, UNSPECIFIED TYPE: ICD-10-CM

## 2024-01-16 RX ORDER — LEVOTHYROXINE SODIUM 137 UG/1
TABLET ORAL
Qty: 30 TABLET | Refills: 5 | Status: SHIPPED | OUTPATIENT
Start: 2024-01-16

## 2024-01-18 ENCOUNTER — CLINICAL SUPPORT (OUTPATIENT)
Dept: BARIATRICS | Facility: CLINIC | Age: 79
End: 2024-01-18

## 2024-01-18 VITALS — HEIGHT: 60 IN | WEIGHT: 166.8 LBS | BODY MASS INDEX: 32.75 KG/M2

## 2024-01-18 DIAGNOSIS — R63.5 ABNORMAL WEIGHT GAIN: Primary | ICD-10-CM

## 2024-01-18 PROCEDURE — RECHECK

## 2024-01-22 ENCOUNTER — OFFICE VISIT (OUTPATIENT)
Dept: URGENT CARE | Facility: MEDICAL CENTER | Age: 79
End: 2024-01-22
Payer: MEDICARE

## 2024-01-22 VITALS
TEMPERATURE: 97.9 F | SYSTOLIC BLOOD PRESSURE: 141 MMHG | RESPIRATION RATE: 18 BRPM | HEART RATE: 69 BPM | DIASTOLIC BLOOD PRESSURE: 61 MMHG | OXYGEN SATURATION: 96 %

## 2024-01-22 DIAGNOSIS — B96.89 ACUTE BACTERIAL SINUSITIS: Primary | ICD-10-CM

## 2024-01-22 DIAGNOSIS — J01.90 ACUTE BACTERIAL SINUSITIS: Primary | ICD-10-CM

## 2024-01-22 PROCEDURE — G0463 HOSPITAL OUTPT CLINIC VISIT: HCPCS | Performed by: PHYSICIAN ASSISTANT

## 2024-01-22 PROCEDURE — 99213 OFFICE O/P EST LOW 20 MIN: CPT | Performed by: PHYSICIAN ASSISTANT

## 2024-01-22 RX ORDER — PREDNISONE 10 MG/1
TABLET ORAL
Qty: 18 TABLET | Refills: 0 | Status: SHIPPED | OUTPATIENT
Start: 2024-01-22 | End: 2024-01-22

## 2024-01-22 RX ORDER — AZITHROMYCIN 250 MG/1
TABLET, FILM COATED ORAL
Qty: 6 TABLET | Refills: 0 | Status: SHIPPED | OUTPATIENT
Start: 2024-01-22 | End: 2024-01-26

## 2024-01-22 RX ORDER — AZITHROMYCIN 250 MG/1
TABLET, FILM COATED ORAL
Qty: 6 TABLET | Refills: 0 | Status: SHIPPED | OUTPATIENT
Start: 2024-01-22 | End: 2024-01-22

## 2024-01-22 RX ORDER — PREDNISONE 10 MG/1
TABLET ORAL
Qty: 18 TABLET | Refills: 0 | Status: SHIPPED | OUTPATIENT
Start: 2024-01-22

## 2024-01-22 NOTE — PROGRESS NOTES
[facility]        NAME: Yokasta Cadena is a 78 y.o. female  : 1945    MRN: [id number]  DATE: 2024  TIME: 11:22 AM    Assessment and Plan   Acute bacterial sinusitis [J01.90, B96.89]  1. Acute bacterial sinusitis  azithromycin (ZITHROMAX) 250 mg tablet    predniSONE 10 mg tablet    DISCONTINUED: azithromycin (ZITHROMAX) 250 mg tablet    DISCONTINUED: predniSONE 10 mg tablet            Patient Instructions       Follow up with PCP as needed.  Finish antibiotic.  Continue using Flonase.    Chief Complaint     Chief Complaint   Patient presents with    Sinusitis     Symptoms started last week Tuesday, ears and throat hurt, raspy non productive cough, headaches, dizzy and nauseated.  Pt left hearing aids at home.         History of Present Illness       Sinusitis  This is a new problem. The current episode started 1 to 4 weeks ago. There has been no fever. The pain is moderate. Associated symptoms include coughing, ear pain, headaches, a hoarse voice, sinus pressure and a sore throat. Pertinent negatives include no chills, congestion, diaphoresis, neck pain, shortness of breath, sneezing or swollen glands. Past treatments include saline nose sprays. The treatment provided no relief.       Review of Systems   Review of Systems   Constitutional:  Negative for chills and diaphoresis.   HENT:  Positive for ear pain, hoarse voice, sinus pressure and sore throat. Negative for congestion and sneezing.    Respiratory:  Positive for cough. Negative for shortness of breath.    Musculoskeletal:  Negative for neck pain.   Neurological:  Positive for headaches.   All other systems reviewed and are negative.        Current Medications       Current Outpatient Medications:     albuterol (Ventolin HFA) 90 mcg/act inhaler, Inhale 2 puffs every 6 (six) hours as needed for wheezing, Disp: 18 g, Rfl: 0    amLODIPine (NORVASC) 10 mg tablet, Take 1 tablet (10 mg total) by mouth daily, Disp: 90 tablet, Rfl: 1     Ascorbic Acid (VITAMIN C PO), Vitamin C, Disp: , Rfl:     atorvastatin (LIPITOR) 20 mg tablet, TAKE 1 TABLET BY MOUTH EVERY DAY, Disp: 90 tablet, Rfl: 0    azithromycin (ZITHROMAX) 250 mg tablet, Take 2 tablets today then 1 tablet daily x 4 days, Disp: 6 tablet, Rfl: 0    buPROPion (WELLBUTRIN XL) 150 mg 24 hr tablet, TAKE 1 TABLET BY MOUTH EVERY DAY IN THE MORNING, Disp: 30 tablet, Rfl: 5    Cholecalciferol (Vitamin D3) 1.25 MG (78175 UT) CAPS, Take 5,000 Units by mouth daily, Disp: , Rfl:     Diclofenac Sodium (VOLTAREN) 1 %, Apply 2 g topically 4 (four) times a day for 15 days, Disp: 120 g, Rfl: 0    fluticasone (FLONASE) 50 mcg/act nasal spray, 1 spray into each nostril daily, Disp: 9.9 mL, Rfl: 0    fluticasone (FLOVENT HFA) 110 MCG/ACT inhaler, Inhale 2 puffs 2 (two) times a day Rinse mouth after use., Disp: 12 g, Rfl: 1    levothyroxine 137 mcg tablet, TAKE 1 TABLET BY MOUTH EVERY DAY, Disp: 30 tablet, Rfl: 5    lisinopril (ZESTRIL) 40 mg tablet, TAKE 1 TABLET BY MOUTH EVERY DAY, Disp: 90 tablet, Rfl: 1    meclizine (ANTIVERT) 25 mg tablet, Take 1 tablet (25 mg total) by mouth every 8 (eight) hours as needed for dizziness for up to 10 days, Disp: 30 tablet, Rfl: 0    metroNIDAZOLE (METROGEL) 1 % gel, Apply topically daily, Disp: 45 g, Rfl: 0    Multiple Vitamin (MULTIVITAMIN ADULT PO), multivitamin, Disp: , Rfl:     pantoprazole (PROTONIX) 40 mg tablet, TAKE 1 TABLET BY MOUTH TWICE A DAY, Disp: 60 tablet, Rfl: 5    predniSONE 10 mg tablet, 4 x 3 days, 3 x 1, 2 x 1, 1 x 1, Disp: 18 tablet, Rfl: 0    sertraline (ZOLOFT) 100 mg tablet, Take 1 tablet (100 mg total) by mouth 2 (two) times a day, Disp: 60 tablet, Rfl: 5    traZODone (DESYREL) 50 mg tablet, Take 2 tablets (100 mg total) by mouth daily at bedtime, Disp: 60 tablet, Rfl: 1    naltrexone (REVIA) 50 mg tablet, Take 1/2 tablet daily in the morning., Disp: 15 tablet, Rfl: 2    nitrofurantoin (MACROBID) 100 mg capsule, , Disp: , Rfl:     Restasis 0.05 %  ophthalmic emulsion, INSTILL 1 DROP BY OPHTHALMIC ROUTE EVERY 12 HOURS IN BOTH EYES (Patient not taking: Reported on 1/22/2024), Disp: , Rfl:     Current Allergies     Allergies as of 01/22/2024 - Reviewed 01/22/2024   Allergen Reaction Noted    Ceftin [cefuroxime] Hives 11/27/2018            The following portions of the patient's history were reviewed and updated as appropriate: allergies, current medications, past family history, past medical history, past social history, past surgical history and problem list.     Past Medical History:   Diagnosis Date    Acid reflux     Anxiety     Arthritis     Asthma     C1-C4 level with central cord syndrome (HCC)     Resolved 2/1/2017     Carpal tunnel syndrome Resolved 4/21/2015    Colitis     Colon polyp     Concussion     Depressed     Dysthymic disorder     Resolved 1/5/2018    Gastric ulcer 2013    small - on EGD - [facility]    Hemorrhoids     Hx of blood clots     Hx of colonic polyps     [facility]rick    Hyperlipemia     Hypertension     Hypothyroid     Lung nodule     stable x 2 yrs on CT    Migraines     Obesity     Postural dizziness with presyncope     Resolved 8/28/2018     Tendinitis     Ulnar neuropathy     Resolved 5/21/2015     Vertigo        Past Surgical History:   Procedure Laterality Date    APPENDECTOMY      BREAST SURGERY Left 01/1966    BREAST LUMPECTOMY    CARPAL TUNNEL RELEASE      CHOLECYSTECTOMY      DILATION AND CURETTAGE OF UTERUS      ELBOW SURGERY      EYE SURGERY Bilateral 2019    Cataract     GALLBLADDER SURGERY      JOINT REPLACEMENT Right     REPLACEMENT TOTAL KNEE    KNEE ARTHROSCOPY      KNEE SURGERY Right     NECK SURGERY      ORTHOPEDIC SURGERY      POSTERIOR LAMINECTOMY / DECOMPRESSION CERVICAL SPINE  02/2015    REPLACEMENT TOTAL KNEE Left 2022    TONSILLECTOMY      TOTAL KNEE ARTHROPLASTY Left 04/06/2022    [facility] Dr. Diaz    VAGINAL DELIVERY      x3    WRIST SURGERY Right        Family History   Problem Relation Age of Onset    Breast cancer  Mother     Alzheimer's disease Mother     Coronary artery disease Mother     Hypertension Mother     Migraines Mother     Peripheral vascular disease Mother     Arthritis Mother     Cancer Mother     Parkinsonism Father     Other Father         Cardiovascular disease     Coronary artery disease Father     Hypertension Father     Bipolar disorder Sister     Thyroid disease Sister     Alzheimer's disease Sister     Depression Sister     Asthma Daughter     Asthma Son     Throat cancer Maternal Grandfather     Cancer Maternal Grandfather     Diabetes Neg Hx     Stroke Neg Hx          Medications have been verified.        Objective   /61 (BP Location: Left arm, Patient Position: Sitting)   Pulse 69   Temp 97.9 °F (36.6 °C)   Resp 18   SpO2 96%   No LMP recorded. Patient is postmenopausal.       Physical Exam     Physical Exam  Vitals and nursing note reviewed.   Constitutional:       Appearance: Normal appearance. She is normal weight.   HENT:      Right Ear: Ear canal and external ear normal.      Left Ear: Ear canal and external ear normal.      Nose: Congestion and rhinorrhea present.      Mouth/Throat:      Mouth: Mucous membranes are moist.      Pharynx: Posterior oropharyngeal erythema present.   Eyes:      Conjunctiva/sclera: Conjunctivae normal.   Cardiovascular:      Rate and Rhythm: Normal rate and regular rhythm.      Pulses: Normal pulses.      Heart sounds: Normal heart sounds.   Pulmonary:      Effort: Pulmonary effort is normal.      Breath sounds: Normal breath sounds.   Lymphadenopathy:      Cervical: No cervical adenopathy.   Neurological:      Mental Status: She is alert.   Psychiatric:         Mood and Affect: Mood normal.         Behavior: Behavior normal.       TMs bulging, nasal mucosa boggy.  Poor transillumination both maxillary sinuses with tenderness to palpation

## 2024-01-25 ENCOUNTER — CLINICAL SUPPORT (OUTPATIENT)
Dept: BARIATRICS | Facility: CLINIC | Age: 79
End: 2024-01-25

## 2024-01-25 VITALS — BODY MASS INDEX: 33.06 KG/M2 | WEIGHT: 168.4 LBS | HEIGHT: 60 IN

## 2024-01-25 DIAGNOSIS — R63.5 ABNORMAL WEIGHT GAIN: Primary | ICD-10-CM

## 2024-01-25 PROCEDURE — RECHECK

## 2024-01-25 NOTE — PROGRESS NOTES
Weight Management Medical Nutrition Assessment  Yokasta presented for a follow-up session with the [facility].  Today's weight is  167.1   lbs.  She has maintained a loss of 16.9 lbs since 6/2022. Did start food logging after Franco but finds this to be difficult at times. She is doing well getting protein. We reviewed portion sizes. She has joined our facebook walking challenge and is looking forward to being more active. She has no concerns at this time.  She will continue in [facility] at this time.      Patient seen by Medical Provider in past 6 months:  yes  Requested to schedule appointment with Medical Provider: No      Anthropometric Measurements  Start Weight (#):  184 # ( 6/15/22 -MD)   205# (4/22) home scale   Current Weight (#): 167.1 #  TBW % Change from start weight: 9.2%   Ideal Body Weight (#): 100#  Goal Weight (#):160-162#      Lowest: 145#     Weight Loss History  Previous weight loss attempts: Counseling with  MD  Meal Replacements (Medifast, Slim Fast, etc.)  Nutrition Counseling with RD     Food and Nutrition Related History  Wake up: 6-7  Bed Time:10-11     Food Recall   [id number]:00: fruit, 1 Toast (light bread) w/2 tbsp pb, sometimes yogurt   Coffee - creamer (35cal) x2  Snack:skip    Lunch 12:00: greek yogurt, apple OR tuna or egg salad, veggies  Snack: skip OR fruit  Dinner:4-5:00:  lean protein/~1/2 cup veggie, ~1/2 cup carb  OR salad, chicken, 1/4 cup mac/cheese OR 1 cup shrimp scampi OR pork chop, large veggies   Snack: skip OR fruit OR greek yogurt      Beverages: water and coffee/tea  Volume of beverage intake: 410 oz water, 2 cups coffee     Weekends: Same  Cravings: sweets or CHO  Trouble area of day:after dinner>>>>better     Frequency of Eating out: irregularly  Food restrictions:none  Lives with her sister  Cooking: self and sister  Food Shopping: self     Physical Activity Intake  Activity: Walking    Frequency: as able    Physical limitations/barriers to  exercise:hx vertigo, foot injury     Estimated Needs  Energy  Juliano James Energy Needs: BMR : 1149 calories            0.5# loss weekly lightly active:1129  calories; 0.5 lb wk lightly active: 1330  Maintenance calories for sedentary  level: 1379 calories   Protein:70-85   (1.2-1.5g/kg IBW); 1.0g/kg IBW= 58  Fluid: 58-68oz     (30-35mL/kg IBW)     Nutrition Diagnosis  Yes;    Overweight/obesity  related to Excess energy intake as evidenced by  BMI more than normative standard for age and sex (obesity-grade I 32.6)     Nutrition Intervention     Nutrition Prescription  Calories:1000 on sedentary days and flex to 1200 calories on walk days.  Protein:70-80g  Fluid:65oz     Meal Plan (Riky/Pro/Carb)  Breakfast:200/26-27  Snack:-  Lunch:200/26-27  Snack:100-150/5-10  Dinner:200-300/15-20  Snack:<200/0-5     Nutrition Education:    Healthy Core Manual  Calorie controlled menu  Lean protein food choices  Healthy snack options  Food journaling tips        Nutrition Counseling:  Strategies: meal planning, portion sizes, healthy snack choices, hydration, fiber intake, protein intake, exercise, food journal        Monitoring and Evaluation:  Evaluation criteria:  Energy Intake  Meet protein needs  Maintain adequate hydration  Monitor weekly weight  Meal planning/preparation  Food journal   Decreased portions at mealtimes and snacks  Physical activity      Barriers to learning:none  Readiness to change: Action   Comprehension: very good  Expected Compliance: good

## 2024-01-31 ENCOUNTER — CLINICAL SUPPORT (OUTPATIENT)
Dept: BARIATRICS | Facility: CLINIC | Age: 79
End: 2024-01-31

## 2024-01-31 VITALS — BODY MASS INDEX: 32.81 KG/M2 | HEIGHT: 60 IN | WEIGHT: 167.1 LBS

## 2024-01-31 DIAGNOSIS — R63.5 ABNORMAL WEIGHT GAIN: Primary | ICD-10-CM

## 2024-01-31 PROCEDURE — RECHECK

## 2024-02-07 ENCOUNTER — OFFICE VISIT (OUTPATIENT)
Dept: FAMILY MEDICINE CLINIC | Facility: CLINIC | Age: 79
End: 2024-02-07
Payer: MEDICARE

## 2024-02-07 VITALS
OXYGEN SATURATION: 98 % | WEIGHT: 168.8 LBS | BODY MASS INDEX: 33.14 KG/M2 | SYSTOLIC BLOOD PRESSURE: 152 MMHG | HEIGHT: 60 IN | HEART RATE: 78 BPM | DIASTOLIC BLOOD PRESSURE: 78 MMHG | TEMPERATURE: 97.6 F | RESPIRATION RATE: 18 BRPM

## 2024-02-07 DIAGNOSIS — N18.31 STAGE 3A CHRONIC KIDNEY DISEASE (HCC): ICD-10-CM

## 2024-02-07 DIAGNOSIS — I10 ESSENTIAL HYPERTENSION: Primary | ICD-10-CM

## 2024-02-07 DIAGNOSIS — F41.8 MIXED ANXIETY AND DEPRESSIVE DISORDER: ICD-10-CM

## 2024-02-07 DIAGNOSIS — J01.01 ACUTE RECURRENT MAXILLARY SINUSITIS: ICD-10-CM

## 2024-02-07 DIAGNOSIS — R73.01 IFG (IMPAIRED FASTING GLUCOSE): ICD-10-CM

## 2024-02-07 DIAGNOSIS — K21.9 GASTROESOPHAGEAL REFLUX DISEASE WITHOUT ESOPHAGITIS: ICD-10-CM

## 2024-02-07 DIAGNOSIS — M54.16 LUMBAR RADICULOPATHY: ICD-10-CM

## 2024-02-07 DIAGNOSIS — E78.2 MIXED HYPERLIPIDEMIA: ICD-10-CM

## 2024-02-07 PROCEDURE — 99214 OFFICE O/P EST MOD 30 MIN: CPT | Performed by: FAMILY MEDICINE

## 2024-02-07 RX ORDER — AMOXICILLIN AND CLAVULANATE POTASSIUM 875; 125 MG/1; MG/1
1 TABLET, FILM COATED ORAL EVERY 12 HOURS SCHEDULED
Qty: 10 TABLET | Refills: 0 | Status: SHIPPED | OUTPATIENT
Start: 2024-02-07 | End: 2024-02-12

## 2024-02-07 NOTE — PATIENT INSTRUCTIONS
Tylenol 1000mg (2 extra-strength) up to three times daily.  Do not exceed 3000mg daily  Let me know if the tylenol doesn't help  Augmentin twice daily x 5 days  Use your flonase  MRI lumbar spine      F/u 1 month

## 2024-02-07 NOTE — PROGRESS NOTES
Name: Yokasta Cadena      : 1945      MRN: [id number]  Encounter Provider: Lisbeth Vallejo DO  Encounter Date: 2024   Encounter department: [facility]    Assessment & Plan     1. Essential hypertension  Comments:  bp mildly elevated today  this is now, so hold on med adjustment and re-eval in a month    2. Gastroesophageal reflux disease without esophagitis  Comments:  symptoms controlled on protonix, but recommend f/u with GI    3. Mixed hyperlipidemia  Comments:  recent lipids at goal  continue lipitor    4. Mixed anxiety and depressive disorder  Comments:  doing well on current meds  continue same    5. IFG (impaired fasting glucose)  Comments:  stable    6. Stage 3a chronic kidney disease (HCC)  Comments:  stable  continue lisinopril, bp control    7. Acute recurrent maxillary sinusitis  Comments:  augmentin twice daily x 5 days  call if sx don't resolve  restart flonase  Orders:  -     amoxicillin-clavulanate (AUGMENTIN) 875-125 mg per tablet; Take 1 tablet by mouth every 12 (twelve) hours for 5 days    8. Lumbar radiculopathy  Comments:  suspect new paresthesias LE are form her back  check mri  Orders:  -     MRI lumbar spine wo contrast; Future; Expected date: 2024           Subjective      HPI  Pt presents for routine f/u.  Most recent labs show:  Lab Results   Component Value Date    CHOLESTEROL 162 2023    CHOLESTEROL 180 2022    CHOLESTEROL 157 2022     Lab Results   Component Value Date    HDL 69 2023    HDL 81 2022    HDL 61 2022     Lab Results   Component Value Date    TRIG 77 2023    TRIG 86 2022    TRIG 117 2022     Lab Results   Component Value Date    NONHDLC 93 2023    NONHDLC 99 2022    NONHDLC 96 2022     Lab Results   Component Value Date    LDLCALC 78 2023     Lab Results   Component Value Date    HGBA1C 6.0 (H) 2023     Lab Results   Component Value Date    [id number]  4.336 12/21/2023     Pt c/o L foot numbness or tingling sensation over the last few months.  Over entire bottom foot and toes.  Sometimes better and worse.  Has ongoing lower back pain which sometimes runs down the left lateral thigh.  Known hx djd.    Pt saw urgent care at the end of January for sinus pressure/pain.  Was treated with zithromax and steroid, and it improved, but a few days later, symptoms returned.  Now has pain in frontal and maxillary sinus areas.  +sinus pressure.  Chills.  Cough.      Ifg stable.    Blood pressure elevated today.  No chest pain, shortness of breath, n/v, abd pain, visual changes, paresthesias, weakness.      Review of Systems   Constitutional:  Negative for chills, fatigue, fever and unexpected weight change.   HENT:  Negative for congestion, ear pain, hearing loss, postnasal drip, rhinorrhea, sinus pressure, sinus pain, sore throat, trouble swallowing and voice change.    Eyes:  Negative for pain, redness and visual disturbance.   Respiratory:  Negative for cough and shortness of breath.    Cardiovascular:  Negative for chest pain, palpitations and leg swelling.   Gastrointestinal:  Negative for abdominal pain, constipation, diarrhea and nausea.   Endocrine: Negative for cold intolerance, heat intolerance, polydipsia and polyuria.   Genitourinary:  Negative for dysuria, frequency and urgency.   Musculoskeletal:  Negative for arthralgias, joint swelling and myalgias.   Skin:  Negative for rash.        No suspicious lesions   Neurological:  Negative for weakness, numbness and headaches.   Hematological:  Negative for adenopathy.       Current Outpatient Medications on File Prior to Visit   Medication Sig    albuterol (Ventolin HFA) 90 mcg/act inhaler Inhale 2 puffs every 6 (six) hours as needed for wheezing    amLODIPine (NORVASC) 10 mg tablet Take 1 tablet (10 mg total) by mouth daily    Ascorbic Acid (VITAMIN C PO) Vitamin C    atorvastatin (LIPITOR) 20 mg tablet TAKE 1 TABLET  BY MOUTH EVERY DAY    buPROPion (WELLBUTRIN XL) 150 mg 24 hr tablet TAKE 1 TABLET BY MOUTH EVERY DAY IN THE MORNING    Cholecalciferol (Vitamin D3) 1.25 MG (39725 UT) CAPS Take 5,000 Units by mouth daily    Diclofenac Sodium (VOLTAREN) 1 % Apply 2 g topically 4 (four) times a day for 15 days    fluticasone (FLONASE) 50 mcg/act nasal spray 1 spray into each nostril daily    fluticasone (FLOVENT HFA) 110 MCG/ACT inhaler Inhale 2 puffs 2 (two) times a day Rinse mouth after use.    levothyroxine 137 mcg tablet TAKE 1 TABLET BY MOUTH EVERY DAY    lisinopril (ZESTRIL) 40 mg tablet TAKE 1 TABLET BY MOUTH EVERY DAY    meclizine (ANTIVERT) 25 mg tablet Take 1 tablet (25 mg total) by mouth every 8 (eight) hours as needed for dizziness for up to 10 days    metroNIDAZOLE (METROGEL) 1 % gel Apply topically daily    Multiple Vitamin (MULTIVITAMIN ADULT PO) multivitamin    naltrexone (REVIA) 50 mg tablet Take 1/2 tablet daily in the morning.    nitrofurantoin (MACROBID) 100 mg capsule     pantoprazole (PROTONIX) 40 mg tablet TAKE 1 TABLET BY MOUTH TWICE A DAY    sertraline (ZOLOFT) 100 mg tablet Take 1 tablet (100 mg total) by mouth 2 (two) times a day    traZODone (DESYREL) 50 mg tablet Take 2 tablets (100 mg total) by mouth daily at bedtime    Restasis 0.05 % ophthalmic emulsion INSTILL 1 DROP BY OPHTHALMIC ROUTE EVERY 12 HOURS IN BOTH EYES (Patient not taking: Reported on 1/22/2024)    [DISCONTINUED] predniSONE 10 mg tablet 4 x 3 days, 3 x 1, 2 x 1, 1 x 1 (Patient not taking: Reported on 2/7/2024)       Objective     /78   Pulse 78   Temp 97.6 °F (36.4 °C) (Temporal)   Resp 18   Ht 5' (1.524 m)   Wt 76.6 kg (168 lb 12.8 oz)   SpO2 98%   BMI 32.97 kg/m²     Physical Exam  Constitutional:       General: She is not in acute distress.     Appearance: Normal appearance. She is well-developed.   HENT:      Head: Normocephalic and atraumatic.      Right Ear: Tympanic membrane, ear canal and external ear normal.      Left  Ear: Tympanic membrane, ear canal and external ear normal.      Nose:      Right Sinus: Maxillary sinus tenderness and frontal sinus tenderness present.      Left Sinus: Maxillary sinus tenderness and frontal sinus tenderness present.      Mouth/Throat:      Mouth: Mucous membranes are moist.      Pharynx: Oropharynx is clear. No posterior oropharyngeal erythema.   Eyes:      Extraocular Movements: Extraocular movements intact.      Conjunctiva/sclera: Conjunctivae normal.      Pupils: Pupils are equal, round, and reactive to light.   Neck:      Thyroid: No thyromegaly.      Vascular: No carotid bruit or JVD.   Cardiovascular:      Rate and Rhythm: Normal rate and regular rhythm.      Heart sounds: S1 normal and S2 normal. No murmur heard.     No friction rub. No gallop. No S3 or S4 sounds.   Pulmonary:      Effort: Pulmonary effort is normal.      Breath sounds: Normal breath sounds. No wheezing, rhonchi or rales.   Abdominal:      General: Bowel sounds are normal. There is no distension.      Palpations: Abdomen is soft.      Tenderness: There is no abdominal tenderness.   Musculoskeletal:      Cervical back: Neck supple.   Lymphadenopathy:      Cervical: No cervical adenopathy.   Neurological:      General: No focal deficit present.      Mental Status: She is alert and oriented to person, place, and time.      Cranial Nerves: No cranial nerve deficit.      Sensory: No sensory deficit.      Deep Tendon Reflexes: Reflexes are normal and symmetric. Reflexes normal.       Lisbeth Vallejo DO

## 2024-02-12 ENCOUNTER — PATIENT MESSAGE (OUTPATIENT)
Dept: FAMILY MEDICINE CLINIC | Facility: CLINIC | Age: 79
End: 2024-02-12

## 2024-02-15 ENCOUNTER — CLINICAL SUPPORT (OUTPATIENT)
Dept: BARIATRICS | Facility: CLINIC | Age: 79
End: 2024-02-15

## 2024-02-15 VITALS — HEIGHT: 60 IN | BODY MASS INDEX: 32.91 KG/M2 | WEIGHT: 167.63 LBS

## 2024-02-15 DIAGNOSIS — R63.5 ABNORMAL WEIGHT GAIN: Primary | ICD-10-CM

## 2024-02-15 PROCEDURE — RECHECK

## 2024-02-22 DIAGNOSIS — R10.13 EPIGASTRIC PAIN: ICD-10-CM

## 2024-02-22 RX ORDER — PANTOPRAZOLE SODIUM 40 MG/1
TABLET, DELAYED RELEASE ORAL
Qty: 180 TABLET | Refills: 1 | Status: SHIPPED | OUTPATIENT
Start: 2024-02-22

## 2024-02-27 ENCOUNTER — HOSPITAL ENCOUNTER (OUTPATIENT)
Facility: MEDICAL CENTER | Age: 79
Discharge: HOME | End: 2024-02-27
Payer: MEDICARE

## 2024-02-27 DIAGNOSIS — M54.16 LUMBAR RADICULOPATHY: ICD-10-CM

## 2024-02-27 PROCEDURE — G1004 CDSM NDSC: HCPCS

## 2024-02-27 PROCEDURE — 72148 MRI LUMBAR SPINE W/O DYE: CPT

## 2024-02-29 ENCOUNTER — CLINICAL SUPPORT (OUTPATIENT)
Dept: BARIATRICS | Facility: CLINIC | Age: 79
End: 2024-02-29

## 2024-02-29 VITALS — WEIGHT: 165.8 LBS | HEIGHT: 60 IN | BODY MASS INDEX: 32.55 KG/M2

## 2024-02-29 DIAGNOSIS — R63.5 ABNORMAL WEIGHT GAIN: Primary | ICD-10-CM

## 2024-02-29 PROCEDURE — RECHECK

## 2024-03-03 NOTE — PROGRESS NOTES
Weight Management Medical Nutrition Assessment  Yokasta presented for a follow-up session with the [facility].  Today's weight is  167.9   lbs.  She has maintained a loss of 16.9  lbs since 6/2022. Frustrated that is not losing weight. Would like to try a partial meal placement for now. Also reports she is having some constipation. Fluid intake is inadequate, avg 30 oz. Discussed how increasing water will help with constipation. Review calorie guidelines.  She will continue in [facility] at this time.      Patient seen by Medical Provider in past 6 months:  yes  Requested to schedule appointment with Medical Provider: No      Anthropometric Measurements  Start Weight (#):  184 # ( 6/15/22 -MD)   205# (4/22) home scale   Current Weight (#): 167.9 #  TBW % Change from start weight: 9.2%   Ideal Body Weight (#): 100#  Goal Weight (#):160-162#      Lowest: 145#     Weight Loss History  Previous weight loss attempts: Counseling with  MD  Meal Replacements (Medifast, Slim Fast, etc.)  Nutrition Counseling with KAYCE     Food and Nutrition Related History  Wake up: 6-7  Bed Time:10-11     Food Recall   [id number]:00: fruit, 1 Toast (light bread) w/2 tbsp pb, sometimes yogurt   Coffee - creamer (35cal) x2  Snack:skip    Lunch 12:00: greek yogurt, apple OR tuna or egg salad, veggies  Snack: skip OR fruit  Dinner:4-5:00:  lean protein/~1/2 cup veggie, ~1/2 cup carb  OR salad, chicken, 1/4 cup mac/cheese OR 1 cup shrimp scampi OR pork chop, large veggies   Snack: skip OR fruit OR greek yogurt      Beverages: water and coffee/tea  Volume of beverage intake: 30 oz water, 2 cups coffee     Weekends: Same  Cravings: sweets or CHO  Trouble area of day:after dinner>>>>better     Frequency of Eating out: irregularly  Food restrictions:none  Lives with her sister  Cooking: self and sister  Food Shopping: self     Physical Activity Intake  Activity: Walking    Frequency: as able    Physical limitations/barriers to  exercise:hx vertigo, foot injury     Estimated Needs  Energy  Juliano James Energy Needs: BMR : 1149 calories            0.5# loss weekly lightly active:1129  calories; 0.5 lb wk lightly active: 1330  Maintenance calories for sedentary  level: 1379 calories   Protein:70-85   (1.2-1.5g/kg IBW); 1.0g/kg IBW= 58  Fluid: 58-68oz     (30-35mL/kg IBW)     Nutrition Diagnosis  Yes;    Overweight/obesity  related to Excess energy intake as evidenced by  BMI more than normative standard for age and sex (obesity-grade I 32.6)     Nutrition Intervention     Nutrition Prescription  Calories:1000 on sedentary days and flex to 1200 calories on walk days.  Protein:70-80g  Fluid:65oz     Meal Plan (Riky/Pro/Carb)  Breakfast:200/26-27  Snack:-  Lunch:200/26-27  Snack:100-150/5-10  Dinner:200-300/15-20  Snack:<200/0-5     Nutrition Education:    Healthy Core Manual  Calorie controlled menu  Lean protein food choices  Healthy snack options  Food journaling tips        Nutrition Counseling:  Strategies: meal planning, portion sizes, healthy snack choices, hydration, fiber intake, protein intake, exercise, food journal        Monitoring and Evaluation:  Evaluation criteria:  Energy Intake  Meet protein needs  Maintain adequate hydration  Monitor weekly weight  Meal planning/preparation  Food journal   Decreased portions at mealtimes and snacks  Physical activity      Barriers to learning:none  Readiness to change: Action   Comprehension: very good  Expected Compliance: good

## 2024-03-04 DIAGNOSIS — F51.01 PRIMARY INSOMNIA: ICD-10-CM

## 2024-03-04 DIAGNOSIS — L71.9 ROSACEA: ICD-10-CM

## 2024-03-04 RX ORDER — TRAZODONE HYDROCHLORIDE 50 MG/1
100 TABLET ORAL
Qty: 60 TABLET | Refills: 1 | Status: SHIPPED | OUTPATIENT
Start: 2024-03-04

## 2024-03-05 DIAGNOSIS — M54.16 LUMBAR RADICULOPATHY: Primary | ICD-10-CM

## 2024-03-05 DIAGNOSIS — M54.16 LUMBAR RADICULOPATHY: ICD-10-CM

## 2024-03-05 DIAGNOSIS — K76.89 HEPATIC CYST: Primary | ICD-10-CM

## 2024-03-05 RX ORDER — METRONIDAZOLE 10 MG/G
GEL TOPICAL DAILY
Qty: 45 G | Refills: 0 | Status: SHIPPED | OUTPATIENT
Start: 2024-03-05 | End: 2024-03-08

## 2024-03-07 ENCOUNTER — CLINICAL SUPPORT (OUTPATIENT)
Dept: BARIATRICS | Facility: CLINIC | Age: 79
End: 2024-03-07

## 2024-03-07 VITALS — BODY MASS INDEX: 32.96 KG/M2 | WEIGHT: 167.9 LBS | HEIGHT: 60 IN

## 2024-03-07 DIAGNOSIS — R63.5 ABNORMAL WEIGHT GAIN: Primary | ICD-10-CM

## 2024-03-07 PROCEDURE — RECHECK

## 2024-03-08 ENCOUNTER — APPOINTMENT (OUTPATIENT)
Dept: LAB | Facility: CLINIC | Age: 79
End: 2024-03-08
Payer: MEDICARE

## 2024-03-08 ENCOUNTER — OFFICE VISIT (OUTPATIENT)
Dept: FAMILY MEDICINE CLINIC | Facility: CLINIC | Age: 79
End: 2024-03-08
Payer: MEDICARE

## 2024-03-08 ENCOUNTER — TELEPHONE (OUTPATIENT)
Age: 79
End: 2024-03-08

## 2024-03-08 VITALS
BODY MASS INDEX: 33.41 KG/M2 | WEIGHT: 170.2 LBS | HEART RATE: 70 BPM | RESPIRATION RATE: 18 BRPM | SYSTOLIC BLOOD PRESSURE: 148 MMHG | DIASTOLIC BLOOD PRESSURE: 64 MMHG | HEIGHT: 60 IN | OXYGEN SATURATION: 99 %

## 2024-03-08 DIAGNOSIS — I10 ESSENTIAL HYPERTENSION: ICD-10-CM

## 2024-03-08 DIAGNOSIS — K76.89 LIVER CYST: ICD-10-CM

## 2024-03-08 DIAGNOSIS — L71.9 ROSACEA: ICD-10-CM

## 2024-03-08 DIAGNOSIS — K76.89 LIVER CYST: Primary | ICD-10-CM

## 2024-03-08 DIAGNOSIS — M54.16 LUMBAR RADICULOPATHY: Primary | ICD-10-CM

## 2024-03-08 LAB
ALBUMIN SERPL BCP-MCNC: 4.4 G/DL (ref 3.5–5)
ALP SERPL-CCNC: 70 U/L (ref 34–104)
ALT SERPL W P-5'-P-CCNC: 10 U/L (ref 7–52)
ANION GAP SERPL CALCULATED.3IONS-SCNC: 6 MMOL/L
AST SERPL W P-5'-P-CCNC: 13 U/L (ref 13–39)
BILIRUB SERPL-MCNC: 0.45 MG/DL (ref 0.2–1)
BUN SERPL-MCNC: 27 MG/DL (ref 5–25)
CALCIUM SERPL-MCNC: 9.6 MG/DL (ref 8.4–10.2)
CHLORIDE SERPL-SCNC: 106 MMOL/L (ref 96–108)
CO2 SERPL-SCNC: 28 MMOL/L (ref 21–32)
CREAT SERPL-MCNC: 0.89 MG/DL (ref 0.6–1.3)
GFR SERPL CREATININE-BSD FRML MDRD: 61 ML/MIN/1.73SQ M
GLUCOSE SERPL-MCNC: 99 MG/DL (ref 65–140)
POTASSIUM SERPL-SCNC: 4.2 MMOL/L (ref 3.5–5.3)
PROT SERPL-MCNC: 7.2 G/DL (ref 6.4–8.4)
SODIUM SERPL-SCNC: 140 MMOL/L (ref 135–147)

## 2024-03-08 PROCEDURE — 80053 COMPREHEN METABOLIC PANEL: CPT

## 2024-03-08 PROCEDURE — G2211 COMPLEX E/M VISIT ADD ON: HCPCS | Performed by: FAMILY MEDICINE

## 2024-03-08 PROCEDURE — 36415 COLL VENOUS BLD VENIPUNCTURE: CPT

## 2024-03-08 PROCEDURE — 99214 OFFICE O/P EST MOD 30 MIN: CPT | Performed by: FAMILY MEDICINE

## 2024-03-08 RX ORDER — IVERMECTIN 10 MG/G
CREAM TOPICAL DAILY
Qty: 45 G | Refills: 1 | Status: SHIPPED | OUTPATIENT
Start: 2024-03-08

## 2024-03-08 NOTE — PATIENT INSTRUCTIONS
We can cancel US, but I do want you to obtain CT  Have a talk with Dr. Sutton  See pain mgmt  Ivermectin daily to face for rosacea  Check blood pressure at home and record.  Send to me in 2 weeks

## 2024-03-08 NOTE — PROGRESS NOTES
Name: Yokasta Cadena      : 1945      MRN: [id number]  Encounter Provider: Lisbeth Vallejo DO  Encounter Date: 3/8/2024   Encounter department: [facility]    Assessment & Plan     1. Lumbar radiculopathy  Comments:  multilevel djd  pt requests NS consult  also refer to pain mgmt    2. Rosacea  Comments:  can't afford metrogel  trial ivermectin cream  Orders:  -     Ivermectin 1 % CREA; Apply topically in the morning    3. Liver cyst  Comments:  check CT  check cmp for renal fxn  Orders:  -     Comprehensive metabolic panel; Future    4. Essential hypertension  Comments:  monitor pressures at home for the next 2 weeks, and then send them to me for review           Subjective      HPI  Pt presents in f/u.  Blood pressure a bit high, but she is very stressed today over mri results.  Pt notes her back feels a little better today.  Has L sciatica and foot numbness as before.  No saddle paresthesias, incontinence, weakness.  She would like to see Dr Sutton just to consult as he did surgery in the past on her neck.  She is also open to pain mgmt, but she wants to see NS first.   No red flags.    Large cyst in liver was noted on MRI.  Will obtain CT, though pt is known to have a large liver cyst.      Pt requests med for rosacea.  Metrogel helpful but now too expensive.  Needs another med.      Review of Systems   Constitutional:  Positive for fatigue. Negative for chills, fever and unexpected weight change.   HENT:  Negative for congestion, ear pain, hearing loss, postnasal drip, rhinorrhea, sinus pressure, sinus pain, sore throat, trouble swallowing and voice change.    Eyes:  Negative for pain, redness and visual disturbance.   Respiratory:  Negative for cough and shortness of breath.    Cardiovascular:  Negative for chest pain, palpitations and leg swelling.   Gastrointestinal:  Negative for abdominal pain, constipation, diarrhea and nausea.   Endocrine: Negative for cold intolerance, heat  intolerance, polydipsia and polyuria.   Genitourinary:  Negative for dysuria, frequency and urgency.   Musculoskeletal:  Positive for back pain. Negative for arthralgias, joint swelling and myalgias.   Skin:  Negative for rash.        No suspicious lesions   Neurological:  Positive for numbness. Negative for weakness and headaches.   Hematological:  Negative for adenopathy.       Current Outpatient Medications on File Prior to Visit   Medication Sig    albuterol (Ventolin HFA) 90 mcg/act inhaler Inhale 2 puffs every 6 (six) hours as needed for wheezing    amLODIPine (NORVASC) 10 mg tablet Take 1 tablet (10 mg total) by mouth daily    Ascorbic Acid (VITAMIN C PO) Vitamin C    atorvastatin (LIPITOR) 20 mg tablet TAKE 1 TABLET BY MOUTH EVERY DAY    buPROPion (WELLBUTRIN XL) 150 mg 24 hr tablet TAKE 1 TABLET BY MOUTH EVERY DAY IN THE MORNING    Cholecalciferol (Vitamin D3) 1.25 MG (42676 UT) CAPS Take 5,000 Units by mouth daily    Diclofenac Sodium (VOLTAREN) 1 % Apply 2 g topically 4 (four) times a day for 15 days    fluticasone (FLONASE) 50 mcg/act nasal spray 1 spray into each nostril daily    fluticasone (FLOVENT HFA) 110 MCG/ACT inhaler Inhale 2 puffs 2 (two) times a day Rinse mouth after use.    levothyroxine 137 mcg tablet TAKE 1 TABLET BY MOUTH EVERY DAY    lisinopril (ZESTRIL) 40 mg tablet TAKE 1 TABLET BY MOUTH EVERY DAY    meclizine (ANTIVERT) 25 mg tablet Take 1 tablet (25 mg total) by mouth every 8 (eight) hours as needed for dizziness for up to 10 days    Multiple Vitamin (MULTIVITAMIN ADULT PO) multivitamin    pantoprazole (PROTONIX) 40 mg tablet TAKE 1 TABLET BY MOUTH TWICE A DAY    sertraline (ZOLOFT) 100 mg tablet Take 1 tablet (100 mg total) by mouth 2 (two) times a day    traZODone (DESYREL) 50 mg tablet TAKE 2 TABLETS (100 MG TOTAL) BY MOUTH DAILY AT BEDTIME    [DISCONTINUED] metroNIDAZOLE (METROGEL) 1 % gel Apply topically daily (Patient not taking: Reported on 3/8/2024)    [DISCONTINUED]  naltrexone (REVIA) 50 mg tablet Take 1/2 tablet daily in the morning. (Patient not taking: Reported on 3/8/2024)    [DISCONTINUED] nitrofurantoin (MACROBID) 100 mg capsule  (Patient not taking: Reported on 3/8/2024)    [DISCONTINUED] Restasis 0.05 % ophthalmic emulsion INSTILL 1 DROP BY OPHTHALMIC ROUTE EVERY 12 HOURS IN BOTH EYES (Patient not taking: Reported on 1/22/2024)       Objective     /64 (BP Location: Right arm, Patient Position: Sitting, Cuff Size: Large)   Pulse 70   Resp 18   Ht 5' (1.524 m)   Wt 77.2 kg (170 lb 3.2 oz)   SpO2 99%   BMI 33.24 kg/m²     Physical Exam  Constitutional:       General: She is not in acute distress.     Appearance: Normal appearance. She is well-developed.   HENT:      Head: Normocephalic and atraumatic.      Right Ear: Tympanic membrane, ear canal and external ear normal.      Left Ear: Tympanic membrane, ear canal and external ear normal.      Nose: Nose normal.      Mouth/Throat:      Mouth: Mucous membranes are moist.      Pharynx: Oropharynx is clear. No posterior oropharyngeal erythema.   Eyes:      Extraocular Movements: Extraocular movements intact.      Conjunctiva/sclera: Conjunctivae normal.      Pupils: Pupils are equal, round, and reactive to light.   Neck:      Thyroid: No thyromegaly.      Vascular: No carotid bruit or JVD.   Cardiovascular:      Rate and Rhythm: Normal rate and regular rhythm.      Heart sounds: S1 normal and S2 normal. No murmur heard.     No friction rub. No gallop. No S3 or S4 sounds.   Pulmonary:      Effort: Pulmonary effort is normal.      Breath sounds: Normal breath sounds. No wheezing, rhonchi or rales.   Abdominal:      General: Bowel sounds are normal. There is no distension.      Palpations: Abdomen is soft.      Tenderness: There is no abdominal tenderness.   Musculoskeletal:      Cervical back: Neck supple.   Lymphadenopathy:      Cervical: No cervical adenopathy.   Neurological:      General: No focal deficit  present.      Mental Status: She is alert and oriented to person, place, and time.      Cranial Nerves: No cranial nerve deficit.      Sensory: Sensory deficit present.      Deep Tendon Reflexes: Reflexes are normal and symmetric. Reflexes normal.       Lisbeth Vallejo DO

## 2024-03-13 NOTE — TELEPHONE ENCOUNTER
PA for Ivermectin 1 % CREA    Submitted via    [x]CMM-KEY [id number]  []Surescripts-Case ID #   []Faxed to plan   []Other website   []Phone call Case ID #     Office notes sent, clinical questions answered. Awaiting determination    Turnaround time for your insurance to make a decision on your Prior Authorization can take 7-21 business days.

## 2024-03-14 ENCOUNTER — CLINICAL SUPPORT (OUTPATIENT)
Dept: BARIATRICS | Facility: CLINIC | Age: 79
End: 2024-03-14

## 2024-03-14 VITALS — BODY MASS INDEX: 32.47 KG/M2 | HEIGHT: 60 IN | WEIGHT: 165.4 LBS

## 2024-03-14 DIAGNOSIS — R63.5 ABNORMAL WEIGHT GAIN: Primary | ICD-10-CM

## 2024-03-14 PROCEDURE — RECHECK

## 2024-03-16 DIAGNOSIS — E78.5 HYPERLIPIDEMIA, UNSPECIFIED HYPERLIPIDEMIA TYPE: ICD-10-CM

## 2024-03-16 RX ORDER — ATORVASTATIN CALCIUM 20 MG/1
20 TABLET, FILM COATED ORAL DAILY
Qty: 90 TABLET | Refills: 0 | Status: SHIPPED | OUTPATIENT
Start: 2024-03-16

## 2024-03-20 ENCOUNTER — OFFICE VISIT (OUTPATIENT)
Dept: GASTROENTEROLOGY | Facility: AMBULARY SURGERY CENTER | Age: 79
End: 2024-03-20
Payer: MEDICARE

## 2024-03-20 ENCOUNTER — PREP FOR PROCEDURE (OUTPATIENT)
Dept: GASTROENTEROLOGY | Facility: AMBULARY SURGERY CENTER | Age: 79
End: 2024-03-20

## 2024-03-20 VITALS
BODY MASS INDEX: 30.73 KG/M2 | WEIGHT: 167 LBS | OXYGEN SATURATION: 95 % | DIASTOLIC BLOOD PRESSURE: 72 MMHG | HEIGHT: 62 IN | HEART RATE: 66 BPM | SYSTOLIC BLOOD PRESSURE: 158 MMHG

## 2024-03-20 DIAGNOSIS — K31.A0 INTESTINAL METAPLASIA OF GASTRIC MUCOSA: ICD-10-CM

## 2024-03-20 DIAGNOSIS — Z86.010 HISTORY OF COLONIC POLYPS: Primary | ICD-10-CM

## 2024-03-20 DIAGNOSIS — K76.89 LIVER CYST: ICD-10-CM

## 2024-03-20 PROCEDURE — 99214 OFFICE O/P EST MOD 30 MIN: CPT | Performed by: INTERNAL MEDICINE

## 2024-03-20 NOTE — PROGRESS NOTES
[facility]  Progress Note - Yokasta Cadena 78 y.o. female MRN: [id number]    Unit/Bed#:  Encounter: [id number]    Assessment/Plan:    1.  Gastric intestinal metaplasia: No H. Pylori.  Ex- smoker.  No family history of gastric cancer.  Recommend repeat EGD at this time.  Continue with pantoprazole 40 mg twice daily as it seems to be controlling symptoms of reflux.  Continue with smoking cessation.  Continue to avoid NSAIDs.    2.  History of colon polyps/tubular adenomas: Last colonoscopy in 2020 notable for 7 colon polyps, was recommended repeat colonoscopy at 3-year interval.  Patient is overdue for colonoscopy at this time.  -Patient is interested in pursuing colonoscopy at this time.  Discussed the risks of the procedure which may include but are not limited to bleeding, infection, perforation, missed lesion.  Discussed with patient regarding importance of adequate bowel prep.  -Patient was explained about the risks and benefits of the procedure. Risks including but not limited to bleeding, infection, perforation were explained in detail.   -Recommend MiraLAX/Dulcolax bowel prep.    3.  Hepatic cyst: Recently underwent spinal MRI, I personally reviewed these images, liver cyst measures 6.1 cm, previous images from 2021 were notable for cyst measuring about 6 cm.  Appears to be fairly stable.  -Liver enzymes are normal.  Patient denies any right-sided abdominal pain.  Patient has a repeat CT of abdomen pelvis with contrast scheduled by her PCP.        Subjective:     78-year-old female with history of GERD, intestinal metaplasia, hypothyroidism, hypertension, asthma who presents for follow-up.  Patient is generally doing well from GI standpoint.  She denies any nausea or vomiting.  Reports that her acid reflux symptoms are well-controlled with pantoprazole 40 mg twice daily which she takes on daily basis.  Denies any hematemesis, cocculin emesis or melena.  No loss of appetite or early  "satiety.  No change in bowel habits or hematochezia.  No family history of colon cancer or gastric cancer.  Patient is an ex-smoker.  Most recent labs were reviewed, AST and ALT are within normal range, hemoglobin normal.  Platelets normal.      Objective:     Vitals: Blood pressure 158/72, pulse 66, height 5' 1.5\" (1.562 m), weight 75.8 kg (167 lb), SpO2 95%, not currently breastfeeding.,Body mass index is 31.04 kg/m².    [unfilled]    Physical Exam:    GEN: wn/wd, NAD  HEENT: MMM, no cervical or supraclavicular LAD, anciteric  CV: RRR, no m/r/g  CHEST: CTA b/l, no w/r/r  ABD: +BS, soft, NT/ND, no hepatosplenomegaly  EXT: no c/c/e  SKIN: no rashes  NEURO: aaox3      Invasive Devices       Peripheral Intravenous Line  Duration             Peripheral IV 12/07/21 Left Antecubital 833 days                            Lab, Imaging and other studies:     No visits with results within 1 Day(s) from this visit.   Latest known visit with results is:   Appointment on 03/08/2024   Component Date Value    Sodium 03/08/2024 140     Potassium 03/08/2024 4.2     Chloride 03/08/2024 106     CO2 03/08/2024 28     ANION GAP 03/08/2024 6     BUN 03/08/2024 27 (H)     Creatinine 03/08/2024 0.89     Glucose 03/08/2024 99     Calcium 03/08/2024 9.6     AST 03/08/2024 13     ALT 03/08/2024 10     Alkaline Phosphatase 03/08/2024 70     Total Protein 03/08/2024 7.2     Albumin 03/08/2024 4.4     Total Bilirubin 03/08/2024 0.45     eGFR 03/08/2024 61          I have personally reviewed pertinent films in PACS    No current facility-administered medications for this visit.             "

## 2024-03-20 NOTE — PATIENT INSTRUCTIONS
Scheduled date of EGD/colonoscopy (as of today): May 20, 2024  Physician performing EGD/colonoscopy: Dr. Morrow  Location of EGD/colonoscopy: [facility]  Desired bowel prep reviewed with patient: miralax/dulcolax  Instructions reviewed with patient by: Wilma TURK  Clearances:  n/a

## 2024-03-21 ENCOUNTER — TELEPHONE (OUTPATIENT)
Dept: NEUROSURGERY | Facility: CLINIC | Age: 79
End: 2024-03-21

## 2024-03-21 NOTE — TELEPHONE ENCOUNTER
Pt called to reschedule appt , new appt is 4.26.24 at 1p in [place], pt confirms time date and location

## 2024-03-28 ENCOUNTER — OFFICE VISIT (OUTPATIENT)
Dept: URGENT CARE | Facility: MEDICAL CENTER | Age: 79
End: 2024-03-28
Payer: MEDICARE

## 2024-03-28 VITALS
BODY MASS INDEX: 31.04 KG/M2 | TEMPERATURE: 99.6 F | OXYGEN SATURATION: 94 % | DIASTOLIC BLOOD PRESSURE: 73 MMHG | RESPIRATION RATE: 18 BRPM | HEART RATE: 88 BPM | SYSTOLIC BLOOD PRESSURE: 164 MMHG | WEIGHT: 167 LBS

## 2024-03-28 DIAGNOSIS — R35.0 URINARY FREQUENCY: Primary | ICD-10-CM

## 2024-03-28 LAB
SL AMB  POCT GLUCOSE, UA: NEGATIVE
SL AMB LEUKOCYTE ESTERASE,UA: NEGATIVE
SL AMB POCT BILIRUBIN,UA: NEGATIVE
SL AMB POCT BLOOD,UA: NEGATIVE
SL AMB POCT CLARITY,UA: CLEAR
SL AMB POCT COLOR,UA: YELLOW
SL AMB POCT KETONES,UA: NEGATIVE
SL AMB POCT NITRITE,UA: NEGATIVE
SL AMB POCT PH,UA: 7.5
SL AMB POCT SPECIFIC GRAVITY,UA: 1
SL AMB POCT URINE PROTEIN: ABNORMAL
SL AMB POCT UROBILINOGEN: 0.2

## 2024-03-28 PROCEDURE — 99213 OFFICE O/P EST LOW 20 MIN: CPT | Performed by: ORTHOPAEDIC SURGERY

## 2024-03-28 PROCEDURE — 81002 URINALYSIS NONAUTO W/O SCOPE: CPT | Performed by: ORTHOPAEDIC SURGERY

## 2024-03-28 PROCEDURE — G0463 HOSPITAL OUTPT CLINIC VISIT: HCPCS | Performed by: ORTHOPAEDIC SURGERY

## 2024-03-28 PROCEDURE — 87086 URINE CULTURE/COLONY COUNT: CPT | Performed by: ORTHOPAEDIC SURGERY

## 2024-03-28 RX ORDER — SULFAMETHOXAZOLE AND TRIMETHOPRIM 800; 160 MG/1; MG/1
1 TABLET ORAL EVERY 12 HOURS SCHEDULED
Qty: 6 TABLET | Refills: 0 | Status: SHIPPED | OUTPATIENT
Start: 2024-03-28 | End: 2024-03-31

## 2024-03-28 NOTE — PROGRESS NOTES
[facility]        NAME: Yokasta Cadena is a 78 y.o. female  : 1945    MRN: [id number]  DATE: 2024  TIME: 4:37 PM    Assessment and Plan   Urinary frequency [R35.0]  1. Urinary frequency  POCT urine dip    Urine culture    sulfamethoxazole-trimethoprim (BACTRIM DS) 800-160 mg per tablet        POCT urine dip negative for hematuria, leukocytes, nitrites. Trace protein. Clear, yellow without distinct smell.     Patient will call her doctor today regarding her barium study scheduled for tomorrow.     Patient Instructions     Take prescription antibiotic as prescribed.   Your urine sample was sent to our labs for culture. The office will contact you following results to either continue your current antibiotics or change to a different antibiotic that will work better. If the urine culture is negative, you may discontinue the antibiotic  You should stay properly hydrated and frequently urinate to help flush out the infection  Warm compresses for abdominal discomfort  Follow up with your PCP in 3-5 days  Proceed to ER if your symptoms worsen  If you experience worsening abdominal/back pain, fever/chills, bloody urination     If tests are performed, our office will contact you with results only if changes need to made to the care plan discussed with you at the visit. [facility]    Chief Complaint     Chief Complaint   Patient presents with    Possible UTI     Pt states she has had to void every 5 to 6 minutes.  She also complains of nausea at times.  Pt is scheduled for abdominal Barium Study tomorrow and is worried about an infection.           History of Present Illness       78-year-old female presents to urgent care for evaluation of urinary frequency.  Patient states symptoms began 2 nights ago.  She complains that symptoms seem to be worse at night, getting up every 5 to 6 minutes to urinate a small amount.  The patient denies any urinary  burning, but notes that there is pressure along the lower abdomen.  The patient denies noticing any blood in her urine.  She denies any back or flank pain.  She denies any fevers or chills.  The patient is worried as tomorrow she is scheduled to have a barium study.  She denies any history of recurrent UTIs, noting that she seems to get infections once every 6 months to a year.  The patient does have a history of chronic kidney disease.  She has been taking Tylenol for symptom relief.  She has no history of urinary incontinence.        Review of Systems   Review of Systems   Constitutional:  Negative for chills and fever.   HENT:  Negative for congestion, ear pain and sore throat.    Eyes:  Negative for pain and visual disturbance.   Respiratory:  Negative for cough and shortness of breath.    Cardiovascular:  Negative for chest pain and palpitations.   Gastrointestinal:  Positive for abdominal pain. Negative for diarrhea, nausea and vomiting.   Genitourinary:  Positive for frequency and urgency. Negative for decreased urine volume, dysuria, flank pain, hematuria, vaginal bleeding and vaginal discharge.   Musculoskeletal:  Negative for arthralgias and back pain.   Skin:  Negative for color change and rash.   Neurological:  Negative for dizziness, seizures, syncope and headaches.   Psychiatric/Behavioral: Negative.     All other systems reviewed and are negative.        Current Medications       Current Outpatient Medications:     albuterol (Ventolin HFA) 90 mcg/act inhaler, Inhale 2 puffs every 6 (six) hours as needed for wheezing, Disp: 18 g, Rfl: 0    amLODIPine (NORVASC) 10 mg tablet, Take 1 tablet (10 mg total) by mouth daily, Disp: 90 tablet, Rfl: 1    Ascorbic Acid (VITAMIN C PO), Vitamin C, Disp: , Rfl:     atorvastatin (LIPITOR) 20 mg tablet, TAKE 1 TABLET BY MOUTH EVERY DAY, Disp: 90 tablet, Rfl: 0    buPROPion (WELLBUTRIN XL) 150 mg 24 hr tablet, TAKE 1 TABLET BY MOUTH EVERY DAY IN THE MORNING, Disp: 30  tablet, Rfl: 5    Cholecalciferol (Vitamin D3) 1.25 MG (06932 UT) CAPS, Take 5,000 Units by mouth daily, Disp: , Rfl:     fluticasone (FLONASE) 50 mcg/act nasal spray, 1 spray into each nostril daily, Disp: 9.9 mL, Rfl: 0    fluticasone (FLOVENT HFA) 110 MCG/ACT inhaler, Inhale 2 puffs 2 (two) times a day Rinse mouth after use., Disp: 12 g, Rfl: 1    Ivermectin 1 % CREA, Apply topically in the morning, Disp: 45 g, Rfl: 1    levothyroxine 137 mcg tablet, TAKE 1 TABLET BY MOUTH EVERY DAY, Disp: 30 tablet, Rfl: 5    lisinopril (ZESTRIL) 40 mg tablet, TAKE 1 TABLET BY MOUTH EVERY DAY, Disp: 90 tablet, Rfl: 1    meclizine (ANTIVERT) 25 mg tablet, Take 1 tablet (25 mg total) by mouth every 8 (eight) hours as needed for dizziness for up to 10 days, Disp: 30 tablet, Rfl: 0    Multiple Vitamin (MULTIVITAMIN ADULT PO), multivitamin, Disp: , Rfl:     pantoprazole (PROTONIX) 40 mg tablet, TAKE 1 TABLET BY MOUTH TWICE A DAY, Disp: 180 tablet, Rfl: 1    sertraline (ZOLOFT) 100 mg tablet, Take 1 tablet (100 mg total) by mouth 2 (two) times a day, Disp: 60 tablet, Rfl: 5    sulfamethoxazole-trimethoprim (BACTRIM DS) 800-160 mg per tablet, Take 1 tablet by mouth every 12 (twelve) hours for 3 days, Disp: 6 tablet, Rfl: 0    traZODone (DESYREL) 50 mg tablet, TAKE 2 TABLETS (100 MG TOTAL) BY MOUTH DAILY AT BEDTIME, Disp: 60 tablet, Rfl: 1    Diclofenac Sodium (VOLTAREN) 1 %, Apply 2 g topically 4 (four) times a day for 15 days (Patient not taking: Reported on 3/20/2024), Disp: 120 g, Rfl: 0    Current Allergies     Allergies as of 03/28/2024 - Reviewed 03/28/2024   Allergen Reaction Noted    Ceftin [cefuroxime] Hives 11/27/2018            The following portions of the patient's history were reviewed and updated as appropriate: allergies, current medications, past family history, past medical history, past social history, past surgical history and problem list.     Past Medical History:   Diagnosis Date    Acid reflux     Anxiety      Arthritis     Asthma     C1-C4 level with central cord syndrome (HCC)     Resolved 2/1/2017     Carpal tunnel syndrome Resolved 4/21/2015    Colitis     Colon polyp     Concussion     Depressed     Dysthymic disorder     Resolved 1/5/2018    Gastric ulcer 2013    small - on EGD - [facility]    Hemorrhoids     Hx of blood clots     Hx of colonic polyps     [place]    Hyperlipemia     Hypertension     Hypothyroid     Lung nodule     stable x 2 yrs on CT    Migraines     Obesity     Postural dizziness with presyncope     Resolved 8/28/2018     Tendinitis     Ulnar neuropathy     Resolved 5/21/2015     Vertigo        Past Surgical History:   Procedure Laterality Date    APPENDECTOMY      BREAST SURGERY Left 01/1966    BREAST LUMPECTOMY    CARPAL TUNNEL RELEASE      CHOLECYSTECTOMY      DILATION AND CURETTAGE OF UTERUS      ELBOW SURGERY      EYE SURGERY Bilateral 2019    Cataract     GALLBLADDER SURGERY      JOINT REPLACEMENT Right     REPLACEMENT TOTAL KNEE    KNEE ARTHROSCOPY      KNEE SURGERY Right     NECK SURGERY      ORTHOPEDIC SURGERY      POSTERIOR LAMINECTOMY / DECOMPRESSION CERVICAL SPINE  02/2015    REPLACEMENT TOTAL KNEE Left 2022    TONSILLECTOMY      TOTAL KNEE ARTHROPLASTY Left 04/06/2022    [facility] Dr. Diaz    VAGINAL DELIVERY      x3    WRIST SURGERY Right        Family History   Problem Relation Age of Onset    Breast cancer Mother     Alzheimer's disease Mother     Coronary artery disease Mother     Hypertension Mother     Migraines Mother     Peripheral vascular disease Mother     Arthritis Mother     Cancer Mother     Parkinsonism Father     Other Father         Cardiovascular disease     Coronary artery disease Father     Hypertension Father     Bipolar disorder Sister     Thyroid disease Sister     Alzheimer's disease Sister     Depression Sister     Asthma Daughter     Asthma Son     Throat cancer Maternal Grandfather     Cancer Maternal Grandfather     Diabetes Neg Hx     Stroke Neg Hx           Medications have been verified.        Objective   /73 (BP Location: Right arm, Patient Position: Sitting)   Pulse 88   Temp 99.6 °F (37.6 °C) (Tympanic)   Resp 18   Wt 75.8 kg (167 lb)   SpO2 94%   BMI 31.04 kg/m²        Physical Exam     Physical Exam  Vitals and nursing note reviewed.   Constitutional:       General: She is not in acute distress.     Appearance: Normal appearance. She is not ill-appearing.   HENT:      Head: Normocephalic and atraumatic.      Nose: Nose normal.      Mouth/Throat:      Mouth: Mucous membranes are moist.      Pharynx: Oropharynx is clear.   Eyes:      Extraocular Movements: Extraocular movements intact.      Pupils: Pupils are equal, round, and reactive to light.   Cardiovascular:      Rate and Rhythm: Normal rate and regular rhythm.      Pulses: Normal pulses.      Heart sounds: Normal heart sounds. No murmur heard.  Pulmonary:      Effort: Pulmonary effort is normal. No respiratory distress.      Breath sounds: Normal breath sounds. No wheezing or rhonchi.   Abdominal:      Tenderness: There is abdominal tenderness (suprapubic pressure). There is no right CVA tenderness or left CVA tenderness.   Musculoskeletal:         General: Normal range of motion.      Cervical back: Normal range of motion.   Skin:     General: Skin is warm and dry.      Capillary Refill: Capillary refill takes less than 2 seconds.   Neurological:      General: No focal deficit present.      Mental Status: She is alert and oriented to person, place, and time.   Psychiatric:         Mood and Affect: Mood normal.         Behavior: Behavior normal.         Thought Content: Thought content normal.         Judgment: Judgment normal.

## 2024-03-28 NOTE — PATIENT INSTRUCTIONS
Take prescription antibiotic as prescribed.   Your urine sample was sent to our labs for culture. The office will contact you following results to either continue your current antibiotics or change to a different antibiotic that will work better. If the urine culture is negative, you may discontinue the antibiotic  You should stay properly hydrated and frequently urinate to help flush out the infection  Warm compresses for abdominal discomfort  Follow up with your PCP in 3-5 days  Proceed to ER if your symptoms worsen  If you experience worsening abdominal/back pain, fever/chills, bloody urination

## 2024-03-29 ENCOUNTER — HOSPITAL ENCOUNTER (OUTPATIENT)
Dept: CT IMAGING | Facility: HOSPITAL | Age: 79
Discharge: HOME | End: 2024-03-29
Payer: MEDICARE

## 2024-03-29 DIAGNOSIS — K76.89 LIVER CYST: ICD-10-CM

## 2024-03-29 PROCEDURE — 74177 CT ABD & PELVIS W/CONTRAST: CPT

## 2024-03-29 RX ADMIN — IOHEXOL 100 ML: 350 INJECTION, SOLUTION INTRAVENOUS at 14:58

## 2024-03-30 LAB — BACTERIA UR CULT: NORMAL

## 2024-04-04 ENCOUNTER — CLINICAL SUPPORT (OUTPATIENT)
Dept: BARIATRICS | Facility: CLINIC | Age: 79
End: 2024-04-04

## 2024-04-04 VITALS — BODY MASS INDEX: 33.26 KG/M2 | HEIGHT: 59 IN | WEIGHT: 165 LBS

## 2024-04-04 DIAGNOSIS — R63.5 ABNORMAL WEIGHT GAIN: Primary | ICD-10-CM

## 2024-04-04 PROCEDURE — RECHECK

## 2024-04-08 DIAGNOSIS — E78.5 HYPERLIPIDEMIA, UNSPECIFIED HYPERLIPIDEMIA TYPE: ICD-10-CM

## 2024-04-09 RX ORDER — ATORVASTATIN CALCIUM 20 MG/1
20 TABLET, FILM COATED ORAL DAILY
Qty: 90 TABLET | Refills: 1 | Status: SHIPPED | OUTPATIENT
Start: 2024-04-09

## 2024-04-18 ENCOUNTER — CLINICAL SUPPORT (OUTPATIENT)
Dept: BARIATRICS | Facility: CLINIC | Age: 79
End: 2024-04-18

## 2024-04-18 VITALS — WEIGHT: 168.8 LBS | HEIGHT: 60 IN | BODY MASS INDEX: 33.14 KG/M2

## 2024-04-18 DIAGNOSIS — R63.5 ABNORMAL WEIGHT GAIN: Primary | ICD-10-CM

## 2024-04-18 PROCEDURE — RECHECK

## 2024-04-18 NOTE — PROGRESS NOTES
Weight Management Medical Nutrition Assessment  Yokasta presented for a follow-up session with the [facility].  Today's weight is  168.8   lbs.  She has maintained her weight x 6 wks with overall loss of 15.2 lbs since June 2022. At last visit we tried to implement a partial replacement. She did this for about a week and did have good results. She has been having a lot of lower back pain and neck pain and not able to exercise as much. Still struggling with water intake, reviewed importance of this again. She would like start tracking again tomorrow. Feels very down on herself when she falls off track. Discussed option of BH visit. Also recommended she write down her motivators and keep them somewhere she can review them. She feels this would be helpful. She will consider BH in the future. Support and encouragement provided. She will continue in [facility] at this time.      Patient seen by Medical Provider in past 6 months:  yes  Requested to schedule appointment with Medical Provider: No      Anthropometric Measurements  Start Weight (#):  184 # ( 6/15/22 -MD)   205# (4/22) home scale   Current Weight (#): 168.8 #  TBW % Change from start weight: 8.3%   Ideal Body Weight (#): 100#  Goal Weight (#):160-162#      Lowest: 145#     Weight Loss History  Previous weight loss attempts: Counseling with  MD  Meal Replacements (Medifast, Slim Fast, etc.)  Nutrition Counseling with [facility]     Food and Nutrition Related History  Wake up: 6-7  Bed Time:10-11     Food Recall   [id number]: fruit, 1 Toast (light bread) w/2 tbsp pb, sometimes yogurt   Coffee - creamer (35cal) x2  Snack:skip    Lunch 12:00: greek yogurt, apple OR tuna or egg salad, veggies  Snack: skip OR fruit  Dinner:4-5:00:  lean protein/~1/2 cup veggie, ~1/2 cup carb  OR salad, chicken, 1/4 cup mac/cheese OR 1 cup shrimp scampi OR pork chop, large veggies   Snack: skip OR fruit OR greek yogurt      Beverages: water and coffee/tea  Volume of  beverage intake: 30 oz water, 2 cups coffee     Weekends: Same  Cravings: sweets or CHO  Trouble area of day:after dinner>>>>better     Frequency of Eating out: irregularly  Food restrictions:none  Lives with her sister  Cooking: self and sister  Food Shopping: self     Physical Activity Intake  Activity: Walking    Frequency: as able    Physical limitations/barriers to exercise:hx vertigo, foot injury     Estimated Needs  Energy  Juliano James Energy Needs: BMR : 1149 calories            0.5# loss weekly lightly active:1129  calories; 0.5 lb wk lightly active: 1330  Maintenance calories for sedentary  level: 1379 calories   Protein:70-85   (1.2-1.5g/kg IBW); 1.0g/kg IBW= 58  Fluid: 58-68oz     (30-35mL/kg IBW)     Nutrition Diagnosis  Yes;    Overweight/obesity  related to Excess energy intake as evidenced by  BMI more than normative standard for age and sex (obesity-grade I 32.6)     Nutrition Intervention     Nutrition Prescription  Calories:1000 on sedentary days and flex to 1200 calories on walk days.  Protein:70-80g  Fluid:65oz     Meal Plan (Riky/Pro/Carb)  Breakfast:200/26-27  Snack:-  Lunch:200/26-27  Snack:100-150/5-10  Dinner:200-300/15-20  Snack:<200/0-5     Nutrition Education:    [facility] Manual  Calorie controlled menu  Lean protein food choices  Healthy snack options  Food journaling tips        Nutrition Counseling:  Strategies: meal planning, portion sizes, healthy snack choices, hydration, fiber intake, protein intake, exercise, food journal        Monitoring and Evaluation:  Evaluation criteria:  Energy Intake  Meet protein needs  Maintain adequate hydration  Monitor weekly weight  Meal planning/preparation  Food journal   Decreased portions at mealtimes and snacks  Physical activity      Barriers to learning:none  Readiness to change: relapse   Comprehension: very good  Expected Compliance: good

## 2024-04-22 ENCOUNTER — OFFICE VISIT (OUTPATIENT)
Dept: PAIN MEDICINE | Facility: MEDICAL CENTER | Age: 79
End: 2024-04-22
Payer: MEDICARE

## 2024-04-22 VITALS
SYSTOLIC BLOOD PRESSURE: 142 MMHG | OXYGEN SATURATION: 96 % | BODY MASS INDEX: 32.98 KG/M2 | DIASTOLIC BLOOD PRESSURE: 61 MMHG | WEIGHT: 168 LBS | HEIGHT: 60 IN | HEART RATE: 73 BPM

## 2024-04-22 DIAGNOSIS — M15.9 PRIMARY OSTEOARTHRITIS INVOLVING MULTIPLE JOINTS: ICD-10-CM

## 2024-04-22 DIAGNOSIS — M47.816 LUMBAR SPONDYLOSIS: ICD-10-CM

## 2024-04-22 DIAGNOSIS — M79.18 MYOFASCIAL PAIN SYNDROME: ICD-10-CM

## 2024-04-22 DIAGNOSIS — M48.061 SPINAL STENOSIS OF LUMBAR REGION WITHOUT NEUROGENIC CLAUDICATION: Primary | ICD-10-CM

## 2024-04-22 PROCEDURE — 99213 OFFICE O/P EST LOW 20 MIN: CPT | Performed by: PHYSICAL MEDICINE & REHABILITATION

## 2024-04-22 NOTE — PROGRESS NOTES
Assessment:  1. Spinal stenosis of lumbar region without neurogenic claudication    2. Lumbar spondylosis    3. Myofascial pain syndrome    4. Primary osteoarthritis involving multiple joints        Plan:  At this time the patient's pain has fortunately improved although when she sought out the appointment she was having severe pain.  Currently she does have some widespread myofascial type pain which raises the possibility of fibromyalgia.  I would ask her primary care physician to consider Lyrica to help with that pain as well as possibly evaluation by rheumatologist to confirm that diagnosis.  I did review the patient's MRI of the lumbar spine which does reveal some significant central canal stenosis however currently she is not experiencing any significant radicular features.  She could potentially be a candidate for an epidural steroid injection if that becomes more problematic.    My impressions and treatment recommendations were discussed in detail with the patient who verbalized understanding and had no further questions.  Discharge instructions were provided. I personally saw and examined the patient and I agree with the above discussed plan of care.    No orders of the defined types were placed in this encounter.    No orders of the defined types were placed in this encounter.      History of Present Illness:  Yokasta Cadena is a 78 y.o. female who presents for a follow up office visit in regards to pain throughout the cervical thoracic and lumbar region especially in the myofascial structures surrounding the spine as well as the shoulders hips knees and calves.    Currently she is rating the pain as a 3/10 but had much more severe pain when she scheduled the appointment a few weeks ago.  Currently she is describing some pain in the evening.  This does lead to some moderate functional deficits.  She is trying to maintain activity especially with walking.    I have personally reviewed and/or updated the  patient's past medical history, past surgical history, family history, social history, current medications, allergies, and vital signs today.     Review of Systems   Respiratory:  Negative for shortness of breath.    Cardiovascular:  Negative for chest pain.   Gastrointestinal:  Negative for constipation, diarrhea, nausea and vomiting.   Musculoskeletal:  Positive for back pain and joint swelling. Negative for arthralgias, gait problem and myalgias.   Skin:  Negative for rash.   Neurological:  Negative for dizziness, seizures and weakness.   All other systems reviewed and are negative.      Patient Active Problem List   Diagnosis    Carpal tunnel syndrome of right wrist    Cataract    Cervical radiculitis    Vertigo    Essential hypertension    Gastroesophageal reflux disease    Hyperlipidemia    History of colonic polyps    Injury of tendon of rotator cuff    Mild persistent asthma without complication    Mixed anxiety and depressive disorder    Occipital neuralgia of left side    Osteoarthritis of knee    Postconcussion syndrome    Hypothyroidism    Cervical spinal stenosis    Tinnitus, bilateral    Ulnar nerve compression, right    IFG (impaired fasting glucose)    Rosacea    History of gastric ulcer    RUQ pain    Intestinal metaplasia of gastric mucosa    URI (upper respiratory infection)    Near syncope    Anemia    Stage 3a chronic kidney disease (HCC)    Obesity, Class I, BMI 30-34.9    At risk for sleep apnea    DIANA (obstructive sleep apnea)       Past Medical History:   Diagnosis Date    Acid reflux     Anxiety     Arthritis     Asthma     C1-C4 level with central cord syndrome (HCC)     Resolved 2/1/2017     Carpal tunnel syndrome Resolved 4/21/2015    Colitis     Colon polyp     Concussion     Depressed     Dysthymic disorder     Resolved 1/5/2018    Gastric ulcer 2013    small - on EGD - [facility]    Hemorrhoids     Hx of blood clots     Hx of colonic polyps     [facility]rick    Hyperlipemia     Hypertension      Hypothyroid     Lung nodule     stable x 2 yrs on CT    Migraines     Obesity     Postural dizziness with presyncope     Resolved 8/28/2018     Tendinitis     Ulnar neuropathy     Resolved 5/21/2015     Vertigo        Past Surgical History:   Procedure Laterality Date    APPENDECTOMY      BREAST SURGERY Left 01/1966    BREAST LUMPECTOMY    CARPAL TUNNEL RELEASE      CHOLECYSTECTOMY      DILATION AND CURETTAGE OF UTERUS      ELBOW SURGERY      EYE SURGERY Bilateral 2019    Cataract     GALLBLADDER SURGERY      JOINT REPLACEMENT Right     REPLACEMENT TOTAL KNEE    KNEE ARTHROSCOPY      KNEE SURGERY Right     NECK SURGERY      ORTHOPEDIC SURGERY      POSTERIOR LAMINECTOMY / DECOMPRESSION CERVICAL SPINE  02/2015    REPLACEMENT TOTAL KNEE Left 2022    TONSILLECTOMY      TOTAL KNEE ARTHROPLASTY Left 04/06/2022    [facility] Dr. Diaz    VAGINAL DELIVERY      x3    WRIST SURGERY Right        Family History   Problem Relation Age of Onset    Breast cancer Mother     Alzheimer's disease Mother     Coronary artery disease Mother     Hypertension Mother     Migraines Mother     Peripheral vascular disease Mother     Arthritis Mother     Cancer Mother     Parkinsonism Father     Other Father         Cardiovascular disease     Coronary artery disease Father     Hypertension Father     Bipolar disorder Sister     Thyroid disease Sister     Alzheimer's disease Sister     Depression Sister     Asthma Daughter     Asthma Son     Throat cancer Maternal Grandfather     Cancer Maternal Grandfather     Diabetes Neg Hx     Stroke Neg Hx        Social History     Occupational History    Occupation: Retired    Tobacco Use    Smoking status: Former     Current packs/day: 0.00     Types: Cigarettes     Start date: 1/1/1983     Quit date: 10/14/2013     Years since quitting: 10.5    Smokeless tobacco: Never   Vaping Use    Vaping status: Never Used   Substance and Sexual Activity    Alcohol use: No    Drug use: No    Sexual activity: Not Currently      Birth control/protection: None       Current Outpatient Medications on File Prior to Visit   Medication Sig    albuterol (Ventolin HFA) 90 mcg/act inhaler Inhale 2 puffs every 6 (six) hours as needed for wheezing    amLODIPine (NORVASC) 10 mg tablet Take 1 tablet (10 mg total) by mouth daily    Ascorbic Acid (VITAMIN C PO) Vitamin C    atorvastatin (LIPITOR) 20 mg tablet Take 1 tablet (20 mg total) by mouth daily    buPROPion (WELLBUTRIN XL) 150 mg 24 hr tablet TAKE 1 TABLET BY MOUTH EVERY DAY IN THE MORNING    Cholecalciferol (Vitamin D3) 1.25 MG (85384 UT) CAPS Take 5,000 Units by mouth daily    fluticasone (FLONASE) 50 mcg/act nasal spray 1 spray into each nostril daily    levothyroxine 137 mcg tablet TAKE 1 TABLET BY MOUTH EVERY DAY    lisinopril (ZESTRIL) 40 mg tablet TAKE 1 TABLET BY MOUTH EVERY DAY    Multiple Vitamin (MULTIVITAMIN ADULT PO) multivitamin    pantoprazole (PROTONIX) 40 mg tablet TAKE 1 TABLET BY MOUTH TWICE A DAY    sertraline (ZOLOFT) 100 mg tablet Take 1 tablet (100 mg total) by mouth 2 (two) times a day    traZODone (DESYREL) 50 mg tablet TAKE 2 TABLETS (100 MG TOTAL) BY MOUTH DAILY AT BEDTIME    Diclofenac Sodium (VOLTAREN) 1 % Apply 2 g topically 4 (four) times a day for 15 days (Patient not taking: Reported on 3/20/2024)    fluticasone (FLOVENT HFA) 110 MCG/ACT inhaler Inhale 2 puffs 2 (two) times a day Rinse mouth after use.    Ivermectin 1 % CREA Apply topically in the morning    meclizine (ANTIVERT) 25 mg tablet Take 1 tablet (25 mg total) by mouth every 8 (eight) hours as needed for dizziness for up to 10 days     No current facility-administered medications on file prior to visit.       Allergies   Allergen Reactions    Ceftin [Cefuroxime] Hives       Physical Exam:    /61   Pulse 73   Ht 5' (1.524 m)   Wt 76.2 kg (168 lb)   SpO2 96%   BMI 32.81 kg/m²     Constitutional:normal, well developed, well nourished, alert, in no distress and non-toxic and no overt pain  behavior.  Eyes:anicteric  HEENT:grossly intact  Neck:supple, symmetric, trachea midline and no masses   Pulmonary:even and unlabored  Cardiovascular:No edema or pitting edema present  Skin:Normal without rashes or lesions and well hydrated  Psychiatric:Mood and affect appropriate  Neurologic:Cranial Nerves II-XII grossly intact  Musculoskeletal:normal, except for some tenderness to palpation throughout the cervical thoracic and lumbar paraspinal musculature as well as over the shoulders triceps hips sacroiliac region thighs and knees, mild pain with lumbar extension and extension with rotation of the lumbar spine    Imaging

## 2024-04-25 PROBLEM — M47.816 LUMBAR SPONDYLOSIS: Status: ACTIVE | Noted: 2024-04-25

## 2024-04-25 NOTE — ASSESSMENT & PLAN NOTE
New evaluation of back pain   Pain is of her entire back from shoulders down. There are focal migratory spots. Has intermittent pain radiating down the lateral thighs stopping at the knees. No BBI.   No PT. Saw Dr. Ngo 4/22/24, no plans for SUKUMAR, but felt more likely fibromyalgia.   Exam: Diffuse midline and paraspinal muscle TTP from shoulders down.  BLE 5/5, LT intact, 2+ DTRs, no clonus    Imaging:  MRI lumbar 2/27/24: Grade 1 degenerative anterolisthesis is noted at the L3-4 and L5-S1 levels. Multilevel lumbar degenerative disc disease as detailed with moderate to severe central canal narrowing at the L3-4 and L4-5 levels causing crowding of the cauda equina. Moderate central canal stenosis is noted at the L2-3 level. Moderate to severe bilateral L4-5 neural foraminal narrowing with encroachment of the exiting L4 nerve roots. Partially imaged approximately 6 cm lobulated cystic lesion within the superior right hepatic lobe. CT of the abdomen and pelvis is recommended for further evaluation. Subcentimeter bilateral renal cortical cysts. Small left-sided parapelvic renal cysts    Plan:  Reviewed imaging with patient and Dr. Souza.  Patient has degenerative changes in her lumbar spine with areas of spondylolisthesis as well as multilevel severe canal and foraminal stenosis.    Her current symptoms of back pain are not correlating with her MRI. No signs or symptoms of cauda equina. While she does have some leg pain, this is not her biggest complaint. No neurosurgical intervention recommended. Patient states that she would not want to have spine surgery.  Suspect component of myofascial pain and fibromyalgia given diffuse nature of pain with multifocal tender areas.  Will refer to rheumatology for further evaluation and management.  Reviewed red flag signs and symptoms.  Follow-up as needed.  Call with any questions or concerns.

## 2024-04-26 ENCOUNTER — OFFICE VISIT (OUTPATIENT)
Dept: NEUROSURGERY | Facility: CLINIC | Age: 79
End: 2024-04-26
Payer: MEDICARE

## 2024-04-26 VITALS
BODY MASS INDEX: 32.98 KG/M2 | TEMPERATURE: 98 F | WEIGHT: 168 LBS | RESPIRATION RATE: 18 BRPM | HEIGHT: 60 IN | HEART RATE: 64 BPM | DIASTOLIC BLOOD PRESSURE: 76 MMHG | OXYGEN SATURATION: 97 % | SYSTOLIC BLOOD PRESSURE: 140 MMHG

## 2024-04-26 DIAGNOSIS — M54.16 LUMBAR RADICULOPATHY: ICD-10-CM

## 2024-04-26 DIAGNOSIS — M47.816 LUMBAR SPONDYLOSIS: Primary | ICD-10-CM

## 2024-04-26 DIAGNOSIS — M79.7 FIBROMYALGIA: ICD-10-CM

## 2024-04-26 PROCEDURE — 99204 OFFICE O/P NEW MOD 45 MIN: CPT | Performed by: PHYSICIAN ASSISTANT

## 2024-04-26 NOTE — PROGRESS NOTES
Neurosurgery Office Note  Yokasta Cadena 78 y.o. female MRN: [id number]      Assessment/Plan     Lumbar spondylosis  New evaluation of back pain   Pain is of her entire back from shoulders down. There are focal migratory spots. Has intermittent pain radiating down the lateral thighs stopping at the knees. No BBI.   No PT. Saw Dr. Ngo 4/22/24, no plans for SUKUMAR, but felt more likely fibromyalgia.   Exam: Diffuse midline and paraspinal muscle TTP from shoulders down.  BLE 5/5, LT intact, 2+ DTRs, no clonus    Imaging:  MRI lumbar 2/27/24: Grade 1 degenerative anterolisthesis is noted at the L3-4 and L5-S1 levels. Multilevel lumbar degenerative disc disease as detailed with moderate to severe central canal narrowing at the L3-4 and L4-5 levels causing crowding of the cauda equina. Moderate central canal stenosis is noted at the L2-3 level. Moderate to severe bilateral L4-5 neural foraminal narrowing with encroachment of the exiting L4 nerve roots. Partially imaged approximately 6 cm lobulated cystic lesion within the superior right hepatic lobe. CT of the abdomen and pelvis is recommended for further evaluation. Subcentimeter bilateral renal cortical cysts. Small left-sided parapelvic renal cysts    Plan:  Reviewed imaging with patient and Dr. Souza.  Patient has degenerative changes in her lumbar spine with areas of spondylolisthesis as well as multilevel severe canal and foraminal stenosis.    Her current symptoms of back pain are not correlating with her MRI. No signs or symptoms of cauda equina. While she does have some leg pain, this is not her biggest complaint. No neurosurgical intervention recommended. Patient states that she would not want to have spine surgery.  Suspect component of myofascial pain and fibromyalgia given diffuse nature of pain with multifocal tender areas.  Will refer to rheumatology for further evaluation and management.  Reviewed red flag signs and symptoms.  Follow-up as needed.   Call with any questions or concerns.       Diagnoses and all orders for this visit:    Lumbar spondylosis    Lumbar radiculopathy  -     Ambulatory Referral to Neurosurgery    Fibromyalgia  -     Ambulatory Referral to Rheumatology; Future          I have spent a total time of 40 minutes on 04/26/24 in caring for this patient including Diagnostic results, Risks and benefits of tx options, Instructions for management, Patient and family education, Impressions, Counseling / Coordination of care, Documenting in the medical record, Reviewing / ordering tests, medicine, procedures  , Obtaining or reviewing history  , and Communicating with other healthcare professionals .      CHIEF COMPLAINT    Chief Complaint   Patient presents with    Consult     SNPX New PT last here 2018-  BACK PAIN  MRI LSPINE 2/27/24 [facility]          HISTORY    History of Present Illness     78 y.o. year old female     78-year-old female seen for new evaluation of diffuse back pain with intermittent pain radiating down the lateral legs.  States that her entire spine hurts, not just along the midline spine, but the muscles from her shoulders all the way down.  She has focal spots that are tender, but the tender spots are migratory.  Pain is worse with sitting as well as prolonged standing.  She cannot sit for more than half an hour at a time.  Patient is able to walk 15 minutes 2-3 times a day and does pretty well.  Sometimes she will have pain that radiates down the lateral thigh to the knee that can be as bad as 5-6/10 and is relieved by rest or heat around her hips.  States that she is very sensitive to anything touching her back including wrinkles in her clothing or bed sheets when she is trying to sleep.  Today she reports her pain to be a 1-2/10 in her entire back. No BBI. Ambulates independently.         See Discussion    REVIEW OF SYSTEMS    Review of Systems   Constitutional: Negative.    HENT: Negative.     Eyes: Negative.    Respiratory:  Negative.     Cardiovascular: Negative.    Gastrointestinal: Negative.    Endocrine: Negative.    Genitourinary: Negative.    Musculoskeletal:  Positive for back pain (mild back pain which radiates into hips and buttocks and down both legs) and gait problem (if the back pain is too servere, difficulty walking and sitting). Negative for myalgias.        Can't stand too long before her back starts to have pain    Skin: Negative.    Allergic/Immunologic: Negative.    Neurological:  Positive for numbness (some times feet feel numb). Negative for weakness.   Hematological: Negative.    Psychiatric/Behavioral:  Positive for sleep disturbance (has to make sure wheb she is layin gflat on her back canthave any wrinkles under her to hlep prevent some pain).        ROS obtained by MA. Reviewed. See HPI.     Meds/Allergies     Current Outpatient Medications   Medication Sig Dispense Refill    albuterol (Ventolin HFA) 90 mcg/act inhaler Inhale 2 puffs every 6 (six) hours as needed for wheezing 18 g 0    amLODIPine (NORVASC) 10 mg tablet Take 1 tablet (10 mg total) by mouth daily 90 tablet 1    Ascorbic Acid (VITAMIN C PO) Vitamin C      atorvastatin (LIPITOR) 20 mg tablet Take 1 tablet (20 mg total) by mouth daily 90 tablet 1    buPROPion (WELLBUTRIN XL) 150 mg 24 hr tablet TAKE 1 TABLET BY MOUTH EVERY DAY IN THE MORNING 30 tablet 5    Cholecalciferol (Vitamin D3) 1.25 MG (98043 UT) CAPS Take 5,000 Units by mouth daily      fluticasone (FLONASE) 50 mcg/act nasal spray 1 spray into each nostril daily 9.9 mL 0    fluticasone (FLOVENT HFA) 110 MCG/ACT inhaler Inhale 2 puffs 2 (two) times a day Rinse mouth after use. 12 g 1    Ivermectin 1 % CREA Apply topically in the morning 45 g 1    levothyroxine 137 mcg tablet TAKE 1 TABLET BY MOUTH EVERY DAY 30 tablet 5    lisinopril (ZESTRIL) 40 mg tablet TAKE 1 TABLET BY MOUTH EVERY DAY 90 tablet 1    Multiple Vitamin (MULTIVITAMIN ADULT PO) multivitamin      pantoprazole (PROTONIX) 40 mg  tablet TAKE 1 TABLET BY MOUTH TWICE A  tablet 1    sertraline (ZOLOFT) 100 mg tablet Take 1 tablet (100 mg total) by mouth 2 (two) times a day 60 tablet 5    traZODone (DESYREL) 50 mg tablet TAKE 2 TABLETS (100 MG TOTAL) BY MOUTH DAILY AT BEDTIME 60 tablet 1    Diclofenac Sodium (VOLTAREN) 1 % Apply 2 g topically 4 (four) times a day for 15 days (Patient not taking: Reported on 3/20/2024) 120 g 0    meclizine (ANTIVERT) 25 mg tablet Take 1 tablet (25 mg total) by mouth every 8 (eight) hours as needed for dizziness for up to 10 days 30 tablet 0     No current facility-administered medications for this visit.       Allergies   Allergen Reactions    Ceftin [Cefuroxime] Hives       PAST HISTORY    Past Medical History:   Diagnosis Date    Acid reflux     Anxiety     Arthritis     Asthma     C1-C4 level with central cord syndrome (HCC)     Resolved 2/1/2017     Carpal tunnel syndrome Resolved 4/21/2015    Colitis     Colon polyp     Concussion     Depressed     Dysthymic disorder     Resolved 1/5/2018    Gastric ulcer 2013    small - on EGD - [facility]    Hemorrhoids     Hx of blood clots     Hx of colonic polyps     [place]    Hyperlipemia     Hypertension     Hypothyroid     Lumbar spondylosis 04/25/2024    Lung nodule     stable x 2 yrs on CT    Migraines     Obesity     Postural dizziness with presyncope     Resolved 8/28/2018     Tendinitis     Ulnar neuropathy     Resolved 5/21/2015     Vertigo        Past Surgical History:   Procedure Laterality Date    APPENDECTOMY      BREAST SURGERY Left 01/1966    BREAST LUMPECTOMY    CARPAL TUNNEL RELEASE      CHOLECYSTECTOMY      DILATION AND CURETTAGE OF UTERUS      ELBOW SURGERY      EYE SURGERY Bilateral 2019    Cataract     GALLBLADDER SURGERY      JOINT REPLACEMENT Right     REPLACEMENT TOTAL KNEE    KNEE ARTHROSCOPY      KNEE SURGERY Right     NECK SURGERY      ORTHOPEDIC SURGERY      POSTERIOR LAMINECTOMY / DECOMPRESSION CERVICAL SPINE  02/2015    REPLACEMENT TOTAL  KNEE Left 2022    TONSILLECTOMY      TOTAL KNEE ARTHROPLASTY Left 04/06/2022    [facility] Dr. Diaz    VAGINAL DELIVERY      x3    WRIST SURGERY Right        Social History     Tobacco Use    Smoking status: Former     Current packs/day: 0.00     Types: Cigarettes     Start date: 1/1/1983     Quit date: 10/14/2013     Years since quitting: 10.5    Smokeless tobacco: Never   Vaping Use    Vaping status: Never Used   Substance Use Topics    Alcohol use: No    Drug use: No       Family History   Problem Relation Age of Onset    Breast cancer Mother     Alzheimer's disease Mother     Coronary artery disease Mother     Hypertension Mother     Migraines Mother     Peripheral vascular disease Mother     Arthritis Mother     Cancer Mother     Parkinsonism Father     Other Father         Cardiovascular disease     Coronary artery disease Father     Hypertension Father     Bipolar disorder Sister     Thyroid disease Sister     Alzheimer's disease Sister     Depression Sister     Asthma Daughter     Asthma Son     Throat cancer Maternal Grandfather     Cancer Maternal Grandfather     Diabetes Neg Hx     Stroke Neg Hx          Above history personally reviewed.       EXAM    Vitals:Blood pressure 140/76, pulse 64, temperature 98 °F (36.7 °C), temperature source Temporal, resp. rate 18, height 5' (1.524 m), weight 76.2 kg (168 lb), SpO2 97%, not currently breastfeeding.,Body mass index is 32.81 kg/m².     Physical Exam  Vitals reviewed.   Constitutional:       General: She is awake.      Appearance: Normal appearance.   HENT:      Head: Normocephalic and atraumatic.   Eyes:      Conjunctiva/sclera: Conjunctivae normal.   Cardiovascular:      Rate and Rhythm: Normal rate.   Pulmonary:      Effort: Pulmonary effort is normal.   Musculoskeletal:      Comments: Diffuse midline spinal and paraspinal muscle TTP   Skin:     General: Skin is warm and dry.   Neurological:      Mental Status: She is alert and oriented to person, place, and  time.      Gait: Gait is intact.      Deep Tendon Reflexes:      Reflex Scores:       Bicep reflexes are 2+ on the right side and 2+ on the left side.       Brachioradialis reflexes are 2+ on the right side and 2+ on the left side.       Patellar reflexes are 2+ on the right side and 2+ on the left side.  Psychiatric:         Attention and Perception: Attention and perception normal.         Mood and Affect: Mood and affect normal.         Speech: Speech normal.         Behavior: Behavior normal. Behavior is cooperative.         Thought Content: Thought content normal.         Cognition and Memory: Cognition and memory normal.         Judgment: Judgment normal.         Neurologic Exam     Mental Status   Oriented to person, place, and time.   Follows 2 step commands.   Attention: normal. Concentration: normal.   Speech: speech is normal   Level of consciousness: alert  Knowledge: good.   Normal comprehension.     Motor Exam   Muscle bulk: normal  Overall muscle tone: normal  BUE-/triceps/biceps 5/5  BLE-5/5     Sensory Exam   Right arm light touch: normal  Left arm light touch: normal  Right leg light touch: normal  Left leg light touch: normal    Gait, Coordination, and Reflexes     Gait  Gait: normal    Tremor   Resting tremor: absent  Intention tremor: absent  Action tremor: absent    Reflexes   Right brachioradialis: 2+  Left brachioradialis: 2+  Right biceps: 2+  Left biceps: 2+  Right patellar: 2+  Left patellar: 2+  Right Ramachandran: absent  Left Ramachandran: absent  Right ankle clonus: absent  Left ankle clonus: absent  Intact rapid finger movements         MEDICAL DECISION MAKING    Imaging Studies:     XR PATELLA BILATERAL 3 VIEWS    Result Date: 4/11/2024  Narrative: This result has an attachment that is not available. X-rays were ordered 4/11/2024 including AP, lateral, and sunrise views of both knees.  These x-rays reveal well-aligned well-fixed cemented bilateral knee replacements.  There is no fracture  or dislocation.  There is good patellar tracking.  Report is limited to orthopaedic interpretation.    CT abdomen pelvis w contrast    Result Date: 4/5/2024  Narrative: CT ABDOMEN AND PELVIS WITH IV CONTRAST INDICATION: K76.89: Other specified diseases of liver. History of right hepatic lobe minimally complicated cystic lesion. COMPARISON: CT abdomen and pelvis 12/7/2021; CT chest 8/30/2021; MRI lumbar spine 2/27/2024 TECHNIQUE: CT examination of the abdomen and pelvis was performed. Multiplanar 2D reformatted images were created from the source data. This examination, like all CT scans performed in the [facility], was performed utilizing techniques to minimize radiation dose exposure, including the use of iterative reconstruction and automated exposure control. Radiation dose length product (DLP) for this visit: 658 mGy-cm IV Contrast: 100 mL of iohexol (OMNIPAQUE) Enteric Contrast: Administered. FINDINGS: ABDOMEN LOWER CHEST: Artery calcifications. Left basilar atelectasis versus scarring. There are multiple pulmonary nodules, which will be described on series 2: -Right middle lobe, image 1, solid with smooth border, stable measuring 4 mm -Right middle lobe, image 6, solid with smooth border, stable measuring 4 mm -Right lower lobe, image 10, solid with smooth border, stable measuring 3 mm -Right lower lobe, image 43, solid with smooth border, stable measuring 3 mm -Left lower lobe, image 18, solid with smooth border associated with scarring, stable measuring 8 mm LIVER/BILIARY TREE: Normal size and configuration. Stable right hepatic lobe minimally complex cystic lesion measuring 5.6 x 5.6 x 5.4 cm. Additional segment V subcentimeter hypoattenuating lesion, too small to characterize but likely benign, which does not require follow-up (ACR White paper 2017). GALLBLADDER: Post cholecystectomy. SPLEEN: Unremarkable. PANCREAS: Unremarkable. ADRENAL GLANDS: Unremarkable. KIDNEYS/URETERS: Simple  interpolar right renal cyst. Subcentimeter left interpolar hypoattenuating lesion, too small to characterize but likely benign and does not require follow-up. No hydronephrosis. STOMACH AND BOWEL: Colonic diverticulosis without findings of acute diverticulitis. APPENDIX: Surgically absent. ABDOMINOPELVIC CAVITY: No ascites. No pneumoperitoneum. No lymphadenopathy. VESSELS: Atherosclerosis without abdominal aortic aneurysm. Retroaortic left renal vein. PELVIS REPRODUCTIVE ORGANS: Unchanged bilateral ovarian cysts measuring 2.0 cm on the right and 2.1 cm on the left. URINARY BLADDER: Unremarkable. ABDOMINAL WALL/INGUINAL REGIONS: Small fat-containing umbilical hernia. BONES: No acute fracture or suspicious osseous lesion. Bilateral hip and spinal degenerative changes.     Impression: 1. Stable right hepatic lobe minimally complex cystic lesion measuring up to 5.6 cm. 2. Unchanged bilateral ovarian cysts. 3. Stable pulmonary nodules as described. Resident: PHU GARCIA I, the attending radiologist, have reviewed the images and agree with the final report above. Workstation performed: [id number]       I have personally reviewed pertinent reports.   and I have personally reviewed pertinent films in PACS

## 2024-04-28 DIAGNOSIS — F51.01 PRIMARY INSOMNIA: ICD-10-CM

## 2024-04-28 RX ORDER — TRAZODONE HYDROCHLORIDE 50 MG/1
100 TABLET ORAL
Qty: 60 TABLET | Refills: 1 | Status: SHIPPED | OUTPATIENT
Start: 2024-04-28

## 2024-04-29 DIAGNOSIS — F51.01 PRIMARY INSOMNIA: ICD-10-CM

## 2024-04-29 RX ORDER — TRAZODONE HYDROCHLORIDE 50 MG/1
100 TABLET ORAL
Qty: 60 TABLET | Refills: 0 | OUTPATIENT
Start: 2024-04-29

## 2024-05-06 ENCOUNTER — ANESTHESIA EVENT (OUTPATIENT)
Dept: ANESTHESIOLOGY | Facility: HOSPITAL | Age: 79
End: 2024-05-06

## 2024-05-06 ENCOUNTER — ANESTHESIA (OUTPATIENT)
Dept: ANESTHESIOLOGY | Facility: HOSPITAL | Age: 79
End: 2024-05-06

## 2024-05-14 DIAGNOSIS — F32.A DEPRESSION, UNSPECIFIED DEPRESSION TYPE: ICD-10-CM

## 2024-05-14 RX ORDER — BUPROPION HYDROCHLORIDE 150 MG/1
150 TABLET ORAL EVERY MORNING
Qty: 30 TABLET | Refills: 5 | Status: SHIPPED | OUTPATIENT
Start: 2024-05-14

## 2024-05-16 ENCOUNTER — CLINICAL SUPPORT (OUTPATIENT)
Dept: BARIATRICS | Facility: CLINIC | Age: 79
End: 2024-05-16

## 2024-05-16 VITALS — BODY MASS INDEX: 33.06 KG/M2 | HEIGHT: 60 IN | WEIGHT: 168.4 LBS

## 2024-05-16 DIAGNOSIS — R63.5 ABNORMAL WEIGHT GAIN: Primary | ICD-10-CM

## 2024-05-16 PROCEDURE — RECHECK

## 2024-05-18 ENCOUNTER — APPOINTMENT (OUTPATIENT)
Dept: LAB | Facility: MEDICAL CENTER | Age: 79
End: 2024-05-18
Payer: MEDICARE

## 2024-05-18 DIAGNOSIS — M06.4 INFLAMMATORY POLYARTHROPATHY (HCC): ICD-10-CM

## 2024-05-18 LAB
ALBUMIN SERPL BCP-MCNC: 4.5 G/DL (ref 3.5–5)
ALP SERPL-CCNC: 79 U/L (ref 34–104)
ALT SERPL W P-5'-P-CCNC: 12 U/L (ref 7–52)
AST SERPL W P-5'-P-CCNC: 16 U/L (ref 13–39)
BASOPHILS # BLD AUTO: 0.07 THOUSANDS/ÂΜL (ref 0–0.1)
BASOPHILS NFR BLD AUTO: 1 % (ref 0–1)
BILIRUB DIRECT SERPL-MCNC: 0.1 MG/DL (ref 0–0.2)
BILIRUB SERPL-MCNC: 0.38 MG/DL (ref 0.2–1)
C3 SERPL-MCNC: 116 MG/DL (ref 87–200)
C4 SERPL-MCNC: 31 MG/DL (ref 19–52)
CREAT SERPL-MCNC: 0.94 MG/DL (ref 0.6–1.3)
CRP SERPL QL: <1 MG/L
EOSINOPHIL # BLD AUTO: 0.23 THOUSAND/ÂΜL (ref 0–0.61)
EOSINOPHIL NFR BLD AUTO: 4 % (ref 0–6)
ERYTHROCYTE [DISTWIDTH] IN BLOOD BY AUTOMATED COUNT: 13.6 % (ref 11.6–15.1)
ERYTHROCYTE [SEDIMENTATION RATE] IN BLOOD: 17 MM/HOUR (ref 0–29)
GFR SERPL CREATININE-BSD FRML MDRD: 57 ML/MIN/1.73SQ M
HCT VFR BLD AUTO: 35.8 % (ref 34.8–46.1)
HGB BLD-MCNC: 11.2 G/DL (ref 11.5–15.4)
IGA SERPL-MCNC: 284 MG/DL (ref 66–433)
IGG SERPL-MCNC: 782 MG/DL (ref 635–1741)
IGM SERPL-MCNC: 66 MG/DL (ref 45–281)
IMM GRANULOCYTES # BLD AUTO: 0.02 THOUSAND/UL (ref 0–0.2)
IMM GRANULOCYTES NFR BLD AUTO: 0 % (ref 0–2)
LYMPHOCYTES # BLD AUTO: 1.57 THOUSANDS/ÂΜL (ref 0.6–4.47)
LYMPHOCYTES NFR BLD AUTO: 24 % (ref 14–44)
MCH RBC QN AUTO: 28.4 PG (ref 26.8–34.3)
MCHC RBC AUTO-ENTMCNC: 31.3 G/DL (ref 31.4–37.4)
MCV RBC AUTO: 91 FL (ref 82–98)
MONOCYTES # BLD AUTO: 0.57 THOUSAND/ÂΜL (ref 0.17–1.22)
MONOCYTES NFR BLD AUTO: 9 % (ref 4–12)
NEUTROPHILS # BLD AUTO: 4.14 THOUSANDS/ÂΜL (ref 1.85–7.62)
NEUTS SEG NFR BLD AUTO: 62 % (ref 43–75)
NRBC BLD AUTO-RTO: 0 /100 WBCS
PLATELET # BLD AUTO: 268 THOUSANDS/UL (ref 149–390)
PMV BLD AUTO: 10.1 FL (ref 8.9–12.7)
PROT SERPL-MCNC: 7.1 G/DL (ref 6.4–8.4)
RBC # BLD AUTO: 3.95 MILLION/UL (ref 3.81–5.12)
URATE SERPL-MCNC: 4.2 MG/DL (ref 2–7.5)
WBC # BLD AUTO: 6.6 THOUSAND/UL (ref 4.31–10.16)

## 2024-05-18 PROCEDURE — 86225 DNA ANTIBODY NATIVE: CPT

## 2024-05-18 PROCEDURE — 84550 ASSAY OF BLOOD/URIC ACID: CPT

## 2024-05-18 PROCEDURE — 85025 COMPLETE CBC W/AUTO DIFF WBC: CPT

## 2024-05-18 PROCEDURE — 86235 NUCLEAR ANTIGEN ANTIBODY: CPT

## 2024-05-18 PROCEDURE — 36415 COLL VENOUS BLD VENIPUNCTURE: CPT

## 2024-05-18 PROCEDURE — 84165 PROTEIN E-PHORESIS SERUM: CPT

## 2024-05-18 PROCEDURE — 86376 MICROSOMAL ANTIBODY EACH: CPT

## 2024-05-18 PROCEDURE — 86200 CCP ANTIBODY: CPT

## 2024-05-18 PROCEDURE — 86160 COMPLEMENT ANTIGEN: CPT

## 2024-05-18 PROCEDURE — 85652 RBC SED RATE AUTOMATED: CPT

## 2024-05-18 PROCEDURE — 80076 HEPATIC FUNCTION PANEL: CPT

## 2024-05-18 PROCEDURE — 84432 ASSAY OF THYROGLOBULIN: CPT

## 2024-05-18 PROCEDURE — 82565 ASSAY OF CREATININE: CPT

## 2024-05-18 PROCEDURE — 82784 ASSAY IGA/IGD/IGG/IGM EACH: CPT

## 2024-05-18 PROCEDURE — 86800 THYROGLOBULIN ANTIBODY: CPT

## 2024-05-18 PROCEDURE — 86140 C-REACTIVE PROTEIN: CPT

## 2024-05-18 PROCEDURE — 86038 ANTINUCLEAR ANTIBODIES: CPT

## 2024-05-18 PROCEDURE — 86430 RHEUMATOID FACTOR TEST QUAL: CPT

## 2024-05-19 LAB
ANA SER QL IA: NEGATIVE
RHEUMATOID FACT SER QL LA: NEGATIVE

## 2024-05-20 ENCOUNTER — ANESTHESIA (OUTPATIENT)
Dept: GASTROENTEROLOGY | Facility: AMBULARY SURGERY CENTER | Age: 79
End: 2024-05-20

## 2024-05-20 ENCOUNTER — ANESTHESIA EVENT (OUTPATIENT)
Dept: GASTROENTEROLOGY | Facility: AMBULARY SURGERY CENTER | Age: 79
End: 2024-05-20

## 2024-05-20 ENCOUNTER — HOSPITAL ENCOUNTER (OUTPATIENT)
Dept: GASTROENTEROLOGY | Facility: AMBULARY SURGERY CENTER | Age: 79
Setting detail: OUTPATIENT SURGERY
Discharge: HOME | End: 2024-05-20
Attending: INTERNAL MEDICINE
Payer: MEDICARE

## 2024-05-20 VITALS
WEIGHT: 168 LBS | DIASTOLIC BLOOD PRESSURE: 58 MMHG | OXYGEN SATURATION: 97 % | HEIGHT: 60 IN | SYSTOLIC BLOOD PRESSURE: 135 MMHG | TEMPERATURE: 97.2 F | RESPIRATION RATE: 20 BRPM | HEART RATE: 72 BPM | BODY MASS INDEX: 32.98 KG/M2

## 2024-05-20 DIAGNOSIS — K31.A0 INTESTINAL METAPLASIA OF GASTRIC MUCOSA: ICD-10-CM

## 2024-05-20 DIAGNOSIS — Z86.010 HISTORY OF COLONIC POLYPS: ICD-10-CM

## 2024-05-20 LAB
ALBUMIN SERPL ELPH-MCNC: 4.22 G/DL (ref 3.2–5.1)
ALBUMIN SERPL ELPH-MCNC: 61.2 % (ref 48–70)
ALPHA1 GLOB SERPL ELPH-MCNC: 0.28 G/DL (ref 0.15–0.47)
ALPHA1 GLOB SERPL ELPH-MCNC: 4 % (ref 1.8–7)
ALPHA2 GLOB SERPL ELPH-MCNC: 0.75 G/DL (ref 0.42–1.04)
ALPHA2 GLOB SERPL ELPH-MCNC: 10.9 % (ref 5.9–14.9)
BETA GLOB ABNORMAL SERPL ELPH-MCNC: 0.49 G/DL (ref 0.31–0.57)
BETA1 GLOB SERPL ELPH-MCNC: 7.1 % (ref 4.7–7.7)
BETA2 GLOB SERPL ELPH-MCNC: 5.6 % (ref 3.1–7.9)
BETA2+GAMMA GLOB SERPL ELPH-MCNC: 0.39 G/DL (ref 0.2–0.58)
ENA RNP AB SER-ACNC: <0.2 AI (ref 0–0.9)
ENA SM AB SER-ACNC: <0.2 AI (ref 0–0.9)
ENA SS-A AB SER-ACNC: <0.2 AI (ref 0–0.9)
ENA SS-B AB SER-ACNC: <0.2 AI (ref 0–0.9)
GAMMA GLOB ABNORMAL SERPL ELPH-MCNC: 0.77 G/DL (ref 0.4–1.66)
GAMMA GLOB SERPL ELPH-MCNC: 11.2 % (ref 6.9–22.3)
IGG/ALB SER: 1.58 {RATIO} (ref 1.1–1.8)
PROT PATTERN SERPL ELPH-IMP: NORMAL
PROT SERPL-MCNC: 6.9 G/DL (ref 6.4–8.2)
THYROPEROXIDASE AB SERPL-ACNC: <9 IU/ML (ref 0–34)

## 2024-05-20 PROCEDURE — 84165 PROTEIN E-PHORESIS SERUM: CPT | Performed by: PATHOLOGY

## 2024-05-20 PROCEDURE — 88305 TISSUE EXAM BY PATHOLOGIST: CPT | Performed by: PATHOLOGY

## 2024-05-20 PROCEDURE — 43239 EGD BIOPSY SINGLE/MULTIPLE: CPT | Performed by: INTERNAL MEDICINE

## 2024-05-20 PROCEDURE — 45385 COLONOSCOPY W/LESION REMOVAL: CPT | Performed by: INTERNAL MEDICINE

## 2024-05-20 RX ORDER — SODIUM CHLORIDE, SODIUM LACTATE, POTASSIUM CHLORIDE, CALCIUM CHLORIDE 600; 310; 30; 20 MG/100ML; MG/100ML; MG/100ML; MG/100ML
INJECTION, SOLUTION INTRAVENOUS CONTINUOUS PRN
Status: DISCONTINUED | OUTPATIENT
Start: 2024-05-20 | End: 2024-05-20

## 2024-05-20 RX ORDER — PROPOFOL 10 MG/ML
INJECTION, EMULSION INTRAVENOUS AS NEEDED
Status: DISCONTINUED | OUTPATIENT
Start: 2024-05-20 | End: 2024-05-20

## 2024-05-20 RX ORDER — LIDOCAINE HYDROCHLORIDE 10 MG/ML
INJECTION, SOLUTION EPIDURAL; INFILTRATION; INTRACAUDAL; PERINEURAL AS NEEDED
Status: DISCONTINUED | OUTPATIENT
Start: 2024-05-20 | End: 2024-05-20

## 2024-05-20 RX ORDER — ONDANSETRON 2 MG/ML
4 INJECTION INTRAMUSCULAR; INTRAVENOUS ONCE AS NEEDED
Status: DISCONTINUED | OUTPATIENT
Start: 2024-05-20 | End: 2024-05-24 | Stop reason: HOSPADM

## 2024-05-20 RX ORDER — SODIUM CHLORIDE, SODIUM LACTATE, POTASSIUM CHLORIDE, CALCIUM CHLORIDE 600; 310; 30; 20 MG/100ML; MG/100ML; MG/100ML; MG/100ML
100 INJECTION, SOLUTION INTRAVENOUS CONTINUOUS
OUTPATIENT
Start: 2024-05-20

## 2024-05-20 RX ADMIN — LIDOCAINE HYDROCHLORIDE 100 MG: 10 INJECTION, SOLUTION EPIDURAL; INFILTRATION; INTRACAUDAL; PERINEURAL at 08:53

## 2024-05-20 RX ADMIN — PROPOFOL 20 MG: 10 INJECTION, EMULSION INTRAVENOUS at 09:08

## 2024-05-20 RX ADMIN — PROPOFOL 30 MG: 10 INJECTION, EMULSION INTRAVENOUS at 08:56

## 2024-05-20 RX ADMIN — PROPOFOL 40 MG: 10 INJECTION, EMULSION INTRAVENOUS at 09:04

## 2024-05-20 RX ADMIN — PROPOFOL 20 MG: 10 INJECTION, EMULSION INTRAVENOUS at 09:27

## 2024-05-20 RX ADMIN — PROPOFOL 30 MG: 10 INJECTION, EMULSION INTRAVENOUS at 09:05

## 2024-05-20 RX ADMIN — PROPOFOL 20 MG: 10 INJECTION, EMULSION INTRAVENOUS at 09:10

## 2024-05-20 RX ADMIN — PROPOFOL 20 MG: 10 INJECTION, EMULSION INTRAVENOUS at 09:12

## 2024-05-20 RX ADMIN — SODIUM CHLORIDE, SODIUM LACTATE, POTASSIUM CHLORIDE, AND CALCIUM CHLORIDE: .6; .31; .03; .02 INJECTION, SOLUTION INTRAVENOUS at 08:49

## 2024-05-20 RX ADMIN — PROPOFOL 30 MG: 10 INJECTION, EMULSION INTRAVENOUS at 09:20

## 2024-05-20 RX ADMIN — PROPOFOL 100 MG: 10 INJECTION, EMULSION INTRAVENOUS at 08:53

## 2024-05-20 RX ADMIN — PROPOFOL 20 MG: 10 INJECTION, EMULSION INTRAVENOUS at 09:23

## 2024-05-20 RX ADMIN — PROPOFOL 20 MG: 10 INJECTION, EMULSION INTRAVENOUS at 09:14

## 2024-05-20 RX ADMIN — PROPOFOL 30 MG: 10 INJECTION, EMULSION INTRAVENOUS at 09:00

## 2024-05-20 NOTE — H&P
History and Physical - [facility]  Yokasta Cadena 79 y.o. female MRN: [id number]    HPI: Yokasta Cadena is a 79 y.o. year old female who presents for evaluation of gastric intestinal metaplasia and history of colon polyps.        Review of Systems    Historical Information   Past Medical History:   Diagnosis Date    Acid reflux     Anxiety     Arthritis     Asthma     C1-C4 level with central cord syndrome (HCC)     Resolved 2/1/2017     Carpal tunnel syndrome Resolved 4/21/2015    Colitis     Colon polyp     Concussion     Depressed     Dysthymic disorder     Resolved 1/5/2018    Gastric ulcer 2013    small - on EGD - [facility]    Hemorrhoids     Hx of blood clots     Hx of colonic polyps     [place]    Hyperlipemia     Hypertension     Hypothyroid     Lumbar spondylosis 04/25/2024    Lung nodule     stable x 2 yrs on CT    Migraines     Obesity     Postural dizziness with presyncope     Resolved 8/28/2018     Tendinitis     Ulnar neuropathy     Resolved 5/21/2015     Vertigo      Past Surgical History:   Procedure Laterality Date    APPENDECTOMY      BREAST SURGERY Left 01/1966    BREAST LUMPECTOMY    CARPAL TUNNEL RELEASE      CHOLECYSTECTOMY      DILATION AND CURETTAGE OF UTERUS      ELBOW SURGERY      EYE SURGERY Bilateral 2019    Cataract     GALLBLADDER SURGERY      JOINT REPLACEMENT Right     REPLACEMENT TOTAL KNEE    KNEE ARTHROSCOPY      KNEE SURGERY Right     NECK SURGERY      ORTHOPEDIC SURGERY      POSTERIOR LAMINECTOMY / DECOMPRESSION CERVICAL SPINE  02/2015    REPLACEMENT TOTAL KNEE Left 2022    TONSILLECTOMY      TOTAL KNEE ARTHROPLASTY Left 04/06/2022    [facility] Dr. Diaz    VAGINAL DELIVERY      x3    WRIST SURGERY Right      Social History   Social History     Substance and Sexual Activity   Alcohol Use No     Social History     Substance and Sexual Activity   Drug Use No     Social History     Tobacco Use   Smoking Status Former    Current packs/day: 0.00    Types: Cigarettes     Start date: 1/1/1983    Quit date: 10/14/2013    Years since quitting: 10.6   Smokeless Tobacco Never     Family History   Problem Relation Age of Onset    Breast cancer Mother     Alzheimer's disease Mother     Coronary artery disease Mother     Hypertension Mother     Migraines Mother     Peripheral vascular disease Mother     Arthritis Mother     Cancer Mother     Parkinsonism Father     Other Father         Cardiovascular disease     Coronary artery disease Father     Hypertension Father     Bipolar disorder Sister     Thyroid disease Sister     Alzheimer's disease Sister     Depression Sister     Asthma Daughter     Asthma Son     Throat cancer Maternal Grandfather     Cancer Maternal Grandfather     Diabetes Neg Hx     Stroke Neg Hx        Meds/Allergies     Not in a hospital admission.    Allergies   Allergen Reactions    Ceftin [Cefuroxime] Hives       Objective     BP (!) 179/78   Pulse 83   Temp (!) 96.9 °F (36.1 °C) (Temporal)   Resp 18   Ht 5' (1.524 m)   Wt 76.2 kg (168 lb)   SpO2 97%   BMI 32.81 kg/m²       PHYSICAL EXAM    Gen: NAD  CV: RRR  CHEST: Clear  ABD: soft, NT/ND  EXT: no edema  Neuro: AAO      ASSESSMENT/PLAN:  This is a 79 y.o. year old female here for evaluation of colon polyps and history of intestinal metaplasia.    PLAN:   Procedure: EGD and colonoscopy.

## 2024-05-20 NOTE — ANESTHESIA PREPROCEDURE EVALUATION
Procedure:  COLONOSCOPY  EGD    Relevant Problems   ANESTHESIA (within normal limits)      CARDIO   (+) Essential hypertension   (+) Hyperlipidemia      ENDO   (+) Hypothyroidism      GI/HEPATIC   (+) Gastroesophageal reflux disease      /RENAL   (+) Stage 3a chronic kidney disease (HCC)      HEMATOLOGY   (+) Anemia      MUSCULOSKELETAL   (+) Lumbar spondylosis   (+) Osteoarthritis of knee      NEURO/PSYCH   (+) Mixed anxiety and depressive disorder   (+) Occipital neuralgia of left side      PULMONARY   (+) Mild persistent asthma without complication   (+) DIANA (obstructive sleep apnea)   (+) URI (upper respiratory infection)        Physical Exam    Airway    Mallampati score: II  TM Distance: >3 FB  Neck ROM: limited     Dental   No notable dental hx     Cardiovascular  Rhythm: regular, Rate: normal    Pulmonary   Breath sounds clear to auscultation    Other Findings  post-pubertal.      Anesthesia Plan  ASA Score- 3     Anesthesia Type- IV sedation with anesthesia with ASA Monitors.         Additional Monitors:     Airway Plan:            Plan Factors-Exercise tolerance (METS): >4 METS.    Chart reviewed. EKG reviewed.   Patient summary reviewed.    Patient is not a current smoker.              Induction- intravenous.    Postoperative Plan-     Perioperative Resuscitation Plan - Level 1 - Full Code.       Informed Consent- Anesthetic plan and risks discussed with patient.  I personally reviewed this patient with the CRNA. Discussed and agreed on the Anesthesia Plan with the CRNA..

## 2024-05-21 LAB
CCP AB SER IA-ACNC: 1
THYROGLOB AB SERPL-ACNC: <1 IU/ML (ref 0–0.9)
THYROGLOB SERPL-MCNC: 0.7 NG/ML (ref 1.5–38.5)

## 2024-05-22 ENCOUNTER — APPOINTMENT (OUTPATIENT)
Dept: RADIOLOGY | Facility: MEDICAL CENTER | Age: 79
End: 2024-05-22
Payer: MEDICARE

## 2024-05-22 DIAGNOSIS — M06.4 INFLAMMATORY POLYARTHROPATHY (HCC): ICD-10-CM

## 2024-05-22 LAB — DSDNA AB SER QL CLIF: NEGATIVE

## 2024-05-22 PROCEDURE — 73130 X-RAY EXAM OF HAND: CPT

## 2024-05-22 PROCEDURE — 73070 X-RAY EXAM OF ELBOW: CPT

## 2024-05-22 PROCEDURE — 73110 X-RAY EXAM OF WRIST: CPT

## 2024-05-22 PROCEDURE — 88305 TISSUE EXAM BY PATHOLOGIST: CPT | Performed by: PATHOLOGY

## 2024-05-29 DIAGNOSIS — I10 ESSENTIAL HYPERTENSION: ICD-10-CM

## 2024-05-29 RX ORDER — LISINOPRIL 40 MG/1
TABLET ORAL
Qty: 90 TABLET | Refills: 1 | Status: SHIPPED | OUTPATIENT
Start: 2024-05-29

## 2024-05-30 ENCOUNTER — CLINICAL SUPPORT (OUTPATIENT)
Dept: BARIATRICS | Facility: CLINIC | Age: 79
End: 2024-05-30

## 2024-05-30 VITALS — WEIGHT: 168.4 LBS | HEIGHT: 60 IN | BODY MASS INDEX: 33.06 KG/M2

## 2024-05-30 DIAGNOSIS — R63.5 ABNORMAL WEIGHT GAIN: Primary | ICD-10-CM

## 2024-05-30 PROCEDURE — RECHECK

## 2024-06-06 ENCOUNTER — CLINICAL SUPPORT (OUTPATIENT)
Dept: BARIATRICS | Facility: CLINIC | Age: 79
End: 2024-06-06

## 2024-06-06 VITALS — BODY MASS INDEX: 33.06 KG/M2 | HEIGHT: 60 IN | WEIGHT: 168.4 LBS

## 2024-06-06 DIAGNOSIS — R63.5 ABNORMAL WEIGHT GAIN: Primary | ICD-10-CM

## 2024-06-06 PROCEDURE — RECHECK

## 2024-06-07 DIAGNOSIS — E03.9 HYPOTHYROIDISM, UNSPECIFIED TYPE: ICD-10-CM

## 2024-06-07 RX ORDER — LEVOTHYROXINE SODIUM 137 UG/1
TABLET ORAL
Qty: 90 TABLET | Refills: 1 | Status: SHIPPED | OUTPATIENT
Start: 2024-06-07

## 2024-06-11 ENCOUNTER — OFFICE VISIT (OUTPATIENT)
Dept: BARIATRICS | Facility: CLINIC | Age: 79
End: 2024-06-11
Payer: MEDICARE

## 2024-06-11 VITALS
SYSTOLIC BLOOD PRESSURE: 140 MMHG | BODY MASS INDEX: 33.18 KG/M2 | DIASTOLIC BLOOD PRESSURE: 60 MMHG | RESPIRATION RATE: 14 BRPM | HEART RATE: 72 BPM | HEIGHT: 60 IN | WEIGHT: 169 LBS | TEMPERATURE: 97.1 F

## 2024-06-11 DIAGNOSIS — I10 ESSENTIAL HYPERTENSION: ICD-10-CM

## 2024-06-11 DIAGNOSIS — E66.9 OBESITY, CLASS I, BMI 30-34.9: Primary | ICD-10-CM

## 2024-06-11 DIAGNOSIS — K21.9 GASTROESOPHAGEAL REFLUX DISEASE WITHOUT ESOPHAGITIS: ICD-10-CM

## 2024-06-11 DIAGNOSIS — E78.2 MIXED HYPERLIPIDEMIA: ICD-10-CM

## 2024-06-11 DIAGNOSIS — E03.9 HYPOTHYROIDISM, UNSPECIFIED TYPE: ICD-10-CM

## 2024-06-11 DIAGNOSIS — G47.33 OSA (OBSTRUCTIVE SLEEP APNEA): ICD-10-CM

## 2024-06-11 PROCEDURE — 99213 OFFICE O/P EST LOW 20 MIN: CPT | Performed by: NURSE PRACTITIONER

## 2024-06-11 RX ORDER — DIPHENOXYLATE HYDROCHLORIDE AND ATROPINE SULFATE 2.5; .025 MG/1; MG/1
1 TABLET ORAL DAILY
COMMUNITY

## 2024-06-11 NOTE — ASSESSMENT & PLAN NOTE
- Patient is pursuing Healthy Ways after completing [facility].  - Initial weight loss goal of 5-10% weight loss for improved health  - On Wellbutrin through primary care provider.   - Not a GLP-1 candidate due to history of pancreatitis.   - Avoid metformin given eGFR.  - Avoid phentermine due to age and history of hypertension.  - Naltrexone 25 mg daily started March 2023 at weight of 162 lbs along with Wellbutrin to mimic Contrave. Took for about 2 weeks, tolerated it well, but stopped, as she did not feel that she needed it. She will think about restarting it.   - Labs reviewed: CMP 3/8/2024. Lipid, A1C, and TSH 12/21/2023. A1C in prediabetes range at 6.0, which will likely improve with weight loss. Remainder of the blood work within acceptable range.         Initial: 184 lbs  Last visit: 160.4 lbs BMI 30.31  Current: 169 lbs BMI 33.01  Change: -15 lbs (+9 since last office visit)  Goal: 150 lbs    Goals:  Do not skip meals.   Restart food logging  Increase water intake to at least 64 oz daily.   Increase walking as tolerated   Continue nutrition recommendations per dietician.   Nutrition Prescription  Calories:2982-3334 calories   Protein:70-80g  Fluid:65oz  Continue Wellbutrin through primary care  Think about restarting Naltrexone.

## 2024-06-11 NOTE — PROGRESS NOTES
Assessment/Plan:     Obesity, Class I, BMI 30-34.9  - Patient is pursuing Healthy Ways after completing [facility].  - Initial weight loss goal of 5-10% weight loss for improved health  - On Wellbutrin through primary care provider.   - Not a GLP-1 candidate due to history of pancreatitis.   - Avoid metformin given eGFR.  - Avoid phentermine due to age and history of hypertension.  - Naltrexone 25 mg daily started March 2023 at weight of 162 lbs along with Wellbutrin to mimic Contrave. Took for about 2 weeks, tolerated it well, but stopped, as she did not feel that she needed it. She will think about restarting it.   - Labs reviewed: CMP 3/8/2024. Lipid, A1C, and TSH 12/21/2023. A1C in prediabetes range at 6.0, which will likely improve with weight loss. Remainder of the blood work within acceptable range.         Initial: 184 lbs  Last visit: 160.4 lbs BMI 30.31  Current: 169 lbs BMI 33.01  Change: -15 lbs (+9 since last office visit)  Goal: 150 lbs    Goals:  Do not skip meals.   Restart food logging  Increase water intake to at least 64 oz daily.   Increase walking as tolerated   Continue nutrition recommendations per dietician.   Nutrition Prescription  Calories:8709-6592 calories   Protein:70-80g  Fluid:65oz  Continue Wellbutrin through primary care  Think about restarting Naltrexone.    Hyperlipidemia  - Taking atorvastatin. May improve with weight loss and lifestyle modification. Continue management with prescribing provider.       Hypothyroidism  - Taking levothyroxine. Continue management with prescribing provider.       DIANA (obstructive sleep apnea)  - Has DIANA, but not using CPAP.  - Risks of untreated sleep apnea reviewed, including increased risk for myocardial infarction and stroke, increased difficulty with weight loss, and risk of sudden cardiac death due to arrhythmia.  - Referral to sleep medicine discussed, patient declined.       Essential hypertension  - Taking norvasc and lisinopril. May  improve with weight loss and lifestyle modification. Continue management with prescribing provider.         Gastroesophageal reflux disease  - Taking protonix. May improve with weight loss and lifestyle modification. Continue management with prescribing provider.          Yokasta was seen today for follow-up.    Diagnoses and all orders for this visit:    Obesity, Class I, BMI 30-34.9    Mixed hyperlipidemia    Hypothyroidism, unspecified type    DIANA (obstructive sleep apnea)    Essential hypertension    Gastroesophageal reflux disease without esophagitis            Follow up in approximately  6 months  with Non-Surgical Physician/Advanced Practitioner.    Subjective:   Chief Complaint   Patient presents with    Follow-up     MWM- 4mo F/u; Waist       Patient ID: Yokasta Cadena  is a 79 y.o. female with excess weight/obesity here to pursue weight management.  Patient is pursuing  Healthy Ways , previously completed Healthy CORE.   Most recent notes and records were reviewed.    HPI    Wt Readings from Last 10 Encounters:   06/11/24 76.7 kg (169 lb)   06/06/24 76.4 kg (168 lb 6.4 oz)   05/30/24 76.4 kg (168 lb 6.4 oz)   05/20/24 76.2 kg (168 lb)   05/16/24 76.4 kg (168 lb 6.4 oz)   04/26/24 76.2 kg (168 lb)   04/22/24 76.2 kg (168 lb)   04/18/24 76.6 kg (168 lb 12.8 oz)   04/04/24 74.8 kg (165 lb)   03/28/24 75.8 kg (167 lb)     Currently in [facility] and finds it very helpful.    Got off track this past week due to people visiting.     Taking Wellbutrin  mg daily through primary care      Cravings for sweets.     Didn't restart naltrexone. Denies any negative side effects from Naltrexone in the past, just didn't restart it.     Tends to skip either breakfast or lunch.     Not food logging.      Hydration: at least 40 oz water, 3 cups of coffee with 1 T creamer, protein shake  Alcohol: none  Exercise: walking 4004-3240 steps daily     Occupation: retired   Sleep: 6-8 hours  Has DIANA, does not  wear CPAP, discussed referral back to sleep medicine, patient declined.         Colonoscopy: no further colonoscopies necessary due to age   Mammogram: has order, encouraged to schedule          The following portions of the patient's history were reviewed and updated as appropriate: allergies, current medications, past family history, past medical history, past social history, past surgical history, and problem list.    Family History   Problem Relation Age of Onset    Breast cancer Mother     Alzheimer's disease Mother     Coronary artery disease Mother     Hypertension Mother     Migraines Mother     Peripheral vascular disease Mother     Arthritis Mother     Cancer Mother     Parkinsonism Father     Other Father         Cardiovascular disease     Coronary artery disease Father     Hypertension Father     Bipolar disorder Sister     Thyroid disease Sister     Alzheimer's disease Sister     Depression Sister     Asthma Daughter     Asthma Son     Throat cancer Maternal Grandfather     Cancer Maternal Grandfather     Diabetes Neg Hx     Stroke Neg Hx         Review of Systems   HENT:  Negative for sore throat.    Respiratory:  Negative for cough and shortness of breath.    Cardiovascular:  Negative for chest pain and palpitations.   Gastrointestinal:  Negative for abdominal pain, constipation, diarrhea, nausea and vomiting.        Denies GERD   Musculoskeletal:  Positive for arthralgias (chronic) and back pain (chronic).   Skin:  Negative for rash.   Psychiatric/Behavioral:  Negative for suicidal ideas (or HI).         Denies depression or anxiety        Objective:  /60   Pulse 72   Temp (!) 97.1 °F (36.2 °C)   Resp 14   Ht 5' (1.524 m)   Wt 76.7 kg (169 lb)   BMI 33.01 kg/m²     Physical Exam  Vitals and nursing note reviewed.       Constitutional   General appearance: Abnormal.  well developed and obese.   Eyes No conjunctival injection.   Ears, Nose, Mouth, and Throat Oral mucosa moist.   Pulmonary    Respiratory effort: No increased work of breathing or signs of respiratory distress.     Cardiovascular     Examination of extremities for edema and/or varicosities: Normal.  no edema.   Abdomen   Abdomen: Abnormal.  The abdomen was obese.    Musculoskeletal   Normal range of motion  Neurological   Gait and station: Normal.    Psychiatric   Orientation to person, place and time: Normal.    Affect: appropriate

## 2024-06-11 NOTE — ASSESSMENT & PLAN NOTE
- Taking protonix. May improve with weight loss and lifestyle modification. Continue management with prescribing provider.

## 2024-06-11 NOTE — ASSESSMENT & PLAN NOTE
- Taking atorvastatin. May improve with weight loss and lifestyle modification. Continue management with prescribing provider.

## 2024-06-11 NOTE — ASSESSMENT & PLAN NOTE
- Has DIANA, but not using CPAP.  - Risks of untreated sleep apnea reviewed, including increased risk for myocardial infarction and stroke, increased difficulty with weight loss, and risk of sudden cardiac death due to arrhythmia.  - Referral to sleep medicine discussed, patient declined.

## 2024-06-13 ENCOUNTER — TELEPHONE (OUTPATIENT)
Dept: BARIATRICS | Facility: CLINIC | Age: 79
End: 2024-06-13

## 2024-06-13 DIAGNOSIS — E66.9 OBESITY, CLASS I, BMI 30-34.9: Primary | ICD-10-CM

## 2024-06-13 RX ORDER — NALTREXONE HYDROCHLORIDE 50 MG/1
TABLET, FILM COATED ORAL
Qty: 30 TABLET | Refills: 3 | Status: SHIPPED | OUTPATIENT
Start: 2024-06-13

## 2024-06-13 NOTE — TELEPHONE ENCOUNTER
Patient requesting a refill of naltrexone (REVIA) 50 mg tablet be sent to [facility]/pharmacy #[id number] - [place], [place] - [address].    This is not on her current med list but patient states she is taking it.      Thank you.

## 2024-06-17 NOTE — PROGRESS NOTES
Weight Management Medical Nutrition Assessment  Yokasta presented for a follow-up session with the Healthy Ways Program.  Today's weight is   168.8   lbs.  She has maintained her weight x 2 months with overall loss of 15.2  lbs since June 2022. Did well on vacation, did a lot of walking which she was happy about. Does feel she should do some food logging in order to better keep track of extras. She reports less pain and is trying to walk more. She will continue in Healthy Ways at this time.      Patient seen by Medical Provider in past 6 months:  yes  Requested to schedule appointment with Medical Provider: No      Anthropometric Measurements  Start Weight (#):  184 # ( 6/15/22 -MD)   205# (4/22) home scale   Current Weight (#): 168.8 #  TBW % Change from start weight: 8.3%   Ideal Body Weight (#): 100#  Goal Weight (#):166 lbs     Lowest: 145#     Weight Loss History  Previous weight loss attempts: Counseling with  MD  Meal Replacements (Medifast, Slim Fast, etc.)  Nutrition Counseling with KAYCE     Food and Nutrition Related History  Wake up: 6-7  Bed Time:10-11     Food Recall   [id number]:00: 1 Toast (light bread) w/1 tbsp pb, sometimes yogurt   Coffee - creamer (35cal) x2  Snack:skip    Lunch 12:00: cheese, pepperoni s/w, fruit OR greek yogurt, apple OR tuna or egg salad, veggies  Snack: skip   Dinner:4-5:00:  lean protein/~1/2 cup veggie, ~1/2 cup carb  OR salad, chicken, 1/4 cup mac/cheese OR 1 cup shrimp scampi OR pork chop, large veggies   Snack: skip OR fruit OR greek yogurt      Beverages: water and coffee/tea  Volume of beverage intake: ~40 oz water, 2 cups coffee     Weekends: Same  Cravings: sweets or CHO  Trouble area of day:after dinner>>>>better     Frequency of Eating out: irregularly  Food restrictions:none  Lives with her sister  Cooking: self and sister  Food Shopping: self     Physical Activity Intake  Activity: Walking    Frequency: as able    Physical limitations/barriers to exercise:hx  vertigo, foot injury     Estimated Needs  Energy  Juliano James Energy Needs: BMR : 1149 calories            0.5# loss weekly lightly active:1129  calories; 0.5 lb wk lightly active: 1330  Maintenance calories for sedentary  level: 1379 calories   Protein:70-85   (1.2-1.5g/kg IBW); 1.0g/kg IBW= 58  Fluid: 58-68oz     (30-35mL/kg IBW)     Nutrition Diagnosis  Yes;    Overweight/obesity  related to Excess energy intake as evidenced by  BMI more than normative standard for age and sex (obesity-grade I 32.6)     Nutrition Intervention     Nutrition Prescription  Calories:1000 on sedentary days and flex to 1200 calories on walk days.  Protein:70-80g  Fluid:65oz     Meal Plan (Riky/Pro/Carb)  Breakfast:200/26-27  Snack:-  Lunch:200/26-27  Snack:100-150/5-10  Dinner:200-300/15-20  Snack:<200/0-5     Nutrition Education:    Healthy Core Manual  Calorie controlled menu  Lean protein food choices  Healthy snack options  Food journaling tips        Nutrition Counseling:  Strategies: meal planning, portion sizes, healthy snack choices, hydration, fiber intake, protein intake, exercise, food journal        Monitoring and Evaluation:  Evaluation criteria:  Energy Intake  Meet protein needs  Maintain adequate hydration  Monitor weekly weight  Meal planning/preparation  Food journal   Decreased portions at mealtimes and snacks  Physical activity      Barriers to learning:none  Readiness to change: maintenance    Comprehension: very good  Expected Compliance: good

## 2024-06-22 ENCOUNTER — OFFICE VISIT (OUTPATIENT)
Dept: URGENT CARE | Facility: MEDICAL CENTER | Age: 79
End: 2024-06-22
Payer: MEDICARE

## 2024-06-22 VITALS
OXYGEN SATURATION: 97 % | SYSTOLIC BLOOD PRESSURE: 168 MMHG | DIASTOLIC BLOOD PRESSURE: 71 MMHG | HEART RATE: 73 BPM | RESPIRATION RATE: 18 BRPM | TEMPERATURE: 98.5 F

## 2024-06-22 DIAGNOSIS — J01.01 ACUTE RECURRENT MAXILLARY SINUSITIS: Primary | ICD-10-CM

## 2024-06-22 PROCEDURE — G0463 HOSPITAL OUTPT CLINIC VISIT: HCPCS | Performed by: NURSE PRACTITIONER

## 2024-06-22 PROCEDURE — 99213 OFFICE O/P EST LOW 20 MIN: CPT | Performed by: NURSE PRACTITIONER

## 2024-06-22 RX ORDER — AZITHROMYCIN 250 MG/1
TABLET, FILM COATED ORAL
Qty: 6 TABLET | Refills: 0 | Status: SHIPPED | OUTPATIENT
Start: 2024-06-22 | End: 2024-06-26

## 2024-06-22 NOTE — PROGRESS NOTES
[facility]        NAME: Yokasta Cadena is a 79 y.o. female  : 1945    MRN: [id number]  DATE: 2024  TIME: 11:44 AM    Assessment and Plan   Acute recurrent maxillary sinusitis [J01.01]  1. Acute recurrent maxillary sinusitis  azithromycin (ZITHROMAX) 250 mg tablet            Patient Instructions   Start azithromycin. Take two tablets today and then one a day until gone. Take with food.  Use tylenol and motrin for symptom management.  Increase fluids  Follow up with PCP in 3-5 days.  Proceed to  ER if symptoms worsen.    If tests are performed, our office will contact you with results only if changes need to made to the care plan discussed with you at the visit. [facility]    Chief Complaint     Chief Complaint   Patient presents with    Facial Pain     Patient reports sinus pain that began last week. She reports she did use flonase yesterday.          History of Present Illness       HPI  Yokasta came into the clinic today complaining of sinus pain and pressure which started over one week ago.  She also has PND, headaches, dry throat.  Did have chills and possible fevers last week. Has recurrent sinus infections. Denies CP and SOB.    Discussed Augmentin put patient typically uses a zpack with good results for her sinus infections.      Confirmed only allergy is ceftin    Review of Systems   Review of Systems   Constitutional:  Positive for fever.   HENT:  Positive for congestion, facial swelling, postnasal drip, rhinorrhea, sinus pressure, sinus pain and sore throat. Negative for trouble swallowing.    Respiratory:  Negative for chest tightness and shortness of breath.    Cardiovascular:  Negative for chest pain.   Gastrointestinal:  Positive for nausea. Negative for abdominal pain, constipation and diarrhea.   Skin:  Negative for rash.   Neurological:  Positive for headaches. Negative for dizziness.   All other systems reviewed and are  negative.        Current Medications       Current Outpatient Medications:     albuterol (Ventolin HFA) 90 mcg/act inhaler, Inhale 2 puffs every 6 (six) hours as needed for wheezing (Patient taking differently: Inhale 2 puffs as needed for wheezing), Disp: 18 g, Rfl: 0    amLODIPine (NORVASC) 10 mg tablet, Take 1 tablet (10 mg total) by mouth daily, Disp: 90 tablet, Rfl: 1    Ascorbic Acid (VITAMIN C PO), Vitamin C, Disp: , Rfl:     atorvastatin (LIPITOR) 20 mg tablet, Take 1 tablet (20 mg total) by mouth daily, Disp: 90 tablet, Rfl: 1    azithromycin (ZITHROMAX) 250 mg tablet, Take 2 tablets today then 1 tablet daily x 4 days, Disp: 6 tablet, Rfl: 0    buPROPion (WELLBUTRIN XL) 150 mg 24 hr tablet, TAKE 1 TABLET BY MOUTH EVERY DAY IN THE MORNING, Disp: 30 tablet, Rfl: 5    Cholecalciferol (Vitamin D3) 1.25 MG (16445 UT) CAPS, Take 5,000 Units by mouth daily, Disp: , Rfl:     Cyanocobalamin (VITAMIN B-12 PO), , Disp: , Rfl:     Diclofenac Sodium (VOLTAREN) 1 %, Apply 2 g topically 4 (four) times a day for 15 days (Patient taking differently: Apply 2 g topically as needed), Disp: 120 g, Rfl: 0    fluticasone (FLONASE) 50 mcg/act nasal spray, 1 spray into each nostril daily (Patient taking differently: 1 spray into each nostril as needed), Disp: 9.9 mL, Rfl: 0    Ivermectin 1 % CREA, Apply topically in the morning, Disp: 45 g, Rfl: 1    levothyroxine 137 mcg tablet, TAKE 1 TABLET BY MOUTH EVERY DAY, Disp: 90 tablet, Rfl: 1    lisinopril (ZESTRIL) 40 mg tablet, TAKE 1 TABLET BY MOUTH EVERY DAY, Disp: 90 tablet, Rfl: 1    Multiple Vitamin (MULTIVITAMIN ADULT PO), multivitamin, Disp: , Rfl:     naltrexone (REVIA) 50 mg tablet, Take 1/2 tablet by mouth daily in the morning for 2 weeks, then take 1/2 tablet twice a day., Disp: 30 tablet, Rfl: 3    pantoprazole (PROTONIX) 40 mg tablet, TAKE 1 TABLET BY MOUTH TWICE A DAY, Disp: 180 tablet, Rfl: 1    sertraline (ZOLOFT) 100 mg tablet, Take 1 tablet (100 mg total) by mouth 2  (two) times a day, Disp: 60 tablet, Rfl: 5    traZODone (DESYREL) 50 mg tablet, TAKE 2 TABLETS (100 MG TOTAL) BY MOUTH DAILY AT BEDTIME, Disp: 60 tablet, Rfl: 1    fluticasone (FLOVENT HFA) 110 MCG/ACT inhaler, Inhale 2 puffs 2 (two) times a day Rinse mouth after use. (Patient taking differently: Inhale 2 puffs as needed Rinse mouth after use.), Disp: 12 g, Rfl: 1    meclizine (ANTIVERT) 25 mg tablet, Take 1 tablet (25 mg total) by mouth every 8 (eight) hours as needed for dizziness for up to 10 days, Disp: 30 tablet, Rfl: 0    multivitamin (THERAGRAN) TABS, Take 1 tablet by mouth daily One a day, Disp: , Rfl:     Current Allergies     Allergies as of 06/22/2024 - Reviewed 06/22/2024   Allergen Reaction Noted    Ceftin [cefuroxime] Hives 11/27/2018            The following portions of the patient's history were reviewed and updated as appropriate: allergies, current medications, past family history, past medical history, past social history, past surgical history and problem list.     Past Medical History:   Diagnosis Date    Acid reflux     Anxiety     Arthritis     Asthma     C1-C4 level with central cord syndrome (HCC)     Resolved 2/1/2017     Carpal tunnel syndrome Resolved 4/21/2015    Colitis     Colon polyp     Concussion     Depressed     Dysthymic disorder     Resolved 1/5/2018    Gastric ulcer 2013    small - on EGD - [facility]    Hemorrhoids     Hx of blood clots     Hx of colonic polyps     D'Merrick    Hyperlipemia     Hypertension     Hypothyroid     Lumbar spondylosis 04/25/2024    Lung nodule     stable x 2 yrs on CT    Migraines     Obesity     Postural dizziness with presyncope     Resolved 8/28/2018     Tendinitis     Ulnar neuropathy     Resolved 5/21/2015     Vertigo        Past Surgical History:   Procedure Laterality Date    APPENDECTOMY      BREAST SURGERY Left 01/1966    BREAST LUMPECTOMY    CARPAL TUNNEL RELEASE      CHOLECYSTECTOMY      DILATION AND CURETTAGE OF UTERUS      ELBOW SURGERY       EYE SURGERY Bilateral 2019    Cataract     GALLBLADDER SURGERY      JOINT REPLACEMENT Right     REPLACEMENT TOTAL KNEE    KNEE ARTHROSCOPY      KNEE SURGERY Right     NECK SURGERY      ORTHOPEDIC SURGERY      POSTERIOR LAMINECTOMY / DECOMPRESSION CERVICAL SPINE  02/2015    REPLACEMENT TOTAL KNEE Left 2022    TONSILLECTOMY      TOTAL KNEE ARTHROPLASTY Left 04/06/2022    [facility] Dr. Diaz    VAGINAL DELIVERY      x3    WRIST SURGERY Right        Family History   Problem Relation Age of Onset    Breast cancer Mother     Alzheimer's disease Mother     Coronary artery disease Mother     Hypertension Mother     Migraines Mother     Peripheral vascular disease Mother     Arthritis Mother     Cancer Mother     Parkinsonism Father     Other Father         Cardiovascular disease     Coronary artery disease Father     Hypertension Father     Bipolar disorder Sister     Thyroid disease Sister     Alzheimer's disease Sister     Depression Sister     Asthma Daughter     Asthma Son     Throat cancer Maternal Grandfather     Cancer Maternal Grandfather     Diabetes Neg Hx     Stroke Neg Hx          Medications have been verified.        Objective   /71   Pulse 73   Temp 98.5 °F (36.9 °C)   Resp 18   SpO2 97%        Physical Exam     Physical Exam  Vitals and nursing note reviewed.   Constitutional:       General: She is not in acute distress.     Appearance: Normal appearance. She is not ill-appearing.   HENT:      Right Ear: Tympanic membrane, ear canal and external ear normal.      Left Ear: Tympanic membrane, ear canal and external ear normal.      Nose: Congestion and rhinorrhea present.      Right Sinus: Maxillary sinus tenderness and frontal sinus tenderness present.      Left Sinus: Maxillary sinus tenderness and frontal sinus tenderness present.      Mouth/Throat:      Mouth: Mucous membranes are moist.      Pharynx: Posterior oropharyngeal erythema present. No oropharyngeal exudate.   Eyes:      General:          Right eye: No discharge.         Left eye: No discharge.      Extraocular Movements: Extraocular movements intact.      Conjunctiva/sclera: Conjunctivae normal.      Pupils: Pupils are equal, round, and reactive to light.   Cardiovascular:      Rate and Rhythm: Normal rate and regular rhythm.      Pulses: Normal pulses.      Heart sounds: Normal heart sounds.   Pulmonary:      Effort: Pulmonary effort is normal. No respiratory distress.      Breath sounds: Normal breath sounds. No wheezing, rhonchi or rales.   Abdominal:      General: Abdomen is flat. Bowel sounds are normal. There is no distension.      Palpations: Abdomen is soft.      Tenderness: There is no abdominal tenderness. There is no guarding.   Musculoskeletal:      Cervical back: Normal range of motion and neck supple.   Lymphadenopathy:      Cervical: No cervical adenopathy.   Skin:     General: Skin is warm and dry.   Neurological:      Mental Status: She is alert.

## 2024-06-22 NOTE — PATIENT INSTRUCTIONS
Start azithromycin. Take two tablets today and then one a day until gone. Take with food.  Use tylenol and motrin for symptom management.  Increase fluids  Follow up with PCP in 3-5 days.  Proceed to  ER if symptoms worsen.

## 2024-06-24 ENCOUNTER — CLINICAL SUPPORT (OUTPATIENT)
Dept: BARIATRICS | Facility: CLINIC | Age: 79
End: 2024-06-24

## 2024-06-24 VITALS — BODY MASS INDEX: 33.14 KG/M2 | WEIGHT: 168.8 LBS | HEIGHT: 60 IN

## 2024-06-24 DIAGNOSIS — R63.5 ABNORMAL WEIGHT GAIN: Primary | ICD-10-CM

## 2024-06-24 DIAGNOSIS — F32.A DEPRESSION, UNSPECIFIED DEPRESSION TYPE: ICD-10-CM

## 2024-06-24 DIAGNOSIS — F51.01 PRIMARY INSOMNIA: ICD-10-CM

## 2024-06-24 PROCEDURE — RECHECK

## 2024-06-24 RX ORDER — TRAZODONE HYDROCHLORIDE 50 MG/1
100 TABLET ORAL
Qty: 60 TABLET | Refills: 5 | Status: SHIPPED | OUTPATIENT
Start: 2024-06-24

## 2024-06-24 RX ORDER — SERTRALINE HYDROCHLORIDE 100 MG/1
100 TABLET, FILM COATED ORAL 2 TIMES DAILY
Qty: 60 TABLET | Refills: 5 | Status: SHIPPED | OUTPATIENT
Start: 2024-06-24

## 2024-06-25 DIAGNOSIS — I10 ESSENTIAL HYPERTENSION: ICD-10-CM

## 2024-06-25 RX ORDER — AMLODIPINE BESYLATE 10 MG/1
10 TABLET ORAL DAILY
Qty: 90 TABLET | Refills: 1 | Status: SHIPPED | OUTPATIENT
Start: 2024-06-25

## 2024-06-27 ENCOUNTER — CLINICAL SUPPORT (OUTPATIENT)
Dept: BARIATRICS | Facility: CLINIC | Age: 79
End: 2024-06-27

## 2024-06-27 VITALS — WEIGHT: 170.6 LBS | HEIGHT: 60 IN | BODY MASS INDEX: 33.49 KG/M2

## 2024-06-27 DIAGNOSIS — R63.5 ABNORMAL WEIGHT GAIN: Primary | ICD-10-CM

## 2024-06-27 PROCEDURE — RECHECK

## 2024-06-28 ENCOUNTER — EVALUATION (OUTPATIENT)
Dept: PHYSICAL THERAPY | Facility: MEDICAL CENTER | Age: 79
End: 2024-06-28
Payer: MEDICARE

## 2024-06-28 ENCOUNTER — OFFICE VISIT (OUTPATIENT)
Dept: URGENT CARE | Facility: MEDICAL CENTER | Age: 79
End: 2024-06-28
Payer: MEDICARE

## 2024-06-28 VITALS
HEART RATE: 68 BPM | OXYGEN SATURATION: 99 % | SYSTOLIC BLOOD PRESSURE: 179 MMHG | TEMPERATURE: 97.2 F | RESPIRATION RATE: 18 BRPM | DIASTOLIC BLOOD PRESSURE: 74 MMHG

## 2024-06-28 DIAGNOSIS — M54.12 CERVICAL RADICULITIS: Primary | ICD-10-CM

## 2024-06-28 DIAGNOSIS — J01.41 ACUTE RECURRENT PANSINUSITIS: Primary | ICD-10-CM

## 2024-06-28 PROCEDURE — 97161 PT EVAL LOW COMPLEX 20 MIN: CPT | Performed by: PHYSICAL MEDICINE & REHABILITATION

## 2024-06-28 PROCEDURE — 97112 NEUROMUSCULAR REEDUCATION: CPT | Performed by: PHYSICAL MEDICINE & REHABILITATION

## 2024-06-28 PROCEDURE — 99213 OFFICE O/P EST LOW 20 MIN: CPT

## 2024-06-28 PROCEDURE — G0463 HOSPITAL OUTPT CLINIC VISIT: HCPCS

## 2024-06-28 RX ORDER — AMOXICILLIN AND CLAVULANATE POTASSIUM 875; 125 MG/1; MG/1
1 TABLET, FILM COATED ORAL EVERY 12 HOURS SCHEDULED
Qty: 14 TABLET | Refills: 0 | Status: SHIPPED | OUTPATIENT
Start: 2024-06-28 | End: 2024-07-05

## 2024-06-28 NOTE — PROGRESS NOTES
[facility]        NAME: Yokasta Cadena is a 79 y.o. female  : 1945    MRN: [id number]  DATE: 2024  TIME: 12:46 PM    Assessment and Plan   Acute recurrent pansinusitis [J01.41]  1. Acute recurrent pansinusitis  amoxicillin-clavulanate (AUGMENTIN) 875-125 mg per tablet    Ambulatory Referral to Otolaryngology            Patient Instructions     Start antibiotics as prescribed  Vitamin D3 2000 IU daily  Vitamin C 1000mg twice per day  Multivitamin daily  Fluids and rest  Over the counter cold medication as needed (EX: Coricidin HBP, tylenol/motrin)  Follow up with PCP in 3-5 days.  Proceed to ER if symptoms worsen.    Eat yogurt with live and active cultures and/or take a probiotic at least 3 hours before or after antibiotic dose. Monitor stool for diarrhea and/or blood. If this occurs, contact primary care doctor ASAP.    If tests are performed, our office will contact you with results only if changes need to made to the care plan discussed with you at the visit. [facility]    Chief Complaint     Chief Complaint   Patient presents with    Cough     Patient c/o nasal congestion, cough, nausea, body aches, and nausea. She notes she was seen in office on 2024 completed a z-pack without improvement.          History of Present Illness       Patient is an 78 yo female with significant PMH of HTN and asthma presenting in the clinic today for cold sx x 1 week. Admits body aches, cough, sinus pain/pressure, headache, congestion, and nausea. Patient was seen in the clinic on 2024, diagnosed with sinusitis, and treated with azithromycin. Denies fever, chills, body aches, fatigue, sore throat, ear pain, dizziness, chest pain, SOB, abdominal pain, n/v/d. Admits the use of azithromycin, Advil, and Flonase for sx management. Denies recent sick contacts.        Review of Systems   Review of Systems   Constitutional:  Negative for chills, fatigue and  fever.   HENT:  Positive for sinus pressure and sinus pain. Negative for congestion, ear pain, postnasal drip, rhinorrhea and sore throat.    Eyes:  Negative for visual disturbance.   Respiratory:  Positive for cough. Negative for shortness of breath.    Cardiovascular:  Negative for chest pain.   Gastrointestinal:  Positive for nausea. Negative for abdominal pain, diarrhea and vomiting.   Musculoskeletal:  Negative for myalgias.   Skin:  Negative for rash.   Neurological:  Positive for headaches. Negative for dizziness.         Current Medications       Current Outpatient Medications:     amoxicillin-clavulanate (AUGMENTIN) 875-125 mg per tablet, Take 1 tablet by mouth every 12 (twelve) hours for 7 days, Disp: 14 tablet, Rfl: 0    albuterol (Ventolin HFA) 90 mcg/act inhaler, Inhale 2 puffs every 6 (six) hours as needed for wheezing (Patient taking differently: Inhale 2 puffs as needed for wheezing), Disp: 18 g, Rfl: 0    amLODIPine (NORVASC) 10 mg tablet, TAKE 1 TABLET BY MOUTH EVERY DAY, Disp: 90 tablet, Rfl: 1    Ascorbic Acid (VITAMIN C PO), Vitamin C, Disp: , Rfl:     atorvastatin (LIPITOR) 20 mg tablet, Take 1 tablet (20 mg total) by mouth daily, Disp: 90 tablet, Rfl: 1    buPROPion (WELLBUTRIN XL) 150 mg 24 hr tablet, TAKE 1 TABLET BY MOUTH EVERY DAY IN THE MORNING, Disp: 30 tablet, Rfl: 5    Cholecalciferol (Vitamin D3) 1.25 MG (27674 UT) CAPS, Take 5,000 Units by mouth daily, Disp: , Rfl:     Cyanocobalamin (VITAMIN B-12 PO), , Disp: , Rfl:     Diclofenac Sodium (VOLTAREN) 1 %, Apply 2 g topically 4 (four) times a day for 15 days (Patient taking differently: Apply 2 g topically as needed), Disp: 120 g, Rfl: 0    fluticasone (FLONASE) 50 mcg/act nasal spray, 1 spray into each nostril daily (Patient taking differently: 1 spray into each nostril as needed), Disp: 9.9 mL, Rfl: 0    fluticasone (FLOVENT HFA) 110 MCG/ACT inhaler, Inhale 2 puffs 2 (two) times a day Rinse mouth after use. (Patient taking  differently: Inhale 2 puffs as needed Rinse mouth after use.), Disp: 12 g, Rfl: 1    Ivermectin 1 % CREA, Apply topically in the morning, Disp: 45 g, Rfl: 1    levothyroxine 137 mcg tablet, TAKE 1 TABLET BY MOUTH EVERY DAY, Disp: 90 tablet, Rfl: 1    lisinopril (ZESTRIL) 40 mg tablet, TAKE 1 TABLET BY MOUTH EVERY DAY, Disp: 90 tablet, Rfl: 1    meclizine (ANTIVERT) 25 mg tablet, Take 1 tablet (25 mg total) by mouth every 8 (eight) hours as needed for dizziness for up to 10 days, Disp: 30 tablet, Rfl: 0    Multiple Vitamin (MULTIVITAMIN ADULT PO), multivitamin, Disp: , Rfl:     multivitamin (THERAGRAN) TABS, Take 1 tablet by mouth daily One a day, Disp: , Rfl:     naltrexone (REVIA) 50 mg tablet, Take 1/2 tablet by mouth daily in the morning for 2 weeks, then take 1/2 tablet twice a day., Disp: 30 tablet, Rfl: 3    pantoprazole (PROTONIX) 40 mg tablet, TAKE 1 TABLET BY MOUTH TWICE A DAY, Disp: 180 tablet, Rfl: 1    sertraline (ZOLOFT) 100 mg tablet, TAKE 1 TABLET BY MOUTH TWICE A DAY, Disp: 60 tablet, Rfl: 5    traZODone (DESYREL) 50 mg tablet, TAKE 2 TABLETS (100 MG TOTAL) BY MOUTH DAILY AT BEDTIME, Disp: 60 tablet, Rfl: 5    Current Allergies     Allergies as of 06/28/2024 - Reviewed 06/28/2024   Allergen Reaction Noted    Ceftin [cefuroxime] Hives 11/27/2018            The following portions of the patient's history were reviewed and updated as appropriate: allergies, current medications, past family history, past medical history, past social history, past surgical history and problem list.     Past Medical History:   Diagnosis Date    Acid reflux     Anxiety     Arthritis     Asthma     C1-C4 level with central cord syndrome (HCC)     Resolved 2/1/2017     Carpal tunnel syndrome Resolved 4/21/2015    Colitis     Colon polyp     Concussion     Depressed     Dysthymic disorder     Resolved 1/5/2018    Gastric ulcer 2013    small - on EGD - [facility]    Hemorrhoids     Hx of blood clots     Hx of colonic polyps      [facility]    Hyperlipemia     Hypertension     Hypothyroid     Lumbar spondylosis 04/25/2024    Lung nodule     stable x 2 yrs on CT    Migraines     Obesity     Postural dizziness with presyncope     Resolved 8/28/2018     Tendinitis     Ulnar neuropathy     Resolved 5/21/2015     Vertigo        Past Surgical History:   Procedure Laterality Date    APPENDECTOMY      BREAST SURGERY Left 01/1966    BREAST LUMPECTOMY    CARPAL TUNNEL RELEASE      CHOLECYSTECTOMY      DILATION AND CURETTAGE OF UTERUS      ELBOW SURGERY      EYE SURGERY Bilateral 2019    Cataract     GALLBLADDER SURGERY      JOINT REPLACEMENT Right     REPLACEMENT TOTAL KNEE    KNEE ARTHROSCOPY      KNEE SURGERY Right     NECK SURGERY      ORTHOPEDIC SURGERY      POSTERIOR LAMINECTOMY / DECOMPRESSION CERVICAL SPINE  02/2015    REPLACEMENT TOTAL KNEE Left 2022    TONSILLECTOMY      TOTAL KNEE ARTHROPLASTY Left 04/06/2022    [facility] Dr. Diaz    VAGINAL DELIVERY      x3    WRIST SURGERY Right        Family History   Problem Relation Age of Onset    Breast cancer Mother     Alzheimer's disease Mother     Coronary artery disease Mother     Hypertension Mother     Migraines Mother     Peripheral vascular disease Mother     Arthritis Mother     Cancer Mother     Parkinsonism Father     Other Father         Cardiovascular disease     Coronary artery disease Father     Hypertension Father     Bipolar disorder Sister     Thyroid disease Sister     Alzheimer's disease Sister     Depression Sister     Asthma Daughter     Asthma Son     Throat cancer Maternal Grandfather     Cancer Maternal Grandfather     Diabetes Neg Hx     Stroke Neg Hx          Medications have been verified.        Objective   BP (!) 179/74   Pulse 68   Temp (!) 97.2 °F (36.2 °C)   Resp 18   SpO2 99%        Physical Exam     Physical Exam  Vitals reviewed.   Constitutional:       General: She is not in acute distress.     Appearance: Normal appearance. She is normal weight. She is not  ill-appearing.   HENT:      Head: Normocephalic.      Right Ear: Tympanic membrane, ear canal and external ear normal. No middle ear effusion. There is no impacted cerumen. Tympanic membrane is not erythematous or bulging.      Left Ear: Tympanic membrane, ear canal and external ear normal.  No middle ear effusion. There is no impacted cerumen. Tympanic membrane is not erythematous or bulging.      Nose: Congestion present. No rhinorrhea.      Right Sinus: Maxillary sinus tenderness and frontal sinus tenderness present.      Left Sinus: Maxillary sinus tenderness and frontal sinus tenderness present.      Mouth/Throat:      Lips: Pink.      Mouth: Mucous membranes are moist.      Pharynx: Oropharynx is clear. Uvula midline. No pharyngeal swelling, oropharyngeal exudate, posterior oropharyngeal erythema or uvula swelling.   Eyes:      General:         Right eye: No discharge.         Left eye: No discharge.      Conjunctiva/sclera: Conjunctivae normal.   Cardiovascular:      Rate and Rhythm: Normal rate and regular rhythm.      Pulses: Normal pulses.      Heart sounds: Normal heart sounds. No murmur heard.     No friction rub. No gallop.   Pulmonary:      Effort: Pulmonary effort is normal.      Breath sounds: Normal breath sounds. No wheezing, rhonchi or rales.   Musculoskeletal:      Cervical back: Normal range of motion and neck supple. No tenderness.   Lymphadenopathy:      Cervical: No cervical adenopathy.   Skin:     General: Skin is warm.      Findings: No rash.   Neurological:      Mental Status: She is alert.   Psychiatric:         Mood and Affect: Mood normal.         Behavior: Behavior normal.

## 2024-06-28 NOTE — PROGRESS NOTES
PT Evaluation     Today's date: 2024  Patient name: Yokasta Cadena  : 1945  MRN: [id number]  Referring provider: Clay Saunders MD  Dx:   Encounter Diagnosis     ICD-10-CM    1. Cervical radiculitis  M54.12                      Assessment  Impairments: abnormal coordination, abnormal muscle firing, abnormal muscle tone, abnormal or restricted ROM, abnormal movement, activity intolerance, difficulty understanding, impaired physical strength, lacks appropriate home exercise program and pain with function    Assessment details: Yokasta Cadena is a pleasant 79 y.o. female who presents with chronic cervical pain.  The patient's greatest concerns are worry over not knowing what's wrong and fear of not being able to keep active.      No further referral appears necessary at this time based upon examination results.    Primary movement impairment diagnosis of poor cervicothoracic movement coordination resulting in pathoanatomical symptoms of Cervical radiculitis  (primary encounter diagnosis) and limiting her ability to care for self, exercise or recreation, look up, and turn to look over shoulder.    Impairments include:  1) poor cervicothoracic movement coordination - addressing with neuromotor retraining   2) central sensitization - addressing with extensive counseling regarding pain neuroscience, diagnosis, prognosis, care options, and care planning  3) UE neural tension - addressing with mobs and mobility exercises   4) poor postural control - addressing with postural retraining    Etiologic factors include none recalled by the patient.    Understanding of Dx/Px/POC: fair     Prognosis: good    Goals  Patient will be independent with home exercise program.   Patient will be able to manage symptoms independently.   Patient will be able to turn to look over a shoulder to back out of a parking space without limitation due to pain.   Patient will be able to look up without limitation due to pain.   Patient  will be able to look up to change a bulb overhead without limitation due to pain.     Plan  Patient would benefit from: skilled physical therapy  Planned modality interventions: thermotherapy: hydrocollator packs    Planned therapy interventions: activity modification, joint mobilization, manual therapy, motor coordination training, neuromuscular re-education, patient education, self care, therapeutic activities, therapeutic exercise, graded activity, home exercise program, behavior modification, IADL retraining and postural training    Treatment plan discussed with: patient  Plan details: Prognosis above is given PT services 2x/week tapering to 2x/month over the next 3 months and home program adherence.        Subjective Evaluation    History of Present Illness  Mechanism of injury: Work/School: retired  Home Life: ADLs, cooking, cleaning, yardwork  Hobbies/exercise: walking  Pain location: bilateral shoulder, down left arm,   HPI: Patient reports in  she had a cervical fusion, recently she started having pain again including left arm radiculopathy  Aggravating factors: looking up, doing puzzles  Relieving factors: rest  Patient Goals  Patient goals for therapy: increased motion, decreased pain and increased strength    Pain  Current pain ratin  At best pain rating: 3  At worst pain ratin          Objective     Static Posture     Comments  Cervical ROM:  Ext: 90% limited, increase jabbing in left arm  Flex: 25% limited, painful, slight stretch  RROT: 50% limited, painful on right side  LROT: 50% limited, painful on left side  RSB: 75% limited, stretch  LSB: 75% limited, pain to shoulder blades    Repeated Motions:  Cervical retraction: decreased pain, increased ROM               Precautions: none      Manuals 2024                                                                Neuro Re-Ed                                                                                           HEP and Return Demo  10'            Ther Ex                                                                                                                     Ther Activity                                       Gait Training                                       Modalities

## 2024-07-02 ENCOUNTER — OFFICE VISIT (OUTPATIENT)
Dept: PHYSICAL THERAPY | Facility: MEDICAL CENTER | Age: 79
End: 2024-07-02
Payer: MEDICARE

## 2024-07-02 DIAGNOSIS — M54.12 CERVICAL RADICULITIS: Primary | ICD-10-CM

## 2024-07-02 PROCEDURE — 97110 THERAPEUTIC EXERCISES: CPT | Performed by: PHYSICAL MEDICINE & REHABILITATION

## 2024-07-02 PROCEDURE — 97140 MANUAL THERAPY 1/> REGIONS: CPT | Performed by: PHYSICAL MEDICINE & REHABILITATION

## 2024-07-02 NOTE — PROGRESS NOTES
"Daily Note     Today's date: 2024  Patient name: Yokasta Cadena  : 1945  MRN: [id number]  Referring provider: Clay Saunders MD  Dx:   Encounter Diagnosis     ICD-10-CM    1. Cervical radiculitis  M54.12                      Subjective: Yokasta reported \"I am doing okay, the exercises are going okay.\"      Objective: See treatment diary below      Assessment: Tolerated treatment well. Patient would benefit from continued PT. Following thoracic extension and exercise she reported a decrease in pain.       Plan: Continue per plan of care.      Precautions: none      Manuals 2024                                                                Neuro Re-Ed             Scap Retraction 3\" 3x10                                                                             HEP and Return Demo 10'            Ther Ex             Thoracic ext 3\" 3x10            Cervical Retraction 3\" 3x10            UT Stretch 4x30\"                                                                             Ther Activity                                       Gait Training                                       Modalities                                            "

## 2024-07-08 ENCOUNTER — OFFICE VISIT (OUTPATIENT)
Dept: PHYSICAL THERAPY | Facility: MEDICAL CENTER | Age: 79
End: 2024-07-08
Payer: MEDICARE

## 2024-07-08 DIAGNOSIS — M54.12 CERVICAL RADICULITIS: Primary | ICD-10-CM

## 2024-07-08 PROCEDURE — 97110 THERAPEUTIC EXERCISES: CPT | Performed by: PHYSICAL MEDICINE & REHABILITATION

## 2024-07-08 PROCEDURE — 97140 MANUAL THERAPY 1/> REGIONS: CPT | Performed by: PHYSICAL MEDICINE & REHABILITATION

## 2024-07-08 NOTE — PROGRESS NOTES
"Daily Note     Today's date: 2024  Patient name: Yokasta Cadena  : 1945  MRN: [id number]  Referring provider: Clay Saunders MD  Dx:   Encounter Diagnosis     ICD-10-CM    1. Cervical radiculitis  M54.12                      Subjective: Yokasta reported \"I am feeling much better.\"      Objective: See treatment diary below      Assessment: Tolerated treatment well. Patient would benefit from continued PT. Due to her subjective report her therapy program was not changed during today's session.       Plan: Continue per plan of care.      Precautions: none      Manuals 2024            IASTM                                                    Neuro Re-Ed             Scap Retraction 3\" 3x10                                                                             HEP and Return Demo 10'            Ther Ex             Thoracic ext 3\" 3x10            Cervical Retraction 3\" 3x10            UT Stretch 4x30\"                                                                             Ther Activity                                       Gait Training                                       Modalities                                            "

## 2024-07-10 ENCOUNTER — OFFICE VISIT (OUTPATIENT)
Dept: PHYSICAL THERAPY | Facility: MEDICAL CENTER | Age: 79
End: 2024-07-10
Payer: MEDICARE

## 2024-07-10 DIAGNOSIS — M54.12 CERVICAL RADICULITIS: Primary | ICD-10-CM

## 2024-07-10 PROCEDURE — 97110 THERAPEUTIC EXERCISES: CPT | Performed by: PHYSICAL MEDICINE & REHABILITATION

## 2024-07-10 PROCEDURE — 97140 MANUAL THERAPY 1/> REGIONS: CPT | Performed by: PHYSICAL MEDICINE & REHABILITATION

## 2024-07-10 NOTE — PROGRESS NOTES
"Daily Note     Today's date: 7/10/2024  Patient name: Yokasat Cadena  : 1945  MRN: [id number]  Referring provider: Clay Saunders MD  Dx:   Encounter Diagnosis     ICD-10-CM    1. Cervical radiculitis  M54.12                      Subjective: Yokasta reported \"I am feeling much better overall, but I want to continue to make progress.\"      Objective: See treatment diary below      Assessment: Tolerated treatment well. Patient would benefit from continued PT. Yokasta was able to progress her therapy program as seen below. This shows progress towards her goals.      Plan: Continue per plan of care.      Precautions: none      Manuals 7/10/2024            IASTM                                                    Neuro Re-Ed             Scap Retraction 3\" 3x10            Standing Rows [place] 3\" 3x10                                                                HEP and Return Demo 10'            Ther Ex             Thoracic ext 3\" 3x10            Cervical Retraction 3\" 3x10            UT Stretch 4x30\"                                                                             Ther Activity                                       Gait Training                                       Modalities                                              "

## 2024-07-11 ENCOUNTER — CLINICAL SUPPORT (OUTPATIENT)
Dept: BARIATRICS | Facility: CLINIC | Age: 79
End: 2024-07-11

## 2024-07-11 VITALS — BODY MASS INDEX: 34.24 KG/M2 | HEIGHT: 60 IN | WEIGHT: 174.4 LBS

## 2024-07-11 DIAGNOSIS — R63.5 ABNORMAL WEIGHT GAIN: Primary | ICD-10-CM

## 2024-07-11 PROCEDURE — RECHECK

## 2024-07-15 ENCOUNTER — OFFICE VISIT (OUTPATIENT)
Dept: PHYSICAL THERAPY | Facility: MEDICAL CENTER | Age: 79
End: 2024-07-15
Payer: MEDICARE

## 2024-07-15 DIAGNOSIS — M54.12 CERVICAL RADICULITIS: Primary | ICD-10-CM

## 2024-07-15 PROCEDURE — 97140 MANUAL THERAPY 1/> REGIONS: CPT | Performed by: PHYSICAL MEDICINE & REHABILITATION

## 2024-07-15 PROCEDURE — 97110 THERAPEUTIC EXERCISES: CPT | Performed by: PHYSICAL MEDICINE & REHABILITATION

## 2024-07-15 NOTE — PROGRESS NOTES
Weight Management Medical Nutrition Assessment  Yokasta presented for a follow-up session with the [facility].  Today's weight is   171.1   lbs.  She has gained 2.3 lbs  x 1 month with overall loss of  12.9  lbs since June 2022. She is not feeling good this week and reports decreased water intake since last Thursday. Discussed how hydration can inhibit weight loss. Overall she is consuming 6662-9966 calories and is aware that she should not decrease calories any further than this. We discussed realistic expectations for weight maintenance. At this time she will transition to biweekly [facility].      Patient seen by Medical Provider in past 6 months:  yes  Requested to schedule appointment with Medical Provider: No      Anthropometric Measurements  Start Weight (#):  184 # ( 6/15/22 -MD)   205# (4/22) home scale   Current Weight (#): 171.1 #  TBW % Change from start weight: 7.0%   Ideal Body Weight (#): 100#  Goal Weight (#):166 lbs     Lowest: 145#     Weight Loss History  Previous weight loss attempts: Counseling with  MD  Meal Replacements (Medifast, Slim Fast, etc.)  Nutrition Counseling with RD     Food and Nutrition Related History  Wake up: 6-7  Bed Time:10-11     Food Recall   [id number]:00: 1 Toast (light bread) w/1 tbsp pb, sometimes yogurt   Coffee - creamer (35cal) x2  Snack:skip    Lunch 12:00: cheese, pepperoni s/w, fruit OR greek yogurt, apple OR tuna or egg salad, veggies  Snack: skip   Dinner:4-5:00:  lean protein/~1/2 cup veggie, ~1/2 cup carb  OR salad, chicken, 1/4 cup mac/cheese OR 1 cup shrimp scampi OR pork chop, large veggies   Snack: 5 saltine crackers, >1 tbsp pb OR apple      Beverages: water and coffee/tea  Volume of beverage intake: ~40 oz water, 2 cups coffee     Weekends: Same  Cravings: sweets or CHO  Trouble area of day:after dinner>>>>better     Frequency of Eating out: irregularly  Food restrictions:none  Lives with her sister  Cooking: self and sister  Food  Shopping: self     Physical Activity Intake  Activity: Walking    Frequency: as able    Physical limitations/barriers to exercise:hx vertigo, foot injury     Estimated Needs  Energy  Juliano James Energy Needs: BMR : 1149 calories            0.5# loss weekly lightly active:1129  calories; 0.5 lb wk lightly active: 1330  Maintenance calories for sedentary  level: 1379 calories   Protein:70-85   (1.2-1.5g/kg IBW); 1.0g/kg IBW= 58  Fluid: 58-68oz     (30-35mL/kg IBW)     Nutrition Diagnosis  Yes;    Overweight/obesity  related to Excess energy intake as evidenced by  BMI more than normative standard for age and sex (obesity-grade I 32.6)     Nutrition Intervention     Nutrition Prescription  Calories:1000 on sedentary days and flex to 1200 calories on walk days.  Protein:70-80g  Fluid:65oz     Meal Plan (Riky/Pro/Carb)  Breakfast:200/26-27  Snack:-  Lunch:200/26-27  Snack:100-150/5-10  Dinner:200-300/15-20  Snack:<200/0-5     Nutrition Education:    Healthy Core Manual  Calorie controlled menu  Lean protein food choices  Healthy snack options  Food journaling tips        Nutrition Counseling:  Strategies: meal planning, portion sizes, healthy snack choices, hydration, fiber intake, protein intake, exercise, food journal        Monitoring and Evaluation:  Evaluation criteria:  Energy Intake  Meet protein needs  Maintain adequate hydration  Monitor weekly weight  Meal planning/preparation  Food journal   Decreased portions at mealtimes and snacks  Physical activity      Barriers to learning:none  Readiness to change: maintenance    Comprehension: very good  Expected Compliance: good

## 2024-07-16 NOTE — PROGRESS NOTES
"Daily Note     Today's date: 7/15/2024  Patient name: Yokasta Cadena  : 1945  MRN: [id number]  Referring provider: Clay Saunders MD  Dx:   Encounter Diagnosis     ICD-10-CM    1. Cervical radiculitis  M54.12                      Subjective: Surinder reported \"I am doing better, but I still have some pain.\"      Objective: See treatment diary below      Assessment: Tolerated treatment well. Patient would benefit from continued PT. Yokasta was able to progress her therapy program as seen below. This shows progress towards her goals.      Plan: Continue per plan of care.      Precautions: none      Manuals 7/15/2024            IASTM JR                                                   Neuro Re-Ed             Scap Retraction 3\" 3x10            Standing Rows [place] 3\" 3x10            No Monies Yellow 3\" 3x10                                                   HEP and Return Demo 10'            Ther Ex             Thoracic ext 3\" 3x10            Cervical Retraction 3\" 3x10            UT Stretch 4x30\"                                                                             Ther Activity                                       Gait Training                                       Modalities                                                "

## 2024-07-17 ENCOUNTER — OFFICE VISIT (OUTPATIENT)
Dept: PHYSICAL THERAPY | Facility: MEDICAL CENTER | Age: 79
End: 2024-07-17
Payer: MEDICARE

## 2024-07-17 DIAGNOSIS — M54.12 CERVICAL RADICULITIS: Primary | ICD-10-CM

## 2024-07-17 PROCEDURE — 97140 MANUAL THERAPY 1/> REGIONS: CPT | Performed by: PHYSICAL MEDICINE & REHABILITATION

## 2024-07-17 PROCEDURE — 97110 THERAPEUTIC EXERCISES: CPT | Performed by: PHYSICAL MEDICINE & REHABILITATION

## 2024-07-18 ENCOUNTER — CLINICAL SUPPORT (OUTPATIENT)
Dept: BARIATRICS | Facility: CLINIC | Age: 79
End: 2024-07-18

## 2024-07-18 VITALS — HEIGHT: 60 IN | BODY MASS INDEX: 33.38 KG/M2 | WEIGHT: 170 LBS

## 2024-07-18 DIAGNOSIS — R63.5 ABNORMAL WEIGHT GAIN: Primary | ICD-10-CM

## 2024-07-18 PROCEDURE — RECHECK

## 2024-07-18 NOTE — PROGRESS NOTES
"Daily Note     Today's date: 2024  Patient name: Yokasta Cadena  : 1945  MRN: [id number]  Referring provider: Clay Saunders MD  Dx:   Encounter Diagnosis     ICD-10-CM    1. Cervical radiculitis  M54.12                      Subjective: Yokasta reported \"I am doing well, getting better, still some tightness though.\"      Objective: See treatment diary below      Assessment: Tolerated treatment well. Patient would benefit from continued PT. Yokasta continues to make progress towards her goals. Reported notable soreness along her erector spinae.       Plan: Continue per plan of care.      Precautions: none      Manuals 7/15/2024            IASTM JR                                                   Neuro Re-Ed             Scap Retraction 3\" 3x10            Standing Rows [place] 3\" 3x10            No Monies Yellow 3\" 3x10                                                   HEP and Return Demo 10'            Ther Ex             Thoracic ext 3\" 3x10            Cervical Retraction 3\" 3x10            UT Stretch 4x30\"                                                                             Ther Activity                                       Gait Training                                       Modalities                                            "

## 2024-07-22 ENCOUNTER — OFFICE VISIT (OUTPATIENT)
Dept: PHYSICAL THERAPY | Facility: MEDICAL CENTER | Age: 79
End: 2024-07-22
Payer: MEDICARE

## 2024-07-22 ENCOUNTER — CLINICAL SUPPORT (OUTPATIENT)
Dept: BARIATRICS | Facility: CLINIC | Age: 79
End: 2024-07-22

## 2024-07-22 VITALS — HEIGHT: 63 IN | BODY MASS INDEX: 30.32 KG/M2 | WEIGHT: 171.1 LBS

## 2024-07-22 DIAGNOSIS — M54.12 CERVICAL RADICULITIS: Primary | ICD-10-CM

## 2024-07-22 DIAGNOSIS — R63.5 ABNORMAL WEIGHT GAIN: Primary | ICD-10-CM

## 2024-07-22 PROCEDURE — 97140 MANUAL THERAPY 1/> REGIONS: CPT | Performed by: PHYSICAL MEDICINE & REHABILITATION

## 2024-07-22 PROCEDURE — RECHECK

## 2024-07-22 PROCEDURE — 97110 THERAPEUTIC EXERCISES: CPT | Performed by: PHYSICAL MEDICINE & REHABILITATION

## 2024-07-22 NOTE — PROGRESS NOTES
"Daily Note     Today's date: 2024  Patient name: Yokasta Cadena  : 1945  MRN: [id number]  Referring provider: Clay Saunders MD  Dx:   Encounter Diagnosis     ICD-10-CM    1. Cervical radiculitis  M54.12                      Subjective: Yokasta reported \"I am feeling good overall, but a little achy this morning.\"      Objective: See treatment diary below      Assessment: Tolerated treatment well. Patient would benefit from continued PT. Yokasta needed verbal and tactile cueing for proper exercise form, she also demonstrated not being able to do standing rows secondary to balance limitations.      Plan: Continue per plan of care.      Precautions: none      Manuals 2024            IASTM JR                                                   Neuro Re-Ed             Scap Retraction 3\" 3x10            Standing Rows [place] 3\" 3x10            No Monies Yellow 3\" 3x10                                                   HEP and Return Demo 10'            Ther Ex             Thoracic ext 3\" 3x10            Cervical Retraction 3\" 3x10            UT Stretch 4x30\"                                                                             Ther Activity                                       Gait Training                                       Modalities                                              "

## 2024-07-25 ENCOUNTER — OFFICE VISIT (OUTPATIENT)
Dept: PHYSICAL THERAPY | Facility: MEDICAL CENTER | Age: 79
End: 2024-07-25
Payer: MEDICARE

## 2024-07-25 ENCOUNTER — CLINICAL SUPPORT (OUTPATIENT)
Dept: BARIATRICS | Facility: CLINIC | Age: 79
End: 2024-07-25

## 2024-07-25 VITALS — BODY MASS INDEX: 33.65 KG/M2 | HEIGHT: 60 IN | WEIGHT: 171.4 LBS

## 2024-07-25 DIAGNOSIS — M54.12 CERVICAL RADICULITIS: Primary | ICD-10-CM

## 2024-07-25 DIAGNOSIS — R63.5 ABNORMAL WEIGHT GAIN: Primary | ICD-10-CM

## 2024-07-25 PROCEDURE — 97110 THERAPEUTIC EXERCISES: CPT | Performed by: PHYSICAL MEDICINE & REHABILITATION

## 2024-07-25 PROCEDURE — 97140 MANUAL THERAPY 1/> REGIONS: CPT | Performed by: PHYSICAL MEDICINE & REHABILITATION

## 2024-07-25 PROCEDURE — RECHECK

## 2024-07-25 NOTE — PROGRESS NOTES
"Daily Note     Today's date: 2024  Patient name: Yokasta Cadena  : 1945  MRN: [id number]  Referring provider: Clay Saunders MD  Dx:   Encounter Diagnosis     ICD-10-CM    1. Cervical radiculitis  M54.12                      Subjective: Yokasta reported \"I am doing well, I think I a getting better.\"      Objective: See treatment diary below      Assessment: Tolerated treatment well. Patient would benefit from continued PT. Yokasta needed less verbal cueing compared to previous sessions which shows progress towards her goals.       Plan: Continue per plan of care.      Precautions: none      Manuals 2024            IASTM JR                                                   Neuro Re-Ed             Scap Retraction 3\" 3x10            Standing Rows [place] 3\" 3x10            No Monies Yellow 3\" 3x10                                                   HEP and Return Demo 10'            Ther Ex             Thoracic ext 3\" 3x10            Cervical Retraction 3\" 3x10            UT Stretch 4x30\"                                                                             Ther Activity                                       Gait Training                                       Modalities                                                "

## 2024-07-29 ENCOUNTER — OFFICE VISIT (OUTPATIENT)
Dept: PHYSICAL THERAPY | Facility: MEDICAL CENTER | Age: 79
End: 2024-07-29
Payer: MEDICARE

## 2024-07-29 DIAGNOSIS — M54.12 CERVICAL RADICULITIS: Primary | ICD-10-CM

## 2024-07-29 PROCEDURE — 97110 THERAPEUTIC EXERCISES: CPT | Performed by: PHYSICAL MEDICINE & REHABILITATION

## 2024-07-29 PROCEDURE — 97140 MANUAL THERAPY 1/> REGIONS: CPT | Performed by: PHYSICAL MEDICINE & REHABILITATION

## 2024-07-29 NOTE — PROGRESS NOTES
"Daily Note     Today's date: 2024  Patient name: Yokasta Cadena  : 1945  MRN: [id number]  Referring provider: Clay Saunders MD  Dx:   Encounter Diagnosis     ICD-10-CM    1. Cervical radiculitis  M54.12                      Subjective: Yokasta reported \"I am feeling better and more loose.\"      Objective: See treatment diary below      Assessment: Tolerated treatment well. Patient would benefit from continued PT. Yokasta was able to add thoracic rotations to her therapy program which shows progress towards her goals.      Plan: Continue per plan of care.      Precautions: none      Manuals 2024            IASTM JR                                                   Neuro Re-Ed             Scap Retraction 3\" 3x10            Standing Rows [place] 3\" 3x10            No Monies Yellow 3\" 3x10                                                   HEP and Return Demo 10'            Ther Ex             Thoracic ext 3\" 3x10            Cervical Retraction 3\" 3x10            UT Stretch 4x30\"            Standing Thoracic Rotations  3\" 2x10                                                                Ther Activity                                       Gait Training                                       Modalities                                            "

## 2024-07-31 ENCOUNTER — OFFICE VISIT (OUTPATIENT)
Dept: PHYSICAL THERAPY | Facility: MEDICAL CENTER | Age: 79
End: 2024-07-31
Payer: MEDICARE

## 2024-07-31 DIAGNOSIS — M54.12 CERVICAL RADICULITIS: Primary | ICD-10-CM

## 2024-07-31 PROCEDURE — 97110 THERAPEUTIC EXERCISES: CPT | Performed by: PHYSICAL MEDICINE & REHABILITATION

## 2024-07-31 PROCEDURE — 97140 MANUAL THERAPY 1/> REGIONS: CPT | Performed by: PHYSICAL MEDICINE & REHABILITATION

## 2024-08-01 NOTE — PROGRESS NOTES
"Daily Note     Today's date: 2024  Patient name: Yokasta Cadena  : 1945  MRN: [id number]  Referring provider: Clay Saunders MD  Dx:   Encounter Diagnosis     ICD-10-CM    1. Cervical radiculitis  M54.12                      Subjective: Yokasta reported \"I am doing well, I feel much better after being here, like an ostrich because I can stand up with my neck straight instead of down.\"      Objective: See treatment diary below      Assessment: Tolerated treatment well. Patient would benefit from continued PT. Yokasta's report of decrease in pain shows progress towards her goals.      Plan: Continue per plan of care.      Precautions: none      Manuals 2024            IASTM JR                                                   Neuro Re-Ed             Scap Retraction 3\" 3x10            Standing Rows [place] 3\" 3x10            No Monies Yellow 3\" 3x10                                                   HEP and Return Demo 10'            Ther Ex             Thoracic ext 3\" 3x10            Cervical Retraction 3\" 3x10            UT Stretch 4x30\"            Standing Thoracic Rotations  3\" 2x10                                                                Ther Activity                                       Gait Training                                       Modalities                                              "

## 2024-08-15 ENCOUNTER — CLINICAL SUPPORT (OUTPATIENT)
Dept: BARIATRICS | Facility: CLINIC | Age: 79
End: 2024-08-15

## 2024-08-15 DIAGNOSIS — R63.5 ABNORMAL WEIGHT GAIN: Primary | ICD-10-CM

## 2024-08-15 PROCEDURE — RECHECK

## 2024-08-19 NOTE — PROGRESS NOTES
Weight Management Medical Nutrition Assessment  Yokasta presented for a follow-up session with the [facility].  Today's weight is    171   lbs.  She has maintained her weight  x 1 month with overall loss of  13   lbs since June 2022. Feels she has not been as hungry lately. Trying to be mindful. Her hydration has improved slightly. Not able to walk as much as she would like due to pain. She is happy with where she currently is at and will continue to f/u in [facility] biweekly.        Patient seen by Medical Provider in past 6 months:  yes  Requested to schedule appointment with Medical Provider: No      Anthropometric Measurements  Start Weight (#):  184 # ( 6/15/22 -MD)   205# (4/22) home scale   Current Weight (#): 171#  TBW % Change from start weight: 7.0%   Ideal Body Weight (#): 100#  Goal Weight (#):166 lbs     Lowest: 145#     Weight Loss History  Previous weight loss attempts: Counseling with  MD  Meal Replacements (Medifast, Slim Fast, etc.)  Nutrition Counseling with KAYCE     Food and Nutrition Related History  Wake up: 6-7  Bed Time:10-11     Food Recall   Breakfast 7:00: 2 Toast (light bread) w/1 tbsp pb, sometimes yogurt OR yogurt, apple OR egg cup 200 jagdeep  Coffee - creamer (35cal) x2  Snack:skip    Lunch 12:00: salad w/walnuts, chicken, cheese OR 1/4 hoagie w/chips  Snack: 5 or 10 crackers  Dinner:4-5:00:  lean protein/~1/2 cup veggie, ~1/2 cup carb  OR salad, chicken, 1/4 cup mac/cheese OR 1 cup shrimp scampi OR pork chop, large veggies OR fish, 1/2 bag of frozen potatoe/veggies or lisbeth's pasta  Snack: coffee OR crackers      Beverages: water and coffee/tea  Volume of beverage intake: ~45 oz water, 2 cups coffee     Weekends: Same  Cravings: sweets or CHO  Trouble area of day:after dinner>>>>better     Frequency of Eating out: irregularly  Food restrictions:none  Lives with her sister  Cooking: self and sister  Food Shopping: self     Physical Activity Intake  Activity: Walking     Frequency: as able    Physical limitations/barriers to exercise:hx vertigo, foot injury     Estimated Needs  Energy  Juliano James Energy Needs: BMR : 1149 calories            0.5# loss weekly lightly active:1129  calories; 0.5 lb wk lightly active: 1330  Maintenance calories for sedentary  level: 1379 calories   Protein:70-85   (1.2-1.5g/kg IBW); 1.0g/kg IBW= 58  Fluid: 58-68oz     (30-35mL/kg IBW)     Nutrition Diagnosis  Yes;    Overweight/obesity  related to Excess energy intake as evidenced by  BMI more than normative standard for age and sex (obesity-grade I 32.6)     Nutrition Intervention     Nutrition Prescription  Calories:1000 on sedentary days and flex to 1200 calories on walk days.  Protein:70-80g  Fluid:65oz     Meal Plan (Riky/Pro/Carb)  Breakfast:200/26-27  Snack:-  Lunch:200/26-27  Snack:100-150/5-10  Dinner:200-300/15-20  Snack:<200/0-5     Nutrition Education:    Healthy Core Manual  Calorie controlled menu  Lean protein food choices  Healthy snack options  Food journaling tips        Nutrition Counseling:  Strategies: meal planning, portion sizes, healthy snack choices, hydration, fiber intake, protein intake, exercise, food journal        Monitoring and Evaluation:  Evaluation criteria:  Energy Intake  Meet protein needs  Maintain adequate hydration  Monitor weekly weight  Meal planning/preparation  Food journal   Decreased portions at mealtimes and snacks  Physical activity      Barriers to learning:none  Readiness to change: maintenance    Comprehension: very good  Expected Compliance: good

## 2024-08-22 ENCOUNTER — HOSPITAL ENCOUNTER (OUTPATIENT)
Dept: RADIOLOGY | Facility: HOSPITAL | Age: 79
Discharge: HOME | End: 2024-08-22
Attending: SPECIALIST
Payer: MEDICARE

## 2024-08-22 DIAGNOSIS — J01.41 ACUTE RECURRENT PANSINUSITIS: ICD-10-CM

## 2024-08-22 PROCEDURE — 70486 CT MAXILLOFACIAL W/O DYE: CPT

## 2024-08-23 DIAGNOSIS — R10.13 EPIGASTRIC PAIN: ICD-10-CM

## 2024-08-23 RX ORDER — PANTOPRAZOLE SODIUM 40 MG/1
TABLET, DELAYED RELEASE ORAL
Qty: 180 TABLET | Refills: 1 | Status: SHIPPED | OUTPATIENT
Start: 2024-08-23

## 2024-08-28 ENCOUNTER — CLINICAL SUPPORT (OUTPATIENT)
Dept: BARIATRICS | Facility: CLINIC | Age: 79
End: 2024-08-28

## 2024-08-28 VITALS — HEIGHT: 60 IN | WEIGHT: 171 LBS | BODY MASS INDEX: 33.57 KG/M2

## 2024-08-28 DIAGNOSIS — R63.5 ABNORMAL WEIGHT GAIN: Primary | ICD-10-CM

## 2024-08-28 PROCEDURE — RECHECK

## 2024-09-02 DIAGNOSIS — F32.A DEPRESSION, UNSPECIFIED DEPRESSION TYPE: ICD-10-CM

## 2024-09-03 RX ORDER — BUPROPION HYDROCHLORIDE 150 MG/1
150 TABLET ORAL EVERY MORNING
Qty: 90 TABLET | Refills: 1 | Status: SHIPPED | OUTPATIENT
Start: 2024-09-03 | End: 2024-09-10 | Stop reason: SDUPTHER

## 2024-09-06 ENCOUNTER — HOSPITAL ENCOUNTER (OUTPATIENT)
Dept: RADIOLOGY | Facility: HOSPITAL | Age: 79
Discharge: HOME | End: 2024-09-06
Payer: MEDICARE

## 2024-09-06 DIAGNOSIS — J32.9 SINUSITIS, UNSPECIFIED CHRONICITY, UNSPECIFIED LOCATION: ICD-10-CM

## 2024-09-06 DIAGNOSIS — J45.40 MODERATE PERSISTENT ASTHMA WITHOUT COMPLICATION: ICD-10-CM

## 2024-09-06 PROCEDURE — 71046 X-RAY EXAM CHEST 2 VIEWS: CPT

## 2024-09-07 ENCOUNTER — APPOINTMENT (OUTPATIENT)
Dept: LAB | Facility: MEDICAL CENTER | Age: 79
End: 2024-09-07
Payer: MEDICARE

## 2024-09-07 DIAGNOSIS — J45.40 MODERATE PERSISTENT ASTHMA WITHOUT COMPLICATION: ICD-10-CM

## 2024-09-07 DIAGNOSIS — J32.9 SINUSITIS, UNSPECIFIED CHRONICITY, UNSPECIFIED LOCATION: ICD-10-CM

## 2024-09-09 ENCOUNTER — APPOINTMENT (OUTPATIENT)
Dept: LAB | Facility: MEDICAL CENTER | Age: 79
End: 2024-09-09
Payer: MEDICARE

## 2024-09-09 LAB
ALBUMIN SERPL BCG-MCNC: 4.1 G/DL (ref 3.5–5)
ALP SERPL-CCNC: 77 U/L (ref 34–104)
ALT SERPL W P-5'-P-CCNC: 13 U/L (ref 7–52)
ANION GAP SERPL CALCULATED.3IONS-SCNC: 8 MMOL/L (ref 4–13)
AST SERPL W P-5'-P-CCNC: 15 U/L (ref 13–39)
BASOPHILS # BLD AUTO: 0.06 THOUSANDS/ÂΜL (ref 0–0.1)
BASOPHILS NFR BLD AUTO: 1 % (ref 0–1)
BILIRUB SERPL-MCNC: 0.32 MG/DL (ref 0.2–1)
BUN SERPL-MCNC: 27 MG/DL (ref 5–25)
CALCIUM SERPL-MCNC: 9.2 MG/DL (ref 8.4–10.2)
CHLORIDE SERPL-SCNC: 108 MMOL/L (ref 96–108)
CO2 SERPL-SCNC: 26 MMOL/L (ref 21–32)
CREAT SERPL-MCNC: 0.94 MG/DL (ref 0.6–1.3)
EOSINOPHIL # BLD AUTO: 0.29 THOUSAND/ÂΜL (ref 0–0.61)
EOSINOPHIL NFR BLD AUTO: 5 % (ref 0–6)
ERYTHROCYTE [DISTWIDTH] IN BLOOD BY AUTOMATED COUNT: 14.1 % (ref 11.6–15.1)
GFR SERPL CREATININE-BSD FRML MDRD: 57 ML/MIN/1.73SQ M
GLUCOSE P FAST SERPL-MCNC: 100 MG/DL (ref 65–99)
HCT VFR BLD AUTO: 33.1 % (ref 34.8–46.1)
HGB BLD-MCNC: 10.5 G/DL (ref 11.5–15.4)
IGA SERPL-MCNC: 245 MG/DL (ref 66–433)
IGG SERPL-MCNC: 765 MG/DL (ref 635–1741)
IGM SERPL-MCNC: 53 MG/DL (ref 45–281)
IMM GRANULOCYTES # BLD AUTO: 0.03 THOUSAND/UL (ref 0–0.2)
IMM GRANULOCYTES NFR BLD AUTO: 1 % (ref 0–2)
LYMPHOCYTES # BLD AUTO: 1.34 THOUSANDS/ÂΜL (ref 0.6–4.47)
LYMPHOCYTES NFR BLD AUTO: 22 % (ref 14–44)
MCH RBC QN AUTO: 27.9 PG (ref 26.8–34.3)
MCHC RBC AUTO-ENTMCNC: 31.7 G/DL (ref 31.4–37.4)
MCV RBC AUTO: 88 FL (ref 82–98)
MONOCYTES # BLD AUTO: 0.44 THOUSAND/ÂΜL (ref 0.17–1.22)
MONOCYTES NFR BLD AUTO: 7 % (ref 4–12)
NEUTROPHILS # BLD AUTO: 3.82 THOUSANDS/ÂΜL (ref 1.85–7.62)
NEUTS SEG NFR BLD AUTO: 64 % (ref 43–75)
NRBC BLD AUTO-RTO: 0 /100 WBCS
PLATELET # BLD AUTO: 247 THOUSANDS/UL (ref 149–390)
PMV BLD AUTO: 10 FL (ref 8.9–12.7)
POTASSIUM SERPL-SCNC: 4 MMOL/L (ref 3.5–5.3)
PROT SERPL-MCNC: 6.6 G/DL (ref 6.4–8.4)
RBC # BLD AUTO: 3.76 MILLION/UL (ref 3.81–5.12)
SODIUM SERPL-SCNC: 142 MMOL/L (ref 135–147)
WBC # BLD AUTO: 5.98 THOUSAND/UL (ref 4.31–10.16)

## 2024-09-09 PROCEDURE — 86162 COMPLEMENT TOTAL (CH50): CPT

## 2024-09-09 PROCEDURE — 82785 ASSAY OF IGE: CPT

## 2024-09-09 PROCEDURE — 85025 COMPLETE CBC W/AUTO DIFF WBC: CPT

## 2024-09-09 PROCEDURE — 82784 ASSAY IGA/IGD/IGG/IGM EACH: CPT

## 2024-09-09 PROCEDURE — 80053 COMPREHEN METABOLIC PANEL: CPT

## 2024-09-09 PROCEDURE — 36415 COLL VENOUS BLD VENIPUNCTURE: CPT

## 2024-09-09 PROCEDURE — 86317 IMMUNOASSAY INFECTIOUS AGENT: CPT

## 2024-09-10 LAB — CH50 SERPL-ACNC: 56 U/ML

## 2024-09-10 RX ORDER — BUPROPION HYDROCHLORIDE 150 MG/1
150 TABLET ORAL EVERY MORNING
Qty: 90 TABLET | Refills: 1 | Status: SHIPPED | OUTPATIENT
Start: 2024-09-10

## 2024-09-11 LAB
C TETANI IGG SER IA-ACNC: 1.36 IU/ML
TOTAL IGE SMQN RAST: 37.3 KU/L (ref 0–113)

## 2024-09-13 DIAGNOSIS — F32.A DEPRESSION, UNSPECIFIED DEPRESSION TYPE: ICD-10-CM

## 2024-09-13 DIAGNOSIS — F51.01 PRIMARY INSOMNIA: ICD-10-CM

## 2024-09-13 RX ORDER — TRAZODONE HYDROCHLORIDE 50 MG/1
100 TABLET, FILM COATED ORAL
Qty: 180 TABLET | Refills: 2 | Status: SHIPPED | OUTPATIENT
Start: 2024-09-13

## 2024-09-13 RX ORDER — SERTRALINE HYDROCHLORIDE 100 MG/1
100 TABLET, FILM COATED ORAL 2 TIMES DAILY
Qty: 180 TABLET | Refills: 2 | Status: SHIPPED | OUTPATIENT
Start: 2024-09-13

## 2024-09-14 LAB
DEPRECATED S PNEUM14 IGG SER-MCNC: 3.9 UG/ML
DEPRECATED S PNEUM19 IGG SER-MCNC: 0.5 UG/ML
DEPRECATED S PNEUM23 IGG SER-MCNC: 5.4 UG/ML
DEPRECATED S PNEUM3 IGG SER-MCNC: 0.9 UG/ML
DEPRECATED S PNEUM4 IGG SER-MCNC: 0.1 UG/ML
DEPRECATED S PNEUM8 AB SER-MCNC: 1.2 UG/ML
DEPRECATED S PNEUM9 IGG SER-MCNC: 0.3 UG/ML
FLUBV RNA SPEC QL NAA+PROBE: <0.1 UG/ML
S PNEUM DA 18C IGG SER-MCNC: 0.2 UG/ML
S PNEUM DA 19A IGG SER-MCNC: 0.7 UG/ML
S PNEUM DA 6B IGG SER-MCNC: <0.1 UG/ML
SL AMB PNEUMO AB TYPE 17 (17F): <0.1 UG/ML
SL AMB PNEUMO AB TYPE 20: 2.1 UG/ML
SL AMB PNEUMO AB TYPE 22 (22F): <0.1 UG/ML
SL AMB PNEUMO AB TYPE 2: <0.2 UG/ML
SL AMB PNEUMO AB TYPE 34 (10A): <0.1 UG/ML
SL AMB PNEUMO AB TYPE 43 (11A): 0.1 UG/ML
SL AMB PNEUMO AB TYPE 54 (15B): 3.5 UG/ML
SL AMB PNEUMO AB TYPE 5: 0.1 UG/ML
SL AMB PNEUMO AB TYPE 70 (33F): 1 UG/ML
STREP PNEUMO TYPE 1: 0.2 UG/ML
STREP PNEUMO TYPE 51: 3.6 UG/ML
STREP PNEUMO TYPE 68: 0.9 UG/ML

## 2024-09-16 NOTE — PROGRESS NOTES
Weight Management Medical Nutrition Assessment  Yokasta presented for a follow-up session with the [facility].  Today's weight is   172.4    lbs.  She has  gained 1.4 lbs  x just under 1 month with overall loss of  11.6   lbs since June 2022. She has been struggling due to her sister not feeling well. Reports her water intake had been better but of late she has been drinking only ~10 oz per day. Reinforced importance of adequate hydration and how this may help with weight loss. She will transition out of [facility] into just check ins with me.      Patient seen by Medical Provider in past 6 months:  yes  Requested to schedule appointment with Medical Provider: No      Anthropometric Measurements  Start Weight (#):  184 # ( 6/15/22 -MD)   205# (4/22) home scale   Current Weight (#): 172.4#  TBW % Change from start weight: 6.3%   Ideal Body Weight (#): 100#  Goal Weight (#):166 lbs     Lowest: 145#     Weight Loss History  Previous weight loss attempts: Counseling with  MD  Meal Replacements (Medifast, Slim Fast, etc.)  Nutrition Counseling with RD     Food and Nutrition Related History  Wake up: 6-7  Bed Time:10-11     Food Recall   Breakfast 7:00: 2 Toast (light bread) w/1 tbsp pb, sometimes yogurt OR yogurt, apple OR egg cup 200 jagdeep  Coffee - creamer (35cal) x2  Snack:skip    Lunch 12:00: salad w/walnuts, chicken, cheese OR 1/4 hoagie w/chips  Snack: 5 or 10 crackers  Dinner:4-5:00:  lean protein/~1/2 cup veggie, ~1/2 cup carb  OR salad, chicken, 1/4 cup mac/cheese OR 1 cup shrimp scampi OR pork chop, large veggies OR fish, 1/2 bag of frozen potatoe/veggies or lisbeth's pasta  Snack: coffee OR crackers      Beverages: water and coffee/tea  Volume of beverage intake: ~10 oz water, 2 cups coffee     Weekends: Same  Cravings: sweets or CHO  Trouble area of day:after dinner>>>>better     Frequency of Eating out: irregularly  Food restrictions:none  Lives with her sister  Cooking: self and sister  Food  Shopping: self     Physical Activity Intake  Activity: Walking    Frequency: as able    Physical limitations/barriers to exercise:hx vertigo, foot injury     Estimated Needs  Energy  Juliano James Energy Needs: BMR : 1149 calories            0.5# loss weekly lightly active:1129  calories; 0.5 lb wk lightly active: 1330  Maintenance calories for sedentary  level: 1379 calories   Protein:70-85   (1.2-1.5g/kg IBW); 1.0g/kg IBW= 58  Fluid: 58-68oz     (30-35mL/kg IBW)     Nutrition Diagnosis  Yes;    Overweight/obesity  related to Excess energy intake as evidenced by  BMI more than normative standard for age and sex (obesity-grade I 32.6)     Nutrition Intervention     Nutrition Prescription  Calories:1000 on sedentary days and flex to 1200 calories on walk days.  Protein:70-80g  Fluid:65oz     Meal Plan (Riky/Pro/Carb)  Breakfast:200/26-27  Snack:-  Lunch:200/26-27  Snack:100-150/5-10  Dinner:200-300/15-20  Snack:<200/0-5     Nutrition Education:    Healthy Core Manual  Calorie controlled menu  Lean protein food choices  Healthy snack options  Food journaling tips        Nutrition Counseling:  Strategies: meal planning, portion sizes, healthy snack choices, hydration, fiber intake, protein intake, exercise, food journal        Monitoring and Evaluation:  Evaluation criteria:  Energy Intake  Meet protein needs  Maintain adequate hydration  Monitor weekly weight  Meal planning/preparation  Food journal   Decreased portions at mealtimes and snacks  Physical activity      Barriers to learning:none  Readiness to change: relapse   Comprehension: very good  Expected Compliance: good

## 2024-09-18 DIAGNOSIS — J01.00 ACUTE MAXILLARY SINUSITIS, RECURRENCE NOT SPECIFIED: ICD-10-CM

## 2024-09-18 DIAGNOSIS — J40 BRONCHITIS: ICD-10-CM

## 2024-09-18 RX ORDER — FLUTICASONE PROPIONATE 50 MCG
1 SPRAY, SUSPENSION (ML) NASAL DAILY
Qty: 9.9 ML | Refills: 1 | Status: SHIPPED | OUTPATIENT
Start: 2024-09-18

## 2024-09-18 NOTE — TELEPHONE ENCOUNTER
Patient states that she does 2 sprays in each nostril daily.        Reason for call:   [] Refill   [] Prior Auth  [] Other: Prescribed by another doctor.     Office:   [] PCP/Provider - Lisbeth Vallejo/[facility]   [] Specialty/Provider -     Medication: Flonase    Dose/Frequency: 50 mcg\/act nasal spray    Quantity: 9.9 ml    Pharmacy: [facility] [address]    Does the patient have enough for 3 days?   [] Yes   [x] No - Send as HP to POD

## 2024-09-25 ENCOUNTER — CLINICAL SUPPORT (OUTPATIENT)
Dept: BARIATRICS | Facility: CLINIC | Age: 79
End: 2024-09-25

## 2024-09-25 VITALS — HEIGHT: 60 IN | WEIGHT: 172.4 LBS | BODY MASS INDEX: 33.85 KG/M2

## 2024-09-25 DIAGNOSIS — R63.5 ABNORMAL WEIGHT GAIN: Primary | ICD-10-CM

## 2024-09-25 PROCEDURE — RECHECK

## 2024-09-26 ENCOUNTER — TELEPHONE (OUTPATIENT)
Dept: FAMILY MEDICINE CLINIC | Facility: CLINIC | Age: 79
End: 2024-09-26

## 2024-09-26 DIAGNOSIS — I10 ESSENTIAL HYPERTENSION: Primary | ICD-10-CM

## 2024-09-26 RX ORDER — LOSARTAN POTASSIUM 100 MG/1
100 TABLET ORAL DAILY
Qty: 30 TABLET | Refills: 1 | Status: SHIPPED | OUTPATIENT
Start: 2024-09-26

## 2024-09-26 NOTE — TELEPHONE ENCOUNTER
Dr. Cash (Allergist) called to ask if we could change patient medication to a non ace inhibitor as this may be causing her cough.  [phone].  They don't need a call back they were just asking.

## 2024-09-26 NOTE — TELEPHONE ENCOUNTER
Fine with me.  Call dahiana, and we'll stop her lisinopril and start losartan 100mg daily.  Then please bring her in for a visit in 1 mo for f/u bp

## 2024-09-27 NOTE — TELEPHONE ENCOUNTER
Spoke to patient and instructed her to D/C lisinopril and to start taking losartan 100 mg daily. Pt verbalized understanding and I informed her that the new Rx was sent to her pharmacy yesterday. I scheduled pt for a F/U appointment as requested by PCP on 10/30/24. Pt had no further questions or concerns at this time.

## 2024-10-02 ENCOUNTER — LAB (OUTPATIENT)
Dept: LAB | Facility: MEDICAL CENTER | Age: 79
End: 2024-10-02
Payer: MEDICARE

## 2024-10-02 DIAGNOSIS — E78.5 HYPERLIPIDEMIA, UNSPECIFIED HYPERLIPIDEMIA TYPE: ICD-10-CM

## 2024-10-02 DIAGNOSIS — Z28.39 PERSONAL HISTORY OF UNDERIMMUNIZATION STATUS: ICD-10-CM

## 2024-10-02 DIAGNOSIS — J45.40 MODERATE PERSISTENT ASTHMA WITHOUT COMPLICATION: ICD-10-CM

## 2024-10-02 PROCEDURE — 86317 IMMUNOASSAY INFECTIOUS AGENT: CPT

## 2024-10-02 PROCEDURE — 36415 COLL VENOUS BLD VENIPUNCTURE: CPT

## 2024-10-02 RX ORDER — ATORVASTATIN CALCIUM 20 MG/1
20 TABLET, FILM COATED ORAL DAILY
Qty: 90 TABLET | Refills: 1 | Status: SHIPPED | OUTPATIENT
Start: 2024-10-02

## 2024-10-05 LAB
DEPRECATED S PNEUM14 IGG SER-MCNC: 3.7 UG/ML
DEPRECATED S PNEUM19 IGG SER-MCNC: 0.4 UG/ML
DEPRECATED S PNEUM23 IGG SER-MCNC: 6 UG/ML
DEPRECATED S PNEUM3 IGG SER-MCNC: 1.3 UG/ML
DEPRECATED S PNEUM4 IGG SER-MCNC: 0.1 UG/ML
DEPRECATED S PNEUM8 AB SER-MCNC: 1.8 UG/ML
DEPRECATED S PNEUM9 IGG SER-MCNC: 0.4 UG/ML
FLUBV RNA SPEC QL NAA+PROBE: <0.1 UG/ML
S PNEUM DA 18C IGG SER-MCNC: 0.2 UG/ML
S PNEUM DA 19A IGG SER-MCNC: 0.5 UG/ML
S PNEUM DA 6B IGG SER-MCNC: <0.1 UG/ML
SL AMB PNEUMO AB TYPE 17 (17F): 0.1 UG/ML
SL AMB PNEUMO AB TYPE 20: 2.1 UG/ML
SL AMB PNEUMO AB TYPE 22 (22F): 0.1 UG/ML
SL AMB PNEUMO AB TYPE 2: 0.2 UG/ML
SL AMB PNEUMO AB TYPE 34 (10A): <0.1 UG/ML
SL AMB PNEUMO AB TYPE 43 (11A): 0.2 UG/ML
SL AMB PNEUMO AB TYPE 54 (15B): 3.7 UG/ML
SL AMB PNEUMO AB TYPE 5: 0.2 UG/ML
SL AMB PNEUMO AB TYPE 70 (33F): 1 UG/ML
STREP PNEUMO TYPE 1: 0.4 UG/ML
STREP PNEUMO TYPE 51: 4.3 UG/ML
STREP PNEUMO TYPE 68: 1 UG/ML

## 2024-10-07 NOTE — RESULT ENCOUNTER NOTE
She makes 6/23 protective titers to Pneumovax 23. Please call patient to come in for an appointment to discuss labs.

## 2024-10-22 ENCOUNTER — OFFICE VISIT (OUTPATIENT)
Dept: URGENT CARE | Facility: MEDICAL CENTER | Age: 79
End: 2024-10-22
Payer: MEDICARE

## 2024-10-22 VITALS
OXYGEN SATURATION: 97 % | SYSTOLIC BLOOD PRESSURE: 137 MMHG | RESPIRATION RATE: 18 BRPM | TEMPERATURE: 96.6 F | DIASTOLIC BLOOD PRESSURE: 66 MMHG | HEART RATE: 76 BPM

## 2024-10-22 DIAGNOSIS — J32.8 OTHER CHRONIC SINUSITIS: Primary | ICD-10-CM

## 2024-10-22 DIAGNOSIS — R11.0 NAUSEA: ICD-10-CM

## 2024-10-22 PROCEDURE — G0463 HOSPITAL OUTPT CLINIC VISIT: HCPCS | Performed by: NURSE PRACTITIONER

## 2024-10-22 PROCEDURE — 99213 OFFICE O/P EST LOW 20 MIN: CPT | Performed by: NURSE PRACTITIONER

## 2024-10-22 RX ORDER — ONDANSETRON 4 MG/1
4 TABLET, FILM COATED ORAL 3 TIMES DAILY PRN
Qty: 20 TABLET | Refills: 0 | Status: SHIPPED | OUTPATIENT
Start: 2024-10-22

## 2024-10-22 NOTE — PROGRESS NOTES
[facility]        NAME: Yokasta Cadena is a 79 y.o. female  : 1945    MRN: [id number]  DATE: 2024  TIME: 1:36 PM    Assessment and Plan   Other chronic sinusitis [J32.8]  1. Other chronic sinusitis        2. Nausea  ondansetron (ZOFRAN) 4 mg tablet            Patient Instructions       Nasal spray OTC  Cont with Flonase  Zofran for nausea   Follow up with PCP in 3-5 days.  Proceed to  ER if symptoms worsen.    If tests have been performed at [facility], our office will contact you with results if changes need to be made to the care plan discussed with you at the visit.  You can review your full results on [facility].    Chief Complaint     Chief Complaint   Patient presents with    Nasal Congestion     Patient c/o nasal congestion with pain and pressure x 4 days          History of Present Illness       HPI  Reports cold symptoms x 4 days. Symptoms include mily congestion, and sinus pressure. Chronic Hx of sinusitis. Has had CT of the head because of sinus problems. Using Flonase and Breo.       Review of Systems   Review of Systems   Constitutional:  Negative for fever.   HENT:  Positive for postnasal drip and sinus pressure. Negative for congestion, ear pain, sinus pain and sore throat.    Respiratory:  Negative for cough and wheezing.    Cardiovascular:  Negative for chest pain.   Gastrointestinal:  Positive for nausea.   Neurological:  Negative for headaches.         Current Medications       Current Outpatient Medications:     ondansetron (ZOFRAN) 4 mg tablet, Take 1 tablet (4 mg total) by mouth 3 (three) times a day as needed for nausea or vomiting, Disp: 20 tablet, Rfl: 0    albuterol (Ventolin HFA) 90 mcg/act inhaler, Inhale 2 puffs every 6 (six) hours as needed for wheezing (Patient taking differently: Inhale 2 puffs as needed for wheezing), Disp: 18 g, Rfl: 0    amLODIPine (NORVASC) 10 mg tablet, TAKE 1 TABLET BY MOUTH EVERY DAY, Disp: 90 tablet, Rfl: 1    Ascorbic Acid  (VITAMIN C PO), Vitamin C, Disp: , Rfl:     atorvastatin (LIPITOR) 20 mg tablet, TAKE 1 TABLET BY MOUTH EVERY DAY, Disp: 90 tablet, Rfl: 1    Breo Ellipta 100-25 MCG/ACT inhaler, Inhale 1 puff daily Rinse mouth after use., Disp: 60 blister, Rfl: 5    buPROPion (WELLBUTRIN XL) 150 mg 24 hr tablet, Take 1 tablet (150 mg total) by mouth every morning, Disp: 90 tablet, Rfl: 1    Cholecalciferol (Vitamin D3) 1.25 MG (77302 UT) CAPS, Take 5,000 Units by mouth daily, Disp: , Rfl:     Cyanocobalamin (VITAMIN B-12 PO), , Disp: , Rfl:     Diclofenac Sodium (VOLTAREN) 1 %, Apply 2 g topically 4 (four) times a day for 15 days (Patient taking differently: Apply 2 g topically as needed), Disp: 120 g, Rfl: 0    fluticasone (FLONASE) 50 mcg/act nasal spray, 1 spray into each nostril daily, Disp: 9.9 mL, Rfl: 1    Ivermectin 1 % CREA, Apply topically in the morning, Disp: 45 g, Rfl: 1    levothyroxine 137 mcg tablet, TAKE 1 TABLET BY MOUTH EVERY DAY, Disp: 90 tablet, Rfl: 1    losartan (COZAAR) 100 MG tablet, Take 1 tablet (100 mg total) by mouth daily, Disp: 30 tablet, Rfl: 1    meclizine (ANTIVERT) 25 mg tablet, Take 1 tablet (25 mg total) by mouth every 8 (eight) hours as needed for dizziness for up to 10 days, Disp: 30 tablet, Rfl: 0    Multiple Vitamin (MULTIVITAMIN ADULT PO), multivitamin, Disp: , Rfl:     multivitamin (THERAGRAN) TABS, Take 1 tablet by mouth daily One a day, Disp: , Rfl:     naltrexone (REVIA) 50 mg tablet, Take 1/2 tablet by mouth daily in the morning for 2 weeks, then take 1/2 tablet twice a day., Disp: 30 tablet, Rfl: 3    pantoprazole (PROTONIX) 40 mg tablet, TAKE 1 TABLET BY MOUTH TWICE A DAY, Disp: 180 tablet, Rfl: 1    sertraline (ZOLOFT) 100 mg tablet, TAKE 1 TABLET BY MOUTH TWICE A DAY, Disp: 180 tablet, Rfl: 2    traZODone (DESYREL) 50 mg tablet, TAKE 2 TABLETS (100 MG TOTAL) BY MOUTH DAILY AT BEDTIME, Disp: 180 tablet, Rfl: 2    Current Allergies     Allergies as of 10/22/2024 - Reviewed 10/22/2024    Allergen Reaction Noted    Ceftin [cefuroxime] Hives 11/27/2018            The following portions of the patient's history were reviewed and updated as appropriate: allergies, current medications, past family history, past medical history, past social history, past surgical history and problem list.     Past Medical History:   Diagnosis Date    Acid reflux     Anxiety     Arthritis     Asthma     C1-C4 level with central cord syndrome (HCC)     Resolved 2/1/2017     Carpal tunnel syndrome Resolved 4/21/2015    Colitis     Colon polyp     Concussion     Depressed     Dysthymic disorder     Resolved 1/5/2018    Gastric ulcer 2013    small - on EGD - [facility]    Hemorrhoids     Hx of blood clots     Hx of colonic polyps     [place]rick    Hyperlipemia     Hypertension     Hypothyroid     Lumbar spondylosis 04/25/2024    Lung nodule     stable x 2 yrs on CT    Migraines     Obesity     Postural dizziness with presyncope     Resolved 8/28/2018     Tendinitis     Ulnar neuropathy     Resolved 5/21/2015     Vertigo        Past Surgical History:   Procedure Laterality Date    APPENDECTOMY      BREAST SURGERY Left 01/1966    BREAST LUMPECTOMY    CARPAL TUNNEL RELEASE      CHOLECYSTECTOMY      DILATION AND CURETTAGE OF UTERUS      ELBOW SURGERY      EYE SURGERY Bilateral 2019    Cataract     GALLBLADDER SURGERY      JOINT REPLACEMENT Right     REPLACEMENT TOTAL KNEE    KNEE ARTHROSCOPY      KNEE SURGERY Right     NECK SURGERY      ORTHOPEDIC SURGERY      POSTERIOR LAMINECTOMY / DECOMPRESSION CERVICAL SPINE  02/2015    REPLACEMENT TOTAL KNEE Left 2022    TONSILLECTOMY      TOTAL KNEE ARTHROPLASTY Left 04/06/2022    [facility] Dr. Diaz    VAGINAL DELIVERY      x3    WRIST SURGERY Right        Family History   Problem Relation Age of Onset    Breast cancer Mother     Alzheimer's disease Mother     Coronary artery disease Mother     Hypertension Mother     Migraines Mother     Peripheral vascular disease Mother     Arthritis Mother      Cancer Mother     Parkinsonism Father     Other Father         Cardiovascular disease     Coronary artery disease Father     Hypertension Father     Bipolar disorder Sister     Thyroid disease Sister     Alzheimer's disease Sister     Depression Sister     Throat cancer Maternal Grandfather     Cancer Maternal Grandfather     Allergic rhinitis Daughter     Asthma Daughter     Asthma Son     Diabetes Neg Hx     Stroke Neg Hx          Medications have been verified.        Objective   /66   Pulse 76   Temp (!) 96.6 °F (35.9 °C)   Resp 18   SpO2 97%   No LMP recorded. Patient is postmenopausal.       Physical Exam     Physical Exam  Constitutional:       Appearance: She is not ill-appearing or diaphoretic.   HENT:      Head:      Comments: Slight discomfort with palpation of the paranasal sinuses     Right Ear: Tympanic membrane normal.      Left Ear: Tympanic membrane normal.      Nose: Rhinorrhea present.      Mouth/Throat:      Pharynx: No posterior oropharyngeal erythema.      Comments: Mild post nasal drip  Cardiovascular:      Rate and Rhythm: Regular rhythm.      Heart sounds: Normal heart sounds.   Pulmonary:      Effort: Pulmonary effort is normal.      Breath sounds: Normal breath sounds.   Lymphadenopathy:      Cervical: No cervical adenopathy.

## 2024-10-30 ENCOUNTER — OFFICE VISIT (OUTPATIENT)
Dept: FAMILY MEDICINE CLINIC | Facility: CLINIC | Age: 79
End: 2024-10-30
Payer: MEDICARE

## 2024-10-30 VITALS
TEMPERATURE: 98.7 F | DIASTOLIC BLOOD PRESSURE: 72 MMHG | RESPIRATION RATE: 16 BRPM | BODY MASS INDEX: 33.61 KG/M2 | HEART RATE: 70 BPM | HEIGHT: 61 IN | SYSTOLIC BLOOD PRESSURE: 138 MMHG | WEIGHT: 178 LBS | OXYGEN SATURATION: 97 %

## 2024-10-30 DIAGNOSIS — D64.9 ANEMIA, UNSPECIFIED TYPE: ICD-10-CM

## 2024-10-30 DIAGNOSIS — E78.2 MIXED HYPERLIPIDEMIA: ICD-10-CM

## 2024-10-30 DIAGNOSIS — Z12.31 OTHER SCREENING MAMMOGRAM: ICD-10-CM

## 2024-10-30 DIAGNOSIS — M54.6 CHRONIC THORACIC BACK PAIN, UNSPECIFIED BACK PAIN LATERALITY: ICD-10-CM

## 2024-10-30 DIAGNOSIS — J01.00 ACUTE MAXILLARY SINUSITIS, RECURRENCE NOT SPECIFIED: Primary | ICD-10-CM

## 2024-10-30 DIAGNOSIS — E03.9 HYPOTHYROIDISM, UNSPECIFIED TYPE: ICD-10-CM

## 2024-10-30 DIAGNOSIS — R73.01 IFG (IMPAIRED FASTING GLUCOSE): ICD-10-CM

## 2024-10-30 DIAGNOSIS — G89.29 CHRONIC THORACIC BACK PAIN, UNSPECIFIED BACK PAIN LATERALITY: ICD-10-CM

## 2024-10-30 DIAGNOSIS — I10 ESSENTIAL HYPERTENSION: ICD-10-CM

## 2024-10-30 DIAGNOSIS — Z79.899 OTHER LONG TERM (CURRENT) DRUG THERAPY: ICD-10-CM

## 2024-10-30 PROCEDURE — G2211 COMPLEX E/M VISIT ADD ON: HCPCS | Performed by: FAMILY MEDICINE

## 2024-10-30 PROCEDURE — 99214 OFFICE O/P EST MOD 30 MIN: CPT | Performed by: FAMILY MEDICINE

## 2024-10-30 RX ORDER — PREGABALIN 75 MG/1
75 CAPSULE ORAL 2 TIMES DAILY
Qty: 60 CAPSULE | Refills: 1 | Status: SHIPPED | OUTPATIENT
Start: 2024-10-30

## 2024-10-30 NOTE — PROGRESS NOTES
Ambulatory Visit  Name: Yokasta Cadena      : 1945      MRN: [id number]  Encounter Provider: Lisbeth Vallejo DO  Encounter Date: 10/30/2024   Encounter department: [facility]    Assessment & Plan  Acute maxillary sinusitis, recurrence not specified  Ongoing for 1 mo with worsening pain around eyes and in forehead  Augmentin BID  F/u with allergy/asthma  Orders:    amoxicillin-clavulanate (AUGMENTIN) 875-125 mg per tablet; Take 1 tablet by mouth every 12 (twelve) hours for 7 days    Chronic thoracic back pain, unspecified back pain laterality  This does appear more like a fibro process to me as well  Obtain records from rheum  Trial lyrica   F/u 1 mo  Orders:    pregabalin (LYRICA) 75 mg capsule; Take 1 capsule (75 mg total) by mouth 2 (two) times a day    Hypothyroidism, unspecified type  Check labs  Orders:    TSH, 3rd generation; Future    Essential hypertension  At goal on current meds  Continue same  Orders:    Lipid panel; Future    Mixed hyperlipidemia  Check labs  Orders:    Lipid panel; Future    IFG (impaired fasting glucose)  Check a1c  Orders:    Hemoglobin A1C; Future    Anemia, unspecified type  Check TSH, ferritin, b12  Orders:    Ferritin; Future    Vitamin B12; Future    Other long term (current) drug therapy    Orders:    Vitamin B12; Future    Other screening mammogram    Orders:    Mammo screening bilateral w 3d and cad; Future       History of Present Illness     HPI  Pt presents c/o multiple issues.  Seeing allergy/asthma.  Has had 1 mo congestion, eye pain, green d/c, forehead pain, fatigue.  No fevers.  Still has cough.  Changed lisinopril to losartan with no improvement    Pt has had more upper and lower back pain x 6 mo.  Has seen NS and pain medicine.  Thought was fibro.  Saw rheum who did testing, but I have no consult letter.  She states she was told there was no rheumatology issue.  She continues with back apin.     Pt due for labs.  Increased fatigue.  Mild  "anemia on recent labs from allergy/asthma      Review of Systems   Constitutional:  Positive for fatigue. Negative for chills, fever and unexpected weight change.   HENT:  Positive for congestion, postnasal drip, rhinorrhea, sinus pressure, sinus pain and sore throat. Negative for ear pain, hearing loss, trouble swallowing and voice change.    Eyes:  Negative for pain, redness and visual disturbance.   Respiratory:  Negative for cough and shortness of breath.    Cardiovascular:  Negative for chest pain, palpitations and leg swelling.   Gastrointestinal:  Negative for abdominal pain, constipation, diarrhea and nausea.   Endocrine: Negative for cold intolerance, heat intolerance, polydipsia and polyuria.   Genitourinary:  Negative for dysuria, frequency and urgency.   Musculoskeletal:  Positive for back pain and myalgias. Negative for arthralgias and joint swelling.   Skin:  Negative for rash.        No suspicious lesions   Neurological:  Negative for weakness, numbness and headaches.   Hematological:  Negative for adenopathy.           Objective     /72 (BP Location: Left arm, Patient Position: Sitting, Cuff Size: Standard)   Pulse 70   Temp 98.7 °F (37.1 °C) (Tympanic)   Resp 16   Ht 5' 1\" (1.549 m)   Wt 80.7 kg (178 lb)   SpO2 97%   BMI 33.63 kg/m²     Physical Exam  Constitutional:       Appearance: Normal appearance.   HENT:      Head: Normocephalic and atraumatic.      Right Ear: Tympanic membrane, ear canal and external ear normal.      Left Ear: Tympanic membrane, ear canal and external ear normal.      Nose: No congestion.      Right Turbinates: Swollen.      Left Turbinates: Swollen.      Right Sinus: Maxillary sinus tenderness and frontal sinus tenderness present.      Left Sinus: Maxillary sinus tenderness and frontal sinus tenderness present.      Mouth/Throat:      Mouth: Mucous membranes are moist.      Pharynx: No oropharyngeal exudate or posterior oropharyngeal erythema.   Eyes:      " Extraocular Movements: Extraocular movements intact.      Conjunctiva/sclera: Conjunctivae normal.      Pupils: Pupils are equal, round, and reactive to light.   Neck:      Vascular: No carotid bruit.   Cardiovascular:      Rate and Rhythm: Normal rate and regular rhythm.      Heart sounds: No murmur heard.     No friction rub. No gallop.   Pulmonary:      Effort: Pulmonary effort is normal.      Breath sounds: No wheezing, rhonchi or rales.   Abdominal:      General: Abdomen is flat. There is no distension.      Palpations: Abdomen is soft.      Tenderness: There is no abdominal tenderness.   Musculoskeletal:      Cervical back: Neck supple.      Comments: Multiple tenderpoints b/l back which are c/w fibro   Lymphadenopathy:      Cervical: No cervical adenopathy.   Neurological:      General: No focal deficit present.      Mental Status: She is alert and oriented to person, place, and time.      Cranial Nerves: No cranial nerve deficit.      Motor: No weakness.      Deep Tendon Reflexes: Reflexes normal.

## 2024-11-02 ENCOUNTER — APPOINTMENT (OUTPATIENT)
Dept: LAB | Facility: MEDICAL CENTER | Age: 79
End: 2024-11-02
Payer: MEDICARE

## 2024-11-02 DIAGNOSIS — E03.9 HYPOTHYROIDISM, UNSPECIFIED TYPE: ICD-10-CM

## 2024-11-02 DIAGNOSIS — I10 ESSENTIAL HYPERTENSION: ICD-10-CM

## 2024-11-02 DIAGNOSIS — D64.9 ANEMIA, UNSPECIFIED TYPE: ICD-10-CM

## 2024-11-02 DIAGNOSIS — R73.01 IFG (IMPAIRED FASTING GLUCOSE): ICD-10-CM

## 2024-11-02 DIAGNOSIS — Z79.899 OTHER LONG TERM (CURRENT) DRUG THERAPY: ICD-10-CM

## 2024-11-02 DIAGNOSIS — E78.2 MIXED HYPERLIPIDEMIA: ICD-10-CM

## 2024-11-02 LAB
CHOLEST SERPL-MCNC: 168 MG/DL
EST. AVERAGE GLUCOSE BLD GHB EST-MCNC: 117 MG/DL
FERRITIN SERPL-MCNC: 9 NG/ML (ref 11–307)
HBA1C MFR BLD: 5.7 %
HDLC SERPL-MCNC: 79 MG/DL
LDLC SERPL CALC-MCNC: 78 MG/DL (ref 0–100)
NONHDLC SERPL-MCNC: 89 MG/DL
TRIGL SERPL-MCNC: 57 MG/DL
TSH SERPL DL<=0.05 MIU/L-ACNC: 8.65 UIU/ML (ref 0.45–4.5)
VIT B12 SERPL-MCNC: 305 PG/ML (ref 180–914)

## 2024-11-02 PROCEDURE — 82728 ASSAY OF FERRITIN: CPT

## 2024-11-02 PROCEDURE — 84443 ASSAY THYROID STIM HORMONE: CPT

## 2024-11-02 PROCEDURE — 83036 HEMOGLOBIN GLYCOSYLATED A1C: CPT

## 2024-11-02 PROCEDURE — 36415 COLL VENOUS BLD VENIPUNCTURE: CPT

## 2024-11-02 PROCEDURE — 82607 VITAMIN B-12: CPT

## 2024-11-02 PROCEDURE — 80061 LIPID PANEL: CPT

## 2024-11-04 DIAGNOSIS — E03.9 HYPOTHYROIDISM, UNSPECIFIED TYPE: Primary | ICD-10-CM

## 2024-11-04 DIAGNOSIS — D64.9 ANEMIA, UNSPECIFIED TYPE: ICD-10-CM

## 2024-11-04 DIAGNOSIS — E53.8 B12 DEFICIENCY: ICD-10-CM

## 2024-11-04 DIAGNOSIS — D50.9 IRON DEFICIENCY ANEMIA, UNSPECIFIED IRON DEFICIENCY ANEMIA TYPE: ICD-10-CM

## 2024-11-04 RX ORDER — LEVOTHYROXINE SODIUM 150 UG/1
150 TABLET ORAL
Qty: 30 TABLET | Refills: 1 | Status: SHIPPED | OUTPATIENT
Start: 2024-11-04

## 2024-11-04 RX ORDER — FERROUS SULFATE 325(65) MG
325 TABLET ORAL
Qty: 30 TABLET | Refills: 3 | Status: SHIPPED | OUTPATIENT
Start: 2024-11-04

## 2024-11-06 ENCOUNTER — OFFICE VISIT (OUTPATIENT)
Dept: URGENT CARE | Facility: MEDICAL CENTER | Age: 79
End: 2024-11-06
Payer: MEDICARE

## 2024-11-06 ENCOUNTER — TELEPHONE (OUTPATIENT)
Dept: ADMINISTRATIVE | Facility: OTHER | Age: 79
End: 2024-11-06

## 2024-11-06 VITALS
OXYGEN SATURATION: 98 % | HEART RATE: 77 BPM | DIASTOLIC BLOOD PRESSURE: 62 MMHG | SYSTOLIC BLOOD PRESSURE: 142 MMHG | HEIGHT: 60 IN | WEIGHT: 178 LBS | BODY MASS INDEX: 34.95 KG/M2 | RESPIRATION RATE: 18 BRPM

## 2024-11-06 DIAGNOSIS — H53.9 VISUAL CHANGES: Primary | ICD-10-CM

## 2024-11-06 PROCEDURE — G0463 HOSPITAL OUTPT CLINIC VISIT: HCPCS | Performed by: NURSE PRACTITIONER

## 2024-11-06 PROCEDURE — 99213 OFFICE O/P EST LOW 20 MIN: CPT | Performed by: NURSE PRACTITIONER

## 2024-11-06 NOTE — PROGRESS NOTES
[facility]        NAME: Yokasta Cadena is a 79 y.o. female  : 1945    MRN: [id number]  DATE: 2024  TIME: 12:39 PM    Assessment and Plan   Visual changes [H53.9]  1. Visual changes          Patient in NAD and VSS upon exam. Discussed with patient eye exam WNL. Discussed low concern for corneal abrasion. Concern regarding vision field change, recommend follow up with eye specialist for further evaluation. Patient agreeable to plan of care.    Patient Instructions       Follow up with PCP in 3-5 days.  Proceed to  ER if symptoms worsen.    If tests have been performed at [facility], our office will contact you with results if changes need to be made to the care plan discussed with you at the visit.  You can review your full results on [facility].    Chief Complaint     Chief Complaint   Patient presents with    Eye Problem     Pt presents with right eye concern.  She states hx of dry eyes.  She complains of eye pressure and cloudiness of vision x 1 day.  She has tried dry eye drops with little change, thought states some improvement today.           History of Present Illness       Started: 1 day  Eye: right eye  Injury: none known  Symptoms: feels swollen, mild soreness to upper eye  Reports changes to vision, feels like there is a smudge in her vision field  Treatment: rewetting drops  Contacts: none  Reports hx of dry eye  Reports hx of cataract surgery to right eye        Review of Systems   Review of Systems   Eyes:  Positive for pain and visual disturbance. Negative for photophobia, discharge, redness and itching.   All other systems reviewed and are negative.        Current Medications       Current Outpatient Medications:     albuterol (Ventolin HFA) 90 mcg/act inhaler, Inhale 2 puffs every 6 (six) hours as needed for wheezing (Patient taking differently: Inhale 2 puffs as needed for wheezing), Disp: 18 g, Rfl: 0    amLODIPine (NORVASC) 10 mg tablet, TAKE 1 TABLET BY MOUTH  EVERY DAY, Disp: 90 tablet, Rfl: 1    amoxicillin-clavulanate (AUGMENTIN) 875-125 mg per tablet, Take 1 tablet by mouth every 12 (twelve) hours for 7 days, Disp: 14 tablet, Rfl: 0    Ascorbic Acid (VITAMIN C PO), Vitamin C, Disp: , Rfl:     atorvastatin (LIPITOR) 20 mg tablet, TAKE 1 TABLET BY MOUTH EVERY DAY, Disp: 90 tablet, Rfl: 1    buPROPion (WELLBUTRIN XL) 150 mg 24 hr tablet, Take 1 tablet (150 mg total) by mouth every morning, Disp: 90 tablet, Rfl: 1    Cholecalciferol (Vitamin D3) 1.25 MG (00154 UT) CAPS, Take 5,000 Units by mouth daily, Disp: , Rfl:     Cyanocobalamin (VITAMIN B-12 PO), , Disp: , Rfl:     Diclofenac Sodium (VOLTAREN) 1 %, Apply 2 g topically 4 (four) times a day for 15 days (Patient taking differently: Apply 2 g topically as needed), Disp: 120 g, Rfl: 0    ferrous sulfate 325 (65 Fe) mg tablet, Take 1 tablet (325 mg total) by mouth daily with breakfast, Disp: 30 tablet, Rfl: 3    fluticasone (FLONASE) 50 mcg/act nasal spray, 1 spray into each nostril daily, Disp: 9.9 mL, Rfl: 1    Ivermectin 1 % CREA, Apply topically in the morning, Disp: 45 g, Rfl: 1    levothyroxine 150 mcg tablet, Take 1 tablet (150 mcg total) by mouth daily in the early morning, Disp: 30 tablet, Rfl: 1    losartan (COZAAR) 100 MG tablet, Take 1 tablet (100 mg total) by mouth daily, Disp: 30 tablet, Rfl: 1    Multiple Vitamin (MULTIVITAMIN ADULT PO), multivitamin, Disp: , Rfl:     multivitamin (THERAGRAN) TABS, Take 1 tablet by mouth daily One a day, Disp: , Rfl:     naltrexone (REVIA) 50 mg tablet, Take 1/2 tablet by mouth daily in the morning for 2 weeks, then take 1/2 tablet twice a day., Disp: 30 tablet, Rfl: 3    ondansetron (ZOFRAN) 4 mg tablet, Take 1 tablet (4 mg total) by mouth 3 (three) times a day as needed for nausea or vomiting, Disp: 20 tablet, Rfl: 0    pantoprazole (PROTONIX) 40 mg tablet, TAKE 1 TABLET BY MOUTH TWICE A DAY, Disp: 180 tablet, Rfl: 1    pregabalin (LYRICA) 75 mg capsule, Take 1 capsule  (75 mg total) by mouth 2 (two) times a day, Disp: 60 capsule, Rfl: 1    sertraline (ZOLOFT) 100 mg tablet, TAKE 1 TABLET BY MOUTH TWICE A DAY, Disp: 180 tablet, Rfl: 2    traZODone (DESYREL) 50 mg tablet, TAKE 2 TABLETS (100 MG TOTAL) BY MOUTH DAILY AT BEDTIME, Disp: 180 tablet, Rfl: 2    Breo Ellipta 100-25 MCG/ACT inhaler, Inhale 1 puff daily Rinse mouth after use., Disp: 60 blister, Rfl: 5    meclizine (ANTIVERT) 25 mg tablet, Take 1 tablet (25 mg total) by mouth every 8 (eight) hours as needed for dizziness for up to 10 days, Disp: 30 tablet, Rfl: 0    Current Allergies     Allergies as of 11/06/2024 - Reviewed 11/06/2024   Allergen Reaction Noted    Ceftin [cefuroxime] Hives 11/27/2018            The following portions of the patient's history were reviewed and updated as appropriate: allergies, current medications, past family history, past medical history, past social history, past surgical history and problem list.     Past Medical History:   Diagnosis Date    Acid reflux     Anxiety     Arthritis     Asthma     C1-C4 level with central cord syndrome (HCC)     Resolved 2/1/2017     Carpal tunnel syndrome Resolved 4/21/2015    Colitis     Colon polyp     Concussion     Depressed     Dysthymic disorder     Resolved 1/5/2018    Gastric ulcer 2013    small - on EGD - [facility]    Hemorrhoids     Hx of blood clots     Hx of colonic polyps     [place]'Merrick    Hyperlipemia     Hypertension     Hypothyroid     Lumbar spondylosis 04/25/2024    Lung nodule     stable x 2 yrs on CT    Migraines     Obesity     Postural dizziness with presyncope     Resolved 8/28/2018     Tendinitis     Ulnar neuropathy     Resolved 5/21/2015     Vertigo        Past Surgical History:   Procedure Laterality Date    APPENDECTOMY      BREAST SURGERY Left 01/1966    BREAST LUMPECTOMY    CARPAL TUNNEL RELEASE      CHOLECYSTECTOMY      DILATION AND CURETTAGE OF UTERUS      ELBOW SURGERY      EYE SURGERY Bilateral 2019    Cataract     GALLBLADDER  SURGERY      JOINT REPLACEMENT Right     REPLACEMENT TOTAL KNEE    KNEE ARTHROSCOPY      KNEE SURGERY Right     NECK SURGERY      ORTHOPEDIC SURGERY      POSTERIOR LAMINECTOMY / DECOMPRESSION CERVICAL SPINE  02/2015    REPLACEMENT TOTAL KNEE Left 2022    TONSILLECTOMY      TOTAL KNEE ARTHROPLASTY Left 04/06/2022    [facility] Dr. Diaz    VAGINAL DELIVERY      x3    WRIST SURGERY Right        Family History   Problem Relation Age of Onset    Breast cancer Mother     Alzheimer's disease Mother     Coronary artery disease Mother     Hypertension Mother     Migraines Mother     Peripheral vascular disease Mother     Arthritis Mother     Cancer Mother     Parkinsonism Father     Other Father         Cardiovascular disease     Coronary artery disease Father     Hypertension Father     Bipolar disorder Sister     Thyroid disease Sister     Alzheimer's disease Sister     Depression Sister     Throat cancer Maternal Grandfather     Cancer Maternal Grandfather     Allergic rhinitis Daughter     Asthma Daughter     Asthma Son     Diabetes Neg Hx     Stroke Neg Hx          Medications have been verified.        Objective   /62   Pulse 77   Resp 18   Ht 5' (1.524 m)   Wt 80.7 kg (178 lb)   SpO2 98%   BMI 34.76 kg/m²   No LMP recorded. Patient is postmenopausal.       Physical Exam     Physical Exam  Constitutional:       General: She is not in acute distress.     Appearance: Normal appearance. She is not ill-appearing.   HENT:      Head: Normocephalic and atraumatic.   Eyes:      General:         Right eye: No foreign body, discharge or hordeolum.      Extraocular Movements: Extraocular movements intact.      Conjunctiva/sclera: Conjunctivae normal.      Pupils: Pupils are equal, round, and reactive to light.   Cardiovascular:      Rate and Rhythm: Normal rate.   Pulmonary:      Effort: Pulmonary effort is normal.   Skin:     General: Skin is warm and dry.   Neurological:      Mental Status: She is alert.

## 2024-11-06 NOTE — TELEPHONE ENCOUNTER
Blood pressure elevated  Appointment department: [facility]  Appointment provider: MAGALY Claros  Blood pressure   11/06/24 1233 142/62   11/06/24 1221 142/62     11/07/24 1:44 PM    Patient was called after the Urgent Care visit ; a message was left for the patient to return the call    Thank you.  Valery Cyr  [facility]

## 2024-11-06 NOTE — PATIENT INSTRUCTIONS
Follow up with eye specialist for further evaluation  If worsening symptoms and unable to see eye specialist, please go to the Emergency Room

## 2024-11-12 ENCOUNTER — APPOINTMENT (OUTPATIENT)
Dept: LAB | Facility: MEDICAL CENTER | Age: 79
End: 2024-11-12
Payer: MEDICARE

## 2024-11-12 DIAGNOSIS — Z28.39 PERSONAL HISTORY OF UNDERIMMUNIZATION STATUS: ICD-10-CM

## 2024-11-12 DIAGNOSIS — J32.9 SINUSITIS, UNSPECIFIED CHRONICITY, UNSPECIFIED LOCATION: ICD-10-CM

## 2024-11-12 DIAGNOSIS — J45.40 MODERATE PERSISTENT ASTHMA WITHOUT COMPLICATION: ICD-10-CM

## 2024-11-12 PROCEDURE — 86317 IMMUNOASSAY INFECTIOUS AGENT: CPT

## 2024-11-12 PROCEDURE — 36415 COLL VENOUS BLD VENIPUNCTURE: CPT

## 2024-11-16 DIAGNOSIS — J01.00 ACUTE MAXILLARY SINUSITIS, RECURRENCE NOT SPECIFIED: ICD-10-CM

## 2024-11-16 DIAGNOSIS — J40 BRONCHITIS: ICD-10-CM

## 2024-11-16 LAB
DEPRECATED S PNEUM14 IGG SER-MCNC: 11.4 UG/ML
DEPRECATED S PNEUM19 IGG SER-MCNC: 15.1 UG/ML
DEPRECATED S PNEUM23 IGG SER-MCNC: 24.6 UG/ML
DEPRECATED S PNEUM3 IGG SER-MCNC: 2.5 UG/ML
DEPRECATED S PNEUM4 IGG SER-MCNC: 0.6 UG/ML
DEPRECATED S PNEUM8 AB SER-MCNC: 50.7 UG/ML
DEPRECATED S PNEUM9 IGG SER-MCNC: 2.8 UG/ML
FLUBV RNA SPEC QL NAA+PROBE: 0.1 UG/ML
S PNEUM DA 18C IGG SER-MCNC: 1.9 UG/ML
S PNEUM DA 19A IGG SER-MCNC: 4.6 UG/ML
S PNEUM DA 6B IGG SER-MCNC: 0.5 UG/ML
SL AMB PNEUMO AB TYPE 17 (17F): 1.3 UG/ML
SL AMB PNEUMO AB TYPE 20: 11.2 UG/ML
SL AMB PNEUMO AB TYPE 22 (22F): 0.8 UG/ML
SL AMB PNEUMO AB TYPE 2: 6.3 UG/ML
SL AMB PNEUMO AB TYPE 34 (10A): 4 UG/ML
SL AMB PNEUMO AB TYPE 43 (11A): 7.7 UG/ML
SL AMB PNEUMO AB TYPE 54 (15B): 5 UG/ML
SL AMB PNEUMO AB TYPE 5: 2.1 UG/ML
SL AMB PNEUMO AB TYPE 70 (33F): >20.2 UG/ML
STREP PNEUMO TYPE 1: >17.4 UG/ML
STREP PNEUMO TYPE 51: >19.6 UG/ML
STREP PNEUMO TYPE 68: 8.9 UG/ML

## 2024-11-18 ENCOUNTER — CLINICAL SUPPORT (OUTPATIENT)
Dept: BARIATRICS | Facility: CLINIC | Age: 79
End: 2024-11-18

## 2024-11-18 DIAGNOSIS — R63.5 ABNORMAL WEIGHT GAIN: Primary | ICD-10-CM

## 2024-11-18 PROCEDURE — RECHECK

## 2024-11-18 PROCEDURE — WEIGHT

## 2024-11-18 RX ORDER — FLUTICASONE PROPIONATE 50 MCG
SPRAY, SUSPENSION (ML) NASAL
Qty: 16 G | Refills: 5 | Status: SHIPPED | OUTPATIENT
Start: 2024-11-18

## 2024-11-22 NOTE — PROGRESS NOTES
Seca attempted but left hand not reading on sensor. Regular weight obtained. Current wt 173.6 lbs.

## 2024-11-23 DIAGNOSIS — I10 ESSENTIAL HYPERTENSION: ICD-10-CM

## 2024-11-25 RX ORDER — LOSARTAN POTASSIUM 100 MG/1
100 TABLET ORAL DAILY
Qty: 30 TABLET | Refills: 1 | Status: SHIPPED | OUTPATIENT
Start: 2024-11-25

## 2024-12-02 ENCOUNTER — CLINICAL SUPPORT (OUTPATIENT)
Dept: BARIATRICS | Facility: CLINIC | Age: 79
End: 2024-12-02

## 2024-12-02 VITALS — BODY MASS INDEX: 34.08 KG/M2 | HEIGHT: 60 IN | WEIGHT: 173.6 LBS

## 2024-12-02 DIAGNOSIS — R63.5 ABNORMAL WEIGHT GAIN: Primary | ICD-10-CM

## 2024-12-02 PROCEDURE — WEIGHT

## 2024-12-02 PROCEDURE — RECHECK

## 2024-12-02 NOTE — ASSESSMENT & PLAN NOTE
- Reports history of DIANA, but not using CPAP   - Risks of untreated sleep apnea reviewed, including increased risk for myocardial infarction and stroke, increased difficulty with weight loss, and risk of sudden cardiac death due to arrhythmia   - Referral to sleep medicine recommended and she was agreeable  denies

## 2024-12-04 ENCOUNTER — OFFICE VISIT (OUTPATIENT)
Dept: FAMILY MEDICINE CLINIC | Facility: CLINIC | Age: 79
End: 2024-12-04
Payer: MEDICARE

## 2024-12-04 VITALS
HEIGHT: 60 IN | HEART RATE: 70 BPM | BODY MASS INDEX: 34.24 KG/M2 | TEMPERATURE: 97.8 F | WEIGHT: 174.4 LBS | DIASTOLIC BLOOD PRESSURE: 64 MMHG | SYSTOLIC BLOOD PRESSURE: 142 MMHG | OXYGEN SATURATION: 97 % | RESPIRATION RATE: 16 BRPM

## 2024-12-04 DIAGNOSIS — Z78.0 POSTMENOPAUSAL: ICD-10-CM

## 2024-12-04 DIAGNOSIS — M79.7 FIBROMYALGIA: ICD-10-CM

## 2024-12-04 DIAGNOSIS — Z00.00 ENCOUNTER FOR SUBSEQUENT ANNUAL WELLNESS VISIT (AWV) IN MEDICARE PATIENT: Primary | ICD-10-CM

## 2024-12-04 DIAGNOSIS — E03.9 HYPOTHYROIDISM, UNSPECIFIED TYPE: ICD-10-CM

## 2024-12-04 DIAGNOSIS — I10 ESSENTIAL HYPERTENSION: ICD-10-CM

## 2024-12-04 DIAGNOSIS — J31.0 CHRONIC RHINITIS: ICD-10-CM

## 2024-12-04 PROCEDURE — 99214 OFFICE O/P EST MOD 30 MIN: CPT | Performed by: FAMILY MEDICINE

## 2024-12-04 PROCEDURE — G0439 PPPS, SUBSEQ VISIT: HCPCS | Performed by: FAMILY MEDICINE

## 2024-12-04 RX ORDER — METOPROLOL SUCCINATE 25 MG/1
25 TABLET, EXTENDED RELEASE ORAL DAILY
Qty: 30 TABLET | Refills: 1 | Status: SHIPPED | OUTPATIENT
Start: 2024-12-04

## 2024-12-04 NOTE — PROGRESS NOTES
Name: Yokasta Cadena      : 1945      MRN: [id number]  Encounter Provider: Lisbeth Vallejo DO  Encounter Date: 2024   Encounter department: [facility]      Orders Placed This Encounter   Procedures    DXA bone density spine hip and pelvis        Preventive health issues were discussed with patient, and age appropriate screening tests were ordered as noted in patient's After Visit Summary. Personalized health advice and appropriate referrals for health education or preventive services given if needed, as noted in patient's After Visit Summary.    History of Present Illness       Pt presents for AWV and f/u.      Preventively, pt due for dexa. Up to date with imm's.  Up to date with GI screening.  Discussed mammogram.  She would like to obtain.  Has living will.  Sees dental.      From a f/u standpoint, pt is feeling better on lyrica.  Would like to continue on same dosing.      Pt's last labs showed continued hypothyroidism.  Dosing has been adjusted and pt will be due at the end of the month for tsh    Mild anemia present on last labs.  Cbc ordered for end of the month.    Pt c/o worsening sinus pressure/asthma sx.  Needs to f/u with allergy/asthma    Blood pressure mildly elevated today.  No chest pain, shortness of breath, n/v, abd pain, visual changes, paresthesias, weakness.    Patient Care Team:  Lisbeth Vallejo DO as PCP - General (Family Medicine)  DO Chung Williamson MD Kristofer Matullo, MD Kathleen McFarland, PA-C Loveleen K Sidhu, MD (Gastroenterology)    Review of Systems  Annual Wellness Visit Questionnaire   Yokasta is here for her Subsequent Wellness visit.     Health Risk Assessment:   Patient rates overall health as very good. Patient feels that their physical health rating is same. Patient is satisfied with their life. Eyesight was rated as slightly worse. Hearing was rated as slightly worse. Patient feels that their emotional and mental health  rating is same. Patients states they are sometimes angry. Patient states they are sometimes unusually tired/fatigued. Pain experienced in the last 7 days has been some. Patient's pain rating has been 7/10. Patient states that she has experienced no weight loss or gain in last 6 months.     Fall Risk Screening:   In the past year, patient has experienced: history of falling in past year    Number of falls: 1  Injured during fall?: No    Feels unsteady when standing or walking?: No    Worried about falling?: Yes      Urinary Incontinence Screening:   Patient has not leaked urine accidently in the last six months.     Home Safety:  Patient has trouble with stairs inside or outside of their home. Patient has working smoke alarms and has no working carbon monoxide detector. Home safety hazards include: not having non-slip bath and/or shower mats.     Nutrition:   Current diet is Regular.     Medications:   Patient is currently taking over-the-counter supplements. OTC medications include: see medication list. Patient is able to manage medications.     Activities of Daily Living (ADLs)/Instrumental Activities of Daily Living (IADLs):   Walk and transfer into and out of bed and chair?: Yes  Dress and groom yourself?: Yes    Bathe or shower yourself?: Yes    Feed yourself? Yes  Do your laundry/housekeeping?: Yes  Manage your money, pay your bills and track your expenses?: Yes  Make your own meals?: Yes    Do your own shopping?: Yes    Previous Hospitalizations:   Any hospitalizations or ED visits within the last 12 months?: No      Advance Care Planning:   Living will: Yes    Durable POA for healthcare: Yes    Advanced directive: Yes      Cognitive Screening:   Provider or family/friend/caregiver concerned regarding cognition?: Yes    PREVENTIVE SCREENINGS      Cardiovascular Screening:    General: Screening Not Indicated and History Lipid Disorder      Diabetes Screening:     General: Screening Current      Colorectal  Cancer Screening:     General: Screening Current      Breast Cancer Screening:     General: Risks and Benefits Discussed    Due for: Mammogram        Cervical Cancer Screening:    General: Screening Not Indicated      Osteoporosis Screening:    General: Risks and Benefits Discussed    Due for: DXA Axial      Abdominal Aortic Aneurysm (AAA) Screening:        General: Screening Not Indicated      Lung Cancer Screening:     General: Screening Not Indicated      Hepatitis C Screening:    General: Screening Current    Screening, Brief Intervention, and Referral to Treatment (SBIRT)    Screening  Typical number of drinks in a day: 0  Typical number of drinks in a week: 0  Interpretation: Low risk drinking behavior.    Single Item Drug Screening:  How often have you used an illegal drug (including marijuana) or a prescription medication for non-medical reasons in the past year? never    Single Item Drug Screen Score: 0  Interpretation: Negative screen for possible drug use disorder    Social Drivers of Health     Financial Resource Strain: Low Risk  (3/28/2023)    Overall Financial Resource Strain (CARDIA)     Difficulty of Paying Living Expenses: Not hard at all   Food Insecurity: No Food Insecurity (12/4/2024)    Hunger Vital Sign     Worried About Running Out of Food in the Last Year: Never true     Ran Out of Food in the Last Year: Never true   Transportation Needs: No Transportation Needs (12/4/2024)    PRAPARE - Transportation     Lack of Transportation (Medical): No     Lack of Transportation (Non-Medical): No   Housing Stability: Low Risk  (12/4/2024)    Housing Stability Vital Sign     Unable to Pay for Housing in the Last Year: No     Number of Times Moved in the Last Year: 0     Homeless in the Last Year: No   Utilities: Not At Risk (12/4/2024)    [facility] Utilities     Threatened with loss of utilities: No     No results found.    Objective   /64   Pulse 70   Temp 97.8 °F (36.6 °C) (Temporal)   Resp 16    Ht 5' (1.524 m)   Wt 79.1 kg (174 lb 6.4 oz)   SpO2 97%   BMI 34.06 kg/m²       Physical Exam  Vitals and nursing note reviewed.   Constitutional:       General: She is not in acute distress.     Appearance: Normal appearance. She is well-developed.   HENT:      Head: Normocephalic and atraumatic.      Right Ear: Tympanic membrane, ear canal and external ear normal.      Left Ear: Tympanic membrane, ear canal and external ear normal.      Nose: Nose normal. No congestion.      Mouth/Throat:      Mouth: Mucous membranes are moist.      Pharynx: No oropharyngeal exudate or posterior oropharyngeal erythema.   Eyes:      Extraocular Movements: Extraocular movements intact.      Conjunctiva/sclera: Conjunctivae normal.      Pupils: Pupils are equal, round, and reactive to light.   Neck:      Vascular: No carotid bruit.   Cardiovascular:      Rate and Rhythm: Normal rate and regular rhythm.      Heart sounds: No murmur heard.     No friction rub. No gallop.   Pulmonary:      Effort: Pulmonary effort is normal. No respiratory distress.      Breath sounds: Normal breath sounds. No wheezing, rhonchi or rales.   Abdominal:      General: Abdomen is flat. There is no distension.      Palpations: Abdomen is soft.      Tenderness: There is no abdominal tenderness.   Musculoskeletal:         General: No swelling.      Cervical back: Neck supple.   Lymphadenopathy:      Cervical: No cervical adenopathy.   Skin:     General: Skin is warm and dry.      Capillary Refill: Capillary refill takes less than 2 seconds.   Neurological:      General: No focal deficit present.      Mental Status: She is alert and oriented to person, place, and time.      Cranial Nerves: No cranial nerve deficit.      Motor: No weakness.      Deep Tendon Reflexes: Reflexes normal.   Psychiatric:         Mood and Affect: Mood normal.

## 2024-12-09 ENCOUNTER — OFFICE VISIT (OUTPATIENT)
Dept: FAMILY MEDICINE CLINIC | Facility: CLINIC | Age: 79
End: 2024-12-09
Payer: MEDICARE

## 2024-12-09 VITALS
BODY MASS INDEX: 34.06 KG/M2 | HEART RATE: 65 BPM | TEMPERATURE: 97.9 F | DIASTOLIC BLOOD PRESSURE: 68 MMHG | SYSTOLIC BLOOD PRESSURE: 158 MMHG | OXYGEN SATURATION: 97 % | RESPIRATION RATE: 16 BRPM | HEIGHT: 60 IN

## 2024-12-09 DIAGNOSIS — J06.9 VIRAL UPPER RESPIRATORY TRACT INFECTION: Primary | ICD-10-CM

## 2024-12-09 PROCEDURE — G2211 COMPLEX E/M VISIT ADD ON: HCPCS | Performed by: FAMILY MEDICINE

## 2024-12-09 PROCEDURE — 99213 OFFICE O/P EST LOW 20 MIN: CPT | Performed by: FAMILY MEDICINE

## 2024-12-09 NOTE — ASSESSMENT & PLAN NOTE
Add metoprolol once daily  F/u 6 weeks  Orders:    metoprolol succinate (TOPROL-XL) 25 mg 24 hr tablet; Take 1 tablet (25 mg total) by mouth daily

## 2024-12-09 NOTE — PROGRESS NOTES
Name: Yokasta Cadena      : 1945      MRN: [id number]  Encounter Provider: Lisbeth Vallejo DO  Encounter Date: 2024   Encounter department: [facility]  :  Assessment & Plan  Encounter for subsequent annual wellness visit (AWV) in Medicare patient  Obtain dexa  Otherwise up to date preventively       Postmenopausal    Orders:    DXA bone density spine hip and pelvis; Future    Fibromyalgia  Improved  Continue lyrica at current dosing       Hypothyroidism, unspecified type  TSH at the end of the month as planned       Essential hypertension  Add metoprolol once daily  F/u 6 weeks  Orders:    metoprolol succinate (TOPROL-XL) 25 mg 24 hr tablet; Take 1 tablet (25 mg total) by mouth daily    Chronic rhinitis  F/u with allergy/asthma

## 2024-12-09 NOTE — PATIENT INSTRUCTIONS
Use coricidin to help with symptoms  Use tylenol as needed  Fluids, rest  Call if your symptoms are worse or are not improving by the end of the week.

## 2024-12-09 NOTE — PROGRESS NOTES
Name: Yokasta Cadena      : 1945      MRN: [id number]  Encounter Provider: Caitlin Escobar MD  Encounter Date: 2024   Encounter department: [facility]  :  Assessment & Plan  Viral upper respiratory tract infection  Patient Instructions   Use coricidin to help with symptoms  Use tylenol as needed  Fluids, rest  Call if your symptoms are worse or are not improving by the end of the week.                Chief Complaint   Patient presents with   • Cold Like Symptoms     Pt c/o congestion, sinus pressure, L ear pain/pressure, dizziness, headache (pain in back of head too), intermittent nausea. Had vomiting the first day. Sx started two days ago. Denies fevers, SOB, and chest tightness. Not currently using anything for sx management.        History of Present Illness     Today is day 3 of not feeling well  Started with vomiting x 2 on Saturday    Not much cough.   Mostly sinus pressure, nasal congestion  Ears feel full  Throat is sore    No fever  No SOB  No CP    Notes she was a little dizzy which she attributes to her ears.    She took sudafed on Saturday. Nothing since.     No ill contacts.     Using flonase regularly.          Review of Systems       Objective   /68   Pulse 65   Temp 97.9 °F (36.6 °C) (Temporal)   Resp 16   Ht 5' (1.524 m)   SpO2 97%   BMI 34.06 kg/m²      Physical Exam  Vitals and nursing note reviewed.   Constitutional:       Appearance: Normal appearance.   HENT:      Nose: Congestion present.      Mouth/Throat:      Mouth: Mucous membranes are moist.      Comments: Cobblestoning posterior oropharynx   Cardiovascular:      Rate and Rhythm: Normal rate and regular rhythm.   Pulmonary:      Effort: Pulmonary effort is normal.      Breath sounds: Normal breath sounds. No wheezing or rhonchi.   Lymphadenopathy:      Cervical: No cervical adenopathy.   Neurological:      Mental Status: She is alert.

## 2024-12-09 NOTE — ASSESSMENT & PLAN NOTE
Patient Instructions   Use coricidin to help with symptoms  Use tylenol as needed  Fluids, rest  Call if your symptoms are worse or are not improving by the end of the week.

## 2024-12-13 ENCOUNTER — OFFICE VISIT (OUTPATIENT)
Dept: BARIATRICS | Facility: CLINIC | Age: 79
End: 2024-12-13
Payer: MEDICARE

## 2024-12-13 VITALS
WEIGHT: 176.4 LBS | DIASTOLIC BLOOD PRESSURE: 65 MMHG | BODY MASS INDEX: 34.63 KG/M2 | HEIGHT: 60 IN | HEART RATE: 60 BPM | TEMPERATURE: 96.4 F | SYSTOLIC BLOOD PRESSURE: 145 MMHG | RESPIRATION RATE: 17 BRPM

## 2024-12-13 DIAGNOSIS — I10 ESSENTIAL HYPERTENSION: ICD-10-CM

## 2024-12-13 DIAGNOSIS — E66.811 OBESITY, CLASS I, BMI 30-34.9: Primary | ICD-10-CM

## 2024-12-13 PROCEDURE — 99214 OFFICE O/P EST MOD 30 MIN: CPT | Performed by: PHYSICIAN ASSISTANT

## 2024-12-13 NOTE — ASSESSMENT & PLAN NOTE
- Patient is following with RD after [facility]  - Initial weight loss goal of 5-10% weight loss for improved health    - On Wellbutrin through primary care provider. Had tried naltrexone but does not feel it has been helpful but was not taking it consistently.  Will restart  -discussed possible topamax but concern about medication side effect of memory impairment  concerning to her  -Other medication options limited.  Not a GLP-1 candidate due to history of pancreatitis.  Avoid metformin given eGFR. Avoid phentermine due to age and history of hypertension.        Initial: 184 lbs  Last visit: 169  Current: 176.4  Change: -7.6lb (+7.4lb from last visit)  Goal: 150 lbs    Goals:  Make sure to monitor portions  Increase water intake to at least 64 oz daily.   Discussed starting to increase exercise. Exercise videos at home discussed or going to the gym with her sister.      Continue nutrition recommendations per dietician.   Nutrition Prescription  Calories:8001-3928 calories   Protein:70-80g  Fluid:65oz

## 2024-12-13 NOTE — PROGRESS NOTES
Assessment/Plan:    Obesity, Class I, BMI 30-34.9  - Patient is following with RD after [employer]  - Initial weight loss goal of 5-10% weight loss for improved health    - On Wellbutrin through primary care provider. Had tried naltrexone but does not feel it has been helpful but was not taking it consistently.  Will restart  -discussed possible topamax but concern about medication side effect of memory impairment  concerning to her  -Other medication options limited.  Not a GLP-1 candidate due to history of pancreatitis.  Avoid metformin given eGFR. Avoid phentermine due to age and history of hypertension.        Initial: 184 lbs  Last visit: 169  Current: 176.4  Change: -7.6lb (+7.4lb from last visit)  Goal: 150 lbs    Goals:  Make sure to monitor portions  Increase water intake to at least 64 oz daily.   Discussed starting to increase exercise. Exercise videos at home discussed or going to the gym with her sister.      Continue nutrition recommendations per dietician.   Nutrition Prescription  Calories:9325-1172 calories   Protein:70-80g  Fluid:65oz      Essential hypertension  On losartan and just started metoprolol. Improvement from earlier this week.         Return in about 6 months (around 6/13/2025).       Diagnoses and all orders for this visit:    Obesity, Class I, BMI 30-34.9    Essential hypertension          Subjective:   Chief Complaint   Patient presents with    Follow-up     MWM-6M F/u; Waist-42.5in        Patient ID: Yokasta Cadena  is a 79 y.o. female with excess weight/obesity here to pursue weight managment.  Patient is pursuing [facility].     HPI  Had finished [employer] and is doing weight check ins.  Weight has been about the same for the last 3 weeks.      Her allergicst recommended not eatign for 6 hours prior to bed.  She has not been able to do so as she feels hungry at night after dinner. Trying to make low calorie choices still .    Wt Readings from Last 10 Encounters:    12/13/24 80 kg (176 lb 6.4 oz)   12/05/24 79.1 kg (174 lb 6.4 oz)   12/04/24 79.1 kg (174 lb 6.4 oz)   12/02/24 78.7 kg (173 lb 9.6 oz)   11/07/24 79.4 kg (175 lb)   11/06/24 80.7 kg (178 lb)   10/30/24 80.7 kg (178 lb)   09/26/24 78 kg (172 lb)   09/25/24 78.2 kg (172 lb 6.4 oz)   08/29/24 77.6 kg (171 lb)       Food logging:  Increased appetite/cravings:  Exercise:walking (day to day activity and not structured)  Hydration:water 40-50 oz , 2 cups coffee    Cravigns sweets/carbs       Food Recall   Breakfast 7:00: 2 Toast (light bread) w/1 tbsp pb or fruit  Coffee - creamer (35cal) x2  Snack:skip    Lunch 12:00: salad 1 tbs slad dressing and sometimes 1 tbs young in the salad (  Snack: low jagdeep popsickle. 80 jagdeep yogurt  Dinner:4-5:00:  lean protein/~1/2 cup veggie, ~1/2 cup carb  OR salad, chicken, 1/4 cup mac/cheese OR 1 cup shrimp scampi OR pork chop, large veggies OR fish, 1/2 bag of frozen potatoe/veggies or lisbeth's pasta  Snack: coffee OR crackers  The following portions of the patient's history were reviewed and updated as appropriate: She  has a past medical history of Acid reflux, Anxiety, Arthritis, Asthma, C1-C4 level with central cord syndrome (HCC), Carpal tunnel syndrome (Resolved 4/21/2015), Colitis, Colon polyp, Concussion, Depressed, Dysthymic disorder, Gastric ulcer (2013), Hemorrhoids, blood clots, colonic polyps, Hyperlipemia, Hypertension, Hypothyroid, Lumbar spondylosis (04/25/2024), Lung nodule, Migraines, Obesity, Postural dizziness with presyncope, Tendinitis, Ulnar neuropathy, and Vertigo.  She   Patient Active Problem List    Diagnosis Date Noted    Lumbar spondylosis 04/25/2024    DIANA (obstructive sleep apnea) 10/19/2022    Obesity, Class I, BMI 30-34.9 06/15/2022    At risk for sleep apnea 06/15/2022    Stage 3a chronic kidney disease (HCC) 03/25/2022    Near syncope 09/01/2021    Anemia 09/01/2021    URI (upper respiratory infection) 05/20/2021    RUQ pain 03/23/2021    Intestinal  metaplasia of gastric mucosa 03/23/2021    History of gastric ulcer 12/04/2020    Rosacea 08/12/2019    IFG (impaired fasting glucose) 05/03/2019    Cervical radiculitis 03/18/2019    Osteoarthritis of knee 03/18/2019    Vertigo 06/12/2018    Mild persistent asthma without complication 06/11/2018    Injury of tendon of rotator cuff 11/14/2017    Mixed anxiety and depressive disorder 01/05/2017    Postconcussion syndrome 11/01/2016    Tinnitus, bilateral 11/01/2016    Occipital neuralgia of left side 12/04/2015    Ulnar nerve compression, right 11/04/2015    Carpal tunnel syndrome of right wrist 04/09/2015    Cervical spinal stenosis 03/23/2015    Cataract 10/17/2014    Gastroesophageal reflux disease 08/28/2013    Essential hypertension 01/14/2010    Hyperlipidemia 01/14/2010    History of colonic polyps 01/14/2010    Hypothyroidism 01/14/2010     She  has a past surgical history that includes Knee surgery (Right); Neck surgery; Wrist surgery (Right); Appendectomy; Gallbladder surgery; Eye surgery (Bilateral, 2019); Posterior laminectomy / decompression cervical spine (02/2015); Joint replacement (Right); Vaginal delivery; Breast surgery (Left, 01/1966); Dilation and curettage of uterus; Carpal tunnel release; Knee arthroscopy; Elbow surgery; Cholecystectomy; Tonsillectomy; Replacement total knee (Left, 2022); Total knee arthroplasty (Left, 04/06/2022); and orthopedic surgery.  Her family history includes Allergic rhinitis in her daughter; Alzheimer's disease in her mother and sister; Arthritis in her mother; Asthma in her daughter and son; Bipolar disorder in her sister; Breast cancer in her mother; Cancer in her maternal grandfather and mother; Coronary artery disease in her father and mother; Depression in her sister; Hypertension in her father and mother; Migraines in her mother; Other in her father; Parkinsonism in her father; Peripheral vascular disease in her mother; Throat cancer in her maternal grandfather;  Thyroid disease in her sister.  She  reports that she quit smoking about 11 years ago. Her smoking use included cigarettes. She started smoking about 41 years ago. She has never been exposed to tobacco smoke. She has never used smokeless tobacco. She reports that she does not drink alcohol and does not use drugs.  Current Outpatient Medications   Medication Sig Dispense Refill    albuterol (Ventolin HFA) 90 mcg/act inhaler Inhale 2 puffs every 6 (six) hours as needed for wheezing 18 g 0    amLODIPine (NORVASC) 10 mg tablet TAKE 1 TABLET BY MOUTH EVERY DAY 90 tablet 1    Arnuity Ellipta 50 MCG/ACT AEPB Inhale 1 puff in the morning Rinse mouth after use. 30 blister 5    Ascorbic Acid (VITAMIN C PO) Vitamin C      atorvastatin (LIPITOR) 20 mg tablet TAKE 1 TABLET BY MOUTH EVERY DAY 90 tablet 1    buPROPion (WELLBUTRIN XL) 150 mg 24 hr tablet Take 1 tablet (150 mg total) by mouth every morning 90 tablet 1    Cholecalciferol (Vitamin D3) 1.25 MG (44798 UT) CAPS Take 5,000 Units by mouth daily      Cyanocobalamin (VITAMIN B-12 PO)       ferrous sulfate 325 (65 Fe) mg tablet Take 1 tablet (325 mg total) by mouth daily with breakfast 30 tablet 3    fluticasone (FLONASE) 50 mcg/act nasal spray SPRAY 1 SPRAY INTO EACH NOSTRIL EVERY DAY 16 g 5    Ivermectin 1 % CREA Apply topically in the morning 45 g 1    levothyroxine 150 mcg tablet Take 1 tablet (150 mcg total) by mouth daily in the early morning 30 tablet 1    losartan (COZAAR) 100 MG tablet TAKE 1 TABLET BY MOUTH EVERY DAY 30 tablet 1    meclizine (ANTIVERT) 25 mg tablet Take 1 tablet (25 mg total) by mouth every 8 (eight) hours as needed for dizziness for up to 10 days 30 tablet 0    metoprolol succinate (TOPROL-XL) 25 mg 24 hr tablet Take 1 tablet (25 mg total) by mouth daily 30 tablet 1    multivitamin (THERAGRAN) TABS Take 1 tablet by mouth daily One a day      ondansetron (ZOFRAN) 4 mg tablet Take 1 tablet (4 mg total) by mouth 3 (three) times a day as needed for  nausea or vomiting 20 tablet 0    pantoprazole (PROTONIX) 40 mg tablet TAKE 1 TABLET BY MOUTH TWICE A  tablet 1    pregabalin (LYRICA) 75 mg capsule Take 1 capsule (75 mg total) by mouth 2 (two) times a day 60 capsule 1    sertraline (ZOLOFT) 100 mg tablet TAKE 1 TABLET BY MOUTH TWICE A  tablet 2    traZODone (DESYREL) 50 mg tablet TAKE 2 TABLETS (100 MG TOTAL) BY MOUTH DAILY AT BEDTIME 180 tablet 2     No current facility-administered medications for this visit.     Current Outpatient Medications on File Prior to Visit   Medication Sig    albuterol (Ventolin HFA) 90 mcg/act inhaler Inhale 2 puffs every 6 (six) hours as needed for wheezing    amLODIPine (NORVASC) 10 mg tablet TAKE 1 TABLET BY MOUTH EVERY DAY    Arnuity Ellipta 50 MCG/ACT AEPB Inhale 1 puff in the morning Rinse mouth after use.    Ascorbic Acid (VITAMIN C PO) Vitamin C    atorvastatin (LIPITOR) 20 mg tablet TAKE 1 TABLET BY MOUTH EVERY DAY    buPROPion (WELLBUTRIN XL) 150 mg 24 hr tablet Take 1 tablet (150 mg total) by mouth every morning    Cholecalciferol (Vitamin D3) 1.25 MG (01551 UT) CAPS Take 5,000 Units by mouth daily    Cyanocobalamin (VITAMIN B-12 PO)     ferrous sulfate 325 (65 Fe) mg tablet Take 1 tablet (325 mg total) by mouth daily with breakfast    fluticasone (FLONASE) 50 mcg/act nasal spray SPRAY 1 SPRAY INTO EACH NOSTRIL EVERY DAY    Ivermectin 1 % CREA Apply topically in the morning    levothyroxine 150 mcg tablet Take 1 tablet (150 mcg total) by mouth daily in the early morning    losartan (COZAAR) 100 MG tablet TAKE 1 TABLET BY MOUTH EVERY DAY    meclizine (ANTIVERT) 25 mg tablet Take 1 tablet (25 mg total) by mouth every 8 (eight) hours as needed for dizziness for up to 10 days    metoprolol succinate (TOPROL-XL) 25 mg 24 hr tablet Take 1 tablet (25 mg total) by mouth daily    multivitamin (THERAGRAN) TABS Take 1 tablet by mouth daily One a day    ondansetron (ZOFRAN) 4 mg tablet Take 1 tablet (4 mg total) by mouth  3 (three) times a day as needed for nausea or vomiting    pantoprazole (PROTONIX) 40 mg tablet TAKE 1 TABLET BY MOUTH TWICE A DAY    pregabalin (LYRICA) 75 mg capsule Take 1 capsule (75 mg total) by mouth 2 (two) times a day    sertraline (ZOLOFT) 100 mg tablet TAKE 1 TABLET BY MOUTH TWICE A DAY    traZODone (DESYREL) 50 mg tablet TAKE 2 TABLETS (100 MG TOTAL) BY MOUTH DAILY AT BEDTIME     No current facility-administered medications on file prior to visit.     She is allergic to ceftin [cefuroxime]..    Review of Systems   Constitutional:  Negative for fever.   Respiratory:  Negative for shortness of breath.    Cardiovascular:  Negative for chest pain and palpitations.   Gastrointestinal:  Negative for abdominal pain, constipation, diarrhea and vomiting.   Genitourinary:  Negative for difficulty urinating.   Skin:  Negative for rash.   Neurological:  Negative for headaches.   Psychiatric/Behavioral:  Negative for dysphoric mood. The patient is not nervous/anxious.        Objective:    /65   Pulse 60   Temp (!) 96.4 °F (35.8 °C)   Resp 17   Ht 5' (1.524 m)   Wt 80 kg (176 lb 6.4 oz)   BMI 34.45 kg/m²      Physical Exam  Vitals and nursing note reviewed.   Constitutional:       General: She is not in acute distress.     Appearance: She is well-developed. She is obese.   HENT:      Head: Normocephalic and atraumatic.   Eyes:      Conjunctiva/sclera: Conjunctivae normal.   Neck:      Thyroid: No thyromegaly.   Pulmonary:      Effort: Pulmonary effort is normal. No respiratory distress.   Skin:     Findings: No rash (visible).   Neurological:      Mental Status: She is alert and oriented to person, place, and time.   Psychiatric:         Mood and Affect: Mood normal.         Behavior: Behavior normal.          No complaints

## 2024-12-19 DIAGNOSIS — I10 ESSENTIAL HYPERTENSION: ICD-10-CM

## 2024-12-19 RX ORDER — AMLODIPINE BESYLATE 10 MG/1
10 TABLET ORAL DAILY
Qty: 90 TABLET | Refills: 1 | Status: SHIPPED | OUTPATIENT
Start: 2024-12-19

## 2024-12-23 DIAGNOSIS — E03.9 HYPOTHYROIDISM, UNSPECIFIED TYPE: ICD-10-CM

## 2024-12-24 RX ORDER — LEVOTHYROXINE SODIUM 150 UG/1
150 TABLET ORAL
Qty: 30 TABLET | Refills: 5 | Status: SHIPPED | OUTPATIENT
Start: 2024-12-24

## 2025-01-06 ENCOUNTER — TELEPHONE (OUTPATIENT)
Dept: BARIATRICS | Facility: CLINIC | Age: 80
End: 2025-01-06

## 2025-01-08 ENCOUNTER — TELEPHONE (OUTPATIENT)
Dept: FAMILY MEDICINE CLINIC | Facility: CLINIC | Age: 80
End: 2025-01-08

## 2025-01-08 ENCOUNTER — HOSPITAL ENCOUNTER (EMERGENCY)
Facility: HOSPITAL | Age: 80
Discharge: HOME | End: 2025-01-08
Attending: EMERGENCY MEDICINE | Admitting: EMERGENCY MEDICINE
Payer: MEDICARE

## 2025-01-08 ENCOUNTER — APPOINTMENT (EMERGENCY)
Dept: RADIOLOGY | Facility: HOSPITAL | Age: 80
End: 2025-01-08
Payer: MEDICARE

## 2025-01-08 ENCOUNTER — APPOINTMENT (EMERGENCY)
Dept: CT IMAGING | Facility: HOSPITAL | Age: 80
End: 2025-01-08
Payer: MEDICARE

## 2025-01-08 VITALS
DIASTOLIC BLOOD PRESSURE: 68 MMHG | OXYGEN SATURATION: 95 % | HEART RATE: 66 BPM | HEIGHT: 60 IN | WEIGHT: 180.78 LBS | RESPIRATION RATE: 18 BRPM | SYSTOLIC BLOOD PRESSURE: 154 MMHG | TEMPERATURE: 98 F | BODY MASS INDEX: 35.49 KG/M2

## 2025-01-08 DIAGNOSIS — R60.0 PERIPHERAL EDEMA: ICD-10-CM

## 2025-01-08 DIAGNOSIS — Z92.241 HISTORY OF RECENT STEROID USE: ICD-10-CM

## 2025-01-08 DIAGNOSIS — E87.70 VOLUME OVERLOAD: Primary | ICD-10-CM

## 2025-01-08 DIAGNOSIS — R79.89 ELEVATED TSH: ICD-10-CM

## 2025-01-08 LAB
2HR DELTA HS TROPONIN: 8 NG/L
ALBUMIN SERPL BCG-MCNC: 4.2 G/DL (ref 3.5–5)
ALP SERPL-CCNC: 85 U/L (ref 34–104)
ALT SERPL W P-5'-P-CCNC: 22 U/L (ref 7–52)
ANION GAP SERPL CALCULATED.3IONS-SCNC: 7 MMOL/L (ref 4–13)
AST SERPL W P-5'-P-CCNC: 17 U/L (ref 13–39)
ATRIAL RATE: 61 BPM
BASOPHILS # BLD AUTO: 0.04 THOUSANDS/ΜL (ref 0–0.1)
BASOPHILS NFR BLD AUTO: 0 % (ref 0–1)
BILIRUB SERPL-MCNC: 0.57 MG/DL (ref 0.2–1)
BILIRUB UR QL STRIP: NEGATIVE
BNP SERPL-MCNC: 188 PG/ML (ref 0–100)
BUN SERPL-MCNC: 23 MG/DL (ref 5–25)
CALCIUM SERPL-MCNC: 9.4 MG/DL (ref 8.4–10.2)
CARDIAC TROPONIN I PNL SERPL HS: 15 NG/L (ref ?–50)
CARDIAC TROPONIN I PNL SERPL HS: 7 NG/L (ref ?–50)
CHLORIDE SERPL-SCNC: 105 MMOL/L (ref 96–108)
CLARITY UR: CLEAR
CO2 SERPL-SCNC: 29 MMOL/L (ref 21–32)
COLOR UR: COLORLESS
CREAT SERPL-MCNC: 0.92 MG/DL (ref 0.6–1.3)
D DIMER PPP FEU-MCNC: 0.28 UG/ML FEU
EOSINOPHIL # BLD AUTO: 0.64 THOUSAND/ΜL (ref 0–0.61)
EOSINOPHIL NFR BLD AUTO: 6 % (ref 0–6)
ERYTHROCYTE [DISTWIDTH] IN BLOOD BY AUTOMATED COUNT: 15.1 % (ref 11.6–15.1)
GFR SERPL CREATININE-BSD FRML MDRD: 58 ML/MIN/1.73SQ M
GLUCOSE SERPL-MCNC: 98 MG/DL (ref 65–140)
GLUCOSE UR STRIP-MCNC: NEGATIVE MG/DL
HCT VFR BLD AUTO: 34.2 % (ref 34.8–46.1)
HGB BLD-MCNC: 10.9 G/DL (ref 11.5–15.4)
HGB UR QL STRIP.AUTO: NEGATIVE
IMM GRANULOCYTES # BLD AUTO: 0.07 THOUSAND/UL (ref 0–0.2)
IMM GRANULOCYTES NFR BLD AUTO: 1 % (ref 0–2)
KETONES UR STRIP-MCNC: NEGATIVE MG/DL
LEUKOCYTE ESTERASE UR QL STRIP: NEGATIVE
LIPASE SERPL-CCNC: 19 U/L (ref 11–82)
LYMPHOCYTES # BLD AUTO: 2.26 THOUSANDS/ΜL (ref 0.6–4.47)
LYMPHOCYTES NFR BLD AUTO: 21 % (ref 14–44)
MCH RBC QN AUTO: 27.7 PG (ref 26.8–34.3)
MCHC RBC AUTO-ENTMCNC: 31.9 G/DL (ref 31.4–37.4)
MCV RBC AUTO: 87 FL (ref 82–98)
MONOCYTES # BLD AUTO: 0.78 THOUSAND/ΜL (ref 0.17–1.22)
MONOCYTES NFR BLD AUTO: 7 % (ref 4–12)
NEUTROPHILS # BLD AUTO: 6.75 THOUSANDS/ΜL (ref 1.85–7.62)
NEUTS SEG NFR BLD AUTO: 65 % (ref 43–75)
NITRITE UR QL STRIP: NEGATIVE
NRBC BLD AUTO-RTO: 0 /100 WBCS
P AXIS: 64 DEGREES
PH UR STRIP.AUTO: 6.5 [PH]
PLATELET # BLD AUTO: 272 THOUSANDS/UL (ref 149–390)
PMV BLD AUTO: 9.8 FL (ref 8.9–12.7)
POTASSIUM SERPL-SCNC: 3.9 MMOL/L (ref 3.5–5.3)
PR INTERVAL: 164 MS
PROT SERPL-MCNC: 7 G/DL (ref 6.4–8.4)
PROT UR STRIP-MCNC: NEGATIVE MG/DL
QRS AXIS: -27 DEGREES
QRSD INTERVAL: 76 MS
QT INTERVAL: 398 MS
QTC INTERVAL: 400 MS
RBC # BLD AUTO: 3.94 MILLION/UL (ref 3.81–5.12)
SODIUM SERPL-SCNC: 141 MMOL/L (ref 135–147)
SP GR UR STRIP.AUTO: 1.01 (ref 1–1.03)
T WAVE AXIS: 34 DEGREES
T4 FREE SERPL-MCNC: 1.12 NG/DL (ref 0.61–1.12)
TSH SERPL DL<=0.05 MIU/L-ACNC: 13.24 UIU/ML (ref 0.45–4.5)
UROBILINOGEN UR STRIP-ACNC: <2 MG/DL
VENTRICULAR RATE: 61 BPM
WBC # BLD AUTO: 10.54 THOUSAND/UL (ref 4.31–10.16)

## 2025-01-08 PROCEDURE — 81003 URINALYSIS AUTO W/O SCOPE: CPT | Performed by: EMERGENCY MEDICINE

## 2025-01-08 PROCEDURE — 84443 ASSAY THYROID STIM HORMONE: CPT | Performed by: EMERGENCY MEDICINE

## 2025-01-08 PROCEDURE — 83880 ASSAY OF NATRIURETIC PEPTIDE: CPT | Performed by: EMERGENCY MEDICINE

## 2025-01-08 PROCEDURE — 71046 X-RAY EXAM CHEST 2 VIEWS: CPT

## 2025-01-08 PROCEDURE — 83690 ASSAY OF LIPASE: CPT | Performed by: EMERGENCY MEDICINE

## 2025-01-08 PROCEDURE — 84439 ASSAY OF FREE THYROXINE: CPT | Performed by: EMERGENCY MEDICINE

## 2025-01-08 PROCEDURE — 74177 CT ABD & PELVIS W/CONTRAST: CPT

## 2025-01-08 PROCEDURE — 85379 FIBRIN DEGRADATION QUANT: CPT | Performed by: EMERGENCY MEDICINE

## 2025-01-08 PROCEDURE — 36415 COLL VENOUS BLD VENIPUNCTURE: CPT | Performed by: EMERGENCY MEDICINE

## 2025-01-08 PROCEDURE — 80053 COMPREHEN METABOLIC PANEL: CPT | Performed by: EMERGENCY MEDICINE

## 2025-01-08 PROCEDURE — 99285 EMERGENCY DEPT VISIT HI MDM: CPT | Performed by: EMERGENCY MEDICINE

## 2025-01-08 PROCEDURE — 84484 ASSAY OF TROPONIN QUANT: CPT | Performed by: EMERGENCY MEDICINE

## 2025-01-08 PROCEDURE — 99284 EMERGENCY DEPT VISIT MOD MDM: CPT

## 2025-01-08 PROCEDURE — 93005 ELECTROCARDIOGRAM TRACING: CPT

## 2025-01-08 PROCEDURE — 85025 COMPLETE CBC W/AUTO DIFF WBC: CPT | Performed by: EMERGENCY MEDICINE

## 2025-01-08 RX ORDER — FUROSEMIDE 20 MG/1
20 TABLET ORAL DAILY
Qty: 4 TABLET | Refills: 0 | Status: SHIPPED | OUTPATIENT
Start: 2025-01-08 | End: 2025-01-15 | Stop reason: ALTCHOICE

## 2025-01-08 RX ADMIN — IOHEXOL 85 ML: 350 INJECTION, SOLUTION INTRAVENOUS at 17:14

## 2025-01-08 NOTE — ED PROVIDER NOTES
Time reflects when diagnosis was documented in both MDM as applicable and the Disposition within this note       Time User Action Codes Description Comment    1/8/2025  6:02 PM Zoë Richey Add [E87.70] Volume overload     1/8/2025  6:03 PM Zoë Richey Add [Z92.241] History of recent steroid use     1/8/2025  6:03 PM Zoë Richey Add [R60.0] Peripheral edema     1/8/2025  6:36 PM Zoë Richey Add [R79.89] Elevated TSH           ED Disposition       ED Disposition   Discharge    Condition   Stable    Date/Time   Wed Jan 8, 2025  6:17 PM    Comment   Yokasta Cadena discharge to home/self care.                   Assessment & Plan       Medical Decision Making  Amount and/or Complexity of Data Reviewed  Labs: ordered.  Radiology: ordered.    Risk  Prescription drug management.        ED Course as of 01/08/25 1837   Wed Jan 08, 2025   1538 Labs drawn prior to rooming.  Renal function stable.No increased LFTs.  CBC reveals stable Hgb & Plt.  WBC slightly elevated though she just completed steroid use in the last couple of days.   1539 DDx includes, not ltd to: renal failure/ volume overload, volume overload secondary to increased sodium intake, new CHF, dysrhythmia such as afib w/ impaired cardiac fxn, cardiomyopathy, HTN urgency, nephrotic syndrome, UTI/ pyelo, coagulopathy w/ LE or pelvic DVT +/- PE, pna, pancreatitis, intraabd malignancy +/- ascites, s.e. of fluid retention w/ prednisone use.   1612 BP trending down.   1648 TSH has trended up since last check.  PCP did have plans to reevaluate around this time and consider adjustment of levothyroxine dosing.  Free T4 remains pending.  Degree of hypothyroidism could be contributing to some of her fatigue/malaise.    First troponin at 7 is normal though within range for repeat.  This will be performed.   1651 Urinalysis unremarkable.  No protein to suggest nephritis or glomerular nephrosis.  D-dimer is not  elevated suggesting very strongly against possible DVT/PE.    Given degree of abdominal distention described and persistent pain and tenderness in the epigastrium we will pursue CT abdomen pelvis.   1712 2-hour troponin in process, free T4 in process, currently at CT   1736 Repeat troponin is minimally changed, delta troponin is 8 not suggestive of cardiac ischemia.   1738 CT images reviewed by me.  I do not appreciate ascites.  Large cystic lesion of the liver has previously been visualized.  Radiology interpretation pending.    If no acute abnormality identified on CT abdomen/pelvis anticipate discharge with short course of diuretics.    Volume overload likely multifactorial related to recent prednisone use, increased sodium intake, decrease in physical activity while ill with viral infection.  Elevated blood pressure and BNP with leg edema supports this.  Although she has needed to use her inhaler a bit more frequently over the last couple of weeks she was able to walk up a great distance here without distress or hypoxia.  She will have good follow-up with PCP.   1802 Radiologist currently reviewing CT images.   1816 No acute CT abnl.  No tenderness of the lower abdomen or skin changes suggestive of panniculitis.   1825 Reviewed study results with patient and family.  Will initiate short course of diuretic.  She already has follow-up with PCP next week.  I additionally asked that she weigh herself daily as well as check her blood pressure daily and record values.  She notes that she already weighs herself each morning and will begin keeping a log of this.  She does have a blood pressure cuff to monitor her pressure.  She will bring these readings with her to PCP appointment.  She is aware to receive medical attention sooner if she has any worsening such as development of a new chest pain, significant increase in swelling or any other concerns.   1827 Family did also inform me patient shared that she has not had  "her blood pressure medicine on all recent days.  She relates that she took this on Sunday although not Monday, Tuesday or today Wednesday as she was very concerned about the swelling.  She is aware to resume this tonight and assures that she does have adequate supply.   Reviewed findings of elevated TSH, pending free T4 and follow-up with Dr. Vallejo regarding results.    Medications   iohexol (OMNIPAQUE) 350 MG/ML injection (MULTI-DOSE) 85 mL (85 mL Intravenous Given 1/8/25 1714)       ED Risk Strat Scores                                              History of Present Illness       Chief Complaint   Patient presents with    Medical Problem     Pt reports recent pt reports recent sinus infection with antibiotics, now having bilateral ankle swelling, +11lb increase in \"one afternoon\" with abdominal distension        Past Medical History:   Diagnosis Date    Acid reflux     Anxiety     Arthritis     Asthma     C1-C4 level with central cord syndrome (HCC)     Resolved 2/1/2017     Carpal tunnel syndrome Resolved 4/21/2015    Colitis     Colon polyp     Concussion     Depressed     Dysthymic disorder     Resolved 1/5/2018    Gastric ulcer 2013    small - on EGD - [facility]    Hemorrhoids     Hx of blood clots     Hx of colonic polyps     [facility]Merrick    Hyperlipemia     Hypertension     Hypothyroid     Lumbar spondylosis 04/25/2024    Lung nodule     stable x 2 yrs on CT    Migraines     Obesity     Postural dizziness with presyncope     Resolved 8/28/2018     Tendinitis     Ulnar neuropathy     Resolved 5/21/2015     Vertigo       Past Surgical History:   Procedure Laterality Date    APPENDECTOMY      BREAST SURGERY Left 01/1966    BREAST LUMPECTOMY    CARPAL TUNNEL RELEASE      CHOLECYSTECTOMY      DILATION AND CURETTAGE OF UTERUS      ELBOW SURGERY      EYE SURGERY Bilateral 2019    Cataract     GALLBLADDER SURGERY      JOINT REPLACEMENT Right     REPLACEMENT TOTAL KNEE    KNEE ARTHROSCOPY      KNEE SURGERY Right     NECK " SURGERY      ORTHOPEDIC SURGERY      POSTERIOR LAMINECTOMY / DECOMPRESSION CERVICAL SPINE  2015    REPLACEMENT TOTAL KNEE Left     TONSILLECTOMY      TOTAL KNEE ARTHROPLASTY Left 2022    [facility] Dr. Diaz    VAGINAL DELIVERY      x3    WRIST SURGERY Right       Family History   Problem Relation Age of Onset    Breast cancer Mother     Alzheimer's disease Mother     Coronary artery disease Mother     Hypertension Mother     Migraines Mother     Peripheral vascular disease Mother     Arthritis Mother     Cancer Mother     Parkinsonism Father     Other Father         Cardiovascular disease     Coronary artery disease Father     Hypertension Father     Bipolar disorder Sister     Thyroid disease Sister     Alzheimer's disease Sister     Depression Sister     Throat cancer Maternal Grandfather     Cancer Maternal Grandfather     Allergic rhinitis Daughter     Asthma Daughter     Asthma Son     Diabetes Neg Hx     Stroke Neg Hx       Social History     Tobacco Use    Smoking status: Former     Current packs/day: 0.00     Types: Cigarettes     Start date: 1983     Quit date: 10/14/2013     Years since quittin.2     Passive exposure: Never    Smokeless tobacco: Never   Vaping Use    Vaping status: Never Used   Substance Use Topics    Alcohol use: No    Drug use: No      E-Cigarette/Vaping    E-Cigarette Use Never User       E-Cigarette/Vaping Substances    Nicotine No     THC No     CBD No     Flavoring No     Other No     Unknown No       I have reviewed and agree with the history as documented.     80 yo F presents to the ED w/ concern for leg swelling & weight gain.  Reports having felt poorly near [place] w/ nasal congestion, dyspnea (prompting increased inhaler use), fatigue & nighttime n/v.  [facility]  when symptoms persisted & report having been prescribed 2 abx which she finished 2+ days ago. (Records notable for doxy 100mg BID & prednisone 20mg which she describes taking  2 tabs of each AM & each PM).  Nasal congestion greatly improved.  Still using inhaler after exertion (ex walking in from parking lot, it provides relief).  2 days ago became concerned re: degree of foot/ ankle swelling.  It had been milder around the holidays but to the point of discomfort & paresthesias 2 d ago (symmetric).  Relays weight was up at night (2 nights ago) to 196lb from her usual 170s-low 180s.  Today weight was 183lb.  Has had urinary frequency without dysuria, hematuria or malodor.  Notes epigastric discomfort waxing & waning X a couple of weeks. No fever.  No immobilization though due to malaise was less active than usual last 2 wks.  Had more chocolate & ham over the holidays than usually in diet.  Took occasional coricidin & mucinex for nasal congestion & sore throat.  Started metoprolol approx 1 m ago as BP was mildly elevated.  Reports no other med changes.     PMHx: HTN, HLD, GERD, CKD, asthma, hypothyroidism (recent dose increase w/ planned TSH for late Dec/ early Jan).  Was scheduled for PCP appt today & referred in for eval.  Concern for possible CHF.    Wt Readings from Last 3 Encounters:  01/08/25 : 82 kg (180 lb 12.4 oz)  12/13/24 : 80 kg (176 lb 6.4 oz)  12/05/24 : 79.1 kg (174 lb 6.4 oz)            Review of Systems   Constitutional:  Positive for fatigue. Negative for chills and fever.   Respiratory:  Positive for shortness of breath. Negative for cough.    Cardiovascular:  Positive for leg swelling. Negative for palpitations.   Gastrointestinal:  Positive for abdominal distention (mild presently, improved from 2 d ago).   Genitourinary:  Positive for frequency (especially today). Negative for dysuria.   Musculoskeletal:  Negative for myalgias.        Resolved foot paresthesias (present when maximal swelling occurred)   Skin:  Negative for rash.   All other systems reviewed and are negative.          Objective       ED Triage Vitals [01/08/25 1422]   Temperature Pulse Blood Pressure  Respirations SpO2 Patient Position - Orthostatic VS   98 °F (36.7 °C) 66 (!) 192/76 18 95 % Sitting      Temp Source Heart Rate Source BP Location FiO2 (%) Pain Score    Oral Monitor Right arm -- --      Vitals      Date and Time Temp Pulse SpO2 Resp BP Pain Score FACES Pain Rating User   01/08/25 1611 -- -- -- -- 154/68 -- -- SAZ   01/08/25 1604 -- -- -- -- 171/76 -- -- RI   01/08/25 1422 98 °F (36.7 °C) 66 95 % 18 192/76 -- -- KK            Physical Exam  Vitals and nursing note reviewed.   Constitutional:       Appearance: Normal appearance.      Comments: Comfortable appearing respirations upon entry to room.  Ambulated good distance from waiting room.   HENT:      Head: Normocephalic.      Mouth/Throat:      Mouth: Mucous membranes are moist.   Eyes:      General: No scleral icterus.     Extraocular Movements: Extraocular movements intact.      Conjunctiva/sclera: Conjunctivae normal.   Cardiovascular:      Rate and Rhythm: Normal rate and regular rhythm.      Heart sounds: Normal heart sounds. No murmur heard.  Pulmonary:      Effort: Pulmonary effort is normal.      Breath sounds: Normal breath sounds.      Comments: Mild bibasilar crackles  Abdominal:      General: Bowel sounds are normal.      Palpations: Abdomen is soft.      Tenderness: There is abdominal tenderness (epigastric). There is right CVA tenderness and left CVA tenderness.   Musculoskeletal:         General: Swelling present. Normal range of motion.      Right lower leg: Edema present.      Left lower leg: Edema present.      Comments: 2+ edema to mid calf, mild tn, + 2 PT pulses (legs symmetric)   Skin:     General: Skin is warm and dry.      Findings: No rash.   Neurological:      Mental Status: She is alert and oriented to person, place, and time.   Psychiatric:         Mood and Affect: Mood normal.         Results Reviewed       Procedure Component Value Units Date/Time    HS Troponin I 2hr [[id number]]  (Normal) Collected: 01/08/25 1705     Lab Status: Final result Specimen: Blood from Arm, Left Updated: 01/08/25 1735     hs TnI 2hr 15 ng/L      Delta 2hr hsTnI 8 ng/L     UA w Reflex to Microscopic w Reflex to Culture [[id number]] Collected: 01/08/25 1603    Lab Status: Final result Specimen: Urine, Clean Catch Updated: 01/08/25 1629     Color, UA Colorless     Clarity, UA Clear     Specific Gravity, UA 1.009     pH, UA 6.5     Leukocytes, UA Negative     Nitrite, UA Negative     Protein, UA Negative mg/dl      Glucose, UA Negative mg/dl      Ketones, UA Negative mg/dl      Urobilinogen, UA <2.0 mg/dl      Bilirubin, UA Negative     Occult Blood, UA Negative    TSH, 3rd generation with Free T4 reflex [[id number]]  (Abnormal) Collected: 01/08/25 1428    Lab Status: Final result Specimen: Blood from Arm, Left Updated: 01/08/25 1628     TSH 3RD GENERATON 13.236 uIU/mL     T4, free [[id number]] Collected: 01/08/25 1428    Lab Status: In process Specimen: Blood from Arm, Left Updated: 01/08/25 1628    D-Dimer [[id number]]  (Normal) Collected: 01/08/25 1601    Lab Status: Final result Specimen: Blood from Arm, Left Updated: 01/08/25 1627     D-Dimer, Quant 0.28 ug/ml FEU     Narrative:      In the evaluation for possible pulmonary embolism, in the appropriate (Well's Score of 4 or less) patient, the age adjusted d-dimer cutoff for this patient can be calculated as:    Age x 0.01 (in ug/mL) for Age-adjusted D-dimer exclusion threshold for a patient over 50 years.    HS Troponin 0hr (reflex protocol) [[id number]]  (Normal) Collected: 01/08/25 1428    Lab Status: Final result Specimen: Blood from Arm, Left Updated: 01/08/25 1619     hs TnI 0hr 7 ng/L     Lipase [[id number]]  (Normal) Collected: 01/08/25 1428    Lab Status: Final result Specimen: Blood from Arm, Left Updated: 01/08/25 1619     Lipase 19 u/L     B-Type Natriuretic Peptide(BNP) [[id number]]  (Abnormal) Collected: 01/08/25 1428    Lab Status: Final result Specimen: Blood from Arm, Left Updated: 01/08/25  1528      pg/mL     Comprehensive metabolic panel [[id number]] Collected: 01/08/25 1428    Lab Status: Final result Specimen: Blood from Arm, Left Updated: 01/08/25 1456     Sodium 141 mmol/L      Potassium 3.9 mmol/L      Chloride 105 mmol/L      CO2 29 mmol/L      ANION GAP 7 mmol/L      BUN 23 mg/dL      Creatinine 0.92 mg/dL      Glucose 98 mg/dL      Calcium 9.4 mg/dL      AST 17 U/L      ALT 22 U/L      Alkaline Phosphatase 85 U/L      Total Protein 7.0 g/dL      Albumin 4.2 g/dL      Total Bilirubin 0.57 mg/dL      eGFR 58 ml/min/1.73sq m     Narrative:      National Kidney Disease Foundation guidelines for Chronic Kidney Disease (CKD):     Stage 1 with normal or high GFR (GFR > 90 mL/min/1.73 square meters)    Stage 2 Mild CKD (GFR = 60-89 mL/min/1.73 square meters)    Stage 3A Moderate CKD (GFR = 45-59 mL/min/1.73 square meters)    Stage 3B Moderate CKD (GFR = 30-44 mL/min/1.73 square meters)    Stage 4 Severe CKD (GFR = 15-29 mL/min/1.73 square meters)    Stage 5 End Stage CKD (GFR <15 mL/min/1.73 square meters)  Note: GFR calculation is accurate only with a steady state creatinine    CBC and differential [[id number]]  (Abnormal) Collected: 01/08/25 1428    Lab Status: Final result Specimen: Blood from Arm, Left Updated: 01/08/25 1444     WBC 10.54 Thousand/uL      RBC 3.94 Million/uL      Hemoglobin 10.9 g/dL      Hematocrit 34.2 %      MCV 87 fL      MCH 27.7 pg      MCHC 31.9 g/dL      RDW 15.1 %      MPV 9.8 fL      Platelets 272 Thousands/uL      nRBC 0 /100 WBCs      Segmented % 65 %      Immature Grans % 1 %      Lymphocytes % 21 %      Monocytes % 7 %      Eosinophils Relative 6 %      Basophils Relative 0 %      Absolute Neutrophils 6.75 Thousands/µL      Absolute Immature Grans 0.07 Thousand/uL      Absolute Lymphocytes 2.26 Thousands/µL      Absolute Monocytes 0.78 Thousand/µL      Eosinophils Absolute 0.64 Thousand/µL      Basophils Absolute 0.04 Thousands/µL             CT abdomen pelvis  with contrast   Final Interpretation by Mazin Tejada DO (01/08 1805)      Mild subcutaneous fat stranding ventral lower abdominal wall with minimal skin thickening, findings which may reflect mild panniculitis.      The stomach is largely collapsed and cannot be reliably evaluated on this exam.      Otherwise, no acute findings in the abdomen or pelvis.      Stable, mildly complex right hepatic lobe cyst, unchanged from April 2021.      Colonic diverticulosis without diverticulitis.      Stable small bilateral ovarian cysts, not requiring further follow-up.               Workstation performed: [id number]         XR chest 2 views   ED Interpretation by Zëo Richey MD (01/08 1606)   Unremarkable cardiac silhouette,  No infiltrate, vascular congestion or effusion.  Haziness over b/l lung fields suspected secondary to soft tissue prominence      Final Interpretation by Iban Jensen MD (01/08 1602)      No acute cardiopulmonary disease.            Workstation performed: [id number]             ECG 12 Lead Documentation Only    Date/Time: 1/8/2025 3:56 PM    Performed by: Zoë Richey MD  Authorized by: Zoë Richey MD    ECG reviewed by me, the ED Provider: yes    Patient location:  ED  Previous ECG:     Previous ECG:  Compared to current    Comparison ECG info:  10/26/2022    Similarity:  No change  Rate:     ECG rate:  61    ECG rate assessment: normal    Rhythm:     Rhythm: sinus rhythm    Ectopy:     Ectopy: none    QRS:     QRS axis:  Left    QRS intervals:  Normal  Conduction:     Conduction: normal    ST segments:     ST segments:  Normal  T waves:     T waves: normal        ED Medication and Procedure Management   Prior to Admission Medications   Prescriptions Last Dose Informant Patient Reported? Taking?   Ascorbic Acid (VITAMIN C PO)  Self Yes No   Sig: Vitamin C   Cholecalciferol (Vitamin D3) 1.25 MG (28992 UT) CAPS  Self Yes No   Sig: Take 5,000  Units by mouth daily   Cyanocobalamin (VITAMIN B-12 PO)   Yes No   Ivermectin 1 % CREA   No No   Sig: Apply topically in the morning   albuterol (Ventolin HFA) 90 mcg/act inhaler  Self No No   Sig: Inhale 2 puffs every 6 (six) hours as needed for wheezing   amLODIPine (NORVASC) 10 mg tablet   No No   Sig: TAKE 1 TABLET BY MOUTH EVERY DAY   atorvastatin (LIPITOR) 20 mg tablet   No No   Sig: TAKE 1 TABLET BY MOUTH EVERY DAY   buPROPion (WELLBUTRIN XL) 150 mg 24 hr tablet   No No   Sig: Take 1 tablet (150 mg total) by mouth every morning   ferrous sulfate 325 (65 Fe) mg tablet   No No   Sig: Take 1 tablet (325 mg total) by mouth daily with breakfast   fluticasone (FLONASE) 50 mcg/act nasal spray   No No   Sig: SPRAY 1 SPRAY INTO EACH NOSTRIL EVERY DAY   levothyroxine 150 mcg tablet   No No   Sig: TAKE 1 TABLET (150 MCG TOTAL) BY MOUTH DAILY IN THE EARLY MORNING   losartan (COZAAR) 100 MG tablet   No No   Sig: TAKE 1 TABLET BY MOUTH EVERY DAY   meclizine (ANTIVERT) 25 mg tablet  Self No No   Sig: Take 1 tablet (25 mg total) by mouth every 8 (eight) hours as needed for dizziness for up to 10 days   metoprolol succinate (TOPROL-XL) 25 mg 24 hr tablet   No No   Sig: Take 1 tablet (25 mg total) by mouth daily   multivitamin (THERAGRAN) TABS   Yes No   Sig: Take 1 tablet by mouth daily One a day   ondansetron (ZOFRAN) 4 mg tablet   No No   Sig: Take 1 tablet (4 mg total) by mouth 3 (three) times a day as needed for nausea or vomiting   pantoprazole (PROTONIX) 40 mg tablet   No No   Sig: TAKE 1 TABLET BY MOUTH TWICE A DAY   pregabalin (LYRICA) 75 mg capsule   No No   Sig: Take 1 capsule (75 mg total) by mouth 2 (two) times a day   sertraline (ZOLOFT) 100 mg tablet   No No   Sig: TAKE 1 TABLET BY MOUTH TWICE A DAY   traZODone (DESYREL) 50 mg tablet   No No   Sig: TAKE 2 TABLETS (100 MG TOTAL) BY MOUTH DAILY AT BEDTIME      Facility-Administered Medications: None     Patient's Medications   Discharge Prescriptions    FUROSEMIDE  (LASIX) 20 MG TABLET    Take 1 tablet (20 mg total) by mouth daily for 4 days       Start Date: 1/8/2025  End Date: 1/12/2025       Order Dose: 20 mg       Quantity: 4 tablet    Refills: 0     No discharge procedures on file.  ED SEPSIS DOCUMENTATION   Time reflects when diagnosis was documented in both MDM as applicable and the Disposition within this note       Time User Action Codes Description Comment    1/8/2025  6:02 PM Zoë Richey Add [E87.70] Volume overload     1/8/2025  6:03 PM Zoë Richey Add [Z92.241] History of recent steroid use     1/8/2025  6:03 PM Zoë Richey Add [R60.0] Peripheral edema     1/8/2025  6:36 PM Zoë Richey Add [R79.89] Elevated TSH                  Zoë Richey MD  01/08/25 0356

## 2025-01-08 NOTE — DISCHARGE INSTRUCTIONS
Take furosemide diuretic/water pill orally daily for the next 4 days.  (Morning timing is recommended as you will need to use the restroom several times to urinate.)    Restart your blood pressure medication as previously prescribed.    Keep a listing of daily weights and blood pressures and bring these to your appointment with Dr. Bray next week.    Reseek medical attention sooner if you have any worsening/new concerns.    As discussed, your thyroid-stimulating hormone level is elevated.  A second thyroid function test is pending.  You will be contacted by phone if this is abnormal and results can be reviewed with Dr. Vallejo to see if any medication adjustment is necessary.

## 2025-01-08 NOTE — TELEPHONE ENCOUNTER
"Patient has appt Wed 1/8/25 at 1:40pm for     \"ankle swelling and numb feeling. gained 11 pounds since 12/30, stomach distended and bloating. \"    Nurse to please direct patient to ER for evaluation to rule out heart failure as will need labs, imaging, and meds.    Sent to office managers as this was not triaged.    "

## 2025-01-09 ENCOUNTER — VBI (OUTPATIENT)
Dept: FAMILY MEDICINE CLINIC | Facility: CLINIC | Age: 80
End: 2025-01-09

## 2025-01-09 NOTE — LETTER
[facility]  [address]  [address]  [place] [address]  [phone]    Date: 01/13/25    Yokasta Cadena  [address]  [place] [address]    Dear Yokasta:                                                                                                                                Thank you for choosing [facility] emergency department for care.  Your primary care provider wants to make sure that your ongoing medical care is being addressed. If you require follow up care as a result of your emergency department visit, there are a few things the practice would like you to know.                As part of the network's continuing commitment to caring for our patients, we have added more same day appointments and have extended office hours to meet your medical needs. After hours, on-call physicians are available via your primary care provider's main office line.               We encourage you to contact our office prior to seeking treatment to discuss your symptoms with the medical staff.  Together, we can determine the correct course of action.  A majority of non-emergent conditions such as: common cold, flu-like symptoms, fevers, strains/sprains, dislocations, minor burns, cuts and animal bites can be treated at [facility] facilities. Diagnostic testing is available at some sites.               Of course, if you are experiencing a life threatening medical emergency call 911 or proceed directly to the nearest emergency room.    Your nearest [facility] facility is conveniently located at:    [facility] ([place])  [address] [place] [address] [address]  [place] [address]  [phone]  SKIP THE WAIT  Conveniently offered at most [facility] locations  [place] your spot online at [email or web address] or on the [facility]    Sincerely,    [facility]  Dept:  [phone]

## 2025-01-09 NOTE — TELEPHONE ENCOUNTER
01/09/25 9:32 AM    Patient contacted post ED visit, first outreach attempt made. Message was left for patient to return a call to the VBI Department at [facility]: Phone [phone].    Thank you.  Halina Bhatt MA  [facility]

## 2025-01-10 NOTE — TELEPHONE ENCOUNTER
01/10/25 9:49 AM    Patient contacted post ED visit, second outreach attempt made. Message was left for patient to return a call to the VBI Department at [facility]: Phone [phone].    Thank you.  Halina Bhatt MA  [facility]

## 2025-01-13 NOTE — TELEPHONE ENCOUNTER
01/13/25 11:19 AM    Patient contacted post ED visit, phone outreaches were unsuccessful and a MyChart letter has been sent to the patient as follow-up.    Thank you.  Halina Bhatt MA  [facility]

## 2025-01-15 ENCOUNTER — OFFICE VISIT (OUTPATIENT)
Dept: FAMILY MEDICINE CLINIC | Facility: CLINIC | Age: 80
End: 2025-01-15
Payer: MEDICARE

## 2025-01-15 VITALS
BODY MASS INDEX: 34.79 KG/M2 | WEIGHT: 177.2 LBS | TEMPERATURE: 99.3 F | HEIGHT: 60 IN | OXYGEN SATURATION: 96 % | HEART RATE: 72 BPM | SYSTOLIC BLOOD PRESSURE: 150 MMHG | RESPIRATION RATE: 18 BRPM | DIASTOLIC BLOOD PRESSURE: 62 MMHG

## 2025-01-15 DIAGNOSIS — I10 ESSENTIAL HYPERTENSION: ICD-10-CM

## 2025-01-15 DIAGNOSIS — J32.9 SINUSITIS, UNSPECIFIED CHRONICITY, UNSPECIFIED LOCATION: Primary | ICD-10-CM

## 2025-01-15 DIAGNOSIS — E87.70 HYPERVOLEMIA, UNSPECIFIED HYPERVOLEMIA TYPE: ICD-10-CM

## 2025-01-15 DIAGNOSIS — E03.9 HYPOTHYROIDISM, UNSPECIFIED TYPE: ICD-10-CM

## 2025-01-15 PROBLEM — N18.31 STAGE 3A CHRONIC KIDNEY DISEASE (HCC): Status: RESOLVED | Noted: 2022-03-25 | Resolved: 2025-01-15

## 2025-01-15 PROCEDURE — 99214 OFFICE O/P EST MOD 30 MIN: CPT | Performed by: FAMILY MEDICINE

## 2025-01-15 NOTE — ASSESSMENT & PLAN NOTE
Blood pressure higher while on prednisone  Now off, and bps improved  Recheck 2 weeks and keep monitoring at home.

## 2025-01-15 NOTE — PROGRESS NOTES
Name: Yokasta Cadena      : 1945      MRN: [id number]  Encounter Provider: Lisbeth Vallejo DO  Encounter Date: 1/15/2025   Encounter department: [facility]  :  Assessment & Plan  Sinusitis, unspecified chronicity, unspecified location  Continued nasal d/c, congestion, PND, headache  D/c doxy  No need for prednisone  Start augmentin BID x 7 days  F/u 2 weeks    Orders:    amoxicillin-clavulanate (AUGMENTIN) 875-125 mg per tablet; Take 1 tablet by mouth every 12 (twelve) hours for 7 days    Hypervolemia, unspecified hypervolemia type  Euvolemic on exam today  Weight down since ER visit  Suspect most weight was secondary to prednisone use which has now resolved  Discussed need to take synthroid daily--she has restarted and will recheck tsh in 1 month.           Hypothyroidism, unspecified type  Discussed need to take synthroid daily--she has restarted and will recheck tsh in 1 mo         Essential hypertension  Blood pressure higher while on prednisone  Now off, and bps improved  Recheck 2 weeks and keep monitoring at home.              History of Present Illness     HPI  Pt presents for ER f/u.  Was in ED  for weight gain, ankle swelling, nausea.  Was on doxy and prednisone from urgent care at the time and noticed a large weight gain (20 lbs in an afternoon, though record does not show this) and was directed to ER  she was given lasix for several days and labs were unremarkable, including bmp.  CT abd stable.  CXR no acute changes.  She has been monitoring weights and bps since the .  Bps running 130s-140s and trending down since prednisone d/c    Weight decreased today.  No LE edema.  Still feels facial pressure, congestion, nasal d/c, and cough from PND.  No shortness of breath.      Tsh elevated--pt admits not taking synthroid regularly but after ER visit has restarted     Review of Systems  See hpi; all other systems negative    Objective   /62   Pulse 72   Temp 99.3 °F  (37.4 °C) (Temporal)   Resp 18   Ht 5' (1.524 m)   Wt 80.4 kg (177 lb 3.2 oz)   SpO2 96%   BMI 34.61 kg/m²      Physical Exam  Constitutional:       Appearance: Normal appearance.   HENT:      Head: Normocephalic and atraumatic.      Right Ear: Tympanic membrane, ear canal and external ear normal.      Left Ear: Tympanic membrane, ear canal and external ear normal.      Nose: Congestion present.      Mouth/Throat:      Pharynx: Posterior oropharyngeal erythema present. No oropharyngeal exudate.   Eyes:      Extraocular Movements: Extraocular movements intact.      Conjunctiva/sclera: Conjunctivae normal.      Pupils: Pupils are equal, round, and reactive to light.   Cardiovascular:      Rate and Rhythm: Normal rate and regular rhythm.      Heart sounds: No murmur heard.     No friction rub. No gallop.   Pulmonary:      Effort: Pulmonary effort is normal.      Breath sounds: Normal breath sounds. No wheezing, rhonchi or rales.   Musculoskeletal:      Right lower leg: No edema.      Left lower leg: No edema.   Lymphadenopathy:      Cervical: No cervical adenopathy.   Neurological:      Mental Status: She is alert.

## 2025-01-20 DIAGNOSIS — I10 ESSENTIAL HYPERTENSION: ICD-10-CM

## 2025-01-20 RX ORDER — LOSARTAN POTASSIUM 100 MG/1
100 TABLET ORAL DAILY
Qty: 30 TABLET | Refills: 5 | Status: SHIPPED | OUTPATIENT
Start: 2025-01-20

## 2025-01-21 ENCOUNTER — TELEPHONE (OUTPATIENT)
Age: 80
End: 2025-01-21

## 2025-01-21 NOTE — TELEPHONE ENCOUNTER
Patient called to let PCP know that since starting amoxicillin-clavulanate (AUGMENTIN) 875-125 mg per tablet on 1/15; her bowels are still green and she can barely eat without feeling sick. Reports losing 4 pounds. She only has 2 pills left and is unsure if she should finish the course or how to proceed. Please advise.

## 2025-01-23 LAB
ATRIAL RATE: 61 BPM
P AXIS: 64 DEGREES
PR INTERVAL: 164 MS
QRS AXIS: -27 DEGREES
QRSD INTERVAL: 76 MS
QT INTERVAL: 398 MS
QTC INTERVAL: 400 MS
T WAVE AXIS: 34 DEGREES
VENTRICULAR RATE: 61 BPM

## 2025-01-28 DIAGNOSIS — I10 ESSENTIAL HYPERTENSION: ICD-10-CM

## 2025-01-28 NOTE — TELEPHONE ENCOUNTER
Refill must be reviewed and completed by the office or provider. The refill is unable to be approved or denied by the medication management team.    Patient have an appointment tomorrow 01.29.2025 - Can this be filled tomorrow?  Last note state:

## 2025-01-29 ENCOUNTER — OFFICE VISIT (OUTPATIENT)
Dept: FAMILY MEDICINE CLINIC | Facility: CLINIC | Age: 80
End: 2025-01-29
Payer: MEDICARE

## 2025-01-29 VITALS
HEIGHT: 60 IN | DIASTOLIC BLOOD PRESSURE: 60 MMHG | HEART RATE: 66 BPM | WEIGHT: 175.6 LBS | TEMPERATURE: 97.3 F | SYSTOLIC BLOOD PRESSURE: 130 MMHG | BODY MASS INDEX: 34.47 KG/M2 | OXYGEN SATURATION: 94 %

## 2025-01-29 DIAGNOSIS — I10 ESSENTIAL HYPERTENSION: Primary | ICD-10-CM

## 2025-01-29 PROCEDURE — G2211 COMPLEX E/M VISIT ADD ON: HCPCS | Performed by: FAMILY MEDICINE

## 2025-01-29 PROCEDURE — 99213 OFFICE O/P EST LOW 20 MIN: CPT | Performed by: FAMILY MEDICINE

## 2025-01-29 RX ORDER — FLUTICASONE FUROATE 50 UG/1
1 POWDER RESPIRATORY (INHALATION)
COMMUNITY
Start: 2025-01-29

## 2025-01-29 NOTE — PROGRESS NOTES
Name: Yokasta Cadena      : 1945      MRN: [id number]  Encounter Provider: Lisbeth Vallejo DO  Encounter Date: 2025   Encounter department: [facility]  :  Assessment & Plan           History of Present Illness   HPI  Review of Systems    Objective   /60 (Patient Position: Sitting, Cuff Size: Standard)   Pulse 66   Temp (!) 97.3 °F (36.3 °C) (Temporal)   Ht 5' (1.524 m)   Wt 79.7 kg (175 lb 9.6 oz)   SpO2 94%   BMI 34.29 kg/m²      Physical Exam

## 2025-01-29 NOTE — PROGRESS NOTES
Name: Yokasta Cadena      : 1945      MRN: [id number]  Encounter Provider: Lisbeth Vallejo DO  Encounter Date: 2025   Encounter department: [facility]  :  Assessment & Plan  Essential hypertension  Improved  Home numbers 130-140  Continue current meds  F/u 5 mo              History of Present Illness   HPI  Pt presents in f/u for blood pressure.  Blood pressure improved today.  Finally feeling better from cough, congestion, diarrhea/nausea.  Not 100% but much better.  Sister as well.  No shortness of breath.  No chest pain, no n/v, abd pain, visual changes, paresthesias, weakness        Review of Systems  See hpi    Objective   /60 (Patient Position: Sitting, Cuff Size: Standard)   Pulse 66   Temp (!) 97.3 °F (36.3 °C) (Temporal)   Ht 5' (1.524 m)   Wt 79.7 kg (175 lb 9.6 oz)   SpO2 94%   BMI 34.29 kg/m²      Physical Exam  Constitutional:       Appearance: Normal appearance.   HENT:      Head: Normocephalic and atraumatic.      Right Ear: External ear normal.      Left Ear: External ear normal.      Nose: Nose normal. No congestion.   Eyes:      Extraocular Movements: Extraocular movements intact.      Conjunctiva/sclera: Conjunctivae normal.   Cardiovascular:      Rate and Rhythm: Normal rate and regular rhythm.      Heart sounds: No murmur heard.     No friction rub. No gallop.   Pulmonary:      Effort: Pulmonary effort is normal.      Breath sounds: Normal breath sounds. No wheezing, rhonchi or rales.   Neurological:      General: No focal deficit present.      Mental Status: She is alert and oriented to person, place, and time.

## 2025-01-30 RX ORDER — METOPROLOL SUCCINATE 25 MG/1
25 TABLET, EXTENDED RELEASE ORAL DAILY
Qty: 30 TABLET | Refills: 1 | Status: SHIPPED | OUTPATIENT
Start: 2025-01-30

## 2025-02-10 ENCOUNTER — APPOINTMENT (EMERGENCY)
Dept: CT IMAGING | Facility: HOSPITAL | Age: 80
DRG: 390 | End: 2025-02-10
Payer: MEDICARE

## 2025-02-10 ENCOUNTER — OFFICE VISIT (OUTPATIENT)
Dept: URGENT CARE | Facility: MEDICAL CENTER | Age: 80
End: 2025-02-10
Payer: MEDICARE

## 2025-02-10 ENCOUNTER — HOSPITAL ENCOUNTER (INPATIENT)
Facility: HOSPITAL | Age: 80
LOS: 2 days | Discharge: HOME | DRG: 390 | End: 2025-02-13
Attending: EMERGENCY MEDICINE | Admitting: INTERNAL MEDICINE
Payer: MEDICARE

## 2025-02-10 VITALS
RESPIRATION RATE: 18 BRPM | SYSTOLIC BLOOD PRESSURE: 169 MMHG | DIASTOLIC BLOOD PRESSURE: 76 MMHG | TEMPERATURE: 98.3 F | BODY MASS INDEX: 34.49 KG/M2 | WEIGHT: 176.6 LBS | OXYGEN SATURATION: 94 % | HEART RATE: 71 BPM

## 2025-02-10 DIAGNOSIS — R10.9 ABDOMINAL PAIN: Primary | ICD-10-CM

## 2025-02-10 DIAGNOSIS — R93.5 ABNORMAL CT OF THE ABDOMEN: ICD-10-CM

## 2025-02-10 DIAGNOSIS — R11.0 NAUSEA: ICD-10-CM

## 2025-02-10 DIAGNOSIS — R10.9 ABDOMINAL PAIN, UNSPECIFIED ABDOMINAL LOCATION: Primary | ICD-10-CM

## 2025-02-10 PROBLEM — K56.609 SBO (SMALL BOWEL OBSTRUCTION) (HCC): Status: ACTIVE | Noted: 2025-02-10

## 2025-02-10 LAB
ALBUMIN SERPL BCG-MCNC: 4.2 G/DL (ref 3.5–5)
ALP SERPL-CCNC: 77 U/L (ref 34–104)
ALT SERPL W P-5'-P-CCNC: 11 U/L (ref 7–52)
ANION GAP SERPL CALCULATED.3IONS-SCNC: 8 MMOL/L (ref 4–13)
AST SERPL W P-5'-P-CCNC: 14 U/L (ref 13–39)
ATRIAL RATE: 101 BPM
ATRIAL RATE: 67 BPM
BACTERIA UR QL AUTO: ABNORMAL /HPF
BASOPHILS # BLD AUTO: 0.03 THOUSANDS/ÂΜL (ref 0–0.1)
BASOPHILS NFR BLD AUTO: 0 % (ref 0–1)
BILIRUB SERPL-MCNC: 0.49 MG/DL (ref 0.2–1)
BILIRUB UR QL STRIP: NEGATIVE
BUN SERPL-MCNC: 20 MG/DL (ref 5–25)
CALCIUM SERPL-MCNC: 9.3 MG/DL (ref 8.4–10.2)
CAOX CRY URNS QL MICRO: ABNORMAL /HPF
CARDIAC TROPONIN I PNL SERPL HS: 4 NG/L (ref ?–50)
CHLORIDE SERPL-SCNC: 109 MMOL/L (ref 96–108)
CLARITY UR: CLEAR
CO2 SERPL-SCNC: 24 MMOL/L (ref 21–32)
COLOR UR: YELLOW
CREAT SERPL-MCNC: 0.92 MG/DL (ref 0.6–1.3)
EOSINOPHIL # BLD AUTO: 0.14 THOUSAND/ÂΜL (ref 0–0.61)
EOSINOPHIL NFR BLD AUTO: 2 % (ref 0–6)
ERYTHROCYTE [DISTWIDTH] IN BLOOD BY AUTOMATED COUNT: 14.5 % (ref 11.6–15.1)
FLUAV AG UPPER RESP QL IA.RAPID: NEGATIVE
FLUBV AG UPPER RESP QL IA.RAPID: NEGATIVE
GFR SERPL CREATININE-BSD FRML MDRD: 58 ML/MIN/1.73SQ M
GLUCOSE SERPL-MCNC: 109 MG/DL (ref 65–140)
GLUCOSE UR STRIP-MCNC: NEGATIVE MG/DL
HCT VFR BLD AUTO: 35.9 % (ref 34.8–46.1)
HGB BLD-MCNC: 11.4 G/DL (ref 11.5–15.4)
HGB UR QL STRIP.AUTO: NEGATIVE
HYALINE CASTS #/AREA URNS LPF: ABNORMAL /LPF
IMM GRANULOCYTES # BLD AUTO: 0.02 THOUSAND/UL (ref 0–0.2)
IMM GRANULOCYTES NFR BLD AUTO: 0 % (ref 0–2)
KETONES UR STRIP-MCNC: NEGATIVE MG/DL
LACTATE SERPL-SCNC: 0.5 MMOL/L (ref 0.5–2)
LEUKOCYTE ESTERASE UR QL STRIP: ABNORMAL
LIPASE SERPL-CCNC: 12 U/L (ref 11–82)
LYMPHOCYTES # BLD AUTO: 1.24 THOUSANDS/ÂΜL (ref 0.6–4.47)
LYMPHOCYTES NFR BLD AUTO: 15 % (ref 14–44)
MCH RBC QN AUTO: 27.4 PG (ref 26.8–34.3)
MCHC RBC AUTO-ENTMCNC: 31.8 G/DL (ref 31.4–37.4)
MCV RBC AUTO: 86 FL (ref 82–98)
MONOCYTES # BLD AUTO: 0.49 THOUSAND/ÂΜL (ref 0.17–1.22)
MONOCYTES NFR BLD AUTO: 6 % (ref 4–12)
MUCOUS THREADS UR QL AUTO: ABNORMAL
NEUTROPHILS # BLD AUTO: 6.44 THOUSANDS/ÂΜL (ref 1.85–7.62)
NEUTS SEG NFR BLD AUTO: 77 % (ref 43–75)
NITRITE UR QL STRIP: NEGATIVE
NON-SQ EPI CELLS URNS QL MICRO: ABNORMAL /HPF
NRBC BLD AUTO-RTO: 0 /100 WBCS
P AXIS: 11 DEGREES
P AXIS: 37 DEGREES
PH UR STRIP.AUTO: 6.5 [PH]
PLATELET # BLD AUTO: 237 THOUSANDS/UL (ref 149–390)
PMV BLD AUTO: 9.5 FL (ref 8.9–12.7)
POTASSIUM SERPL-SCNC: 4.2 MMOL/L (ref 3.5–5.3)
PR INTERVAL: 168 MS
PROT SERPL-MCNC: 6.9 G/DL (ref 6.4–8.4)
PROT UR STRIP-MCNC: ABNORMAL MG/DL
QRS AXIS: -44 DEGREES
QRS AXIS: -45 DEGREES
QRSD INTERVAL: 84 MS
QRSD INTERVAL: 86 MS
QT INTERVAL: 388 MS
QT INTERVAL: 418 MS
QTC INTERVAL: 442 MS
QTC INTERVAL: 450 MS
RBC # BLD AUTO: 4.16 MILLION/UL (ref 3.81–5.12)
RBC #/AREA URNS AUTO: ABNORMAL /HPF
SARS-COV+SARS-COV-2 AG RESP QL IA.RAPID: NEGATIVE
SODIUM SERPL-SCNC: 141 MMOL/L (ref 135–147)
SP GR UR STRIP.AUTO: 1.02 (ref 1–1.03)
T WAVE AXIS: 37 DEGREES
T WAVE AXIS: 38 DEGREES
UROBILINOGEN UR STRIP-ACNC: <2 MG/DL
VENTRICULAR RATE: 67 BPM
VENTRICULAR RATE: 81 BPM
WBC # BLD AUTO: 8.36 THOUSAND/UL (ref 4.31–10.16)
WBC #/AREA URNS AUTO: ABNORMAL /HPF

## 2025-02-10 PROCEDURE — 80053 COMPREHEN METABOLIC PANEL: CPT

## 2025-02-10 PROCEDURE — G0463 HOSPITAL OUTPT CLINIC VISIT: HCPCS | Performed by: PHYSICIAN ASSISTANT

## 2025-02-10 PROCEDURE — 74177 CT ABD & PELVIS W/CONTRAST: CPT

## 2025-02-10 PROCEDURE — 96374 THER/PROPH/DIAG INJ IV PUSH: CPT

## 2025-02-10 PROCEDURE — 87811 SARS-COV-2 COVID19 W/OPTIC: CPT | Performed by: EMERGENCY MEDICINE

## 2025-02-10 PROCEDURE — 99284 EMERGENCY DEPT VISIT MOD MDM: CPT

## 2025-02-10 PROCEDURE — 96375 TX/PRO/DX INJ NEW DRUG ADDON: CPT

## 2025-02-10 PROCEDURE — 93010 ELECTROCARDIOGRAM REPORT: CPT

## 2025-02-10 PROCEDURE — 83690 ASSAY OF LIPASE: CPT

## 2025-02-10 PROCEDURE — 99223 1ST HOSP IP/OBS HIGH 75: CPT | Performed by: SURGERY

## 2025-02-10 PROCEDURE — 81001 URINALYSIS AUTO W/SCOPE: CPT | Performed by: EMERGENCY MEDICINE

## 2025-02-10 PROCEDURE — 99285 EMERGENCY DEPT VISIT HI MDM: CPT | Performed by: EMERGENCY MEDICINE

## 2025-02-10 PROCEDURE — 83605 ASSAY OF LACTIC ACID: CPT | Performed by: EMERGENCY MEDICINE

## 2025-02-10 PROCEDURE — 85025 COMPLETE CBC W/AUTO DIFF WBC: CPT

## 2025-02-10 PROCEDURE — 93005 ELECTROCARDIOGRAM TRACING: CPT | Performed by: PHYSICIAN ASSISTANT

## 2025-02-10 PROCEDURE — 87804 INFLUENZA ASSAY W/OPTIC: CPT | Performed by: EMERGENCY MEDICINE

## 2025-02-10 PROCEDURE — 84484 ASSAY OF TROPONIN QUANT: CPT | Performed by: EMERGENCY MEDICINE

## 2025-02-10 PROCEDURE — 99214 OFFICE O/P EST MOD 30 MIN: CPT | Performed by: PHYSICIAN ASSISTANT

## 2025-02-10 PROCEDURE — 96361 HYDRATE IV INFUSION ADD-ON: CPT

## 2025-02-10 PROCEDURE — 99223 1ST HOSP IP/OBS HIGH 75: CPT | Performed by: INTERNAL MEDICINE

## 2025-02-10 PROCEDURE — 93005 ELECTROCARDIOGRAM TRACING: CPT

## 2025-02-10 PROCEDURE — 36415 COLL VENOUS BLD VENIPUNCTURE: CPT

## 2025-02-10 RX ORDER — ENOXAPARIN SODIUM 100 MG/ML
40 INJECTION SUBCUTANEOUS DAILY
Status: DISCONTINUED | OUTPATIENT
Start: 2025-02-11 | End: 2025-02-13 | Stop reason: HOSPADM

## 2025-02-10 RX ORDER — ONDANSETRON 2 MG/ML
4 INJECTION INTRAMUSCULAR; INTRAVENOUS ONCE
Status: COMPLETED | OUTPATIENT
Start: 2025-02-10 | End: 2025-02-10

## 2025-02-10 RX ORDER — PANTOPRAZOLE SODIUM 40 MG/10ML
40 INJECTION, POWDER, LYOPHILIZED, FOR SOLUTION INTRAVENOUS
Status: DISCONTINUED | OUTPATIENT
Start: 2025-02-11 | End: 2025-02-13 | Stop reason: HOSPADM

## 2025-02-10 RX ORDER — TRAZODONE HYDROCHLORIDE 100 MG/1
100 TABLET ORAL
Status: DISCONTINUED | OUTPATIENT
Start: 2025-02-10 | End: 2025-02-13 | Stop reason: HOSPADM

## 2025-02-10 RX ORDER — METOPROLOL SUCCINATE 25 MG/1
25 TABLET, EXTENDED RELEASE ORAL DAILY
Status: DISCONTINUED | OUTPATIENT
Start: 2025-02-11 | End: 2025-02-13 | Stop reason: HOSPADM

## 2025-02-10 RX ORDER — ACETAMINOPHEN 10 MG/ML
1000 INJECTION, SOLUTION INTRAVENOUS ONCE
Status: COMPLETED | OUTPATIENT
Start: 2025-02-10 | End: 2025-02-10

## 2025-02-10 RX ORDER — BUPROPION HYDROCHLORIDE 150 MG/1
150 TABLET ORAL EVERY MORNING
Status: DISCONTINUED | OUTPATIENT
Start: 2025-02-11 | End: 2025-02-13 | Stop reason: HOSPADM

## 2025-02-10 RX ORDER — SODIUM CHLORIDE, SODIUM GLUCONATE, SODIUM ACETATE, POTASSIUM CHLORIDE, MAGNESIUM CHLORIDE, SODIUM PHOSPHATE, DIBASIC, AND POTASSIUM PHOSPHATE .53; .5; .37; .037; .03; .012; .00082 G/100ML; G/100ML; G/100ML; G/100ML; G/100ML; G/100ML; G/100ML
75 INJECTION, SOLUTION INTRAVENOUS CONTINUOUS
Status: DISCONTINUED | OUTPATIENT
Start: 2025-02-10 | End: 2025-02-12

## 2025-02-10 RX ORDER — ONDANSETRON 2 MG/ML
4 INJECTION INTRAMUSCULAR; INTRAVENOUS EVERY 6 HOURS PRN
Status: DISCONTINUED | OUTPATIENT
Start: 2025-02-10 | End: 2025-02-13 | Stop reason: HOSPADM

## 2025-02-10 RX ORDER — ALBUTEROL SULFATE 90 UG/1
2 INHALANT RESPIRATORY (INHALATION) EVERY 6 HOURS PRN
Status: DISCONTINUED | OUTPATIENT
Start: 2025-02-10 | End: 2025-02-13 | Stop reason: HOSPADM

## 2025-02-10 RX ORDER — LEVOTHYROXINE SODIUM 75 UG/1
150 TABLET ORAL
Status: DISCONTINUED | OUTPATIENT
Start: 2025-02-11 | End: 2025-02-13 | Stop reason: HOSPADM

## 2025-02-10 RX ORDER — SERTRALINE HYDROCHLORIDE 100 MG/1
100 TABLET, FILM COATED ORAL 2 TIMES DAILY
Status: DISCONTINUED | OUTPATIENT
Start: 2025-02-10 | End: 2025-02-13 | Stop reason: HOSPADM

## 2025-02-10 RX ORDER — AMLODIPINE BESYLATE 10 MG/1
10 TABLET ORAL DAILY
Status: DISCONTINUED | OUTPATIENT
Start: 2025-02-11 | End: 2025-02-13 | Stop reason: HOSPADM

## 2025-02-10 RX ORDER — ACETAMINOPHEN 325 MG/1
650 TABLET ORAL EVERY 6 HOURS PRN
Status: DISCONTINUED | OUTPATIENT
Start: 2025-02-10 | End: 2025-02-13 | Stop reason: HOSPADM

## 2025-02-10 RX ADMIN — SODIUM CHLORIDE 1000 ML: 0.9 INJECTION, SOLUTION INTRAVENOUS at 15:13

## 2025-02-10 RX ADMIN — ACETAMINOPHEN 1000 MG: 10 INJECTION INTRAVENOUS at 15:17

## 2025-02-10 RX ADMIN — ONDANSETRON 4 MG: 2 INJECTION INTRAMUSCULAR; INTRAVENOUS at 19:09

## 2025-02-10 RX ADMIN — TRAZODONE HYDROCHLORIDE 100 MG: 100 TABLET ORAL at 23:02

## 2025-02-10 RX ADMIN — SERTRALINE 100 MG: 100 TABLET, FILM COATED ORAL at 23:02

## 2025-02-10 RX ADMIN — ONDANSETRON 4 MG: 2 INJECTION INTRAMUSCULAR; INTRAVENOUS at 15:15

## 2025-02-10 RX ADMIN — IOHEXOL 100 ML: 350 INJECTION, SOLUTION INTRAVENOUS at 17:13

## 2025-02-10 RX ADMIN — SODIUM CHLORIDE, SODIUM GLUCONATE, SODIUM ACETATE, POTASSIUM CHLORIDE, MAGNESIUM CHLORIDE, SODIUM PHOSPHATE, DIBASIC, AND POTASSIUM PHOSPHATE 125 ML/HR: .53; .5; .37; .037; .03; .012; .00082 INJECTION, SOLUTION INTRAVENOUS at 18:55

## 2025-02-10 NOTE — Clinical Note
Case was discussed with MATEUSZ  and the patient's admission status was agreed to be Admission Status: observation status to the service of Dr. Sol

## 2025-02-10 NOTE — PROGRESS NOTES
[facility]        NAME: Yokasta Cadena is a 79 y.o. female  : 1945    MRN: [id number]  DATE: February 10, 2025  TIME: 2:13 PM    /76   Pulse 71   Temp 98.3 °F (36.8 °C) (Tympanic)   Resp 18   Wt 80.1 kg (176 lb 9.6 oz)   SpO2 94%   BMI 34.49 kg/m²     Assessment and Plan   Abdominal pain, unspecified abdominal location [R10.9]  1. Abdominal pain, unspecified abdominal location        2. Nausea              Patient Instructions       Follow up with PCP in 3-5 days.  Proceed to  ER if symptoms worsen.    Chief Complaint     Chief Complaint   Patient presents with    Abdominal Pain     Patient reports generalized abd pain and vomiting since yesterday.         History of Present Illness       Pt with abdomen pain and nausea starting yesterday, pt with 3 bowel movments today, states feels a little bit better today     Abdominal Pain  Associated symptoms include nausea and vomiting.       Review of Systems   Review of Systems   Constitutional: Negative.    HENT: Negative.     Eyes: Negative.    Respiratory: Negative.     Cardiovascular: Negative.    Gastrointestinal:  Positive for abdominal pain, nausea and vomiting.   Endocrine: Negative.    Genitourinary: Negative.    Musculoskeletal: Negative.    Skin: Negative.    Allergic/Immunologic: Negative.    Neurological: Negative.    Hematological: Negative.    Psychiatric/Behavioral: Negative.     All other systems reviewed and are negative.        Current Medications       Current Outpatient Medications:     albuterol (Ventolin HFA) 90 mcg/act inhaler, Inhale 2 puffs every 6 (six) hours as needed for wheezing, Disp: 18 g, Rfl: 0    amLODIPine (NORVASC) 10 mg tablet, TAKE 1 TABLET BY MOUTH EVERY DAY, Disp: 90 tablet, Rfl: 1    Arnuity Ellipta 50 MCG/ACT AEPB, Inhale 1 puff, Disp: , Rfl:     Ascorbic Acid (VITAMIN C PO), Vitamin C, Disp: , Rfl:     atorvastatin (LIPITOR) 20 mg tablet, TAKE 1 TABLET BY MOUTH EVERY DAY, Disp: 90 tablet, Rfl: 1     buPROPion (WELLBUTRIN XL) 150 mg 24 hr tablet, Take 1 tablet (150 mg total) by mouth every morning, Disp: 90 tablet, Rfl: 1    Cholecalciferol (Vitamin D3) 1.25 MG (58122 UT) CAPS, Take 5,000 Units by mouth daily, Disp: , Rfl:     Cyanocobalamin (VITAMIN B-12 PO), , Disp: , Rfl:     ferrous sulfate 325 (65 Fe) mg tablet, Take 1 tablet (325 mg total) by mouth daily with breakfast, Disp: 30 tablet, Rfl: 3    fluticasone (FLONASE) 50 mcg/act nasal spray, SPRAY 1 SPRAY INTO EACH NOSTRIL EVERY DAY, Disp: 16 g, Rfl: 5    Ivermectin 1 % CREA, Apply topically in the morning, Disp: 45 g, Rfl: 1    levothyroxine 150 mcg tablet, TAKE 1 TABLET (150 MCG TOTAL) BY MOUTH DAILY IN THE EARLY MORNING, Disp: 30 tablet, Rfl: 5    losartan (COZAAR) 100 MG tablet, TAKE 1 TABLET BY MOUTH EVERY DAY, Disp: 30 tablet, Rfl: 5    meclizine (ANTIVERT) 25 mg tablet, Take 1 tablet (25 mg total) by mouth every 8 (eight) hours as needed for dizziness for up to 10 days, Disp: 30 tablet, Rfl: 0    metoprolol succinate (TOPROL-XL) 25 mg 24 hr tablet, TAKE 1 TABLET (25 MG TOTAL) BY MOUTH DAILY., Disp: 30 tablet, Rfl: 1    multivitamin (THERAGRAN) TABS, Take 1 tablet by mouth daily One a day, Disp: , Rfl:     ondansetron (ZOFRAN) 4 mg tablet, Take 1 tablet (4 mg total) by mouth 3 (three) times a day as needed for nausea or vomiting, Disp: 20 tablet, Rfl: 0    pantoprazole (PROTONIX) 40 mg tablet, TAKE 1 TABLET BY MOUTH TWICE A DAY, Disp: 180 tablet, Rfl: 1    pregabalin (LYRICA) 75 mg capsule, Take 1 capsule (75 mg total) by mouth 2 (two) times a day, Disp: 60 capsule, Rfl: 1    sertraline (ZOLOFT) 100 mg tablet, TAKE 1 TABLET BY MOUTH TWICE A DAY, Disp: 180 tablet, Rfl: 2    traZODone (DESYREL) 50 mg tablet, TAKE 2 TABLETS (100 MG TOTAL) BY MOUTH DAILY AT BEDTIME, Disp: 180 tablet, Rfl: 2    Current Allergies     Allergies as of 02/10/2025 - Reviewed 02/10/2025   Allergen Reaction Noted    Ceftin [cefuroxime] Hives 11/27/2018    Cephalexin Other (See  Comments) 01/29/2025            The following portions of the patient's history were reviewed and updated as appropriate: allergies, current medications, past family history, past medical history, past social history, past surgical history and problem list.     Past Medical History:   Diagnosis Date    Acid reflux     Anxiety     Arthritis     Asthma     C1-C4 level with central cord syndrome (HCC)     Resolved 2/1/2017     Carpal tunnel syndrome Resolved 4/21/2015    Colitis     Colon polyp     Concussion     Depressed     Dysthymic disorder     Resolved 1/5/2018    Gastric ulcer 2013    small - on EGD - [facility]    Hemorrhoids     Hx of blood clots     Hx of colonic polyps     D'Merrick    Hyperlipemia     Hypertension     Hypothyroid     Lumbar spondylosis 04/25/2024    Lung nodule     stable x 2 yrs on CT    Migraines     Obesity     Postural dizziness with presyncope     Resolved 8/28/2018     Tendinitis     Ulnar neuropathy     Resolved 5/21/2015     Vertigo        Past Surgical History:   Procedure Laterality Date    APPENDECTOMY      BREAST SURGERY Left 01/1966    BREAST LUMPECTOMY    CARPAL TUNNEL RELEASE      CHOLECYSTECTOMY      DILATION AND CURETTAGE OF UTERUS      ELBOW SURGERY      EYE SURGERY Bilateral 2019    Cataract     GALLBLADDER SURGERY      JOINT REPLACEMENT Right     REPLACEMENT TOTAL KNEE    KNEE ARTHROSCOPY      KNEE SURGERY Right     NECK SURGERY      ORTHOPEDIC SURGERY      POSTERIOR LAMINECTOMY / DECOMPRESSION CERVICAL SPINE  02/2015    REPLACEMENT TOTAL KNEE Left 2022    TONSILLECTOMY      TOTAL KNEE ARTHROPLASTY Left 04/06/2022    [facility] Dr. Diaz    VAGINAL DELIVERY      x3    WRIST SURGERY Right        Family History   Problem Relation Age of Onset    Breast cancer Mother     Alzheimer's disease Mother     Coronary artery disease Mother     Hypertension Mother     Migraines Mother     Peripheral vascular disease Mother     Arthritis Mother     Cancer Mother     Parkinsonism Father     Other  Father         Cardiovascular disease     Coronary artery disease Father     Hypertension Father     Bipolar disorder Sister     Thyroid disease Sister     Alzheimer's disease Sister     Depression Sister     Throat cancer Maternal Grandfather     Cancer Maternal Grandfather     Allergic rhinitis Daughter     Asthma Daughter     Asthma Son     Diabetes Neg Hx     Stroke Neg Hx          Medications have been verified.        Objective   /76   Pulse 71   Temp 98.3 °F (36.8 °C) (Tympanic)   Resp 18   Wt 80.1 kg (176 lb 9.6 oz)   SpO2 94%   BMI 34.49 kg/m²        Physical Exam     Physical Exam  Vitals and nursing note reviewed.   Constitutional:       Appearance: She is well-developed and normal weight.      Comments: Gb and appendix removed     Pt states she has pain her back more than her regular arthritis pain   Discussed with pt about going to er   pt would like to go by ambulance     EKG in office wnl   HENT:      Head: Normocephalic and atraumatic.      Mouth/Throat:      Mouth: Mucous membranes are moist.      Pharynx: Oropharynx is clear.   Eyes:      Extraocular Movements: Extraocular movements intact.   Cardiovascular:      Rate and Rhythm: Normal rate and regular rhythm.      Heart sounds: Normal heart sounds.   Pulmonary:      Effort: Pulmonary effort is normal.      Breath sounds: Normal breath sounds.   Abdominal:      Palpations: Abdomen is soft.      Tenderness: There is abdominal tenderness in the epigastric area, periumbilical area, suprapubic area, left upper quadrant and left lower quadrant.      Comments: Moderate to severe mid epigastric and left abd pain    Neurological:      General: No focal deficit present.      Mental Status: She is alert and oriented to person, place, and time.   Psychiatric:         Mood and Affect: Mood normal.

## 2025-02-11 ENCOUNTER — DOCUMENTATION (OUTPATIENT)
Dept: ADMINISTRATIVE | Facility: OTHER | Age: 80
End: 2025-02-11

## 2025-02-11 VITALS — DIASTOLIC BLOOD PRESSURE: 76 MMHG | SYSTOLIC BLOOD PRESSURE: 169 MMHG

## 2025-02-11 PROBLEM — K59.00 CONSTIPATION: Status: ACTIVE | Noted: 2025-02-11

## 2025-02-11 LAB
ALBUMIN SERPL BCG-MCNC: 3.4 G/DL (ref 3.5–5)
ALP SERPL-CCNC: 63 U/L (ref 34–104)
ALT SERPL W P-5'-P-CCNC: 8 U/L (ref 7–52)
ANION GAP SERPL CALCULATED.3IONS-SCNC: 6 MMOL/L (ref 4–13)
AST SERPL W P-5'-P-CCNC: 11 U/L (ref 13–39)
BILIRUB SERPL-MCNC: 0.41 MG/DL (ref 0.2–1)
BUN SERPL-MCNC: 14 MG/DL (ref 5–25)
CALCIUM ALBUM COR SERPL-MCNC: 8.8 MG/DL (ref 8.3–10.1)
CALCIUM SERPL-MCNC: 8.3 MG/DL (ref 8.4–10.2)
CHLORIDE SERPL-SCNC: 111 MMOL/L (ref 96–108)
CO2 SERPL-SCNC: 25 MMOL/L (ref 21–32)
CREAT SERPL-MCNC: 0.82 MG/DL (ref 0.6–1.3)
ERYTHROCYTE [DISTWIDTH] IN BLOOD BY AUTOMATED COUNT: 14.4 % (ref 11.6–15.1)
FERRITIN SERPL-MCNC: 19 NG/ML (ref 11–307)
GFR SERPL CREATININE-BSD FRML MDRD: 67 ML/MIN/1.73SQ M
GLUCOSE P FAST SERPL-MCNC: 92 MG/DL (ref 65–99)
GLUCOSE SERPL-MCNC: 92 MG/DL (ref 65–140)
HCT VFR BLD AUTO: 30.1 % (ref 34.8–46.1)
HGB BLD-MCNC: 9.4 G/DL (ref 11.5–15.4)
IRON SATN MFR SERPL: 29 % (ref 15–50)
IRON SERPL-MCNC: 88 UG/DL (ref 50–212)
MCH RBC QN AUTO: 27.6 PG (ref 26.8–34.3)
MCHC RBC AUTO-ENTMCNC: 31.2 G/DL (ref 31.4–37.4)
MCV RBC AUTO: 89 FL (ref 82–98)
PLATELET # BLD AUTO: 172 THOUSANDS/UL (ref 149–390)
PMV BLD AUTO: 9.6 FL (ref 8.9–12.7)
POTASSIUM SERPL-SCNC: 3.8 MMOL/L (ref 3.5–5.3)
PROT SERPL-MCNC: 5.5 G/DL (ref 6.4–8.4)
RBC # BLD AUTO: 3.4 MILLION/UL (ref 3.81–5.12)
SODIUM SERPL-SCNC: 142 MMOL/L (ref 135–147)
TIBC SERPL-MCNC: 308 UG/DL (ref 250–450)
TRANSFERRIN SERPL-MCNC: 220 MG/DL (ref 203–362)
TSH SERPL DL<=0.05 MIU/L-ACNC: 1.52 UIU/ML (ref 0.45–4.5)
UIBC SERPL-MCNC: 220 UG/DL (ref 155–355)
WBC # BLD AUTO: 5.54 THOUSAND/UL (ref 4.31–10.16)

## 2025-02-11 PROCEDURE — 99232 SBSQ HOSP IP/OBS MODERATE 35: CPT

## 2025-02-11 PROCEDURE — 99231 SBSQ HOSP IP/OBS SF/LOW 25: CPT | Performed by: SPECIALIST

## 2025-02-11 PROCEDURE — 85027 COMPLETE CBC AUTOMATED: CPT | Performed by: INTERNAL MEDICINE

## 2025-02-11 PROCEDURE — 84443 ASSAY THYROID STIM HORMONE: CPT

## 2025-02-11 PROCEDURE — 83540 ASSAY OF IRON: CPT

## 2025-02-11 PROCEDURE — 82728 ASSAY OF FERRITIN: CPT

## 2025-02-11 PROCEDURE — 80053 COMPREHEN METABOLIC PANEL: CPT | Performed by: INTERNAL MEDICINE

## 2025-02-11 PROCEDURE — 83550 IRON BINDING TEST: CPT

## 2025-02-11 RX ORDER — LOSARTAN POTASSIUM 50 MG/1
100 TABLET ORAL DAILY
Status: DISCONTINUED | OUTPATIENT
Start: 2025-02-11 | End: 2025-02-13 | Stop reason: HOSPADM

## 2025-02-11 RX ORDER — POTASSIUM CHLORIDE 1500 MG/1
20 TABLET, EXTENDED RELEASE ORAL ONCE
Status: COMPLETED | OUTPATIENT
Start: 2025-02-11 | End: 2025-02-11

## 2025-02-11 RX ADMIN — SERTRALINE 100 MG: 100 TABLET, FILM COATED ORAL at 17:52

## 2025-02-11 RX ADMIN — LOSARTAN POTASSIUM 100 MG: 50 TABLET, FILM COATED ORAL at 15:49

## 2025-02-11 RX ADMIN — ONDANSETRON 4 MG: 2 INJECTION INTRAMUSCULAR; INTRAVENOUS at 12:39

## 2025-02-11 RX ADMIN — ENOXAPARIN SODIUM 40 MG: 40 INJECTION SUBCUTANEOUS at 08:18

## 2025-02-11 RX ADMIN — POTASSIUM CHLORIDE 20 MEQ: 1500 TABLET, EXTENDED RELEASE ORAL at 09:27

## 2025-02-11 RX ADMIN — PANTOPRAZOLE SODIUM 40 MG: 40 INJECTION, POWDER, FOR SOLUTION INTRAVENOUS at 08:19

## 2025-02-11 RX ADMIN — AMLODIPINE BESYLATE 10 MG: 10 TABLET ORAL at 08:18

## 2025-02-11 RX ADMIN — ACETAMINOPHEN 650 MG: 325 TABLET, FILM COATED ORAL at 12:39

## 2025-02-11 RX ADMIN — SODIUM CHLORIDE, SODIUM GLUCONATE, SODIUM ACETATE, POTASSIUM CHLORIDE, MAGNESIUM CHLORIDE, SODIUM PHOSPHATE, DIBASIC, AND POTASSIUM PHOSPHATE 125 ML/HR: .53; .5; .37; .037; .03; .012; .00082 INJECTION, SOLUTION INTRAVENOUS at 06:44

## 2025-02-11 RX ADMIN — SERTRALINE 100 MG: 100 TABLET, FILM COATED ORAL at 08:18

## 2025-02-11 RX ADMIN — METOPROLOL SUCCINATE 25 MG: 25 TABLET, EXTENDED RELEASE ORAL at 08:18

## 2025-02-11 RX ADMIN — BUPROPION HYDROCHLORIDE 150 MG: 150 TABLET, EXTENDED RELEASE ORAL at 08:18

## 2025-02-11 RX ADMIN — TRAZODONE HYDROCHLORIDE 100 MG: 100 TABLET ORAL at 22:19

## 2025-02-11 RX ADMIN — FLUTICASONE FUROATE 1 PUFF: 100 POWDER RESPIRATORY (INHALATION) at 08:20

## 2025-02-11 RX ADMIN — LEVOTHYROXINE SODIUM 150 MCG: 75 TABLET ORAL at 06:43

## 2025-02-11 RX ADMIN — ACETAMINOPHEN 650 MG: 325 TABLET, FILM COATED ORAL at 22:21

## 2025-02-11 NOTE — ASSESSMENT & PLAN NOTE
79-year-old female with small bowel obstruction.  Patient continues to have bowel movements and pass gas at this time.  Pain is improved.    Plan:  - Admit to internal medicine service for management of comorbidities  - NPO  - Pain and nausea control PRN  - advance diet as tolerated  - DVT ppx  - strongly encourage OOB/ambulation  - maintenance IV fluids  - Remainder of care per primary team

## 2025-02-11 NOTE — ASSESSMENT & PLAN NOTE
79-year-old female with small bowel obstruction.  Patient continues to have bowel movements and pass gas at this time.  Pain is improved.    Plan:  - Advance diet to CLD  - Pain and nausea control PRN  - advance diet as tolerated  - DVT ppx  - strongly encourage OOB/ambulation  - maintenance IV fluids  - monitor output  - Remainder of care per primary team

## 2025-02-11 NOTE — ASSESSMENT & PLAN NOTE
Presenting with acute onset distended abdomen and abdominal pain  CT with intermediate grade SBO with transition of caliber in the ileum  No new medication changes - she has been her CCB for some time  Did have abnormally high TSH in January, recheck levels  Starting to tolerate clear liquids and passing flatus today with improvement in abdominal pain  Evaluated bedside with surgery, question if this is a functional component due to stimulant laxatives daily at home vs mechanical component with known adhesions  She is improving  Continue IVF at this time and supportive care  Anticipate discharge home in 24 hours if continues to improve

## 2025-02-11 NOTE — ASSESSMENT & PLAN NOTE
Takes Senokot typically on a nightly basis  Seen by GI and encouraged to increase her fiber  Once acute problem continues to resolve will need to establish bowel regimen on discharge

## 2025-02-11 NOTE — PROGRESS NOTES
Patient currently admitted- no outreach required     Blood pressure elevated  Appointment department: [facility] [place]  Appointment provider: Mariano Saucedo Jr., PA-C  Blood pressure   02/10/25 1350 169/76   02/10/25 1347 169/76

## 2025-02-11 NOTE — CONSULTS
"Consultation - Surgery-General   Name: Yokasta Cadena 79 y.o. female I MRN: [id number]  Unit/Bed#: ED-30 I Date of Admission: 2/10/2025   Date of Service: 2/10/2025 I Hospital Day: 0   Consult to surgery general  Consult performed by: Alvino Diaz PA-C  Consult ordered by: Karely Boswell DO        Physician Requesting Evaluation: Radha White DO   Reason for Evaluation / Principal Problem: Intermediate SBO    Assessment & Plan  SBO (small bowel obstruction) ([facility])  79-year-old female with small bowel obstruction.  Patient continues to have bowel movements and pass gas at this time.  Pain is improved.    Plan:  - Admit to internal medicine service for management of comorbidities  - NPO  - Pain and nausea control PRN  - advance diet as tolerated  - DVT ppx  - strongly encourage OOB/ambulation  - maintenance IV fluids  - Remainder of care per primary team  Please contact the SecureChat role for the Surgery-General service with any questions/concerns.    History of Present Illness   Yokasta Cadena is a 79 y.o. female with past medical history of hypertension, hyperlipidemia, hypothyroidism, colitis, and obesity with a past surgical history of appendectomy and cholecystectomy presenting with x 1 day of abdominal pain.  Patient states abdominal pain woke her up overnight.  She states the pain was all throughout her abdomen and worse if she laid on her side.  Patient originally thought symptoms were related to possible constipation, but could not have a bowel movement once awoken.  The pain progressively worsened with associated nausea and vomiting.  She described episode of vomiting as \"bile\".  She denied any fevers, chills, chest pain, or shortness of breath.  Over time, patient decided to go to urgent care which yielded further evaluation by the ED.  It is leaving her house, patient described x 3 bowel movements that became softer with more liquid over time.  Since being in the emergency department, patient has not " vomited.    Review of Systems   Constitutional:  Negative for activity change, chills, diaphoresis and fever.   Respiratory:  Negative for apnea and shortness of breath.    Cardiovascular:  Negative for chest pain and palpitations.   Gastrointestinal:  Positive for abdominal distention and nausea. Negative for blood in stool and vomiting.   Genitourinary:  Negative for difficulty urinating, dysuria and frequency.   Neurological:  Negative for dizziness, light-headedness and numbness.     I have reviewed the patient's PMH, PSH, Social History, Family History, Meds, and Allergies  Historical Information   Past Medical History:   Diagnosis Date    Acid reflux     Anxiety     Arthritis     Asthma     C1-C4 level with central cord syndrome (HCC)     Resolved 2/1/2017     Carpal tunnel syndrome Resolved 4/21/2015    Colitis     Colon polyp     Concussion     Depressed     Dysthymic disorder     Resolved 1/5/2018    Gastric ulcer 2013    small - on EGD - [facility]    Hemorrhoids     Hx of blood clots     Hx of colonic polyps     D'Merrick    Hyperlipemia     Hypertension     Hypothyroid     Lumbar spondylosis 04/25/2024    Lung nodule     stable x 2 yrs on CT    Migraines     Obesity     Postural dizziness with presyncope     Resolved 8/28/2018     Tendinitis     Ulnar neuropathy     Resolved 5/21/2015     Vertigo      Past Surgical History:   Procedure Laterality Date    APPENDECTOMY      BREAST SURGERY Left 01/1966    BREAST LUMPECTOMY    CARPAL TUNNEL RELEASE      CHOLECYSTECTOMY      DILATION AND CURETTAGE OF UTERUS      ELBOW SURGERY      EYE SURGERY Bilateral 2019    Cataract     GALLBLADDER SURGERY      JOINT REPLACEMENT Right     REPLACEMENT TOTAL KNEE    KNEE ARTHROSCOPY      KNEE SURGERY Right     NECK SURGERY      ORTHOPEDIC SURGERY      POSTERIOR LAMINECTOMY / DECOMPRESSION CERVICAL SPINE  02/2015    REPLACEMENT TOTAL KNEE Left 2022    TONSILLECTOMY      TOTAL KNEE ARTHROPLASTY Left 04/06/2022    [facility] Dr. Diaz     VAGINAL DELIVERY      x3    WRIST SURGERY Right      Social History     Tobacco Use    Smoking status: Former     Current packs/day: 0.00     Types: Cigarettes     Start date: 1983     Quit date: 10/14/2013     Years since quittin.3     Passive exposure: Current    Smokeless tobacco: Never   Vaping Use    Vaping status: Never Used   Substance and Sexual Activity    Alcohol use: No    Drug use: No    Sexual activity: Not Currently     Birth control/protection: None     E-Cigarette/Vaping    E-Cigarette Use Never User      E-Cigarette/Vaping Substances    Nicotine No     THC No     CBD No     Flavoring No     Other No     Unknown No      Family History   Problem Relation Age of Onset    Breast cancer Mother     Alzheimer's disease Mother     Coronary artery disease Mother     Hypertension Mother     Migraines Mother     Peripheral vascular disease Mother     Arthritis Mother     Cancer Mother     Parkinsonism Father     Other Father         Cardiovascular disease     Coronary artery disease Father     Hypertension Father     Bipolar disorder Sister     Thyroid disease Sister     Alzheimer's disease Sister     Depression Sister     Throat cancer Maternal Grandfather     Cancer Maternal Grandfather     Allergic rhinitis Daughter     Asthma Daughter     Asthma Son     Diabetes Neg Hx     Stroke Neg Hx      Social History     Tobacco Use    Smoking status: Former     Current packs/day: 0.00     Types: Cigarettes     Start date: 1983     Quit date: 10/14/2013     Years since quittin.3     Passive exposure: Current    Smokeless tobacco: Never   Vaping Use    Vaping status: Never Used   Substance and Sexual Activity    Alcohol use: No    Drug use: No    Sexual activity: Not Currently     Birth control/protection: None       Current Facility-Administered Medications:     multi-electrolyte (PLASMALYTE-A/ISOLYTE-S PH 7.4) IV solution, Continuous, Last Rate: 125 mL/hr (02/10/25 9495)    ondansetron (ZOFRAN)  injection 4 mg, Q6H PRN  Prior to Admission Medications   Prescriptions Last Dose Informant Patient Reported? Taking?   Arnuity Ellipta 50 MCG/ACT AEPB   Yes No   Sig: Inhale 1 puff   Ascorbic Acid (VITAMIN C PO)  Self Yes No   Sig: Vitamin C   Cholecalciferol (Vitamin D3) 1.25 MG (62031 UT) CAPS  Self Yes No   Sig: Take 5,000 Units by mouth daily   Cyanocobalamin (VITAMIN B-12 PO)   Yes No   Ivermectin 1 % CREA   No No   Sig: Apply topically in the morning   albuterol (Ventolin HFA) 90 mcg/act inhaler  Self No No   Sig: Inhale 2 puffs every 6 (six) hours as needed for wheezing   amLODIPine (NORVASC) 10 mg tablet   No No   Sig: TAKE 1 TABLET BY MOUTH EVERY DAY   atorvastatin (LIPITOR) 20 mg tablet   No No   Sig: TAKE 1 TABLET BY MOUTH EVERY DAY   buPROPion (WELLBUTRIN XL) 150 mg 24 hr tablet   No No   Sig: Take 1 tablet (150 mg total) by mouth every morning   ferrous sulfate 325 (65 Fe) mg tablet   No No   Sig: Take 1 tablet (325 mg total) by mouth daily with breakfast   fluticasone (FLONASE) 50 mcg/act nasal spray   No No   Sig: SPRAY 1 SPRAY INTO EACH NOSTRIL EVERY DAY   levothyroxine 150 mcg tablet   No No   Sig: TAKE 1 TABLET (150 MCG TOTAL) BY MOUTH DAILY IN THE EARLY MORNING   losartan (COZAAR) 100 MG tablet   No No   Sig: TAKE 1 TABLET BY MOUTH EVERY DAY   meclizine (ANTIVERT) 25 mg tablet  Self No No   Sig: Take 1 tablet (25 mg total) by mouth every 8 (eight) hours as needed for dizziness for up to 10 days   metoprolol succinate (TOPROL-XL) 25 mg 24 hr tablet   No No   Sig: TAKE 1 TABLET (25 MG TOTAL) BY MOUTH DAILY.   multivitamin (THERAGRAN) TABS   Yes No   Sig: Take 1 tablet by mouth daily One a day   ondansetron (ZOFRAN) 4 mg tablet   No No   Sig: Take 1 tablet (4 mg total) by mouth 3 (three) times a day as needed for nausea or vomiting   pantoprazole (PROTONIX) 40 mg tablet   No No   Sig: TAKE 1 TABLET BY MOUTH TWICE A DAY   pregabalin (LYRICA) 75 mg capsule   No No   Sig: Take 1 capsule (75 mg total)  by mouth 2 (two) times a day   sertraline (ZOLOFT) 100 mg tablet   No No   Sig: TAKE 1 TABLET BY MOUTH TWICE A DAY   traZODone (DESYREL) 50 mg tablet   No No   Sig: TAKE 2 TABLETS (100 MG TOTAL) BY MOUTH DAILY AT BEDTIME      Facility-Administered Medications: None     Ceftin [cefuroxime] and Cephalexin    Objective :  Temp:  [97.5 °F (36.4 °C)-98.3 °F (36.8 °C)] 97.5 °F (36.4 °C)  HR:  [65-71] 65  BP: (124-169)/(65-76) 124/65  Resp:  [18-20] 20  SpO2:  [94 %-96 %] 94 %  O2 Device: None (Room air)      Physical Exam  Constitutional:       General: She is not in acute distress.     Appearance: Normal appearance. She is obese. She is not ill-appearing, toxic-appearing or diaphoretic.   Cardiovascular:      Rate and Rhythm: Normal rate and regular rhythm.      Pulses: Normal pulses.      Heart sounds: Normal heart sounds. No murmur heard.  Pulmonary:      Effort: Pulmonary effort is normal. No respiratory distress.      Breath sounds: Normal breath sounds. No wheezing.   Abdominal:      General: There is distension.      Palpations: Abdomen is soft. There is no mass.      Tenderness: There is abdominal tenderness (Diffusely tender throughout abdomen.  No overlying skin changes.  Tympanic to percussion). There is guarding (Some guarding in upper hemiabdomen). There is no right CVA tenderness, left CVA tenderness or rebound.      Hernia: No hernia is present.   Skin:     General: Skin is warm and dry.      Coloration: Skin is not jaundiced.   Neurological:      General: No focal deficit present.      Mental Status: She is alert and oriented to person, place, and time.         Lab Results: I have reviewed the following results:  Recent Labs     02/10/25  1446 02/10/25  1519   WBC 8.36  --    HGB 11.4*  --    HCT 35.9  --      --    SODIUM 141  --    K 4.2  --    *  --    CO2 24  --    BUN 20  --    CREATININE 0.92  --    GLUC 109  --    AST 14  --    ALT 11  --    ALB 4.2  --    TBILI 0.49  --    ALKPHOS 77   "--    HSTNI0  --  4   LACTICACID  --  0.5       2/10 CT: \"At least intermediate grade small bowel obstruction with transition of caliber in the ileum in the lower abdomen. No significant gastric dilatation.  Sigmoid diverticulosis. No evidence of diverticulitis.  Small hiatal hernia.  Other chronic findings, as per the body of the report.\"    VTE Pharmacologic Prophylaxis: VTE covered by:    None     VTE Mechanical Prophylaxis: sequential compression device    Administrative Statements   I have spent a total time of 30 minutes in caring for this patient on the day of the visit/encounter including Diagnostic results, Prognosis, Impressions, Counseling / Coordination of care, Reviewing / ordering tests, medicine, procedures  , Obtaining or reviewing history  , and Communicating with other healthcare professionals .  "

## 2025-02-11 NOTE — H&P
H&P - Hospitalist   Name: Yokasta Cadena 79 y.o. female I MRN: [id number]  Unit/Bed#: ED-30 I Date of Admission: 2/10/2025   Date of Service: 2/10/2025 I Hospital Day: 0     Assessment & Plan  SBO (small bowel obstruction) (HCC)  Presented with abd pain and distention  She was found to have sbo on ct but started to have bowel movements in the ed  Surgery evaluated her and stated npo but no need for ng tube at this time  Will continue with npo and iv fluids  Monitor pt closely   Essential hypertension  Bp stable.   Npo but sips with meds  Will continue norvasc and metoprolol  Hold losartan   Gastroesophageal reflux disease  Write for iv protonix  DIANA (obstructive sleep apnea)  H/o diana but declined cpap      VTE Pharmacologic Prophylaxis: VTE Score: 4 lovenox   Code Status: full code      Anticipated Length of Stay: Patient will be admitted on an observation basis with an anticipated length of stay of less than 2 midnights secondary to above.    History of Present Illness   Chief Complaint: diffuse abd pain and abdominal distention     Yokasta Cadena is a 79 y.o. female with a PMH of htn, gerd, and diana who presents with distended abd and diffuse abd pain. She was found to have a sbo obstruction. She had several bm in the ed but still distended. Surgery evaluated her and stated no need for ng tube but to monitor her over night and bowel rest. Pt currently states the pain is better but she is still nauseated. No vomiting .    Review of Systems   Constitutional:  Positive for activity change and appetite change.   HENT: Negative.     Eyes: Negative.    Respiratory: Negative.     Cardiovascular: Negative.    Gastrointestinal:  Positive for abdominal pain, constipation, nausea and vomiting.   Endocrine: Negative.    Genitourinary: Negative.    Musculoskeletal: Negative.    Skin: Negative.    Allergic/Immunologic: Negative.    Neurological: Negative.    Hematological: Negative.    Psychiatric/Behavioral: Negative.          Historical Information   Past Medical History:   Diagnosis Date    Acid reflux     Anxiety     Arthritis     Asthma     C1-C4 level with central cord syndrome (HCC)     Resolved 2017     Carpal tunnel syndrome Resolved 2015    Colitis     Colon polyp     Concussion     Depressed     Dysthymic disorder     Resolved 2018    Gastric ulcer 2013    small - on EGD - [facility]    Hemorrhoids     Hx of blood clots     Hx of colonic polyps     D'Merrick    Hyperlipemia     Hypertension     Hypothyroid     Lumbar spondylosis 2024    Lung nodule     stable x 2 yrs on CT    Migraines     Obesity     Postural dizziness with presyncope     Resolved 2018     Tendinitis     Ulnar neuropathy     Resolved 2015     Vertigo      Past Surgical History:   Procedure Laterality Date    APPENDECTOMY      BREAST SURGERY Left 1966    BREAST LUMPECTOMY    CARPAL TUNNEL RELEASE      CHOLECYSTECTOMY      DILATION AND CURETTAGE OF UTERUS      ELBOW SURGERY      EYE SURGERY Bilateral     Cataract     GALLBLADDER SURGERY      JOINT REPLACEMENT Right     REPLACEMENT TOTAL KNEE    KNEE ARTHROSCOPY      KNEE SURGERY Right     NECK SURGERY      ORTHOPEDIC SURGERY      POSTERIOR LAMINECTOMY / DECOMPRESSION CERVICAL SPINE  2015    REPLACEMENT TOTAL KNEE Left     TONSILLECTOMY      TOTAL KNEE ARTHROPLASTY Left 2022    [facility] Dr. Diaz    VAGINAL DELIVERY      x3    WRIST SURGERY Right      Social History     Tobacco Use    Smoking status: Former     Current packs/day: 0.00     Types: Cigarettes     Start date: 1983     Quit date: 10/14/2013     Years since quittin.3     Passive exposure: Current    Smokeless tobacco: Never   Vaping Use    Vaping status: Never Used   Substance and Sexual Activity    Alcohol use: No    Drug use: No    Sexual activity: Not Currently     Birth control/protection: None     E-Cigarette/Vaping    E-Cigarette Use Never User      E-Cigarette/Vaping Substances    Nicotine No      THC No     CBD No     Flavoring No     Other No     Unknown No      Family history non-contributory  Social History:  Marital Status:        Meds/Allergies   I have reviewed home medications with patient personally.  Prior to Admission medications    Medication Sig Start Date End Date Taking? Authorizing Provider   albuterol (Ventolin HFA) 90 mcg/act inhaler Inhale 2 puffs every 6 (six) hours as needed for wheezing 10/9/23  Yes Lisbeth Vallejo DO   amLODIPine (NORVASC) 10 mg tablet TAKE 1 TABLET BY MOUTH EVERY DAY 12/19/24  Yes Lisbeth Vallejo DO   Arnuity Ellipta 50 MCG/ACT AEPB Inhale 1 puff 1/29/25  Yes Historical Provider, MD   Ascorbic Acid (VITAMIN C PO) Vitamin C   Yes Historical Provider, MD   atorvastatin (LIPITOR) 20 mg tablet TAKE 1 TABLET BY MOUTH EVERY DAY 10/2/24  Yes Lisbeth Vallejo DO   buPROPion (WELLBUTRIN XL) 150 mg 24 hr tablet Take 1 tablet (150 mg total) by mouth every morning 9/10/24  Yes Lisbeth Vallejo DO   Cholecalciferol (Vitamin D3) 1.25 MG (19994 UT) CAPS Take 5,000 Units by mouth daily   Yes Historical Provider, MD   Cyanocobalamin (VITAMIN B-12 PO)    Yes Historical Provider, MD   fluticasone (FLONASE) 50 mcg/act nasal spray SPRAY 1 SPRAY INTO EACH NOSTRIL EVERY DAY 11/18/24  Yes Lisbeth Vallejo DO   Ivermectin 1 % CREA Apply topically in the morning 3/8/24  Yes Lisbeth Vallejo DO   levothyroxine 150 mcg tablet TAKE 1 TABLET (150 MCG TOTAL) BY MOUTH DAILY IN THE EARLY MORNING 12/24/24  Yes Lisbeth Vallejo DO   losartan (COZAAR) 100 MG tablet TAKE 1 TABLET BY MOUTH EVERY DAY 1/20/25  Yes Lisbeth Vallejo DO   meclizine (ANTIVERT) 25 mg tablet Take 1 tablet (25 mg total) by mouth every 8 (eight) hours as needed for dizziness for up to 10 days 10/4/23  Yes Lisbeth Vallejo DO   metoprolol succinate (TOPROL-XL) 25 mg 24 hr tablet TAKE 1 TABLET (25 MG TOTAL) BY MOUTH DAILY. 1/30/25  Yes Lisbeth Vallejo DO   multivitamin (THERAGRAN) TABS Take 1 tablet by mouth daily One a day   Yes Historical Provider, MD   pantoprazole  (PROTONIX) 40 mg tablet TAKE 1 TABLET BY MOUTH TWICE A DAY 8/23/24  Yes Lisbeth Vallejo DO   pregabalin (LYRICA) 75 mg capsule Take 1 capsule (75 mg total) by mouth 2 (two) times a day 10/30/24  Yes Lisbeth Vallejo DO   sertraline (ZOLOFT) 100 mg tablet TAKE 1 TABLET BY MOUTH TWICE A DAY 9/13/24  Yes Lisbeth Vallejo DO   traZODone (DESYREL) 50 mg tablet TAKE 2 TABLETS (100 MG TOTAL) BY MOUTH DAILY AT BEDTIME 9/13/24  Yes Lisbeth Vallejo DO   ferrous sulfate 325 (65 Fe) mg tablet Take 1 tablet (325 mg total) by mouth daily with breakfast  Patient not taking: Reported on 2/10/2025 11/4/24   Lisbeth Vallejo DO   ondansetron (ZOFRAN) 4 mg tablet Take 1 tablet (4 mg total) by mouth 3 (three) times a day as needed for nausea or vomiting 10/22/24   MAGALY Cabrera     Allergies   Allergen Reactions    Ceftin [Cefuroxime] Hives    Cephalexin Other (See Comments)     ? PT Not 100% Sure Will Call US    Affected how she retained water.       Objective :  Temp:  [97.5 °F (36.4 °C)-98.3 °F (36.8 °C)] 97.5 °F (36.4 °C)  HR:  [60-71] 60  BP: (124-169)/(65-76) 145/70  Resp:  [18-20] 18  SpO2:  [88 %-96 %] 95 %  O2 Device: Nasal cannula  Nasal Cannula O2 Flow Rate (L/min):  [2 L/min] 2 L/min    Physical Exam  Constitutional:       Appearance: Normal appearance.   HENT:      Head: Normocephalic and atraumatic.   Eyes:      Extraocular Movements: Extraocular movements intact.      Pupils: Pupils are equal, round, and reactive to light.   Cardiovascular:      Rate and Rhythm: Normal rate and regular rhythm.      Heart sounds: No murmur heard.     No friction rub. No gallop.   Pulmonary:      Effort: Pulmonary effort is normal. No respiratory distress.      Breath sounds: Normal breath sounds. No wheezing, rhonchi or rales.   Abdominal:      General: There is distension.      Palpations: Abdomen is soft.      Tenderness: There is abdominal tenderness. There is no guarding or rebound.      Comments: Bowel sounds but minimal    Musculoskeletal:       Right lower leg: No edema.      Left lower leg: No edema.   Neurological:      Mental Status: She is alert and oriented to person, place, and time.       Lines/Drains:      Lab Results: I have reviewed the following results:  Results from last 7 days   Lab Units 02/10/25  1446   WBC Thousand/uL 8.36   HEMOGLOBIN g/dL 11.4*   HEMATOCRIT % 35.9   PLATELETS Thousands/uL 237   SEGS PCT % 77*   LYMPHO PCT % 15   MONO PCT % 6   EOS PCT % 2     Results from last 7 days   Lab Units 02/10/25  1446   SODIUM mmol/L 141   POTASSIUM mmol/L 4.2   CHLORIDE mmol/L 109*   CO2 mmol/L 24   BUN mg/dL 20   CREATININE mg/dL 0.92   ANION GAP mmol/L 8   CALCIUM mg/dL 9.3   ALBUMIN g/dL 4.2   TOTAL BILIRUBIN mg/dL 0.49   ALK PHOS U/L 77   ALT U/L 11   AST U/L 14   GLUCOSE RANDOM mg/dL 109             Lab Results   Component Value Date    HGBA1C 5.7 (H) 11/02/2024    HGBA1C 6.0 (H) 12/21/2023    HGBA1C 5.5 06/28/2022     Results from last 7 days   Lab Units 02/10/25  1519   LACTIC ACID mmol/L 0.5       Ct abd/pelvis- reviewed   Other Study Results Review: EKG was reviewed.

## 2025-02-11 NOTE — ASSESSMENT & PLAN NOTE
Microcytic anemia without evidence of bleeding  Patient does have history of polyps, last colonoscopy May 2024 -biopsies negative for dysplasia  Check iron panel

## 2025-02-11 NOTE — ASSESSMENT & PLAN NOTE
Presented with abd pain and distention  She was found to have sbo on ct but started to have bowel movements in the ed  Surgery evaluated her and stated npo but no need for ng tube at this time  Will continue with npo and iv fluids  Monitor pt closely

## 2025-02-11 NOTE — PLAN OF CARE
Problem: PAIN - ADULT  Goal: Verbalizes/displays adequate comfort level or baseline comfort level  Description: Interventions:  - Encourage patient to monitor pain and request assistance  - Assess pain using appropriate pain scale  - Administer analgesics based on type and severity of pain and evaluate response  - Implement non-pharmacological measures as appropriate and evaluate response  - Consider cultural and social influences on pain and pain management  - Notify physician/advanced practitioner if interventions unsuccessful or patient reports new pain  Outcome: Progressing     Problem: INFECTION - ADULT  Goal: Absence or prevention of progression during hospitalization  Description: INTERVENTIONS:  - Assess and monitor for signs and symptoms of infection  - Monitor lab/diagnostic results  - Monitor all insertion sites, i.e. indwelling lines, tubes, and drains  - Monitor endotracheal if appropriate and nasal secretions for changes in amount and color  - [place] appropriate cooling/warming therapies per order  - Administer medications as ordered  - Instruct and encourage patient and family to use good hand hygiene technique  - Identify and instruct in appropriate isolation precautions for identified infection/condition  Outcome: Progressing     Problem: SAFETY ADULT  Goal: Patient will remain free of falls  Description: INTERVENTIONS:  - Educate patient/family on patient safety including physical limitations  - Instruct patient to call for assistance with activity   - Consult OT/PT to assist with strengthening/mobility   - Keep Call bell within reach  - Keep bed low and locked with side rails adjusted as appropriate  - Keep care items and personal belongings within reach  - Initiate and maintain comfort rounds  - Make Fall Risk Sign visible to staff  - Offer Toileting every  Hours, in advance of need  - Initiate/Maintain alarm  - Obtain necessary fall risk management equipment:   - Apply yellow socks and  bracelet for high fall risk patients  - Consider moving patient to room near nurses station  Outcome: Progressing  Goal: Maintain or return to baseline ADL function  Description: INTERVENTIONS:  -  Assess patient's ability to carry out ADLs; assess patient's baseline for ADL function and identify physical deficits which impact ability to perform ADLs (bathing, care of mouth/teeth, toileting, grooming, dressing, etc.)  - Assess/evaluate cause of self-care deficits   - Assess range of motion  - Assess patient's mobility; develop plan if impaired  - Assess patient's need for assistive devices and provide as appropriate  - Encourage maximum independence but intervene and supervise when necessary  - Involve family in performance of ADLs  - Assess for home care needs following discharge   - Consider OT consult to assist with ADL evaluation and planning for discharge  - Provide patient education as appropriate  Outcome: Progressing  Goal: Maintains/Returns to pre admission functional level  Description: INTERVENTIONS:  - Perform AM-PAC 6 Click Basic Mobility/ Daily Activity assessment daily.  - Set and communicate daily mobility goal to care team and patient/family/caregiver.   - Collaborate with rehabilitation services on mobility goals if consulted  - Perform Range of Motion  times a day.  - Reposition patient every  hours.  - Dangle patient imes a day  - Stand patient  times a day  - Ambulate patient  times a day  - Out of bed to chair  times a day   - Out of bed for meals  times a day  - Out of bed for toileting  - Record patient progress and toleration of activity level   Outcome: Progressing     Problem: DISCHARGE PLANNING  Goal: Discharge to home or other facility with appropriate resources  Description: INTERVENTIONS:  - Identify barriers to discharge w/patient and caregiver  - Arrange for needed discharge resources and transportation as appropriate  - Identify discharge learning needs (meds, wound care, etc.)  -  Arrange for interpretive services to assist at discharge as needed  - Refer to Case Management Department for coordinating discharge planning if the patient needs post-hospital services based on physician/advanced practitioner order or complex needs related to functional status, cognitive ability, or social support system  Outcome: Progressing     Problem: Knowledge Deficit  Goal: Patient/family/caregiver demonstrates understanding of disease process, treatment plan, medications, and discharge instructions  Description: Complete learning assessment and assess knowledge base.  Interventions:  - Provide teaching at level of understanding  - Provide teaching via preferred learning methods  Outcome: Progressing

## 2025-02-11 NOTE — ASSESSMENT & PLAN NOTE
79-year-old female with small bowel obstruction.  Patient continues to have bowel movements and pass gas overnight     Plan:  - Advance diet this AM  - Pain and nausea control PRN  - advance diet as tolerated  - DVT ppx  - strongly encourage OOB/ambulation  - maintenance IV fluids  - monitor output  - Remainder of care per primary team, d/c when appropriate

## 2025-02-11 NOTE — PLAN OF CARE
Problem: PAIN - ADULT  Goal: Verbalizes/displays adequate comfort level or baseline comfort level  Description: Interventions:  - Encourage patient to monitor pain and request assistance  - Assess pain using appropriate pain scale  - Administer analgesics based on type and severity of pain and evaluate response  - Implement non-pharmacological measures as appropriate and evaluate response  - Consider cultural and social influences on pain and pain management  - Notify physician/advanced practitioner if interventions unsuccessful or patient reports new pain  Outcome: Progressing     Problem: INFECTION - ADULT  Goal: Absence or prevention of progression during hospitalization  Description: INTERVENTIONS:  - Assess and monitor for signs and symptoms of infection  - Monitor lab/diagnostic results  - Monitor all insertion sites, i.e. indwelling lines, tubes, and drains  - Monitor endotracheal if appropriate and nasal secretions for changes in amount and color  - [place] appropriate cooling/warming therapies per order  - Administer medications as ordered  - Instruct and encourage patient and family to use good hand hygiene technique  - Identify and instruct in appropriate isolation precautions for identified infection/condition  Outcome: Progressing     Problem: SAFETY ADULT  Goal: Patient will remain free of falls  Description: INTERVENTIONS:  - Educate patient/family on patient safety including physical limitations  - Instruct patient to call for assistance with activity   - Consult OT/PT to assist with strengthening/mobility   - Keep Call bell within reach  - Keep bed low and locked with side rails adjusted as appropriate  - Keep care items and personal belongings within reach  - Initiate and maintain comfort rounds  - Make Fall Risk Sign visible to staff  - Offer Toileting every  Hours, in advance of need  - Initiate/Maintain alarm  - Obtain necessary fall risk management equipment:   - Apply yellow socks and  bracelet for high fall risk patients  - Consider moving patient to room near nurses station  Outcome: Progressing  Goal: Maintain or return to baseline ADL function  Description: INTERVENTIONS:  -  Assess patient's ability to carry out ADLs; assess patient's baseline for ADL function and identify physical deficits which impact ability to perform ADLs (bathing, care of mouth/teeth, toileting, grooming, dressing, etc.)  - Assess/evaluate cause of self-care deficits   - Assess range of motion  - Assess patient's mobility; develop plan if impaired  - Assess patient's need for assistive devices and provide as appropriate  - Encourage maximum independence but intervene and supervise when necessary  - Involve family in performance of ADLs  - Assess for home care needs following discharge   - Consider OT consult to assist with ADL evaluation and planning for discharge  - Provide patient education as appropriate  Outcome: Progressing  Goal: Maintains/Returns to pre admission functional level  Description: INTERVENTIONS:  - Perform AM-PAC 6 Click Basic Mobility/ Daily Activity assessment daily.  - Set and communicate daily mobility goal to care team and patient/family/caregiver.   - Collaborate with rehabilitation services on mobility goals if consulted  - Perform Range of Motion  times a day.  - Reposition patient every hours.  - Dangle patient  times a day  - Stand patient  times a day  - Ambulate patient  times a day  - Out of bed to chair  times a day   - Out of bed for meals  times a day  - Out of bed for toileting  - Record patient progress and toleration of activity level   Outcome: Progressing     Problem: DISCHARGE PLANNING  Goal: Discharge to home or other facility with appropriate resources  Description: INTERVENTIONS:  - Identify barriers to discharge w/patient and caregiver  - Arrange for needed discharge resources and transportation as appropriate  - Identify discharge learning needs (meds, wound care, etc.)  -  Arrange for interpretive services to assist at discharge as needed  - Refer to Case Management Department for coordinating discharge planning if the patient needs post-hospital services based on physician/advanced practitioner order or complex needs related to functional status, cognitive ability, or social support system  Outcome: Progressing     Problem: Knowledge Deficit  Goal: Patient/family/caregiver demonstrates understanding of disease process, treatment plan, medications, and discharge instructions  Description: Complete learning assessment and assess knowledge base.  Interventions:  - Provide teaching at level of understanding  - Provide teaching via preferred learning methods  Outcome: Progressing

## 2025-02-11 NOTE — PROGRESS NOTES
"Progress Note - Surgery-General   Name: Yokasta Cadena 79 y.o. female I MRN: [id number]  Unit/Bed#: E4 -01 I Date of Admission: 2/10/2025   Date of Service: 2/11/2025 I Hospital Day: 0     Assessment & Plan  SBO (small bowel obstruction) ([facility])  79-year-old female with small bowel obstruction.  Patient continues to have bowel movements and pass gas overnight     Plan:  - Advance diet this AM  - Pain and nausea control PRN  - advance diet as tolerated  - DVT ppx  - strongly encourage OOB/ambulation  - maintenance IV fluids  - monitor output  - Remainder of care per primary team, d/c when appropriate    24 Hour Events : No acute overnight events.  Subjective : Patient with minimal abdominal pain overnight.  She mentions her bloating decreasing since yesterday.  She also endorses some color change with bowel movements including an orange color.  Otherwise, no nausea, vomiting, fevers or chills overnight    Objective :  Visit Vitals  /69 (BP Location: Right arm)   Pulse 65   Temp 98 °F (36.7 °C) (Temporal)   Resp 18   Ht 5' 1\" (1.549 m)   Wt 80 kg (176 lb 5.9 oz)   SpO2 92%   BMI 33.32 kg/m²   OB Status Postmenopausal   Smoking Status Former   BSA 1.79 m²        Physical Exam  Constitutional:       General: She is not in acute distress.     Appearance: Normal appearance. She is not ill-appearing, toxic-appearing or diaphoretic.   Cardiovascular:      Rate and Rhythm: Normal rate and regular rhythm.      Pulses: Normal pulses.      Heart sounds: Normal heart sounds. No murmur heard.  Pulmonary:      Effort: Pulmonary effort is normal. No respiratory distress.      Breath sounds: Normal breath sounds.   Abdominal:      General: Abdomen is flat. There is distension.      Palpations: Abdomen is soft. There is no mass.      Tenderness: There is abdominal tenderness (LLQ to central abdomen tenderness to palpation. No guarding or rigidity). There is no right CVA tenderness, left CVA tenderness, guarding or rebound. "      Hernia: No hernia is present.   Neurological:      General: No focal deficit present.      Mental Status: She is alert and oriented to person, place, and time.         Lab Results: I have reviewed the following results:  Recent Labs     02/10/25  1446 02/11/25  0531 02/12/25  0536   WBC 8.36 5.54 4.83   HGB 11.4* 9.4* 9.4*    172 163   SODIUM 141 142  --    K 4.2 3.8  --    * 111*  --    CO2 24 25  --    BUN 20 14  --    CREATININE 0.92 0.82  --    GLUC 109 92  --    CALCIUM 9.3 8.3*  --    AST 14 11*  --    ALT 11 8  --    ALKPHOS 77 63  --    TBILI 0.49 0.41  --           VTE Pharmacologic Prophylaxis: VTE covered by:  enoxaparin, Subcutaneous, 40 mg at 02/11/25 0818     VTE Mechanical Prophylaxis: sequential compression device

## 2025-02-11 NOTE — PROGRESS NOTES
"Progress Note - Surgery-General   Name: Yokasta Cadena 79 y.o. female I MRN: [id number]  Unit/Bed#: ED-30 I Date of Admission: 2/10/2025   Date of Service: 2/10/2025 I Hospital Day: 0     Assessment & Plan  SBO (small bowel obstruction) ([facility])  79-year-old female with small bowel obstruction.  Patient continues to have bowel movements and pass gas at this time.  Pain is improved.    Plan:  - Advance diet to CLD  - Pain and nausea control PRN  - advance diet as tolerated  - DVT ppx  - strongly encourage OOB/ambulation  - maintenance IV fluids  - monitor output  - Remainder of care per primary team    24 Hour Events : No acute overnight events  Subjective : Patient states abdominal pain is much improved.  She has no pain at rest but pain with movement and to touch.  She denies any nausea, vomiting, fevers or chills.  Patient states she is passing gas overnight but has not had a bowel movement within the last 12 hours.    Objective :  Visit Vitals  /61 (BP Location: Right arm)   Pulse 62   Temp 97.8 °F (36.6 °C) (Temporal)   Resp 18   Ht 5' 1\" (1.549 m)   Wt 80 kg (176 lb 5.9 oz)   SpO2 93%   BMI 33.32 kg/m²   OB Status Postmenopausal   Smoking Status Former   BSA 1.79 m²        Physical Exam  Constitutional:       General: She is not in acute distress.     Appearance: Normal appearance. She is obese. She is not ill-appearing, toxic-appearing or diaphoretic.   Cardiovascular:      Rate and Rhythm: Normal rate and regular rhythm.      Pulses: Normal pulses.      Heart sounds: Normal heart sounds.   Pulmonary:      Effort: Pulmonary effort is normal. No respiratory distress.      Breath sounds: Normal breath sounds. No wheezing.   Abdominal:      General: Abdomen is flat. There is distension.      Palpations: Abdomen is soft. There is no mass.      Tenderness: There is abdominal tenderness (Upper hemiabdomen tenderness to palpation.  No guarding or rigidity.). There is no right CVA tenderness, left CVA tenderness, " guarding or rebound.      Hernia: No hernia is present.   Neurological:      General: No focal deficit present.      Mental Status: She is alert and oriented to person, place, and time.         Lab Results: I have reviewed the following results:  Recent Labs     02/10/25  1446 02/11/25  0531   WBC 8.36 5.54   HGB 11.4* 9.4*    172   SODIUM 141 142   K 4.2 3.8   * 111*   CO2 24 25   BUN 20 14   CREATININE 0.92 0.82   GLUC 109 92   CALCIUM 9.3 8.3*   AST 14 11*   ALT 11 8   ALKPHOS 77 63   TBILI 0.49 0.41          VTE Pharmacologic Prophylaxis: VTE covered by:  [START ON 2/11/2025] enoxaparin, Subcutaneous     VTE Mechanical Prophylaxis: sequential compression device

## 2025-02-11 NOTE — PROGRESS NOTES
02/11/25 1200   Sacramental Encounters   Communion Given Indicator Yes   Sacrament of Sick-Anointing Anointed

## 2025-02-11 NOTE — PROGRESS NOTES
Progress Note - Hospitalist   Name: Yokasta Cadena 79 y.o. female I MRN: [id number]  Unit/Bed#: E4 -01 I Date of Admission: 2/10/2025   Date of Service: 2/11/2025 I Hospital Day: 0    Assessment & Plan  SBO (small bowel obstruction) ([facility])  Presenting with acute onset distended abdomen and abdominal pain  CT with intermediate grade SBO with transition of caliber in the ileum  No new medication changes - she has been her CCB for some time  Did have abnormally high TSH in January, recheck levels  Starting to tolerate clear liquids and passing flatus today with improvement in abdominal pain  Evaluated bedside with surgery, question if this is a functional component due to stimulant laxatives daily at home vs mechanical component with known adhesions  She is improving  Continue IVF at this time and supportive care  Anticipate discharge home in 24 hours if continues to improve  Anemia  Microcytic anemia without evidence of bleeding  Patient does have history of polyps, last colonoscopy May 2024 -biopsies negative for dysplasia  Check iron panel  Hypothyroidism  Check TSH, continue levothyroxine 150 mcg  Essential hypertension  Continue home Toprol 25 mg daily, amlodipine 10 mg daily, losartan 100 mg  Gastroesophageal reflux disease  Had EGD 5/2024 - noted with chronic antral gastritis no dysplasia. Continue PPI twice daily  DIANA (obstructive sleep apnea)  Declined CPAP  Constipation  Takes Senokot typically on a nightly basis  Seen by GI and encouraged to increase her fiber  Once acute problem continues to resolve will need to establish bowel regimen on discharge    VTE Pharmacologic Prophylaxis: VTE Score: 4 Moderate Risk (Score 3-4) - Pharmacological DVT Prophylaxis Ordered: enoxaparin (Lovenox).    Mobility:   Basic Mobility Inpatient Raw Score: 23  JH-HLM Goal: 7: Walk 25 feet or more  JH-HLM Achieved: 7: Walk 25 feet or more  JH-HLM Goal achieved. Continue to encourage appropriate mobility.    Patient Centered  Rounds: I performed bedside rounds with nursing staff today.   Discussions with Specialists or Other Care Team Provider: General Surgery    Education and Discussions with Family / Patient: Updated  (sister) via phone.    Current Length of Stay: 0 day(s)  Current Patient Status: Inpatient   Certification Statement: The patient will continue to require additional inpatient hospital stay due to SBO  Discharge Plan: Anticipate discharge in 24-48 hrs to home.    Code Status: Level 1 - Full Code    Subjective   Patient seen and examined.  Sitting up at the side the bed.  Has been ambulating the halls today.  Her stomach is still somewhat tender but she is having pain improvement.  Tolerated some clear liquids today without any nausea or vomiting.  She is passing gas.  Urinating without difficulties.    Objective :  Temp:  [97.5 °F (36.4 °C)-98.5 °F (36.9 °C)] 98.5 °F (36.9 °C)  HR:  [56-71] 63  BP: (122-169)/(61-76) 149/66  Resp:  [18-20] 18  SpO2:  [88 %-97 %] 90 %  O2 Device: None (Room air)  Nasal Cannula O2 Flow Rate (L/min):  [2 L/min] 2 L/min    Body mass index is 33.32 kg/m².     Input and Output Summary (last 24 hours):     Intake/Output Summary (Last 24 hours) at 2/11/2025 1240  Last data filed at 2/11/2025 0915  Gross per 24 hour   Intake 1280 ml   Output --   Net 1280 ml       Physical Exam  Constitutional:       Appearance: Normal appearance. She is obese. She is not ill-appearing or diaphoretic.   Cardiovascular:      Rate and Rhythm: Normal rate and regular rhythm.   Pulmonary:      Effort: Pulmonary effort is normal. No respiratory distress.      Breath sounds: Normal breath sounds.   Abdominal:      General: Bowel sounds are normal.      Tenderness: There is abdominal tenderness. There is no guarding or rebound.      Comments: Mid- upper abdominal pain   Musculoskeletal:      Right lower leg: No edema.      Left lower leg: No edema.   Skin:     General: Skin is warm and dry.   Neurological:       Mental Status: She is alert and oriented to person, place, and time. Mental status is at baseline.         Lab Results: I have reviewed the following results:   Results from last 7 days   Lab Units 02/11/25  0531 02/10/25  1446   WBC Thousand/uL 5.54 8.36   HEMOGLOBIN g/dL 9.4* 11.4*   HEMATOCRIT % 30.1* 35.9   PLATELETS Thousands/uL 172 237   SEGS PCT %  --  77*   LYMPHO PCT %  --  15   MONO PCT %  --  6   EOS PCT %  --  2     Results from last 7 days   Lab Units 02/11/25  0531   SODIUM mmol/L 142   POTASSIUM mmol/L 3.8   CHLORIDE mmol/L 111*   CO2 mmol/L 25   BUN mg/dL 14   CREATININE mg/dL 0.82   ANION GAP mmol/L 6   CALCIUM mg/dL 8.3*   ALBUMIN g/dL 3.4*   TOTAL BILIRUBIN mg/dL 0.41   ALK PHOS U/L 63   ALT U/L 8   AST U/L 11*   GLUCOSE RANDOM mg/dL 92     Results from last 7 days   Lab Units 02/10/25  1519   LACTIC ACID mmol/L 0.5     Last 24 Hours Medication List:     Current Facility-Administered Medications:     acetaminophen (TYLENOL) tablet 650 mg, Q6H PRN    albuterol (PROVENTIL HFA,VENTOLIN HFA) inhaler 2 puff, Q6H PRN    amLODIPine (NORVASC) tablet 10 mg, Daily    buPROPion (WELLBUTRIN XL) 24 hr tablet 150 mg, QAM    enoxaparin (LOVENOX) subcutaneous injection 40 mg, Daily    fluticasone (ARNUITY ELLIPTA) 100 MCG/ACT inhaler 1 puff, Daily    levothyroxine tablet 150 mcg, Early Morning    metoprolol succinate (TOPROL-XL) 24 hr tablet 25 mg, Daily    multi-electrolyte (PLASMALYTE-A/ISOLYTE-S PH 7.4) IV solution, Continuous, Last Rate: 75 mL/hr (02/11/25 1129)    ondansetron (ZOFRAN) injection 4 mg, Q6H PRN    pantoprazole (PROTONIX) injection 40 mg, Q24H AUGUSTUS    sertraline (ZOLOFT) tablet 100 mg, BID    traZODone (DESYREL) tablet 100 mg, HS    Administrative Statements   Today, Patient Was Seen By: Bryanne Horn, PA-C    **Please Note: This note may have been constructed using a voice recognition system.**

## 2025-02-12 LAB
ANION GAP SERPL CALCULATED.3IONS-SCNC: 6 MMOL/L (ref 4–13)
BASOPHILS # BLD AUTO: 0.02 THOUSANDS/ÂΜL (ref 0–0.1)
BASOPHILS NFR BLD AUTO: 0 % (ref 0–1)
BUN SERPL-MCNC: 10 MG/DL (ref 5–25)
CALCIUM SERPL-MCNC: 8.4 MG/DL (ref 8.4–10.2)
CHLORIDE SERPL-SCNC: 110 MMOL/L (ref 96–108)
CO2 SERPL-SCNC: 27 MMOL/L (ref 21–32)
CREAT SERPL-MCNC: 0.82 MG/DL (ref 0.6–1.3)
EOSINOPHIL # BLD AUTO: 0.22 THOUSAND/ÂΜL (ref 0–0.61)
EOSINOPHIL NFR BLD AUTO: 5 % (ref 0–6)
ERYTHROCYTE [DISTWIDTH] IN BLOOD BY AUTOMATED COUNT: 14.1 % (ref 11.6–15.1)
GFR SERPL CREATININE-BSD FRML MDRD: 67 ML/MIN/1.73SQ M
GLUCOSE SERPL-MCNC: 85 MG/DL (ref 65–140)
HCT VFR BLD AUTO: 30.2 % (ref 34.8–46.1)
HGB BLD-MCNC: 9.4 G/DL (ref 11.5–15.4)
IMM GRANULOCYTES # BLD AUTO: 0.01 THOUSAND/UL (ref 0–0.2)
IMM GRANULOCYTES NFR BLD AUTO: 0 % (ref 0–2)
LYMPHOCYTES # BLD AUTO: 1.26 THOUSANDS/ÂΜL (ref 0.6–4.47)
LYMPHOCYTES NFR BLD AUTO: 26 % (ref 14–44)
MCH RBC QN AUTO: 28.2 PG (ref 26.8–34.3)
MCHC RBC AUTO-ENTMCNC: 31.1 G/DL (ref 31.4–37.4)
MCV RBC AUTO: 91 FL (ref 82–98)
MONOCYTES # BLD AUTO: 0.47 THOUSAND/ÂΜL (ref 0.17–1.22)
MONOCYTES NFR BLD AUTO: 10 % (ref 4–12)
NEUTROPHILS # BLD AUTO: 2.85 THOUSANDS/ÂΜL (ref 1.85–7.62)
NEUTS SEG NFR BLD AUTO: 59 % (ref 43–75)
NRBC BLD AUTO-RTO: 0 /100 WBCS
PLATELET # BLD AUTO: 163 THOUSANDS/UL (ref 149–390)
PMV BLD AUTO: 10 FL (ref 8.9–12.7)
POTASSIUM SERPL-SCNC: 3.8 MMOL/L (ref 3.5–5.3)
RBC # BLD AUTO: 3.33 MILLION/UL (ref 3.81–5.12)
SODIUM SERPL-SCNC: 143 MMOL/L (ref 135–147)
WBC # BLD AUTO: 4.83 THOUSAND/UL (ref 4.31–10.16)

## 2025-02-12 PROCEDURE — 85025 COMPLETE CBC W/AUTO DIFF WBC: CPT

## 2025-02-12 PROCEDURE — 99232 SBSQ HOSP IP/OBS MODERATE 35: CPT | Performed by: PHYSICIAN ASSISTANT

## 2025-02-12 PROCEDURE — 80048 BASIC METABOLIC PNL TOTAL CA: CPT

## 2025-02-12 PROCEDURE — 99231 SBSQ HOSP IP/OBS SF/LOW 25: CPT | Performed by: SPECIALIST

## 2025-02-12 RX ORDER — LABETALOL HYDROCHLORIDE 5 MG/ML
5 INJECTION, SOLUTION INTRAVENOUS EVERY 6 HOURS PRN
Status: DISCONTINUED | OUTPATIENT
Start: 2025-02-12 | End: 2025-02-13 | Stop reason: HOSPADM

## 2025-02-12 RX ADMIN — TRAZODONE HYDROCHLORIDE 100 MG: 100 TABLET ORAL at 21:12

## 2025-02-12 RX ADMIN — ACETAMINOPHEN 650 MG: 325 TABLET, FILM COATED ORAL at 09:37

## 2025-02-12 RX ADMIN — PANTOPRAZOLE SODIUM 40 MG: 40 INJECTION, POWDER, FOR SOLUTION INTRAVENOUS at 09:37

## 2025-02-12 RX ADMIN — LEVOTHYROXINE SODIUM 150 MCG: 75 TABLET ORAL at 05:51

## 2025-02-12 RX ADMIN — ACETAMINOPHEN 650 MG: 325 TABLET, FILM COATED ORAL at 21:13

## 2025-02-12 RX ADMIN — LOSARTAN POTASSIUM 100 MG: 50 TABLET, FILM COATED ORAL at 09:37

## 2025-02-12 RX ADMIN — ONDANSETRON 4 MG: 2 INJECTION INTRAMUSCULAR; INTRAVENOUS at 13:55

## 2025-02-12 RX ADMIN — AMLODIPINE BESYLATE 10 MG: 10 TABLET ORAL at 09:37

## 2025-02-12 RX ADMIN — SERTRALINE 100 MG: 100 TABLET, FILM COATED ORAL at 09:37

## 2025-02-12 RX ADMIN — SODIUM CHLORIDE, SODIUM GLUCONATE, SODIUM ACETATE, POTASSIUM CHLORIDE, MAGNESIUM CHLORIDE, SODIUM PHOSPHATE, DIBASIC, AND POTASSIUM PHOSPHATE 75 ML/HR: .53; .5; .37; .037; .03; .012; .00082 INJECTION, SOLUTION INTRAVENOUS at 02:05

## 2025-02-12 RX ADMIN — IRON SUCROSE 200 MG: 20 INJECTION, SOLUTION INTRAVENOUS at 17:07

## 2025-02-12 RX ADMIN — FLUTICASONE FUROATE 1 PUFF: 100 POWDER RESPIRATORY (INHALATION) at 09:44

## 2025-02-12 RX ADMIN — METOPROLOL SUCCINATE 25 MG: 25 TABLET, EXTENDED RELEASE ORAL at 09:37

## 2025-02-12 RX ADMIN — ENOXAPARIN SODIUM 40 MG: 40 INJECTION SUBCUTANEOUS at 09:36

## 2025-02-12 RX ADMIN — SERTRALINE 100 MG: 100 TABLET, FILM COATED ORAL at 17:33

## 2025-02-12 RX ADMIN — BUPROPION HYDROCHLORIDE 150 MG: 150 TABLET, EXTENDED RELEASE ORAL at 09:37

## 2025-02-12 NOTE — CASE MANAGEMENT
Case Management Assessment & Discharge Planning Note    Patient name Yokasta Cadena  Location [facility] /E4 -* MRN [id number]  : 1945 Date 2025       Current Admission Date: 2/10/2025  Current Admission Diagnosis:SBO (small bowel obstruction) (Formerly [facility])   Patient Active Problem List    Diagnosis Date Noted Date Diagnosed    Constipation 2025     SBO (small bowel obstruction) (HCC) 02/10/2025     Lumbar spondylosis 2024     DIANA (obstructive sleep apnea) 10/19/2022     Obesity, Class I, BMI 30-34.9 06/15/2022     At risk for sleep apnea 06/15/2022     Near syncope 2021     Anemia 2021     URI (upper respiratory infection) 2021     RUQ pain 2021     Intestinal metaplasia of gastric mucosa 2021     History of gastric ulcer 2020     Rosacea 2019     IFG (impaired fasting glucose) 2019     Cervical radiculitis 2019     Osteoarthritis of knee 2019     Vertigo 2018     Mild persistent asthma without complication 2018     Injury of tendon of rotator cuff 2017     Mixed anxiety and depressive disorder 2017     Postconcussion syndrome 2016     Tinnitus, bilateral 2016     Occipital neuralgia of left side 2015     Ulnar nerve compression, right 2015     Carpal tunnel syndrome of right wrist 2015     Cervical spinal stenosis 2015     Cataract 10/17/2014     Gastroesophageal reflux disease 2013     Essential hypertension 2010     Hyperlipidemia 2010     History of colonic polyps 2010     Hypothyroidism 2010       LOS (days): 1  Geometric Mean LOS (GMLOS) (days): 2.3  Days to GMLOS:1.1     OBJECTIVE:    Risk of Unplanned Readmission Score: 13.15         Current admission status: Inpatient       Preferred Pharmacy:   [id number] - [place] - [address] [address]  Phone: [phone] Fax:  [phone]    Primary Care Provider: Lisbeth Vallejo DO    Primary Insurance: MEDICARE  Secondary Insurance: [employer]ET[employer]    ASSESSMENT:  Active Health Care Proxies    There are no active Health Care Proxies on file.       Advance Directives  Does patient have a Health Care POA?: Yes  Does patient have Advance Directives?: Yes  Advance Directives: Living will, Power of  for health care         Readmission Root Cause  30 Day Readmission: No    Patient Information  Admitted from:: Home  Mental Status: Alert  During Assessment patient was accompanied by: Not accompanied during assessment  Assessment information provided by:: Patient  Primary Caregiver: Self  Support Systems: Family members  County of Residence: [place]  What [facility] do you live in?: [place]  Type of Current Residence: Apartment  Upon entering residence, is there a bedroom on the main floor (no further steps)?: Yes  Upon entering residence, is there a bathroom on the main floor (no further steps)?: Yes  Living Arrangements: Lives w/ Family members  Is patient a ?: No    Activities of Daily Living Prior to Admission  Functional Status: Independent  Completes ADLs independently?: Yes  Ambulates independently?: Yes  Does patient use assisted devices?: No  Does patient currently own DME?: No  Does patient have a history of Outpatient Therapy (PT/OT)?: Yes  Does the patient have a history of Short-Term Rehab?: Yes  Does patient have a history of HHC?: Yes  Does patient currently have HHC?: No         Patient Information Continued  Income Source: Pension/intermediate  Does patient have prescription coverage?: Yes  Does patient receive dialysis treatments?: No  Does patient have a history of substance abuse?: No  Does patient have a history of Mental Health Diagnosis?: No         Means of Transportation  [facility]          DISCHARGE DETAILS:    Discharge planning discussed with:: pt     Comments - Freedom of Choice: home  CM  contacted family/caregiver?: No- see comments (alert and able to answer for herself)  Were Treatment Team discharge recommendations reviewed with patient/caregiver?: Yes  Did patient/caregiver verbalize understanding of patient care needs?: Yes  Were patient/caregiver advised of the risks associated with not following Treatment Team discharge recommendations?: Yes    Contacts  Patient Contacts: Patient  Relationship to Patient:: Other (Comment) (self)  Contact Method: In Person  Reason/Outcome: Continuity of Care, Discharge Planning    Requested Home Health Care         Is the patient interested in HHC at discharge?: No    DME Referral Provided  Referral made for DME?: No         Would you like to participate in our [facility] service program?  : No - Declined    Treatment Team Recommendation: Home  Discharge Destination Plan:: Home  Transport at Discharge : Family                                      Additional Comments: CM met with the pt and she was made aware of the CM role.  Assessment was done.  Pt lives her sister in an apt and she has been independent with all adls and still drives.  Pt states she is feeling better but felt some nausea after eating some solid food today.  She is passing flatus and said she had a BM, but it was not quite normal for her.  There were no barriers or needs identified for discharge and CM to follow as needed.

## 2025-02-12 NOTE — PLAN OF CARE
Problem: PAIN - ADULT  Goal: Verbalizes/displays adequate comfort level or baseline comfort level  Description: Interventions:  - Encourage patient to monitor pain and request assistance  - Assess pain using appropriate pain scale  - Administer analgesics based on type and severity of pain and evaluate response  - Implement non-pharmacological measures as appropriate and evaluate response  - Consider cultural and social influences on pain and pain management  - Notify physician/advanced practitioner if interventions unsuccessful or patient reports new pain  Outcome: Progressing     Problem: INFECTION - ADULT  Goal: Absence or prevention of progression during hospitalization  Description: INTERVENTIONS:  - Assess and monitor for signs and symptoms of infection  - Monitor lab/diagnostic results  - Monitor all insertion sites, i.e. indwelling lines, tubes, and drains  - Monitor endotracheal if appropriate and nasal secretions for changes in amount and color  - [place] appropriate cooling/warming therapies per order  - Administer medications as ordered  - Instruct and encourage patient and family to use good hand hygiene technique  - Identify and instruct in appropriate isolation precautions for identified infection/condition  Outcome: Progressing

## 2025-02-12 NOTE — ASSESSMENT & PLAN NOTE
Continue home Toprol 25 mg daily, amlodipine 10 mg daily, losartan 100 mg  BP slightly elevated today 171/74  Add prn here if SBP >170

## 2025-02-12 NOTE — PROGRESS NOTES
Progress Note - Hospitalist   Name: Yokasta Cadena 79 y.o. female I MRN: [id number]  Unit/Bed#: E4 -01 I Date of Admission: 2/10/2025   Date of Service: 2/12/2025 I Hospital Day: 1    Assessment & Plan  SBO (small bowel obstruction) (HCC)  Presenting with acute onset distended abdomen and abdominal pain  CT with intermediate grade SBO with transition of caliber in the ileum  No new medication changes - she has been her CCB for some time  Did have abnormally high TSH in January, recheck levels normal  Starting to tolerate clear liquids and passing flatus today with improvement in abdominal pain 2/11  Question if this is a functional component due to stimulant laxatives daily at home vs mechanical component with known adhesions  She is improving slowly - plan to adavnce diet further today and monitor  Continue IVF at this time and supportive care  Anticipate discharge home tomorrow if continues to tolerate   Anemia  Microcytic anemia without evidence of bleeding  Patient does have history of polyps, last colonoscopy May 2024 -biopsies negative for dysplasia  Check iron panel- iron sat 29%, TIBC 308, iron 88, ferritin 19 - start IV venofer  Hypothyroidism  TSH levels elevated a month ago - recheck here normal  Continue levothyroxine 150 mcg  Essential hypertension  Continue home Toprol 25 mg daily, amlodipine 10 mg daily, losartan 100 mg  BP slightly elevated today 171/74  Add prn here if SBP >170  Gastroesophageal reflux disease  Had EGD 5/2024 - noted with chronic antral gastritis no dysplasia.   Continue PPI twice daily  DAINA (obstructive sleep apnea)  Declined CPAP  Constipation  Takes Senokot typically on a nightly basis  Seen by GI and encouraged to increase her fiber  Once acute problem continues to resolve will need to establish bowel regimen on discharge    VTE Pharmacologic Prophylaxis: VTE Score: 4 lovenox    Mobility:   Basic Mobility Inpatient Raw Score: 23  -[facility] Goal: 7: Walk 25 feet or more  [facility]  Achieved: 8: Walk 250 feet ot more    Patient Centered Rounds: I performed bedside rounds with nursing staff today.   Discussions with Specialists or Other Care Team Provider: surgery    Education and Discussions with Family / Patient: patient    Current Length of Stay: 1 day(s)  Current Patient Status: Inpatient   Certification Statement: with need for continued inpatient stay for advancement and tolerance of diet  Discharge Plan: 24 hrs pending clinical improvement, add pt/ot consult    Code Status: Level 1 - Full Code    Subjective   Resting in bed. Tolerated clears this am. Eager to advance diet. Reports some abdominal pain but overall better compared to yesterday. No recent vomiting.    Objective :  Temp:  [97 °F (36.1 °C)-98.2 °F (36.8 °C)] 97 °F (36.1 °C)  HR:  [62-65] 64  BP: (147-171)/(67-74) 171/74  Resp:  [18] 18  SpO2:  [92 %-96 %] 96 %  O2 Device: Nasal cannula  Nasal Cannula O2 Flow Rate (L/min):  [2 L/min] 2 L/min    Body mass index is 33.32 kg/m².     Input and Output Summary (last 24 hours):     Intake/Output Summary (Last 24 hours) at 2/12/2025 1421  Last data filed at 2/12/2025 0501  Gross per 24 hour   Intake 250 ml   Output --   Net 250 ml       Physical Exam  Vitals and nursing note reviewed.   Constitutional:       General: She is not in acute distress.     Appearance: She is normal weight. She is not ill-appearing, toxic-appearing or diaphoretic.   HENT:      Head: Normocephalic and atraumatic.   Eyes:      General: No scleral icterus.  Cardiovascular:      Rate and Rhythm: Normal rate and regular rhythm.   Pulmonary:      Effort: Pulmonary effort is normal. No respiratory distress.      Breath sounds: Normal breath sounds. No wheezing or rhonchi.   Abdominal:      General: Bowel sounds are normal. There is no distension.      Palpations: Abdomen is soft. There is no mass.      Tenderness: There is abdominal tenderness.      Hernia: No hernia is present.   Musculoskeletal:         General: No  swelling.      Cervical back: Neck supple.   Skin:     General: Skin is warm and dry.   Neurological:      Mental Status: She is alert and oriented to person, place, and time. Mental status is at baseline.   Psychiatric:         Mood and Affect: Mood normal.         Behavior: Behavior normal.             Lab Results: I have reviewed the following results:   Results from last 7 days   Lab Units 02/12/25  0536   WBC Thousand/uL 4.83   HEMOGLOBIN g/dL 9.4*   HEMATOCRIT % 30.2*   PLATELETS Thousands/uL 163   SEGS PCT % 59   LYMPHO PCT % 26   MONO PCT % 10   EOS PCT % 5     Results from last 7 days   Lab Units 02/12/25  0536 02/11/25  0531   SODIUM mmol/L 143 142   POTASSIUM mmol/L 3.8 3.8   CHLORIDE mmol/L 110* 111*   CO2 mmol/L 27 25   BUN mg/dL 10 14   CREATININE mg/dL 0.82 0.82   ANION GAP mmol/L 6 6   CALCIUM mg/dL 8.4 8.3*   ALBUMIN g/dL  --  3.4*   TOTAL BILIRUBIN mg/dL  --  0.41   ALK PHOS U/L  --  63   ALT U/L  --  8   AST U/L  --  11*   GLUCOSE RANDOM mg/dL 85 92                 Results from last 7 days   Lab Units 02/10/25  1519   LACTIC ACID mmol/L 0.5       Recent Cultures (last 7 days):               Last 24 Hours Medication List:     Current Facility-Administered Medications:     acetaminophen (TYLENOL) tablet 650 mg, Q6H PRN    albuterol (PROVENTIL HFA,VENTOLIN HFA) inhaler 2 puff, Q6H PRN    amLODIPine (NORVASC) tablet 10 mg, Daily    buPROPion (WELLBUTRIN XL) 24 hr tablet 150 mg, QAM    enoxaparin (LOVENOX) subcutaneous injection 40 mg, Daily    fluticasone (ARNUITY ELLIPTA) 100 MCG/ACT inhaler 1 puff, Daily    levothyroxine tablet 150 mcg, Early Morning    losartan (COZAAR) tablet 100 mg, Daily    metoprolol succinate (TOPROL-XL) 24 hr tablet 25 mg, Daily    ondansetron (ZOFRAN) injection 4 mg, Q6H PRN    pantoprazole (PROTONIX) injection 40 mg, Q24H AUGUSTUS    sertraline (ZOLOFT) tablet 100 mg, BID    traZODone (DESYREL) tablet 100 mg, HS    Administrative Statements   Today, Patient Was Seen By: Bharti  MONTANA Silva      **Please Note: This note may have been constructed using a voice recognition system.**

## 2025-02-12 NOTE — ASSESSMENT & PLAN NOTE
Microcytic anemia without evidence of bleeding  Patient does have history of polyps, last colonoscopy May 2024 -biopsies negative for dysplasia  Check iron panel- iron sat 29%, TIBC 308, iron 88, ferritin 19 - start IV venofer

## 2025-02-12 NOTE — ASSESSMENT & PLAN NOTE
Presenting with acute onset distended abdomen and abdominal pain  CT with intermediate grade SBO with transition of caliber in the ileum  No new medication changes - she has been her CCB for some time  Did have abnormally high TSH in January, recheck levels normal  Starting to tolerate clear liquids and passing flatus today with improvement in abdominal pain 2/11  Question if this is a functional component due to stimulant laxatives daily at home vs mechanical component with known adhesions  She is improving slowly - plan to adavnce diet further today and monitor  Continue IVF at this time and supportive care  Anticipate discharge home tomorrow if continues to tolerate

## 2025-02-13 ENCOUNTER — TELEPHONE (OUTPATIENT)
Dept: FAMILY MEDICINE CLINIC | Facility: CLINIC | Age: 80
End: 2025-02-13

## 2025-02-13 VITALS
HEIGHT: 61 IN | TEMPERATURE: 96.9 F | HEART RATE: 55 BPM | SYSTOLIC BLOOD PRESSURE: 168 MMHG | RESPIRATION RATE: 18 BRPM | BODY MASS INDEX: 33.3 KG/M2 | WEIGHT: 176.37 LBS | DIASTOLIC BLOOD PRESSURE: 68 MMHG | OXYGEN SATURATION: 92 %

## 2025-02-13 DIAGNOSIS — E03.9 HYPOTHYROIDISM, UNSPECIFIED TYPE: ICD-10-CM

## 2025-02-13 DIAGNOSIS — I10 ESSENTIAL HYPERTENSION: ICD-10-CM

## 2025-02-13 PROCEDURE — 99239 HOSP IP/OBS DSCHRG MGMT >30: CPT | Performed by: PHYSICIAN ASSISTANT

## 2025-02-13 PROCEDURE — NC001 PR NO CHARGE: Performed by: SPECIALIST

## 2025-02-13 RX ORDER — LEVOTHYROXINE SODIUM 150 UG/1
150 TABLET ORAL
Qty: 90 TABLET | Refills: 1 | Status: SHIPPED | OUTPATIENT
Start: 2025-02-13

## 2025-02-13 RX ORDER — LOSARTAN POTASSIUM 100 MG/1
100 TABLET ORAL DAILY
Qty: 90 TABLET | Refills: 1 | Status: SHIPPED | OUTPATIENT
Start: 2025-02-13

## 2025-02-13 RX ADMIN — LEVOTHYROXINE SODIUM 150 MCG: 75 TABLET ORAL at 05:32

## 2025-02-13 RX ADMIN — IRON SUCROSE 200 MG: 20 INJECTION, SOLUTION INTRAVENOUS at 09:30

## 2025-02-13 RX ADMIN — PANTOPRAZOLE SODIUM 40 MG: 40 INJECTION, POWDER, FOR SOLUTION INTRAVENOUS at 08:13

## 2025-02-13 RX ADMIN — BUPROPION HYDROCHLORIDE 150 MG: 150 TABLET, EXTENDED RELEASE ORAL at 08:13

## 2025-02-13 RX ADMIN — ENOXAPARIN SODIUM 40 MG: 40 INJECTION SUBCUTANEOUS at 08:13

## 2025-02-13 RX ADMIN — SERTRALINE 100 MG: 100 TABLET, FILM COATED ORAL at 08:13

## 2025-02-13 RX ADMIN — LOSARTAN POTASSIUM 100 MG: 50 TABLET, FILM COATED ORAL at 08:13

## 2025-02-13 RX ADMIN — AMLODIPINE BESYLATE 10 MG: 10 TABLET ORAL at 08:13

## 2025-02-13 RX ADMIN — FLUTICASONE FUROATE 1 PUFF: 100 POWDER RESPIRATORY (INHALATION) at 08:15

## 2025-02-13 RX ADMIN — METOPROLOL SUCCINATE 25 MG: 25 TABLET, EXTENDED RELEASE ORAL at 08:13

## 2025-02-13 NOTE — ASSESSMENT & PLAN NOTE
Presenting with acute onset distended abdomen and abdominal pain  CT with intermediate grade SBO with transition of caliber in the ileum  No new medication changes   Did have abnormally high TSH in January, recheck levels normal  Seen and evaluated by general surgery  Improved on clear liquids and IV fluids, started passing flatus  Question if this is a functional component due to stimulant laxatives daily at home vs mechanical component with known adhesions  Slowly improved and now tolerating advance diet  Discussed with surgery as patient is stable for discharge at this time  Plan for outpatient follow-up with Dr. Pedro in 2 weeks

## 2025-02-13 NOTE — DISCHARGE SUMMARY
Discharge Summary - Hospitalist   Name: Yokasta Cadena 79 y.o. female I MRN: [id number]  Unit/Bed#: E4 -01 I Date of Admission: 2/10/2025   Date of Service: 2/13/2025 I Hospital Day: 2     Assessment & Plan  SBO (small bowel obstruction) (HCC)  Presenting with acute onset distended abdomen and abdominal pain  CT with intermediate grade SBO with transition of caliber in the ileum  No new medication changes   Did have abnormally high TSH in January, recheck levels normal  Seen and evaluated by general surgery  Improved on clear liquids and IV fluids, started passing flatus  Question if this is a functional component due to stimulant laxatives daily at home vs mechanical component with known adhesions  Slowly improved and now tolerating advance diet  Discussed with surgery as patient is stable for discharge at this time  Plan for outpatient follow-up with Dr. Pedro in 2 weeks  Anemia  Microcytic anemia without evidence of bleeding  Patient does have history of polyps, last colonoscopy May 2024 -biopsies negative for dysplasia  Previously took oral iron but has a difficult time tolerating it  Check iron panel- iron sat 29%, TIBC 308, iron 88, ferritin 19 - start IV venofer-received 2 days  Patient will follow-up with PCP in the outpatient setting to discuss iron infusions  Hypothyroidism  TSH levels elevated a month ago - recheck here normal  Continue levothyroxine 150 mcg  Essential hypertension  Continue home Toprol 25 mg daily, amlodipine 10 mg daily, losartan 100 mg  Gastroesophageal reflux disease  Had EGD 5/2024 - noted with chronic antral gastritis no dysplasia.   Continue PPI twice daily  DIANA (obstructive sleep apnea)  Declined CPAP  Constipation  Takes Senokot typically on a nightly basis  Advised to switch to stool softener colace  Seen by GI in the outpt setting - had EGD colonoscopy 5/20/24 - at that time, she was instructed to follow a high fiber diet with 25-30 g of finer on a daily basis         Consultations During Hospital Stay:  General surgery     Procedures Performed:   CT abdomen pelvis with contrast  Result Date: 2/10/2025  Impression: At least intermediate grade small bowel obstruction with transition of caliber in the ileum in the lower abdomen. No significant gastric dilatation. Sigmoid diverticulosis. No evidence of diverticulitis. Small hiatal hernia. Other chronic findings, as per the body of the report. Workstation performed: [id number]     Significant Findings / Test Results:   Partial small bowel obstruction    Incidental Findings:   None    Test Results Pending at Discharge (will require follow up):   None     Outpatient follow-up requested:  Surgery-2 weeks    Complications:  none    Hospital Course:     Yokasta Cadena is a 79 y.o. female patient who originally presented to the hospital on 2/10/2025 due to diffuse abdominal pain and abdominal distention.  In the ED, CT imaging obtained revealing at least intermediate grade small bowel obstruction with transition of caliber and ileum in the lower abdomen.  Patient was seen and evaluated by general surgery.  She was placed on n.p.o. diet, pain control, and IV fluids.  The following day her diet was advanced to clears.  She slowly improved.  Initially began feeling nauseous after first meal but this did subside and pain is overall improved.  She is now tolerating meals as previously.  In conclusion, patient is stable for discharge at this time.  She will follow-up with surgery in the outpatient setting.  Instructed to start bowel regimen of colace as well as increase fiber.    Condition at Discharge: stable     Discharge Day Visit / Exam:     Subjective: Resting in bed.  Reports she is feeling better and has less abdominal pain.  Now tolerating regular diet.  Had grilled cheese last night and eggs and lost movement this morning for breakfast.  No nausea or vomiting.  Discussed importance of follow-up and increasing fiber.    Vitals: Blood  "Pressure: 152/72 (02/13/25 0755)  Pulse: 64 (02/13/25 0755)  Temperature: 97.5 °F (36.4 °C) (02/13/25 0755)  Temp Source: Temporal (02/13/25 0755)  Respirations: 16 (02/13/25 0755)  Height: 5' 1\" (154.9 cm) (02/10/25 2233)  Weight - Scale: 80 kg (176 lb 5.9 oz) (02/10/25 2233)  SpO2: 90 % (02/12/25 2351)    Exam:   Physical Exam  Vitals reviewed.   Constitutional:       General: She is not in acute distress.     Appearance: She is obese. She is not ill-appearing, toxic-appearing or diaphoretic.   HENT:      Head: Normocephalic and atraumatic.   Eyes:      General: No scleral icterus.  Cardiovascular:      Rate and Rhythm: Normal rate and regular rhythm.   Pulmonary:      Effort: Pulmonary effort is normal.      Breath sounds: Normal breath sounds.   Abdominal:      General: Bowel sounds are normal. There is no distension.      Palpations: Abdomen is soft. There is no mass.      Tenderness: There is no abdominal tenderness.      Hernia: No hernia is present.   Musculoskeletal:         General: No swelling.      Cervical back: Neck supple.   Skin:     General: Skin is warm and dry.   Neurological:      Mental Status: She is alert and oriented to person, place, and time. Mental status is at baseline.   Psychiatric:         Mood and Affect: Mood normal.         Behavior: Behavior normal.         Discharge instructions/Information to patient and family:   See after visit summary for information provided to patient and family.      Provisions for Follow-Up Care:  See after visit summary for information related to follow-up care and any pertinent home health orders.      Planned Readmission: no     Discharge Statement:  This time was spent on the day of discharge. I had direct contact with the patient on the day of discharge. Greater than 50% of the total time was spent examining patient, answering all patient questions, arranging and discussing plan of care with patient as well as directly providing post-discharge " instructions.  Additional time then spent on discharge activities.    Discharge Medications:  See after visit summary for reconciled discharge medications provided to patient and family.      ** Please Note: This note has been constructed using a voice recognition system **

## 2025-02-13 NOTE — DISCHARGE INSTR - AVS FIRST PAGE
Dear Yokasta Cadena,     It was our pleasure to care for you here at [facility].  It is our hope that we were always able to exceed the expected standards for your care during your stay.  You were hospitalized due to small bowel obstruction.  You were cared for on the fourth floor by Bharti Silva PA-C under the service of Neva Meadows with the [facility] who covers for your primary care physician (PCP), Lisbeth Vallejo DO, while you were hospitalized.  If you have any questions or concerns related to this hospitalization, you may contact us at .  For follow up as well as any medication refills, we recommend that you follow up with your primary care physician.  A registered nurse will reach out to you by phone within a few days after your discharge to answer any additional questions that you may have after going home.  However, at this time we provide for you here, the most important instructions / recommendations at discharge:       Notable During Your Stay-   You are found to have small bowel obstruction  You were seen by general surgery and treated with IV fluids along with bowel rest  Your diet was slowly advanced and you improved    Important follow up information -   Please see general surgery in the office in 2 weeks-Dr. Jean Baptiste    Other Instructions -   Please increase your fiber intake  Can take over the counter stool softer  Please stay well hydrated    Please review this entire after visit summary as additional general instructions including medication list, appointments, activity, diet, any pertinent wound care, and other additional recommendations from your care team that may be provided for you.      Sincerely,     Bharti Silva PA-C        High-fiber diet    The Basics  Written by the doctors and editors at [facility]  What is fiber?  This is a substance found in some fruits, vegetables, and grains. Most fiber passes  "through your body without being digested. But it can affect how you digest other foods, and it can also improve your bowel movements.  There are 2 kinds of fiber:  ? \"Soluble fiber\" is found in fruits, oats, barley, beans, and peas.  ? \"Insoluble fiber\" is found in wheat, rye, and other grains.  Both kinds of fiber you eat are called \"dietary fiber.\"  Why is fiber important for good health?  Fiber can help make your bowel movements softer and more regular. Adding fiber to your diet can help with problems like constipation, hemorrhoids, and diarrhea. Plus, it can help prevent \"accidents\" if you have trouble controlling your bowel movements.  Getting enough fiber can also help lower your risk of heart disease, stroke, and type 2 diabetes. That's because fiber can help lower cholesterol and control blood sugar.  How much fiber do I need?  The recommended amount of fiber is 20 to 35 grams a day. The nutrition label on packaged foods shows you how much fiber you are getting in each serving (figure 1).  How can I make sure I'm getting enough fiber?  Eat plenty of the fruits, vegetables, and grains that contain fiber (table 1 and figure 2). Many breakfast cereals also have a lot of fiber.  If you can't get enough fiber from food, you can add wheat bran to the foods you do eat. Or you can take fiber supplements. These come as powders, wafers, or pills. They include psyllium seed (sample brand names: Metamucil, Konsyl), methylcellulose (sample brand name: Citrucel), polycarbophil (sample brand name: FiberCon), and wheat dextrin (sample brand name: Benefiber). If you take a fiber supplement, read the label so you know how much to take. If you're not sure, ask your doctor or nurse.  What are the side effects of fiber?  When you start eating more fiber, your belly might feel bloated, or you might have gas or cramps. You can avoid these side effects by adding fiber to your diet slowly.  Some people feel worse when they eat more " fiber or take fiber supplements. If you feel worse after adding more fiber to your diet, you can try decreasing the amount of fiber to see if that helps.  All topics are updated as new evidence becomes available and our peer review process is complete.  This topic retrieved from [employer] on: Feb 12, 2025.  Topic 84833 Version 14.0  Release: 32.10.4 - C33.42  © 2025 UpToDate, Inc. and/or its affiliates. All rights reserved.  figure 1: Nutrition label - Fiber    table 1: Amount of fiber in different foods  Food Serving Grams of fiber   Fruits   Apple (with skin) 1 medium apple 4.4 g   Avocado 1/2 cup 5.0 g   Banana 1 medium banana 3.1 g   Blueberries 1 cup 3.6 g   Oranges 1 orange 3.1 g   Pear (with skin) 1 medium pear 5.5 g   Prunes 1 cup (pitted) 12.4 g   Raspberries 1 cup 8.0 g   Strawberries 1/2 cup 2.0 g   Juices   Apple (unsweetened, with added ascorbic acid) 1 cup 0.5 g   Grapefruit (white, canned, sweetened) 1 cup 0.2 g   Grape (unsweetened, with added ascorbic acid) 1 cup 0.5 g   Orange (with pulp) 1 cup 0.7 g   Prune 1 cup 2.6 g   Vegetables   Cooked   Carrots 1/2 cup (sliced) 2.3 g   Corn 1/2 cup 2.0 g   Green beans 1 cup 4.0 g   Peas 1 cup 8.8 g   Potato (baked, with skin) 1 medium potato 3.8 g   Raw   Broccoli 1/2 cup 2.6 g   Celery 1 cup 2.0 g   Cucumber (with peel) 1 cucumber 1.5 g   Lettuce 1 cup (shredded) 0.5 g   Spinach 1 cup 0.7 g   Tomato 1 medium tomato 1.5 g   Legumes   Baked beans (canned, no salt added) 1/2 cup 5.6 g   Black beans (cooked) 1/2 cup 7.5 g   Chickpeas (cooked) 1/2 cup 6.2 g   Kidney beans (canned) 1/2 cup 5.7 g   Lentils (boiled) 1/2 cup 7.8 g   Lima beans (canned) 1/2 cup 6.6 g   Navy beans (cooked) 1/2 cup 9.5 g   Breads, pastas, flours   Bran muffins 1 medium muffin 5.2 g   Bread (rye) 1 slice 2.0 g   Bread (white) 1 slice 0.6 g   Bread (whole wheat) 1 slice 1.9 g   Bulgur (cooked) 1/2 cup 4.1 g   Cracker (rye wafers) 2 crackers 5.0 g   Oatmeal (cooked) 1 cup 4.0 g   Pasta and  "rice (cooked)   Macaroni 1 cup 2.5 g   Rice (brown) 1 cup 3.5 g   Rice (white) 1 cup 0.6 g   Spaghetti (regular) 1 cup 2.5 g   Spaghetti (whole wheat) 1 cup 5.4 g   Serena barley (cooked) 1 cup 6.0 g   Popcorn (air popped) 3 cups 3.6 g   Breakfast cereals*   Bran flakes 3/4 cup 5.5 g   \"High-fiber\" cereals 1/2 cup 14 g   Instant oatmeal 1 pouch (43 g) 3.0 g   Other cereals 1/2 cup 3.0 to 7.5 g   Nuts   Almonds 1/2 cup 8.7 g   Peanuts 1/2 cup 7.9 g   There are 2 kinds of fiber:  \"Soluble fiber\" is found in fruits, oats, barley, beans, and peas. It can help heart health, including lowering cholesterol.  \"Insoluble fiber\" is found in wheat, rye, and other grains. It can help with constipation.  The exact amount of fiber in a food depends on the specific product and brand. To learn how much fiber and other nutrients are in different foods, visit the [place] [employer] of [employer] FoodData Central website.  * Ready-to-eat breakfast cereals have a wide range of fiber content. Types with high fiber content typically include bran (wheat or oat) and/or oat fiber. Check the nutrition label of each brand for actual fiber content.  Graphic 32841 Version 7.0  figure 2: Foods with fiber    Consumer Information Use and Disclaimer  Disclaimer: This generalized information is a limited summary of diagnosis, treatment, and/or medication information. It is not meant to be comprehensive and should be used as a tool to help the user understand and/or assess potential diagnostic and treatment options. It does NOT include all information about conditions, treatments, medications, side effects, or risks that may apply to a specific patient. It is not intended to be medical advice or a substitute for the medical advice, diagnosis, or treatment of a health care provider based on the health care provider's examination and assessment of a patient's specific and unique circumstances. Patients must speak with a health care provider for " complete information about their health, medical questions, and treatment options, including any risks or benefits regarding use of medications. This information does not endorse any treatments or medications as safe, effective, or approved for treating a specific patient. UpToDate, Inc. and its affiliates disclaim any warranty or liability relating to this information or the use thereof.The use of this information is governed by the Terms of Use, available at https://www.woltersOnfanuwer.com/en/know/clinical-effectiveness-terms. 2025© UpToDate, Inc. and its affiliates and/or licensors. All rights reserved.  © 2025 UpToDate, Inc. and/or its affiliates. All rights reserved.

## 2025-02-13 NOTE — PROGRESS NOTES
Progress Note - Surgery-General   Name: Yokasta Cadena 79 y.o. female I MRN: [id number]  Unit/Bed#: E4 -01 I Date of Admission: 2/10/2025   Date of Service: 2/13/2025 I Hospital Day: 2    Assessment & Plan  SBO (small bowel obstruction) (HCC)  P/w SBO. Since +bms    Plan:  - Ok for DC from surgery perspective  - Remainder of care per primary team        Subjective   Feels well, resting comfortably    Objective :  Temp:  [97 °F (36.1 °C)-98.5 °F (36.9 °C)] 97.6 °F (36.4 °C)  HR:  [58-64] 58  BP: (136-171)/(60-74) 136/60  Resp:  [18] 18  SpO2:  [90 %-96 %] 90 %  O2 Device: None (Room air)  Nasal Cannula O2 Flow Rate (L/min):  [2 L/min] 2 L/min    I/O         02/11 0701 02/12 0700 02/12 0701 02/13 0700    P.O. 430 660    Total Intake(mL/kg) 430 (5.4) 660 (8.3)    Net +430 +660          Unmeasured Stool Occurrence 2 x             Physical Exam  Physical Exam:  General: NAD  CV: nl rate  Lungs: nl wob. No resp distress.  Chest: no lesions  ABD: Soft, ND, mild tender  Extrem: No CCE      Lab Results: I have reviewed the following results:  Recent Labs     02/10/25  1519 02/11/25  0531 02/12/25  0536   WBC  --  5.54 4.83   HGB  --  9.4* 9.4*   HCT  --  30.1* 30.2*   PLT  --  172 163   SODIUM  --  142 143   K  --  3.8 3.8   CL  --  111* 110*   CO2  --  25 27   BUN  --  14 10   CREATININE  --  0.82 0.82   GLUC  --  92 85   AST  --  11*  --    ALT  --  8  --    ALB  --  3.4*  --    TBILI  --  0.41  --    ALKPHOS  --  63  --    HSTNI0 4  --   --    LACTICACID 0.5  --   --

## 2025-02-13 NOTE — PLAN OF CARE
Problem: PAIN - ADULT  Goal: Verbalizes/displays adequate comfort level or baseline comfort level  Description: Interventions:  - Encourage patient to monitor pain and request assistance  - Assess pain using appropriate pain scale  - Administer analgesics based on type and severity of pain and evaluate response  - Implement non-pharmacological measures as appropriate and evaluate response  - Consider cultural and social influences on pain and pain management  - Notify physician/advanced practitioner if interventions unsuccessful or patient reports new pain  Outcome: Progressing     Problem: INFECTION - ADULT  Goal: Absence or prevention of progression during hospitalization  Description: INTERVENTIONS:  - Assess and monitor for signs and symptoms of infection  - Monitor lab/diagnostic results  - Monitor all insertion sites, i.e. indwelling lines, tubes, and drains  - Monitor endotracheal if appropriate and nasal secretions for changes in amount and color  - [place] appropriate cooling/warming therapies per order  - Administer medications as ordered  - Instruct and encourage patient and family to use good hand hygiene technique  - Identify and instruct in appropriate isolation precautions for identified infection/condition  Outcome: Progressing     Problem: SAFETY ADULT  Goal: Patient will remain free of falls  Description: INTERVENTIONS:  - Educate patient/family on patient safety including physical limitations  - Instruct patient to call for assistance with activity   - Consult OT/PT to assist with strengthening/mobility   - Keep Call bell within reach  - Keep bed low and locked with side rails adjusted as appropriate  - Keep care items and personal belongings within reach  - Initiate and maintain comfort rounds  - Make Fall Risk Sign visible to staff  - Offer Toileting every  Hours, in advance of need  - Initiate/Maintain alarm  - Obtain necessary fall risk management equipment:   - Apply yellow socks and  bracelet for high fall risk patients  - Consider moving patient to room near nurses station  Outcome: Progressing  Goal: Maintain or return to baseline ADL function  Description: INTERVENTIONS:  -  Assess patient's ability to carry out ADLs; assess patient's baseline for ADL function and identify physical deficits which impact ability to perform ADLs (bathing, care of mouth/teeth, toileting, grooming, dressing, etc.)  - Assess/evaluate cause of self-care deficits   - Assess range of motion  - Assess patient's mobility; develop plan if impaired  - Assess patient's need for assistive devices and provide as appropriate  - Encourage maximum independence but intervene and supervise when necessary  - Involve family in performance of ADLs  - Assess for home care needs following discharge   - Consider OT consult to assist with ADL evaluation and planning for discharge  - Provide patient education as appropriate  Outcome: Progressing  Goal: Maintains/Returns to pre admission functional level  Description: INTERVENTIONS:  - Perform AM-PAC 6 Click Basic Mobility/ Daily Activity assessment daily.  - Set and communicate daily mobility goal to care team and patient/family/caregiver.   - Collaborate with rehabilitation services on mobility goals if consulted  - Perform Range of Motion  times a day.  - Reposition patient every  hours.  - Dangle patient  times a day  - Stand patient  times a day  - Ambulate patient  times a day  - Out of bed to chair  times a day   - Out of bed for meals times a day  - Out of bed for toileting  - Record patient progress and toleration of activity level   Outcome: Progressing     Problem: DISCHARGE PLANNING  Goal: Discharge to home or other facility with appropriate resources  Description: INTERVENTIONS:  - Identify barriers to discharge w/patient and caregiver  - Arrange for needed discharge resources and transportation as appropriate  - Identify discharge learning needs (meds, wound care, etc.)  -  Arrange for interpretive services to assist at discharge as needed  - Refer to Case Management Department for coordinating discharge planning if the patient needs post-hospital services based on physician/advanced practitioner order or complex needs related to functional status, cognitive ability, or social support system  Outcome: Progressing     Problem: Knowledge Deficit  Goal: Patient/family/caregiver demonstrates understanding of disease process, treatment plan, medications, and discharge instructions  Description: Complete learning assessment and assess knowledge base.  Interventions:  - Provide teaching at level of understanding  - Provide teaching via preferred learning methods  Outcome: Progressing

## 2025-02-13 NOTE — TELEPHONE ENCOUNTER
Patient d/c from [facility]. Patient scheduled for 2/25/25 with Dr. Vallejo. Please contact patient for TCM questions.

## 2025-02-13 NOTE — PLAN OF CARE
Problem: PAIN - ADULT  Goal: Verbalizes/displays adequate comfort level or baseline comfort level  Description: Interventions:  - Encourage patient to monitor pain and request assistance  - Assess pain using appropriate pain scale  - Administer analgesics based on type and severity of pain and evaluate response  - Implement non-pharmacological measures as appropriate and evaluate response  - Consider cultural and social influences on pain and pain management  - Notify physician/advanced practitioner if interventions unsuccessful or patient reports new pain  Outcome: Progressing     Problem: INFECTION - ADULT  Goal: Absence or prevention of progression during hospitalization  Description: INTERVENTIONS:  - Assess and monitor for signs and symptoms of infection  - Monitor lab/diagnostic results  - Monitor all insertion sites, i.e. indwelling lines, tubes, and drains  - Monitor endotracheal if appropriate and nasal secretions for changes in amount and color  - [place] appropriate cooling/warming therapies per order  - Administer medications as ordered  - Instruct and encourage patient and family to use good hand hygiene technique  - Identify and instruct in appropriate isolation precautions for identified infection/condition  Outcome: Progressing     Problem: SAFETY ADULT  Goal: Patient will remain free of falls  Description: INTERVENTIONS:  - Educate patient/family on patient safety including physical limitations  - Instruct patient to call for assistance with activity   - Consult OT/PT to assist with strengthening/mobility   - Keep Call bell within reach  - Keep bed low and locked with side rails adjusted as appropriate  - Keep care items and personal belongings within reach  - Initiate and maintain comfort rounds  - Make Fall Risk Sign visible to staff  - Offer Toileting every 2 Hours, in advance of need  - Initiate/Maintain bed alarm  - Obtain necessary fall risk management equipment:   - Apply yellow socks and  bracelet for high fall risk patients  - Consider moving patient to room near nurses station  Outcome: Progressing  Goal: Maintain or return to baseline ADL function  Description: INTERVENTIONS:  -  Assess patient's ability to carry out ADLs; assess patient's baseline for ADL function and identify physical deficits which impact ability to perform ADLs (bathing, care of mouth/teeth, toileting, grooming, dressing, etc.)  - Assess/evaluate cause of self-care deficits   - Assess range of motion  - Assess patient's mobility; develop plan if impaired  - Assess patient's need for assistive devices and provide as appropriate  - Encourage maximum independence but intervene and supervise when necessary  - Involve family in performance of ADLs  - Assess for home care needs following discharge   - Consider OT consult to assist with ADL evaluation and planning for discharge  - Provide patient education as appropriate  Outcome: Progressing  Goal: Maintains/Returns to pre admission functional level  Description: INTERVENTIONS:  - Perform AM-PAC 6 Click Basic Mobility/ Daily Activity assessment daily.  - Set and communicate daily mobility goal to care team and patient/family/caregiver.   - Collaborate with rehabilitation services on mobility goals if consulted  - Perform Range of Motion 3 times a day.  - Reposition patient every 2 hours.  - Dangle patient 3 times a day  - Stand patient 3 times a day  - Ambulate patient 3 times a day  - Out of bed to chair 3 times a day   - Out of bed for meals 3 times a day  - Out of bed for toileting  - Record patient progress and toleration of activity level   Outcome: Progressing     Problem: DISCHARGE PLANNING  Goal: Discharge to home or other facility with appropriate resources  Description: INTERVENTIONS:  - Identify barriers to discharge w/patient and caregiver  - Arrange for needed discharge resources and transportation as appropriate  - Identify discharge learning needs (meds, wound care,  etc.)  - Arrange for interpretive services to assist at discharge as needed  - Refer to Case Management Department for coordinating discharge planning if the patient needs post-hospital services based on physician/advanced practitioner order or complex needs related to functional status, cognitive ability, or social support system  Outcome: Progressing     Problem: Knowledge Deficit  Goal: Patient/family/caregiver demonstrates understanding of disease process, treatment plan, medications, and discharge instructions  Description: Complete learning assessment and assess knowledge base.  Interventions:  - Provide teaching at level of understanding  - Provide teaching via preferred learning methods  Outcome: Progressing

## 2025-02-13 NOTE — ASSESSMENT & PLAN NOTE
P/w SBO. Since +bms    Plan:  - Ok for DC from surgery perspective  - Remainder of care per primary team

## 2025-02-13 NOTE — ASSESSMENT & PLAN NOTE
Microcytic anemia without evidence of bleeding  Patient does have history of polyps, last colonoscopy May 2024 -biopsies negative for dysplasia  Previously took oral iron but has a difficult time tolerating it  Check iron panel- iron sat 29%, TIBC 308, iron 88, ferritin 19 - start IV venofer-received 2 days  Patient will follow-up with PCP in the outpatient setting to discuss iron infusions

## 2025-02-13 NOTE — ASSESSMENT & PLAN NOTE
Takes Senokot typically on a nightly basis  Advised to switch to stool softener colace  Seen by GI in the outpt setting - had EGD colonoscopy 5/20/24 - at that time, she was instructed to follow a high fiber diet with 25-30 g of finer on a daily basis

## 2025-02-13 NOTE — ASSESSMENT & PLAN NOTE
Declined CPAP   Per pt's grand daughter, the pt just had a drain removed and has been constipated and has been taking laxatives.

## 2025-02-13 NOTE — PHYSICAL THERAPY NOTE
PHYSICAL THERAPY SCREEN          Patient Name: Yokasta Cadena  Today's Date: 2/13/2025 02/13/25 0943   PT Last Visit   PT Visit Date 02/13/25   Note Type   Note type Screen   Additional Comments Consult received. Per nsg documentation achieving am-pac goal 7: walking 25 ft. Observed ambulating on unit independently. No acute PT needs at this time.     Jacqueline Addison, PT

## 2025-02-14 ENCOUNTER — TRANSITIONAL CARE MANAGEMENT (OUTPATIENT)
Dept: FAMILY MEDICINE CLINIC | Facility: CLINIC | Age: 80
End: 2025-02-14

## 2025-02-19 DIAGNOSIS — R10.13 EPIGASTRIC PAIN: ICD-10-CM

## 2025-02-20 RX ORDER — PANTOPRAZOLE SODIUM 40 MG/1
40 TABLET, DELAYED RELEASE ORAL 2 TIMES DAILY
Qty: 180 TABLET | Refills: 1 | Status: SHIPPED | OUTPATIENT
Start: 2025-02-20

## 2025-02-25 ENCOUNTER — OFFICE VISIT (OUTPATIENT)
Dept: FAMILY MEDICINE CLINIC | Facility: CLINIC | Age: 80
End: 2025-02-25
Payer: MEDICARE

## 2025-02-25 VITALS
SYSTOLIC BLOOD PRESSURE: 146 MMHG | TEMPERATURE: 97.4 F | BODY MASS INDEX: 33.04 KG/M2 | DIASTOLIC BLOOD PRESSURE: 72 MMHG | OXYGEN SATURATION: 95 % | WEIGHT: 175 LBS | HEART RATE: 67 BPM | HEIGHT: 61 IN

## 2025-02-25 DIAGNOSIS — K56.609 SBO (SMALL BOWEL OBSTRUCTION) (HCC): Primary | ICD-10-CM

## 2025-02-25 DIAGNOSIS — E03.9 HYPOTHYROIDISM, UNSPECIFIED TYPE: ICD-10-CM

## 2025-02-25 DIAGNOSIS — K59.00 CONSTIPATION, UNSPECIFIED CONSTIPATION TYPE: ICD-10-CM

## 2025-02-25 DIAGNOSIS — D50.8 IRON DEFICIENCY ANEMIA SECONDARY TO INADEQUATE DIETARY IRON INTAKE: ICD-10-CM

## 2025-02-25 DIAGNOSIS — R60.0 LEG EDEMA: ICD-10-CM

## 2025-02-25 PROCEDURE — 99495 TRANSJ CARE MGMT MOD F2F 14D: CPT | Performed by: FAMILY MEDICINE

## 2025-02-25 RX ORDER — SODIUM CHLORIDE 9 MG/ML
20 INJECTION, SOLUTION INTRAVENOUS ONCE
OUTPATIENT
Start: 2025-02-26

## 2025-02-25 RX ORDER — DOCUSATE SODIUM 100 MG/1
100 CAPSULE, LIQUID FILLED ORAL 2 TIMES DAILY
Qty: 60 CAPSULE | Refills: 1 | Status: SHIPPED | OUTPATIENT
Start: 2025-02-25

## 2025-02-25 NOTE — ASSESSMENT & PLAN NOTE
Recheck cbc, cmp--suspect dilution causing some of the more abnml results on d/c summary  F/u with GI  May use colace BID  Non-constipating foods  Orders:    CBC and differential; Future    Comprehensive metabolic panel; Future

## 2025-02-25 NOTE — PATIENT INSTRUCTIONS
Colace twice daily  Non-constipating foods  Compression stockings at [facility] pharmacy  echocardiogram  Obtain labs    Call [phone] to schedule iron infusions  [address] [place]  [address]

## 2025-02-25 NOTE — ASSESSMENT & PLAN NOTE
Can't tolerate oral iron  Had 2 venofer infusions inpt  Will finish course of 5 in total by doing 3 infusions outpt

## 2025-02-25 NOTE — PROGRESS NOTES
Name: Yokasta Cadena      : 1945      MRN: [id number]  Encounter Provider: Lisbeth Vallejo DO  Encounter Date: 2025   Encounter department: [facility]  :  Assessment & Plan  SBO (small bowel obstruction) (HCC)  Recheck cbc, cmp--suspect dilution causing some of the more abnml results on d/c summary  F/u with GI  May use colace BID  Non-constipating foods  Orders:    CBC and differential; Future    Comprehensive metabolic panel; Future    Hypothyroidism, unspecified type  Tsh nml inpt       Iron deficiency anemia secondary to inadequate dietary iron intake  Can't tolerate oral iron  Had 2 venofer infusions inpt  Will finish course of 5 in total by doing 3 infusions outpt       Leg edema  None on exam today, but she notes it intermittently  Compression stockings  Check echo  Orders:    Jobst Stockings    Echo complete w/ contrast if indicated; Future    Constipation, unspecified constipation type    Orders:    docusate sodium (COLACE) 100 mg capsule; Take 1 capsule (100 mg total) by mouth 2 (two) times a day           History of Present Illness   HPI  TCM Call       Date and time call was made  2025  2:19 PM    Hospital care reviewed  Records reviewed    Patient was hospitialized at  [facility]    Date of Admission  02/10/25    Date of discharge  25    Diagnosis  SBO    Disposition  Home    Were the patients medications reviewed and updated  No    Current Symptoms  None          TCM Call       Post hospital issues  None    Should patient be enrolled in anticoag monitoring?  No    Scheduled for follow up?  Yes    Did you obtain your prescribed medications  Yes    Do you need help managing your prescriptions or medications  No    Is transportation to your appointment needed  No    I have advised the patient to call PCP with any new or worsening symptoms  Chantell STEWART MA             Pt presents for TCM for above hospitalization.  Was hospitalized for abd pain and  "partial SBO.  Was kept NPO and hydrated and symptoms gradually decreased.  No operative intervention necessary.  Has appt with general surgery next week.  She stopped senna and didn't understand she was supposed to try colace, so she notes some constipation/straining with bowel mvmts since hospitalization.  Abd less painful and hard than it had been when she presented to hospital.    While inpt, tsh was checked which has returned to normal.  She was given two iron infusions inpt for ferritin 9.  Doesn't tolerate oral iron.    Calcium mildly low as inpt.  Suspect dilutional.      Ankles swollen this past week or so.  Notes occasional shortness of breath which she thinks is her asthma after she has exerted herself heavily.  No chest pain.  No neck/arm/jaw pain.  No diaphoresis.  Seeing allergy/asthma    Review of Systems  See hpi; all other systems negative    Objective   /72 (Patient Position: Sitting, Cuff Size: Standard)   Pulse 67   Temp (!) 97.4 °F (36.3 °C) (Temporal)   Ht 5' 1\" (1.549 m)   Wt 79.4 kg (175 lb)   SpO2 95%   BMI 33.07 kg/m²      Physical Exam  Constitutional:       Appearance: Normal appearance.   HENT:      Head: Normocephalic and atraumatic.      Right Ear: Tympanic membrane, ear canal and external ear normal.      Left Ear: Tympanic membrane, ear canal and external ear normal.      Nose: Nose normal. No congestion.      Mouth/Throat:      Mouth: Mucous membranes are moist.      Pharynx: No oropharyngeal exudate or posterior oropharyngeal erythema.   Eyes:      Extraocular Movements: Extraocular movements intact.      Conjunctiva/sclera: Conjunctivae normal.      Pupils: Pupils are equal, round, and reactive to light.   Neck:      Vascular: No carotid bruit.   Cardiovascular:      Rate and Rhythm: Normal rate and regular rhythm.      Heart sounds: No murmur heard.     No friction rub. No gallop.   Pulmonary:      Effort: Pulmonary effort is normal.      Breath sounds: No wheezing, " rhonchi or rales.   Abdominal:      General: Abdomen is flat. There is no distension.      Palpations: Abdomen is soft.      Tenderness: There is no abdominal tenderness.   Musculoskeletal:      Cervical back: Neck supple.   Lymphadenopathy:      Cervical: No cervical adenopathy.   Neurological:      General: No focal deficit present.      Mental Status: She is alert and oriented to person, place, and time.      Cranial Nerves: No cranial nerve deficit.      Motor: No weakness.      Deep Tendon Reflexes: Reflexes normal.

## 2025-02-25 NOTE — ASSESSMENT & PLAN NOTE
None on exam today, but she notes it intermittently  Compression stockings  Check echo  Orders:    Jobst Stockings    Echo complete w/ contrast if indicated; Future

## 2025-02-26 DIAGNOSIS — I10 ESSENTIAL HYPERTENSION: ICD-10-CM

## 2025-02-27 RX ORDER — METOPROLOL SUCCINATE 25 MG/1
25 TABLET, EXTENDED RELEASE ORAL DAILY
Qty: 90 TABLET | Refills: 1 | Status: SHIPPED | OUTPATIENT
Start: 2025-02-27

## 2025-03-04 ENCOUNTER — CONSULT (OUTPATIENT)
Dept: SURGERY | Facility: CLINIC | Age: 80
End: 2025-03-04
Payer: MEDICARE

## 2025-03-04 VITALS
HEART RATE: 61 BPM | OXYGEN SATURATION: 96 % | HEIGHT: 61 IN | TEMPERATURE: 87.1 F | DIASTOLIC BLOOD PRESSURE: 79 MMHG | BODY MASS INDEX: 33.23 KG/M2 | SYSTOLIC BLOOD PRESSURE: 127 MMHG | WEIGHT: 176 LBS

## 2025-03-04 DIAGNOSIS — K56.609 SBO (SMALL BOWEL OBSTRUCTION) (HCC): Primary | ICD-10-CM

## 2025-03-04 PROCEDURE — 99213 OFFICE O/P EST LOW 20 MIN: CPT | Performed by: SURGERY

## 2025-03-05 NOTE — ASSESSMENT & PLAN NOTE
I reviewed her hospitalization and her imaging from her hospitalization.  She had some mildly dilated loops of small bowel that decompressed distally.  She does have abdominal surgical history.  She denies history of bowel structure in the past.  Her hospitalization was short and uncomplicated.  This may have been enteritis as well.  Since she is doing well there is no plans for intervention.  This is her first episode and therefore does not need any further treatment or imaging.  If she has begins having symptoms again she is told to call up for follow-up.  She can follow-up as needed

## 2025-03-05 NOTE — PROGRESS NOTES
Name: Yokasta Cadena      : 1945      MRN: [id number]  Encounter Provider: Leonardo Jean Baptiste MD  Encounter Date: 3/4/2025   Encounter department: [facility] [place]  :  Assessment & Plan  SBO (small bowel obstruction) (HCC)  I reviewed her hospitalization and her imaging from her hospitalization.  She had some mildly dilated loops of small bowel that decompressed distally.  She does have abdominal surgical history.  She denies history of bowel structure in the past.  Her hospitalization was short and uncomplicated.  This may have been enteritis as well.  Since she is doing well there is no plans for intervention.  This is her first episode and therefore does not need any further treatment or imaging.  If she has begins having symptoms again she is told to call up for follow-up.  She can follow-up as needed             History of Present Illness   Yokasta Cadena is a 79 y.o. female who presents for follow-up from hospitalization for a possible partial small bowel obstruction versus enteritis.  She was admitted with concern for partial obstruction versus enteritis with some dilated loops of small intestine in her pelvis.  She was treated conservatively with bowel rest.  She very quickly had resumption of bowel function and was discharged home.  She has been doing well at home.  She has no complaints at home.  Tolerating diet without difficulty.  HPI  Review of Systems   Constitutional:  Negative for appetite change, chills and fever.   HENT:  Negative for congestion and ear pain.    Eyes:  Negative for discharge and itching.   Respiratory:  Negative for chest tightness and shortness of breath.    Cardiovascular:  Negative for chest pain and palpitations.   Gastrointestinal:  Negative for abdominal distention and abdominal pain.   Musculoskeletal:  Negative for arthralgias and gait problem.   Skin:  Negative for color change and rash.   Neurological:  Negative for dizziness and numbness.  "  Psychiatric/Behavioral:  Negative for agitation and confusion.     as per HPI.       Objective   /79 (BP Location: Right arm, Patient Position: Sitting, Cuff Size: Standard)   Pulse 61   Temp (!) 87.1 °F (30.6 °C) (Temporal)   Ht 5' 1\" (1.549 m)   Wt 79.8 kg (176 lb)   SpO2 96%   BMI 33.25 kg/m²      Physical Exam  Vitals and nursing note reviewed.   Constitutional:       General: She is not in acute distress.     Appearance: She is well-developed. She is not diaphoretic.   HENT:      Head: Normocephalic and atraumatic.   Eyes:      Pupils: Pupils are equal, round, and reactive to light.   Cardiovascular:      Rate and Rhythm: Normal rate and regular rhythm.   Pulmonary:      Effort: Pulmonary effort is normal. No respiratory distress.   Abdominal:      General: There is no distension.      Palpations: Abdomen is soft.   Musculoskeletal:      Cervical back: Normal range of motion and neck supple.   Skin:     General: Skin is warm and dry.   Neurological:      Mental Status: She is alert and oriented to person, place, and time.   Psychiatric:         Behavior: Behavior normal.                 "

## 2025-03-10 DIAGNOSIS — F32.A DEPRESSION, UNSPECIFIED DEPRESSION TYPE: ICD-10-CM

## 2025-03-11 RX ORDER — BUPROPION HYDROCHLORIDE 150 MG/1
150 TABLET ORAL EVERY MORNING
Qty: 90 TABLET | Refills: 1 | Status: SHIPPED | OUTPATIENT
Start: 2025-03-11

## 2025-03-14 ENCOUNTER — APPOINTMENT (OUTPATIENT)
Dept: LAB | Facility: HOSPITAL | Age: 80
End: 2025-03-14
Payer: MEDICARE

## 2025-03-14 DIAGNOSIS — D50.9 IRON DEFICIENCY ANEMIA, UNSPECIFIED IRON DEFICIENCY ANEMIA TYPE: ICD-10-CM

## 2025-03-14 DIAGNOSIS — K56.609 SBO (SMALL BOWEL OBSTRUCTION) (HCC): ICD-10-CM

## 2025-03-14 DIAGNOSIS — E53.8 B12 DEFICIENCY: ICD-10-CM

## 2025-03-14 DIAGNOSIS — E03.9 HYPOTHYROIDISM, UNSPECIFIED TYPE: ICD-10-CM

## 2025-03-14 LAB
ALBUMIN SERPL BCG-MCNC: 4.7 G/DL (ref 3.5–5)
ALP SERPL-CCNC: 64 U/L (ref 34–104)
ALT SERPL W P-5'-P-CCNC: 10 U/L (ref 7–52)
ANION GAP SERPL CALCULATED.3IONS-SCNC: 9 MMOL/L (ref 4–13)
AST SERPL W P-5'-P-CCNC: 13 U/L (ref 13–39)
BASOPHILS # BLD AUTO: 0.07 THOUSANDS/ÂΜL (ref 0–0.1)
BASOPHILS NFR BLD AUTO: 1 % (ref 0–1)
BILIRUB SERPL-MCNC: 0.53 MG/DL (ref 0.2–1)
BUN SERPL-MCNC: 23 MG/DL (ref 5–25)
CALCIUM SERPL-MCNC: 9.6 MG/DL (ref 8.4–10.2)
CHLORIDE SERPL-SCNC: 106 MMOL/L (ref 96–108)
CO2 SERPL-SCNC: 26 MMOL/L (ref 21–32)
CREAT SERPL-MCNC: 1.13 MG/DL (ref 0.6–1.3)
EOSINOPHIL # BLD AUTO: 0.26 THOUSAND/ÂΜL (ref 0–0.61)
EOSINOPHIL NFR BLD AUTO: 4 % (ref 0–6)
ERYTHROCYTE [DISTWIDTH] IN BLOOD BY AUTOMATED COUNT: 14.4 % (ref 11.6–15.1)
FERRITIN SERPL-MCNC: 63 NG/ML (ref 11–307)
GFR SERPL CREATININE-BSD FRML MDRD: 45 ML/MIN/1.73SQ M
GLUCOSE SERPL-MCNC: 100 MG/DL (ref 65–140)
HCT VFR BLD AUTO: 37.7 % (ref 34.8–46.1)
HGB BLD-MCNC: 12.1 G/DL (ref 11.5–15.4)
IMM GRANULOCYTES # BLD AUTO: 0.03 THOUSAND/UL (ref 0–0.2)
IMM GRANULOCYTES NFR BLD AUTO: 1 % (ref 0–2)
LYMPHOCYTES # BLD AUTO: 1.48 THOUSANDS/ÂΜL (ref 0.6–4.47)
LYMPHOCYTES NFR BLD AUTO: 23 % (ref 14–44)
MCH RBC QN AUTO: 28.2 PG (ref 26.8–34.3)
MCHC RBC AUTO-ENTMCNC: 32.1 G/DL (ref 31.4–37.4)
MCV RBC AUTO: 88 FL (ref 82–98)
MONOCYTES # BLD AUTO: 0.54 THOUSAND/ÂΜL (ref 0.17–1.22)
MONOCYTES NFR BLD AUTO: 8 % (ref 4–12)
NEUTROPHILS # BLD AUTO: 4.12 THOUSANDS/ÂΜL (ref 1.85–7.62)
NEUTS SEG NFR BLD AUTO: 63 % (ref 43–75)
NRBC BLD AUTO-RTO: 0 /100 WBCS
PLATELET # BLD AUTO: 217 THOUSANDS/UL (ref 149–390)
PMV BLD AUTO: 10 FL (ref 8.9–12.7)
POTASSIUM SERPL-SCNC: 4.2 MMOL/L (ref 3.5–5.3)
PROT SERPL-MCNC: 7.5 G/DL (ref 6.4–8.4)
RBC # BLD AUTO: 4.29 MILLION/UL (ref 3.81–5.12)
SODIUM SERPL-SCNC: 141 MMOL/L (ref 135–147)
T4 FREE SERPL-MCNC: 1.1 NG/DL (ref 0.61–1.12)
TSH SERPL DL<=0.05 MIU/L-ACNC: 1.36 UIU/ML (ref 0.45–4.5)
VIT B12 SERPL-MCNC: 231 PG/ML (ref 180–914)
WBC # BLD AUTO: 6.5 THOUSAND/UL (ref 4.31–10.16)

## 2025-03-14 PROCEDURE — 82607 VITAMIN B-12: CPT

## 2025-03-14 PROCEDURE — 80053 COMPREHEN METABOLIC PANEL: CPT

## 2025-03-14 PROCEDURE — 82728 ASSAY OF FERRITIN: CPT

## 2025-03-14 PROCEDURE — 84439 ASSAY OF FREE THYROXINE: CPT

## 2025-03-14 PROCEDURE — 85025 COMPLETE CBC W/AUTO DIFF WBC: CPT

## 2025-03-14 PROCEDURE — 84443 ASSAY THYROID STIM HORMONE: CPT

## 2025-03-14 PROCEDURE — 36415 COLL VENOUS BLD VENIPUNCTURE: CPT

## 2025-03-18 ENCOUNTER — RESULTS FOLLOW-UP (OUTPATIENT)
Dept: FAMILY MEDICINE CLINIC | Facility: CLINIC | Age: 80
End: 2025-03-18

## 2025-03-18 DIAGNOSIS — E53.8 B12 DEFICIENCY: Primary | ICD-10-CM

## 2025-03-18 RX ORDER — CYANOCOBALAMIN 1000 UG/ML
1000 INJECTION, SOLUTION INTRAMUSCULAR; SUBCUTANEOUS
Status: SHIPPED | OUTPATIENT
Start: 2025-03-19 | End: 2025-05-14

## 2025-03-23 DIAGNOSIS — M54.6 CHRONIC THORACIC BACK PAIN, UNSPECIFIED BACK PAIN LATERALITY: ICD-10-CM

## 2025-03-23 DIAGNOSIS — G89.29 CHRONIC THORACIC BACK PAIN, UNSPECIFIED BACK PAIN LATERALITY: ICD-10-CM

## 2025-03-24 NOTE — TELEPHONE ENCOUNTER
Spoke with patient and notified them if they are feeling well they do not need to see [facility]. Patient states they are feeling fine.

## 2025-03-24 NOTE — TELEPHONE ENCOUNTER
Attempted to contact patient. Patient could not hear me. Attempted to contact patient again with same response. Will attempt to contact patient again at later time.

## 2025-03-25 RX ORDER — PREGABALIN 75 MG/1
75 CAPSULE ORAL 2 TIMES DAILY
Qty: 60 CAPSULE | Refills: 0 | Status: SHIPPED | OUTPATIENT
Start: 2025-03-25

## 2025-03-28 DIAGNOSIS — G89.29 CHRONIC THORACIC BACK PAIN, UNSPECIFIED BACK PAIN LATERALITY: ICD-10-CM

## 2025-03-28 DIAGNOSIS — M54.6 CHRONIC THORACIC BACK PAIN, UNSPECIFIED BACK PAIN LATERALITY: ICD-10-CM

## 2025-03-31 RX ORDER — PREGABALIN 75 MG/1
75 CAPSULE ORAL 2 TIMES DAILY
Qty: 60 CAPSULE | Refills: 0 | OUTPATIENT
Start: 2025-03-31

## 2025-04-02 ENCOUNTER — CLINICAL SUPPORT (OUTPATIENT)
Dept: FAMILY MEDICINE CLINIC | Facility: CLINIC | Age: 80
End: 2025-04-02
Payer: MEDICARE

## 2025-04-02 DIAGNOSIS — E53.8 B12 DEFICIENCY: Primary | ICD-10-CM

## 2025-04-02 PROCEDURE — 96372 THER/PROPH/DIAG INJ SC/IM: CPT | Performed by: FAMILY MEDICINE

## 2025-04-02 RX ADMIN — CYANOCOBALAMIN 1000 MCG: 1000 INJECTION, SOLUTION INTRAMUSCULAR; SUBCUTANEOUS at 11:12

## 2025-04-16 ENCOUNTER — CLINICAL SUPPORT (OUTPATIENT)
Dept: FAMILY MEDICINE CLINIC | Facility: CLINIC | Age: 80
End: 2025-04-16
Payer: MEDICARE

## 2025-04-16 DIAGNOSIS — E53.8 B12 DEFICIENCY: Primary | ICD-10-CM

## 2025-04-16 PROCEDURE — 96372 THER/PROPH/DIAG INJ SC/IM: CPT | Performed by: FAMILY MEDICINE

## 2025-04-16 RX ADMIN — CYANOCOBALAMIN 1000 MCG: 1000 INJECTION, SOLUTION INTRAMUSCULAR; SUBCUTANEOUS at 10:57

## 2025-04-30 ENCOUNTER — CLINICAL SUPPORT (OUTPATIENT)
Dept: FAMILY MEDICINE CLINIC | Facility: CLINIC | Age: 80
End: 2025-04-30
Payer: MEDICARE

## 2025-04-30 DIAGNOSIS — G89.29 CHRONIC THORACIC BACK PAIN, UNSPECIFIED BACK PAIN LATERALITY: ICD-10-CM

## 2025-04-30 DIAGNOSIS — M54.6 CHRONIC THORACIC BACK PAIN, UNSPECIFIED BACK PAIN LATERALITY: ICD-10-CM

## 2025-04-30 DIAGNOSIS — E53.8 B12 DEFICIENCY: Primary | ICD-10-CM

## 2025-04-30 PROCEDURE — 96372 THER/PROPH/DIAG INJ SC/IM: CPT | Performed by: FAMILY MEDICINE

## 2025-04-30 RX ADMIN — CYANOCOBALAMIN 1000 MCG: 1000 INJECTION, SOLUTION INTRAMUSCULAR; SUBCUTANEOUS at 10:59

## 2025-05-02 DIAGNOSIS — E78.5 HYPERLIPIDEMIA, UNSPECIFIED HYPERLIPIDEMIA TYPE: ICD-10-CM

## 2025-05-02 RX ORDER — ATORVASTATIN CALCIUM 20 MG/1
20 TABLET, FILM COATED ORAL DAILY
Qty: 90 TABLET | Refills: 1 | Status: SHIPPED | OUTPATIENT
Start: 2025-05-02

## 2025-05-02 RX ORDER — PREGABALIN 75 MG/1
75 CAPSULE ORAL 2 TIMES DAILY
Qty: 60 CAPSULE | Refills: 0 | Status: SHIPPED | OUTPATIENT
Start: 2025-05-02

## 2025-05-08 ENCOUNTER — HOSPITAL ENCOUNTER (OUTPATIENT)
Dept: NON INVASIVE DIAGNOSTICS | Facility: HOSPITAL | Age: 80
Discharge: HOME | End: 2025-05-08
Payer: MEDICARE

## 2025-05-08 VITALS
WEIGHT: 175.93 LBS | HEIGHT: 61 IN | HEART RATE: 69 BPM | BODY MASS INDEX: 33.22 KG/M2 | SYSTOLIC BLOOD PRESSURE: 152 MMHG | DIASTOLIC BLOOD PRESSURE: 64 MMHG

## 2025-05-08 DIAGNOSIS — R60.0 LEG EDEMA: ICD-10-CM

## 2025-05-08 LAB
AORTIC ROOT: 3.5 CM
AORTIC VALVE MEAN VELOCITY: 12.2 M/S
ASCENDING AORTA: 3.7 CM
AV AREA BY CONTINUOUS VTI: 2.8 CM2
AV AREA PEAK VELOCITY: 2.9 CM2
AV LVOT MEAN GRADIENT: 5 MMHG
AV LVOT PEAK GRADIENT: 9 MMHG
AV MEAN PRESS GRAD SYS DOP V1V2: 6 MMHG
AV ORIFICE AREA US: 2.76 CM2
AV PEAK GRADIENT: 11 MMHG
AV REGURGITATION PRESSURE HALF TIME: 362 MS
AV VELOCITY RATIO: 0.88
AV VMAX SYS DOP: 1.63 M/S
BSA FOR ECHO PROCEDURE: 1.79 M2
DOP CALC AO VTI: 40.77 CM
DOP CALC LVOT AREA: 3.14 CM2
DOP CALC LVOT CARDIAC INDEX: 3.64 L/MIN/M2
DOP CALC LVOT CARDIAC OUTPUT: 6.52 L/MIN
DOP CALC LVOT DIAMETER: 2 CM
DOP CALC LVOT PEAK VEL VTI: 35.86 CM
DOP CALC LVOT PEAK VEL: 1.49 M/S
DOP CALC LVOT STROKE INDEX: 60.3 ML/M2
DOP CALC LVOT STROKE VOLUME: 112.6
E WAVE DECELERATION TIME: 271 MS
E/A RATIO: 0.7
FRACTIONAL SHORTENING: 29 (ref 28–44)
INTERVENTRICULAR SEPTUM IN DIASTOLE (PARASTERNAL SHORT AXIS VIEW): 1.2 CM
INTERVENTRICULAR SEPTUM: 1.2 CM (ref 0.6–1.1)
LAAS-AP2: 25.8 CM2
LAAS-AP4: 23.6 CM2
LEFT ATRIUM SIZE: 3.9 CM
LEFT ATRIUM VOLUME (MOD BIPLANE): 83 ML
LEFT ATRIUM VOLUME INDEX (MOD BIPLANE): 46.4 ML/M2
LEFT INTERNAL DIMENSION IN SYSTOLE: 3.4 CM (ref 2.1–4)
LEFT VENTRICULAR INTERNAL DIMENSION IN DIASTOLE: 4.8 CM (ref 3.5–6)
LEFT VENTRICULAR POSTERIOR WALL IN END DIASTOLE: 1.2 CM
LEFT VENTRICULAR STROKE VOLUME: 58 ML
LV EF US.2D.A4C+ESTIMATED: 55 %
LVSV (TEICH): 58 ML
MV E'TISSUE VEL-LAT: 8 CM/S
MV E'TISSUE VEL-SEP: 5 CM/S
MV PEAK A VEL: 1.13 M/S
MV PEAK E VEL: 79 CM/S
RA PRESSURE ESTIMATED: 10 MMHG
RIGHT ATRIUM AREA SYSTOLE A4C: 10.4 CM2
RIGHT VENTRICLE ID DIMENSION: 3.1 CM
RV PSP: 41 MMHG
SINOTUBULAR JUNCTION: 2.9 CM
SL CV AV DECELERATION TIME RETROGRADE: 1250 MS
SL CV AV PEAK GRADIENT RETROGRADE: 63 MMHG
SL CV LEFT ATRIUM LENGTH A2C: 6.1 CM
SL CV LV EF: 55
SL CV PED ECHO LEFT VENTRICLE DIASTOLIC VOLUME (MOD BIPLANE) 2D: 106 ML
SL CV PED ECHO LEFT VENTRICLE SYSTOLIC VOLUME (MOD BIPLANE) 2D: 48 ML
SL CV SINUS OF VALSALVA 2D: 3.3 CM
STJ: 2.9 CM
TR MAX PG: 31 MMHG
TR PEAK VELOCITY: 2.8 M/S
TRICUSPID ANNULAR PLANE SYSTOLIC EXCURSION: 2.8 CM
TRICUSPID VALVE PEAK REGURGITATION VELOCITY: 2.77 M/S

## 2025-05-08 PROCEDURE — 93306 TTE W/DOPPLER COMPLETE: CPT

## 2025-05-08 PROCEDURE — 93306 TTE W/DOPPLER COMPLETE: CPT | Performed by: STUDENT IN AN ORGANIZED HEALTH CARE EDUCATION/TRAINING PROGRAM

## 2025-05-14 ENCOUNTER — CLINICAL SUPPORT (OUTPATIENT)
Dept: FAMILY MEDICINE CLINIC | Facility: CLINIC | Age: 80
End: 2025-05-14
Payer: MEDICARE

## 2025-05-14 DIAGNOSIS — E53.8 B12 DEFICIENCY: Primary | ICD-10-CM

## 2025-05-14 PROCEDURE — 96372 THER/PROPH/DIAG INJ SC/IM: CPT | Performed by: FAMILY MEDICINE

## 2025-05-14 RX ORDER — CYANOCOBALAMIN 1000 UG/ML
1000 INJECTION, SOLUTION INTRAMUSCULAR; SUBCUTANEOUS DAILY
Status: COMPLETED | OUTPATIENT
Start: 2025-05-14 | End: 2025-05-14

## 2025-05-14 RX ADMIN — CYANOCOBALAMIN 1000 MCG: 1000 INJECTION, SOLUTION INTRAMUSCULAR; SUBCUTANEOUS at 11:43

## 2025-05-30 DIAGNOSIS — K59.00 CONSTIPATION, UNSPECIFIED CONSTIPATION TYPE: ICD-10-CM

## 2025-05-31 RX ORDER — DOCUSATE SODIUM 100 MG/1
100 CAPSULE, LIQUID FILLED ORAL 2 TIMES DAILY
Qty: 60 CAPSULE | Refills: 1 | Status: SHIPPED | OUTPATIENT
Start: 2025-05-31

## 2025-06-02 ENCOUNTER — TELEPHONE (OUTPATIENT)
Dept: BARIATRICS | Facility: CLINIC | Age: 80
End: 2025-06-02

## 2025-06-02 NOTE — TELEPHONE ENCOUNTER
Called patient to reschedule the 6/16/25 appointment with Malcom Celaya PA-C  that was cancelled through [facility] but she did not wish to reschedule at this time.

## 2025-06-04 ENCOUNTER — APPOINTMENT (OUTPATIENT)
Dept: URGENT CARE | Facility: MEDICAL CENTER | Age: 80
End: 2025-06-04
Payer: MEDICARE

## 2025-06-04 ENCOUNTER — OFFICE VISIT (OUTPATIENT)
Dept: URGENT CARE | Facility: MEDICAL CENTER | Age: 80
End: 2025-06-04
Payer: MEDICARE

## 2025-06-04 VITALS
HEIGHT: 62 IN | BODY MASS INDEX: 32.46 KG/M2 | SYSTOLIC BLOOD PRESSURE: 156 MMHG | OXYGEN SATURATION: 96 % | HEART RATE: 63 BPM | DIASTOLIC BLOOD PRESSURE: 73 MMHG | TEMPERATURE: 97.1 F | RESPIRATION RATE: 18 BRPM | WEIGHT: 176.4 LBS

## 2025-06-04 DIAGNOSIS — N39.0 URINARY TRACT INFECTION WITHOUT HEMATURIA, SITE UNSPECIFIED: Primary | ICD-10-CM

## 2025-06-04 LAB
SL AMB  POCT GLUCOSE, UA: ABNORMAL
SL AMB LEUKOCYTE ESTERASE,UA: ABNORMAL
SL AMB POCT BILIRUBIN,UA: ABNORMAL
SL AMB POCT BLOOD,UA: ABNORMAL
SL AMB POCT COLOR,UA: ABNORMAL
SL AMB POCT KETONES,UA: ABNORMAL
SL AMB POCT NITRITE,UA: ABNORMAL
SL AMB POCT PH,UA: 6
SL AMB POCT SPECIFIC GRAVITY,UA: 1.01
SL AMB POCT URINE PROTEIN: ABNORMAL
SL AMB POCT UROBILINOGEN: 0.2

## 2025-06-04 PROCEDURE — G0463 HOSPITAL OUTPT CLINIC VISIT: HCPCS

## 2025-06-04 PROCEDURE — 99213 OFFICE O/P EST LOW 20 MIN: CPT

## 2025-06-04 PROCEDURE — 87086 URINE CULTURE/COLONY COUNT: CPT

## 2025-06-04 PROCEDURE — 81002 URINALYSIS NONAUTO W/O SCOPE: CPT

## 2025-06-04 RX ORDER — SULFAMETHOXAZOLE AND TRIMETHOPRIM 800; 160 MG/1; MG/1
1 TABLET ORAL EVERY 12 HOURS SCHEDULED
Qty: 6 TABLET | Refills: 0 | Status: SHIPPED | OUTPATIENT
Start: 2025-06-04 | End: 2025-06-07

## 2025-06-04 NOTE — PROGRESS NOTES
[facility]        NAME: Yokasta Cadena is a 80 y.o. female  : 1945    MRN: [id number]  DATE: 2025  TIME: 12:31 PM    Assessment and Plan   Urinary tract infection without hematuria, site unspecified [N39.0]  1. Urinary tract infection without hematuria, site unspecified  sulfamethoxazole-trimethoprim (BACTRIM DS) 800-160 mg per tablet            Patient Instructions   Increase your fluids    Use cranberry juice to help change the pH of your bladder    Take the antibiotic as prescribed    We will send a urine culture and if the antibiotic does not cover the bacteria we will call you     If you develop chills, fever, or increased back pain go to the ED    Follow up with your PCP in the beginning of next week    Follow up with PCP in 3-5 days.  Proceed to  ER if symptoms worsen.    If tests have been performed at [facility], our office will contact you with results if changes need to be made to the care plan discussed with you at the visit.  You can review your full results on [facility] MyChart.    Chief Complaint     Chief Complaint   Patient presents with    Possible UTI     Pt c/o abdominal pain x 2 days. Foul odor noticed. Pt had been experiencing loose stools for at least 3 days and is wondering if she now developed an UTI. Pt also has some lower back pain.          History of Present Illness       This is an 80-year-old female who presents today with lower abdominal discomfort.  She states that it is intermittent in nature and feels like menstrual cramping.  She states it started about 2 days ago and she had dysuria but no longer.  She denies any fever or chills.  She states she does have some lower back pain.  It goes across to her lower back she states that she gets the cramping feeling just prior to voiding.  She states that she has issues with constipation so she takes Dulcolax and Senokot.  She had diarrhea  and Wednesday but that has subsided.  Urine dip in office  "showed small amount of bilirubin trace protein and moderate amount of leukocytes.  Will start on antibiotic and wait for culture to return.        Review of Systems   Review of Systems   Constitutional: Negative.    HENT: Negative.     Respiratory: Negative.     Cardiovascular: Negative.    Gastrointestinal:  Positive for abdominal pain and diarrhea. Negative for abdominal distention and vomiting.   Genitourinary:  Positive for dysuria.   Musculoskeletal: Negative.    Neurological: Negative.          Current Medications     Current Medications[1]    Current Allergies     Allergies as of 06/04/2025 - Reviewed 06/04/2025   Allergen Reaction Noted    Cefuroxime Hives and Other (See Comments) 11/27/2018    Cephalexin Other (See Comments) 01/29/2025    Medical tape Itching 02/25/2025            The following portions of the patient's history were reviewed and updated as appropriate: allergies, current medications, past family history, past medical history, past social history, past surgical history and problem list.     Past Medical History[2]    Past Surgical History[3]    Family History[4]      Medications have been verified.        Objective   /73   Pulse 63   Temp (!) 97.1 °F (36.2 °C) (Tympanic)   Resp 18   Ht 5' 2\" (1.575 m)   Wt 80 kg (176 lb 6.4 oz)   SpO2 96%   BMI 32.26 kg/m²   No LMP recorded. Patient is postmenopausal.       Physical Exam     Physical Exam  Constitutional:       Appearance: Normal appearance.   HENT:      Head: Normocephalic and atraumatic.      Nose: Nose normal.      Mouth/Throat:      Mouth: Mucous membranes are moist.      Pharynx: Oropharynx is clear.     Eyes:      Conjunctiva/sclera: Conjunctivae normal.      Pupils: Pupils are equal, round, and reactive to light.       Cardiovascular:      Rate and Rhythm: Normal rate and regular rhythm.      Pulses: Normal pulses.      Heart sounds: Normal heart sounds.   Pulmonary:      Effort: Pulmonary effort is normal.      Breath " sounds: Normal breath sounds.   Abdominal:      General: Abdomen is flat. There is no distension.      Tenderness: There is abdominal tenderness. There is no right CVA tenderness, left CVA tenderness, guarding or rebound.     Musculoskeletal:         General: Normal range of motion.     Skin:     General: Skin is warm and dry.      Capillary Refill: Capillary refill takes less than 2 seconds.     Neurological:      General: No focal deficit present.      Mental Status: She is alert and oriented to person, place, and time.     Psychiatric:         Mood and Affect: Mood normal.         Thought Content: Thought content normal.         Judgment: Judgment normal.                        [1]   Current Outpatient Medications:     sulfamethoxazole-trimethoprim (BACTRIM DS) 800-160 mg per tablet, Take 1 tablet by mouth every 12 (twelve) hours for 3 days, Disp: 6 tablet, Rfl: 0    albuterol (Ventolin HFA) 90 mcg/act inhaler, Inhale 2 puffs every 6 (six) hours as needed for wheezing, Disp: 18 g, Rfl: 0    amLODIPine (NORVASC) 10 mg tablet, TAKE 1 TABLET BY MOUTH EVERY DAY, Disp: 90 tablet, Rfl: 1    Ascorbic Acid (VITAMIN C PO), Vitamin C, Disp: , Rfl:     atorvastatin (LIPITOR) 20 mg tablet, TAKE 1 TABLET BY MOUTH EVERY DAY, Disp: 90 tablet, Rfl: 1    buPROPion (WELLBUTRIN XL) 150 mg 24 hr tablet, TAKE 1 TABLET BY MOUTH EVERY DAY IN THE MORNING, Disp: 90 tablet, Rfl: 1    Cholecalciferol (Vitamin D3) 1.25 MG (45376 UT) CAPS, Take 5,000 Units by mouth daily, Disp: , Rfl:     Cyanocobalamin (VITAMIN B-12 PO), , Disp: , Rfl:     docusate sodium (COLACE) 100 mg capsule, TAKE 1 CAPSULE BY MOUTH TWICE A DAY, Disp: 60 capsule, Rfl: 1    famotidine (PEPCID) 40 MG tablet, Take 1 tablet (40 mg total) by mouth daily, Disp: 60 tablet, Rfl: 5    fluticasone (FLONASE) 50 mcg/act nasal spray, SPRAY 1 SPRAY INTO EACH NOSTRIL EVERY DAY, Disp: 16 g, Rfl: 5    fluticasone-salmeterol (Advair HFA) 45-21 MCG/ACT inhaler, Inhale 2 puffs 2 (two)  times a day, Disp: 12 g, Rfl: 5    Ivermectin 1 % CREA, Apply topically in the morning, Disp: 45 g, Rfl: 1    levothyroxine 150 mcg tablet, TAKE 1 TABLET (150 MCG TOTAL) BY MOUTH DAILY IN THE EARLY MORNING, Disp: 90 tablet, Rfl: 1    losartan (COZAAR) 100 MG tablet, TAKE 1 TABLET BY MOUTH EVERY DAY, Disp: 90 tablet, Rfl: 1    meclizine (ANTIVERT) 25 mg tablet, Take 1 tablet (25 mg total) by mouth every 8 (eight) hours as needed for dizziness for up to 10 days, Disp: 30 tablet, Rfl: 0    metoprolol succinate (TOPROL-XL) 25 mg 24 hr tablet, TAKE 1 TABLET (25 MG TOTAL) BY MOUTH DAILY., Disp: 90 tablet, Rfl: 1    multivitamin (THERAGRAN) TABS, Take 1 tablet by mouth daily One a day, Disp: , Rfl:     ondansetron (ZOFRAN) 4 mg tablet, Take 1 tablet (4 mg total) by mouth 3 (three) times a day as needed for nausea or vomiting, Disp: 20 tablet, Rfl: 0    pregabalin (LYRICA) 75 mg capsule, TAKE 1 CAPSULE BY MOUTH TWICE A DAY, Disp: 60 capsule, Rfl: 0    sertraline (ZOLOFT) 100 mg tablet, TAKE 1 TABLET BY MOUTH TWICE A DAY, Disp: 180 tablet, Rfl: 2    Spacer/Aero-Holding Chambers (Vortex Valved Holding Chamber) DASHA, Use 2 (two) times a day, Disp: 1 each, Rfl: 0    traZODone (DESYREL) 50 mg tablet, TAKE 2 TABLETS (100 MG TOTAL) BY MOUTH DAILY AT BEDTIME, Disp: 180 tablet, Rfl: 2  [2]   Past Medical History:  Diagnosis Date    Acid reflux     Anxiety     Arthritis     Asthma     C1-C4 level with central cord syndrome (HCC)     Resolved 2/1/2017     Carpal tunnel syndrome Resolved 4/21/2015    Colitis     Colon polyp     Concussion     Depressed     Dysthymic disorder     Resolved 1/5/2018    Gastric ulcer 2013    small - on EGD - [facility]    Hemorrhoids     Hx of blood clots     Hx of colonic polyps     [facility]    Hyperlipemia     Hypertension     Hypothyroid     Lumbar spondylosis 04/25/2024    Lung nodule     stable x 2 yrs on CT    Migraines     Obesity     Postural dizziness with presyncope     Resolved 8/28/2018      Tendinitis     Ulnar neuropathy     Resolved 5/21/2015     Vertigo    [3]   Past Surgical History:  Procedure Laterality Date    APPENDECTOMY      BREAST SURGERY Left 01/1966    BREAST LUMPECTOMY    CARPAL TUNNEL RELEASE      CHOLECYSTECTOMY      DILATION AND CURETTAGE OF UTERUS      ELBOW SURGERY      EYE SURGERY Bilateral 2019    Cataract     GALLBLADDER SURGERY      JOINT REPLACEMENT Right     REPLACEMENT TOTAL KNEE    KNEE ARTHROSCOPY      KNEE SURGERY Right     NECK SURGERY      ORTHOPEDIC SURGERY      POSTERIOR LAMINECTOMY / DECOMPRESSION CERVICAL SPINE  02/2015    REPLACEMENT TOTAL KNEE Left 2022    TONSILLECTOMY      TOTAL KNEE ARTHROPLASTY Left 04/06/2022    [facility] Dr. Diaz    VAGINAL DELIVERY      x3    WRIST SURGERY Right    [4]   Family History  Problem Relation Name Age of Onset    Breast cancer Mother Elly     Alzheimer's disease Mother Elly     Coronary artery disease Mother Elly     Hypertension Mother Elly     Migraines Mother Elly     Peripheral vascular disease Mother Elly     Arthritis Mother Elly     Cancer Mother Elly     Parkinsonism Father JimmyFeijo`     Other Father JimmyFeijo`         Cardiovascular disease     Coronary artery disease Father JohnFeijo`     Hypertension Father JohnFeijo`     Bipolar disorder Sister Jacqueline     Thyroid disease Sister Jacqueline     Alzheimer's disease Sister Jacqueline     Depression Sister Jacqueline     Throat cancer Maternal Grandfather Raposa     Cancer Maternal Grandfather Raposa     Allergic rhinitis Daughter      Asthma Daughter      Asthma Son Andrés YINKA Cadena     Diabetes Neg Hx      Stroke Neg Hx

## 2025-06-04 NOTE — PATIENT INSTRUCTIONS
Increase your fluids    Use cranberry juice to help change the pH of your bladder    Take the antibiotic as prescribed    We will send a urine culture and if the antibiotic does not cover the bacteria we will call you     Follow up with your PCP in the beginning of next week    If you develop chills, fever, or increased back pain go to the ED

## 2025-06-05 DIAGNOSIS — F51.01 PRIMARY INSOMNIA: ICD-10-CM

## 2025-06-05 LAB — BACTERIA UR CULT: NORMAL

## 2025-06-05 RX ORDER — TRAZODONE HYDROCHLORIDE 50 MG/1
100 TABLET ORAL
Qty: 180 TABLET | Refills: 1 | Status: SHIPPED | OUTPATIENT
Start: 2025-06-05

## 2025-06-08 ENCOUNTER — APPOINTMENT (EMERGENCY)
Dept: CT IMAGING | Facility: HOSPITAL | Age: 80
End: 2025-06-08
Payer: MEDICARE

## 2025-06-08 ENCOUNTER — HOSPITAL ENCOUNTER (EMERGENCY)
Facility: HOSPITAL | Age: 80
Discharge: HOME | End: 2025-06-08
Attending: EMERGENCY MEDICINE | Admitting: EMERGENCY MEDICINE
Payer: MEDICARE

## 2025-06-08 VITALS
HEART RATE: 73 BPM | WEIGHT: 177.91 LBS | TEMPERATURE: 98.3 F | RESPIRATION RATE: 16 BRPM | DIASTOLIC BLOOD PRESSURE: 73 MMHG | OXYGEN SATURATION: 96 % | SYSTOLIC BLOOD PRESSURE: 164 MMHG | BODY MASS INDEX: 32.54 KG/M2

## 2025-06-08 DIAGNOSIS — R10.9 ABDOMINAL PAIN: ICD-10-CM

## 2025-06-08 DIAGNOSIS — K57.92 DIVERTICULITIS: Primary | ICD-10-CM

## 2025-06-08 LAB
ABO GROUP BLD: NORMAL
ALBUMIN SERPL BCG-MCNC: 4.1 G/DL (ref 3.5–5)
ALP SERPL-CCNC: 74 U/L (ref 34–104)
ALT SERPL W P-5'-P-CCNC: 8 U/L (ref 7–52)
ANION GAP SERPL CALCULATED.3IONS-SCNC: 5 MMOL/L (ref 4–13)
APTT PPP: 26 SECONDS (ref 23–34)
AST SERPL W P-5'-P-CCNC: 11 U/L (ref 13–39)
BASOPHILS # BLD AUTO: 0.06 THOUSANDS/ÂΜL (ref 0–0.1)
BASOPHILS NFR BLD AUTO: 1 % (ref 0–1)
BILIRUB SERPL-MCNC: 0.37 MG/DL (ref 0.2–1)
BILIRUB UR QL STRIP: NEGATIVE
BLD GP AB SCN SERPL QL: NEGATIVE
BUN SERPL-MCNC: 26 MG/DL (ref 5–25)
CALCIUM SERPL-MCNC: 9.4 MG/DL (ref 8.4–10.2)
CHLORIDE SERPL-SCNC: 111 MMOL/L (ref 96–108)
CLARITY UR: CLEAR
CO2 SERPL-SCNC: 22 MMOL/L (ref 21–32)
COLOR UR: YELLOW
CREAT SERPL-MCNC: 1.2 MG/DL (ref 0.6–1.3)
EOSINOPHIL # BLD AUTO: 0.28 THOUSAND/ÂΜL (ref 0–0.61)
EOSINOPHIL NFR BLD AUTO: 3 % (ref 0–6)
ERYTHROCYTE [DISTWIDTH] IN BLOOD BY AUTOMATED COUNT: 13.5 % (ref 11.6–15.1)
FLUAV AG UPPER RESP QL IA.RAPID: NEGATIVE
FLUBV AG UPPER RESP QL IA.RAPID: NEGATIVE
GFR SERPL CREATININE-BSD FRML MDRD: 42 ML/MIN/1.73SQ M
GLUCOSE SERPL-MCNC: 109 MG/DL (ref 65–140)
GLUCOSE UR STRIP-MCNC: NEGATIVE MG/DL
HCT VFR BLD AUTO: 33.3 % (ref 34.8–46.1)
HGB BLD-MCNC: 10.8 G/DL (ref 11.5–15.4)
HGB UR QL STRIP.AUTO: NEGATIVE
IMM GRANULOCYTES # BLD AUTO: 0.03 THOUSAND/UL (ref 0–0.2)
IMM GRANULOCYTES NFR BLD AUTO: 0 % (ref 0–2)
INR PPP: 1.1 (ref 0.85–1.19)
KETONES UR STRIP-MCNC: NEGATIVE MG/DL
LEUKOCYTE ESTERASE UR QL STRIP: NEGATIVE
LIPASE SERPL-CCNC: 20 U/L (ref 11–82)
LYMPHOCYTES # BLD AUTO: 1.43 THOUSANDS/ÂΜL (ref 0.6–4.47)
LYMPHOCYTES NFR BLD AUTO: 17 % (ref 14–44)
MAGNESIUM SERPL-MCNC: 2.2 MG/DL (ref 1.9–2.7)
MCH RBC QN AUTO: 29.1 PG (ref 26.8–34.3)
MCHC RBC AUTO-ENTMCNC: 32.4 G/DL (ref 31.4–37.4)
MCV RBC AUTO: 90 FL (ref 82–98)
MONOCYTES # BLD AUTO: 0.73 THOUSAND/ÂΜL (ref 0.17–1.22)
MONOCYTES NFR BLD AUTO: 9 % (ref 4–12)
NEUTROPHILS # BLD AUTO: 5.74 THOUSANDS/ÂΜL (ref 1.85–7.62)
NEUTS SEG NFR BLD AUTO: 70 % (ref 43–75)
NITRITE UR QL STRIP: NEGATIVE
NRBC BLD AUTO-RTO: 0 /100 WBCS
PH UR STRIP.AUTO: 6 [PH] (ref 4.5–8)
PLATELET # BLD AUTO: 222 THOUSANDS/UL (ref 149–390)
PMV BLD AUTO: 9.7 FL (ref 8.9–12.7)
POTASSIUM SERPL-SCNC: 4.1 MMOL/L (ref 3.5–5.3)
PROT SERPL-MCNC: 6.8 G/DL (ref 6.4–8.4)
PROT UR STRIP-MCNC: NEGATIVE MG/DL
PROTHROMBIN TIME: 14.3 SECONDS (ref 12.3–15)
RBC # BLD AUTO: 3.71 MILLION/UL (ref 3.81–5.12)
RH BLD: POSITIVE
SARS-COV+SARS-COV-2 AG RESP QL IA.RAPID: NEGATIVE
SODIUM SERPL-SCNC: 138 MMOL/L (ref 135–147)
SP GR UR STRIP.AUTO: 1.02 (ref 1–1.03)
SPECIMEN EXPIRATION DATE: NORMAL
UROBILINOGEN UR QL STRIP.AUTO: 0.2 E.U./DL
WBC # BLD AUTO: 8.27 THOUSAND/UL (ref 4.31–10.16)

## 2025-06-08 PROCEDURE — 85610 PROTHROMBIN TIME: CPT | Performed by: EMERGENCY MEDICINE

## 2025-06-08 PROCEDURE — 83690 ASSAY OF LIPASE: CPT | Performed by: EMERGENCY MEDICINE

## 2025-06-08 PROCEDURE — 85730 THROMBOPLASTIN TIME PARTIAL: CPT | Performed by: EMERGENCY MEDICINE

## 2025-06-08 PROCEDURE — 85025 COMPLETE CBC W/AUTO DIFF WBC: CPT | Performed by: EMERGENCY MEDICINE

## 2025-06-08 PROCEDURE — 86850 RBC ANTIBODY SCREEN: CPT | Performed by: EMERGENCY MEDICINE

## 2025-06-08 PROCEDURE — 36415 COLL VENOUS BLD VENIPUNCTURE: CPT | Performed by: EMERGENCY MEDICINE

## 2025-06-08 PROCEDURE — 83735 ASSAY OF MAGNESIUM: CPT | Performed by: EMERGENCY MEDICINE

## 2025-06-08 PROCEDURE — 87804 INFLUENZA ASSAY W/OPTIC: CPT | Performed by: EMERGENCY MEDICINE

## 2025-06-08 PROCEDURE — 99285 EMERGENCY DEPT VISIT HI MDM: CPT | Performed by: EMERGENCY MEDICINE

## 2025-06-08 PROCEDURE — 74177 CT ABD & PELVIS W/CONTRAST: CPT

## 2025-06-08 PROCEDURE — 86900 BLOOD TYPING SEROLOGIC ABO: CPT | Performed by: EMERGENCY MEDICINE

## 2025-06-08 PROCEDURE — 81003 URINALYSIS AUTO W/O SCOPE: CPT

## 2025-06-08 PROCEDURE — 80053 COMPREHEN METABOLIC PANEL: CPT | Performed by: EMERGENCY MEDICINE

## 2025-06-08 PROCEDURE — 70450 CT HEAD/BRAIN W/O DYE: CPT

## 2025-06-08 PROCEDURE — 87811 SARS-COV-2 COVID19 W/OPTIC: CPT | Performed by: EMERGENCY MEDICINE

## 2025-06-08 PROCEDURE — 99285 EMERGENCY DEPT VISIT HI MDM: CPT

## 2025-06-08 PROCEDURE — 86901 BLOOD TYPING SEROLOGIC RH(D): CPT | Performed by: EMERGENCY MEDICINE

## 2025-06-08 RX ADMIN — AMOXICILLIN AND CLAVULANATE POTASSIUM 1 TABLET: 875; 125 TABLET, COATED ORAL at 20:47

## 2025-06-08 RX ADMIN — IOHEXOL 100 ML: 350 INJECTION, SOLUTION INTRAVENOUS at 18:39

## 2025-06-08 NOTE — ED PROVIDER NOTES
Time reflects when diagnosis was documented in both MDM as applicable and the Disposition within this note       Time User Action Codes Description Comment    6/8/2025  8:43 PM Omid Doss Add [R10.9] Abdominal pain     6/8/2025  8:43 PM Omid Doss Add [K57.92] Diverticulitis     6/8/2025  8:43 PM Omid Doss Modify [R10.9] Abdominal pain     6/8/2025  8:43 PM mOid Doss Modify [K57.92] Diverticulitis           ED Disposition       ED Disposition   Discharge    Condition   Stable    Date/Time   Sun Jun 8, 2025  8:43 PM    Comment   Yokasta Cadena discharge to home/self care.                   Assessment & Plan       Medical Decision Making  Patient seen and evaluated, differential includes but is not limited to viral syndrome electrolyte abnormality, intracranial process, diverticulitis anemia, obstruction, C. difficile colitis.  Symptoms not consistent with mesenteric ischemia, stroke.  She has a reassuring neurologic exam and is no longer dizzy but was able to walk into the emergency department.  Will assess with labs, CT imaging.    Amount and/or Complexity of Data Reviewed  External Data Reviewed: notes.  Labs: ordered. Decision-making details documented in ED Course.  Radiology: ordered. Decision-making details documented in ED Course.    Risk  Prescription drug management.        ED Course as of 06/08/25 2203   [place] Jun 08, 2025   1740 Blood Pressure: 164/73  Slightly elevated blood pressure, other vital signs normal   1804 CBC and differential(!)  Slight anemia, does have chronic anemia   1814 BUN(!): 26  Consistent with GI bleed   1814 Comprehensive metabolic panel(!)  Similar to creatinine 2 months ago   1848 CT head without contrast  Independently interpreted CT head, no ICH or midline shift.  Formal read pending per radiology   1848 CT abdomen pelvis with contrast  Independently reviewed CT images.  No free air.  There is a liver lesion present.  Findings consistent with diverticulitis.   Formal read per radiology pending   1928 Urine Macroscopic, POC  Not consistent with UTI   2027 CT head without contrast  Read with no acute process   2037 CT report with : segment of mild colonic wall thickening and pericolonic inflammatory stranding involving the mid to distal sigmoid colon with multiple diverticula most compatible with an acute diverticulitis. No evidence of perforation.   2044 Discussed findings of CT with patient.  Patient got itchiness and hives to cefuroxime but did not have any anaphylaxis.  Review of medications indicating that she has been prescribed Augmentin multiple times.  Will give dose here and prescription sent to pharmacy.  Patient has been stable for discharge.  Understands return precautions       Medications   iohexol (OMNIPAQUE) 350 MG/ML injection (MULTI-DOSE) 100 mL (100 mL Intravenous Given 6/8/25 1839)   amoxicillin-clavulanate (AUGMENTIN) 875-125 mg per tablet 1 tablet (1 tablet Oral Given 6/8/25 2047)       ED Risk Strat Scores                    No data recorded        SBIRT 22yo+      Flowsheet Row Most Recent Value   Initial Alcohol Screen: US AUDIT-C     1. How often do you have a drink containing alcohol? 0 Filed at: 06/08/2025 1734   2. How many drinks containing alcohol do you have on a typical day you are drinking?  0 Filed at: 06/08/2025 1734   3b. FEMALE Any Age, or MALE 65+: How often do you have 4 or more drinks on one occassion? 0 Filed at: 06/08/2025 1734   Audit-C Score 0 Filed at: 06/08/2025 1734   SHERI: How many times in the past year have you...    Used an illegal drug or used a prescription medication for non-medical reasons? Never Filed at: 06/08/2025 1734                            History of Present Illness       Chief Complaint   Patient presents with    Abdominal Pain     Pt c/o lower abd cramping and diarrhea since this morning, reports noted some blood in stool, also reports dizziness since yesterday       Past Medical History[1]   Past Surgical  History[2]   Family History[3]   Social History[4]   E-Cigarette/Vaping    E-Cigarette Use Never User       E-Cigarette/Vaping Substances    Nicotine No     THC No     CBD No     Flavoring No     Other No     Unknown No       I have reviewed and agree with the history as documented.     80-year-old female presents for evaluation of abdominal pain, blood in her stool, dizziness.  Patient has felt tired and felt like her balance has been off for few days.  Patient normally does have some issues with balance but it was a little worse.  She has been treated with antibiotics for UTI.  Today she had 7 bowel movements that were loose, last 2 were bloody.  Does have abdominal pain.  No fevers or vomiting      History provided by:  Medical records and patient  Abdominal Pain  Associated symptoms: diarrhea and fatigue    Associated symptoms: no chest pain, no constipation, no cough, no fever, no nausea, no shortness of breath, no sore throat and no vomiting        Review of Systems   Constitutional:  Positive for fatigue. Negative for fever.   HENT:  Negative for sore throat.    Eyes:  Negative for pain and visual disturbance.   Respiratory:  Negative for cough and shortness of breath.    Cardiovascular:  Negative for chest pain and palpitations.   Gastrointestinal:  Positive for abdominal pain, blood in stool and diarrhea. Negative for constipation, nausea and vomiting.   Musculoskeletal:  Negative for arthralgias and back pain.   Neurological:  Positive for dizziness. Negative for syncope and facial asymmetry.   Hematological:  Does not bruise/bleed easily.   Psychiatric/Behavioral:  Negative for agitation and confusion.            Objective       ED Triage Vitals   Temperature Pulse Blood Pressure Respirations SpO2 Patient Position - Orthostatic VS   06/08/25 1729 06/08/25 1732 06/08/25 1732 06/08/25 1732 06/08/25 1732 06/08/25 1732   98.3 °F (36.8 °C) 73 164/73 16 96 % Sitting      Temp src Heart Rate Source BP Location  FiO2 (%) Pain Score    -- 06/08/25 1732 06/08/25 1732 -- 06/08/25 1732     Monitor Right arm  2      Vitals      Date and Time Temp Pulse SpO2 Resp BP Pain Score FACES Pain Rating User   06/08/25 1732 -- 73 96 % 16 164/73 2 -- DIC   06/08/25 1729 98.3 °F (36.8 °C) -- -- -- -- -- -- DIC            Physical Exam  Vitals and nursing note reviewed.   Constitutional:       General: She is not in acute distress.     Appearance: She is well-developed. She is not toxic-appearing.   HENT:      Head: Normocephalic and atraumatic.     Eyes:      Conjunctiva/sclera: Conjunctivae normal.       Cardiovascular:      Rate and Rhythm: Normal rate and regular rhythm.      Heart sounds: Normal heart sounds. No murmur heard.  Pulmonary:      Effort: Pulmonary effort is normal. No respiratory distress.      Breath sounds: Normal breath sounds.   Abdominal:      General: Abdomen is flat.      Palpations: Abdomen is soft.      Tenderness: There is generalized abdominal tenderness.     Musculoskeletal:         General: No swelling.      Cervical back: Neck supple.      Right lower leg: No edema.      Left lower leg: No edema.     Skin:     General: Skin is warm and dry.      Capillary Refill: Capillary refill takes less than 2 seconds.     Neurological:      General: No focal deficit present.      Mental Status: She is alert.      GCS: GCS eye subscore is 4. GCS verbal subscore is 5. GCS motor subscore is 6.      Cranial Nerves: No dysarthria or facial asymmetry.      Sensory: No sensory deficit.      Motor: No tremor, abnormal muscle tone or seizure activity.      Coordination: Finger-Nose-Finger Test normal.     Psychiatric:         Mood and Affect: Mood normal.         Behavior: Behavior is cooperative.         Results Reviewed       Procedure Component Value Units Date/Time    POCT urinalysis dipstick [[id number]]  (Normal) Collected: 06/08/25 1927    Lab Status: Final result Specimen: Urine Updated: 06/08/25 1927     Color, UA --      Clarity, UA --     EXT Leukocytes, UA --     Nitrite, UA --     Protein, UA -- mg/dl      Glucose, UA --     Ketones, UA -- mg/dl      EXT Urobilinogen, UA --      Bilirubin, UA --     Blood, UA --    Urine Macroscopic, POC [[id number]] Collected: 06/08/25 1926    Lab Status: Final result Specimen: Urine Updated: 06/08/25 1927     Color, UA Yellow     Clarity, UA Clear     pH, UA 6.0     Leukocytes, UA Negative     Nitrite, UA Negative     Protein, UA Negative mg/dl      Glucose, UA Negative mg/dl      Ketones, UA Negative mg/dl      Urobilinogen, UA 0.2 E.U./dl      Bilirubin, UA Negative     Occult Blood, UA Negative     Specific Gravity, UA 1.025    Narrative:      CLINITEK RESULT    Protime-INR [[id number]]  (Normal) Collected: 06/08/25 1750    Lab Status: Final result Specimen: Blood from Arm, Left Updated: 06/08/25 1821     Protime 14.3 seconds      INR 1.10    Narrative:      INR Therapeutic Range    Indication                                             INR Range      Atrial Fibrillation                                               2.0-3.0  Hypercoagulable State                                    2.0.2.3  Left Ventricular Asist Device                            2.0-3.0  Mechanical Heart Valve                                  -    Aortic(with afib, MI, embolism, HF, LA enlargement,    and/or coagulopathy)                                     2.0-3.0 (2.5-3.5)     Mitral                                                             2.5-3.5  Prosthetic/Bioprosthetic Heart Valve               2.0-3.0  Venous thromboembolism (VTE: VT, PE        2.0-3.0    APTT [[id number]]  (Normal) Collected: 06/08/25 1750    Lab Status: Final result Specimen: Blood from Arm, Left Updated: 06/08/25 1821     PTT 26 seconds     Lipase [[id number]]  (Normal) Collected: 06/08/25 1750    Lab Status: Final result Specimen: Blood from Arm, Left Updated: 06/08/25 1813     Lipase 20 u/L     FLU/COVID Rapid Antigen (30 min. TAT) - Preferred  screening test in ED [[id number]]  (Normal) Collected: 06/08/25 1750    Lab Status: Final result Specimen: Nares from Nose Updated: 06/08/25 1813     SARS COV Rapid Antigen Negative     Influenza A Rapid Antigen Negative     Influenza B Rapid Antigen Negative    Narrative:      This test has been performed using the [employer] Nelly 2 FLU+SARS Antigen test under the Emergency Use Authorization (EUA). This test has been validated by the  and verified by the performing laboratory. The Nelly uses lateral flow immunofluorescent sandwich assay to detect SARS-COV, Influenza A and Influenza B Antigen.     The [employer] Nelly 2 SARS Antigen test does not differentiate between SARS-CoV and SARS-CoV-2.     Negative results are presumptive and may be confirmed with a molecular assay, if necessary, for patient management. Negative results do not rule out SARS-CoV-2 or influenza infection and should not be used as the sole basis for treatment or patient management decisions. A negative test result may occur if the level of antigen in a sample is below the limit of detection of this test.     Positive results are indicative of the presence of viral antigens, but do not rule out bacterial infection or co-infection with other viruses.     All test results should be used as an adjunct to clinical observations and other information available to the provider.    FOR PEDIATRIC PATIENTS - copy/paste COVID Guidelines URL to browser: [email or web address]    Comprehensive metabolic panel [[id number]]  (Abnormal) Collected: 06/08/25 1750    Lab Status: Final result Specimen: Blood from Arm, Left Updated: 06/08/25 1812     Sodium 138 mmol/L      Potassium 4.1 mmol/L      Chloride 111 mmol/L      CO2 22 mmol/L      ANION GAP 5 mmol/L      BUN 26 mg/dL      Creatinine 1.20 mg/dL      Glucose 109 mg/dL      Calcium 9.4 mg/dL      AST 11 U/L      ALT 8 U/L      Alkaline Phosphatase 74 U/L       Total Protein 6.8 g/dL      Albumin 4.1 g/dL      Total Bilirubin 0.37 mg/dL      eGFR 42 ml/min/1.73sq m     Narrative:      National Kidney Disease Foundation guidelines for Chronic Kidney Disease (CKD):     Stage 1 with normal or high GFR (GFR > 90 mL/min/1.73 square meters)    Stage 2 Mild CKD (GFR = 60-89 mL/min/1.73 square meters)    Stage 3A Moderate CKD (GFR = 45-59 mL/min/1.73 square meters)    Stage 3B Moderate CKD (GFR = 30-44 mL/min/1.73 square meters)    Stage 4 Severe CKD (GFR = 15-29 mL/min/1.73 square meters)    Stage 5 End Stage CKD (GFR <15 mL/min/1.73 square meters)  Note: GFR calculation is accurate only with a steady state creatinine    Magnesium [[id number]]  (Normal) Collected: 06/08/25 1750    Lab Status: Final result Specimen: Blood from Arm, Left Updated: 06/08/25 1812     Magnesium 2.2 mg/dL     CBC and differential [[id number]]  (Abnormal) Collected: 06/08/25 1750    Lab Status: Final result Specimen: Blood from Arm, Left Updated: 06/08/25 1756     WBC 8.27 Thousand/uL      RBC 3.71 Million/uL      Hemoglobin 10.8 g/dL      Hematocrit 33.3 %      MCV 90 fL      MCH 29.1 pg      MCHC 32.4 g/dL      RDW 13.5 %      MPV 9.7 fL      Platelets 222 Thousands/uL      nRBC 0 /100 WBCs      Segmented % 70 %      Immature Grans % 0 %      Lymphocytes % 17 %      Monocytes % 9 %      Eosinophils Relative 3 %      Basophils Relative 1 %      Absolute Neutrophils 5.74 Thousands/µL      Absolute Immature Grans 0.03 Thousand/uL      Absolute Lymphocytes 1.43 Thousands/µL      Absolute Monocytes 0.73 Thousand/µL      Eosinophils Absolute 0.28 Thousand/µL      Basophils Absolute 0.06 Thousands/µL             CT abdomen pelvis with contrast   Final Interpretation by Kenneth Arteaga MD (06/08 2032)      Segment of mild colonic wall thickening and pericolonic inflammatory stranding involving the mid to distal sigmoid colon with multiple diverticula most compatible with an acute diverticulitis. No  evidence of perforation.      Trace pelvic free fluid.      No evidence of large or small bowel obstruction.      Reidentified multiple bilateral pulmonary nodules.      Stable right hepatic lobe cyst.      Workstation performed: [id number]         CT head without contrast   Final Interpretation by Kenneth Arteaga MD (2025)      No acute intracranial abnormality.  Chronic microangiopathic changes.                  Workstation performed: [id number]             Procedures    ED Medication and Procedure Management   Prior to Admission Medications   Prescriptions Last Dose Informant Patient Reported? Taking?   Ascorbic Acid (VITAMIN C PO)  Self Yes No   Sig: Vitamin C   Cholecalciferol (Vitamin D3) 1.25 MG (24934 UT) CAPS  Self Yes No   Sig: Take 5,000 Units by mouth daily   Cyanocobalamin (VITAMIN B-12 PO)   Yes No   Ivermectin 1 % CREA   No No   Sig: Apply topically in the morning   Spacer/Aero-Holding Chambers (Vortex Valved Holding Chamber) DASHA   No No   Sig: Use 2 (two) times a day   albuterol (Ventolin HFA) 90 mcg/act inhaler   No No   Sig: Inhale 2 puffs every 6 (six) hours as needed for wheezing   amLODIPine (NORVASC) 10 mg tablet   No No   Sig: TAKE 1 TABLET BY MOUTH EVERY DAY   atorvastatin (LIPITOR) 20 mg tablet   No No   Sig: TAKE 1 TABLET BY MOUTH EVERY DAY   buPROPion (WELLBUTRIN XL) 150 mg 24 hr tablet   No No   Sig: TAKE 1 TABLET BY MOUTH EVERY DAY IN THE MORNING   docusate sodium (COLACE) 100 mg capsule   No No   Sig: TAKE 1 CAPSULE BY MOUTH TWICE A DAY   famotidine (PEPCID) 40 MG tablet   No No   Sig: Take 1 tablet (40 mg total) by mouth daily   fluticasone (FLONASE) 50 mcg/act nasal spray   No No   Si spray into each nostril in the morning and 1 spray before bedtime.   fluticasone-salmeterol (Advair HFA) 45-21 MCG/ACT inhaler   No No   Sig: Inhale 2 puffs 2 (two) times a day   levothyroxine 150 mcg tablet   No No   Sig: TAKE 1 TABLET (150 MCG TOTAL) BY MOUTH DAILY IN THE EARLY MORNING    losartan (COZAAR) 100 MG tablet   No No   Sig: TAKE 1 TABLET BY MOUTH EVERY DAY   meclizine (ANTIVERT) 25 mg tablet  Self No No   Sig: Take 1 tablet (25 mg total) by mouth every 8 (eight) hours as needed for dizziness for up to 10 days   metoprolol succinate (TOPROL-XL) 25 mg 24 hr tablet   No No   Sig: TAKE 1 TABLET (25 MG TOTAL) BY MOUTH DAILY.   ondansetron (ZOFRAN) 4 mg tablet   No No   Sig: Take 1 tablet (4 mg total) by mouth 3 (three) times a day as needed for nausea or vomiting   pregabalin (LYRICA) 75 mg capsule   No No   Sig: TAKE 1 CAPSULE BY MOUTH TWICE A DAY   sertraline (ZOLOFT) 100 mg tablet   No No   Sig: TAKE 1 TABLET BY MOUTH TWICE A DAY   sulfamethoxazole-trimethoprim (BACTRIM DS) 800-160 mg per tablet   No No   Sig: Take 1 tablet by mouth every 12 (twelve) hours for 3 days   traZODone (DESYREL) 50 mg tablet   No No   Sig: TAKE 2 TABLETS (100 MG TOTAL) BY MOUTH DAILY AT BEDTIME      Facility-Administered Medications: None     Discharge Medication List as of 6/8/2025  8:45 PM        START taking these medications    Details   amoxicillin-clavulanate (AUGMENTIN) 875-125 mg per tablet Take 1 tablet by mouth every 12 (twelve) hours for 7 days, Starting Sun 6/8/2025, Until Sun 6/15/2025, Normal           CONTINUE these medications which have NOT CHANGED    Details   albuterol (Ventolin HFA) 90 mcg/act inhaler Inhale 2 puffs every 6 (six) hours as needed for wheezing, Starting Thu 6/5/2025, Normal      amLODIPine (NORVASC) 10 mg tablet TAKE 1 TABLET BY MOUTH EVERY DAY, Starting Thu 12/19/2024, Normal      Ascorbic Acid (VITAMIN C PO) Vitamin C, Historical Med      atorvastatin (LIPITOR) 20 mg tablet TAKE 1 TABLET BY MOUTH EVERY DAY, Starting Fri 5/2/2025, Normal      buPROPion (WELLBUTRIN XL) 150 mg 24 hr tablet TAKE 1 TABLET BY MOUTH EVERY DAY IN THE MORNING, Starting Tue 3/11/2025, Normal      Cholecalciferol (Vitamin D3) 1.25 MG (17889 UT) CAPS Take 5,000 Units by mouth daily, Historical Med       Cyanocobalamin (VITAMIN B-12 PO) Historical Med      docusate sodium (COLACE) 100 mg capsule TAKE 1 CAPSULE BY MOUTH TWICE A DAY, Starting Sat 5/31/2025, Normal      famotidine (PEPCID) 40 MG tablet Take 1 tablet (40 mg total) by mouth daily, Starting Thu 3/6/2025, Normal      fluticasone (FLONASE) 50 mcg/act nasal spray 1 spray into each nostril in the morning and 1 spray before bedtime., Starting Thu 6/5/2025, Normal      fluticasone-salmeterol (Advair HFA) 45-21 MCG/ACT inhaler Inhale 2 puffs 2 (two) times a day, Starting Mon 3/10/2025, Normal      Ivermectin 1 % CREA Apply topically in the morning, Starting Fri 3/8/2024, Normal      levothyroxine 150 mcg tablet TAKE 1 TABLET (150 MCG TOTAL) BY MOUTH DAILY IN THE EARLY MORNING, Starting Thu 2/13/2025, Normal      losartan (COZAAR) 100 MG tablet TAKE 1 TABLET BY MOUTH EVERY DAY, Starting Thu 2/13/2025, Normal      meclizine (ANTIVERT) 25 mg tablet Take 1 tablet (25 mg total) by mouth every 8 (eight) hours as needed for dizziness for up to 10 days, Starting Wed 10/4/2023, Until 2359, Normal      metoprolol succinate (TOPROL-XL) 25 mg 24 hr tablet TAKE 1 TABLET (25 MG TOTAL) BY MOUTH DAILY., Starting Thu 2/27/2025, Normal      ondansetron (ZOFRAN) 4 mg tablet Take 1 tablet (4 mg total) by mouth 3 (three) times a day as needed for nausea or vomiting, Starting Tue 10/22/2024, Normal      pregabalin (LYRICA) 75 mg capsule TAKE 1 CAPSULE BY MOUTH TWICE A DAY, Starting Fri 5/2/2025, Normal      sertraline (ZOLOFT) 100 mg tablet TAKE 1 TABLET BY MOUTH TWICE A DAY, Starting Fri 9/13/2024, Normal      Spacer/Aero-Holding Chambers (Vortex Valved Holding Chamber) DASHA Use 2 (two) times a day, Starting Thu 3/6/2025, Normal      traZODone (DESYREL) 50 mg tablet TAKE 2 TABLETS (100 MG TOTAL) BY MOUTH DAILY AT BEDTIME, Starting Thu 6/5/2025, Normal           STOP taking these medications       sulfamethoxazole-trimethoprim (BACTRIM DS) 800-160 mg per tablet Comments:   Reason  for Stopping:               ED SEPSIS DOCUMENTATION   Time reflects when diagnosis was documented in both MDM as applicable and the Disposition within this note       Time User Action Codes Description Comment    6/8/2025  8:43 PM Omid Doss Add [R10.9] Abdominal pain     6/8/2025  8:43 PM Omid Doss Add [K57.92] Diverticulitis     6/8/2025  8:43 PM Omid Doss Modify [R10.9] Abdominal pain     6/8/2025  8:43 PM Omid Doss Modify [K57.92] Diverticulitis                    [1]   Past Medical History:  Diagnosis Date    Acid reflux     Anxiety     Arthritis     Asthma     C1-C4 level with central cord syndrome (HCC)     Resolved 2/1/2017     Carpal tunnel syndrome Resolved 4/21/2015    Colitis     Colon polyp     Concussion     Depressed     Dysthymic disorder     Resolved 1/5/2018    Gastric ulcer 2013    small - on EGD - [facility]    Hemorrhoids     Hx of blood clots     Hx of colonic polyps     [facility]    Hyperlipemia     Hypertension     Hypothyroid     Lumbar spondylosis 04/25/2024    Lung nodule     stable x 2 yrs on CT    Migraines     Obesity     Postural dizziness with presyncope     Resolved 8/28/2018     Tendinitis     Ulnar neuropathy     Resolved 5/21/2015     Vertigo    [2]   Past Surgical History:  Procedure Laterality Date    APPENDECTOMY      BREAST SURGERY Left 01/1966    BREAST LUMPECTOMY    CARPAL TUNNEL RELEASE      CHOLECYSTECTOMY      DILATION AND CURETTAGE OF UTERUS      ELBOW SURGERY      EYE SURGERY Bilateral 2019    Cataract     GALLBLADDER SURGERY      JOINT REPLACEMENT Right     REPLACEMENT TOTAL KNEE    KNEE ARTHROSCOPY      KNEE SURGERY Right     NECK SURGERY      ORTHOPEDIC SURGERY      POSTERIOR LAMINECTOMY / DECOMPRESSION CERVICAL SPINE  02/2015    REPLACEMENT TOTAL KNEE Left 2022    TONSILLECTOMY      TOTAL KNEE ARTHROPLASTY Left 04/06/2022    [facility] Dr. Diaz    VAGINAL DELIVERY      x3    WRIST SURGERY Right    [3]   Family History  Problem Relation Name Age of Onset     Breast cancer Mother Elly     Alzheimer's disease Mother Elly     Coronary artery disease Mother Elly     Hypertension Mother Elly     Migraines Mother Elly     Peripheral vascular disease Mother Elly     Arthritis Mother Elly     Cancer Mother Elly     Parkinsonism Father JimmyFeijo`     Other Father JimmyFeijo`         Cardiovascular disease     Coronary artery disease Father JohnFeijo`     Hypertension Father JohnFeijo`     Bipolar disorder Sister Jacqueline     Thyroid disease Sister Jacqueline     Alzheimer's disease Sister Jacqueline     Depression Sister Jacqueline     Throat cancer Maternal Grandfather Raposa     Cancer Maternal Grandfather Raposa     Allergic rhinitis Daughter      Asthma Daughter      Asthma Son Andrés Cadena     Diabetes Neg Hx      Stroke Neg Hx     [4]   Social History  Tobacco Use    Smoking status: Former     Current packs/day: 0.00     Types: Cigarettes     Start date: 1983     Quit date: 10/14/2013     Years since quittin.6     Passive exposure: Current    Smokeless tobacco: Never   Vaping Use    Vaping status: Never Used   Substance Use Topics    Alcohol use: No    Drug use: No        Omid Doss, DO  [phone]

## 2025-06-09 ENCOUNTER — TELEPHONE (OUTPATIENT)
Age: 80
End: 2025-06-09

## 2025-06-09 NOTE — TELEPHONE ENCOUNTER
Last OV 3/20/24 and EGD Colonoscopy 5/20/24. Pt started on Augmentin.     Spoke with pt. Appt scheduled 6/30/25.

## 2025-06-09 NOTE — TELEPHONE ENCOUNTER
Patients GI provider:       Number to return call: [phone]    Reason for call: Pt calling to schedule her ED follow up for Diverticulitis. Pt is still having symptoms. Please reach back out to the pt to schedule an ED f/u at the [facility]    Scheduled procedure/appointment date if applicable: Apt/procedure

## 2025-06-24 DIAGNOSIS — I10 ESSENTIAL HYPERTENSION: ICD-10-CM

## 2025-06-24 RX ORDER — AMLODIPINE BESYLATE 10 MG/1
10 TABLET ORAL DAILY
Qty: 90 TABLET | Refills: 1 | Status: SHIPPED | OUTPATIENT
Start: 2025-06-24

## 2025-06-30 ENCOUNTER — OFFICE VISIT (OUTPATIENT)
Dept: GASTROENTEROLOGY | Facility: AMBULARY SURGERY CENTER | Age: 80
End: 2025-06-30
Payer: MEDICARE

## 2025-06-30 VITALS
DIASTOLIC BLOOD PRESSURE: 78 MMHG | SYSTOLIC BLOOD PRESSURE: 144 MMHG | OXYGEN SATURATION: 98 % | BODY MASS INDEX: 32.06 KG/M2 | HEIGHT: 62 IN | HEART RATE: 74 BPM | WEIGHT: 174.2 LBS

## 2025-06-30 DIAGNOSIS — K57.92 DIVERTICULITIS: ICD-10-CM

## 2025-06-30 DIAGNOSIS — K57.92 ACUTE DIVERTICULITIS: Primary | ICD-10-CM

## 2025-06-30 DIAGNOSIS — K21.9 GASTROESOPHAGEAL REFLUX DISEASE WITHOUT ESOPHAGITIS: ICD-10-CM

## 2025-06-30 PROCEDURE — 99213 OFFICE O/P EST LOW 20 MIN: CPT | Performed by: PHYSICIAN ASSISTANT

## 2025-06-30 NOTE — PROGRESS NOTES
Name: Yokasta Cadena      : 1945      MRN: [id number]  Encounter Provider: Elis Buckley PA-C  Encounter Date: 2025   Encounter department: [facility]  :  Assessment & Plan  Acute diverticulitis  Noted on CT 2025, no complications.  She reports previous episode of diverticulitis many years ago.  She completed antibiotic course.  No further abdominal pain.  She does have chronic constipation for which she is managing with Dulcolax and occasional senna.    -Discussed diverticulitis/diverticulosis  - Recommend starting daily fiber supplement such as Metamucil or Benefiber 1 tablespoon daily.  - Can continue Dulcolax as needed.  - Continue diet as tolerated.  - Patient had colonoscopy last year.  Will hold off on any repeat.  Consider in the future if any development of recurrent diverticulitis or alarm symptoms.    -Advised patient to call with any recurrence of symptoms.       Gastroesophageal reflux disease without esophagitis  Symptoms well-controlled on Pepcid 40 mg daily.  - Continue Pepcid       Follow up as needed.    History of Present Illness   Yokasta Cadena is a 80 y.o. female who presents with hypertension, asthma, SBO who presents for follow up after diverticulitis.     Patient went to the emergency room 2025 for abdominal pain with CT scan showing acute diverticulitis of the sigmoid.  She completed antibiotic course.  She reports she is doing well.  No further abdominal pain.  No fevers.  No nausea or vomiting.  Appetite is normal.  She bought Benefiber however has not started this yet.  She reports she is taking Dulcolax daily for her bowel movements.  She goes every day however the alternate and their consistency.  No blood in the stool.    Recent labs with stable hemoglobin.  Liver enzymes normal.    Patient had colonoscopy 2024 with few polyps removed, diverticulosis and no repeat recommended due to age. EGD at that time with biopsies  "negative and no repeat recommended.    HPI    Review of Systems   All other systems reviewed and are negative.   A complete review of systems is negative other than that noted above in the HPI.      Current Medications[1]  Objective   /78 (BP Location: Left arm, Patient Position: Sitting, Cuff Size: Standard)   Pulse 74   Ht 5' 2\" (1.575 m)   Wt 79 kg (174 lb 3.2 oz)   SpO2 98%   BMI 31.86 kg/m²     Physical Exam  Vitals reviewed.   Constitutional:       General: She is not in acute distress.     Appearance: Normal appearance. She is not ill-appearing.   HENT:      Head: Normocephalic and atraumatic.     Eyes:      Conjunctiva/sclera: Conjunctivae normal.       Cardiovascular:      Rate and Rhythm: Normal rate and regular rhythm.      Heart sounds: No murmur heard.  Pulmonary:      Effort: Pulmonary effort is normal. No respiratory distress.      Breath sounds: Normal breath sounds.   Abdominal:      General: Abdomen is flat. There is no distension.      Palpations: Abdomen is soft.      Tenderness: There is abdominal tenderness (mild lower abdominal tenderness, improved on re-examine). There is no guarding or rebound.     Musculoskeletal:         General: No swelling.      Cervical back: Normal range of motion.      Right lower leg: No edema.      Left lower leg: No edema.     Skin:     General: Skin is warm.      Coloration: Skin is not jaundiced.     Neurological:      General: No focal deficit present.      Mental Status: She is alert and oriented to person, place, and time. Mental status is at baseline.     Psychiatric:         Mood and Affect: Mood normal.         Behavior: Behavior normal.          Lab Results: I personally reviewed relevant lab results.       Results for orders placed during the hospital encounter of 05/20/24    EGD    Impression  1 cm type I hiatal hernia  Mild edematous, erythematous mucosa in the prepyloric region; performed cold forceps biopsy  The duodenal bulb, 1st part of " the duodenum and 2nd part of the duodenum appeared normal. Performed random biopsy to rule out celiac disease.      RECOMMENDATION:  Await pathology results    Follow biopsy results in 2 weeks.  Avoid NSAIDs.  Continue with current dose of PPI.          Rohan Morrow MD               [1]   Current Outpatient Medications   Medication Sig Dispense Refill    albuterol (Ventolin HFA) 90 mcg/act inhaler Inhale 2 puffs every 6 (six) hours as needed for wheezing 18 g 0    amLODIPine (NORVASC) 10 mg tablet TAKE 1 TABLET BY MOUTH EVERY DAY 90 tablet 1    atorvastatin (LIPITOR) 20 mg tablet TAKE 1 TABLET BY MOUTH EVERY DAY 90 tablet 1    buPROPion (WELLBUTRIN XL) 150 mg 24 hr tablet TAKE 1 TABLET BY MOUTH EVERY DAY IN THE MORNING 90 tablet 1    Cyanocobalamin (VITAMIN B-12 PO)       docusate sodium (COLACE) 100 mg capsule TAKE 1 CAPSULE BY MOUTH TWICE A DAY 60 capsule 1    famotidine (PEPCID) 40 MG tablet Take 1 tablet (40 mg total) by mouth daily 60 tablet 5    fluticasone (FLONASE) 50 mcg/act nasal spray 1 spray into each nostril in the morning and 1 spray before bedtime. 16 g 5    fluticasone-salmeterol (Advair HFA) 45-21 MCG/ACT inhaler Inhale 2 puffs 2 (two) times a day 12 g 5    Ivermectin 1 % CREA Apply topically in the morning 45 g 1    levothyroxine 150 mcg tablet TAKE 1 TABLET (150 MCG TOTAL) BY MOUTH DAILY IN THE EARLY MORNING 90 tablet 1    losartan (COZAAR) 100 MG tablet TAKE 1 TABLET BY MOUTH EVERY DAY 90 tablet 1    meclizine (ANTIVERT) 25 mg tablet Take 1 tablet (25 mg total) by mouth every 8 (eight) hours as needed for dizziness for up to 10 days 30 tablet 0    metoprolol succinate (TOPROL-XL) 25 mg 24 hr tablet TAKE 1 TABLET (25 MG TOTAL) BY MOUTH DAILY. 90 tablet 1    ondansetron (ZOFRAN) 4 mg tablet Take 1 tablet (4 mg total) by mouth 3 (three) times a day as needed for nausea or vomiting 20 tablet 0    pregabalin (LYRICA) 75 mg capsule TAKE 1 CAPSULE BY MOUTH TWICE A DAY 60 capsule 0    sertraline  (ZOLOFT) 100 mg tablet TAKE 1 TABLET BY MOUTH TWICE A  tablet 2    Spacer/Aero-Holding Chambers (Vortex Valved Holding Chamber) DASHA Use 2 (two) times a day 1 each 0    traZODone (DESYREL) 50 mg tablet TAKE 2 TABLETS (100 MG TOTAL) BY MOUTH DAILY AT BEDTIME 180 tablet 1    Ascorbic Acid (VITAMIN C PO) Vitamin C      Cholecalciferol (Vitamin D3) 1.25 MG (75661 UT) CAPS Take 5,000 Units by mouth daily       No current facility-administered medications for this visit.

## 2025-07-02 ENCOUNTER — CONSULT (OUTPATIENT)
Dept: CARDIOLOGY CLINIC | Facility: CLINIC | Age: 80
End: 2025-07-02
Attending: FAMILY MEDICINE
Payer: MEDICARE

## 2025-07-02 VITALS
OXYGEN SATURATION: 96 % | HEART RATE: 65 BPM | DIASTOLIC BLOOD PRESSURE: 74 MMHG | HEIGHT: 62 IN | WEIGHT: 174.1 LBS | BODY MASS INDEX: 32.04 KG/M2 | SYSTOLIC BLOOD PRESSURE: 116 MMHG

## 2025-07-02 DIAGNOSIS — I35.1 NONRHEUMATIC AORTIC VALVE INSUFFICIENCY: ICD-10-CM

## 2025-07-02 DIAGNOSIS — I51.89 DIASTOLIC DYSFUNCTION: ICD-10-CM

## 2025-07-02 DIAGNOSIS — R06.02 SHORTNESS OF BREATH: Primary | ICD-10-CM

## 2025-07-02 LAB
ATRIAL RATE: 65 BPM
P AXIS: -5 DEGREES
PR INTERVAL: 178 MS
QRS AXIS: -35 DEGREES
QRSD INTERVAL: 80 MS
QT INTERVAL: 414 MS
QTC INTERVAL: 430 MS
T WAVE AXIS: 13 DEGREES
VENTRICULAR RATE: 65 BPM

## 2025-07-02 PROCEDURE — 93000 ELECTROCARDIOGRAM COMPLETE: CPT | Performed by: STUDENT IN AN ORGANIZED HEALTH CARE EDUCATION/TRAINING PROGRAM

## 2025-07-02 PROCEDURE — 99203 OFFICE O/P NEW LOW 30 MIN: CPT | Performed by: STUDENT IN AN ORGANIZED HEALTH CARE EDUCATION/TRAINING PROGRAM

## 2025-07-02 NOTE — PROGRESS NOTES
Cardiology     Clinic Visit Note  Yokasta Tom 80 y.o. female   MRN: [id number]    Assessment and Plan      Diagnoses and all orders for this visit:    Shortness of breath  -     NM myocardial perfusion spect (stress and/or rest); Future    Diastolic dysfunction  -     Ambulatory Referral to Cardiology  -     POCT ECG  -     NM myocardial perfusion spect (stress and/or rest); Future    Nonrheumatic aortic valve insufficiency  -     Ambulatory Referral to Cardiology  -     POCT ECG  -     NM myocardial perfusion spect (stress and/or rest); Future      Plan:  Grade 1 diastolic dysfunction and mild to moderate aortic valve regurg.G1 DD stable from prior TTE in 2021.  Mild progression of AV regurg from prior TTE. Euvolemic on exam today.  Plan is to repeat a TTE in 1 to 2 years for continued monitoring.    Her shortness of breath on exertion is potentially concerning for an anginal equivalent.  EKG normal sinus with no Q waves.I did look at both her TTE's, no wall motion abnormalities on either but EF is 55% most recently which is slightly down from 65% previously.  I discussed the symptoms with the patient and she is agreeable to proceed with a nuclear stress test for further assessment, likely pharmacologic given her arthritis.  She has arthritis of both of her knees and will probably not be able to do treadmill.  I discussed the stress test procedure with her, and the possibility of needing a cardiac cath in the future pending these results.  She is agreeable to both.      Schedule a follow-up appointment in 6 months    Chief Complaint: I get short of breath sometimes  Subjective     History of Present Illness:  Yokasta tom is an 80-year-old female with a PMH of HTN, HLD, asthma, hypothyroidism, DIANA, GERD, Diverticulitis, anxiety.     She had a recent outpatient transthoracic echo performed for lower extremity edema, which showed G1 DD and mild to moderate AV regurg.  She was referred to cardiology for these  findings.    Today in the office she states that she feels well.  She denies any chest pain or palpitations, but does mention that she gets occasionally short of breath when climbing stairs and if she overexerts herself.  She is able to climb roughly 8 steps before having to stop to catch her breath.  The shortness of breath will eventually subside with rest, and she is able to continue climbing the stairs afterwards.  She also mentions that she got short of breath yesterday after having a very active day with lots of errands.  She got home and felt some chest tightness that resolved after an hour of sitting on the couch.  She also reports a history of bilateral knee replacements and chronic knee arthritis which makes it difficult to exercise.    Denies any tobacco alcohol or drug use.  She lives with her sister on a 1st floor apartment.     2025 TTE:   LVEF 55%, normal wall motion, G1DD  IVS: There is sigmoid appearance of the septum.   Left Atrium: The atrium is moderately dilated (42-48 mL/m2).  Aortic Valve: There is mild to moderate regurgitation.   Mitral Valve: There is mild regurgitation.   Tricuspid Valve: The right ventricular systolic pressure is mildly elevated. The estimated right ventricular systolic pressure is 41.00 mmHg.      Previous Cardiology Workup:  TREADMILL STRESS  No results found for this or any previous visit.     ----------------------------------------------------------------------------------------------  NUCLEAR STRESS TEST: No results found for this or any previous visit.    Results for orders placed during the hospital encounter of 19    NM myocardial perfusion spect (rx stress and/or rest)    Narrative  [address] [address]    Rest/Stress Gated SPECT Myocardial Perfusion Imaging After Regadenoson    Patient: ARSLAN ANDRADE  MR number: [id number]  Account number: [id number]  : 1945  Age: 73 years  Gender:  Female  Status: Outpatient  Location: [facility]  Height: 60 in  Weight: 192 lb  BP: 160/ 80 mmHg    Diagnosis: R06.02 - Shortness of breath    RN:  Nicol Vargas RN  Referring Physician:  Lisbeth Vallejo DO  Technician:  Bony Randolph  Group:  [facility]  Report Prepared By::  Bony Randolph  Interpreting Physician:  Jimmy Oden MD    INDICATIONS: Coronary artery disease.    HISTORY: The patient is a 73 year old  female. Chest pain status: no chest pain. Other symptoms: dyspnea. Coronary artery disease risk factors: dyslipidemia, hypertension, and quit smoking 7 years ago. Cardiovascular history: none  significant. Co-morbidity: history of lung disease and obesity. Medications: a calcium channel blocker, an ACE inhibitor/ARB, a lipid lowering agent, and thyroid medications.    PHYSICAL EXAM: Baseline physical exam screening: normal.    REST ECG: Normal sinus rhythm at a rate of 68 beats per minute. Incomplete right bundle branch block pattern. Otherwise normal tracing.    PROCEDURE: The study was performed in the the [facility]. A regadenoson infusion pharmacologic stress test was performed. Gated SPECT myocardial perfusion imaging was performed after stress and at rest. Systolic blood pressure was  160 mmHg, at the start of the study. Diastolic blood pressure was 80 mmHg, at the start of the study. The heart rate was 68 bpm, at the start of the study.  Regadenoson protocol:  HR bpm SBP mmHg DBP mmHg Symptoms  Baseline 68 160 80 none  1 min 103 -- -- dyspnea  2 min 109 160 70 dyspnea  3 min 109 150 70 dyspnea  4 min 95 174 60 subsiding  5 min 89 174 60 subsiding  6 min 85 160 70 none  7 min 78 160 70 none  No medications or fluids given.    STRESS SUMMARY: Duration of pharmacologic stress was 3 min and 0 sec. Maximal heart rate during stress was 109 bpm. The heart rate response to stress was normal. There was resting hypertension with a hypertensive blood  pressure response to  stress. The rate-pressure product for the peak heart rate and blood pressure was 79215. There was no chest pain during stress. The stress test was terminated due to protocol completion. Pre oxygen saturation: 95 %. Peak oxygen saturation:  93 %. With pharmacologic stress using intravenous Lexiscan, there was no evidence of ischemia. No ST or T-wave changes occurred. There was no arrhythmia.    ISOTOPE ADMINISTRATION:  The radiopharmaceutical was injected at the peak effect of pharmacologic stress.    MYOCARDIAL PERFUSION IMAGING:  Tomographic perfusion series was performed at rest and following pharmacologic stress with intravenous Lexiscan. The rest study was performed with scanning in the supine position. The post-stress study was performed with scanning in both  the supine and prone positions.  Tomographic perfusion series at rest demonstrated uniformly normal uptake in activity. Following pharmacologic stress with intravenous Lexiscan, there was no change.    GATED SPECT:  Normal left ventricular systolic function, EF 76%. Normal left ventricular cavity size. Normal left ventricular wall motion without regional wall motion abnormalities.    SUMMARY:  -  Stress results: There was resting hypertension with a hypertensive blood pressure response to stress. There was no chest pain during stress.    IMPRESSIONS: 1. Resting hypertension  2. Negative pharmacologic stress test with low level exercise for symptoms of angina pectoris or electrocardiographic changes of ischemia.  3. Normal tomographic perfusion series  4. Normal left ventricular systolic function, EF 76%.    Prepared and signed by    Jimmy Oden MD  Signed 05/03/2019 14:08:56      --------------------------------------------------------------------------------  CATH:  No results found for this or any previous visit.    --------------------------------------------------------------------------------  ECHO:   Results for orders  placed during the hospital encounter of 21    Echo complete with contrast if indicated    Narrative  [place] [phone]    Transthoracic Echocardiogram  2D, M-mode, Doppler, and Color Doppler    Study date:  01-Sep-2021    Patient: ARSLAN ANDRADE  MR number: [id number]  Account number: [id number]  : 1945  Age: 76 years  Gender: Female  Status: Inpatient  Location: Bedside  Height: 60 in  Weight: 178.6 lb  BP: 166/ 74 mmHg    Indications: Syncope and collapse    Diagnoses: R55. - Syncope and collapse    Sonographer:  GRAZYNA Renee  Primary Physician:  Lisbeth Vallejo DO  Referring Physician:  Valerie Samuel MD  Group:  [facility]  Interpreting Physician:  Genaro Thompson MD    SUMMARY    LEFT VENTRICLE:  Systolic function was normal. Ejection fraction was estimated to be 64 %.  There were no regional wall motion abnormalities.  Wall thickness was at the upper limits of normal.  There was no evidence of concentric hypertrophy.  Doppler parameters were consistent with abnormal left ventricular relaxation (grade 1 diastolic dysfunction).    LEFT ATRIUM:  The atrium was mildly dilated.    MITRAL VALVE:  There was mild annular calcification.  There was trace regurgitation.    AORTIC VALVE:  There was mild regurgitation.    TRICUSPID VALVE:  There was trace regurgitation.    HISTORY: PRIOR HISTORY: Hypertension; GERD; Anxiety; Hyperlipidemia; Anemia    PROCEDURE: The procedure was performed at the bedside. This was a routine study. The transthoracic approach was used. The study included complete 2D imaging, M-mode, complete spectral Doppler, and color Doppler. The heart rate was 67 bpm,  at the start of the study. Images were obtained from the parasternal, apical, subcostal, and suprasternal notch acoustic windows. Image quality was adequate.    LEFT VENTRICLE: Size was normal. Systolic function was  normal. Ejection fraction was estimated to be 64 %. There were no regional wall motion abnormalities. Wall thickness was at the upper limits of normal. There was no evidence of  concentric hypertrophy. DOPPLER: Doppler parameters were consistent with abnormal left ventricular relaxation (grade 1 diastolic dysfunction).    RIGHT VENTRICLE: The size was normal. Systolic function was normal. Wall thickness was normal.    LEFT ATRIUM: The atrium was mildly dilated.    RIGHT ATRIUM: Size was normal.    MITRAL VALVE: There was mild annular calcification. Valve structure was normal. There was normal leaflet separation. DOPPLER: The transmitral velocity was within the normal range. There was no evidence for stenosis. There was trace  regurgitation.    AORTIC VALVE: The valve was trileaflet. Leaflets exhibited normal thickness and normal cuspal separation. DOPPLER: Transaortic velocity was within the normal range. There was no evidence for stenosis. There was mild regurgitation.    TRICUSPID VALVE: The valve structure was normal. There was normal leaflet separation. DOPPLER: The transtricuspid velocity was within the normal range. There was no evidence for stenosis. There was trace regurgitation. Pulmonary artery  systolic pressure was within the normal range.    PULMONIC VALVE: Leaflets exhibited normal thickness, no calcification, and normal cuspal separation. DOPPLER: The transpulmonic velocity was within the normal range. There was no regurgitation.    PERICARDIUM: There was no pericardial effusion. The pericardium was normal in appearance.    AORTA: The root exhibited normal size.    SYSTEM MEASUREMENT TABLES    2D  %FS: 36.04 %  Ao Diam: 3.48 cm  Ao asc: 3.62 cm  EDV(Teich): 162.36 ml  EF(Teich): 64.92 %  ESV(Teich): 56.95 ml  IVSd: 0.84 cm  LA Area: 20.23 cm2  LA Diam: 3.85 cm  LVEDV MOD A4C: 111.08 ml  LVEF MOD A4C: 64.5 %  LVESV MOD A4C: 39.44 ml  LVIDd: 5.74 cm  LVIDs: 3.67 cm  LVLd A4C: 7.89 cm  LVLs A4C: 6.73  cm  LVPWd: 0.83 cm  RA Area: 14.23 cm2  RVIDd: 2.97 cm  SV MOD A4C: 71.65 ml  SV(Teich): 105.41 ml    CW  AR Dec Holmes: 3.47 m/s2  AR Dec Time: 1238.83 ms  AR PHT: 359.26 ms  AR Vmax: 4.29 m/s  AR maxP.74 mmHg  TR Vmax: 2.05 m/s  TR maxP.73 mmHg    MM  TAPSE: 2.24 cm    PW  E' Sept: 0.07 m/s  E/E' Sept: 9.81  MV A Giles: 1.04 m/s  MV Dec Holmes: 3.39 m/s2  MV DecT: 207.65 ms  MV E Giles: 0.7 m/s  MV E/A Ratio: 0.68  MV PHT: 60.22 ms  MVA By PHT: 3.65 cm2    [facility]    Prepared and electronically signed by    Genaro Thompson MD  Signed 02-Sep-2021 06:43:56    No results found for this or any previous visit.    --------------------------------------------------------------------------------  HOLTER  No results found for this or any previous visit.    --------------------------------------------------------------------------------  CAROTIDS  No results found for this or any previous visit.       ---------------------------------------------------------------------------------  Review of Systems   Constitutional:  Negative for chills and fever.   HENT:  Negative for ear pain and sore throat.    Eyes:  Negative for pain and visual disturbance.   Respiratory:  Positive for shortness of breath (on exertion). Negative for cough and chest tightness.    Cardiovascular:  Negative for chest pain and palpitations.   Gastrointestinal:  Negative for abdominal pain and vomiting.   Genitourinary:  Negative for dysuria and hematuria.   Musculoskeletal:  Positive for arthralgias (bilatral knees). Negative for back pain.   Skin:  Negative for color change and rash.   Neurological:  Negative for seizures and syncope.   All other systems reviewed and are negative.      Current Medications[1]  Past Medical History[2]  Past Surgical History[3]  Social History     Socioeconomic History    Marital status:      Spouse name: Not on file    Number of children: Not on file     Years of education: Not on file    Highest education level: Not on file   Occupational History    Occupation: Retired    Tobacco Use    Smoking status: Former     Current packs/day: 0.00     Types: Cigarettes     Start date: 1983     Quit date: 10/14/2013     Years since quittin.7     Passive exposure: Current    Smokeless tobacco: Never   Vaping Use    Vaping status: Never Used   Substance and Sexual Activity    Alcohol use: No    Drug use: No    Sexual activity: Not Currently     Birth control/protection: None   Other Topics Concern    Not on file   Social History Narrative    Consumes 2-4 cups per day        Do you have pets? none Is pet allowed in bedroom?NA    Are you a smoker? Former    Does anyone smoke in your home? Yes       Do you live with smokers? Yes    Travel [place] frequently? No   How many times a year? N/A      Social Drivers of Health     Financial Resource Strain: Low Risk  (3/28/2023)    Overall Financial Resource Strain (CARDIA)     Difficulty of Paying Living Expenses: Not hard at all   Food Insecurity: No Food Insecurity (2/10/2025)    Nursing - Inadequate Food Risk Classification     Worried About Running Out of Food in the Last Year: Never true     Ran Out of Food in the Last Year: Never true     Ran Out of Food in the Last Year: Never true   Transportation Needs: No Transportation Needs (2/10/2025)    Nursing - Transportation Risk Classification     Lack of Transportation: Not on file     Lack of Transportation: No   Physical Activity: Not on file   Stress: Not on file   Social Connections: Not on file   Intimate Partner Violence: Unknown (2/10/2025)    Nursing IPS     Feels Physically and Emotionally Safe: Not on file     Physically Hurt by Someone: Not on file     Humiliated or Emotionally Abused by Someone: Not on file     Physically Hurt by Someone: No     Hurt or Threatened by Someone: No   Housing Stability: Unknown (2/10/2025)    Nursing: Inadequate Housing Risk  "Classification     Has Housing: Not on file     Worried About Losing Housing: Not on file     Unable to Get Utilities: Not on file     Unable to Pay for Housing in the Last Year: No     Has Housing: No     Family History[4]  Allergies[5]    Objective     Vitals:    07/02/25 1110   BP: 116/74   BP Location: Right arm   Patient Position: Sitting   Cuff Size: Standard   Pulse: 65   SpO2: 96%   Weight: 79 kg (174 lb 1.6 oz)   Height: 5' 2\" (1.575 m)       Physical exam:     Physical Exam  Vitals and nursing note reviewed.   Constitutional:       General: She is not in acute distress.     Appearance: Normal appearance. She is well-developed. She is not ill-appearing.   HENT:      Head: Normocephalic and atraumatic.      Right Ear: External ear normal.      Left Ear: External ear normal.      Mouth/Throat:      Mouth: Mucous membranes are moist.     Eyes:      Extraocular Movements: Extraocular movements intact.      Conjunctiva/sclera: Conjunctivae normal.      Pupils: Pupils are equal, round, and reactive to light.       Cardiovascular:      Rate and Rhythm: Normal rate and regular rhythm.      Pulses: Normal pulses.      Heart sounds: Normal heart sounds. No murmur heard.     No friction rub. No gallop.      Comments: No JVD, limbs warm  Pulmonary:      Effort: Pulmonary effort is normal. No respiratory distress.      Breath sounds: Normal breath sounds. No wheezing, rhonchi or rales.   Abdominal:      General: Bowel sounds are normal. There is no distension.      Palpations: Abdomen is soft.      Tenderness: There is no abdominal tenderness. There is no guarding.      Hernia: No hernia is present.     Musculoskeletal:         General: No swelling or deformity.      Cervical back: Neck supple.      Right lower leg: No edema.      Left lower leg: No edema.     Skin:     General: Skin is warm and dry.      Coloration: Skin is not jaundiced.      Findings: No bruising, lesion or rash.     Neurological:      General: No " focal deficit present.      Mental Status: She is alert and oriented to person, place, and time.           ==  PLEASE NOTE:  This encounter was completed utilizing the [employer] Direct Speech Voice Recognition Software. Grammatical errors, random word insertions, pronoun errors and incomplete sentences are occasional consequences of the system due to software limitations, ambient noise and hardware issues.These may be missed by proof reading prior to affixing electronic signature. Any questions or concerns about the content, text or information contained within the body of this dictation should be directly addressed to the physician for clarification. Please do not hesitate to call me directly if you have any any questions or concerns.          [1]   Current Outpatient Medications:     albuterol (Ventolin HFA) 90 mcg/act inhaler, Inhale 2 puffs every 6 (six) hours as needed for wheezing, Disp: 18 g, Rfl: 0    amLODIPine (NORVASC) 10 mg tablet, TAKE 1 TABLET BY MOUTH EVERY DAY, Disp: 90 tablet, Rfl: 1    atorvastatin (LIPITOR) 20 mg tablet, TAKE 1 TABLET BY MOUTH EVERY DAY, Disp: 90 tablet, Rfl: 1    buPROPion (WELLBUTRIN XL) 150 mg 24 hr tablet, TAKE 1 TABLET BY MOUTH EVERY DAY IN THE MORNING, Disp: 90 tablet, Rfl: 1    Cyanocobalamin (VITAMIN B-12 PO), , Disp: , Rfl:     docusate sodium (COLACE) 100 mg capsule, TAKE 1 CAPSULE BY MOUTH TWICE A DAY, Disp: 60 capsule, Rfl: 1    famotidine (PEPCID) 40 MG tablet, Take 1 tablet (40 mg total) by mouth daily, Disp: 60 tablet, Rfl: 5    fluticasone (FLONASE) 50 mcg/act nasal spray, 1 SPRAY INTO EACH NOSTRIL IN THE MORNING AND 1 SPRAY BEFORE BEDTIME., Disp: 48 mL, Rfl: 2    fluticasone-salmeterol (Advair HFA) 45-21 MCG/ACT inhaler, Inhale 2 puffs 2 (two) times a day, Disp: 12 g, Rfl: 5    Ivermectin 1 % CREA, Apply topically in the morning, Disp: 45 g, Rfl: 1    levothyroxine 150 mcg tablet, TAKE 1 TABLET (150 MCG TOTAL) BY MOUTH DAILY IN THE EARLY MORNING, Disp: 90  tablet, Rfl: 1    losartan (COZAAR) 100 MG tablet, TAKE 1 TABLET BY MOUTH EVERY DAY, Disp: 90 tablet, Rfl: 1    meclizine (ANTIVERT) 25 mg tablet, Take 1 tablet (25 mg total) by mouth every 8 (eight) hours as needed for dizziness for up to 10 days, Disp: 30 tablet, Rfl: 0    metoprolol succinate (TOPROL-XL) 25 mg 24 hr tablet, TAKE 1 TABLET (25 MG TOTAL) BY MOUTH DAILY., Disp: 90 tablet, Rfl: 1    ondansetron (ZOFRAN) 4 mg tablet, Take 1 tablet (4 mg total) by mouth 3 (three) times a day as needed for nausea or vomiting, Disp: 20 tablet, Rfl: 0    pregabalin (LYRICA) 75 mg capsule, TAKE 1 CAPSULE BY MOUTH TWICE A DAY, Disp: 60 capsule, Rfl: 0    sertraline (ZOLOFT) 100 mg tablet, TAKE 1 TABLET BY MOUTH TWICE A DAY, Disp: 180 tablet, Rfl: 2    Spacer/Aero-Holding Chambers (Vortex Valved Holding Chamber) DASHA, Use 2 (two) times a day, Disp: 1 each, Rfl: 0    traZODone (DESYREL) 50 mg tablet, TAKE 2 TABLETS (100 MG TOTAL) BY MOUTH DAILY AT BEDTIME, Disp: 180 tablet, Rfl: 1    Ascorbic Acid (VITAMIN C PO), Vitamin C, Disp: , Rfl:     Cholecalciferol (Vitamin D3) 1.25 MG (54511 UT) CAPS, Take 5,000 Units by mouth daily, Disp: , Rfl:   [2]   Past Medical History:  Diagnosis Date    Acid reflux     Anxiety     Arthritis     Asthma     C1-C4 level with central cord syndrome (HCC)     Resolved 2/1/2017     Carpal tunnel syndrome Resolved 4/21/2015    Colitis     Colon polyp     Concussion     Depressed     Dysthymic disorder     Resolved 1/5/2018    Gastric ulcer 2013    small - on EGD - [facility]    Hemorrhoids     Hx of blood clots     Hx of colonic polyps     D'Merrick    Hyperlipemia     Hypertension     Hypothyroid     Lumbar spondylosis 04/25/2024    Lung nodule     stable x 2 yrs on CT    Migraines     Obesity     Postural dizziness with presyncope     Resolved 8/28/2018     Tendinitis     Ulnar neuropathy     Resolved 5/21/2015     Vertigo    [3]   Past Surgical History:  Procedure Laterality Date    APPENDECTOMY       BREAST SURGERY Left 01/1966    BREAST LUMPECTOMY    CARPAL TUNNEL RELEASE      CHOLECYSTECTOMY      DILATION AND CURETTAGE OF UTERUS      ELBOW SURGERY      EYE SURGERY Bilateral 2019    Cataract     GALLBLADDER SURGERY      JOINT REPLACEMENT Right     REPLACEMENT TOTAL KNEE    KNEE ARTHROSCOPY      KNEE SURGERY Right     NECK SURGERY      ORTHOPEDIC SURGERY      POSTERIOR LAMINECTOMY / DECOMPRESSION CERVICAL SPINE  02/2015    REPLACEMENT TOTAL KNEE Left 2022    TONSILLECTOMY      TOTAL KNEE ARTHROPLASTY Left 04/06/2022    [facility] Dr. Diaz    VAGINAL DELIVERY      x3    WRIST SURGERY Right    [4]   Family History  Problem Relation Name Age of Onset    Breast cancer Mother Elly     Alzheimer's disease Mother Elly     Coronary artery disease Mother Elly     Hypertension Mother Elly     Migraines Mother Elly     Peripheral vascular disease Mother Elly     Arthritis Mother Elly     Cancer Mother Elly     Parkinsonism Father JohnFeijo`     Other Father JohnFeijo`         Cardiovascular disease     Coronary artery disease Father JohnFeijo`     Hypertension Father JohnFeijo`     Bipolar disorder Sister Jacqueline     Thyroid disease Sister Jacqueline     Alzheimer's disease Sister Jacqueline     Depression Sister Jacqueline     Throat cancer Maternal Grandfather Raposa     Cancer Maternal Grandfather Raposa     Allergic rhinitis Daughter      Asthma Daughter      Asthma Son Andrés Cadena     Diabetes Neg Hx      Stroke Neg Hx     [5]   Allergies  Allergen Reactions    Cefuroxime Hives and Other (See Comments)     Ceftin    Cephalexin Other (See Comments)     ? PT Not 100% Sure Will Call US    Affected how she retained water.    Medical Tape Itching

## 2025-07-25 DIAGNOSIS — I10 ESSENTIAL HYPERTENSION: ICD-10-CM

## 2025-07-28 ENCOUNTER — HOSPITAL ENCOUNTER (OUTPATIENT)
Dept: NON INVASIVE DIAGNOSTICS | Facility: HOSPITAL | Age: 80
Discharge: HOME | End: 2025-07-28
Payer: MEDICARE

## 2025-07-28 ENCOUNTER — HOSPITAL ENCOUNTER (OUTPATIENT)
Dept: NUCLEAR MEDICINE | Facility: HOSPITAL | Age: 80
Discharge: HOME | End: 2025-07-28
Payer: MEDICARE

## 2025-07-28 VITALS — WEIGHT: 174 LBS | HEIGHT: 62 IN | BODY MASS INDEX: 32.02 KG/M2

## 2025-07-28 DIAGNOSIS — R06.02 SHORTNESS OF BREATH: ICD-10-CM

## 2025-07-28 DIAGNOSIS — I51.89 DIASTOLIC DYSFUNCTION: ICD-10-CM

## 2025-07-28 DIAGNOSIS — I35.1 NONRHEUMATIC AORTIC VALVE INSUFFICIENCY: ICD-10-CM

## 2025-07-28 LAB
MAX HR PERCENT: 52 %
MAX HR: 73 BPM
RATE PRESSURE PRODUCT: NORMAL
SL CV REST NUCLEAR ISOTOPE DOSE: 10.7 MCI
SL CV STRESS NUCLEAR ISOTOPE DOSE: 31.4 MCI
SL CV STRESS RECOVERY BP: NORMAL MMHG
SL CV STRESS RECOVERY HR: 66 BPM
SL CV STRESS RECOVERY O2 SAT: 98 %
SPECT HRT GATED+EF W RNC IV: 77 %
STRESS ANGINA INDEX: 0
STRESS BASELINE BP: NORMAL MMHG
STRESS BASELINE HR: 61 BPM
STRESS O2 SAT REST: 99 %
STRESS PEAK HR: 73 BPM
STRESS POST ESTIMATED WORKLOAD: 1 METS
STRESS POST O2 SAT PEAK: 98 %
STRESS POST PEAK BP: 137 MMHG
STRESS/REST PERFUSION RATIO: 1.15

## 2025-07-28 PROCEDURE — A9502 TC99M TETROFOSMIN: HCPCS

## 2025-07-28 PROCEDURE — 78452 HT MUSCLE IMAGE SPECT MULT: CPT

## 2025-07-28 PROCEDURE — 78452 HT MUSCLE IMAGE SPECT MULT: CPT | Performed by: INTERNAL MEDICINE

## 2025-07-28 PROCEDURE — 93018 CV STRESS TEST I&R ONLY: CPT | Performed by: INTERNAL MEDICINE

## 2025-07-28 PROCEDURE — 93016 CV STRESS TEST SUPVJ ONLY: CPT | Performed by: INTERNAL MEDICINE

## 2025-07-28 PROCEDURE — 93017 CV STRESS TEST TRACING ONLY: CPT

## 2025-07-28 RX ORDER — LOSARTAN POTASSIUM 100 MG/1
100 TABLET ORAL DAILY
Qty: 90 TABLET | Refills: 1 | Status: SHIPPED | OUTPATIENT
Start: 2025-07-28

## 2025-07-29 DIAGNOSIS — E03.9 HYPOTHYROIDISM, UNSPECIFIED TYPE: ICD-10-CM

## 2025-07-29 LAB
CHEST PAIN STATEMENT: NORMAL
MAX DIASTOLIC BP: 68 MMHG
MAX PREDICTED HEART RATE: 138 BPM
PROTOCOL NAME: NORMAL
REASON FOR TERMINATION: NORMAL
STRESS POST EXERCISE DUR MIN: 3 MIN
STRESS POST EXERCISE DUR SEC: 0 SEC
STRESS POST PEAK HR: 73 BPM
STRESS POST PEAK SYSTOLIC BP: 165 MMHG
TARGET HR FORMULA: NORMAL
TEST INDICATION: NORMAL

## 2025-07-30 RX ORDER — LEVOTHYROXINE SODIUM 150 UG/1
150 TABLET ORAL
Qty: 90 TABLET | Refills: 1 | Status: SHIPPED | OUTPATIENT
Start: 2025-07-30

## 2025-08-05 ENCOUNTER — OFFICE VISIT (OUTPATIENT)
Dept: FAMILY MEDICINE CLINIC | Facility: CLINIC | Age: 80
End: 2025-08-05
Payer: MEDICARE

## 2025-08-05 VITALS
SYSTOLIC BLOOD PRESSURE: 132 MMHG | HEART RATE: 63 BPM | TEMPERATURE: 98.6 F | WEIGHT: 177 LBS | DIASTOLIC BLOOD PRESSURE: 68 MMHG | OXYGEN SATURATION: 97 % | HEIGHT: 62 IN | RESPIRATION RATE: 14 BRPM | BODY MASS INDEX: 32.57 KG/M2

## 2025-08-05 DIAGNOSIS — D64.9 ANEMIA, UNSPECIFIED TYPE: ICD-10-CM

## 2025-08-05 DIAGNOSIS — E66.811 OBESITY, CLASS I, BMI 30-34.9: ICD-10-CM

## 2025-08-05 DIAGNOSIS — J45.30 MILD PERSISTENT ASTHMA WITHOUT COMPLICATION: ICD-10-CM

## 2025-08-05 DIAGNOSIS — I10 ESSENTIAL HYPERTENSION: Primary | ICD-10-CM

## 2025-08-05 DIAGNOSIS — E53.8 B12 DEFICIENCY: ICD-10-CM

## 2025-08-05 DIAGNOSIS — E03.9 HYPOTHYROIDISM, UNSPECIFIED TYPE: ICD-10-CM

## 2025-08-05 DIAGNOSIS — R73.01 IFG (IMPAIRED FASTING GLUCOSE): ICD-10-CM

## 2025-08-05 DIAGNOSIS — E78.2 MIXED HYPERLIPIDEMIA: ICD-10-CM

## 2025-08-05 PROCEDURE — G2211 COMPLEX E/M VISIT ADD ON: HCPCS | Performed by: FAMILY MEDICINE

## 2025-08-05 PROCEDURE — 99214 OFFICE O/P EST MOD 30 MIN: CPT | Performed by: FAMILY MEDICINE

## 2025-08-21 DIAGNOSIS — F32.A DEPRESSION, UNSPECIFIED DEPRESSION TYPE: ICD-10-CM

## 2025-08-21 DIAGNOSIS — I10 ESSENTIAL HYPERTENSION: ICD-10-CM

## 2025-08-22 RX ORDER — METOPROLOL SUCCINATE 25 MG/1
25 TABLET, EXTENDED RELEASE ORAL DAILY
Qty: 90 TABLET | Refills: 1 | Status: SHIPPED | OUTPATIENT
Start: 2025-08-22

## 2025-08-22 RX ORDER — SERTRALINE HYDROCHLORIDE 100 MG/1
100 TABLET, FILM COATED ORAL 2 TIMES DAILY
Qty: 180 TABLET | Refills: 1 | Status: SHIPPED | OUTPATIENT
Start: 2025-08-22